# Patient Record
Sex: MALE | Race: WHITE | NOT HISPANIC OR LATINO
[De-identification: names, ages, dates, MRNs, and addresses within clinical notes are randomized per-mention and may not be internally consistent; named-entity substitution may affect disease eponyms.]

---

## 2017-04-06 ENCOUNTER — APPOINTMENT (OUTPATIENT)
Dept: HEART AND VASCULAR | Facility: CLINIC | Age: 82
End: 2017-04-06

## 2017-04-06 VITALS
RESPIRATION RATE: 20 BRPM | DIASTOLIC BLOOD PRESSURE: 47 MMHG | OXYGEN SATURATION: 99 % | WEIGHT: 125.66 LBS | HEART RATE: 64 BPM | HEIGHT: 69 IN | SYSTOLIC BLOOD PRESSURE: 109 MMHG | TEMPERATURE: 99 F

## 2017-04-06 VITALS
HEIGHT: 69 IN | HEART RATE: 49 BPM | OXYGEN SATURATION: 98 % | WEIGHT: 125 LBS | DIASTOLIC BLOOD PRESSURE: 44 MMHG | SYSTOLIC BLOOD PRESSURE: 103 MMHG | BODY MASS INDEX: 18.51 KG/M2

## 2017-04-06 RX ORDER — CHLORHEXIDINE GLUCONATE 213 G/1000ML
1 SOLUTION TOPICAL ONCE
Qty: 0 | Refills: 0 | Status: DISCONTINUED | OUTPATIENT
Start: 2017-04-10 | End: 2017-04-17

## 2017-04-06 NOTE — H&P ADULT - NSHPSOCIALHISTORY_GEN_ALL_CORE
former smoker quit 1980s, denies ETOH use former smoker quit 1980s (previously .[id number] years), denies ETOH use

## 2017-04-06 NOTE — H&P ADULT - PROBLEM SELECTOR PLAN 3
- H/H 7.8/25.9 today, as per verbal report from Dr. Montalvo's office Hgb 12.3 in 07/2016  - Guaic +, Denies gross blood in stool  - GI consult with Dr. Bolton  - NPO in the setting of possible colonscopy  - Discontinue ASA 81mg PO and Plavix 75mg PO in the setting of anemia and duration since last placed stent (4 years)  - Type and Screen ordered- follow up results  - Pt consented for 2L blood transfusion - H/H 7.8/25.9 today, as per verbal report from Dr. Montalvo's office Hgb 12.3 in 07/2016  - Endorsing weight loss and   - Guaic +, Denies gross blood in stool  - GI consult with Dr. Bolton  - NPO in the setting of possible colonscopy  - As per Dr. Mena, discontinue ASA 81mg PO and Plavix 75mg PO in the setting of anemia and duration since last placed stent (4 years)  - Type and Screen ordered- follow up results  - Pt consented for 2L blood transfusion - H/H 7.8/25.9 today, as per verbal report from Dr. Montalvo's office Hgb 12.3 in 07/2016  - Endorsing weight loss and   - Guaic +, Endorses 5lb weight loss, loss of appetite and diarrhea. Denies gross blood in stool  - GI consult with Dr. Bolton  - NPO in the setting of possible colonscopy  - As per Dr. Mena, discontinue ASA 81mg PO and Plavix 75mg PO in the setting of anemia and duration since last placed stent (4 years)  - Type and Screen ordered- follow up results  - Pt consented for blood transfusion  - Pending Echo results, will order 2U PRBCs - H/H 7.8/25.9 today, as per verbal report from Dr. Montalvo's office Hgb 12.3 in 07/2016  - Endorsing weight loss and   - Guaic +, Endorses 5lb weight loss, loss of appetite and diarrhea. Denies gross blood in stool  - GI consult with Dr. Bolton  - NPO in the setting of possible colonscopy  - As per Dr. Mena, discontinue ASA 81mg PO and Plavix 75mg PO in the setting of anemia and duration since last placed stent (4 years)  - Last dose ASA 81mg PO was yesterday, last dose Plavix 75mg PO today per pt  - Type and Screen ordered- follow up results  - Pt consented for blood transfusion  - Pending Echo results, will order 2U PRBCs - H/H 7.8/25.9 today, as per outpt labs Hgb 12.3 in 07/2016  - Guaic +, Endorses 5lb weight loss, loss of appetite and diarrhea. Denies gross blood in stool  - GI consult with Dr. Bolton  - NPO in the setting of possible colonscopy  - As per Dr. Mena, discontinue ASA 81mg PO and Plavix 75mg PO in the setting of anemia and duration since last placed stent (4 years)  - Last dose ASA 81mg PO was yesterday, last dose Plavix 75mg PO today per pt  - Type and Screen ordered- follow up results  - Pt consented and agreeable to blood transfusion  - Pending Type and Screen Results-  will order 2U PRBCs (EF 55-60% by Echo 04/10/17) - H/H 7.8/25.9 today, as per outpt labs Hgb 12.3 in 07/2016  - Guaic +, Endorses 5lb weight loss, loss of appetite and diarrhea. Denies gross blood in stool  - GI consult with Dr. Bolton  - NPO in the setting of possible colonscopy, follow up GI recs  - As per Dr. Mena, discontinue ASA 81mg PO and Plavix 75mg PO in the setting of anemia and duration since last placed stent (4 years)  - Last dose ASA 81mg PO was yesterday, last dose Plavix 75mg PO today per pt  - Type and Screen ordered- follow up results  - Pt consented and agreeable to blood transfusion  - Pending Type and Screen Results-  will order 2U PRBCs (EF 55-60% by Echo 04/10/17) - H/H 7.8/25.9 today, as per outpt labs Hgb 12.3 in 07/2016  - Guaic +, Endorses 5lb weight loss, loss of appetite and diarrhea. Denies gross blood in stool  - GI consult with Dr. Bolton, follow up recs  - As per Dr. Mena, discontinue ASA 81mg PO and Plavix 75mg PO in the setting of anemia and duration since last placed stent (4 years)  - Last dose ASA 81mg PO was yesterday, last dose Plavix 75mg PO today per pt  - Type and Screen ordered- follow up results  - Pt consented and agreeable to blood transfusion  - Pending Type and Screen Results-  will order 2U PRBCs (EF 55-60% by Echo 04/10/17)

## 2017-04-06 NOTE — H&P ADULT - PROBLEM SELECTOR PLAN 1
- Admit to [facility] for telemetry monitoring  - Currently O2 Sat 99% on RA, not in any respiratory distress  - Does not appear to be overloaded by PE  - Echo ordered, follow up results - Admit to [facility] for telemetry monitoring  - Currently O2 Sat 99% on RA, accessory muslce use but not in any respiratory distress  - Does not appear to be overloaded by PE, lungs CTAB, no JVD, O2Sat stable when supine  - Echo ordered, follow up results - Admit to [facility] for telemetry monitoring  - Currently O2 Sat 99% on RA, accessory muslce use but not in any respiratory distress  - WBC 11.4 with left shift. Denies fevers, chills, cough, recent illness. Continue to monitor.   - Does not appear to be overloaded by PE, lungs CTAB, no JVD, O2Sat stable when supine  - Echo ordered, follow up results - Admit to [facility] for telemetry monitoring  - Currently O2 Sat 99% on RA, accessory muslce use but not in any respiratory distress  - WBC 11.4 with left shift. Denies fevers, chills, cough, recent illness. Continue to monitor.   - Does not appear to be overloaded by PE, lungs CTAB, no JVD, O2Sat stable when supine  - Echo 04/10/17 revealed LVEF 55%-60% - Admit to [facility] for telemetry monitoring  - Currently O2 Sat 99% on RA, accessory muslce use but not in any respiratory distress  - WBC 11.4 with left shift. Denies fevers, chills, cough, recent illness. Continue to monitor.   - Does not appear to be overloaded by PE, lungs CTAB, no JVD, O2Sat stable when supine  - Echo 04/10/17 revealed LVEF 55%-60%  - CXR ordered- follow up results - Admit to [facility] for telemetry monitoring  - Currently O2 Sat 99% on RA, accessory muscle use but not in any respiratory distress  - WBC 11.4 with left shift. Denies fevers, chills, cough, recent illness. Continue to monitor.   - Does not appear to be overloaded by PE, lungs CTAB, no JVD, O2Sat stable when supine  - Echo 04/10/17 revealed LVEF 55%-60%  - CXR/ UA ordered- follow up results

## 2017-04-06 NOTE — H&P ADULT - NSHPPHYSICALEXAM_GEN_ALL_CORE
Appearance: Cachectic  HEENT:   Normal oral mucosa, PERRL, EOMI	  Neck: Supple, No Carotid Bruit and 2+ pulses B/L  Cardiovascular: Normal S1 S2, No JVD, No murmurs  Respiratory: + intercostal muscle accessory muscle use, Lungs clear to auscultation, No Rales, Rhonchi, Wheezing	  Gastrointestinal:  + Guaic, Soft, Non-tender, + BS	  Skin: No rashes, No ecchymoses, No cyanosis  Extremities: Normal range of motion, No clubbing, cyanosis or edema  Vascular: Femoral pulses 2+ b/l without bruit, DP 2+ b/l, PT 2+ b/l  Neurologic: Non-focal  Psychiatry: A & O x 3, Mood & affect appropriate Appearance: Cachectic and pale  HEENT:   Normal oral mucosa, PERRL, EOMI	  Neck: Supple, No Carotid Bruit and 2+ pulses B/L  Cardiovascular: Normal S1 S2, No JVD, No murmurs  Respiratory: + intercostal muscle accessory muscle use, Lungs clear to auscultation, No Rales, Rhonchi, Wheezing	  Gastrointestinal:  + Guaic, Soft, Non-tender, + BS	  Skin: No rashes, No ecchymoses, No cyanosis  Extremities: Normal range of motion, No clubbing, cyanosis or edema  Vascular: Femoral pulses 2+ b/l without bruit, DP 2+ b/l, PT 2+ b/l  Neurologic: Non-focal  Psychiatry: A & O x 3, Mood & affect appropriate

## 2017-04-06 NOTE — H&P ADULT - HISTORY OF PRESENT ILLNESS
To bring in medications    Pt is an 87 yo M former smoker, PMHx CAD s/p PCI x3 LCx (2013 @[facility]), HLD, BPH, presented to cardiologist c/o worsening shortness of breath which has progressed over the past year. Patient states symptoms are similar to when he required his previous stents, and he can only walk about a block or less before having to stop secondary to shortness of breath. He states that sometimes getting dressed and walking around the house can also cause SOB. He denies any symptoms such as chest pain, LOC, dizziness, syncope, PND, orthopnea, LE edema, palpitations, asthma/COPD, recent viral illness or travel. On EKG in office patient noticed to be sinus yasmine with IVCD. Pt recommended for cardiac catheterization given history of multiple PCI and EKG abnormalities as well as possible echo/EP evaluation. To bring in medications    * Pt sounded mildly short of breath on phone 4/6/17, able to form full sentences and denied any distress, chest pain, lightheadedness, instructed to please go immediately to emergency room/call EMS if shortness of breath worsened or any symptoms including not limited to chest pain, lightheadedness/dizziness, pt agreed and understood      Pt is an 85 yo M former smoker, PMHx CAD s/p PCI x3 LCx (2013 @[facility]), HLD, BPH, presented to cardiologist c/o worsening shortness of breath which has progressed over the past year. Patient states symptoms are similar to when he required his previous stents, and he can only walk about a block or less before having to stop secondary to shortness of breath. He states that sometimes getting dressed and walking around the house can also cause SOB. He denies any symptoms such as chest pain, LOC, dizziness, syncope, PND, orthopnea, LE edema, palpitations, asthma/COPD, recent viral illness or travel. On EKG in office patient noticed to be sinus yasmine with IVCD. Pt recommended for cardiac catheterization given history of multiple PCI and EKG abnormalities as well as possible echo/EP evaluation. To bring in medications    * Pt sounded mildly short of breath on phone 4/6/17, able to form full sentences and denied any distress, chest pain, lightheadedness, instructed to please go immediately to emergency room/call EMS if shortness of breath worsened or any symptoms including not limited to chest pain, lightheadedness/dizziness, pt agreed and understood      Pt is an 85 yo M former smoker, PMHx CAD s/p PCI x3 LCx (2013 @[facility]), HLD, BPH, presented to cardiologist c/o worsening shortness of breath which has progressed over the past year. Patient states symptoms are similar to when he required his previous stents, and he can only walk about a block or less before having to stop secondary to shortness of breath. He states that sometimes getting dressed and walking around the house can also cause SOB. He denies any symptoms such as chest pain, LOC, dizziness, syncope, PND, orthopnea, LE edema, palpitations, asthma/COPD, recent viral illness or travel. On EKG in office patient noticed to be sinus yasmine with IVCD. Pt recommended for Right and Left cardiac catheterization given history of multiple PCI and EKG abnormalities as well as possible echo/EP evaluation. Pt is an 87 yo M former smoker, PMHx CAD s/p PCI x3 LCx (2013 @[facility]), HLD, BPH, presented to cardiologist c/o worsening shortness of breath which has progressed over the past year. Patient states symptoms are similar to when he required his previous stents, and he can only walk about a block or less before having to stop secondary to shortness of breath. He states that sometimes getting dressed and walking around the house can also cause SOB. Of note, he also endorses a recent loss of appetite over the past month and non-bloody diarrhea x 1 week with associated 5lb weight loss. He denies any symptoms such as chest pain, LOC, dizziness, syncope, PND, orthopnea, LE edema, palpitations, asthma/COPD, recent viral illness or travel, abdominal pain, nausea/vomiting. On EKG in office patient noticed to be sinus yasmine with IVCD. Pt recommended for Right and Left cardiac catheterization given history of multiple PCI and EKG abnormalities as well as possible echo/EP evaluation. Pt is an 85 yo M former smoker, PMHx CAD s/p PCI x3 LCx (2013 @[facility]), HLD, BPH, presented to cardiologist c/o worsening shortness of breath which has progressed over the past year. Patient states symptoms are similar to when he required his previous stents, and he can only walk about a block or less before having to stop secondary to shortness of breath. He states that sometimes getting dressed and walking around the house can also cause SOB. Of note, he also endorses a recent loss of appetite over the past month and non-bloody diarrhea x 1 week with associated 5lb weight loss. He denies any symptoms such as chest pain, LOC, dizziness, syncope, PND, orthopnea, LE edema, palpitations, asthma/COPD, recent viral illness or travel, abdominal pain, nausea/vomiting, hx of blood tranfusion/anemia. On EKG in office patient noticed to be sinus yasmine with IVCD. Pt recommended for Right and Left cardiac catheterization given history of multiple PCI and EKG abnormalities as well as possible echo/EP evaluation. Pt is an 85 yo M former smoker, PMHx CAD s/p PCI x3 LCx (2013 @[facility]), HLD, BPH, depression, presented to cardiologist c/o worsening shortness of breath which has progressed over the past year. Patient states symptoms are similar to when he required his previous stents, and he can only walk about a block or less before having to stop secondary to shortness of breath. He states that sometimes getting dressed and walking around the house can also cause SOB. Of note, he also endorses a recent loss of appetite over the past month and non-bloody diarrhea x 1 week with associated 5lb weight loss. He denies any symptoms such as chest pain, LOC, dizziness, syncope, PND, orthopnea, LE edema, palpitations, asthma/COPD, recent viral illness or travel, abdominal pain, nausea/vomiting, hx of blood tranfusion/anemia. On EKG in office patient noticed to be sinus yasmine with IVCD. Pt recommended for Right and Left cardiac catheterization given history of multiple PCI and EKG abnormalities as well as possible echo/EP evaluation.

## 2017-04-06 NOTE — H&P ADULT - PROBLEM SELECTOR PLAN 2
- s/p DAYANA x3 to LCx 2013 with Dr. Varela  - NIURKA and L heart cardiac cath scheduled 04/10/17 with Dr. Varela cancelled in the setting of anemia - s/p DAYANA x3 to LCx 2013 with Dr. Avery BAH and L heart cardiac cath scheduled 04/10/17 with Dr. Varela cancelled in the setting of symptomatic anemia - s/p DAYANA x3 to LCx 2013 with Dr. Varela  - R and L heart cardiac cath scheduled 04/10/17 with Dr. Varela cancelled in the setting of symptomatic anemia  - As per Dr. Varela re-consider cardiac cath when he is stable

## 2017-04-06 NOTE — H&P ADULT - NSHPLABSRESULTS_GEN_ALL_CORE
EKG Sinus Bradycardia @ 54 BPM with   7.8    11.4  )-----------( 316      ( 10 Apr 2017 11:17 )             25.9       04-10    142  |  111<H>  |  29<H>  ----------------------------<  92  4.3   |  21<L>  |  1.74<H>    Ca    8.4<L>      10 Apr 2017 11:17    TPro  6.6  /  Alb  3.1<L>  /  TBili  0.2  /  DBili  0.07  /  AST  <3<L>  /  ALT  11<L>  /  AlkPhos  58  04-10      PT/INR - ( 10 Apr 2017 11:17 )   PT: 10.6 sec;   INR: 0.96          PTT - ( 10 Apr 2017 11:17 )  PTT:29.7 sec    CARDIAC MARKERS ( 10 Apr 2017 11:17 )  x     / x     / 80 U/L / x     / 4.5 ng/mL EKG Sinus Bradycardia @ 54 BPM with IVCD     7.8    11.4  )-----------( 316      ( 10 Apr 2017 11:17 )             25.9       04-10    142  |  111<H>  |  29<H>  ----------------------------<  92  4.3   |  21<L>  |  1.74<H>    Ca    8.4<L>      10 Apr 2017 11:17    TPro  6.6  /  Alb  3.1<L>  /  TBili  0.2  /  DBili  0.07  /  AST  <3<L>  /  ALT  11<L>  /  AlkPhos  58  04-10      PT/INR - ( 10 Apr 2017 11:17 )   PT: 10.6 sec;   INR: 0.96          PTT - ( 10 Apr 2017 11:17 )  PTT:29.7 sec    CARDIAC MARKERS ( 10 Apr 2017 11:17 )  x     / x     / 80 U/L / x     / 4.5 ng/mL

## 2017-04-06 NOTE — H&P ADULT - ASSESSMENT
Pt is an 87 yo M former smoker, PMHx CAD s/p PCI x3 LCx (2013 @[facility]), HLD, BPH, presented to cardiologist c/o worsening shortness of breath which has progressed over the past year who now presents for cardiac catheterization given history of multiple PCI and EKG abnormalities as well as possible echo/EP evaluation. Pt is an 85 yo M former smoker, PMHx CAD s/p PCI x3 LCx (2013 @[facility]), HLD, BPH, presented to cardiologist c/o worsening shortness of breath which has progressed over the past year who now presented for cardiac catheterization given history of multiple PCI and EKG abnormalities as well as possible echo/EP evaluation, however was found to be Anemic H/H 7.8/25.9 prior to cath and was Guaic + so decision was made to cancel cardiac cath and admit to [facility] for telemetry monitoring.

## 2017-04-06 NOTE — H&P ADULT - PROBLEM SELECTOR PLAN 4
- Continue Simvastatin 20mg PO daily - HR 54 BPM by EKG with IVCD  - As per office note, HR in the 40's at Dr. Mena's office, not on any AVN agents  - As per Dr. Mena, EP consult not necessary at this time  - Monitor telemetry overnight and reconsider in the AM

## 2017-04-09 ENCOUNTER — FORM ENCOUNTER (OUTPATIENT)
Age: 82
End: 2017-04-09

## 2017-04-10 ENCOUNTER — INPATIENT (INPATIENT)
Facility: HOSPITAL | Age: 82
LOS: 13 days | Discharge: NURSING, REHAB OR LONG-TERM CARE | DRG: 329 | End: 2017-04-24
Attending: SURGERY | Admitting: SURGERY
Payer: MEDICARE

## 2017-04-10 DIAGNOSIS — R06.02 SHORTNESS OF BREATH: ICD-10-CM

## 2017-04-10 DIAGNOSIS — D64.9 ANEMIA, UNSPECIFIED: ICD-10-CM

## 2017-04-10 DIAGNOSIS — N18.3 CHRONIC KIDNEY DISEASE, STAGE 3 (MODERATE): ICD-10-CM

## 2017-04-10 DIAGNOSIS — R00.1 BRADYCARDIA, UNSPECIFIED: ICD-10-CM

## 2017-04-10 DIAGNOSIS — E78.5 HYPERLIPIDEMIA, UNSPECIFIED: ICD-10-CM

## 2017-04-10 DIAGNOSIS — I25.10 ATHEROSCLEROTIC HEART DISEASE OF NATIVE CORONARY ARTERY WITHOUT ANGINA PECTORIS: ICD-10-CM

## 2017-04-10 DIAGNOSIS — K92.1 MELENA: ICD-10-CM

## 2017-04-10 DIAGNOSIS — Z98.49 CATARACT EXTRACTION STATUS, UNSPECIFIED EYE: Chronic | ICD-10-CM

## 2017-04-10 LAB
ALBUMIN SERPL ELPH-MCNC: 3.1 G/DL — LOW (ref 3.4–5)
ALP SERPL-CCNC: 58 U/L — SIGNIFICANT CHANGE UP (ref 40–120)
ALT FLD-CCNC: 11 U/L — LOW (ref 12–42)
ANION GAP SERPL CALC-SCNC: 10 MMOL/L — SIGNIFICANT CHANGE UP (ref 9–16)
APTT BLD: 29.7 SEC — SIGNIFICANT CHANGE UP (ref 27.5–37.4)
AST SERPL-CCNC: <3 U/L — LOW (ref 15–37)
BASOPHILS NFR BLD AUTO: 0.2 % — SIGNIFICANT CHANGE UP (ref 0–2)
BILIRUB DIRECT SERPL-MCNC: 0.07 MG/DL — SIGNIFICANT CHANGE UP
BILIRUB INDIRECT FLD-MCNC: 0.1 MG/DL — LOW (ref 0.2–1)
BILIRUB SERPL-MCNC: 0.2 MG/DL — SIGNIFICANT CHANGE UP (ref 0.2–1.2)
BUN SERPL-MCNC: 29 MG/DL — HIGH (ref 7–23)
CALCIUM SERPL-MCNC: 8.4 MG/DL — LOW (ref 8.5–10.5)
CHLORIDE SERPL-SCNC: 111 MMOL/L — HIGH (ref 96–108)
CHOLEST SERPL-MCNC: 138 MG/DL — SIGNIFICANT CHANGE UP
CK MB CFR SERPL CALC: 4.5 NG/ML — HIGH (ref 0.5–3.6)
CK SERPL-CCNC: 80 U/L — SIGNIFICANT CHANGE UP (ref 39–308)
CO2 SERPL-SCNC: 21 MMOL/L — LOW (ref 22–31)
CREAT SERPL-MCNC: 1.74 MG/DL — HIGH (ref 0.5–1.3)
EOSINOPHIL NFR BLD AUTO: 1 % — SIGNIFICANT CHANGE UP (ref 0–6)
FERRITIN SERPL-MCNC: 9.2 NG/ML — LOW (ref 26–388)
GLUCOSE SERPL-MCNC: 92 MG/DL — SIGNIFICANT CHANGE UP (ref 70–99)
HBA1C BLD-MCNC: 4.8 % — SIGNIFICANT CHANGE UP (ref 4.8–5.6)
HCT VFR BLD CALC: 25.9 % — LOW (ref 39–50)
HCT VFR BLD CALC: 29.8 % — LOW (ref 39–50)
HCT VFR BLD CALC: 29.9 % — LOW (ref 39–50)
HDLC SERPL-MCNC: 56 MG/DL — SIGNIFICANT CHANGE UP
HGB BLD-MCNC: 7.8 G/DL — LOW (ref 13–17)
HGB BLD-MCNC: 9.4 G/DL — LOW (ref 13–17)
HGB BLD-MCNC: 9.5 G/DL — LOW (ref 13–17)
INR BLD: 0.96 — SIGNIFICANT CHANGE UP (ref 0.88–1.16)
IRON SATN MFR SERPL: 14 UG/DL — LOW (ref 65–175)
IRON SATN MFR SERPL: 5 % — LOW (ref 26–39)
LIPID PNL WITH DIRECT LDL SERPL: 50 MG/DL — SIGNIFICANT CHANGE UP
LYMPHOCYTES # BLD AUTO: 5.4 % — LOW (ref 13–44)
MCHC RBC-ENTMCNC: 24.8 PG — LOW (ref 27–34)
MCHC RBC-ENTMCNC: 26 PG — LOW (ref 27–34)
MCHC RBC-ENTMCNC: 26 PG — LOW (ref 27–34)
MCHC RBC-ENTMCNC: 30.1 G/DL — LOW (ref 32–36)
MCHC RBC-ENTMCNC: 31.5 G/DL — LOW (ref 32–36)
MCHC RBC-ENTMCNC: 31.8 G/DL — LOW (ref 32–36)
MCV RBC AUTO: 81.7 FL — SIGNIFICANT CHANGE UP (ref 80–100)
MCV RBC AUTO: 82.2 FL — SIGNIFICANT CHANGE UP (ref 80–100)
MCV RBC AUTO: 82.5 FL — SIGNIFICANT CHANGE UP (ref 80–100)
MONOCYTES NFR BLD AUTO: 8.2 % — SIGNIFICANT CHANGE UP (ref 2–14)
NEUTROPHILS NFR BLD AUTO: 85.2 % — HIGH (ref 43–77)
PLATELET # BLD AUTO: 316 K/UL — SIGNIFICANT CHANGE UP (ref 150–400)
PLATELET # BLD AUTO: 325 K/UL — SIGNIFICANT CHANGE UP (ref 150–400)
PLATELET # BLD AUTO: 346 K/UL — SIGNIFICANT CHANGE UP (ref 150–400)
POTASSIUM SERPL-MCNC: 4.3 MMOL/L — SIGNIFICANT CHANGE UP (ref 3.5–5.3)
POTASSIUM SERPL-SCNC: 4.3 MMOL/L — SIGNIFICANT CHANGE UP (ref 3.5–5.3)
PROT SERPL-MCNC: 6.6 G/DL — SIGNIFICANT CHANGE UP (ref 6.4–8.2)
PROTHROM AB SERPL-ACNC: 10.6 SEC — SIGNIFICANT CHANGE UP (ref 9.8–12.7)
RBC # BLD: 3.15 M/UL — LOW (ref 4.2–5.8)
RBC # BLD: 3.61 M/UL — LOW (ref 4.2–5.8)
RBC # BLD: 3.66 M/UL — LOW (ref 4.2–5.8)
RBC # FLD: 15.3 % — SIGNIFICANT CHANGE UP (ref 10.3–16.9)
RBC # FLD: 15.5 % — SIGNIFICANT CHANGE UP (ref 10.3–16.9)
RBC # FLD: 16.5 % — SIGNIFICANT CHANGE UP (ref 10.3–16.9)
SODIUM SERPL-SCNC: 142 MMOL/L — SIGNIFICANT CHANGE UP (ref 135–145)
TIBC SERPL-MCNC: 311 UG/DL — SIGNIFICANT CHANGE UP (ref 250–450)
TOTAL CHOLESTEROL/HDL RATIO MEASUREMENT: 2.5 RATIO — SIGNIFICANT CHANGE UP
TRIGL SERPL-MCNC: 158 MG/DL — HIGH
WBC # BLD: 10.4 K/UL — SIGNIFICANT CHANGE UP (ref 3.8–10.5)
WBC # BLD: 11.4 K/UL — HIGH (ref 3.8–10.5)
WBC # BLD: 11.4 K/UL — HIGH (ref 3.8–10.5)
WBC # FLD AUTO: 10.4 K/UL — SIGNIFICANT CHANGE UP (ref 3.8–10.5)
WBC # FLD AUTO: 11.4 K/UL — HIGH (ref 3.8–10.5)
WBC # FLD AUTO: 11.4 K/UL — HIGH (ref 3.8–10.5)

## 2017-04-10 PROCEDURE — 93010 ELECTROCARDIOGRAM REPORT: CPT

## 2017-04-10 PROCEDURE — 71020: CPT | Mod: 26

## 2017-04-10 PROCEDURE — 99223 1ST HOSP IP/OBS HIGH 75: CPT

## 2017-04-10 RX ORDER — ASPIRIN/CALCIUM CARB/MAGNESIUM 324 MG
325 TABLET ORAL ONCE
Qty: 0 | Refills: 0 | Status: DISCONTINUED | OUTPATIENT
Start: 2017-04-10 | End: 2017-04-10

## 2017-04-10 RX ORDER — GABAPENTIN 400 MG/1
100 CAPSULE ORAL DAILY
Qty: 0 | Refills: 0 | Status: DISCONTINUED | OUTPATIENT
Start: 2017-04-10 | End: 2017-04-17

## 2017-04-10 RX ORDER — LISINOPRIL 2.5 MG/1
10 TABLET ORAL DAILY
Qty: 0 | Refills: 0 | Status: DISCONTINUED | OUTPATIENT
Start: 2017-04-10 | End: 2017-04-17

## 2017-04-10 RX ORDER — SODIUM FERRIC GLUCONAT/SUCROSE 62.5MG/5ML
100 AMPUL (ML) INTRAVENOUS DAILY
Qty: 0 | Refills: 0 | Status: DISCONTINUED | OUTPATIENT
Start: 2017-04-10 | End: 2017-04-11

## 2017-04-10 RX ORDER — MIRTAZAPINE 45 MG/1
15 TABLET, ORALLY DISINTEGRATING ORAL AT BEDTIME
Qty: 0 | Refills: 0 | Status: DISCONTINUED | OUTPATIENT
Start: 2017-04-10 | End: 2017-04-17

## 2017-04-10 RX ORDER — SIMVASTATIN 20 MG/1
20 TABLET, FILM COATED ORAL AT BEDTIME
Qty: 0 | Refills: 0 | Status: DISCONTINUED | OUTPATIENT
Start: 2017-04-10 | End: 2017-04-17

## 2017-04-10 RX ORDER — PANTOPRAZOLE SODIUM 20 MG/1
40 TABLET, DELAYED RELEASE ORAL
Qty: 0 | Refills: 0 | Status: DISCONTINUED | OUTPATIENT
Start: 2017-04-10 | End: 2017-04-17

## 2017-04-10 RX ORDER — SODIUM FERRIC GLUCONAT/SUCROSE 62.5MG/5ML
25 AMPUL (ML) INTRAVENOUS ONCE
Qty: 0 | Refills: 0 | Status: COMPLETED | OUTPATIENT
Start: 2017-04-10 | End: 2017-04-10

## 2017-04-10 RX ORDER — AMLODIPINE BESYLATE 2.5 MG/1
2.5 TABLET ORAL DAILY
Qty: 0 | Refills: 0 | Status: DISCONTINUED | OUTPATIENT
Start: 2017-04-10 | End: 2017-04-17

## 2017-04-10 RX ORDER — BUDESONIDE AND FORMOTEROL FUMARATE DIHYDRATE 160; 4.5 UG/1; UG/1
2 AEROSOL RESPIRATORY (INHALATION)
Qty: 0 | Refills: 0 | Status: DISCONTINUED | OUTPATIENT
Start: 2017-04-10 | End: 2017-04-24

## 2017-04-10 RX ADMIN — SIMVASTATIN 20 MILLIGRAM(S): 20 TABLET, FILM COATED ORAL at 23:36

## 2017-04-10 RX ADMIN — MIRTAZAPINE 15 MILLIGRAM(S): 45 TABLET, ORALLY DISINTEGRATING ORAL at 23:36

## 2017-04-10 RX ADMIN — Medication 208 MILLIGRAM(S): at 23:36

## 2017-04-10 NOTE — PROVIDER CONTACT NOTE (OTHER) - SITUATION
Pt blood transfusion was completed @ 1937 as per DARRELL Posada. a CBC was taken post transfusion and results came back @ 1951. Hgb was 9.4 , Hct 29.8 RBC 3.61

## 2017-04-10 NOTE — PROGRESS NOTE ADULT - SUBJECTIVE AND OBJECTIVE BOX
Pt is an 87 yo M former smoker, PMHx CAD s/p PCI x3 LCx (2013 @[facility]), HLD, BPH, depression, presented to cardiologist c/o worsening shortness of breath which has progressed over the past year. Symptoms are similar to when he required his previous stents, and he can only walk about a block or less before having to stop secondary to shortness of breath. He states that sometimes getting dressed and walking around the house can also cause SOB. Of note, he also endorses a recent loss of appetite over the past month and non-bloody diarrhea x 1 week with associated 5lb weight loss. He denies any symptoms such as chest pain, LOC, dizziness, syncope, PND, orthopnea, LE edema, palpitations, asthma/COPD, recent viral illness or travel, abdominal pain, nausea/vomiting, hx of blood tranfusion/anemia. On EKG in office patient noticed to be sinus yasmine with IVCD.Recommended for Right and Left cardiac catheterization given history of multiple PCI and EKG abnormalities as well as possible echo/EP evaluation noted to have anemia and heme positive stool.  Last colonoscopy normal 5 years ago,  Never had egd and did not notice any melena.      REVIEW OF SYSTEMS:  Constitutional: No fever, weight loss or fatigue  ENMT:  No difficulty hearing, tinnitus, vertigo; No sinus or throat pain  Respiratory: No cough, wheezing, chills or hemoptysis  Cardiovascular: No chest pain, palpitations, dizziness or leg swelling  Gastrointestinal: No abdominal or epigastric pain. No nausea, vomiting or hematemesis; No diarrhea or constipation. No melena or hematochezia.  Skin: No itching, burning, rashes or lesions   Musculoskeletal: No joint pain or swelling; No muscle, back or extremity pain    PAST MEDICAL & SURGICAL HISTORY:  CAD (coronary artery disease)  HLD (hyperlipidemia)  S/P cataract extraction      FAMILY HISTORY:  No pertinent family history in first degree relatives      SOCIAL HISTORY:  Smoking Status: [ ] Current, [ ] Former, [ ] Never  Pack Years:    MEDICATIONS:  MEDICATIONS  (STANDING):  chlorhexidine 4% Liquid 1Application(s) Topical once  lisinopril 10milliGRAM(s) Oral daily  gabapentin 100milliGRAM(s) Oral daily  mirtazapine 15milliGRAM(s) Oral at bedtime  PARoxetine 20milliGRAM(s) Oral daily  simvastatin 20milliGRAM(s) Oral at bedtime  amLODIPine   Tablet 2.5milliGRAM(s) Oral daily  pantoprazole    Tablet 40milliGRAM(s) Oral before breakfast  buDESOnide 160 MICROgram(s)/formoterol 4.5 MICROgram(s) Inhaler 2Puff(s) Inhalation two times a day  sodium ferric gluconate complex IVPB 25milliGRAM(s) IV Intermittent once  sodium ferric gluconate complex IVPB 100milliGRAM(s) IV Intermittent daily    MEDICATIONS  (PRN):      Allergies    No Known Allergies    Intolerances        Vital Signs Last 24 Hrs  T(C): --  T(F): --  HR: 59 (59 - 59)  BP: 154/65 (154/65 - 154/65)  BP(mean): --  RR: 16 (16 - 16)  SpO2: 95% (95% - 95%)        PHYSICAL EXAM:    General: Well developed; well nourished; in no acute distress  HEENT: MMM, conjunctiva and sclera clear  Gastrointestinal: Soft, non-tender non-distended; Normal bowel sounds; No rebound or guarding  Extremities: Normal range of motion, No clubbing, cyanosis or edema  Neurological: Alert and oriented x3  Skin: Warm and dry. No obvious rash      LABS:                        7.8    11.4  )-----------( 316      ( 10 Apr 2017 11:17 )             25.9     04-10    142  |  111<H>  |  29<H>  ----------------------------<  92  4.3   |  21<L>  |  1.74<H>    Ca    8.4<L>      10 Apr 2017 11:17    TPro  6.6  /  Alb  3.1<L>  /  TBili  0.2  /  DBili  0.07  /  AST  <3<L>  /  ALT  11<L>  /  AlkPhos  58  04-10          RADIOLOGY & ADDITIONAL STUDIES:

## 2017-04-11 ENCOUNTER — RESULT REVIEW (OUTPATIENT)
Age: 82
End: 2017-04-11

## 2017-04-11 LAB
ANION GAP SERPL CALC-SCNC: 8 MMOL/L — LOW (ref 9–16)
APPEARANCE UR: CLEAR — SIGNIFICANT CHANGE UP
BACTERIA # UR AUTO: PRESENT /HPF
BASOPHILS NFR BLD AUTO: 0.2 % — SIGNIFICANT CHANGE UP (ref 0–2)
BILIRUB UR-MCNC: NEGATIVE — SIGNIFICANT CHANGE UP
BUN SERPL-MCNC: 25 MG/DL — HIGH (ref 7–23)
CALCIUM SERPL-MCNC: 8.1 MG/DL — LOW (ref 8.5–10.5)
CHLORIDE SERPL-SCNC: 113 MMOL/L — HIGH (ref 96–108)
CO2 SERPL-SCNC: 22 MMOL/L — SIGNIFICANT CHANGE UP (ref 22–31)
COLOR SPEC: YELLOW — SIGNIFICANT CHANGE UP
COMMENT - URINE: SIGNIFICANT CHANGE UP
CREAT SERPL-MCNC: 1.43 MG/DL — HIGH (ref 0.5–1.3)
DIFF PNL FLD: (no result)
EOSINOPHIL NFR BLD AUTO: 3.6 % — SIGNIFICANT CHANGE UP (ref 0–6)
EPI CELLS # UR: SIGNIFICANT CHANGE UP /HPF
FERRITIN SERPL-MCNC: 37.9 NG/ML — SIGNIFICANT CHANGE UP (ref 26–388)
FOLATE SERPL-MCNC: 17.6 NG/ML — SIGNIFICANT CHANGE UP (ref 4.8–24.2)
GLUCOSE SERPL-MCNC: 90 MG/DL — SIGNIFICANT CHANGE UP (ref 70–99)
GLUCOSE UR QL: NEGATIVE — SIGNIFICANT CHANGE UP
HCT VFR BLD CALC: 28.2 % — LOW (ref 39–50)
HGB BLD-MCNC: 8.9 G/DL — LOW (ref 13–17)
KETONES UR-MCNC: (no result) MG/DL
LEUKOCYTE ESTERASE UR-ACNC: NEGATIVE — SIGNIFICANT CHANGE UP
LYMPHOCYTES # BLD AUTO: 11.7 % — LOW (ref 13–44)
MAGNESIUM SERPL-MCNC: 2 MG/DL — SIGNIFICANT CHANGE UP (ref 1.6–2.4)
MCHC RBC-ENTMCNC: 25.8 PG — LOW (ref 27–34)
MCHC RBC-ENTMCNC: 31.6 G/DL — LOW (ref 32–36)
MCV RBC AUTO: 81.7 FL — SIGNIFICANT CHANGE UP (ref 80–100)
MONOCYTES NFR BLD AUTO: 12.1 % — SIGNIFICANT CHANGE UP (ref 2–14)
NEUTROPHILS NFR BLD AUTO: 72.4 % — SIGNIFICANT CHANGE UP (ref 43–77)
NITRITE UR-MCNC: NEGATIVE — SIGNIFICANT CHANGE UP
PH UR: 5.5 — SIGNIFICANT CHANGE UP (ref 4–8)
PLATELET # BLD AUTO: 300 K/UL — SIGNIFICANT CHANGE UP (ref 150–400)
POTASSIUM SERPL-MCNC: 4.2 MMOL/L — SIGNIFICANT CHANGE UP (ref 3.5–5.3)
POTASSIUM SERPL-SCNC: 4.2 MMOL/L — SIGNIFICANT CHANGE UP (ref 3.5–5.3)
PROT UR-MCNC: NEGATIVE MG/DL — SIGNIFICANT CHANGE UP
RBC # BLD: 3.45 M/UL — LOW (ref 4.2–5.8)
RBC # FLD: 15.4 % — SIGNIFICANT CHANGE UP (ref 10.3–16.9)
RBC CASTS # UR COMP ASSIST: < 5 /HPF — SIGNIFICANT CHANGE UP
SODIUM SERPL-SCNC: 143 MMOL/L — SIGNIFICANT CHANGE UP (ref 135–145)
SP GR SPEC: 1.02 — SIGNIFICANT CHANGE UP (ref 1–1.03)
TSH SERPL-MCNC: 1.04 UIU/ML — SIGNIFICANT CHANGE UP (ref 0.35–4.94)
UROBILINOGEN FLD QL: 0.2 E.U./DL — SIGNIFICANT CHANGE UP
VIT B12 SERPL-MCNC: 611 PG/ML — SIGNIFICANT CHANGE UP (ref 243–894)
WBC # BLD: 8.5 K/UL — SIGNIFICANT CHANGE UP (ref 3.8–10.5)
WBC # FLD AUTO: 8.5 K/UL — SIGNIFICANT CHANGE UP (ref 3.8–10.5)
WBC UR QL: < 5 /HPF — SIGNIFICANT CHANGE UP

## 2017-04-11 PROCEDURE — 99233 SBSQ HOSP IP/OBS HIGH 50: CPT

## 2017-04-11 RX ORDER — SODIUM FERRIC GLUCONAT/SUCROSE 62.5MG/5ML
100 AMPUL (ML) INTRAVENOUS ONCE
Qty: 0 | Refills: 0 | Status: COMPLETED | OUTPATIENT
Start: 2017-04-11 | End: 2017-04-11

## 2017-04-11 RX ORDER — ALBUTEROL 90 UG/1
1 AEROSOL, METERED ORAL EVERY 4 HOURS
Qty: 0 | Refills: 0 | Status: DISCONTINUED | OUTPATIENT
Start: 2017-04-11 | End: 2017-04-24

## 2017-04-11 RX ORDER — SODIUM FERRIC GLUCONAT/SUCROSE 62.5MG/5ML
125 AMPUL (ML) INTRAVENOUS DAILY
Qty: 0 | Refills: 0 | Status: DISCONTINUED | OUTPATIENT
Start: 2017-04-11 | End: 2017-04-12

## 2017-04-11 RX ORDER — TIOTROPIUM BROMIDE 18 UG/1
1 CAPSULE ORAL; RESPIRATORY (INHALATION) DAILY
Qty: 0 | Refills: 0 | Status: DISCONTINUED | OUTPATIENT
Start: 2017-04-11 | End: 2017-04-24

## 2017-04-11 RX ORDER — SOD SULF/SODIUM/NAHCO3/KCL/PEG
4000 SOLUTION, RECONSTITUTED, ORAL ORAL ONCE
Qty: 0 | Refills: 0 | Status: COMPLETED | OUTPATIENT
Start: 2017-04-11 | End: 2017-04-11

## 2017-04-11 RX ORDER — IPRATROPIUM/ALBUTEROL SULFATE 18-103MCG
3 AEROSOL WITH ADAPTER (GRAM) INHALATION EVERY 6 HOURS
Qty: 0 | Refills: 0 | Status: DISCONTINUED | OUTPATIENT
Start: 2017-04-11 | End: 2017-04-24

## 2017-04-11 RX ADMIN — Medication 3 MILLILITER(S): at 10:33

## 2017-04-11 RX ADMIN — Medication 55 MILLIGRAM(S): at 12:08

## 2017-04-11 RX ADMIN — Medication 3 MILLILITER(S): at 21:51

## 2017-04-11 RX ADMIN — SIMVASTATIN 20 MILLIGRAM(S): 20 TABLET, FILM COATED ORAL at 21:51

## 2017-04-11 RX ADMIN — BUDESONIDE AND FORMOTEROL FUMARATE DIHYDRATE 2 PUFF(S): 160; 4.5 AEROSOL RESPIRATORY (INHALATION) at 17:53

## 2017-04-11 RX ADMIN — GABAPENTIN 100 MILLIGRAM(S): 400 CAPSULE ORAL at 11:34

## 2017-04-11 RX ADMIN — BUDESONIDE AND FORMOTEROL FUMARATE DIHYDRATE 2 PUFF(S): 160; 4.5 AEROSOL RESPIRATORY (INHALATION) at 01:15

## 2017-04-11 RX ADMIN — Medication 20 MILLIGRAM(S): at 11:34

## 2017-04-11 RX ADMIN — MIRTAZAPINE 15 MILLIGRAM(S): 45 TABLET, ORALLY DISINTEGRATING ORAL at 21:51

## 2017-04-11 RX ADMIN — LISINOPRIL 10 MILLIGRAM(S): 2.5 TABLET ORAL at 06:24

## 2017-04-11 RX ADMIN — AMLODIPINE BESYLATE 2.5 MILLIGRAM(S): 2.5 TABLET ORAL at 06:24

## 2017-04-11 RX ADMIN — PANTOPRAZOLE SODIUM 40 MILLIGRAM(S): 20 TABLET, DELAYED RELEASE ORAL at 06:24

## 2017-04-11 RX ADMIN — BUDESONIDE AND FORMOTEROL FUMARATE DIHYDRATE 2 PUFF(S): 160; 4.5 AEROSOL RESPIRATORY (INHALATION) at 06:25

## 2017-04-11 RX ADMIN — Medication 4000 MILLILITER(S): at 23:03

## 2017-04-11 RX ADMIN — Medication 54 MILLIGRAM(S): at 01:15

## 2017-04-11 NOTE — PROGRESS NOTE ADULT - ASSESSMENT
Pt is an 87 yo M former smoker, PMHx CAD s/p PCI x3 LCx (2013 @[facility]), HLD, BPH, presented to cardiologist c/o worsening shortness of breath which has progressed over the past year who now presented for cardiac catheterization given history of multiple PCI and EKG abnormalities as well as possible echo/EP evaluation, however was found to be Anemic H/H 7.8/25.9 prior to cath and was Guaic + so decision was made to cancel cardiac cath and admit to [facility] for telemetry monitoring.

## 2017-04-11 NOTE — PROGRESS NOTE ADULT - PROBLEM SELECTOR PLAN 1
-  O2 Sat 99% on RA, tachypneic, not in any respiratory distress  - WBC 11.4 with left shift. Denies fevers, chills, cough, recent illness. Continue to monitor.   - Does not appear to be overloaded by PE, lungs CTAB, no JVD, O2Sat stable when supine  - Echo 04/10/17 revealed LVEF 55%-60%  - CXR/ UA ordered- follow up results -  O2 Sat 99% on RA, tachypneic, not in any respiratory distress  - acute anemia hgb 7.8 s/p 1 unit prbcs improved to 8.9 this AM  - WBC 11.4 down to 8.5. Denies fevers, chills, cough, recent illness. Continue to monitor.   - Does not appear to be overloaded by PE, lungs CTAB, no JVD, O2Sat stable when supine  - Echo 04/10/17 revealed LVEF 55%-60%, Normal left ventricular size and wall thickness. The left ventricular wall motion is normal, no HD valvular dz, no effusion  - CXR reveals hyperinflation c/w emphysema, no congestion or infiltrate, f/u official read

## 2017-04-11 NOTE — PROGRESS NOTE ADULT - SUBJECTIVE AND OBJECTIVE BOX
Pt seen and examined [employer]fuse.  No complaints except for respiratory issue earlier    REVIEW OF SYSTEMS:  Constitutional: No fever, weight loss or fatigue  Cardiovascular: No chest pain, palpitations, dizziness or leg swelling  Gastrointestinal: No abdominal or epigastric pain. No nausea, vomiting or hematemesis; No diarrhea or constipation. No melena or hematochezia.  Skin: No itching, burning, rashes or lesions       MEDICATIONS:  MEDICATIONS  (STANDING):  chlorhexidine 4% Liquid 1Application(s) Topical once  lisinopril 10milliGRAM(s) Oral daily  gabapentin 100milliGRAM(s) Oral daily  mirtazapine 15milliGRAM(s) Oral at bedtime  PARoxetine 20milliGRAM(s) Oral daily  simvastatin 20milliGRAM(s) Oral at bedtime  amLODIPine   Tablet 2.5milliGRAM(s) Oral daily  pantoprazole    Tablet 40milliGRAM(s) Oral before breakfast  buDESOnide 160 MICROgram(s)/formoterol 4.5 MICROgram(s) Inhaler 2Puff(s) Inhalation two times a day  sodium ferric gluconate complex IVPB 100milliGRAM(s) IV Intermittent daily  sodium ferric gluconate complex IVPB 125milliGRAM(s) IV Intermittent daily    MEDICATIONS  (PRN):      Allergies    No Known Allergies    Intolerances        Vital Signs Last 24 Hrs  T(C): 36.4, Max: 37.2 (04-10 @ 21:00)  T(F): 97.6, Max: 98.9 (04-10 @ 21:00)  HR: 52 (52 - 60)  BP: 149/67 (118/72 - 159/61)  BP(mean): --  RR: 16 (15 - 16)  SpO2: 98% (95% - 98%)    I & Os for current day (as of 04-11 @ 10:19)  =============================================  IN: 360 ml / OUT: 600 ml / NET: -240 ml      PHYSICAL EXAM:    General: in no acute distress  HEENT: MMM, conjunctiva and sclera clear  Gastrointestinal: Soft non-tender non-distended; Normal bowel sounds; No hepatosplenomegaly  Skin: Warm and dry. No obvious rash    LABS:      CBC Full  -  ( 11 Apr 2017 06:24 )  WBC Count : 8.5 K/uL  Hemoglobin : 8.9 g/dL  Hematocrit : 28.2 %  Platelet Count - Automated : 300 K/uL  Mean Cell Volume : 81.7 fL  Mean Cell Hemoglobin : 25.8 pg  Mean Cell Hemoglobin Concentration : 31.6 g/dL  Auto Neutrophil # : x  Auto Lymphocyte # : x  Auto Monocyte # : x  Auto Eosinophil # : x  Auto Basophil # : x  Auto Neutrophil % : 72.4 %  Auto Lymphocyte % : 11.7 %  Auto Monocyte % : 12.1 %  Auto Eosinophil % : 3.6 %  Auto Basophil % : 0.2 %    04-11    143  |  113<H>  |  25<H>  ----------------------------<  90  4.2   |  22  |  1.43<H>    Ca    8.1<L>      11 Apr 2017 06:26  Mg     2.0     04-11    TPro  6.6  /  Alb  3.1<L>  /  TBili  0.2  /  DBili  0.07  /  AST  <3<L>  /  ALT  11<L>  /  AlkPhos  58  04-10    PT/INR - ( 10 Apr 2017 11:17 )   PT: 10.6 sec;   INR: 0.96          PTT - ( 10 Apr 2017 11:17 )  PTT:29.7 sec                  RADIOLOGY & ADDITIONAL STUDIES (The following images were personally reviewed):

## 2017-04-11 NOTE — PROGRESS NOTE ADULT - PROBLEM SELECTOR PLAN 2
egd no bleed, cears today, golytely this pm, colonoscopy tomorrow egd no bleed, clears today, golytely this pm, colonoscopy tomorrow

## 2017-04-11 NOTE — PROGRESS NOTE ADULT - PROBLEM SELECTOR PLAN 6
- Continue Simvastatin 20mg PO daily - Continue Simvastatin 20mg PO daily    DVT PPX: holding AC in setting of acute anemia, colonoscopy in AM  GI PPx: protonix 40 mg PO QD

## 2017-04-11 NOTE — PROGRESS NOTE ADULT - SUBJECTIVE AND OBJECTIVE BOX
HPI:  Pt is an 87 yo M former smoker, PMHx CAD s/p PCI x3 LCx (2013 @[facility]), HLD, BPH, depression, presented to cardiologist c/o worsening shortness of breath which has progressed over the past year. Patient states symptoms are similar to when he required his previous stents, and he can only walk about a block or less before having to stop secondary to shortness of breath. He states that sometimes getting dressed and walking around the house can also cause SOB. Of note, he also endorses a recent loss of appetite over the past month and non-bloody diarrhea x 1 week with associated 5lb weight loss. He denies any symptoms such as chest pain, LOC, dizziness, syncope, PND, orthopnea, LE edema, palpitations, asthma/COPD, recent viral illness or travel, abdominal pain, nausea/vomiting, hx of blood tranfusion/anemia. On EKG in office patient noticed to be sinus yasmine with IVCD. Pt recommended for Right and Left cardiac catheterization given history of multiple PCI and EKG abnormalities as well as possible echo/EP evaluation. (06 Apr 2017 16:15)    FAMILY HISTORY:  No pertinent family history in first degree relatives    MEDICATIONS  (STANDING):  chlorhexidine 4% Liquid 1Application(s) Topical once  lisinopril 10milliGRAM(s) Oral daily  gabapentin 100milliGRAM(s) Oral daily  mirtazapine 15milliGRAM(s) Oral at bedtime  PARoxetine 20milliGRAM(s) Oral daily  simvastatin 20milliGRAM(s) Oral at bedtime  amLODIPine   Tablet 2.5milliGRAM(s) Oral daily  pantoprazole    Tablet 40milliGRAM(s) Oral before breakfast  buDESOnide 160 MICROgram(s)/formoterol 4.5 MICROgram(s) Inhaler 2Puff(s) Inhalation two times a day  sodium ferric gluconate complex IVPB 100milliGRAM(s) IV Intermittent daily  sodium ferric gluconate complex IVPB 125milliGRAM(s) IV Intermittent daily    MEDICATIONS  (PRN):    Vital Signs Last 24 Hrs  T(C): 36.4, Max: 37.2 (04-10 @ 21:00)  T(F): 97.6, Max: 98.9 (04-10 @ 21:00)  HR: 52 (52 - 60)  BP: 149/67 (118/72 - 159/61)  BP(mean): --  RR: 16 (15 - 16)  SpO2: 98% (95% - 98%)    Physical exam:    Overall impression  Lymphadenopathy  Liver  spleen    Labs:  CBC Full  -  ( 11 Apr 2017 06:24 )  WBC Count : 8.5 K/uL  Hemoglobin : 8.9 g/dL  Hematocrit : 28.2 %  Platelet Count - Automated : 300 K/uL  Mean Cell Volume : 81.7 fL  Mean Cell Hemoglobin : 25.8 pg  Mean Cell Hemoglobin Concentration : 31.6 g/dL  Auto Neutrophil # : x  Auto Lymphocyte # : x  Auto Monocyte # : x  Auto Eosinophil # : x  Auto Basophil # : x  Auto Neutrophil % : 72.4 %  Auto Lymphocyte % : 11.7 %  Auto Monocyte % : 12.1 %  Auto Eosinophil % : 3.6 %  Auto Basophil % : 0.2 %    04-11    143  |  113<H>  |  25<H>  ----------------------------<  90  4.2   |  22  |  1.43<H>    Ca    8.1<L>      11 Apr 2017 06:26  Mg     2.0     04-11    TPro  6.6  /  Alb  3.1<L>  /  TBili  0.2  /  DBili  0.07  /  AST  <3<L>  /  ALT  11<L>  /  AlkPhos  58  04-10      Radiology:  HEALTH ISSUES - R/O PROBLEM Dx:      Assessmant / Problems  1) iron deficiency anemia on iv iron    Plan:    1) iv iron  2) colonoscopy /EGD  3)CEA  4) monitor Hg/Hcrt  Thank you  Darlin Ta MD

## 2017-04-11 NOTE — PROGRESS NOTE ADULT - PROBLEM SELECTOR PLAN 3
- H/H 7.8/25.9 today, as per outpt labs Hgb 12.3 in 07/2016  - [place] +, Endorses 5lb weight loss, loss of appetite and diarrhea. Denies gross blood in stool  - GI consult with Dr. Bolton, follow up recs  - As per Dr. Mena, discontinue ASA 81mg PO and Plavix 75mg PO in the setting of anemia and duration since last placed stent (4 years)  - Last dose ASA 81mg PO was yesterday, last dose Plavix 75mg PO today per pt  - Type and Screen ordered- follow up results  - Pt consented and agreeable to blood transfusion  - Pending Type and Screen Results-  will order 2U PRBCs (EF 55-60% by Echo 04/10/17) Heme/onc (Zaharia) / GI (Harman)  - H/H 7.8/25.9 today, as per outpt labs Hgb 12.3 in 07/2016  - [place] +, Endorses 5lb weight loss, loss of appetite and diarrhea. Denies gross blood in stool  - As per Dr. Mena, discontinue ASA 81mg PO and Plavix 75mg PO in the setting of anemia and duration since last placed stent (4 years)  - Last dose ASA 81mg PO was yesterday, last dose Plavix 75mg PO today per pt  - s/p 1 unit PRBCs with improvement to hgb of 8.9/28.2  - follow up folate, B12, CEA, IV iron ordered  - Pt s/p EGD today neg for acute bleed, NPO after MN and clears for colonoscopy in AM concerned for malignancy

## 2017-04-11 NOTE — PROGRESS NOTE ADULT - SUBJECTIVE AND OBJECTIVE BOX
Interventional Cardiology PA Adult Progress Note    Subjective Assessment: cachectic appearing, short of breath. Denies any chest pain, palpitations, n/v/d, fever, chills, hematemesis, hematochezia, melena  	  MEDICATIONS:  lisinopril 10milliGRAM(s) Oral daily  amLODIPine   Tablet 2.5milliGRAM(s) Oral daily  buDESOnide 160 MICROgram(s)/formoterol 4.5 MICROgram(s) Inhaler 2Puff(s) Inhalation two times a day  ALBUTerol/ipratropium for Nebulization 3milliLiter(s) Nebulizer every 6 hours  ALBUTerol    90 MICROgram(s) HFA Inhaler 1Puff(s) Inhalation every 4 hours  tiotropium 18 MICROgram(s) Capsule 1Capsule(s) Inhalation daily  gabapentin 100milliGRAM(s) Oral daily  mirtazapine 15milliGRAM(s) Oral at bedtime  PARoxetine 20milliGRAM(s) Oral daily  pantoprazole    Tablet 40milliGRAM(s) Oral before breakfast  polyethylene glycol/electrolyte Solution 4000milliLiter(s) Oral once  simvastatin 20milliGRAM(s) Oral at bedtime  chlorhexidine 4% Liquid 1Application(s) Topical once  sodium ferric gluconate complex IVPB 125milliGRAM(s) IV Intermittent daily	    [PHYSICAL EXAM:  TELEMETRY:  T(C): 36.2, Max: 37.2 (04-10 @ 21:00)  HR: 55 (52 - 60)  BP: 147/70 (118/72 - 159/61)  RR: 16 (15 - 16)  SpO2: 97% (96% - 98%)  Wt(kg): --  I&O's Summary  I & Os for 24h ending 11 Apr 2017 07:00  =============================================  IN: 360 ml / OUT: 600 ml / NET: -240 ml    I & Os for current day (as of 11 Apr 2017 16:46)  =============================================  IN: 120 ml / OUT: 250 ml / NET: -130 ml                                         Appearance: cachectic	  Neck: Supple,  - JVD; Carotid Bruit   Cardiovascular: Normal S1 S2, No JVD, No murmurs,   Respiratory: Lungs clear to auscultation//No Rales, Rhonchi, Wheezing	  Gastrointestinal:  Soft, Non-tender, + BS	  Skin: No rashes, No ecchymoses, No cyanosis  Extremities: Normal range of motion, No clubbing, cyanosis or edema  Vascular: Peripheral pulses palpable 2+ bilaterally  Neurologic: Non-focal  Psychiatry: A & O x 3, Mood & affect appropriate    	    ECG:  	  RADIOLOGY:   DIAGNOSTIC TESTING:  [ ] Echocardiogram:  [ ]  Catheterization:  [ ] Stress Test:    [ ] WINSTON  OTHER: 	    LABS:	 	  CARDIAC MARKERS:                       8.9    8.5   )-----------( 300      ( 11 Apr 2017 06:24 )             28.2     04-11    143  |  113<H>  |  25<H>  ----------------------------<  90  4.2   |  22  |  1.43<H>    Ca    8.1<L>      11 Apr 2017 06:26  Mg     2.0     04-11    TPro  6.6  /  Alb  3.1<L>  /  TBili  0.2  /  DBili  0.07  /  AST  <3<L>  /  ALT  11<L>  /  AlkPhos  58  04-10    proBNP:   Lipid Profile:   HgA1c:   TSH: Thyroid Stimulating Hormone, Serum: 1.044 uIU/mL (04-11 @ 06:26)    PT/INR - ( 10 Apr 2017 11:17 )   PT: 10.6 sec;   INR: 0.96     PTT - ( 10 Apr 2017 11:17 )  PTT:29.7 sec

## 2017-04-11 NOTE — PROGRESS NOTE ADULT - PROBLEM SELECTOR PLAN 5
- CKD Stage 3 by GFR, BUN/Cr 29/1.74 and GFR 35 today  - GFR WNL from outpt labs 07/2016 - CKD Stage 3 by GFR, BUN/Cr 25/1.43 today  - GFR WNL from outpt labs 07/2016

## 2017-04-11 NOTE — PROGRESS NOTE ADULT - PROBLEM SELECTOR PLAN 2
- s/p DAYANA x3 to LCx 2013 with Dr. Varela  - R and L heart cardiac cath scheduled 04/10/17 with Dr. Varela cancelled in the setting of symptomatic anemia  - As per Dr. Varela re-consider cardiac cath when he is stable - s/p DAYANA x3 to LCx 2013 with Dr. Varela  - R and L heart cardiac cath scheduled 04/10/17 with Dr. Varela cancelled in the setting of symptomatic anemia  - As per Dr. Varela re-consider cardiac cath when he is stable from heme/onc, GI standpoint.

## 2017-04-12 LAB
ANION GAP SERPL CALC-SCNC: 7 MMOL/L — LOW (ref 9–16)
BUN SERPL-MCNC: 19 MG/DL — SIGNIFICANT CHANGE UP (ref 7–23)
CALCIUM SERPL-MCNC: 8.2 MG/DL — LOW (ref 8.5–10.5)
CEA SERPL-MCNC: 1.7 NG/ML — SIGNIFICANT CHANGE UP (ref 0–3.8)
CHLORIDE SERPL-SCNC: 109 MMOL/L — HIGH (ref 96–108)
CO2 SERPL-SCNC: 23 MMOL/L — SIGNIFICANT CHANGE UP (ref 22–31)
CREAT SERPL-MCNC: 1.15 MG/DL — SIGNIFICANT CHANGE UP (ref 0.5–1.3)
FERRITIN SERPL-MCNC: 164.9 NG/ML — SIGNIFICANT CHANGE UP (ref 26–388)
FOLATE SERPL-MCNC: 17.2 NG/ML — SIGNIFICANT CHANGE UP (ref 4.8–24.2)
GLUCOSE SERPL-MCNC: 98 MG/DL — SIGNIFICANT CHANGE UP (ref 70–99)
HCT VFR BLD CALC: 26.9 % — LOW (ref 39–50)
HGB BLD-MCNC: 8.3 G/DL — LOW (ref 13–17)
IRON SATN MFR SERPL: 247 UG/DL — HIGH (ref 65–175)
IRON SATN MFR SERPL: 93 % — HIGH (ref 26–39)
MAGNESIUM SERPL-MCNC: 1.7 MG/DL — SIGNIFICANT CHANGE UP (ref 1.6–2.4)
MCHC RBC-ENTMCNC: 25 PG — LOW (ref 27–34)
MCHC RBC-ENTMCNC: 30.9 G/DL — LOW (ref 32–36)
MCV RBC AUTO: 81 FL — SIGNIFICANT CHANGE UP (ref 80–100)
PLATELET # BLD AUTO: 300 K/UL — SIGNIFICANT CHANGE UP (ref 150–400)
POTASSIUM SERPL-MCNC: 4 MMOL/L — SIGNIFICANT CHANGE UP (ref 3.5–5.3)
POTASSIUM SERPL-SCNC: 4 MMOL/L — SIGNIFICANT CHANGE UP (ref 3.5–5.3)
RBC # BLD: 3.32 M/UL — LOW (ref 4.2–5.8)
RBC # FLD: 15.8 % — SIGNIFICANT CHANGE UP (ref 10.3–16.9)
SODIUM SERPL-SCNC: 139 MMOL/L — SIGNIFICANT CHANGE UP (ref 135–145)
TIBC SERPL-MCNC: 266 UG/DL — SIGNIFICANT CHANGE UP (ref 250–450)
VIT B12 SERPL-MCNC: 578 PG/ML — SIGNIFICANT CHANGE UP (ref 243–894)
WBC # BLD: 12.6 K/UL — HIGH (ref 3.8–10.5)
WBC # FLD AUTO: 12.6 K/UL — HIGH (ref 3.8–10.5)

## 2017-04-12 PROCEDURE — 99233 SBSQ HOSP IP/OBS HIGH 50: CPT

## 2017-04-12 PROCEDURE — 74177 CT ABD & PELVIS W/CONTRAST: CPT | Mod: 26

## 2017-04-12 RX ORDER — DIATRIZOATE MEGLUMINE 180 MG/ML
30 INJECTION, SOLUTION INTRAVESICAL ONCE
Qty: 0 | Refills: 0 | Status: COMPLETED | OUTPATIENT
Start: 2017-04-12 | End: 2017-04-12

## 2017-04-12 RX ORDER — MAGNESIUM OXIDE 400 MG ORAL TABLET 241.3 MG
400 TABLET ORAL ONCE
Qty: 0 | Refills: 0 | Status: COMPLETED | OUTPATIENT
Start: 2017-04-12 | End: 2017-04-12

## 2017-04-12 RX ADMIN — Medication 3 MILLILITER(S): at 22:37

## 2017-04-12 RX ADMIN — Medication 20 MILLIGRAM(S): at 13:03

## 2017-04-12 RX ADMIN — MIRTAZAPINE 15 MILLIGRAM(S): 45 TABLET, ORALLY DISINTEGRATING ORAL at 22:38

## 2017-04-12 RX ADMIN — BUDESONIDE AND FORMOTEROL FUMARATE DIHYDRATE 2 PUFF(S): 160; 4.5 AEROSOL RESPIRATORY (INHALATION) at 07:48

## 2017-04-12 RX ADMIN — MAGNESIUM OXIDE 400 MG ORAL TABLET 400 MILLIGRAM(S): 241.3 TABLET ORAL at 07:50

## 2017-04-12 RX ADMIN — DIATRIZOATE MEGLUMINE 30 MILLILITER(S): 180 INJECTION, SOLUTION INTRAVESICAL at 14:50

## 2017-04-12 RX ADMIN — Medication 3 MILLILITER(S): at 07:47

## 2017-04-12 RX ADMIN — Medication 3 MILLILITER(S): at 13:03

## 2017-04-12 RX ADMIN — SIMVASTATIN 20 MILLIGRAM(S): 20 TABLET, FILM COATED ORAL at 22:38

## 2017-04-12 RX ADMIN — GABAPENTIN 100 MILLIGRAM(S): 400 CAPSULE ORAL at 13:03

## 2017-04-12 RX ADMIN — LISINOPRIL 10 MILLIGRAM(S): 2.5 TABLET ORAL at 07:50

## 2017-04-12 RX ADMIN — PANTOPRAZOLE SODIUM 40 MILLIGRAM(S): 20 TABLET, DELAYED RELEASE ORAL at 07:47

## 2017-04-12 RX ADMIN — DIATRIZOATE MEGLUMINE 30 MILLILITER(S): 180 INJECTION, SOLUTION INTRAVESICAL at 19:44

## 2017-04-12 RX ADMIN — AMLODIPINE BESYLATE 2.5 MILLIGRAM(S): 2.5 TABLET ORAL at 07:47

## 2017-04-12 NOTE — PROGRESS NOTE ADULT - SUBJECTIVE AND OBJECTIVE BOX
CC/ HPI 85 yo male former smoker, with CAD s/p PCI x3 LCx (2013 @[facility]), c/o worsening shortness of breath which has progressed over the past year found to be anemic, seen resting comfortably in the endoscopy suite without acute respiratory complaint          PAST MEDICAL & SURGICAL HISTORY:  CAD (coronary artery disease)  HLD (hyperlipidemia)  S/P cataract extraction    SOCHX:  + tobacco,  -  alcohol    FMHX: FA/MO  - contributory     ROS reviewed below with positive findings marked (+) :  GEN:  fever, chills ENT: tracheostomy,   epistaxis,  sinusitis COR: +CAD, CHF,  HTN, dysrhythmia PUL: COPD, ILD, asthma, pneumonia GI: PEG, dysphagia, hemorrhage, other ELIZABETH: kidney disease, electrolyte disorder HEM:  anemia, thrombus, coagulopathy, cancer ENDO:  thyroid disease, diabetes mellitus CNS:  dementia, stroke, seizure, PSY:  depression, anxiety, other      MEDICATIONS  (STANDING):  chlorhexidine 4% Liquid 1Application(s) Topical once  lisinopril 10milliGRAM(s) Oral daily  gabapentin 100milliGRAM(s) Oral daily  mirtazapine 15milliGRAM(s) Oral at bedtime  PARoxetine 20milliGRAM(s) Oral daily  simvastatin 20milliGRAM(s) Oral at bedtime  amLODIPine   Tablet 2.5milliGRAM(s) Oral daily  pantoprazole    Tablet 40milliGRAM(s) Oral before breakfast  buDESOnide 160 MICROgram(s)/formoterol 4.5 MICROgram(s) Inhaler 2Puff(s) Inhalation two times a day  ALBUTerol/ipratropium for Nebulization 3milliLiter(s) Nebulizer every 6 hours  ALBUTerol    90 MICROgram(s) HFA Inhaler 1Puff(s) Inhalation every 4 hours  tiotropium 18 MICROgram(s) Capsule 1Capsule(s) Inhalation daily    MEDICATIONS  (PRN):      Vital Signs Last 24 Hrs  T(C): 36.3, Max: 36.6 (04-11 @ 23:53)  T(F): 97.4, Max: 97.8 (04-11 @ 23:53)  HR: 56 (55 - 58)  BP: 156/69 (112/56 - 156/69)  BP(mean): --  RR: 17 (16 - 17)  SpO2: 98% (97% - 98%)    GENERAL:         comfortable,  - distress.  HEENT:            - trauma,  - icterus,  - injection,  - nasal discharge.  NECK:              - jugular venous distention, - thyromegaly.  LYMPH:           - lymphadenopathy, - masses.  RESP:              + clear,   - rales,   - rhonchi,   - wheezes.   COR:                S1S2   - gallops,  - rubs.  ABD:                bowel sounds,   soft, - tender, - distended, - organomegaly.  EXT/MSC:         - cyanosis,  + clubbing,  + edema.    NEURO:             alert,   responds to stimuli.                        8.3    12.6  )-----------( 300      ( 12 Apr 2017 06:31 )             26.9       CXR (4/10)  No acute disease      ASSESSMENT/PLAN    1)  Anemia  2)  Heart disease  3)  Rule out chronic obstructive pulmonary disease  4)  Rule out gastrointestinal lesion      Bedside spirometry  Bronchodilators:  Atrovent/ albuterol q 4 – 6 hours as needed  Corticosteroids: Budesonide  Cardiac/HTN:  amlodipine, lisinopril  GI: Rx/ c PPI/H2B  Heme: Rx/VT prophylaxis c SQH/SCD  Discuss with medical team

## 2017-04-12 NOTE — PROGRESS NOTE ADULT - SUBJECTIVE AND OBJECTIVE BOX
Patient is a 86y old  Male who presents with a chief complaint of     HPI:  Pt is an 87 yo M former smoker, PMHx CAD s/p PCI x3 LCx (2013 @[facility]), HLD, BPH, depression, presented to cardiologist c/o worsening shortness of breath which has progressed over the past year. Patient states symptoms are similar to when he required his previous stents, and he can only walk about a block or less before having to stop secondary to shortness of breath. He states that sometimes getting dressed and walking around the house can also cause SOB. Of note, he also endorses a recent loss of appetite over the past month and non-bloody diarrhea x 1 week with associated 5lb weight loss. He denies any symptoms such as chest pain, LOC, dizziness, syncope, PND, orthopnea, LE edema, palpitations, asthma/COPD, recent viral illness or travel, abdominal pain, nausea/vomiting, hx of blood tranfusion/anemia. On EKG in office patient noticed to be sinus yasmine with IVCD. Pt recommended for Right and Left cardiac catheterization given history of multiple PCI and EKG abnormalities as well as possible echo/EP evaluation. (2017 16:15)    h/o anemia, g positive, cath cancelled    INTERVAL HPI/OVERNIGHT EVENTS:::feels better-- for endoscopy    HEALTH ISSUES - PROBLEM Dx:  Blood in stool: Blood in stool  Bradycardia: Bradycardia  CKD (chronic kidney disease) stage 3, GFR 30-59 ml/min: CKD (chronic kidney disease) stage 3, GFR 30-59 ml/min  Hyperlipidemia: Hyperlipidemia  Anemia: Anemia  CAD (coronary artery disease): CAD (coronary artery disease)  SOB (shortness of breath): SOB (shortness of breath)          PAST MEDICAL & SURGICAL HISTORY:  CAD (coronary artery disease)  HLD (hyperlipidemia)  S/P cataract extraction          Consultant NOTE  REVIEWED  (  +++ )    REVIEW OF SYSTEMS:  [x] As per HPI  CONSTITUTIONAL: No fever, weight loss, or fatigue  RESPIRATORY: No cough, wheezing, chills or hemoptysis; No Shortness of Breath  CARDIOVASCULAR: No chest pain, palpitations, dizziness, or leg swelling  GASTROINTESTINAL: No abdominal or epigastric pain. No nausea, vomiting, or hematemesis; No diarrhea or constipation. No melena or hematochezia.  MUSCULOSKELETAL: No joint pain or swelling; No muscle, back, or extremity pain weakness  PSYCH    awake, alert       [x] All others negative	  [ ] Unable to obtain          Vital Signs Last 24 Hrs  T(C): 36.3, Max: 36.6 ( @ 23:53)  T(F): 97.4, Max: 97.8 ( @ 23:53)  HR: 58 (55 - 58)  BP: 146/78 (146/78 - 147/70)  BP(mean): --  RR: 16 (16 - 16)  SpO2: 98% (97% - 98%)        I & Os for current day (as of  @ 08:55)  =============================================  IN: 3820 ml / OUT: 570 ml / NET: 3250 ml    PHYSICAL EXAMINATION:                                    (    )  NO CHANGE  Appearance: Normal	  HEENT:   Normal oral mucosa, PERRL, EOMI	  Neck: Supple, + JVD/ - JVD; Carotid Bruit   Cardiovascular: Normal S1 S2, No JVD, mur  Respiratory: Lungs clear to auscultation/Decreased Breath Sounds/No Rales, Rhonchi, Wheezing	  Gastrointestinal:  Soft, Non-tender, + BS	  Skin: No rashes, No ecchymoses, No cyanosis  Extremities: Normal range of motion, No clubbing, cyanosis or edema  Vascular: Peripheral pulses palpable 2+ bilaterally  Neurologic: Non-focal  Psychiatry: A & O x 3, Mood & affect appropriate    chlorhexidine 4% Liquid 1Application(s) Topical once  lisinopril 10milliGRAM(s) Oral daily  gabapentin 100milliGRAM(s) Oral daily  mirtazapine 15milliGRAM(s) Oral at bedtime  PARoxetine 20milliGRAM(s) Oral daily  simvastatin 20milliGRAM(s) Oral at bedtime  amLODIPine   Tablet 2.5milliGRAM(s) Oral daily  pantoprazole    Tablet 40milliGRAM(s) Oral before breakfast  buDESOnide 160 MICROgram(s)/formoterol 4.5 MICROgram(s) Inhaler 2Puff(s) Inhalation two times a day  ALBUTerol/ipratropium for Nebulization 3milliLiter(s) Nebulizer every 6 hours  ALBUTerol    90 MICROgram(s) HFA Inhaler 1Puff(s) Inhalation every 4 hours  tiotropium 18 MICROgram(s) Capsule 1Capsule(s) Inhalation daily        PT/INR - ( 10 Apr 2017 11:17 )   PT: 10.6 sec;   INR: 0.96          PTT - ( 10 Apr 2017 11:17 )  PTT:29.7 sec    CARDIAC MARKERS ( 10 Apr 2017 11:17 )  x     / x     / 80 U/L / x     / 4.5 ng/mL                            8.3    12.6  )-----------( 300      ( 2017 06:31 )             26.9     04-12    139  |  109<H>  |  19  ----------------------------<  98  4.0   |  23  |  1.15    Ca    8.2<L>      2017 06:09  Mg     1.7     04-12    TPro  6.6  /  Alb  3.1<L>  /  TBili  0.2  /  DBili  0.07  /  AST  <3<L>  /  ALT  11<L>  /  AlkPhos  58  04-10      CAPILLARY BLOOD GLUCOSE    Urinalysis Basic - ( 2017 11:17 )    Color: Yellow / Appearance: Clear / S.020 / pH: x  Gluc: x / Ketone: Trace mg/dL  / Bili: NEGATIVE / Urobili: 0.2 E.U./dL   Blood: x / Protein: NEGATIVE mg/dL / Nitrite: NEGATIVE   Leuk Esterase: NEGATIVE / RBC: < 5 /HPF / WBC < 5 /HPF   Sq Epi: x / Non Sq Epi: Rare /HPF / Bacteria: Present /HPF

## 2017-04-12 NOTE — PROGRESS NOTE ADULT - ASSESSMENT
colonoscopy = diverticulosis of sigmoid, external hemorrhoids. large villous  ascending colon tumor  Recommend: CT scan, await biopsy results

## 2017-04-12 NOTE — PROGRESS NOTE ADULT - SUBJECTIVE AND OBJECTIVE BOX
Pt seen and examined colonoscopy today    REVIEW OF SYSTEMS:  Constitutional: No fever, weight loss or fatigue  Cardiovascular: No chest pain, palpitations, dizziness or leg swelling  Gastrointestinal: No abdominal or epigastric pain. No nausea, vomiting or hematemesis; No diarrhea or constipation. No melena or hematochezia.  Skin: No itching, burning, rashes or lesions       MEDICATIONS:  MEDICATIONS  (STANDING):  chlorhexidine 4% Liquid 1Application(s) Topical once  lisinopril 10milliGRAM(s) Oral daily  gabapentin 100milliGRAM(s) Oral daily  mirtazapine 15milliGRAM(s) Oral at bedtime  PARoxetine 20milliGRAM(s) Oral daily  simvastatin 20milliGRAM(s) Oral at bedtime  amLODIPine   Tablet 2.5milliGRAM(s) Oral daily  pantoprazole    Tablet 40milliGRAM(s) Oral before breakfast  buDESOnide 160 MICROgram(s)/formoterol 4.5 MICROgram(s) Inhaler 2Puff(s) Inhalation two times a day  ALBUTerol/ipratropium for Nebulization 3milliLiter(s) Nebulizer every 6 hours  ALBUTerol    90 MICROgram(s) HFA Inhaler 1Puff(s) Inhalation every 4 hours  tiotropium 18 MICROgram(s) Capsule 1Capsule(s) Inhalation daily    MEDICATIONS  (PRN):      Allergies    No Known Allergies    Intolerances        Vital Signs Last 24 Hrs  T(C): 36.3, Max: 36.6 ( @ 23:53)  T(F): 97.4, Max: 97.8 ( @ 23:53)  HR: 56 (55 - 58)  BP: 156/69 (112/56 - 156/69)  BP(mean): --  RR: 17 (16 - 17)  SpO2: 98% (97% - 98%)    I & Os for current day (as of  @ 10:04)  =============================================  IN: 3820 ml / OUT: 570 ml / NET: 3250 ml      PHYSICAL EXAM:    General: thin, in no acute distress  HEENT: MMM, conjunctiva and sclera clear  Gastrointestinal: Soft non-tender non-distended; Normal bowel sounds; No hepatosplenomegaly  Skin: Warm and dry. No obvious rash    LABS:      CBC Full  -  ( 2017 06:31 )  WBC Count : 12.6 K/uL  Hemoglobin : 8.3 g/dL  Hematocrit : 26.9 %  Platelet Count - Automated : 300 K/uL  Mean Cell Volume : 81.0 fL  Mean Cell Hemoglobin : 25.0 pg  Mean Cell Hemoglobin Concentration : 30.9 g/dL  Auto Neutrophil # : x  Auto Lymphocyte # : x  Auto Monocyte # : x  Auto Eosinophil # : x  Auto Basophil # : x  Auto Neutrophil % : x  Auto Lymphocyte % : x  Auto Monocyte % : x  Auto Eosinophil % : x  Auto Basophil % : x    04-12    139  |  109<H>  |  19  ----------------------------<  98  4.0   |  23  |  1.15    Ca    8.2<L>      2017 06:09  Mg     1.7     04-12    TPro  6.6  /  Alb  3.1<L>  /  TBili  0.2  /  DBili  0.07  /  AST  <3<L>  /  ALT  11<L>  /  AlkPhos  58  04-10    PT/INR - ( 10 Apr 2017 11:17 )   PT: 10.6 sec;   INR: 0.96          PTT - ( 10 Apr 2017 11:17 )  PTT:29.7 sec      Urinalysis Basic - ( 2017 11:17 )    Color: Yellow / Appearance: Clear / S.020 / pH: x  Gluc: x / Ketone: Trace mg/dL  / Bili: NEGATIVE / Urobili: 0.2 E.U./dL   Blood: x / Protein: NEGATIVE mg/dL / Nitrite: NEGATIVE   Leuk Esterase: NEGATIVE / RBC: < 5 /HPF / WBC < 5 /HPF   Sq Epi: x / Non Sq Epi: Rare /HPF / Bacteria: Present /HPF                RADIOLOGY & ADDITIONAL STUDIES (The following images were personally reviewed):

## 2017-04-12 NOTE — PROGRESS NOTE ADULT - PROBLEM SELECTOR PLAN 1
CV (Rajesh)/ Pulm (Marla)/ IM (Klein)  -  O2 Sat 99% on RA, tachypneic, not in any respiratory distress  - acute anemia hgb 7.8 s/p 1 unit prbcs 4/10 improved to 8.5 this AM  - WBC increased to 12.6 Denies fevers, chills, cough, recent illness. Continue to monitor.   - Does not appear to be overloaded by PE, lungs CTAB, no JVD, O2Sat stable when supine  - Echo 04/10/17 revealed LVEF 55%-60%, Normal left ventricular size and wall thickness. The left ventricular wall motion is normal, no HD valvular dz, no effusion  - CXR reveals hyperinflation c/w emphysema, no congestion or infiltrate, f/u official read  - continue nebs/inhalers as ordered by pulm PRN

## 2017-04-12 NOTE — PROGRESS NOTE ADULT - PROBLEM SELECTOR PLAN 2
- s/p DAYANA x3 to LCx 2013 with Dr. Varela  - R and L heart cardiac cath scheduled 04/10/17 with Dr. Varela cancelled in the setting of symptomatic anemia  - As per Dr. Varela re-consider cardiac cath when he is stable from heme/onc, GI standpoint.

## 2017-04-12 NOTE — PROGRESS NOTE ADULT - SUBJECTIVE AND OBJECTIVE BOX
HPI:  Pt is an 85 yo M former smoker, PMHx CAD s/p PCI x3 LCx (2013 @[facility]), HLD, BPH, depression, presented to cardiologist c/o worsening shortness of breath which has progressed over the past year. Patient states symptoms are similar to when he required his previous stents, and he can only walk about a block or less before having to stop secondary to shortness of breath. He states that sometimes getting dressed and walking around the house can also cause SOB. Of note, he also endorses a recent loss of appetite over the past month and non-bloody diarrhea x 1 week with associated 5lb weight loss. He denies any symptoms such as chest pain, LOC, dizziness, syncope, PND, orthopnea, LE edema, palpitations, asthma/COPD, recent viral illness or travel, abdominal pain, nausea/vomiting, hx of blood tranfusion/anemia. On EKG in office patient noticed to be sinus yasmine with IVCD. Pt recommended for Right and Left cardiac catheterization given history of multiple PCI and EKG abnormalities as well as possible echo/EP evaluation. (06 Apr 2017 16:15)    FAMILY HISTORY:  No pertinent family history in first degree relatives    MEDICATIONS  (STANDING):  chlorhexidine 4% Liquid 1Application(s) Topical once  lisinopril 10milliGRAM(s) Oral daily  gabapentin 100milliGRAM(s) Oral daily  mirtazapine 15milliGRAM(s) Oral at bedtime  PARoxetine 20milliGRAM(s) Oral daily  simvastatin 20milliGRAM(s) Oral at bedtime  amLODIPine   Tablet 2.5milliGRAM(s) Oral daily  pantoprazole    Tablet 40milliGRAM(s) Oral before breakfast  buDESOnide 160 MICROgram(s)/formoterol 4.5 MICROgram(s) Inhaler 2Puff(s) Inhalation two times a day  ALBUTerol/ipratropium for Nebulization 3milliLiter(s) Nebulizer every 6 hours  ALBUTerol    90 MICROgram(s) HFA Inhaler 1Puff(s) Inhalation every 4 hours  tiotropium 18 MICROgram(s) Capsule 1Capsule(s) Inhalation daily    MEDICATIONS  (PRN):    Vital Signs Last 24 Hrs  T(C): 37, Max: 37 (04-12 @ 17:10)  T(F): 98.6, Max: 98.6 (04-12 @ 17:10)  HR: 68 (52 - 68)  BP: 102/55 (102/55 - 156/69)  BP(mean): --  RR: 18 (17 - 18)  SpO2: 98% (98% - 99%)    Physical exam:    Overall impression  Lymphadenopathy  Liver  spleen    Labs:  CBC Full  -  ( 12 Apr 2017 06:31 )  WBC Count : 12.6 K/uL  Hemoglobin : 8.3 g/dL  Hematocrit : 26.9 %  Platelet Count - Automated : 300 K/uL  Mean Cell Volume : 81.0 fL  Mean Cell Hemoglobin : 25.0 pg  Mean Cell Hemoglobin Concentration : 30.9 g/dL  Auto Neutrophil # : x  Auto Lymphocyte # : x  Auto Monocyte # : x  Auto Eosinophil # : x  Auto Basophil # : x  Auto Neutrophil % : x  Auto Lymphocyte % : x  Auto Monocyte % : x  Auto Eosinophil % : x  Auto Basophil % : x    04-12    139  |  109<H>  |  19  ----------------------------<  98  4.0   |  23  |  1.15    Ca    8.2<L>      12 Apr 2017 06:09  Mg     1.7     04-12        Radiology:  HEALTH ISSUES - R/O PROBLEM Dx:      Assessmant / Problems  1) Anemia of iron deficiency and chronic disease  2) Colonic mass await pathology, highly likely malignant    Plan:  1) Transfuse 1 u PRBC  2) colonic bx pathology  3) PET CT     Thank you  Darlin Ta MD

## 2017-04-12 NOTE — PROGRESS NOTE ADULT - ASSESSMENT
Pt is an 87 yo M former smoker, PMHx CAD s/p PCI x3 LCx (2013 @[facility]), HLD, BPH, presented to cardiologist c/o worsening shortness of breath which has progressed over the past year who now presented for cardiac catheterization given history of multiple PCI and EKG abnormalities as well as possible echo/EP evaluation, however was found to be Anemic H/H 7.8/25.9 prior to cath and was Guaic + so decision was made to cancel cardiac cath and admit to [facility] for telemetry monitoring.

## 2017-04-12 NOTE — PROGRESS NOTE ADULT - PROBLEM SELECTOR PLAN 3
Heme/onc (Cely) / GI (Harman) / Onc (Jessica)/ IM (Klein)  - H/H 8.3/26.9 trending down. as per outpt labs Hgb 12.3 in 07/2016  - Guaic +, Endorses 5lb weight loss, loss of appetite and diarrhea. Denies gross blood in stool  - As per Dr. Mena, discontinue ASA 81mg PO and Plavix 75mg PO in the setting of anemia and duration since last placed stent (4 years)  - s/p 1 unit PRBCs 4/10 with improvement to hgb of 8.9/28.2  - follow up folate, B12, CEA, IV iron ordered daily  - Pt s/p EGD neg for acute bleed  - s/p colonoscopy today revealing diverticulosis of sigmoid, external hemorrhoids. 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 Lopez consulted from oncology for evaluation, discussed with Dr. Hammer and all AC will continue to be held 2/2 bleeding risk.   - CT scan abd/pelvis ordered for further evaluation of colonic mass, f/u results  - Dr. Hammer to discuss with Dr. Montalvo (internal med) recommendation for surgical consult Heme/onc (Cely) / GI (Harman) / Onc (Jessica)/ IM (Klein)  - H/H 8.3/26.9 trending down. as per outpt labs Hgb 12.3 in 07/2016, 1 unit today   - Guaic +, Endorses 5lb weight loss, loss of appetite and diarrhea. Denies gross blood in stool  - As per Dr. Mena, discontinue ASA 81mg PO and Plavix 75mg PO in the setting of anemia and duration since last placed stent (4 years)  - s/p 1 unit PRBCs 4/10 with improvement to hgb of 8.9/28.2  - follow up folate, B12, CEA, IV iron ordered daily  - Pt s/p EGD neg for acute bleed  - s/p colonoscopy today revealing diverticulosis of sigmoid, external hemorrhoids. 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 Lopez consulted from oncology for evaluation, discussed with Dr. Hammer and all AC will continue to be held 2/2 bleeding risk.   - CT scan abd/pelvis ordered for further evaluation of colonic mass, f/u results  - Dr. Hammer to discuss with Dr. Montalvo (internal med) recommendation for surgical consult

## 2017-04-12 NOTE — PROGRESS NOTE ADULT - SUBJECTIVE AND OBJECTIVE BOX
Interventional Cardiology PA Adult Progress Note    Subjective Assessment: Pt seen prior to colonoscopy, states that shortness of breath is improving, denies chest pain, palpitations, n/v/d, constipation, hematochezia, hematemesis, le edema, melena.  	  MEDICATIONS:  lisinopril 10milliGRAM(s) Oral daily  amLODIPine   Tablet 2.5milliGRAM(s) Oral daily  buDESOnide 160 MICROgram(s)/formoterol 4.5 MICROgram(s) Inhaler 2Puff(s) Inhalation two times a day  ALBUTerol/ipratropium for Nebulization 3milliLiter(s) Nebulizer every 6 hours  ALBUTerol    90 MICROgram(s) HFA Inhaler 1Puff(s) Inhalation every 4 hours  tiotropium 18 MICROgram(s) Capsule 1Capsule(s) Inhalation daily  gabapentin 100milliGRAM(s) Oral daily  mirtazapine 15milliGRAM(s) Oral at bedtime  PARoxetine 20milliGRAM(s) Oral daily  pantoprazole    Tablet 40milliGRAM(s) Oral before breakfast  simvastatin 20milliGRAM(s) Oral at bedtime  chlorhexidine 4% Liquid 1Application(s) Topical once  	    [PHYSICAL EXAM:  TELEMETRY:  T(C): 36.3, Max: 36.6 (04-11 @ 23:53)  HR: 52 (52 - 58)  BP: 156/69 (112/56 - 156/69)  RR: 18 (16 - 18)  SpO2: 99% (97% - 99%)  Wt(kg): --  I&O's Summary    I & Os for current day (as of 12 Apr 2017 12:22)  =============================================  IN: 3820 ml / OUT: 570 ml / NET: 3250 ml                                    Appearance: cachectic 		  Neck: Supple, - JVD; Carotid Bruit   Cardiovascular: Normal S1 S2, No JVD, No murmurs,   Respiratory: Lungs clear to auscultation/No Rales, Rhonchi, Wheezing	  Gastrointestinal:  Soft, Non-tender, + BS	  Skin: No rashes, No ecchymoses, No cyanosis  Extremities: Normal range of motion, No clubbing, cyanosis or edema  Vascular: Peripheral pulses palpable 2+ bilaterally  Neurologic: Non-focal  Psychiatry: A & O x 3, Mood & affect appropriate      	    ECG:  	  RADIOLOGY:   DIAGNOSTIC TESTING:  [ ] Echocardiogram:  [ ]  Catheterization:  [ ] Stress Test:    [ ] WINSTON  OTHER: 	    LABS:	 	  CARDIAC MARKERS                          8.3    12.6  )-----------( 300      ( 12 Apr 2017 06:31 )             26.9     04-12    139  |  109<H>  |  19  ----------------------------<  98  4.0   |  23  |  1.15    Ca    8.2<L>      12 Apr 2017 06:09  Mg     1.7     04-12      proBNP:   Lipid Profile:   HgA1c:   TSH:       ASSESSMENT/PLAN: 	        DVT ppx:  Dispo:

## 2017-04-12 NOTE — PROGRESS NOTE ADULT - PROBLEM SELECTOR PLAN 6
- Continue Simvastatin 20mg PO daily      DVT PPX: holding any ASA/ AC in setting of acute anemia and new colon mass  Dispo: likely home with follow up, f/u CT scan, Dr. Lopez recommendations, Dr. Hammer recommendations, and Dr. Montalvo rec. for a surgery consult/follow up.  discussed with all consulting services and attending

## 2017-04-13 DIAGNOSIS — C18.9 MALIGNANT NEOPLASM OF COLON, UNSPECIFIED: ICD-10-CM

## 2017-04-13 LAB
ANION GAP SERPL CALC-SCNC: 8 MMOL/L — LOW (ref 9–16)
BUN SERPL-MCNC: 14 MG/DL — SIGNIFICANT CHANGE UP (ref 7–23)
CALCIUM SERPL-MCNC: 8.7 MG/DL — SIGNIFICANT CHANGE UP (ref 8.5–10.5)
CHLORIDE SERPL-SCNC: 107 MMOL/L — SIGNIFICANT CHANGE UP (ref 96–108)
CO2 SERPL-SCNC: 25 MMOL/L — SIGNIFICANT CHANGE UP (ref 22–31)
CREAT SERPL-MCNC: 1.15 MG/DL — SIGNIFICANT CHANGE UP (ref 0.5–1.3)
GLUCOSE SERPL-MCNC: 74 MG/DL — SIGNIFICANT CHANGE UP (ref 70–99)
HCT VFR BLD CALC: 31.6 % — LOW (ref 39–50)
HGB BLD-MCNC: 10 G/DL — LOW (ref 13–17)
MAGNESIUM SERPL-MCNC: 1.7 MG/DL — SIGNIFICANT CHANGE UP (ref 1.6–2.4)
MCHC RBC-ENTMCNC: 25.6 PG — LOW (ref 27–34)
MCHC RBC-ENTMCNC: 31.6 G/DL — LOW (ref 32–36)
MCV RBC AUTO: 80.8 FL — SIGNIFICANT CHANGE UP (ref 80–100)
PLATELET # BLD AUTO: 277 K/UL — SIGNIFICANT CHANGE UP (ref 150–400)
POTASSIUM SERPL-MCNC: 4 MMOL/L — SIGNIFICANT CHANGE UP (ref 3.5–5.3)
POTASSIUM SERPL-SCNC: 4 MMOL/L — SIGNIFICANT CHANGE UP (ref 3.5–5.3)
RBC # BLD: 3.91 M/UL — LOW (ref 4.2–5.8)
RBC # FLD: 15.5 % — SIGNIFICANT CHANGE UP (ref 10.3–16.9)
SODIUM SERPL-SCNC: 140 MMOL/L — SIGNIFICANT CHANGE UP (ref 135–145)
SURGICAL PATHOLOGY STUDY: SIGNIFICANT CHANGE UP
WBC # BLD: 9.9 K/UL — SIGNIFICANT CHANGE UP (ref 3.8–10.5)
WBC # FLD AUTO: 9.9 K/UL — SIGNIFICANT CHANGE UP (ref 3.8–10.5)

## 2017-04-13 PROCEDURE — 99233 SBSQ HOSP IP/OBS HIGH 50: CPT

## 2017-04-13 PROCEDURE — 71260 CT THORAX DX C+: CPT | Mod: 26

## 2017-04-13 RX ORDER — MAGNESIUM OXIDE 400 MG ORAL TABLET 241.3 MG
800 TABLET ORAL ONCE
Qty: 0 | Refills: 0 | Status: COMPLETED | OUTPATIENT
Start: 2017-04-13 | End: 2017-04-13

## 2017-04-13 RX ADMIN — MAGNESIUM OXIDE 400 MG ORAL TABLET 800 MILLIGRAM(S): 241.3 TABLET ORAL at 12:43

## 2017-04-13 RX ADMIN — Medication 3 MILLILITER(S): at 17:26

## 2017-04-13 RX ADMIN — GABAPENTIN 100 MILLIGRAM(S): 400 CAPSULE ORAL at 12:43

## 2017-04-13 RX ADMIN — PANTOPRAZOLE SODIUM 40 MILLIGRAM(S): 20 TABLET, DELAYED RELEASE ORAL at 06:50

## 2017-04-13 RX ADMIN — LISINOPRIL 10 MILLIGRAM(S): 2.5 TABLET ORAL at 05:20

## 2017-04-13 RX ADMIN — Medication 20 MILLIGRAM(S): at 12:43

## 2017-04-13 RX ADMIN — AMLODIPINE BESYLATE 2.5 MILLIGRAM(S): 2.5 TABLET ORAL at 05:20

## 2017-04-13 RX ADMIN — SIMVASTATIN 20 MILLIGRAM(S): 20 TABLET, FILM COATED ORAL at 23:04

## 2017-04-13 RX ADMIN — Medication 3 MILLILITER(S): at 23:04

## 2017-04-13 RX ADMIN — BUDESONIDE AND FORMOTEROL FUMARATE DIHYDRATE 2 PUFF(S): 160; 4.5 AEROSOL RESPIRATORY (INHALATION) at 17:26

## 2017-04-13 RX ADMIN — MIRTAZAPINE 15 MILLIGRAM(S): 45 TABLET, ORALLY DISINTEGRATING ORAL at 23:04

## 2017-04-13 RX ADMIN — Medication 3 MILLILITER(S): at 12:44

## 2017-04-13 NOTE — PROGRESS NOTE ADULT - SUBJECTIVE AND OBJECTIVE BOX
Patient is a 86y old  Male who presents with a chief complaint of     HPI:  Pt is an 85 yo M former smoker, PMHx CAD s/p PCI x3 LCx (2013 @[facility]), HLD, BPH, depression, presented to cardiologist c/o worsening shortness of breath which has progressed over the past year. Patient states symptoms are similar to when he required his previous stents, and he can only walk about a block or less before having to stop secondary to shortness of breath. He states that sometimes getting dressed and walking around the house can also cause SOB. Of note, he also endorses a recent loss of appetite over the past month and non-bloody diarrhea x 1 week with associated 5lb weight loss. He denies any symptoms such as chest pain, LOC, dizziness, syncope, PND, orthopnea, LE edema, palpitations, asthma/COPD, recent viral illness or travel, abdominal pain, nausea/vomiting, hx of blood tranfusion/anemia. On EKG in office patient noticed to be sinus yasmine with IVCD. Pt recommended for Right and Left cardiac catheterization given history of multiple PCI and EKG abnormalities as well as possible echo/EP evaluation. (06 Apr 2017 16:15)    INTERVAL HPI/OVERNIGHT EVENTS:::colon lesion for surgery, pt aware    HEALTH ISSUES - PROBLEM Dx:  Blood in stool: Blood in stool  Bradycardia: Bradycardia  CKD (chronic kidney disease) stage 3, GFR 30-59 ml/min: CKD (chronic kidney disease) stage 3, GFR 30-59 ml/min  Hyperlipidemia: Hyperlipidemia  Anemia: Anemia  CAD (coronary artery disease): CAD (coronary artery disease)  SOB (shortness of breath): SOB (shortness of breath)          PAST MEDICAL & SURGICAL HISTORY:  CAD (coronary artery disease)  HLD (hyperlipidemia)  S/P cataract extraction          Consultant NOTE  REVIEWED  (   )    REVIEW OF SYSTEMS:  [x] As per HPI  CONSTITUTIONAL: No fever, weight loss, or fatigue  RESPIRATORY: SOB  CARDIOVASCULAR: No chest pain, palpitations, dizziness, or leg swelling  GASTROINTESTINAL: No abdominal or epigastric pain. No nausea, vomiting, or hematemesis; No diarrhea or constipation. No melena or hematochezia.  MUSCULOSKELETAL: No joint pain or swelling; No muscle, back, or extremity pain  PSYCH    awake, alert       [x] All others negative	  [ ] Unable to obtain          Vital Signs Last 24 Hrs  T(C): 36.4, Max: 37 (04-12 @ 17:10)  T(F): 97.6, Max: 98.6 (04-12 @ 17:10)  HR: 57 (57 - 68)  BP: 108/55 (101/58 - 174/75)  BP(mean): --  RR: 18 (16 - 18)  SpO2: 95% (95% - 100%)      I & Os for 24h ending 04-13 @ 07:00  =============================================  IN: 350 ml / OUT: 920 ml / NET: -570 ml    I & Os for current day (as of 04-13 @ 15:53)  =============================================  IN: 120 ml / OUT: 0 ml / NET: 120 ml    PHYSICAL EXAMINATION:                                    (    )  NO CHANGE  Appearance: Normal	  HEENT:   Normal oral mucosa, PERRL, EOMI	  Neck: Supple, + JVD/ - JVD; Carotid Bruit   Cardiovascular: Normal S1 S2, No JVD, No murmurs,   Respiratory: Lungs clear to auscultation/Decreased Breath Sounds/No Rales, Rhonchi, Wheezing	  Gastrointestinal:  Soft, Non-tender, + BS	  Skin: No rashes, No ecchymoses, No cyanosis  Extremities: Normal range of motion, No clubbing, cyanosis or edema  Vascular: Peripheral pulses palpable   Neurologic: Non-focal  Psychiatry: A & O x 3, Mood & affect appropriate    chlorhexidine 4% Liquid 1Application(s) Topical once  lisinopril 10milliGRAM(s) Oral daily  gabapentin 100milliGRAM(s) Oral daily  mirtazapine 15milliGRAM(s) Oral at bedtime  PARoxetine 20milliGRAM(s) Oral daily  simvastatin 20milliGRAM(s) Oral at bedtime  amLODIPine   Tablet 2.5milliGRAM(s) Oral daily  pantoprazole    Tablet 40milliGRAM(s) Oral before breakfast  buDESOnide 160 MICROgram(s)/formoterol 4.5 MICROgram(s) Inhaler 2Puff(s) Inhalation two times a day  ALBUTerol/ipratropium for Nebulization 3milliLiter(s) Nebulizer every 6 hours  ALBUTerol    90 MICROgram(s) HFA Inhaler 1Puff(s) Inhalation every 4 hours  tiotropium 18 MICROgram(s) Capsule 1Capsule(s) Inhalation daily                                      10.0   9.9   )-----------( 277      ( 13 Apr 2017 07:01 )             31.6     04-13    140  |  107  |  14  ----------------------------<  74  4.0   |  25  |  1.15    Ca    8.7      13 Apr 2017 07:01  Mg     1.7     04-13    poidal mass in the proximal ascending colon with slight   infiltration of the fat posterior to the colon, possibly representing   direct invasion.  2.  No pathologically enlarged nodes seen.  3.  No liver metastases seen.  4.  Two small lung nodules which have NOT increased in size since   1/14/2014 and are therefore of doubtful significance.  5.  Severe coronary artery calcifications.  6.  Severe atherosclerotic calcifications. Multiple abdominal aortic   aneurysms.  7.  Enlarged prostate.  8.  Large direct left inguinal hernia containing urinary bladder.  9.  Small right femoral hernia containing nonobstructed bowel.

## 2017-04-13 NOTE — PROGRESS NOTE ADULT - PROBLEM SELECTOR PLAN 7
- Continue Simvastatin 20mg PO daily      DVT PPX: holding any ASA/ AC in setting of acute anemia and new colon mass  Dispo: likely home with follow up, f/u CT scan, Dr. Lopez recommendations, Dr. Hammer recommendations, and Dr. Montalvo rec. for a surgery consult/follow up.  discussed with all consulting services and attending - Continue Simvastatin 20mg PO daily

## 2017-04-13 NOTE — PROGRESS NOTE ADULT - SUBJECTIVE AND OBJECTIVE BOX
Pt seen and examined no complaints  eating lunch    REVIEW OF SYSTEMS:  Constitutional: No fever, weight loss or fatigue  Cardiovascular: No chest pain, palpitations, dizziness or leg swelling  Gastrointestinal: No abdominal or epigastric pain. No nausea, vomiting or hematemesis; No diarrhea or constipation. No melena or hematochezia.  Skin: No itching, burning, rashes or lesions       MEDICATIONS:  MEDICATIONS  (STANDING):  chlorhexidine 4% Liquid 1Application(s) Topical once  lisinopril 10milliGRAM(s) Oral daily  gabapentin 100milliGRAM(s) Oral daily  mirtazapine 15milliGRAM(s) Oral at bedtime  PARoxetine 20milliGRAM(s) Oral daily  simvastatin 20milliGRAM(s) Oral at bedtime  amLODIPine   Tablet 2.5milliGRAM(s) Oral daily  pantoprazole    Tablet 40milliGRAM(s) Oral before breakfast  buDESOnide 160 MICROgram(s)/formoterol 4.5 MICROgram(s) Inhaler 2Puff(s) Inhalation two times a day  ALBUTerol/ipratropium for Nebulization 3milliLiter(s) Nebulizer every 6 hours  ALBUTerol    90 MICROgram(s) HFA Inhaler 1Puff(s) Inhalation every 4 hours  tiotropium 18 MICROgram(s) Capsule 1Capsule(s) Inhalation daily    MEDICATIONS  (PRN):      Allergies    No Known Allergies    Intolerances        Vital Signs Last 24 Hrs  T(C): 36.4, Max: 37 (04-12 @ 17:10)  T(F): 97.6, Max: 98.6 (04-12 @ 17:10)  HR: 61 (58 - 68)  BP: 101/58 (101/58 - 174/75)  BP(mean): --  RR: 16 (16 - 17)  SpO2: 95% (95% - 100%)  I & Os for 24h ending 04-13 @ 07:00  =============================================  IN: 350 ml / OUT: 920 ml / NET: -570 ml    I & Os for current day (as of 04-13 @ 13:07)  =============================================  IN: 120 ml / OUT: 0 ml / NET: 120 ml      PHYSICAL EXAM:    General: thin; in no acute distress  HEENT: MMM, conjunctiva and sclera clear  Gastrointestinal: Soft non-tender non-distended; Normal bowel sounds; No hepatosplenomegaly  Skin: Warm and dry. No obvious rash    LABS:      CBC Full  -  ( 13 Apr 2017 07:01 )  WBC Count : 9.9 K/uL  Hemoglobin : 10.0 g/dL  Hematocrit : 31.6 %  Platelet Count - Automated : 277 K/uL  Mean Cell Volume : 80.8 fL  Mean Cell Hemoglobin : 25.6 pg  Mean Cell Hemoglobin Concentration : 31.6 g/dL  Auto Neutrophil # : x  Auto Lymphocyte # : x  Auto Monocyte # : x  Auto Eosinophil # : x  Auto Basophil # : x  Auto Neutrophil % : x  Auto Lymphocyte % : x  Auto Monocyte % : x  Auto Eosinophil % : x  Auto Basophil % : x    04-13    140  |  107  |  14  ----------------------------<  74  4.0   |  25  |  1.15    Ca    8.7      13 Apr 2017 07:01  Mg     1.7     04-13                        RADIOLOGY & ADDITIONAL STUDIES (The following images were personally reviewed): CT ABDOMEN AND PELVIS OC IC1.  Large polypoidal mass in the proximal ascending colon with slight   infiltration of the fat posterior to the colon, possibly representing   direct invasion.  2.  No pathologically enlarged nodes seen.  3.  No liver metastases seen.  4.  Two small lung nodules which have NOT increased in size since   1/14/2014 and are therefore of doubtful significance.  5.  Severe coronary artery calcifications.  6.  Severe atherosclerotic calcifications. Multiple abdominal aortic   aneurysms.  7.  Enlarged prostate.  8.  Large direct left inguinal hernia containing urinary bladder.  9.  Small right femoral hernia containing nonobstructed bowel.1.  Large polypoidal mass in the proximal ascending colon with slight   infiltration of the fat posterior to the colon, possibly representing   direct invasion.  2.  No pathologically enlarged nodes seen.  3.  No liver metastases seen.  4.  Two small lung nodules which have NOT increased in size since   1/14/2014 and are therefore of doubtful significance.  5.  Severe coronary artery calcifications.  6.  Severe atherosclerotic calcifications. Multiple abdominal aortic   aneurysms.  7.  Enlarged prostate.  8.  Large direct left inguinal hernia containing urinary bladder.  9.  Small right femoral hernia containing nonobstructed bowel.

## 2017-04-13 NOTE — PROGRESS NOTE ADULT - ASSESSMENT
Pt is an 85yo M, former smoker, with PMHx CAD s/p PCI x3 LCx (2013 @[facility]), HLD, BPH who presented to cardiologist c/o worsening shortness of breath which has progressed over the past year who now presented for cardiac catheterization given history of multiple PCI and EKG abnormalities as well as possible echo/EP evaluation. On arrival to the cath lab patient was found to be Anemic H/H 7.8/25.9 prior to cath and was Guaic + so decision was made to cancel cardiac cath and admit to [facility] for telemetry monitoring.

## 2017-04-13 NOTE — PROGRESS NOTE ADULT - PROBLEM SELECTOR PLAN 3
Heme/onc (Cely) / GI (Harman) / Onc (Jessica)/ IM (Katia)  - H/H 10/31.6 s/p 1 unit PRBCs 4/10 and 1 unit overnight last night 4/12, will continue to trend  - Guaic +, Endorses 5lb weight loss, loss of appetite and diarrhea. Denies gross blood in stool  - As per Dr. Mena, discontinue ASA 81mg PO and Plavix 75mg PO in the setting of anemia and duration since last placed stent (4 years)  - follow up folate, B12, CEA, IV iron ordered daily

## 2017-04-13 NOTE — PROGRESS NOTE ADULT - PROBLEM SELECTOR PLAN 2
- s/p DAYANA x3 to LCx 2013 with Dr. Avery BAH and L heart cardiac cath scheduled 04/10/17 with Dr. Varela cancelled in the setting of symptomatic anemia

## 2017-04-13 NOTE — PROGRESS NOTE ADULT - SUBJECTIVE AND OBJECTIVE BOX
CC/ HPI 85 yo male former smoker, with CAD s/p PCI x3 LCx (2013 @[facility]), c/o worsening shortness of breath which has progressed over the past year found to be anemic, today without acute respiratory complaint.      PAST MEDICAL & SURGICAL HISTORY:  CAD (coronary artery disease)  HLD (hyperlipidemia)  S/P cataract extraction    SOCHX:  + tobacco,  -  alcohol    FMHX: FA/MO  - contributory     ROS reviewed below with positive findings marked (+) :  GEN:  fever, chills ENT: tracheostomy,   epistaxis,  sinusitis COR: +CAD, CHF,  HTN, dysrhythmia PUL: COPD, ILD, asthma, pneumonia GI: PEG, dysphagia, hemorrhage, other ELIZABETH: kidney disease, electrolyte disorder HEM:  anemia, thrombus, coagulopathy, cancer ENDO:  thyroid disease, diabetes mellitus CNS:  dementia, stroke, seizure, PSY:  depression, anxiety, other         MEDICATIONS  (STANDING):  chlorhexidine 4% Liquid 1Application(s) Topical once  lisinopril 10milliGRAM(s) Oral daily  gabapentin 100milliGRAM(s) Oral daily  mirtazapine 15milliGRAM(s) Oral at bedtime  PARoxetine 20milliGRAM(s) Oral daily  simvastatin 20milliGRAM(s) Oral at bedtime  amLODIPine   Tablet 2.5milliGRAM(s) Oral daily  pantoprazole    Tablet 40milliGRAM(s) Oral before breakfast  buDESOnide 160 MICROgram(s)/formoterol 4.5 MICROgram(s) Inhaler 2Puff(s) Inhalation two times a day  ALBUTerol/ipratropium for Nebulization 3milliLiter(s) Nebulizer every 6 hours  ALBUTerol    90 MICROgram(s) HFA Inhaler 1Puff(s) Inhalation every 4 hours  tiotropium 18 MICROgram(s) Capsule 1Capsule(s) Inhalation daily  magnesium oxide 800milliGRAM(s) Oral once      Vital Signs Last 24 Hrs  T(C): 36.3, Max: 37 (04-12 @ 17:10)  T(F): 97.4, Max: 98.6 (04-12 @ 17:10)  HR: 66 (58 - 68)  BP: 174/75 (102/55 - 174/75)  BP(mean): --  RR: 16 (16 - 18)  SpO2: 100% (98% - 100%)    GENERAL:         comfortable,  - distress.  HEENT:            - trauma,  - icterus,  - injection,  - nasal discharge.  NECK:              - jugular venous distention, - thyromegaly.  LYMPH:           - lymphadenopathy, - masses.  RESP:              + crackles,   - rhonchi,   - wheezes.   COR:                S1S2  - gallops,  - rubs.  ABD:                bowel sounds,   soft, - tender, - distended, - organomegaly.  EXT/MSC:         - cyanosis,   - edema.    NEURO:             alert,   responds to stimuli.                          10.0   9.9   )-----------( 277      ( 13 Apr 2017 07:01 )             31.6     CXR (4/10)  No acute disease      ASSESSMENT/PLAN    1)  Anemia  2)  Heart disease  3)  Rule out chronic obstructive pulmonary disease  4)  Colon mass      Bedside spirometry  Bronchodilators:  Atrovent/ albuterol q 4 – 6 hours as needed  Corticosteroids: Budesonide  Cardiac/HTN:  amlodipine, lisinopril  GI: Rx/ c PPI/H2B  Heme: Rx/VT prophylaxis c SQH/SCD  Discuss with medical team

## 2017-04-13 NOTE — PROGRESS NOTE ADULT - PROBLEM SELECTOR PLAN 1
CV (Rajesh)/ Pulm (Marla)/ IM (Katia)  -  O2 Sat 96% on RA, tachypneic, not in any respiratory distress  - WBC decreased from 12 to 9.9 Denies fevers, chills, cough, recent illness. Continue to monitor.   - Does not appear to be overloaded by PE, lungs CTAB, no JVD, O2Sat stable when supine  - Echo 04/10/17 revealed LVEF 55%-60%, Normal left ventricular size and wall thickness. The left ventricular wall motion is normal, no HD valvular dz, no effusion  - CXR reveals hyperinflation c/w emphysema, no congestion or infiltrate, f/u official read  - continue nebs/inhalers as ordered by pulm PRN

## 2017-04-13 NOTE — PROGRESS NOTE ADULT - SUBJECTIVE AND OBJECTIVE BOX
HPI:  Pt is an 87 yo M former smoker, PMHx CAD s/p PCI x3 LCx (2013 @[facility]), HLD, BPH, depression, presented to cardiologist c/o worsening shortness of breath which has progressed over the past year. Patient states symptoms are similar to when he required his previous stents, and he can only walk about a block or less before having to stop secondary to shortness of breath. He states that sometimes getting dressed and walking around the house can also cause SOB. Of note, he also endorses a recent loss of appetite over the past month and non-bloody diarrhea x 1 week with associated 5lb weight loss. He denies any symptoms such as chest pain, LOC, dizziness, syncope, PND, orthopnea, LE edema, palpitations, asthma/COPD, recent viral illness or travel, abdominal pain, nausea/vomiting, hx of blood tranfusion/anemia. On EKG in office patient noticed to be sinus yasmine with IVCD. Pt recommended for Right and Left cardiac catheterization given history of multiple PCI and EKG abnormalities as well as possible echo/EP evaluation. (06 Apr 2017 16:15)    FAMILY HISTORY:  No pertinent family history in first degree relatives    MEDICATIONS  (STANDING):  chlorhexidine 4% Liquid 1Application(s) Topical once  lisinopril 10milliGRAM(s) Oral daily  gabapentin 100milliGRAM(s) Oral daily  mirtazapine 15milliGRAM(s) Oral at bedtime  PARoxetine 20milliGRAM(s) Oral daily  simvastatin 20milliGRAM(s) Oral at bedtime  amLODIPine   Tablet 2.5milliGRAM(s) Oral daily  pantoprazole    Tablet 40milliGRAM(s) Oral before breakfast  buDESOnide 160 MICROgram(s)/formoterol 4.5 MICROgram(s) Inhaler 2Puff(s) Inhalation two times a day  ALBUTerol/ipratropium for Nebulization 3milliLiter(s) Nebulizer every 6 hours  ALBUTerol    90 MICROgram(s) HFA Inhaler 1Puff(s) Inhalation every 4 hours  tiotropium 18 MICROgram(s) Capsule 1Capsule(s) Inhalation daily  magnesium oxide 800milliGRAM(s) Oral once    MEDICATIONS  (PRN):    Vital Signs Last 24 Hrs  T(C): 36.4, Max: 37 (04-12 @ 17:10)  T(F): 97.6, Max: 98.6 (04-12 @ 17:10)  HR: 66 (58 - 68)  BP: 174/75 (102/55 - 174/75)  BP(mean): --  RR: 16 (16 - 18)  SpO2: 100% (98% - 100%)    Physical exam:    Overall impression  Lymphadenopathy  Liver  spleen    Labs:  CBC Full  -  ( 13 Apr 2017 07:01 )  WBC Count : 9.9 K/uL  Hemoglobin : 10.0 g/dL  Hematocrit : 31.6 %  Platelet Count - Automated : 277 K/uL  Mean Cell Volume : 80.8 fL  Mean Cell Hemoglobin : 25.6 pg  Mean Cell Hemoglobin Concentration : 31.6 g/dL  Auto Neutrophil # : x  Auto Lymphocyte # : x  Auto Monocyte # : x  Auto Eosinophil # : x  Auto Basophil # : x  Auto Neutrophil % : x  Auto Lymphocyte % : x  Auto Monocyte % : x  Auto Eosinophil % : x  Auto Basophil % : x    04-13    140  |  107  |  14  ----------------------------<  74  4.0   |  25  |  1.15    Ca    8.7      13 Apr 2017 07:01  Mg     1.7     04-13        Radiology:  HEALTH ISSUES - R/O PROBLEM Dx:      Assessmant / Problems  1) Anemia of chronic disease with intermitent gi blood loss, posttransfusion Hg 10  2)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

## 2017-04-13 NOTE — PROGRESS NOTE ADULT - SUBJECTIVE AND OBJECTIVE BOX
Interventional Cardiology PA Adult Progress Note    Subjective Assessment: Patient was seen and examined this AM and was asymptomatic without complaints. Denies CP or SOB.  	  MEDICATIONS:  lisinopril 10milliGRAM(s) Oral daily  amLODIPine   Tablet 2.5milliGRAM(s) Oral daily  buDESOnide 160 MICROgram(s)/formoterol 4.5 MICROgram(s) Inhaler 2Puff(s) Inhalation two times a day  ALBUTerol/ipratropium for Nebulization 3milliLiter(s) Nebulizer every 6 hours  ALBUTerol    90 MICROgram(s) HFA Inhaler 1Puff(s) Inhalation every 4 hours  tiotropium 18 MICROgram(s) Capsule 1Capsule(s) Inhalation daily  gabapentin 100milliGRAM(s) Oral daily  mirtazapine 15milliGRAM(s) Oral at bedtime  PARoxetine 20milliGRAM(s) Oral daily  pantoprazole    Tablet 40milliGRAM(s) Oral before breakfast  simvastatin 20milliGRAM(s) Oral at bedtime    [PHYSICAL EXAM:  TELEMETRY:  T(C): 36.4, Max: 37 (04-12 @ 17:10)  HR: 57 (57 - 68)  BP: 108/55 (101/58 - 174/75)  RR: 18 (16 - 18)  SpO2: 95% (95% - 100%)  Wt(kg): --  I&O's Summary  I & Os for 24h ending 13 Apr 2017 07:00  =============================================  IN: 350 ml / OUT: 920 ml / NET: -570 ml    I & Os for current day (as of 13 Apr 2017 16:12)  =============================================  IN: 120 ml / OUT: 0 ml / NET: 120 ml                                 Appearance: Normal	  HEENT:   Normal oral mucosa, PERRL, EOMI	  Neck: Supple, - JVD; No Carotid Bruit   Cardiovascular: Normal S1 S2, No JVD, No murmurs  Respiratory: Lungs clear to auscultation, No Rales, Rhonchi, Wheezing	  Gastrointestinal:  Soft, Non-tender, + BS	  Skin: No rashes, No ecchymoses, No cyanosis  Extremities: Normal range of motion, No clubbing, cyanosis or edema  Vascular: Peripheral pulses palpable 2+ bilaterally  Neurologic: Non-focal  Psychiatry: A & O x 3, Mood & affect appropriate	    LABS:	 	  CARDIAC MARKERS:                        10.0   9.9   )-----------( 277      ( 13 Apr 2017 07:01 )             31.6     04-13    140  |  107  |  14  ----------------------------<  74  4.0   |  25  |  1.15    Ca    8.7      13 Apr 2017 07:01  Mg     1.7     04-13

## 2017-04-14 LAB
ANION GAP SERPL CALC-SCNC: 5 MMOL/L — LOW (ref 9–16)
APTT BLD: 33.2 SEC — SIGNIFICANT CHANGE UP (ref 27.5–37.4)
BUN SERPL-MCNC: 21 MG/DL — SIGNIFICANT CHANGE UP (ref 7–23)
CALCIUM SERPL-MCNC: 8.2 MG/DL — LOW (ref 8.5–10.5)
CEA SERPL-MCNC: 1.3 NG/ML — SIGNIFICANT CHANGE UP (ref 0–3.8)
CHLORIDE SERPL-SCNC: 109 MMOL/L — HIGH (ref 96–108)
CO2 SERPL-SCNC: 27 MMOL/L — SIGNIFICANT CHANGE UP (ref 22–31)
CREAT SERPL-MCNC: 1.39 MG/DL — HIGH (ref 0.5–1.3)
GLUCOSE SERPL-MCNC: 78 MG/DL — SIGNIFICANT CHANGE UP (ref 70–99)
HCT VFR BLD CALC: 28.5 % — LOW (ref 39–50)
HGB BLD-MCNC: 8.9 G/DL — LOW (ref 13–17)
INR BLD: 1.03 — SIGNIFICANT CHANGE UP (ref 0.88–1.16)
MAGNESIUM SERPL-MCNC: 1.9 MG/DL — SIGNIFICANT CHANGE UP (ref 1.6–2.4)
MCHC RBC-ENTMCNC: 25.6 PG — LOW (ref 27–34)
MCHC RBC-ENTMCNC: 31.2 G/DL — LOW (ref 32–36)
MCV RBC AUTO: 82.1 FL — SIGNIFICANT CHANGE UP (ref 80–100)
PLATELET # BLD AUTO: 242 K/UL — SIGNIFICANT CHANGE UP (ref 150–400)
POTASSIUM SERPL-MCNC: 4.1 MMOL/L — SIGNIFICANT CHANGE UP (ref 3.5–5.3)
POTASSIUM SERPL-SCNC: 4.1 MMOL/L — SIGNIFICANT CHANGE UP (ref 3.5–5.3)
PROTHROM AB SERPL-ACNC: 11.4 SEC — SIGNIFICANT CHANGE UP (ref 9.8–12.7)
RBC # BLD: 3.47 M/UL — LOW (ref 4.2–5.8)
RBC # FLD: 15.8 % — SIGNIFICANT CHANGE UP (ref 10.3–16.9)
SODIUM SERPL-SCNC: 141 MMOL/L — SIGNIFICANT CHANGE UP (ref 135–145)
WBC # BLD: 7.7 K/UL — SIGNIFICANT CHANGE UP (ref 3.8–10.5)
WBC # FLD AUTO: 7.7 K/UL — SIGNIFICANT CHANGE UP (ref 3.8–10.5)

## 2017-04-14 PROCEDURE — 94010 BREATHING CAPACITY TEST: CPT | Mod: 26

## 2017-04-14 PROCEDURE — 93970 EXTREMITY STUDY: CPT | Mod: 26

## 2017-04-14 PROCEDURE — 99232 SBSQ HOSP IP/OBS MODERATE 35: CPT

## 2017-04-14 RX ORDER — SODIUM CHLORIDE 9 MG/ML
500 INJECTION, SOLUTION INTRAVENOUS ONCE
Qty: 0 | Refills: 0 | Status: COMPLETED | OUTPATIENT
Start: 2017-04-14 | End: 2017-04-14

## 2017-04-14 RX ORDER — ACETAMINOPHEN 500 MG
650 TABLET ORAL ONCE
Qty: 0 | Refills: 0 | Status: COMPLETED | OUTPATIENT
Start: 2017-04-14 | End: 2017-04-14

## 2017-04-14 RX ORDER — SODIUM CHLORIDE 9 MG/ML
1000 INJECTION, SOLUTION INTRAVENOUS
Qty: 0 | Refills: 0 | Status: DISCONTINUED | OUTPATIENT
Start: 2017-04-14 | End: 2017-04-15

## 2017-04-14 RX ADMIN — Medication 650 MILLIGRAM(S): at 10:50

## 2017-04-14 RX ADMIN — LISINOPRIL 10 MILLIGRAM(S): 2.5 TABLET ORAL at 06:58

## 2017-04-14 RX ADMIN — Medication 3 MILLILITER(S): at 06:58

## 2017-04-14 RX ADMIN — PANTOPRAZOLE SODIUM 40 MILLIGRAM(S): 20 TABLET, DELAYED RELEASE ORAL at 06:58

## 2017-04-14 RX ADMIN — GABAPENTIN 100 MILLIGRAM(S): 400 CAPSULE ORAL at 10:11

## 2017-04-14 RX ADMIN — Medication 3 MILLILITER(S): at 12:43

## 2017-04-14 RX ADMIN — Medication 20 MILLIGRAM(S): at 10:11

## 2017-04-14 RX ADMIN — AMLODIPINE BESYLATE 2.5 MILLIGRAM(S): 2.5 TABLET ORAL at 06:58

## 2017-04-14 RX ADMIN — Medication 3 MILLILITER(S): at 18:26

## 2017-04-14 RX ADMIN — MIRTAZAPINE 15 MILLIGRAM(S): 45 TABLET, ORALLY DISINTEGRATING ORAL at 21:43

## 2017-04-14 RX ADMIN — Medication 650 MILLIGRAM(S): at 10:11

## 2017-04-14 RX ADMIN — BUDESONIDE AND FORMOTEROL FUMARATE DIHYDRATE 2 PUFF(S): 160; 4.5 AEROSOL RESPIRATORY (INHALATION) at 11:43

## 2017-04-14 RX ADMIN — SODIUM CHLORIDE 100 MILLILITER(S): 9 INJECTION, SOLUTION INTRAVENOUS at 10:11

## 2017-04-14 RX ADMIN — SIMVASTATIN 20 MILLIGRAM(S): 20 TABLET, FILM COATED ORAL at 21:43

## 2017-04-14 RX ADMIN — BUDESONIDE AND FORMOTEROL FUMARATE DIHYDRATE 2 PUFF(S): 160; 4.5 AEROSOL RESPIRATORY (INHALATION) at 21:43

## 2017-04-14 RX ADMIN — SODIUM CHLORIDE 1000 MILLILITER(S): 9 INJECTION, SOLUTION INTRAVENOUS at 05:56

## 2017-04-14 NOTE — PROGRESS NOTE ADULT - SUBJECTIVE AND OBJECTIVE BOX
Patient is a 86y old  Male who presents with a chief complaint of     HPI:  Pt is an 87 yo M former smoker, PMHx CAD s/p PCI x3 LCx (2013 @[facility]), HLD, BPH, depression, presented to cardiologist c/o worsening shortness of breath which has progressed over the past year. Patient states symptoms are similar to when he required his previous stents, and he can only walk about a block or less before having to stop secondary to shortness of breath. He states that sometimes getting dressed and walking around the house can also cause SOB. Of note, he also endorses a recent loss of appetite over the past month and non-bloody diarrhea x 1 week with associated 5lb weight loss. He denies any symptoms such as chest pain, LOC, dizziness, syncope, PND, orthopnea, LE edema, palpitations, asthma/COPD, recent viral illness or travel, abdominal pain, nausea/vomiting, hx of blood tranfusion/anemia. On EKG in office patient noticed to be sinus yasmine with IVCD. Pt recommended for Right and Left cardiac catheterization given history of multiple PCI and EKG abnormalities as well as possible echo/EP evaluation. (06 Apr 2017 16:15)    INTERVAL HPI/OVERNIGHT EVENTS:::    HEALTH ISSUES - PROBLEM Dx:  Colon cancer: Colon cancer  Blood in stool: Blood in stool  Bradycardia: Bradycardia  CKD (chronic kidney disease) stage 3, GFR 30-59 ml/min: CKD (chronic kidney disease) stage 3, GFR 30-59 ml/min  Hyperlipidemia: Hyperlipidemia  Anemia: Anemia  CAD (coronary artery disease): CAD (coronary artery disease)  SOB (shortness of breath): SOB (shortness of breath)          PAST MEDICAL & SURGICAL HISTORY:  CAD (coronary artery disease)  HLD (hyperlipidemia)  S/P cataract extraction          Consultant NOTE  REVIEWED  (   )    REVIEW OF SYSTEMS:  [x] As per HPI  CONSTITUTIONAL: No fever, weight loss, or fatigue  RESPIRATORY: No cough, wheezing, chills or hemoptysis; No Shortness of Breath  CARDIOVASCULAR: No chest pain, palpitations, dizziness, or leg swelling  GASTROINTESTINAL: No abdominal or epigastric pain. No nausea, vomiting, or hematemesis; No diarrhea or constipation. No melena or hematochezia.  MUSCULOSKELETAL: No joint pain or swelling; No muscle, back, or extremity pain  PSYCH    awake, alert       [x] All others negative	  [ ] Unable to obtain          Vital Signs Last 24 Hrs  T(C): 36.7, Max: 36.8 (04-13 @ 16:51)  T(F): 98, Max: 98.2 (04-13 @ 16:51)  HR: 48 (48 - 63)  BP: 100/49 (100/49 - 155/69)  BP(mean): 70 (70 - 91)  RR: 14 (14 - 18)  SpO2: 99% (94% - 99%)        I & Os for current day (as of 04-14 @ 07:59)  =============================================  IN: 540 ml / OUT: 150 ml / NET: 390 ml    PHYSICAL EXAMINATION:                                    (    )  NO CHANGE  Appearance: Normal	  HEENT:   Normal oral mucosa, PERRL, EOMI	  Neck: Supple, + JVD/ - JVD; Carotid Bruit   Cardiovascular: Normal S1 S2, No JVD, No murmurs,   Respiratory: Lungs clear to auscultation/Decreased Breath Sounds/No Rales, Rhonchi, Wheezing	  Gastrointestinal:  Soft, Non-tender, + BS	  Skin: No rashes, No ecchymoses, No cyanosis  Extremities: Normal range of motion, No clubbing, cyanosis or edema  Vascular: Peripheral pulses palpable 2+ bilaterally  Neurologic: Non-focal  Psychiatry: A & O x 3, Mood & affect appropriate    chlorhexidine 4% Liquid 1Application(s) Topical once  lisinopril 10milliGRAM(s) Oral daily  gabapentin 100milliGRAM(s) Oral daily  mirtazapine 15milliGRAM(s) Oral at bedtime  PARoxetine 20milliGRAM(s) Oral daily  simvastatin 20milliGRAM(s) Oral at bedtime  amLODIPine   Tablet 2.5milliGRAM(s) Oral daily  pantoprazole    Tablet 40milliGRAM(s) Oral before breakfast  buDESOnide 160 MICROgram(s)/formoterol 4.5 MICROgram(s) Inhaler 2Puff(s) Inhalation two times a day  ALBUTerol/ipratropium for Nebulization 3milliLiter(s) Nebulizer every 6 hours  ALBUTerol    90 MICROgram(s) HFA Inhaler 1Puff(s) Inhalation every 4 hours  tiotropium 18 MICROgram(s) Capsule 1Capsule(s) Inhalation daily        PT/INR - ( 14 Apr 2017 05:58 )   PT: 11.4 sec;   INR: 1.03          PTT - ( 14 Apr 2017 05:58 )  PTT:33.2 sec                              8.9    7.7   )-----------( 242      ( 14 Apr 2017 05:58 )             28.5     04-14    141  |  109<H>  |  21  ----------------------------<  78  4.1   |  27  |  1.39<H>    Ca    8.2<L>      14 Apr 2017 05:58  Mg     1.9     04-14        CAPILLARY BLOOD GLUCOSE Patient is a 86y old  Male who presents with a chief complaint of     HPI:  Pt is an 85 yo M former smoker, PMHx CAD s/p PCI x3 LCx (2013 @[facility]), HLD, BPH, depression, presented to cardiologist c/o worsening shortness of breath which has progressed over the past year. Patient states symptoms are similar to when he required his previous stents, and he can only walk about a block or less before having to stop secondary to shortness of breath. He states that sometimes getting dressed and walking around the house can also cause SOB. Of note, he also endorses a recent loss of appetite over the past month and non-bloody diarrhea x 1 week with associated 5lb weight loss. He denies any symptoms such as chest pain, LOC, dizziness, syncope, PND, orthopnea, LE edema, palpitations, asthma/COPD, recent viral illness or travel, abdominal pain, nausea/vomiting, hx of blood tranfusion/anemia. On EKG in office patient noticed to be sinus yasmine with IVCD. Pt recommended for Right and Left cardiac catheterization given history of multiple PCI and EKG abnormalities as well as possible echo/EP evaluation. (06 Apr 2017 16:15)    INTERVAL HPI/OVERNIGHT EVENTS:::transfer to surgery    PATH TV adenoma    HEALTH ISSUES - PROBLEM Dx:  Colon cancer: Colon cancer  Blood in stool: Blood in stool  Bradycardia: Bradycardia  CKD (chronic kidney disease) stage 3, GFR 30-59 ml/min: CKD (chronic kidney disease) stage 3, GFR 30-59 ml/min  Hyperlipidemia: Hyperlipidemia  Anemia: Anemia  CAD (coronary artery disease): CAD (coronary artery disease)  SOB (shortness of breath): SOB (shortness of breath)          PAST MEDICAL & SURGICAL HISTORY:  CAD (coronary artery disease)  HLD (hyperlipidemia)  S/P cataract extraction          Consultant NOTE  REVIEWED  (+++   )    REVIEW OF SYSTEMS:  [x] As per HPI  CONSTITUTIONAL: comfortable  RESPIRATORY: No cough, wheezing, chills or hemoptysis; No Shortness of Breath  CARDIOVASCULAR: No chest pain, palpitations, dizziness, or leg swelling  GASTROINTESTINAL: No abdominal or epigastric pain. No nausea, vomiting, or hematemesis; No diarrhea or constipation. No melena or hematochezia.  MUSCULOSKELETAL: No joint pain or swelling; No muscle, back, or extremity pain  PSYCH    awake, alert       [x] All others negative	  [ ] Unable to obtain          Vital Signs Last 24 Hrs  T(C): 36.7, Max: 36.8 (04-13 @ 16:51)  T(F): 98, Max: 98.2 (04-13 @ 16:51)  HR: 48 (48 - 63)  BP: 100/49 (100/49 - 155/69)  BP(mean): 70 (70 - 91)  RR: 14 (14 - 18)  SpO2: 99% (94% - 99%)        I & Os for current day (as of 04-14 @ 07:59)  =============================================  IN: 540 ml / OUT: 150 ml / NET: 390 ml    PHYSICAL EXAMINATION:                                    (    )  NO CHANGE  Appearance: Normal	  HEENT:   Normal oral mucosa, PERRL, EOMI	  Neck: Supple, + JVD/ - JVD; Carotid Bruit   Cardiovascular: Normal S1 S2, No JVD, ,   Respiratory: Lungs clear to auscultation/Decreased Breath Sounds/No Rales, Rhonchi, Wheezing	  Gastrointestinal:  Soft, Non-tender, + BS	  Skin: No rashes, No ecchymoses, No cyanosis  Extremities: Normal range of motion, No clubbing, cyanosis or edema  Vascular: Peripheral pulses palpable 2+ bilaterally  Neurologic: Non-focal  Psychiatry: A & O x 3, Mood & affect appropriate    chlorhexidine 4% Liquid 1Application(s) Topical once  lisinopril 10milliGRAM(s) Oral daily  gabapentin 100milliGRAM(s) Oral daily  mirtazapine 15milliGRAM(s) Oral at bedtime  PARoxetine 20milliGRAM(s) Oral daily  simvastatin 20milliGRAM(s) Oral at bedtime  amLODIPine   Tablet 2.5milliGRAM(s) Oral daily  pantoprazole    Tablet 40milliGRAM(s) Oral before breakfast  buDESOnide 160 MICROgram(s)/formoterol 4.5 MICROgram(s) Inhaler 2Puff(s) Inhalation two times a day  ALBUTerol/ipratropium for Nebulization 3milliLiter(s) Nebulizer every 6 hours  ALBUTerol    90 MICROgram(s) HFA Inhaler 1Puff(s) Inhalation every 4 hours  tiotropium 18 MICROgram(s) Capsule 1Capsule(s) Inhalation daily        PT/INR - ( 14 Apr 2017 05:58 )   PT: 11.4 sec;   INR: 1.03          PTT - ( 14 Apr 2017 05:58 )  PTT:33.2 sec                              8.9    7.7   )-----------( 242      ( 14 Apr 2017 05:58 )             28.5     04-14    141  |  109<H>  |  21  ----------------------------<  78  4.1   |  27  |  1.39<H>    Ca    8.2<L>      14 Apr 2017 05:58  Mg     1.9     04-14        CAPILLARY BLOOD GLUCOSE    Surgical Pathology Report (04.12.17 @ 09:50)    Surgical Pathology Report:   ACCESSION No:  [id number]    TIARA GOMEZ                  1        Surgical Final Report          Final Diagnosis    1. Ascending colon mass, biopsy:  - Tubulovillous adenoma.    Conor Rodriguez MD  (Electronic Signature)  Reported on: 04/13/17    Clinical History  Patient is a 86-year-old male with history of anemia    Specimen(s) Submitted  1. Ascending colon mass    Gross Description  The specimen is received in formalin, labeled with the patient's  identification and "ascending colon mass." It consists of  multiple irregular fragments of tan-pink, soft tissue ranging  from 0.1-0.3 cm in greatest dimension. The specimen is entirely  submitted as, 1A.  EF 04/12/17 12:34

## 2017-04-14 NOTE — PROGRESS NOTE ADULT - ASSESSMENT
85 yo male with CAD (stent x2 last 3 years ago), BPH, htn, hld, admitted originally for elective cardiac cath, found to be anemic, further work up reveild ascending colonic mass    Dash diet   Home meds  IS  OOB  PT  F/U Chest CT

## 2017-04-14 NOTE — PROGRESS NOTE ADULT - SUBJECTIVE AND OBJECTIVE BOX
Pt seen and examined   no complaints    REVIEW OF SYSTEMS:  Constitutional: No fever,  Cardiovascular: No chest pain, palpitations, dizziness or leg swelling  Gastrointestinal: No abdominal or epigastric pain. No nausea, vomiting or hematemesis; No diarrhea or constipation. No melena or hematochezia.  Skin: No itching, burning, rashes or lesions       MEDICATIONS:  MEDICATIONS  (STANDING):  chlorhexidine 4% Liquid 1Application(s) Topical once  lisinopril 10milliGRAM(s) Oral daily  gabapentin 100milliGRAM(s) Oral daily  mirtazapine 15milliGRAM(s) Oral at bedtime  PARoxetine 20milliGRAM(s) Oral daily  simvastatin 20milliGRAM(s) Oral at bedtime  amLODIPine   Tablet 2.5milliGRAM(s) Oral daily  pantoprazole    Tablet 40milliGRAM(s) Oral before breakfast  buDESOnide 160 MICROgram(s)/formoterol 4.5 MICROgram(s) Inhaler 2Puff(s) Inhalation two times a day  ALBUTerol/ipratropium for Nebulization 3milliLiter(s) Nebulizer every 6 hours  ALBUTerol    90 MICROgram(s) HFA Inhaler 1Puff(s) Inhalation every 4 hours  tiotropium 18 MICROgram(s) Capsule 1Capsule(s) Inhalation daily  dextrose 5% + sodium chloride 0.45% 1000milliLiter(s) IV Continuous <Continuous>    MEDICATIONS  (PRN):      Allergies    No Known Allergies    Intolerances        Vital Signs Last 24 Hrs  T(C): 36.7, Max: 36.8 (04-13 @ 16:51)  T(F): 98, Max: 98.2 (04-13 @ 16:51)  HR: 78 (48 - 78)  BP: 130/56 (100/49 - 155/69)  BP(mean): 81 (70 - 91)  RR: 18 (14 - 18)  SpO2: 94% (94% - 99%)    I & Os for current day (as of 04-14 @ 09:24)  =============================================  IN: 540 ml / OUT: 150 ml / NET: 390 ml      PHYSICAL EXAM:    General: in no acute distress  HEENT: MMM, conjunctiva and sclera clear  Gastrointestinal: Soft non-tender non-distended; Normal bowel sounds; No hepatosplenomegaly  Skin: Warm and dry. No obvious rash    LABS:      CBC Full  -  ( 14 Apr 2017 05:58 )  WBC Count : 7.7 K/uL  Hemoglobin : 8.9 g/dL  Hematocrit : 28.5 %  Platelet Count - Automated : 242 K/uL  Mean Cell Volume : 82.1 fL  Mean Cell Hemoglobin : 25.6 pg  Mean Cell Hemoglobin Concentration : 31.2 g/dL  Auto Neutrophil # : x  Auto Lymphocyte # : x  Auto Monocyte # : x  Auto Eosinophil # : x  Auto Basophil # : x  Auto Neutrophil % : x  Auto Lymphocyte % : x  Auto Monocyte % : x  Auto Eosinophil % : x  Auto Basophil % : x    04-14    141  |  109<H>  |  21  ----------------------------<  78  4.1   |  27  |  1.39<H>    Ca    8.2<L>      14 Apr 2017 05:58  Mg     1.9     04-14      PT/INR - ( 14 Apr 2017 05:58 )   PT: 11.4 sec;   INR: 1.03          PTT - ( 14 Apr 2017 05:58 )  PTT:33.2 sec                  RADIOLOGY & ADDITIONAL STUDIES (The following images were personally reviewed):Surgical Pathology Report (04.12.17 @ 09:50)    Surgical Pathology Report:   ACCESSION No:  [id number]    TIARA GOMEZ                  1        Surgical Final Report          Final Diagnosis    1. Ascending colon mass, biopsy:  - Tubulovillous adenoma.    Conor Rodriguez MD  (Electronic Signature)  Reported on: 04/13/17    Clinical History  Patient is a 86-year-old male with history of anemia    Specimen(s) Submitted  1. Ascending colon mass    Gross Description  The specimen is received in formalin, labeled with the patient's  identification and "ascending colon mass." It consists of  multiple irregular fragments of tan-pink, soft tissue ranging  from 0.1-0.3 cm in greatest dimension. The specimen is entirely  submitted as, 1A.  EF 04/12/17 12:34

## 2017-04-14 NOTE — PROGRESS NOTE ADULT - SUBJECTIVE AND OBJECTIVE BOX
Chief Complaint/Reason for Consult: CAD  INTERVAL HPI: no sob palp cp  	  MEDICATIONS:  lisinopril 10milliGRAM(s) Oral daily  amLODIPine   Tablet 2.5milliGRAM(s) Oral daily      buDESOnide 160 MICROgram(s)/formoterol 4.5 MICROgram(s) Inhaler 2Puff(s) Inhalation two times a day  ALBUTerol/ipratropium for Nebulization 3milliLiter(s) Nebulizer every 6 hours  ALBUTerol    90 MICROgram(s) HFA Inhaler 1Puff(s) Inhalation every 4 hours  tiotropium 18 MICROgram(s) Capsule 1Capsule(s) Inhalation daily    gabapentin 100milliGRAM(s) Oral daily  mirtazapine 15milliGRAM(s) Oral at bedtime  PARoxetine 20milliGRAM(s) Oral daily    pantoprazole    Tablet 40milliGRAM(s) Oral before breakfast    simvastatin 20milliGRAM(s) Oral at bedtime    chlorhexidine 4% Liquid 1Application(s) Topical once  dextrose 5% + sodium chloride 0.45% 1000milliLiter(s) IV Continuous <Continuous>      REVIEW OF SYSTEMS:  [x] As per HPI  CONSTITUTIONAL: No fever, weight loss, or fatigue  RESPIRATORY: No cough, wheezing, chills or hemoptysis; No Shortness of Breath  CARDIOVASCULAR: No chest pain, palpitations, dizziness, or leg swelling  GASTROINTESTINAL: No abdominal or epigastric pain. No nausea, vomiting, or hematemesis; No diarrhea or constipation. No melena or hematochezia.  MUSCULOSKELETAL: No joint pain or swelling; No muscle, back, or extremity pain  [x] All others negative	  [ ] Unable to obtain    PHYSICAL EXAM:  T(C): 36.7, Max: 36.8 (04-13 @ 16:51)  HR: 56 (48 - 78)  BP: 109/53 (100/49 - 155/69)  RR: 16 (14 - 18)  SpO2: 94% (94% - 99%)  Wt(kg): --  I&O's Summary  I & Os for 24h ending 14 Apr 2017 07:00  =============================================  IN: 540 ml / OUT: 150 ml / NET: 390 ml    I & Os for current day (as of 14 Apr 2017 15:21)  =============================================  IN: 440 ml / OUT: 150 ml / NET: 290 ml        Appearance: Normal	  HEENT:   Normal oral mucosa  Cardiovascular: Normal S1 S2, No JVD, No murmurs, No edema  Respiratory: Lungs clear to auscultation	  Gastrointestinal:  Soft, Non-tender, + BS	  Extremities: Normal range of motion, No clubbing, cyanosis or edema  Vascular: Peripheral pulses palpable 2+ bilaterally    TELEMETRY: 	  no events  ECG:  Diagnosis Line Sinus bradycardia  Left axis deviation  Inferior infarct , age undetermined  	  RADIOLOGY:   CXR:  CT:No suspicious pulmonary finding.  US:    CARDIAC TESTING:  Echocardiogram:  Catheterization:  Stress Test:      LABS:	 	    CARDIAC MARKERS:                                  8.9    7.7   )-----------( 242      ( 14 Apr 2017 05:58 )             28.5     04-14    141  |  109<H>  |  21  ----------------------------<  78  4.1   |  27  |  1.39<H>    Ca    8.2<L>      14 Apr 2017 05:58  Mg     1.9     04-14      proBNP:   Lipid Profile:   HgA1c:   TSH:     ASSESSMENT/PLAN: 	  Problem: SOB (shortness of breath).   - Does not appear to be overloaded by PE, lungs CTAB, no JVD, O2Sat stable when supine  - Echo 04/10/17 revealed LVEF 55%-60%, Normal left ventricular size and wall thickness. The left ventricular wall motion is normal, no HD valvular dz, no effusion  - CXR reveals hyperinflation c/w emphysema, no congestion or infiltrate, f/u official read  - continue nebs/inhalers as ordered by pulm PRN.       Problem: CAD (coronary artery disease).  Plan: - s/p DAYANA x3 to LCx 2013   EF normal wall motion, no chest pain, EKG unchanged.         Problem: Bradycardia.  Plan: - HR in 40s-60s, no BB.       Problem: Hyperlipidemia.  Plan: - Continue Simvastatin 20mg PO daily.     Cardiac Optimized for Surgery.

## 2017-04-14 NOTE — PROGRESS NOTE ADULT - SUBJECTIVE AND OBJECTIVE BOX
INTERVAL HPI/OVERNIGHT EVENTS:    SURGERY ATTENDING        Pt is an 87 yo M former smoker, PMHx CAD s/p PCI x3 LCx (2013 @[facility]), HLD, BPH, depression, presented to cardiologist c/o worsening shortness of breath which has progressed over the past year. Patient states symptoms are similar to when he required his previous stents, and he can only walk about a block or less before having to stop secondary to shortness of breath. He states that sometimes getting dressed and walking around the house can also cause SOB. Of note, he also endorses a recent loss of appetite over the past month and non-bloody diarrhea x 1 week with associated 5lb weight loss. He denies any symptoms such as chest pain, LOC, dizziness, syncope, PND, orthopnea, LE edema, palpitations, asthma/COPD, recent viral illness or travel, abdominal pain, nausea/vomiting, hx of blood tranfusion/anemia. On EKG in office patient noticed to be sinus yasmine with IVCD. Pt recommended for Right and Left cardiac catheterization given history of multiple PCI and EKG abnormalities as well as possible echo/EP evaluation. (06 Apr 2017 16:15)  Patient seen and examined at bedside. Patient states he was being worked up for SOB on exertion which was believed to be cardiac in nature because of previous PCI with DESx2 last 3 years ago. He was admitted for elective cath, however found to be anemic. The patient does not report blood in stool, personal or family hx of colon CA, but was found to have GI bleed and anemia on admission. Cath postponed. Anemia tx with 2uPRBC with improvement in SOB. Colonoscopy showed large ulcerative colonic mass suspicious for CA in ascending colon. Path tubovillous adenoma, however invading pericolonic fat on CT. CT abd shows no nodes or intraabdominal mets. Pending CEA and CT chest. Patient otherwise denies fevers, chills, abdominal pain, nausea or vomiting.  Large B/L inguinal hernias , left containing urinary bladder, left small bowel, non obstructing.        SUBJECTIVE:  Flatus: [X ] YES [ ] NO             Bowel Movement: [X ] YES [ ] NO  Pain (0-10):     0       Pain Control Adequate: [ ] YES [ ] NO  Nausea: [ ] YES [X ] NO            Vomiting: [ ] YES [X ] NO  Diarrhea: [ ] YES [X ] NO         Constipation: [ ] YES [X ] NO     Chest Pain: [ ] YES [X ] NO    SOB:  [ ] YES [X ] NO    MEDICATIONS  (STANDING):  chlorhexidine 4% Liquid 1Application(s) Topical once  lisinopril 10milliGRAM(s) Oral daily  gabapentin 100milliGRAM(s) Oral daily  mirtazapine 15milliGRAM(s) Oral at bedtime  PARoxetine 20milliGRAM(s) Oral daily  simvastatin 20milliGRAM(s) Oral at bedtime  amLODIPine   Tablet 2.5milliGRAM(s) Oral daily  pantoprazole    Tablet 40milliGRAM(s) Oral before breakfast  buDESOnide 160 MICROgram(s)/formoterol 4.5 MICROgram(s) Inhaler 2Puff(s) Inhalation two times a day  ALBUTerol/ipratropium for Nebulization 3milliLiter(s) Nebulizer every 6 hours  ALBUTerol    90 MICROgram(s) HFA Inhaler 1Puff(s) Inhalation every 4 hours  tiotropium 18 MICROgram(s) Capsule 1Capsule(s) Inhalation daily  dextrose 5% + sodium chloride 0.45% 1000milliLiter(s) IV Continuous <Continuous>    MEDICATIONS  (PRN):      Vital Signs Last 24 Hrs  T(C): 36.7, Max: 36.8 (04-13 @ 16:51)  T(F): 98, Max: 98.2 (04-13 @ 16:51)  HR: 56 (48 - 78)  BP: 109/53 (100/49 - 155/69)  BP(mean): 77 (70 - 91)  RR: 16 (14 - 18)  SpO2: 94% (94% - 99%)            I&O's Detail  I & Os for 24h ending 14 Apr 2017 07:00  =============================================  IN:    Oral Fluid: 540 ml    Total IN: 540 ml  ---------------------------------------------  OUT:    Voided: 150 ml    Total OUT: 150 ml  ---------------------------------------------  Total NET: 390 ml    I & Os for current day (as of 14 Apr 2017 15:18)  =============================================  IN:    Oral Fluid: 240 ml    dextrose 5% + sodium chloride 0.45%: 200 ml    Total IN: 440 ml  ---------------------------------------------  OUT:    Voided: 150 ml    Total OUT: 150 ml  ---------------------------------------------  Total NET: 290 ml      LABS:                        8.9    7.7   )-----------( 242      ( 14 Apr 2017 05:58 )             28.5     04-14    141  |  109<H>  |  21  ----------------------------<  78  4.1   |  27  |  1.39<H>    Ca    8.2<L>      14 Apr 2017 05:58  Mg     1.9     04-14      PT/INR - ( 14 Apr 2017 05:58 )   PT: 11.4 sec;   INR: 1.03          PTT - ( 14 Apr 2017 05:58 )  PTT:33.2 sec      RADIOLOGY & ADDITIONAL STUDIES:

## 2017-04-14 NOTE — DIETITIAN INITIAL EVALUATION ADULT. - OTHER INFO
85y/o M admitted for elective cardiac cath, found to be anemic and further w/u showed ascending colonic mass. Pt seen resting in bed. He reports no appetite and very poor intake PTA. However, since admission pt reports appetite is greatly improved and is consuming % of meals provided. Denies N/V/D/C, pain, and mechanical issues with po intake. Last BM yesterday and normal. Pt endorses wt loss 2/2 poor intake PTA, but UBW reported to be 125# which is stable to current wt. Pt with noticeable loss of LBM with bitemporal, clavicular, and tricep wasting. 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 Discussed adequate intake and supplementation. Pt reports that ensure and boost upset his stomach, but he is willing to take ensure clear. Will follow and monitor meal intake and tolerance.

## 2017-04-14 NOTE — PROGRESS NOTE ADULT - ASSESSMENT
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 lesion is large and ulcerated and surgical Rx erecommended

## 2017-04-14 NOTE — PROGRESS NOTE ADULT - SUBJECTIVE AND OBJECTIVE BOX
HPI:  Pt is an 85 yo M former smoker, PMHx CAD s/p PCI x3 LCx (2013 @[facility]), HLD, BPH, depression, presented to cardiologist c/o worsening shortness of breath which has progressed over the past year. Patient states symptoms are similar to when he required his previous stents, and he can only walk about a block or less before having to stop secondary to shortness of breath. He states that sometimes getting dressed and walking around the house can also cause SOB. Of note, he also endorses a recent loss of appetite over the past month and non-bloody diarrhea x 1 week with associated 5lb weight loss. He denies any symptoms such as chest pain, LOC, dizziness, syncope, PND, orthopnea, LE edema, palpitations, asthma/COPD, recent viral illness or travel, abdominal pain, nausea/vomiting, hx of blood tranfusion/anemia. On EKG in office patient noticed to be sinus yasmine with IVCD. Pt recommended for Right and Left cardiac catheterization given history of multiple PCI and EKG abnormalities as well as possible echo/EP evaluation. (06 Apr 2017 16:15)    FAMILY HISTORY:  No pertinent family history in first degree relatives    MEDICATIONS  (STANDING):  chlorhexidine 4% Liquid 1Application(s) Topical once  lisinopril 10milliGRAM(s) Oral daily  gabapentin 100milliGRAM(s) Oral daily  mirtazapine 15milliGRAM(s) Oral at bedtime  PARoxetine 20milliGRAM(s) Oral daily  simvastatin 20milliGRAM(s) Oral at bedtime  amLODIPine   Tablet 2.5milliGRAM(s) Oral daily  pantoprazole    Tablet 40milliGRAM(s) Oral before breakfast  buDESOnide 160 MICROgram(s)/formoterol 4.5 MICROgram(s) Inhaler 2Puff(s) Inhalation two times a day  ALBUTerol/ipratropium for Nebulization 3milliLiter(s) Nebulizer every 6 hours  ALBUTerol    90 MICROgram(s) HFA Inhaler 1Puff(s) Inhalation every 4 hours  tiotropium 18 MICROgram(s) Capsule 1Capsule(s) Inhalation daily  dextrose 5% + sodium chloride 0.45% 1000milliLiter(s) IV Continuous <Continuous>    MEDICATIONS  (PRN):    Vital Signs Last 24 Hrs  T(C): 36.7, Max: 36.8 (04-13 @ 16:51)  T(F): 98, Max: 98.2 (04-13 @ 16:51)  HR: 56 (48 - 78)  BP: 109/53 (100/49 - 155/69)  BP(mean): 77 (70 - 91)  RR: 16 (14 - 18)  SpO2: 94% (94% - 99%)    Physical exam:    Overall impression  Lymphadenopathy  Liver  spleen    Labs:  CBC Full  -  ( 14 Apr 2017 05:58 )  WBC Count : 7.7 K/uL  Hemoglobin : 8.9 g/dL  Hematocrit : 28.5 %  Platelet Count - Automated : 242 K/uL  Mean Cell Volume : 82.1 fL  Mean Cell Hemoglobin : 25.6 pg  Mean Cell Hemoglobin Concentration : 31.2 g/dL  Auto Neutrophil # : x  Auto Lymphocyte # : x  Auto Monocyte # : x  Auto Eosinophil # : x  Auto Basophil # : x  Auto Neutrophil % : x  Auto Lymphocyte % : x  Auto Monocyte % : x  Auto Eosinophil % : x  Auto Basophil % : x    04-14    141  |  109<H>  |  21  ----------------------------<  78  4.1   |  27  |  1.39<H>    Ca    8.2<L>      14 Apr 2017 05:58  Mg     1.9     04-14        Radiology:  HEALTH ISSUES - R/O PROBLEM Dx:      Assessmant / Problems  Ascending colon mass ( large 5 cm. bleeding, on Ct abdomen invading retrocolonic fat)  Bx: tubulovilous adenoma  This is just a sampling issue, the mass behaves as a malignant tumor  Plan:  Recomend right hemicolectomy    Thank you  Darlin Ta MD HPI:  Pt is an 87 yo M former smoker, PMHx CAD s/p PCI x3 LCx (2013 @[facility]), HLD, BPH, depression, presented to cardiologist c/o worsening shortness of breath which has progressed over the past year. Patient states symptoms are similar to when he required his previous stents, and he can only walk about a block or less before having to stop secondary to shortness of breath. He states that sometimes getting dressed and walking around the house can also cause SOB. Of note, he also endorses a recent loss of appetite over the past month and non-bloody diarrhea x 1 week with associated 5lb weight loss. He denies any symptoms such as chest pain, LOC, dizziness, syncope, PND, orthopnea, LE edema, palpitations, asthma/COPD, recent viral illness or travel, abdominal pain, nausea/vomiting, hx of blood tranfusion/anemia. On EKG in office patient noticed to be sinus yasmine with IVCD. Pt recommended for Right and Left cardiac catheterization given history of multiple PCI and EKG abnormalities as well as possible echo/EP evaluation. (06 Apr 2017 16:15)    FAMILY HISTORY:  No pertinent family history in first degree relatives    MEDICATIONS  (STANDING):  chlorhexidine 4% Liquid 1Application(s) Topical once  lisinopril 10milliGRAM(s) Oral daily  gabapentin 100milliGRAM(s) Oral daily  mirtazapine 15milliGRAM(s) Oral at bedtime  PARoxetine 20milliGRAM(s) Oral daily  simvastatin 20milliGRAM(s) Oral at bedtime  amLODIPine   Tablet 2.5milliGRAM(s) Oral daily  pantoprazole    Tablet 40milliGRAM(s) Oral before breakfast  buDESOnide 160 MICROgram(s)/formoterol 4.5 MICROgram(s) Inhaler 2Puff(s) Inhalation two times a day  ALBUTerol/ipratropium for Nebulization 3milliLiter(s) Nebulizer every 6 hours  ALBUTerol    90 MICROgram(s) HFA Inhaler 1Puff(s) Inhalation every 4 hours  tiotropium 18 MICROgram(s) Capsule 1Capsule(s) Inhalation daily  dextrose 5% + sodium chloride 0.45% 1000milliLiter(s) IV Continuous <Continuous>    MEDICATIONS  (PRN):    Vital Signs Last 24 Hrs  T(C): 36.7, Max: 36.8 (04-13 @ 16:51)  T(F): 98, Max: 98.2 (04-13 @ 16:51)  HR: 56 (48 - 78)  BP: 109/53 (100/49 - 155/69)  BP(mean): 77 (70 - 91)  RR: 16 (14 - 18)  SpO2: 94% (94% - 99%)    Physical exam:    Overall impression  Lymphadenopathy  Liver  spleen    Labs:  CBC Full  -  ( 14 Apr 2017 05:58 )  WBC Count : 7.7 K/uL  Hemoglobin : 8.9 g/dL  Hematocrit : 28.5 %  Platelet Count - Automated : 242 K/uL  Mean Cell Volume : 82.1 fL  Mean Cell Hemoglobin : 25.6 pg  Mean Cell Hemoglobin Concentration : 31.2 g/dL  Auto Neutrophil # : x  Auto Lymphocyte # : x  Auto Monocyte # : x  Auto Eosinophil # : x  Auto Basophil # : x  Auto Neutrophil % : x  Auto Lymphocyte % : x  Auto Monocyte % : x  Auto Eosinophil % : x  Auto Basophil % : x    04-14    141  |  109<H>  |  21  ----------------------------<  78  4.1   |  27  |  1.39<H>    Ca    8.2<L>      14 Apr 2017 05:58  Mg     1.9     04-14        Radiology:  HEALTH ISSUES - R/O PROBLEM Dx:      Assessmant / Problems  1)Ascending colon mass ( large 5 cm. bleeding, on Ct abdomen invading retrocolonic fat)  Bx: tubulovilous adenoma  This is just a sampling issue, the mass behaves as a malignant tumor  2)the 2 pulmonary nodules appear unchanged since 2014, unlikely associated with colonic mass  Plan:    1)Recomend right hemicolectomy  2) pet ct whole body  Thank you  Darlin Ta MD

## 2017-04-14 NOTE — PROGRESS NOTE ADULT - ASSESSMENT
TO THE OR ON MONDAY, FOR LAPAROSCOPIC RIGHT RADICAL HEMICOLECTOMY , PREOP, CARDIOLOGY CLEARANCE, BOWEL PREP ON SUNDAY, TRANSFUSE TO H/H 10/30 PREOP.

## 2017-04-14 NOTE — DIETITIAN INITIAL EVALUATION ADULT. - ENERGY NEEDS
IBW 72.7Kg  %IBW 78%  BMI 18.6    Utilized IBW to calculate needs, <80% of IBW. Needs increased for suspected PCM and catabolic illness.

## 2017-04-14 NOTE — PHYSICAL THERAPY INITIAL EVALUATION ADULT - PERTINENT HX OF CURRENT PROBLEM, REHAB EVAL
Pt is an 87 yo M former smoker, PMHx CAD s/p PCI x3 LCx (2013 @[facility]), HLD, BPH, depression, presented to cardiologist c/o worsening shortness of breath which has progressed over the past year.

## 2017-04-14 NOTE — PROGRESS NOTE ADULT - SUBJECTIVE AND OBJECTIVE BOX
O/N: transferred from cardiology, VSS. Chest CT done, pending read. O/N: transferred from cardiology, S. Chest CT done, pending read.       SUBJECTIVE: Pt seen and examined at bedside. No overnight events.  Pain controlled. No f/c/n/v.   Vital Signs Last 24 Hrs  T(C): 36.7, Max: 36.8 (04-13 @ 16:51)  T(F): 98, Max: 98.2 (04-13 @ 16:51)  HR: 48 (48 - 63)  BP: 100/49 (100/49 - 155/69)  BP(mean): 70 (70 - 91)  RR: 14 (14 - 18)  SpO2: 99% (94% - 99%)    PHYSICAL EXAM    Gen: NAD  CV: RRR  Pulm: no resp distress  Abd: Soft, NT/ND  Ext: WWP    I&O's Detail    I & Os for current day (as of 14 Apr 2017 07:37)  =============================================  IN:    Oral Fluid: 540 ml    Total IN: 540 ml  ---------------------------------------------  OUT:    Voided: 150 ml    Total OUT: 150 ml  ---------------------------------------------  Total NET: 390 ml      LABS:                        8.9    7.7   )-----------( 242      ( 14 Apr 2017 05:58 )             28.5     04-14    141  |  109<H>  |  21  ----------------------------<  78  4.1   |  27  |  1.39<H>    Ca    8.2<L>      14 Apr 2017 05:58  Mg     1.9     04-14      PT/INR - ( 14 Apr 2017 05:58 )   PT: 11.4 sec;   INR: 1.03          PTT - ( 14 Apr 2017 05:58 )  PTT:33.2 sec      MEDICATIONS  (STANDING):  chlorhexidine 4% Liquid 1Application(s) Topical once  lisinopril 10milliGRAM(s) Oral daily  gabapentin 100milliGRAM(s) Oral daily  mirtazapine 15milliGRAM(s) Oral at bedtime  PARoxetine 20milliGRAM(s) Oral daily  simvastatin 20milliGRAM(s) Oral at bedtime  amLODIPine   Tablet 2.5milliGRAM(s) Oral daily  pantoprazole    Tablet 40milliGRAM(s) Oral before breakfast  buDESOnide 160 MICROgram(s)/formoterol 4.5 MICROgram(s) Inhaler 2Puff(s) Inhalation two times a day  ALBUTerol/ipratropium for Nebulization 3milliLiter(s) Nebulizer every 6 hours  ALBUTerol    90 MICROgram(s) HFA Inhaler 1Puff(s) Inhalation every 4 hours  tiotropium 18 MICROgram(s) Capsule 1Capsule(s) Inhalation daily    MEDICATIONS  (PRN):      RADIOLOGY & ADDITIONAL STUDIES:    ASSESSMENT AND PLAN

## 2017-04-14 NOTE — PROGRESS NOTE ADULT - SUBJECTIVE AND OBJECTIVE BOX
CC/ HPI 85 yo male former smoker, with CAD s/p PCI x3 LCx (2013 @[facility]), admitted with anemia and found to have colon mass, today without acute respiratory complaint.      PAST MEDICAL & SURGICAL HISTORY:  CAD (coronary artery disease)  HLD (hyperlipidemia)  S/P cataract extraction    SOCHX:  + tobacco,  -  alcohol    FMHX: FA/MO  - contributory     ROS reviewed below with positive findings marked (+) :  GEN:  fever, chills ENT: tracheostomy,   epistaxis,  sinusitis COR: +CAD, CHF,  HTN, dysrhythmia PUL: COPD, ILD, asthma, pneumonia GI: PEG, dysphagia, hemorrhage, other ELIZABETH: kidney disease, electrolyte disorder HEM:  anemia, thrombus, coagulopathy, cancer ENDO:  thyroid disease, diabetes mellitus CNS:  dementia, stroke, seizure, PSY:  depression, anxiety, other      MEDICATIONS  (STANDING):  chlorhexidine 4% Liquid 1Application(s) Topical once  lisinopril 10milliGRAM(s) Oral daily  gabapentin 100milliGRAM(s) Oral daily  mirtazapine 15milliGRAM(s) Oral at bedtime  PARoxetine 20milliGRAM(s) Oral daily  simvastatin 20milliGRAM(s) Oral at bedtime  amLODIPine   Tablet 2.5milliGRAM(s) Oral daily  pantoprazole    Tablet 40milliGRAM(s) Oral before breakfast  buDESOnide 160 MICROgram(s)/formoterol 4.5 MICROgram(s) Inhaler 2Puff(s) Inhalation two times a day  ALBUTerol/ipratropium for Nebulization 3milliLiter(s) Nebulizer every 6 hours  ALBUTerol    90 MICROgram(s) HFA Inhaler 1Puff(s) Inhalation every 4 hours  tiotropium 18 MICROgram(s) Capsule 1Capsule(s) Inhalation daily  dextrose 5% + sodium chloride 0.45% 1000milliLiter(s) IV Continuous <Continuous>    MEDICATIONS  (PRN):      Vital Signs Last 24 Hrs  T(C): 36.4, Max: 36.8 (04-13 @ 16:51)  T(F): 97.6, Max: 98.2 (04-13 @ 16:51)  HR: 78 (48 - 78)  BP: 130/56 (100/49 - 155/69)  BP(mean): 81 (70 - 91)  RR: 18 (14 - 18)  SpO2: 94% (94% - 99%)    GENERAL:         comfortable,  - distress.  HEENT:            - trauma,  - icterus,  - injection,  - nasal discharge.  NECK:              - jugular venous distention, - thyromegaly.  LYMPH:           - lymphadenopathy, - masses.  RESP:              + clear,   - rales,   - rhonchi,   - wheezes.   COR:                S1S2 RRR  - murmurs,  - gallops,  - rubs.  ABD:                bowel sounds,   soft, - tender, - distended, - organomegaly.  EXT/MSC:         - cyanosis,  - clubbing,  - edema.    NEURO:             alert,   responds to stimuli.                          8.9    7.7   )-----------( 242      ( 14 Apr 2017 05:58 )             28.5       CT chest (4/13) Negative for thoracic inlet or axillary adenopathy. There is a mildly enlarged prevascular lymph node measuring 1.6 cm, stable since 2013. Negative for hilar adenopathy. Evaluation of the lung parenchyma demonstrates biapical pleural-parenchymal scarring. There is mild centrilobular emphysematous change, localized the bilateral lung apices. There is a 3 mm subpleural nodule within the right lower lobe image 234, stable since 3/2013, and a 2 mm subpleural nodule within the left lower lobe (image 242), stable since 1/2014 for which follow-up is not required. There is a small diffuse small multifocal region of clustered branching tree-in-bud micronodular the within the subpleural right lower lobe, intervally improved since 1/2014, consistent with minimal bronchiolitis. There is a fibrotic/atelectatic opacity within the lingula. No endobronchial lesion. No pulmonary consolidation or mass.            ASSESSMENT/PLAN    1) Colon mass  2) Chronic obstructive pulmonary disease  3) Pulmonary nodules  4) Coronary artery disease      Bedside spirometry  Bronchodilators:  Spiriva daily, Atrovent/ albuterol q 4 – 6 hours as needed  Corticosteroids: Budesonide  Cardiac/HTN:  amlodipine, lisinopril  GI: Rx/ c PPI/H2B  Heme: Rx/VT prophylaxis  Discuss with medical team

## 2017-04-14 NOTE — CHART NOTE - NSCHARTNOTEFT_GEN_A_CORE
Upon Nutritional Assessment by the Registered Dietitian your patient was determined to meet criteria / has evidence of the following diagnosis/diagnoses:          [ ]  Mild Protein Calorie Malnutrition        [ ]  Moderate Protein Calorie Malnutrition        [ X] Severe Protein Calorie Malnutrition        [ ] Unspecified Protein Calorie Malnutrition        [ X] Underweight / BMI <19        [ ] Morbid Obesity / BMI > 40      Findings as based on:  •  Comprehensive nutrition assessment and consultation  •  Calorie counts (nutrient intake analysis)  •  Food acceptance and intake status from observations by staff  •  Follow up  •  Patient education  •  Intervention secondary to interdisciplinary rounds  •   concerns      Treatment:    The following diet has been recommended:      PROVIDER Section:     By signing this assessment you are acknowledging and agree with the diagnosis/diagnoses assigned by the Registered Dietitian    Comments:

## 2017-04-15 LAB
ANION GAP SERPL CALC-SCNC: 7 MMOL/L — LOW (ref 9–16)
BUN SERPL-MCNC: 21 MG/DL — SIGNIFICANT CHANGE UP (ref 7–23)
CALCIUM SERPL-MCNC: 8.3 MG/DL — LOW (ref 8.5–10.5)
CHLORIDE SERPL-SCNC: 110 MMOL/L — HIGH (ref 96–108)
CO2 SERPL-SCNC: 25 MMOL/L — SIGNIFICANT CHANGE UP (ref 22–31)
CREAT SERPL-MCNC: 1.46 MG/DL — HIGH (ref 0.5–1.3)
GLUCOSE SERPL-MCNC: 112 MG/DL — HIGH (ref 70–99)
MAGNESIUM SERPL-MCNC: 1.7 MG/DL — SIGNIFICANT CHANGE UP (ref 1.6–2.4)
PHOSPHATE SERPL-MCNC: 2.5 MG/DL — SIGNIFICANT CHANGE UP (ref 2.5–4.5)
POTASSIUM SERPL-MCNC: 4.7 MMOL/L — SIGNIFICANT CHANGE UP (ref 3.5–5.3)
POTASSIUM SERPL-SCNC: 4.7 MMOL/L — SIGNIFICANT CHANGE UP (ref 3.5–5.3)
SODIUM SERPL-SCNC: 142 MMOL/L — SIGNIFICANT CHANGE UP (ref 135–145)

## 2017-04-15 PROCEDURE — 99232 SBSQ HOSP IP/OBS MODERATE 35: CPT

## 2017-04-15 RX ORDER — SODIUM FERRIC GLUCONAT/SUCROSE 62.5MG/5ML
125 AMPUL (ML) INTRAVENOUS DAILY
Qty: 0 | Refills: 0 | Status: DISCONTINUED | OUTPATIENT
Start: 2017-04-15 | End: 2017-04-17

## 2017-04-15 RX ORDER — SODIUM FERRIC GLUCONAT/SUCROSE 62.5MG/5ML
125 AMPUL (ML) INTRAVENOUS DAILY
Qty: 0 | Refills: 0 | Status: DISCONTINUED | OUTPATIENT
Start: 2017-04-15 | End: 2017-04-15

## 2017-04-15 RX ORDER — MAGNESIUM SULFATE 500 MG/ML
1 VIAL (ML) INJECTION ONCE
Qty: 0 | Refills: 0 | Status: COMPLETED | OUTPATIENT
Start: 2017-04-15 | End: 2017-04-15

## 2017-04-15 RX ORDER — DEXTROSE MONOHYDRATE, SODIUM CHLORIDE, AND POTASSIUM CHLORIDE 50; .745; 4.5 G/1000ML; G/1000ML; G/1000ML
1000 INJECTION, SOLUTION INTRAVENOUS
Qty: 0 | Refills: 0 | Status: DISCONTINUED | OUTPATIENT
Start: 2017-04-15 | End: 2017-04-15

## 2017-04-15 RX ORDER — POTASSIUM PHOSPHATE, MONOBASIC POTASSIUM PHOSPHATE, DIBASIC 236; 224 MG/ML; MG/ML
15 INJECTION, SOLUTION INTRAVENOUS ONCE
Qty: 0 | Refills: 0 | Status: COMPLETED | OUTPATIENT
Start: 2017-04-15 | End: 2017-04-15

## 2017-04-15 RX ORDER — SODIUM CHLORIDE 9 MG/ML
1000 INJECTION INTRAMUSCULAR; INTRAVENOUS; SUBCUTANEOUS
Qty: 0 | Refills: 0 | Status: DISCONTINUED | OUTPATIENT
Start: 2017-04-15 | End: 2017-04-17

## 2017-04-15 RX ADMIN — Medication 3 MILLILITER(S): at 17:50

## 2017-04-15 RX ADMIN — PANTOPRAZOLE SODIUM 40 MILLIGRAM(S): 20 TABLET, DELAYED RELEASE ORAL at 06:08

## 2017-04-15 RX ADMIN — POTASSIUM PHOSPHATE, MONOBASIC POTASSIUM PHOSPHATE, DIBASIC 62.5 MILLIMOLE(S): 236; 224 INJECTION, SOLUTION INTRAVENOUS at 10:47

## 2017-04-15 RX ADMIN — Medication 110 MILLIGRAM(S): at 22:35

## 2017-04-15 RX ADMIN — Medication 3 MILLILITER(S): at 06:08

## 2017-04-15 RX ADMIN — Medication 3 MILLILITER(S): at 00:37

## 2017-04-15 RX ADMIN — GABAPENTIN 100 MILLIGRAM(S): 400 CAPSULE ORAL at 12:37

## 2017-04-15 RX ADMIN — Medication 100 GRAM(S): at 08:23

## 2017-04-15 RX ADMIN — Medication 20 MILLIGRAM(S): at 12:37

## 2017-04-15 RX ADMIN — BUDESONIDE AND FORMOTEROL FUMARATE DIHYDRATE 2 PUFF(S): 160; 4.5 AEROSOL RESPIRATORY (INHALATION) at 10:48

## 2017-04-15 RX ADMIN — LISINOPRIL 10 MILLIGRAM(S): 2.5 TABLET ORAL at 06:08

## 2017-04-15 RX ADMIN — Medication 3 MILLILITER(S): at 12:37

## 2017-04-15 RX ADMIN — SIMVASTATIN 20 MILLIGRAM(S): 20 TABLET, FILM COATED ORAL at 21:53

## 2017-04-15 RX ADMIN — MIRTAZAPINE 15 MILLIGRAM(S): 45 TABLET, ORALLY DISINTEGRATING ORAL at 21:53

## 2017-04-15 RX ADMIN — AMLODIPINE BESYLATE 2.5 MILLIGRAM(S): 2.5 TABLET ORAL at 06:08

## 2017-04-15 RX ADMIN — BUDESONIDE AND FORMOTEROL FUMARATE DIHYDRATE 2 PUFF(S): 160; 4.5 AEROSOL RESPIRATORY (INHALATION) at 21:53

## 2017-04-15 RX ADMIN — SODIUM CHLORIDE 100 MILLILITER(S): 9 INJECTION INTRAMUSCULAR; INTRAVENOUS; SUBCUTANEOUS at 10:47

## 2017-04-15 NOTE — PROGRESS NOTE ADULT - SUBJECTIVE AND OBJECTIVE BOX
O/N: Afebrile, VSS. RONN  4/14: CT chest with no mets, CEA normal, b/l LE duplex ordered O/N: Afebrile, VSS. RONN  4/14: CT chest with no mets, CEA normal, b/l LE duplex ordered     SUBJECTIVE: Patient w/o complaints. Patient seen and examined bedside by chief resident.    lisinopril 10milliGRAM(s) Oral daily  amLODIPine   Tablet 2.5milliGRAM(s) Oral daily      Vital Signs Last 24 Hrs  T(C): 36.7, Max: 36.9 (04-14 @ 21:12)  T(F): 98.1, Max: 98.4 (04-14 @ 21:12)  HR: 50 (50 - 78)  BP: 129/61 (104/52 - 141/64)  BP(mean): 88 (75 - 92)  RR: 16 (16 - 18)  SpO2: 98% (77% - 99%)  I&O's Detail    I & Os for current day (as of 15 Apr 2017 07:28)  =============================================  IN:    dextrose 5% + sodium chloride 0.45%: 1000 ml    Oral Fluid: 840 ml    Total IN: 1840 ml  ---------------------------------------------  OUT:    Voided: 650 ml    Total OUT: 650 ml  ---------------------------------------------  Total NET: 1190 ml      General: NAD, sitting up in chair  C/V: NSR  Pulm: Nonlabored breathing, no respiratory distress  Abd: soft, NT/ND.  Extrem: WWP, no edema      LABS:                        8.9    7.7   )-----------( 242      ( 14 Apr 2017 05:58 )             28.5     04-14    141  |  109<H>  |  21  ----------------------------<  78  4.1   |  27  |  1.39<H>    Ca    8.2<L>      14 Apr 2017 05:58  Mg     1.9     04-14      PT/INR - ( 14 Apr 2017 05:58 )   PT: 11.4 sec;   INR: 1.03          PTT - ( 14 Apr 2017 05:58 )  PTT:33.2 sec      RADIOLOGY & ADDITIONAL STUDIES:

## 2017-04-15 NOTE — PROGRESS NOTE ADULT - SUBJECTIVE AND OBJECTIVE BOX
Chief Complaint/Reason for Consult: CAD  INTERVAL HPI: no sob palp cp  	  MEDICATIONS:  lisinopril 10milliGRAM(s) Oral daily  amLODIPine   Tablet 2.5milliGRAM(s) Oral daily      buDESOnide 160 MICROgram(s)/formoterol 4.5 MICROgram(s) Inhaler 2Puff(s) Inhalation two times a day  ALBUTerol/ipratropium for Nebulization 3milliLiter(s) Nebulizer every 6 hours  ALBUTerol    90 MICROgram(s) HFA Inhaler 1Puff(s) Inhalation every 4 hours  tiotropium 18 MICROgram(s) Capsule 1Capsule(s) Inhalation daily    gabapentin 100milliGRAM(s) Oral daily  mirtazapine 15milliGRAM(s) Oral at bedtime  PARoxetine 20milliGRAM(s) Oral daily    pantoprazole    Tablet 40milliGRAM(s) Oral before breakfast    simvastatin 20milliGRAM(s) Oral at bedtime    chlorhexidine 4% Liquid 1Application(s) Topical once  sodium chloride 0.9%. 1000milliLiter(s) IV Continuous <Continuous>      REVIEW OF SYSTEMS:  [x] As per HPI  CONSTITUTIONAL: No fever, weight loss, or fatigue  RESPIRATORY: No cough, wheezing, chills or hemoptysis; No Shortness of Breath  CARDIOVASCULAR: No chest pain, palpitations, dizziness, or leg swelling  GASTROINTESTINAL: No abdominal or epigastric pain. No nausea, vomiting, or hematemesis; No diarrhea or constipation. No melena or hematochezia.  MUSCULOSKELETAL: No joint pain or swelling; No muscle, back, or extremity pain  [x] All others negative	  [ ] Unable to obtain    PHYSICAL EXAM:  T(C): 36.6, Max: 36.9 (04-14 @ 21:12)  HR: 64 (50 - 68)  BP: 137/63 (104/52 - 141/64)  RR: 14 (14 - 18)  SpO2: 97% (77% - 99%)  Wt(kg): --  I&O's Summary  I & Os for 24h ending 15 Apr 2017 07:00  =============================================  IN: 1840 ml / OUT: 650 ml / NET: 1190 ml    I & Os for current day (as of 15 Apr 2017 13:06)  =============================================  IN: 440 ml / OUT: 450 ml / NET: -10 ml        Appearance: Normal	  HEENT:   Normal oral mucosa  Cardiovascular: Normal S1 S2, No JVD, No murmurs, No edema  Respiratory: Lungs clear to auscultation	  Gastrointestinal:  Soft, Non-tender, + BS	  Extremities: Normal range of motion, No clubbing, cyanosis or edema  Vascular: Peripheral pulses palpable 2+ bilaterally    TELEMETRY: 	  sinus yasmine no pauses  ECG:    	  RADIOLOGY:   CXR:  CT:  US:    CARDIAC TESTING:  Echocardiogram:  Catheterization:  Stress Test:      LABS:	 	    CARDIAC MARKERS:                                  8.9    7.7   )-----------( 242      ( 14 Apr 2017 05:58 )             28.5     04-15    142  |  110<H>  |  21  ----------------------------<  112<H>  4.7   |  25  |  1.46<H>    Ca    8.3<L>      15 Apr 2017 07:09  Phos  2.5     04-15  Mg     1.7     04-15      proBNP:   Lipid Profile:   HgA1c:   TSH:     ASSESSMENT/PLAN: 	  Problem: SOB (shortness of breath).   - Does not appear to be overloaded by PE, lungs CTAB, no JVD, O2Sat stable when supine  - Echo 04/10/17 revealed LVEF 55%-60%, Normal left ventricular size and wall thickness. The left ventricular wall motion is normal, no HD valvular dz, no effusion  - CXR reveals hyperinflation c/w emphysema, no congestion or infiltrate, f/u official read  - continue nebs/inhalers as ordered by pulm PRN.       Problem: CAD (coronary artery disease).  Plan: - s/p DAYANA x3 to LCx 2013   EF normal wall motion, no chest pain, EKG unchanged.         Problem: Bradycardia.  Plan: - HR in 40s-60s, no BB.       Problem: Hyperlipidemia.  Plan: - Continue Simvastatin 20mg PO daily.     Cardiac Optimized for Surgery.

## 2017-04-15 NOTE — PROGRESS NOTE ADULT - ASSESSMENT
87 yo male with CAD (stent x2 last 3 years ago), BPH, htn, hld, admitted originally for elective cardiac cath, found to be anemic, further work up reveild ascending colonic mass    Dash diet   Home meds  IS  OOB  PT  F/U doppler 85 yo male with CAD (stent x2 last 3 years ago), BPH, htn, hld, admitted originally for elective cardiac cath, found to be anemic, further work up reveild ascending colonic mass. No DVT seen on doppler.    Dash diet   Home meds  IS  OOB  PT  Bowel prep tomorrow  OR monday

## 2017-04-15 NOTE — PROGRESS NOTE ADULT - SUBJECTIVE AND OBJECTIVE BOX
HPI:  Pt is an 87 yo M former smoker, PMHx CAD s/p PCI x3 LCx (2013 @[facility]), HLD, BPH, depression, presented to cardiologist c/o worsening shortness of breath which has progressed over the past year. Patient states symptoms are similar to when he required his previous stents, and he can only walk about a block or less before having to stop secondary to shortness of breath. He states that sometimes getting dressed and walking around the house can also cause SOB. Of note, he also endorses a recent loss of appetite over the past month and non-bloody diarrhea x 1 week with associated 5lb weight loss. He denies any symptoms such as chest pain, LOC, dizziness, syncope, PND, orthopnea, LE edema, palpitations, asthma/COPD, recent viral illness or travel, abdominal pain, nausea/vomiting, hx of blood tranfusion/anemia. On EKG in office patient noticed to be sinus yasmine with IVCD. Pt recommended for Right and Left cardiac catheterization given history of multiple PCI and EKG abnormalities as well as possible echo/EP evaluation. (06 Apr 2017 16:15)    FAMILY HISTORY:  No pertinent family history in first degree relatives    MEDICATIONS  (STANDING):  chlorhexidine 4% Liquid 1Application(s) Topical once  lisinopril 10milliGRAM(s) Oral daily  gabapentin 100milliGRAM(s) Oral daily  mirtazapine 15milliGRAM(s) Oral at bedtime  PARoxetine 20milliGRAM(s) Oral daily  simvastatin 20milliGRAM(s) Oral at bedtime  amLODIPine   Tablet 2.5milliGRAM(s) Oral daily  pantoprazole    Tablet 40milliGRAM(s) Oral before breakfast  buDESOnide 160 MICROgram(s)/formoterol 4.5 MICROgram(s) Inhaler 2Puff(s) Inhalation two times a day  ALBUTerol/ipratropium for Nebulization 3milliLiter(s) Nebulizer every 6 hours  ALBUTerol    90 MICROgram(s) HFA Inhaler 1Puff(s) Inhalation every 4 hours  tiotropium 18 MICROgram(s) Capsule 1Capsule(s) Inhalation daily  sodium chloride 0.9%. 1000milliLiter(s) IV Continuous <Continuous>    MEDICATIONS  (PRN):    Vital Signs Last 24 Hrs  T(C): 37.3, Max: 37.3 (04-15 @ 18:12)  T(F): 99.2, Max: 99.2 (04-15 @ 18:12)  HR: 66 (50 - 66)  BP: 142/65 (112/56 - 142/65)  BP(mean): 90 (79 - 90)  RR: 16 (14 - 16)  SpO2: 96% (96% - 99%)    Physical exam:    Overall impression  Lymphadenopathy  Liver  spleen    Labs:  CBC Full  -  ( 14 Apr 2017 05:58 )  WBC Count : 7.7 K/uL  Hemoglobin : 8.9 g/dL  Hematocrit : 28.5 %  Platelet Count - Automated : 242 K/uL  Mean Cell Volume : 82.1 fL  Mean Cell Hemoglobin : 25.6 pg  Mean Cell Hemoglobin Concentration : 31.2 g/dL  Auto Neutrophil # : x  Auto Lymphocyte # : x  Auto Monocyte # : x  Auto Eosinophil # : x  Auto Basophil # : x  Auto Neutrophil % : x  Auto Lymphocyte % : x  Auto Monocyte % : x  Auto Eosinophil % : x  Auto Basophil % : x    04-15    142  |  110<H>  |  21  ----------------------------<  112<H>  4.7   |  25  |  1.46<H>    Ca    8.3<L>      15 Apr 2017 07:09  Phos  2.5     04-15  Mg     1.7     04-15        Radiology:  HEALTH ISSUES - R/O PROBLEM Dx:      Assessmant / Problems  Ascending colon mass , 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 Patient states symptoms are similar to when he required his previous stents, and he can only walk about a block or less before having to stop secondary to shortness of breath. He states that sometimes getting dressed and walking around the house can also cause SOB. Of note, he also endorses a recent loss of appetite over the past month and non-bloody diarrhea x 1 week with associated 5lb weight loss. He denies any symptoms such as chest pain, LOC, dizziness, syncope, PND, orthopnea, LE edema, palpitations, asthma/COPD, recent viral illness or travel, abdominal pain, nausea/vomiting, hx of blood tranfusion/anemia. On EKG in office patient noticed to be sinus yasmine with IVCD. Pt recommended for Right and Left cardiac catheterization given history of multiple PCI and EKG abnormalities as well as possible echo/EP evaluation. (06 Apr 2017 16:15)    FAMILY HISTORY:  No pertinent family history in first degree relatives    MEDICATIONS  (STANDING):  chlorhexidine 4% Liquid 1Application(s) Topical once  lisinopril 10milliGRAM(s) Oral daily  gabapentin 100milliGRAM(s) Oral daily  mirtazapine 15milliGRAM(s) Oral at bedtime  PARoxetine 20milliGRAM(s) Oral daily  simvastatin 20milliGRAM(s) Oral at bedtime  amLODIPine   Tablet 2.5milliGRAM(s) Oral daily  pantoprazole    Tablet 40milliGRAM(s) Oral before breakfast  buDESOnide 160 MICROgram(s)/formoterol 4.5 MICROgram(s) Inhaler 2Puff(s) Inhalation two times a day  ALBUTerol/ipratropium for Nebulization 3milliLiter(s) Nebulizer every 6 hours  ALBUTerol    90 MICROgram(s) HFA Inhaler 1Puff(s) Inhalation every 4 hours  tiotropium 18 MICROgram(s) Capsule 1Capsule(s) Inhalation daily  sodium chloride 0.9%. 1000milliLiter(s) IV Continuous <Continuous>    MEDICATIONS  (PRN):    Vital Signs Last 24 Hrs  T(C): 37.3, Max: 37.3 (04-15 @ 18:12)  T(F): 99.2, Max: 99.2 (04-15 @ 18:12)  HR: 66 (50 - 66)  BP: 142/65 (112/56 - 142/65)  BP(mean): 90 (79 - 90)  RR: 16 (14 - 16)  SpO2: 96% (96% - 99%)    Physical exam:    Overall impression  Lymphadenopathy  Liver  spleen    Labs:  CBC Full  -  ( 14 Apr 2017 05:58 )  WBC Count : 7.7 K/uL  Hemoglobin : 8.9 g/dL  Hematocrit : 28.5 %  Platelet Count - Automated : 242 K/uL  Mean Cell Volume : 82.1 fL  Mean Cell Hemoglobin : 25.6 pg  Mean Cell Hemoglobin Concentration : 31.2 g/dL  Auto Neutrophil # : x  Auto Lymphocyte # : x  Auto Monocyte # : x  Auto Eosinophil # : x  Auto Basophil # : x  Auto Neutrophil % : x  Auto Lymphocyte % : x  Auto Monocyte % : x  Auto Eosinophil % : x  Auto Basophil % : x    04-15    142  |  110<H>  |  21  ----------------------------<  112<H>  4.7   |  25  |  1.46<H>    Ca    8.3<L>      15 Apr 2017 07:09  Phos  2.5     04-15  Mg     1.7     04-15        Radiology:  HEALTH ISSUES - R/O PROBLEM Dx:      Assessmant / Problems  1)Ascending colon mass , 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

## 2017-04-15 NOTE — PROGRESS NOTE ADULT - SUBJECTIVE AND OBJECTIVE BOX
PUD CCM ATTENDING FOR DR JULIAN  \  - CP - SOB (in bed, usually able to walk 3 blocks before he stops due to SOB) - cough  \  CC/ HPI 87 yo male former smoker, with CAD s/p PCI x3 LCx (2013 @[facility]), admitted with anemia and found to have colon mass, today without acute respiratory complaint.    PAST MEDICAL & SURGICAL HISTORY:  CAD (coronary artery disease)  HLD (hyperlipidemia)  S/P cataract extraction    SOCHX:  + tobacco QUIT 1980,  -  alcohol    FMHX: FA/MO  - contributory     ROS reviewed below with positive findings marked (+) :  GEN:  fever, chills ENT: tracheostomy,   epistaxis,  sinusitis COR: +CAD, CHF,  HTN, dysrhythmia PUL: COPD, ILD, asthma, pneumonia GI: PEG, dysphagia, hemorrhage, other ELIZABETH: kidney disease, electrolyte disorder HEM:  anemia, thrombus, coagulopathy, cancer ENDO:  thyroid disease, diabetes mellitus CNS:  dementia, stroke, seizure, PSY:  depression, anxiety, other      MEDICATIONS  (STANDING):  chlorhexidine 4% Liquid 1Application(s) Topical once  lisinopril 10milliGRAM(s) Oral daily  gabapentin 100milliGRAM(s) Oral daily  mirtazapine 15milliGRAM(s) Oral at bedtime  PARoxetine 20milliGRAM(s) Oral daily  simvastatin 20milliGRAM(s) Oral at bedtime  amLODIPine   Tablet 2.5milliGRAM(s) Oral daily  pantoprazole    Tablet 40milliGRAM(s) Oral before breakfast  buDESOnide 160 MICROgram(s)/formoterol 4.5 MICROgram(s) Inhaler 2Puff(s) Inhalation two times a day  ALBUTerol/ipratropium for Nebulization 3milliLiter(s) Nebulizer every 6 hours  ALBUTerol    90 MICROgram(s) HFA Inhaler 1Puff(s) Inhalation every 4 hours  tiotropium 18 MICROgram(s) Capsule 1Capsule(s) Inhalation daily  dextrose 5% + sodium chloride 0.45% 1000milliLiter(s) IV Continuous <Continuous>    MEDICATIONS  (PRN):    Vital Signs Last 24 Hrs  T(C): 36.4, Max: 36.8 (04-13 @ 16:51)  T(F): 97.6, Max: 98.2 (04-13 @ 16:51)  HR: 78 (48 - 78)  BP: 130/56 (100/49 - 155/69)  BP(mean): 81 (70 - 91)  RR: 18 (14 - 18)  SpO2: 94% (94% - 99%) ON RA, AT REST    GENERAL:         comfortable,  - distress.  HEENT:            - trauma,  - icterus,  - injection,  - nasal discharge.  NECK:              - jugular venous distention, - thyromegaly.  LYMPH:           - lymphadenopathy, - masses.  RESP:              BS mildly decreased,   - rales,   - rhonchi,   - wheezes.   COR:                S1S2  sinus rhythm RRR  - murmurs,  - gallops,  - rubs.  ABD:                bowel sounds,   soft, - tender, - distended, - organomegaly.  EXT/MSC:         - cyanosis,  - clubbing,  - edema.    NEURO:             alert,   responds to stimuli, intermittent agitation                          8.9    7.7   )-----------( 242      ( 14 Apr 2017 05:58 )             28.5       CT chest (4/13) Negative for thoracic inlet or axillary adenopathy. There is a mildly enlarged prevascular lymph node measuring 1.6 cm, stable since 2013. Negative for hilar adenopathy. Evaluation of the lung parenchyma demonstrates biapical pleural-parenchymal scarring. There is mild centrilobular emphysematous change, localized the bilateral lung apices. There is a 3 mm subpleural nodule within the right lower lobe image 234, stable since 3/2013, and a 2 mm subpleural nodule within the left lower lobe (image 242), stable since 1/2014 for which follow-up is not required. There is a small diffuse small multifocal region of clustered branching tree-in-bud micronodular the within the subpleural right lower lobe, intervally improved since 1/2014, consistent with minimal bronchiolitis. There is a fibrotic/atelectatic opacity within the lingula. No endobronchial lesion. No pulmonary consolidation or mass.    ASSESSMENT/PLAN    1) Colon mass  2) Chronic obstructive pulmonary disease  3) Pulmonary nodules  4) Coronary artery disease    Bedside spirometry  Bronchodilators:  Spiriva daily, Atrovent/ albuterol q 4 – 6 hours as needed  Corticosteroids: Budesonide  Cardiac/HTN:  amlodipine, lisinopril  GI: Rx/ c PPI/H2B  Heme: Rx/VT prophylaxis  On monitor  PT, oob

## 2017-04-15 NOTE — PROGRESS NOTE ADULT - SUBJECTIVE AND OBJECTIVE BOX
Pt seen and examined no complaints    REVIEW OF SYSTEMS:  Constitutional: No fever,  Cardiovascular: No chest pain, palpitations, dizziness or leg swelling  Gastrointestinal: No abdominal or epigastric pain. No nausea, vomiting or hematemesis; No diarrhea or constipation. No melena or hematochezia.  Skin: No itching, burning, rashes or lesions       MEDICATIONS:  MEDICATIONS  (STANDING):  chlorhexidine 4% Liquid 1Application(s) Topical once  lisinopril 10milliGRAM(s) Oral daily  gabapentin 100milliGRAM(s) Oral daily  mirtazapine 15milliGRAM(s) Oral at bedtime  PARoxetine 20milliGRAM(s) Oral daily  simvastatin 20milliGRAM(s) Oral at bedtime  amLODIPine   Tablet 2.5milliGRAM(s) Oral daily  pantoprazole    Tablet 40milliGRAM(s) Oral before breakfast  buDESOnide 160 MICROgram(s)/formoterol 4.5 MICROgram(s) Inhaler 2Puff(s) Inhalation two times a day  ALBUTerol/ipratropium for Nebulization 3milliLiter(s) Nebulizer every 6 hours  ALBUTerol    90 MICROgram(s) HFA Inhaler 1Puff(s) Inhalation every 4 hours  tiotropium 18 MICROgram(s) Capsule 1Capsule(s) Inhalation daily  sodium chloride 0.9%. 1000milliLiter(s) IV Continuous <Continuous>    MEDICATIONS  (PRN):      Allergies    No Known Allergies    Intolerances        Vital Signs Last 24 Hrs  T(C): 36.6, Max: 36.9 (04-14 @ 21:12)  T(F): 97.8, Max: 98.4 (04-14 @ 21:12)  HR: 60 (50 - 68)  BP: 112/56 (104/52 - 141/64)  BP(mean): 80 (75 - 92)  RR: 14 (14 - 18)  SpO2: 98% (77% - 99%)  I & Os for 24h ending 04-15 @ 07:00  =============================================  IN: 1840 ml / OUT: 650 ml / NET: 1190 ml    I & Os for current day (as of 04-15 @ 11:11)  =============================================  IN: 440 ml / OUT: 450 ml / NET: -10 ml      PHYSICAL EXAM:    General: thin; in no acute distress  HEENT: MMM, conjunctiva and sclera clear  Gastrointestinal: Soft non-tender non-distended; Normal bowel sounds; No hepatosplenomegaly  Skin: Warm and dry. No obvious rash    LABS:      CBC Full  -  ( 14 Apr 2017 05:58 )  WBC Count : 7.7 K/uL  Hemoglobin : 8.9 g/dL  Hematocrit : 28.5 %  Platelet Count - Automated : 242 K/uL  Mean Cell Volume : 82.1 fL  Mean Cell Hemoglobin : 25.6 pg  Mean Cell Hemoglobin Concentration : 31.2 g/dL  Auto Neutrophil # : x  Auto Lymphocyte # : x  Auto Monocyte # : x  Auto Eosinophil # : x  Auto Basophil # : x  Auto Neutrophil % : x  Auto Lymphocyte % : x  Auto Monocyte % : x  Auto Eosinophil % : x  Auto Basophil % : x    04-15    142  |  110<H>  |  21  ----------------------------<  112<H>  4.7   |  25  |  1.46<H>    Ca    8.3<L>      15 Apr 2017 07:09  Phos  2.5     04-15  Mg     1.7     04-15      PT/INR - ( 14 Apr 2017 05:58 )   PT: 11.4 sec;   INR: 1.03          PTT - ( 14 Apr 2017 05:58 )  PTT:33.2 sec                  RADIOLOGY & ADDITIONAL STUDIES (The following images were personally reviewed):

## 2017-04-15 NOTE — PROGRESS NOTE ADULT - SUBJECTIVE AND OBJECTIVE BOX
Patient is a 86y old  Male who presents with a chief complaint of     HPI:  Pt is an 85 yo M former smoker, PMHx CAD s/p PCI x3 LCx (2013 @[facility]), HLD, BPH, depression, presented to cardiologist c/o worsening shortness of breath which has progressed over the past year. Patient states symptoms are similar to when he required his previous stents, and he can only walk about a block or less before having to stop secondary to shortness of breath. He states that sometimes getting dressed and walking around the house can also cause SOB. Of note, he also endorses a recent loss of appetite over the past month and non-bloody diarrhea x 1 week with associated 5lb weight loss. He denies any symptoms such as chest pain, LOC, dizziness, syncope, PND, orthopnea, LE edema, palpitations, asthma/COPD, recent viral illness or travel, abdominal pain, nausea/vomiting, hx of blood tranfusion/anemia. On EKG in office patient noticed to be sinus yasmine with IVCD. Pt recommended for Right and Left cardiac catheterization given history of multiple PCI and EKG abnormalities as well as possible echo/EP evaluation. (06 Apr 2017 16:15)    INTERVAL HPI/OVERNIGHT EVENTS:::OR on Monday. comfortable.     HEALTH ISSUES - PROBLEM Dx:  Colon cancer: Colon cancer  Blood in stool: Blood in stool  Bradycardia: Bradycardia  CKD (chronic kidney disease) stage 3, GFR 30-59 ml/min: CKD (chronic kidney disease) stage 3, GFR 30-59 ml/min  Hyperlipidemia: Hyperlipidemia  Anemia: Anemia  CAD (coronary artery disease): CAD (coronary artery disease)  SOB (shortness of breath): SOB (shortness of breath)          PAST MEDICAL & SURGICAL HISTORY:  CAD (coronary artery disease)  HLD (hyperlipidemia)  S/P cataract extraction          Consultant NOTE  REVIEWED  (   )    REVIEW OF SYSTEMS:  [x] As per HPI  CONSTITUTIONAL: thin, weak  RESPIRATORY: No cough, wheezing, chills or hemoptysis; No Shortness of Breath  CARDIOVASCULAR: No chest pain, palpitations, dizziness, or leg swelling  GASTROINTESTINAL: pre op  MUSCULOSKELETAL: No joint pain or swelling; No muscle, back, or extremity pain  PSYCH    awake, alert       [x] All others negative	  [ ] Unable to obtain          Vital Signs Last 24 Hrs  T(C): 36.6, Max: 36.9 (04-14 @ 21:12)  T(F): 97.8, Max: 98.4 (04-14 @ 21:12)  HR: 60 (50 - 68)  BP: 112/56 (104/52 - 141/64)  BP(mean): 80 (75 - 92)  RR: 14 (14 - 18)  SpO2: 98% (77% - 99%)      I & Os for 24h ending 04-15 @ 07:00  =============================================  IN: 1840 ml / OUT: 650 ml / NET: 1190 ml    I & Os for current day (as of 04-15 @ 11:50)  =============================================  IN: 440 ml / OUT: 450 ml / NET: -10 ml    PHYSICAL EXAMINATION:                                    (    )  NO CHANGE  Appearance: Normal	  HEENT:   Normal oral mucosa, PERRL, EOMI	  Neck: Supple, + JVD/ - JVD; Carotid Bruit   Cardiovascular: Normal S1 S2, No JVD, No murmurs,   Respiratory: Lungs clear to auscultation/Decreased Breath Sounds/No Rales, Rhonchi, Wheezing	  Gastrointestinal:  Soft, Non-tender, + BS	  Skin: No rashes, No ecchymoses, No cyanosis  Extremities: Normal range of motion, No clubbing, cyanosis or edema  Vascular: Peripheral pulses palpable 2+ bilaterally  Neurologic: Non-focal  Psychiatry: A & O x 3, Mood & affect appropriate    chlorhexidine 4% Liquid 1Application(s) Topical once  lisinopril 10milliGRAM(s) Oral daily  gabapentin 100milliGRAM(s) Oral daily  mirtazapine 15milliGRAM(s) Oral at bedtime  PARoxetine 20milliGRAM(s) Oral daily  simvastatin 20milliGRAM(s) Oral at bedtime  amLODIPine   Tablet 2.5milliGRAM(s) Oral daily  pantoprazole    Tablet 40milliGRAM(s) Oral before breakfast  buDESOnide 160 MICROgram(s)/formoterol 4.5 MICROgram(s) Inhaler 2Puff(s) Inhalation two times a day  ALBUTerol/ipratropium for Nebulization 3milliLiter(s) Nebulizer every 6 hours  ALBUTerol    90 MICROgram(s) HFA Inhaler 1Puff(s) Inhalation every 4 hours  tiotropium 18 MICROgram(s) Capsule 1Capsule(s) Inhalation daily  sodium chloride 0.9%. 1000milliLiter(s) IV Continuous <Continuous>        PT/INR - ( 14 Apr 2017 05:58 )   PT: 11.4 sec;   INR: 1.03          PTT - ( 14 Apr 2017 05:58 )  PTT:33.2 sec                              8.9    7.7   )-----------( 242      ( 14 Apr 2017 05:58 )             28.5     04-15    142  |  110<H>  |  21  ----------------------------<  112<H>  4.7   |  25  |  1.46<H>    Ca    8.3<L>      15 Apr 2017 07:09  Phos  2.5     04-15  Mg     1.7     04-15        CAPILLARY BLOOD GLUCOSE

## 2017-04-16 ENCOUNTER — RESULT REVIEW (OUTPATIENT)
Age: 82
End: 2017-04-16

## 2017-04-16 LAB
ANION GAP SERPL CALC-SCNC: 7 MMOL/L — LOW (ref 9–16)
BUN SERPL-MCNC: 16 MG/DL — SIGNIFICANT CHANGE UP (ref 7–23)
CALCIUM SERPL-MCNC: 8.4 MG/DL — LOW (ref 8.5–10.5)
CHLORIDE SERPL-SCNC: 110 MMOL/L — HIGH (ref 96–108)
CO2 SERPL-SCNC: 23 MMOL/L — SIGNIFICANT CHANGE UP (ref 22–31)
CREAT SERPL-MCNC: 1.32 MG/DL — HIGH (ref 0.5–1.3)
GLUCOSE SERPL-MCNC: 85 MG/DL — SIGNIFICANT CHANGE UP (ref 70–99)
HCT VFR BLD CALC: 31.9 % — LOW (ref 39–50)
HGB BLD-MCNC: 10.1 G/DL — LOW (ref 13–17)
MAGNESIUM SERPL-MCNC: 2 MG/DL — SIGNIFICANT CHANGE UP (ref 1.6–2.4)
MCHC RBC-ENTMCNC: 26.4 PG — LOW (ref 27–34)
MCHC RBC-ENTMCNC: 31.7 G/DL — LOW (ref 32–36)
MCV RBC AUTO: 83.5 FL — SIGNIFICANT CHANGE UP (ref 80–100)
PHOSPHATE SERPL-MCNC: 2.8 MG/DL — SIGNIFICANT CHANGE UP (ref 2.5–4.5)
PLATELET # BLD AUTO: 255 K/UL — SIGNIFICANT CHANGE UP (ref 150–400)
POTASSIUM SERPL-MCNC: 4.6 MMOL/L — SIGNIFICANT CHANGE UP (ref 3.5–5.3)
POTASSIUM SERPL-SCNC: 4.6 MMOL/L — SIGNIFICANT CHANGE UP (ref 3.5–5.3)
RBC # BLD: 3.82 M/UL — LOW (ref 4.2–5.8)
RBC # FLD: 16.2 % — SIGNIFICANT CHANGE UP (ref 10.3–16.9)
SODIUM SERPL-SCNC: 140 MMOL/L — SIGNIFICANT CHANGE UP (ref 135–145)
WBC # BLD: 10.3 K/UL — SIGNIFICANT CHANGE UP (ref 3.8–10.5)
WBC # FLD AUTO: 10.3 K/UL — SIGNIFICANT CHANGE UP (ref 3.8–10.5)

## 2017-04-16 PROCEDURE — 99232 SBSQ HOSP IP/OBS MODERATE 35: CPT

## 2017-04-16 RX ORDER — METRONIDAZOLE 500 MG
500 TABLET ORAL ONCE
Qty: 0 | Refills: 0 | Status: COMPLETED | OUTPATIENT
Start: 2017-04-16 | End: 2017-04-16

## 2017-04-16 RX ORDER — SOD SULF/SODIUM/NAHCO3/KCL/PEG
4000 SOLUTION, RECONSTITUTED, ORAL ORAL ONCE
Qty: 0 | Refills: 0 | Status: COMPLETED | OUTPATIENT
Start: 2017-04-16 | End: 2017-04-16

## 2017-04-16 RX ORDER — NEOMYCIN SULFATE 500 MG/1
1 TABLET ORAL ONCE
Qty: 0 | Refills: 0 | Status: COMPLETED | OUTPATIENT
Start: 2017-04-16 | End: 2017-04-16

## 2017-04-16 RX ADMIN — Medication 4000 MILLILITER(S): at 10:51

## 2017-04-16 RX ADMIN — BUDESONIDE AND FORMOTEROL FUMARATE DIHYDRATE 2 PUFF(S): 160; 4.5 AEROSOL RESPIRATORY (INHALATION) at 21:47

## 2017-04-16 RX ADMIN — SIMVASTATIN 20 MILLIGRAM(S): 20 TABLET, FILM COATED ORAL at 21:47

## 2017-04-16 RX ADMIN — NEOMYCIN SULFATE 1 GRAM(S): 500 TABLET ORAL at 21:47

## 2017-04-16 RX ADMIN — Medication 110 MILLIGRAM(S): at 21:56

## 2017-04-16 RX ADMIN — Medication 3 MILLILITER(S): at 01:06

## 2017-04-16 RX ADMIN — Medication 20 MILLIGRAM(S): at 12:34

## 2017-04-16 RX ADMIN — Medication 500 MILLIGRAM(S): at 21:47

## 2017-04-16 RX ADMIN — Medication 63.75 MILLIMOLE(S): at 10:51

## 2017-04-16 RX ADMIN — NEOMYCIN SULFATE 1 GRAM(S): 500 TABLET ORAL at 15:33

## 2017-04-16 RX ADMIN — PANTOPRAZOLE SODIUM 40 MILLIGRAM(S): 20 TABLET, DELAYED RELEASE ORAL at 07:11

## 2017-04-16 RX ADMIN — Medication 500 MILLIGRAM(S): at 15:33

## 2017-04-16 RX ADMIN — BUDESONIDE AND FORMOTEROL FUMARATE DIHYDRATE 2 PUFF(S): 160; 4.5 AEROSOL RESPIRATORY (INHALATION) at 11:00

## 2017-04-16 RX ADMIN — GABAPENTIN 100 MILLIGRAM(S): 400 CAPSULE ORAL at 12:34

## 2017-04-16 RX ADMIN — NEOMYCIN SULFATE 1 GRAM(S): 500 TABLET ORAL at 13:25

## 2017-04-16 RX ADMIN — MIRTAZAPINE 15 MILLIGRAM(S): 45 TABLET, ORALLY DISINTEGRATING ORAL at 21:47

## 2017-04-16 RX ADMIN — Medication 3 MILLILITER(S): at 12:35

## 2017-04-16 RX ADMIN — Medication 500 MILLIGRAM(S): at 13:25

## 2017-04-16 RX ADMIN — Medication 3 MILLILITER(S): at 18:37

## 2017-04-16 RX ADMIN — AMLODIPINE BESYLATE 2.5 MILLIGRAM(S): 2.5 TABLET ORAL at 06:03

## 2017-04-16 RX ADMIN — Medication 3 MILLILITER(S): at 06:03

## 2017-04-16 RX ADMIN — LISINOPRIL 10 MILLIGRAM(S): 2.5 TABLET ORAL at 06:03

## 2017-04-16 NOTE — PROGRESS NOTE ADULT - SUBJECTIVE AND OBJECTIVE BOX
Chief Complaint/Reason for Consult: CAD  INTERVAL HPI: no sob palp cp  	  MEDICATIONS:  lisinopril 10milliGRAM(s) Oral daily  amLODIPine   Tablet 2.5milliGRAM(s) Oral daily    neomycin 1Gram(s) Oral once  neomycin 1Gram(s) Oral once  neomycin 1Gram(s) Oral once  metroNIDAZOLE    Tablet 500milliGRAM(s) Oral once  metroNIDAZOLE    Tablet 500milliGRAM(s) Oral once  metroNIDAZOLE    Tablet 500milliGRAM(s) Oral once    buDESOnide 160 MICROgram(s)/formoterol 4.5 MICROgram(s) Inhaler 2Puff(s) Inhalation two times a day  ALBUTerol/ipratropium for Nebulization 3milliLiter(s) Nebulizer every 6 hours  ALBUTerol    90 MICROgram(s) HFA Inhaler 1Puff(s) Inhalation every 4 hours  tiotropium 18 MICROgram(s) Capsule 1Capsule(s) Inhalation daily    gabapentin 100milliGRAM(s) Oral daily  mirtazapine 15milliGRAM(s) Oral at bedtime  PARoxetine 20milliGRAM(s) Oral daily    pantoprazole    Tablet 40milliGRAM(s) Oral before breakfast    simvastatin 20milliGRAM(s) Oral at bedtime    chlorhexidine 4% Liquid 1Application(s) Topical once  sodium chloride 0.9%. 1000milliLiter(s) IV Continuous <Continuous>  sodium ferric gluconate complex IVPB 125milliGRAM(s) IV Intermittent daily      REVIEW OF SYSTEMS:  [x] As per HPI  CONSTITUTIONAL: No fever, weight loss, or fatigue  RESPIRATORY: No cough, wheezing, chills or hemoptysis; No Shortness of Breath  CARDIOVASCULAR: No chest pain, palpitations, dizziness, or leg swelling  GASTROINTESTINAL: No abdominal or epigastric pain. No nausea, vomiting, or hematemesis; No diarrhea or constipation. No melena or hematochezia.  MUSCULOSKELETAL: No joint pain or swelling; No muscle, back, or extremity pain  [x] All others negative	  [ ] Unable to obtain    PHYSICAL EXAM:  T(C): 36.6, Max: 37.3 (04-15 @ 18:12)  HR: 54 (54 - 68)  BP: 142/67 (122/58 - 148/67)  RR: 20 (16 - 20)  SpO2: 92% (92% - 98%)  Wt(kg): --  I&O's Summary  I & Os for 24h ending 16 Apr 2017 07:00  =============================================  IN: 2410 ml / OUT: 2500 ml / NET: -90 ml    I & Os for current day (as of 16 Apr 2017 12:50)  =============================================  IN: 1200 ml / OUT: 400 ml / NET: 800 ml        Appearance: Normal	  HEENT:   Normal oral mucosa  Cardiovascular: Normal S1 S2, No JVD, No murmurs, No edema  Respiratory: Lungs clear to auscultation	  Gastrointestinal:  Soft, Non-tender, + BS	  Extremities: Normal range of motion, No clubbing, cyanosis or edema  Vascular: Peripheral pulses palpable 2+ bilaterally    TELEMETRY: 	    ECG:    	  RADIOLOGY:   CXR:  CT:  US:    CARDIAC TESTING:  Echocardiogram:  Catheterization:  Stress Test:      LABS:	 	    CARDIAC MARKERS:                                  10.1   10.3  )-----------( 255      ( 16 Apr 2017 08:06 )             31.9     04-16    140  |  110<H>  |  16  ----------------------------<  85  4.6   |  23  |  1.32<H>    Ca    8.4<L>      16 Apr 2017 08:06  Phos  2.8     04-16  Mg     2.0     04-16      proBNP:   Lipid Profile:   HgA1c:   TSH:     ASSESSMENT/PLAN: 	  Problem: SOB (shortness of breath).   - Does not appear to be overloaded by PE, lungs CTAB, no JVD, O2Sat stable when supine  - Echo 04/10/17 revealed LVEF 55%-60%, Normal left ventricular size and wall thickness. The left ventricular wall motion is normal, no HD valvular dz, no effusion  - CXR reveals hyperinflation c/w emphysema, no congestion or infiltrate, f/u official read  - continue nebs/inhalers as ordered by pulm PRN.       Problem: CAD (coronary artery disease).  Plan: - s/p DAYANA x3 to LCx 2013   EF normal wall motion, no chest pain, EKG unchanged.         Problem: Bradycardia.  Plan: - HR in 40s-60s, no BB.       Problem: Hyperlipidemia.  Plan: - Continue Simvastatin 20mg PO daily.     Cardiac Optimized for Surgery

## 2017-04-16 NOTE — PROGRESS NOTE ADULT - SUBJECTIVE AND OBJECTIVE BOX
Pt seen and examined  no complaints    REVIEW OF SYSTEMS:  Constitutional: No fever  Cardiovascular: No chest pain, palpitations, dizziness or leg swelling  Gastrointestinal: No abdominal or epigastric pain. No nausea, vomiting or hematemesis; No diarrhea or constipation. No melena or hematochezia.  Skin: No itching, burning, rashes or lesions       MEDICATIONS:  MEDICATIONS  (STANDING):  chlorhexidine 4% Liquid 1Application(s) Topical once  lisinopril 10milliGRAM(s) Oral daily  gabapentin 100milliGRAM(s) Oral daily  mirtazapine 15milliGRAM(s) Oral at bedtime  PARoxetine 20milliGRAM(s) Oral daily  simvastatin 20milliGRAM(s) Oral at bedtime  amLODIPine   Tablet 2.5milliGRAM(s) Oral daily  pantoprazole    Tablet 40milliGRAM(s) Oral before breakfast  buDESOnide 160 MICROgram(s)/formoterol 4.5 MICROgram(s) Inhaler 2Puff(s) Inhalation two times a day  ALBUTerol/ipratropium for Nebulization 3milliLiter(s) Nebulizer every 6 hours  ALBUTerol    90 MICROgram(s) HFA Inhaler 1Puff(s) Inhalation every 4 hours  tiotropium 18 MICROgram(s) Capsule 1Capsule(s) Inhalation daily  sodium chloride 0.9%. 1000milliLiter(s) IV Continuous <Continuous>  sodium ferric gluconate complex IVPB 125milliGRAM(s) IV Intermittent daily  polyethylene glycol/electrolyte Solution. 4000milliLiter(s) Oral once  sodium phosphate IVPB 15milliMole(s) IV Intermittent once  neomycin 1Gram(s) Oral once  neomycin 1Gram(s) Oral once  neomycin 1Gram(s) Oral once  metroNIDAZOLE    Tablet 500milliGRAM(s) Oral once  metroNIDAZOLE    Tablet 500milliGRAM(s) Oral once  metroNIDAZOLE    Tablet 500milliGRAM(s) Oral once    MEDICATIONS  (PRN):      Allergies    No Known Allergies    Intolerances        Vital Signs Last 24 Hrs  T(C): 36.6, Max: 37.3 (04-15 @ 18:12)  T(F): 97.8, Max: 99.2 (04-15 @ 18:12)  HR: 54 (54 - 68)  BP: 142/67 (122/58 - 148/67)  BP(mean): 97 (84 - 97)  RR: 20 (14 - 20)  SpO2: 92% (92% - 98%)  I & Os for 24h ending 04-16 @ 07:00  =============================================  IN: 2410 ml / OUT: 2500 ml / NET: -90 ml    I & Os for current day (as of 04-16 @ 10:48)  =============================================  IN: 600 ml / OUT: 400 ml / NET: 200 ml      PHYSICAL EXAM:    General: thin; in no acute distress  HEENT: MMM, conjunctiva and sclera clear  Gastrointestinal: Soft non-tender non-distended; Normal bowel sounds; No hepatosplenomegaly  Skin: Warm and dry. No obvious rash    LABS:      CBC Full  -  ( 16 Apr 2017 08:06 )  WBC Count : 10.3 K/uL  Hemoglobin : 10.1 g/dL  Hematocrit : 31.9 %  Platelet Count - Automated : 255 K/uL  Mean Cell Volume : 83.5 fL  Mean Cell Hemoglobin : 26.4 pg  Mean Cell Hemoglobin Concentration : 31.7 g/dL  Auto Neutrophil # : x  Auto Lymphocyte # : x  Auto Monocyte # : x  Auto Eosinophil # : x  Auto Basophil # : x  Auto Neutrophil % : x  Auto Lymphocyte % : x  Auto Monocyte % : x  Auto Eosinophil % : x  Auto Basophil % : x    04-16    140  |  110<H>  |  16  ----------------------------<  85  4.6   |  23  |  1.32<H>    Ca    8.4<L>      16 Apr 2017 08:06  Phos  2.8     04-16  Mg     2.0     04-16                        RADIOLOGY & ADDITIONAL STUDIES (The following images were personally reviewed):

## 2017-04-16 NOTE — PROGRESS NOTE ADULT - SUBJECTIVE AND OBJECTIVE BOX
PUD CCM ATTENDING FOR DR JULIAN    In chair    - CP + SOB - cough    CC/ HPI 85 yo male former smoker, with CAD s/p PCI x3 LCx (2013 @[facility]), admitted with anemia and found to have colon mass, today without acute respiratory complaint.    PAST MEDICAL & SURGICAL HISTORY:  CAD (coronary artery disease)  HLD (hyperlipidemia)  S/P cataract extraction    SOCHX:  + tobacco QUIT 1980,  -  alcohol    FMHX: FA/MO  - contributory     ROS reviewed below with positive findings marked (+) :  GEN:  fever, chills ENT: tracheostomy,   epistaxis,  sinusitis COR: +CAD, CHF,  HTN, dysrhythmia PUL: COPD, ILD, asthma, pneumonia GI: PEG, dysphagia, hemorrhage, other ELIZABETH: kidney disease, electrolyte disorder HEM:  anemia, thrombus, coagulopathy, cancer ENDO:  thyroid disease, diabetes mellitus CNS:  dementia, stroke, seizure, PSY:  depression, anxiety, other      MEDICATIONS  (STANDING):  chlorhexidine 4% Liquid 1Application(s) Topical once  lisinopril 10milliGRAM(s) Oral daily  gabapentin 100milliGRAM(s) Oral daily  mirtazapine 15milliGRAM(s) Oral at bedtime  PARoxetine 20milliGRAM(s) Oral daily  simvastatin 20milliGRAM(s) Oral at bedtime  amLODIPine   Tablet 2.5milliGRAM(s) Oral daily  pantoprazole    Tablet 40milliGRAM(s) Oral before breakfast  buDESOnide 160 MICROgram(s)/formoterol 4.5 MICROgram(s) Inhaler 2Puff(s) Inhalation two times a day  ALBUTerol/ipratropium for Nebulization 3milliLiter(s) Nebulizer every 6 hours  ALBUTerol    90 MICROgram(s) HFA Inhaler 1Puff(s) Inhalation every 4 hours  tiotropium 18 MICROgram(s) Capsule 1Capsule(s) Inhalation daily  dextrose 5% + sodium chloride 0.45% 1000milliLiter(s) IV Continuous <Continuous>    MEDICATIONS  (PRN):    Vital Signs Last 24 Hrs  T(C): 36.4, Max: 36.8 (04-13 @ 16:51)  T(F): 97.6, Max: 98.2 (04-13 @ 16:51)  HR: 78 (48 - 78)  BP: 130/56 (100/49 - 155/69)  BP(mean): 81 (70 - 91)  RR: 18 (14 - 18)  SpO2: 94% (94% - 99%) ON RA, AT REST    no pain  GENERAL:         uncomfortable,  - acute distress.  HEENT:            - trauma,  - icterus,  - injection,  - nasal discharge.  NECK:              - jugular venous distention, - thyromegaly.  LYMPH:           - lymphadenopathy, - masses.  RESP:              BS mildly decreased,   - rales,   + rhonchi,   - wheezes.   COR:                S1S2  sinus rhythm RRR  - murmurs,  - gallops,  - rubs.  ABD:                bowel sounds,   soft, - tender, - distended, - organomegaly.  EXT/MSC:         - cyanosis,  + clubbing,  - edema.    NEURO:             alert,   responds to stimuli, intermittent agitation                          8.9    7.7   )-----------( 242      ( 14 Apr 2017 05:58 )             28.5       CT chest (4/13) Negative for thoracic inlet or axillary adenopathy. There is a mildly enlarged prevascular lymph node measuring 1.6 cm, stable since 2013. Negative for hilar adenopathy. Evaluation of the lung parenchyma demonstrates biapical pleural-parenchymal scarring. There is mild centrilobular emphysematous change, localized the bilateral lung apices. There is a 3 mm subpleural nodule within the right lower lobe image 234, stable since 3/2013, and a 2 mm subpleural nodule within the left lower lobe (image 242), stable since 1/2014 for which follow-up is not required. There is a small diffuse small multifocal region of clustered branching tree-in-bud micronodular the within the subpleural right lower lobe, intervally improved since 1/2014, consistent with minimal bronchiolitis. There is a fibrotic/atelectatic opacity within the lingula. No endobronchial lesion. No pulmonary consolidation or mass.    ASSESSMENT/PLAN    1) Colon mass  2) Chronic obstructive pulmonary disease  3) Pulmonary nodules, reviewed again today, similar to 2014  4) Coronary artery disease  5) Severe atherosclerosis of the abdominal aorta, with aneurysms    Bedside spirometry  Bronchodilators:  Spiriva daily, Atrovent/ albuterol q 4 – 6 hours as needed  Corticosteroids: Budesonide  Cardiac/HTN:  amlodipine, lisinopril, simvastatin  GI: Rx/ c PPI/H2B  Heme: Rx/VT prophylaxis  On monitor  PT, oob

## 2017-04-16 NOTE — PROGRESS NOTE ADULT - ASSESSMENT
85 yo male with CAD (stent x2 last 3 years ago), BPH, htn, hld, admitted originally for elective cardiac cath, found to be anemic, further work up reveild ascending colonic mass. No DVT seen on doppler.    DASH diet, CLD later and NPO after MN  transfuse 1u pRBC today  Home meds  IS  OOB  PT  Bowel prep today  OR monday 85 yo male with CAD (stent x2 last 3 years ago), BPH, htn, hld, admitted originally for elective cardiac cath, found to be anemic, further work up reveild ascending colonic mass. No DVT seen on doppler.    CLD/IVF  Home meds  IS  OOB  PT  Bowel prep today  OR monday

## 2017-04-16 NOTE — PROGRESS NOTE ADULT - SUBJECTIVE AND OBJECTIVE BOX
Patient is a 86y old  Male who presents with a chief complaint of     HPI:  Pt is an 87 yo M former smoker, PMHx CAD s/p PCI x3 LCx (2013 @[facility]), HLD, BPH, depression, presented to cardiologist c/o worsening shortness of breath which has progressed over the past year. Patient states symptoms are similar to when he required his previous stents, and he can only walk about a block or less before having to stop secondary to shortness of breath. He states that sometimes getting dressed and walking around the house can also cause SOB. Of note, he also endorses a recent loss of appetite over the past month and non-bloody diarrhea x 1 week with associated 5lb weight loss. He denies any symptoms such as chest pain, LOC, dizziness, syncope, PND, orthopnea, LE edema, palpitations, asthma/COPD, recent viral illness or travel, abdominal pain, nausea/vomiting, hx of blood tranfusion/anemia. On EKG in office patient noticed to be sinus yasmine with IVCD. Pt recommended for Right and Left cardiac catheterization given history of multiple PCI and EKG abnormalities as well as possible echo/EP evaluation. (06 Apr 2017 16:15)    INTERVAL HPI/OVERNIGHT EVENTS:::on bowel prep for tmw    HEALTH ISSUES - PROBLEM Dx:  Colon cancer: Colon cancer question of  Blood in stool: Blood in stool  Bradycardia: Bradycardia  CKD (chronic kidney disease) stage 3, GFR 30-59 ml/min: CKD (chronic kidney disease) stage 3, GFR 30-59 ml/min  Hyperlipidemia: Hyperlipidemia  Anemia: Anemia  CAD (coronary artery disease): CAD (coronary artery disease)  SOB (shortness of breath): SOB (shortness of breath)          PAST MEDICAL & SURGICAL HISTORY:  CAD (coronary artery disease)  HLD (hyperlipidemia)  S/P cataract extraction          Consultant NOTE  REVIEWED  (  +++ )    REVIEW OF SYSTEMS:  [x] As per HPI  CONSTITUTIONAL: No fever, weight loss, or fatigue  RESPIRATORY: No cough, wheezing, chills or hemoptysis; No Shortness of Breath  CARDIOVASCULAR: No chest pain, palpitations, dizziness, or leg swelling  GASTROINTESTINAL: No abdominal or epigastric pain. No nausea, vomiting, or hematemesis; No diarrhea or constipation. No melena or hematochezia.  MUSCULOSKELETAL: No joint pain or swelling; No muscle, back, or extremity pain  PSYCH    awake, alert       [x] All others negative	  [ ] Unable to obtain          Vital Signs Last 24 Hrs  T(C): 36.6, Max: 37.3 (04-15 @ 18:12)  T(F): 97.8, Max: 99.2 (04-15 @ 18:12)  HR: 54 (54 - 68)  BP: 142/67 (122/58 - 148/67)  BP(mean): 97 (84 - 97)  RR: 20 (14 - 20)  SpO2: 92% (92% - 98%)      I & Os for 24h ending 04-16 @ 07:00  =============================================  IN: 2410 ml / OUT: 2500 ml / NET: -90 ml    I & Os for current day (as of 04-16 @ 11:55)  =============================================  IN: 900 ml / OUT: 400 ml / NET: 500 ml    PHYSICAL EXAMINATION:                                    (    )  NO CHANGE  Appearance: thin  HEENT:   Normal oral mucosa, PERRL, EOMI	  Neck: Supple, + JVD/ - JVD; Carotid Bruit   Cardiovascular: Normal S1 S2, No JVD, No murmurs,   Respiratory: Lungs clear to auscultation/Decreased Breath Sounds/No Rales, Rhonchi, Wheezing	  Gastrointestinal:  Soft, Non-tender, + BS	  Skin: No rashes, No ecchymoses, No cyanosis  Extremities: Normal range of motion, No clubbing, cyanosis or edema  Vascular: Peripheral pulses palpable 2+ bilaterally  Neurologic: Non-focal  Psychiatry: A & O x 3, Mood & affect appropriate    chlorhexidine 4% Liquid 1Application(s) Topical once  lisinopril 10milliGRAM(s) Oral daily  gabapentin 100milliGRAM(s) Oral daily  mirtazapine 15milliGRAM(s) Oral at bedtime  PARoxetine 20milliGRAM(s) Oral daily  simvastatin 20milliGRAM(s) Oral at bedtime  amLODIPine   Tablet 2.5milliGRAM(s) Oral daily  pantoprazole    Tablet 40milliGRAM(s) Oral before breakfast  buDESOnide 160 MICROgram(s)/formoterol 4.5 MICROgram(s) Inhaler 2Puff(s) Inhalation two times a day  ALBUTerol/ipratropium for Nebulization 3milliLiter(s) Nebulizer every 6 hours  ALBUTerol    90 MICROgram(s) HFA Inhaler 1Puff(s) Inhalation every 4 hours  tiotropium 18 MICROgram(s) Capsule 1Capsule(s) Inhalation daily  sodium chloride 0.9%. 1000milliLiter(s) IV Continuous <Continuous>  sodium ferric gluconate complex IVPB 125milliGRAM(s) IV Intermittent daily  neomycin 1Gram(s) Oral once  neomycin 1Gram(s) Oral once  neomycin 1Gram(s) Oral once  metroNIDAZOLE    Tablet 500milliGRAM(s) Oral once  metroNIDAZOLE    Tablet 500milliGRAM(s) Oral once  metroNIDAZOLE    Tablet 500milliGRAM(s) Oral once                                      10.1   10.3  )-----------( 255      ( 16 Apr 2017 08:06 )             31.9     04-16    140  |  110<H>  |  16  ----------------------------<  85  4.6   |  23  |  1.32<H>    Ca    8.4<L>      16 Apr 2017 08:06  Phos  2.8     04-16  Mg     2.0     04-16        CAPILLARY BLOOD GLUCOSE

## 2017-04-17 DIAGNOSIS — C18.9 MALIGNANT NEOPLASM OF COLON, UNSPECIFIED: ICD-10-CM

## 2017-04-17 DIAGNOSIS — R06.3 PERIODIC BREATHING: ICD-10-CM

## 2017-04-17 DIAGNOSIS — D64.9 ANEMIA, UNSPECIFIED: ICD-10-CM

## 2017-04-17 DIAGNOSIS — R06.82 TACHYPNEA, NOT ELSEWHERE CLASSIFIED: ICD-10-CM

## 2017-04-17 DIAGNOSIS — I25.110 ATHEROSCLEROTIC HEART DISEASE OF NATIVE CORONARY ARTERY WITH UNSTABLE ANGINA PECTORIS: ICD-10-CM

## 2017-04-17 LAB
ANION GAP SERPL CALC-SCNC: 8 MMOL/L — LOW (ref 9–16)
ANION GAP SERPL CALC-SCNC: 9 MMOL/L — SIGNIFICANT CHANGE UP (ref 9–16)
APTT BLD: 38 SEC — HIGH (ref 27.5–37.4)
APTT BLD: 38.2 SEC — HIGH (ref 27.5–37.4)
APTT BLD: 39.2 SEC — HIGH (ref 27.5–37.4)
BASE EXCESS BLDA CALC-SCNC: -6.1 MMOL/L — LOW (ref -2–3)
BUN SERPL-MCNC: 10 MG/DL — SIGNIFICANT CHANGE UP (ref 7–23)
BUN SERPL-MCNC: 9 MG/DL — SIGNIFICANT CHANGE UP (ref 7–23)
CALCIUM SERPL-MCNC: 7.7 MG/DL — LOW (ref 8.5–10.5)
CALCIUM SERPL-MCNC: 8.3 MG/DL — LOW (ref 8.5–10.5)
CHLORIDE SERPL-SCNC: 112 MMOL/L — HIGH (ref 96–108)
CHLORIDE SERPL-SCNC: 114 MMOL/L — HIGH (ref 96–108)
CO2 SERPL-SCNC: 21 MMOL/L — LOW (ref 22–31)
CO2 SERPL-SCNC: 22 MMOL/L — SIGNIFICANT CHANGE UP (ref 22–31)
CREAT SERPL-MCNC: 1.02 MG/DL — SIGNIFICANT CHANGE UP (ref 0.5–1.3)
CREAT SERPL-MCNC: 1.13 MG/DL — SIGNIFICANT CHANGE UP (ref 0.5–1.3)
GAS PNL BLDA: SIGNIFICANT CHANGE UP
GLUCOSE SERPL-MCNC: 115 MG/DL — HIGH (ref 70–99)
GLUCOSE SERPL-MCNC: 81 MG/DL — SIGNIFICANT CHANGE UP (ref 70–99)
HCO3 BLDA-SCNC: 20 MMOL/L — LOW (ref 21–28)
HCT VFR BLD CALC: 28.4 % — LOW (ref 39–50)
HCT VFR BLD CALC: 35.7 % — LOW (ref 39–50)
HGB BLD-MCNC: 11.7 G/DL — LOW (ref 13–17)
HGB BLD-MCNC: 8.8 G/DL — LOW (ref 13–17)
INR BLD: 1.1 — SIGNIFICANT CHANGE UP (ref 0.88–1.16)
MAGNESIUM SERPL-MCNC: 1.4 MG/DL — LOW (ref 1.6–2.4)
MAGNESIUM SERPL-MCNC: 1.6 MG/DL — SIGNIFICANT CHANGE UP (ref 1.6–2.4)
MCHC RBC-ENTMCNC: 26.3 PG — LOW (ref 27–34)
MCHC RBC-ENTMCNC: 27 PG — SIGNIFICANT CHANGE UP (ref 27–34)
MCHC RBC-ENTMCNC: 31 G/DL — LOW (ref 32–36)
MCHC RBC-ENTMCNC: 32.8 G/DL — SIGNIFICANT CHANGE UP (ref 32–36)
MCV RBC AUTO: 82.4 FL — SIGNIFICANT CHANGE UP (ref 80–100)
MCV RBC AUTO: 84.8 FL — SIGNIFICANT CHANGE UP (ref 80–100)
PCO2 BLDA: 39 MMHG — SIGNIFICANT CHANGE UP (ref 35–48)
PH BLDA: 7.32 — LOW (ref 7.35–7.45)
PHOSPHATE SERPL-MCNC: 2.2 MG/DL — LOW (ref 2.5–4.5)
PHOSPHATE SERPL-MCNC: 2.7 MG/DL — SIGNIFICANT CHANGE UP (ref 2.5–4.5)
PLATELET # BLD AUTO: 238 K/UL — SIGNIFICANT CHANGE UP (ref 150–400)
PLATELET # BLD AUTO: 260 K/UL — SIGNIFICANT CHANGE UP (ref 150–400)
PO2 BLDA: 122 MMHG — HIGH (ref 83–108)
POTASSIUM SERPL-MCNC: 4.2 MMOL/L — SIGNIFICANT CHANGE UP (ref 3.5–5.3)
POTASSIUM SERPL-MCNC: 4.4 MMOL/L — SIGNIFICANT CHANGE UP (ref 3.5–5.3)
POTASSIUM SERPL-SCNC: 4.2 MMOL/L — SIGNIFICANT CHANGE UP (ref 3.5–5.3)
POTASSIUM SERPL-SCNC: 4.4 MMOL/L — SIGNIFICANT CHANGE UP (ref 3.5–5.3)
PROTHROM AB SERPL-ACNC: 12.2 SEC — SIGNIFICANT CHANGE UP (ref 9.8–12.7)
RBC # BLD: 3.35 M/UL — LOW (ref 4.2–5.8)
RBC # BLD: 4.33 M/UL — SIGNIFICANT CHANGE UP (ref 4.2–5.8)
RBC # FLD: 15.9 % — SIGNIFICANT CHANGE UP (ref 10.3–16.9)
RBC # FLD: 16 % — SIGNIFICANT CHANGE UP (ref 10.3–16.9)
SAO2 % BLDA: 98 % — SIGNIFICANT CHANGE UP (ref 95–100)
SODIUM SERPL-SCNC: 143 MMOL/L — SIGNIFICANT CHANGE UP (ref 135–145)
SODIUM SERPL-SCNC: 143 MMOL/L — SIGNIFICANT CHANGE UP (ref 135–145)
WBC # BLD: 15.4 K/UL — HIGH (ref 3.8–10.5)
WBC # BLD: 7.3 K/UL — SIGNIFICANT CHANGE UP (ref 3.8–10.5)
WBC # FLD AUTO: 15.4 K/UL — HIGH (ref 3.8–10.5)
WBC # FLD AUTO: 7.3 K/UL — SIGNIFICANT CHANGE UP (ref 3.8–10.5)

## 2017-04-17 PROCEDURE — 71010: CPT | Mod: 26

## 2017-04-17 PROCEDURE — 99232 SBSQ HOSP IP/OBS MODERATE 35: CPT

## 2017-04-17 RX ORDER — HEPARIN SODIUM 5000 [USP'U]/ML
5000 INJECTION INTRAVENOUS; SUBCUTANEOUS EVERY 8 HOURS
Qty: 0 | Refills: 0 | Status: DISCONTINUED | OUTPATIENT
Start: 2017-04-17 | End: 2017-04-18

## 2017-04-17 RX ORDER — GLUCAGON INJECTION, SOLUTION 0.5 MG/.1ML
1 INJECTION, SOLUTION SUBCUTANEOUS ONCE
Qty: 0 | Refills: 0 | Status: DISCONTINUED | OUTPATIENT
Start: 2017-04-17 | End: 2017-04-24

## 2017-04-17 RX ORDER — SODIUM CHLORIDE 9 MG/ML
1000 INJECTION, SOLUTION INTRAVENOUS
Qty: 0 | Refills: 0 | Status: DISCONTINUED | OUTPATIENT
Start: 2017-04-17 | End: 2017-04-24

## 2017-04-17 RX ORDER — POTASSIUM PHOSPHATE, MONOBASIC POTASSIUM PHOSPHATE, DIBASIC 236; 224 MG/ML; MG/ML
15 INJECTION, SOLUTION INTRAVENOUS ONCE
Qty: 0 | Refills: 0 | Status: DISCONTINUED | OUTPATIENT
Start: 2017-04-17 | End: 2017-04-17

## 2017-04-17 RX ORDER — HYDROMORPHONE HYDROCHLORIDE 2 MG/ML
0.5 INJECTION INTRAMUSCULAR; INTRAVENOUS; SUBCUTANEOUS EVERY 4 HOURS
Qty: 0 | Refills: 0 | Status: DISCONTINUED | OUTPATIENT
Start: 2017-04-17 | End: 2017-04-18

## 2017-04-17 RX ORDER — PANTOPRAZOLE SODIUM 20 MG/1
40 TABLET, DELAYED RELEASE ORAL DAILY
Qty: 0 | Refills: 0 | Status: DISCONTINUED | OUTPATIENT
Start: 2017-04-17 | End: 2017-04-17

## 2017-04-17 RX ORDER — MAGNESIUM SULFATE 500 MG/ML
2 VIAL (ML) INJECTION ONCE
Qty: 0 | Refills: 0 | Status: COMPLETED | OUTPATIENT
Start: 2017-04-17 | End: 2017-04-17

## 2017-04-17 RX ORDER — LABETALOL HCL 100 MG
10 TABLET ORAL
Qty: 0 | Refills: 0 | Status: DISCONTINUED | OUTPATIENT
Start: 2017-04-17 | End: 2017-04-24

## 2017-04-17 RX ORDER — MAGNESIUM SULFATE 500 MG/ML
4 VIAL (ML) INJECTION ONCE
Qty: 0 | Refills: 0 | Status: DISCONTINUED | OUTPATIENT
Start: 2017-04-17 | End: 2017-04-17

## 2017-04-17 RX ORDER — CEFOXITIN 1 G/1
2 INJECTION, POWDER, FOR SOLUTION INTRAVENOUS EVERY 8 HOURS
Qty: 0 | Refills: 0 | Status: DISCONTINUED | OUTPATIENT
Start: 2017-04-17 | End: 2017-04-17

## 2017-04-17 RX ORDER — INSULIN LISPRO 100/ML
VIAL (ML) SUBCUTANEOUS
Qty: 0 | Refills: 0 | Status: DISCONTINUED | OUTPATIENT
Start: 2017-04-17 | End: 2017-04-24

## 2017-04-17 RX ORDER — POTASSIUM PHOSPHATE, MONOBASIC POTASSIUM PHOSPHATE, DIBASIC 236; 224 MG/ML; MG/ML
30 INJECTION, SOLUTION INTRAVENOUS ONCE
Qty: 0 | Refills: 0 | Status: DISCONTINUED | OUTPATIENT
Start: 2017-04-17 | End: 2017-04-17

## 2017-04-17 RX ORDER — HYDRALAZINE HCL 50 MG
10 TABLET ORAL
Qty: 0 | Refills: 0 | Status: DISCONTINUED | OUTPATIENT
Start: 2017-04-17 | End: 2017-04-18

## 2017-04-17 RX ORDER — HYDROMORPHONE HYDROCHLORIDE 2 MG/ML
0.2 INJECTION INTRAMUSCULAR; INTRAVENOUS; SUBCUTANEOUS EVERY 4 HOURS
Qty: 0 | Refills: 0 | Status: DISCONTINUED | OUTPATIENT
Start: 2017-04-17 | End: 2017-04-18

## 2017-04-17 RX ORDER — CEFOXITIN 1 G/1
2 INJECTION, POWDER, FOR SOLUTION INTRAVENOUS EVERY 8 HOURS
Qty: 0 | Refills: 0 | Status: COMPLETED | OUTPATIENT
Start: 2017-04-17 | End: 2017-04-18

## 2017-04-17 RX ORDER — SODIUM CHLORIDE 9 MG/ML
1000 INJECTION, SOLUTION INTRAVENOUS
Qty: 0 | Refills: 0 | Status: DISCONTINUED | OUTPATIENT
Start: 2017-04-17 | End: 2017-04-18

## 2017-04-17 RX ORDER — DEXTROSE 50 % IN WATER 50 %
25 SYRINGE (ML) INTRAVENOUS ONCE
Qty: 0 | Refills: 0 | Status: DISCONTINUED | OUTPATIENT
Start: 2017-04-17 | End: 2017-04-24

## 2017-04-17 RX ADMIN — BUDESONIDE AND FORMOTEROL FUMARATE DIHYDRATE 2 PUFF(S): 160; 4.5 AEROSOL RESPIRATORY (INHALATION) at 23:50

## 2017-04-17 RX ADMIN — SODIUM CHLORIDE 100 MILLILITER(S): 9 INJECTION, SOLUTION INTRAVENOUS at 19:56

## 2017-04-17 RX ADMIN — Medication 3 MILLILITER(S): at 07:33

## 2017-04-17 RX ADMIN — Medication 50 GRAM(S): at 19:56

## 2017-04-17 RX ADMIN — Medication 50 GRAM(S): at 17:40

## 2017-04-17 RX ADMIN — LISINOPRIL 10 MILLIGRAM(S): 2.5 TABLET ORAL at 07:34

## 2017-04-17 RX ADMIN — HEPARIN SODIUM 5000 UNIT(S): 5000 INJECTION INTRAVENOUS; SUBCUTANEOUS at 22:13

## 2017-04-17 RX ADMIN — Medication 110 MILLIGRAM(S): at 12:38

## 2017-04-17 RX ADMIN — Medication 63.75 MILLIMOLE(S): at 22:13

## 2017-04-17 RX ADMIN — PANTOPRAZOLE SODIUM 40 MILLIGRAM(S): 20 TABLET, DELAYED RELEASE ORAL at 07:34

## 2017-04-17 RX ADMIN — Medication 3 MILLILITER(S): at 12:00

## 2017-04-17 RX ADMIN — Medication 3 MILLILITER(S): at 18:20

## 2017-04-17 RX ADMIN — AMLODIPINE BESYLATE 2.5 MILLIGRAM(S): 2.5 TABLET ORAL at 07:34

## 2017-04-17 RX ADMIN — CEFOXITIN 100 GRAM(S): 1 INJECTION, POWDER, FOR SOLUTION INTRAVENOUS at 23:45

## 2017-04-17 NOTE — PROGRESS NOTE ADULT - SUBJECTIVE AND OBJECTIVE BOX
O/N: Afebrile, VSS. completed bowel prep.   4/16: started on golytely, hgb 10.1 this AM no transfusion required O/N: Afebrile, VSS. completed bowel prep.   4/16: started on golytely, hgb 10.1 this AM no transfusion required       SUBJECTIVE: Patient w/o complaints. Patient seen and examined bedside by chief resident.    lisinopril 10milliGRAM(s) Oral daily  amLODIPine   Tablet 2.5milliGRAM(s) Oral daily      Vital Signs Last 24 Hrs  T(C): 37.2, Max: 37.2 (04-17 @ 06:12)  T(F): 98.9, Max: 98.9 (04-17 @ 06:12)  HR: 58 (54 - 72)  BP: 137/63 (137/63 - 165/67)  BP(mean): 91 (91 - 108)  RR: 18 (18 - 20)  SpO2: 17% (17% - 99%)  I&O's Detail    I & Os for current day (as of 17 Apr 2017 07:32)  =============================================  IN:    Oral Fluid: 1200 ml    sodium chloride 0.9%.: 300 ml    Total IN: 1500 ml  ---------------------------------------------  OUT:    Voided: 700 ml    Total OUT: 700 ml  ---------------------------------------------  Total NET: 800 ml      General: NAD, resting comfortably in bed  C/V: NSR  Pulm: Nonlabored breathing, no respiratory distress  Abd: soft, NT/ND.  Extrem: WWP, no edema        LABS:                        8.8    7.3   )-----------( 238      ( 17 Apr 2017 06:57 )             28.4     04-17    143  |  112<H>  |  10  ----------------------------<  81  4.2   |  22  |  1.13    Ca    8.3<L>      17 Apr 2017 06:56  Phos  2.2     04-17  Mg     1.6     04-17      PT/INR - ( 17 Apr 2017 06:57 )   PT: 12.2 sec;   INR: 1.10          PTT - ( 17 Apr 2017 06:57 )  PTT:38.2 sec

## 2017-04-17 NOTE — PROGRESS NOTE ADULT - SUBJECTIVE AND OBJECTIVE BOX
Patient is a 86y old  Male who presents with a chief complaint of     HPI:  Pt is an 87 yo M former smoker, PMHx CAD s/p PCI x3 LCx (2013 @[facility]), HLD, BPH, depression, presented to cardiologist c/o worsening shortness of breath which has progressed over the past year. Patient states symptoms are similar to when he required his previous stents, and he can only walk about a block or less before having to stop secondary to shortness of breath. He states that sometimes getting dressed and walking around the house can also cause SOB. Of note, he also endorses a recent loss of appetite over the past month and non-bloody diarrhea x 1 week with associated 5lb weight loss. He denies any symptoms such as chest pain, LOC, dizziness, syncope, PND, orthopnea, LE edema, palpitations, asthma/COPD, recent viral illness or travel, abdominal pain, nausea/vomiting, hx of blood tranfusion/anemia. On EKG in office patient noticed to be sinus yasmine with IVCD. Pt recommended for Right and Left cardiac catheterization given history of multiple PCI and EKG abnormalities as well as possible echo/EP evaluation. (06 Apr 2017 16:15)    INTERVAL HPI/OVERNIGHT EVENTS:::s/p expl lap-- removal of colon.  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  Tachypnea: Tachypnea  Colon cancer: Colon mass  Blood in stool: Blood in stool  Bradycardia: Bradycardia  CKD (chronic kidney disease) stage 3, GFR 30-59 ml/min: CKD (chronic kidney disease) stage 3, GFR 30-59 ml/min  Hyperlipidemia: Hyperlipidemia  Anemia: Anemia  CAD (coronary artery disease): CAD (coronary artery disease)  SOB (shortness of breath): SOB (shortness of breath)          PAST MEDICAL & SURGICAL HISTORY:  CAD (coronary artery disease)  HLD (hyperlipidemia)  S/P cataract extraction          Consultant NOTE  REVIEWED  (  ++ )    REVIEW OF SYSTEMS:  [x] As per HPI  POST OP  CONSTITUTIONAL: No fever, weight loss, or fatigue  RESPIRATORY: No cough, wheezing, chills or hemoptysis; No Shortness of Breath  CARDIOVASCULAR: No chest pain, palpitations, dizziness, or leg swelling  GASTROINTESTINAL  s/p expl lap  MUSCULOSKELETAL: No joint pain or swelling; No muscle, back, or extremity pain wekness  PSYCH    awake, alert       [x] All others negative	  [ ] Unable to obtain          Vital Signs Last 24 Hrs  T(C): 36.7, Max: 37.2 (04-16 @ 22:51)  T(F): 98.1, Max: 98.9 (04-16 @ 22:51)  HR: 58 (56 - 76)  BP: 169/61 (137/63 - 169/61)  BP(mean): 88 (80 - 113)  RR: 19 (14 - 41)  SpO2: 99% (17% - 100%)      I & Os for 24h ending 04-17 @ 07:00  =============================================  IN: 1500 ml / OUT: 700 ml / NET: 800 ml    I & Os for current day (as of 04-17 @ 21:13)  =============================================  IN: 100 ml / OUT: 490 ml / NET: -390 ml    PHYSICAL EXAMINATION:                                    (    )  NO CHANGE  Appearance: Normal	  HEENT:   Normal oral mucosa, PERRL, EOMI	  Neck: Supple, + JVD/ - JVD; Carotid Bruit   Cardiovascular: Normal S1 S2, No JVD, No murmurs,   Respiratory: Lungs clear to auscultation/Decreased Breath Sounds/No Rales, Rhonchi, Wheezing	  Gastrointestinal:  Soft, Non-tender, + BS	  Skin: No rashes, No ecchymoses, No cyanosis  Extremities: Normal range of motion, No clubbing, cyanosis or edema  Vascular: Peripheral pulses palpable 2+ bilaterally  Neurologic: Non-focal  Psychiatry: A & O x 3, Mood & affect appropriate    chlorhexidine 4% Liquid 1Application(s) Topical once  buDESOnide 160 MICROgram(s)/formoterol 4.5 MICROgram(s) Inhaler 2Puff(s) Inhalation two times a day  ALBUTerol/ipratropium for Nebulization 3milliLiter(s) Nebulizer every 6 hours  ALBUTerol    90 MICROgram(s) HFA Inhaler 1Puff(s) Inhalation every 4 hours  tiotropium 18 MICROgram(s) Capsule 1Capsule(s) Inhalation daily  sodium ferric gluconate complex IVPB 125milliGRAM(s) IV Intermittent daily  heparin  Injectable 5000Unit(s) SubCutaneous every 8 hours  HYDROmorphone  Injectable 0.5milliGRAM(s) IV Push every 4 hours PRN  HYDROmorphone  Injectable 0.2milliGRAM(s) IV Push every 4 hours PRN  lactated ringers. 1000milliLiter(s) IV Continuous <Continuous>  insulin lispro (HumaLOG) corrective regimen sliding scale  SubCutaneous Before meals and at bedtime  dextrose 5%. 1000milliLiter(s) IV Continuous <Continuous>  dextrose 50% Injectable 25Gram(s) IV Push once  glucagon  Injectable 1milliGRAM(s) IntraMuscular once PRN  labetalol Injectable 10milliGRAM(s) IV Push every 1 hour PRN  hydrALAZINE Injectable 10milliGRAM(s) IV Push every 1 hour PRN  cefOXitin  IVPB 2Gram(s) IV Intermittent every 8 hours  sodium phosphate IVPB 15milliMole(s) IV Intermittent once        PT/INR - ( 17 Apr 2017 06:57 )   PT: 12.2 sec;   INR: 1.10          PTT - ( 17 Apr 2017 17:08 )  PTT:39.2 sec                              11.7   15.4  )-----------( 260      ( 17 Apr 2017 17:08 )             35.7     04-17    143  |  114<H>  |  9   ----------------------------<  115<H>  4.4   |  21<L>  |  1.02    Ca    7.7<L>      17 Apr 2017 17:08  Phos  2.7     04-17  Mg     1.4     04-17        CAPILLARY BLOOD GLUCOSE  92 (17 Apr 2017 06:12)

## 2017-04-17 NOTE — PROGRESS NOTE ADULT - SUBJECTIVE AND OBJECTIVE BOX
Pt seen and examined   no complaints, transfusion in progress  REVIEW OF SYSTEMS:  Constitutional: No fever, weight loss or fatigue  Cardiovascular: No chest pain, palpitations, dizziness or leg swelling  Gastrointestinal: No abdominal or epigastric pain. No nausea, vomiting or hematemesis; No diarrhea or constipation. No melena or hematochezia.  Skin: No itching, burning, rashes or lesions       MEDICATIONS:  MEDICATIONS  (STANDING):  chlorhexidine 4% Liquid 1Application(s) Topical once  lisinopril 10milliGRAM(s) Oral daily  gabapentin 100milliGRAM(s) Oral daily  mirtazapine 15milliGRAM(s) Oral at bedtime  PARoxetine 20milliGRAM(s) Oral daily  simvastatin 20milliGRAM(s) Oral at bedtime  amLODIPine   Tablet 2.5milliGRAM(s) Oral daily  pantoprazole    Tablet 40milliGRAM(s) Oral before breakfast  buDESOnide 160 MICROgram(s)/formoterol 4.5 MICROgram(s) Inhaler 2Puff(s) Inhalation two times a day  ALBUTerol/ipratropium for Nebulization 3milliLiter(s) Nebulizer every 6 hours  ALBUTerol    90 MICROgram(s) HFA Inhaler 1Puff(s) Inhalation every 4 hours  tiotropium 18 MICROgram(s) Capsule 1Capsule(s) Inhalation daily  sodium chloride 0.9%. 1000milliLiter(s) IV Continuous <Continuous>  sodium ferric gluconate complex IVPB 125milliGRAM(s) IV Intermittent daily  potassium phosphate IVPB 15milliMole(s) IV Intermittent once  magnesium sulfate  IVPB 2Gram(s) IV Intermittent once    MEDICATIONS  (PRN):      Allergies    No Known Allergies    Intolerances        Vital Signs Last 24 Hrs  T(C): 37.2, Max: 37.2 (04-17 @ 06:12)  T(F): 98.9, Max: 98.9 (04-17 @ 06:12)  HR: 76 (58 - 76)  BP: 162/67 (137/63 - 165/67)  BP(mean): 97 (91 - 108)  RR: 18 (18 - 20)  SpO2: 98% (17% - 99%)  I & Os for 24h ending 04-17 @ 07:00  =============================================  IN: 1500 ml / OUT: 700 ml / NET: 800 ml    I & Os for current day (as of 04-17 @ 12:41)  =============================================  IN: 0 ml / OUT: 250 ml / NET: -250 ml      PHYSICAL EXAM:    General: Well developed; well nourished; in no acute distress  HEENT: MMM, conjunctiva and sclera clear  Gastrointestinal: Soft non-tender non-distended; Normal bowel sounds; No hepatosplenomegaly  Skin: Warm and dry. No obvious rash    LABS:      CBC Full  -  ( 17 Apr 2017 06:57 )  WBC Count : 7.3 K/uL  Hemoglobin : 8.8 g/dL  Hematocrit : 28.4 %  Platelet Count - Automated : 238 K/uL  Mean Cell Volume : 84.8 fL  Mean Cell Hemoglobin : 26.3 pg  Mean Cell Hemoglobin Concentration : 31.0 g/dL  Auto Neutrophil # : x  Auto Lymphocyte # : x  Auto Monocyte # : x  Auto Eosinophil # : x  Auto Basophil # : x  Auto Neutrophil % : x  Auto Lymphocyte % : x  Auto Monocyte % : x  Auto Eosinophil % : x  Auto Basophil % : x    04-17    143  |  112<H>  |  10  ----------------------------<  81  4.2   |  22  |  1.13    Ca    8.3<L>      17 Apr 2017 06:56  Phos  2.2     04-17  Mg     1.6     04-17      PT/INR - ( 17 Apr 2017 06:57 )   PT: 12.2 sec;   INR: 1.10          PTT - ( 17 Apr 2017 11:25 )  PTT:38.0 sec                  RADIOLOGY & ADDITIONAL STUDIES (The following images were personally reviewed):

## 2017-04-17 NOTE — PROGRESS NOTE ADULT - ASSESSMENT
87 yo male with CAD (stent x2 last 3 years ago), BPH, htn, hld, admitted originally for elective cardiac cath, found to be anemic, further work up reveled ascending colonic mass    NPO/IVF  Home meds  IS  OOBA  SCDs  No AC  OR today 87 yo male with CAD (stent x2 last 3 years ago), BPH, htn, hld, admitted originally for elective cardiac cath, found to be anemic, further work up reveled ascending colonic mass    NPO/IVF  Home meds  IS  OOBA  SCDs  No AC  1U PRBC  OR today

## 2017-04-17 NOTE — PROGRESS NOTE ADULT - PROBLEM SELECTOR PROBLEM 5
Bradycardia
CKD (chronic kidney disease) stage 3, GFR 30-59 ml/min
CKD (chronic kidney disease) stage 3, GFR 30-59 ml/min
Colon cancer
SOB (shortness of breath)

## 2017-04-17 NOTE — PROGRESS NOTE ADULT - SUBJECTIVE AND OBJECTIVE BOX
PUD ATTENDING    INTERVAL HPI/OVERNIGHT EVENTS:    In SICU, tachypnea up to 46/min    PAST MEDICAL & SURGICAL HISTORY:  CAD (coronary artery disease)  HLD (hyperlipidemia)  S/P cataract extraction    FAMILY HISTORY:  No pertinent family history in first degree relatives    SOCIAL HISTORY:    REVIEW OF SYSTEMS:  still sedated; nonverbal    Vital Signs Last 24 Hrs  T(C): 36.7, Max: 37.2 (04-16 @ 22:51)  T(F): 98.1, Max: 98.9 (04-16 @ 22:51)  HR: 58 (56 - 76)  BP: 153/62 (137/63 - 165/64)  BP(mean): 100 (80 - 113)  RR: 38 (14 - 41)  SpO2: 98% (17% - 99%)    ABG - ( 17 Apr 2017 17:06 )  pH: 7.32  /  pCO2: 39    /  pO2: 122   / HCO3: 20    / Base Excess: -6.1  /  SaO2: 98        I&O's Detail  I & Os for 24h ending 17 Apr 2017 07:00  =============================================  IN:    Oral Fluid: 1200 ml    sodium chloride 0.9%: 300 ml    Total IN: 1500 ml  ---------------------------------------------  OUT:    Voided: 700 ml    Total OUT: 700 ml  ---------------------------------------------  Total NET: 800 ml    I & Os for current day (as of 17 Apr 2017 18:13)  =============================================  IN:    IV PiggyBack: 50 ml    Total IN: 50 ml  ---------------------------------------------  OUT:    Voided: 250 ml    Indwelling Catheter - Urethral: 60 ml    Total OUT: 310 ml  ---------------------------------------------  Total NET: -260 ml    PHYSICAL EXAM:  EXTUBATED IN OR, NOW IN SICU  Malnourished, cachexia, mucle atrophy,  comfortable BUT RR 15-47,  +acute distress; vital signs are monitored continuously  Eyes: -conjunctivitis, -scleritis   Head: no focal deficit, normocephalic,  no trauma  ENMT: DOES NOT OPEN MOUTHT, -nasal discharge, -hoarseness, ABnormal hearing, -cough, -hemoptysis, trachea midline  Neck: supple, - lymphadenopathy,  -masses, -JVD  Respiratory: bilateral diminished breath sounds, -wheezing, -rhonchi, -rales, -crackles  Chest: +accessory muscle use, +paradoxical breathing  Cardiovascular: regular rate and sinus rhythm, S1 S2 normal, -S3, -S4, -murmur, -gallop, -rub  Gastrointestinal: soft, nontender, nondistended, normal bowel sounds, no hepatosplenomegaly  Genitourinary: -flank pain, -dysuria  Extremities: -clubbing, -cyanosis, -edema    Vascular: peripheral pulses palpable 2+ bilaterally  Neurological: STILL SEDATED, no focal deficit, -tremor   Skin: warm, dry, -erythema, iv sites intact  Lymph nodes; no cervical, supraclavicular or axillary adenopathy  Psychiatric: cooperative, appropriate mood    MEDICATIONS  (STANDING):  chlorhexidine 4% Liquid 1Application(s) Topical once  buDESOnide 160 MICROgram(s)/formoterol 4.5 MICROgram(s) Inhaler 2Puff(s) Inhalation two times a day  ALBUTerol/ipratropium for Nebulization 3milliLiter(s) Nebulizer every 6 hours  ALBUTerol    90 MICROgram(s) HFA Inhaler 1Puff(s) Inhalation every 4 hours  tiotropium 18 MICROgram(s) Capsule 1Capsule(s) Inhalation daily  sodium ferric gluconate complex IVPB 125milliGRAM(s) IV Intermittent daily  heparin  Injectable 5000Unit(s) SubCutaneous every 8 hours  lactated ringers. 1000milliLiter(s) IV Continuous <Continuous>  insulin lispro (HumaLOG) corrective regimen sliding scale  SubCutaneous Before meals and at bedtime  dextrose 5%. 1000milliLiter(s) IV Continuous <Continuous>  dextrose 50% Injectable 25Gram(s) IV Push once  magnesium sulfate  IVPB 4Gram(s) IV Intermittent once  potassium phosphate IVPB 30milliMole(s) IV Intermittent once  cefOXitin  IVPB 2Gram(s) IV Intermittent every 8 hours    MEDICATIONS  (PRN):  HYDROmorphone  Injectable 0.5milliGRAM(s) IV Push every 4 hours PRN Severe Pain (7 - 10)  HYDROmorphone  Injectable 0.2milliGRAM(s) IV Push every 4 hours PRN Moderate Pain (4 - 6)  glucagon  Injectable 1milliGRAM(s) IntraMuscular once PRN Glucose LESS THAN 70 milligrams/deciliter  labetalol Injectable 10milliGRAM(s) IV Push every 1 hour PRN For SBP>160, Hold for HR<65  hydrALAZINE Injectable 10milliGRAM(s) IV Push every 1 hour PRN For SBP>160, hold for HR>100    Allergies  No Known Allergies  Intolerances    LABS:                        11.7   15.4  )-----------( 260      ( 17 Apr 2017 17:08 )             35.7     04-17    143  |  114<H>  |  9   ----------------------------<  115<H>  4.4   |  21<L>  |  1.02    Ca    7.7<L>      17 Apr 2017 17:08  Phos  2.7     04-17  Mg     1.4     04-17  PT/INR - ( 17 Apr 2017 06:57 )   PT: 12.2 sec;   INR: 1.10     PTT - ( 17 Apr 2017 17:08 )  PTT:39.2 sec    +DVT prophylaxis 5 q12  +Sleep na  +Nutrition goals  NPO  -Pain NOT OBVIOUS  -Decubital ulcer  +GI prophylaxis (PPV, coagulopathy, Hx)  +Aspiration prophylaxis (45 degrees)  +Sedation/analgesia stopped once  +ID (phos, CH, mupi, SB)  -Delirium  +Cardiac  +Prevention  +Education  +Medication reviewed (drug-drug interactions, PDA)  Medical devices  Discussed with SICU, PGY, CCRN    RADIOLOGY & ADDITIONAL STUDIES:    EXAM:  XR CHEST 1 VIEW PORT IMMEDIATE                          PROCEDURE DATE:  04/17/2017                     INTERPRETATION:  XR CHEST 1 VIEW PORT IMMEDIATE dated 4/17/2017 6:00 PM    CLINICAL INFORMATION: Male, 86 years old.  Post-op.    PRIOR STUDIES: 4/10/2017.    FINDINGS: Heart size, mediastinal and hilar contours are unchanged. There   is persistent cardiomegaly despite the portable technique. There may be a   new left-sided pleural effusion as the diaphragm appears obscured. There   are linear atelectasis at the right base. No pneumothorax. Soft tissues   and osseous structures and straight age-appropriate degenerative changes.   Extensive calcified atheromatous plaque formation is noted to involve the   aortic arch and descending thoracic aorta as well as the abdominal aorta.    IMPRESSION:  Question newly developing left-sided pleural effusion.  Question new linear atelectasis at the right base.  Cardiomegaly.

## 2017-04-17 NOTE — PROGRESS NOTE ADULT - SUBJECTIVE AND OBJECTIVE BOX
Chief Complaint/Reason for Consult: CAD  INTERVAL HPI: no sob palp cp currently getting transfusion  	  MEDICATIONS:  lisinopril 10milliGRAM(s) Oral daily  amLODIPine   Tablet 2.5milliGRAM(s) Oral daily      buDESOnide 160 MICROgram(s)/formoterol 4.5 MICROgram(s) Inhaler 2Puff(s) Inhalation two times a day  ALBUTerol/ipratropium for Nebulization 3milliLiter(s) Nebulizer every 6 hours  ALBUTerol    90 MICROgram(s) HFA Inhaler 1Puff(s) Inhalation every 4 hours  tiotropium 18 MICROgram(s) Capsule 1Capsule(s) Inhalation daily    gabapentin 100milliGRAM(s) Oral daily  mirtazapine 15milliGRAM(s) Oral at bedtime  PARoxetine 20milliGRAM(s) Oral daily    pantoprazole    Tablet 40milliGRAM(s) Oral before breakfast    simvastatin 20milliGRAM(s) Oral at bedtime    chlorhexidine 4% Liquid 1Application(s) Topical once  sodium chloride 0.9%. 1000milliLiter(s) IV Continuous <Continuous>  sodium ferric gluconate complex IVPB 125milliGRAM(s) IV Intermittent daily  potassium phosphate IVPB 15milliMole(s) IV Intermittent once  magnesium sulfate  IVPB 2Gram(s) IV Intermittent once      REVIEW OF SYSTEMS:  [x] As per HPI  CONSTITUTIONAL: No fever, weight loss, or fatigue  RESPIRATORY: No cough, wheezing, chills or hemoptysis; No Shortness of Breath  CARDIOVASCULAR: No chest pain, palpitations, dizziness, or leg swelling  GASTROINTESTINAL: No abdominal or epigastric pain. No nausea, vomiting, or hematemesis; No diarrhea or constipation. No melena or hematochezia.  MUSCULOSKELETAL: No joint pain or swelling; No muscle, back, or extremity pain  [x] All others negative	  [ ] Unable to obtain    PHYSICAL EXAM:  T(C): 37.2, Max: 37.2 (04-16 @ 22:51)  HR: 56 (56 - 76)  BP: 147/89 (137/63 - 165/67)  RR: 16 (16 - 20)  SpO2: 98% (17% - 99%)  Wt(kg): --  I&O's Summary  I & Os for 24h ending 17 Apr 2017 07:00  =============================================  IN: 1500 ml / OUT: 700 ml / NET: 800 ml    I & Os for current day (as of 17 Apr 2017 13:56)  =============================================  IN: 0 ml / OUT: 250 ml / NET: -250 ml    Height (cm): 175.3 (04-16 @ 22:51)  Weight (kg): 57 (04-16 @ 22:51)  BMI (kg/m2): 18.5 (04-16 @ 22:51)  BSA (m2): 1.7 (04-16 @ 22:51)    Appearance: Normal	  HEENT:   Normal oral mucosa  Cardiovascular: Normal S1 S2, No JVD, No murmurs, No edema  Respiratory: Lungs clear to auscultation	  Gastrointestinal:  Soft, Non-tender, + BS	  Extremities: Normal range of motion, No clubbing, cyanosis or edema  Vascular: Peripheral pulses palpable 2+ bilaterally    TELEMETRY: 	    ECG:    	  RADIOLOGY:   CXR:  CT:  US:    CARDIAC TESTING:  Echocardiogram:  Catheterization:  Stress Test:      LABS:	 	    CARDIAC MARKERS:                                  8.8    7.3   )-----------( 238      ( 17 Apr 2017 06:57 )             28.4     04-17    143  |  112<H>  |  10  ----------------------------<  81  4.2   |  22  |  1.13    Ca    8.3<L>      17 Apr 2017 06:56  Phos  2.2     04-17  Mg     1.6     04-17      proBNP:   Lipid Profile:   HgA1c:   TSH:     ASSESSMENT/PLAN: 	  Problem: SOB (shortness of breath).   - Does not appear to be overloaded by PE, lungs CTAB, no JVD, O2Sat stable when supine  - Echo 04/10/17 revealed LVEF 55%-60%, Normal left ventricular size and wall thickness. The left ventricular wall motion is normal, no HD valvular dz, no effusion  - CXR reveals hyperinflation c/w emphysema, no congestion or infiltrate, f/u official read  - continue nebs/inhalers as ordered by pulm PRN.       Problem: CAD (coronary artery disease).  Plan: - s/p DAYANA x3 to LCx 2013   EF normal wall motion, no chest pain, EKG unchanged.         Problem: Bradycardia.  Plan: - HR in 40s-60s, no BB.       Problem: Hyperlipidemia.  Plan: - Continue Simvastatin 20mg PO daily.     Cardiac Optimized for Surgery

## 2017-04-17 NOTE — PROGRESS NOTE ADULT - PROBLEM SELECTOR PLAN 4
- HR 54 BPM by EKG with IVCD  - As per office note, HR in the 40's at Dr. Mena's office, not on any AVN agents  - As per Dr. Mena, EP consult not necessary at this time  - Monitor telemetry overnight and reconsider in the AM
- HR 54 BPM by EKG with IVCD, currently in 50s   - As per office note, HR in the 40's at Dr. Mena's office, not on any AVN agents  - As per Dr. Mena, EP consult not necessary at this time  - Monitor telemetry overnight and reconsider in the AM
Dr. Lopez (oncology) and Dr. Bower (surgery) following  - EGD negative but colonoscopy + for mass in ascending colon, biopsy revealed tubulovillous adenoma  - CT  abd/pelvis revealed large polypoidal mass in the proximal ascending colon with slight infiltration of the fat posterior to the colon, possibly representing direct invasion, no pathologically enlarged nodes seen, no liver metastases seen.  - NPO after MN for CT chest with IV contrast in AM per surgery team  - Surgery to discuss doing surgery for removal of mass on this admission, patient is cleared from a cardiac standpoint for surgery per Dr. Mena
had one PRBC
monitor h/h

## 2017-04-17 NOTE — BRIEF OPERATIVE NOTE - PROCEDURE
Right hemicolectomy with anastomosis of ileum to colon  04/17/2017  extended right hemicolectomy  Active  DAYO

## 2017-04-18 LAB
ANION GAP SERPL CALC-SCNC: 11 MMOL/L — SIGNIFICANT CHANGE UP (ref 9–16)
BUN SERPL-MCNC: 12 MG/DL — SIGNIFICANT CHANGE UP (ref 7–23)
CALCIUM SERPL-MCNC: 8 MG/DL — LOW (ref 8.5–10.5)
CHLORIDE SERPL-SCNC: 111 MMOL/L — HIGH (ref 96–108)
CO2 SERPL-SCNC: 21 MMOL/L — LOW (ref 22–31)
CREAT SERPL-MCNC: 1.14 MG/DL — SIGNIFICANT CHANGE UP (ref 0.5–1.3)
GLUCOSE SERPL-MCNC: 144 MG/DL — HIGH (ref 70–99)
HCT VFR BLD CALC: 34.5 % — LOW (ref 39–50)
HGB BLD-MCNC: 11.5 G/DL — LOW (ref 13–17)
INR BLD: 1.06 — SIGNIFICANT CHANGE UP (ref 0.88–1.16)
MAGNESIUM SERPL-MCNC: 2.7 MG/DL — HIGH (ref 1.6–2.4)
MCHC RBC-ENTMCNC: 27.2 PG — SIGNIFICANT CHANGE UP (ref 27–34)
MCHC RBC-ENTMCNC: 33.3 G/DL — SIGNIFICANT CHANGE UP (ref 32–36)
MCV RBC AUTO: 81.6 FL — SIGNIFICANT CHANGE UP (ref 80–100)
PHOSPHATE SERPL-MCNC: 4.1 MG/DL — SIGNIFICANT CHANGE UP (ref 2.5–4.5)
PLATELET # BLD AUTO: 244 K/UL — SIGNIFICANT CHANGE UP (ref 150–400)
POTASSIUM SERPL-MCNC: 4.1 MMOL/L — SIGNIFICANT CHANGE UP (ref 3.5–5.3)
POTASSIUM SERPL-SCNC: 4.1 MMOL/L — SIGNIFICANT CHANGE UP (ref 3.5–5.3)
PROTHROM AB SERPL-ACNC: 11.8 SEC — SIGNIFICANT CHANGE UP (ref 9.8–12.7)
RBC # BLD: 4.23 M/UL — SIGNIFICANT CHANGE UP (ref 4.2–5.8)
RBC # FLD: 16.3 % — SIGNIFICANT CHANGE UP (ref 10.3–16.9)
SODIUM SERPL-SCNC: 143 MMOL/L — SIGNIFICANT CHANGE UP (ref 135–145)
WBC # BLD: 21.4 K/UL — HIGH (ref 3.8–10.5)
WBC # FLD AUTO: 21.4 K/UL — HIGH (ref 3.8–10.5)

## 2017-04-18 PROCEDURE — 99232 SBSQ HOSP IP/OBS MODERATE 35: CPT

## 2017-04-18 RX ORDER — SODIUM CHLORIDE 9 MG/ML
500 INJECTION, SOLUTION INTRAVENOUS
Qty: 0 | Refills: 0 | Status: DISCONTINUED | OUTPATIENT
Start: 2017-04-18 | End: 2017-04-21

## 2017-04-18 RX ORDER — HEPARIN SODIUM 5000 [USP'U]/ML
5000 INJECTION INTRAVENOUS; SUBCUTANEOUS EVERY 12 HOURS
Qty: 0 | Refills: 0 | Status: DISCONTINUED | OUTPATIENT
Start: 2017-04-18 | End: 2017-04-19

## 2017-04-18 RX ORDER — HYDRALAZINE HCL 50 MG
10 TABLET ORAL ONCE
Qty: 0 | Refills: 0 | Status: COMPLETED | OUTPATIENT
Start: 2017-04-18 | End: 2017-04-18

## 2017-04-18 RX ORDER — CEFOTETAN DISODIUM 1 G
1 VIAL (EA) INJECTION EVERY 12 HOURS
Qty: 0 | Refills: 0 | Status: DISCONTINUED | OUTPATIENT
Start: 2017-04-19 | End: 2017-04-23

## 2017-04-18 RX ORDER — HYDROMORPHONE HYDROCHLORIDE 2 MG/ML
0.2 INJECTION INTRAMUSCULAR; INTRAVENOUS; SUBCUTANEOUS EVERY 4 HOURS
Qty: 0 | Refills: 0 | Status: DISCONTINUED | OUTPATIENT
Start: 2017-04-18 | End: 2017-04-21

## 2017-04-18 RX ORDER — ACETAMINOPHEN 500 MG
1000 TABLET ORAL ONCE
Qty: 0 | Refills: 0 | Status: COMPLETED | OUTPATIENT
Start: 2017-04-18 | End: 2017-04-18

## 2017-04-18 RX ORDER — ACETAMINOPHEN 500 MG
1000 TABLET ORAL ONCE
Qty: 0 | Refills: 0 | Status: COMPLETED | OUTPATIENT
Start: 2017-04-18 | End: 2017-04-19

## 2017-04-18 RX ORDER — CEFOTETAN DISODIUM 1 G
1 VIAL (EA) INJECTION ONCE
Qty: 0 | Refills: 0 | Status: COMPLETED | OUTPATIENT
Start: 2017-04-18 | End: 2017-04-18

## 2017-04-18 RX ORDER — DIPHENHYDRAMINE HCL 50 MG
25 CAPSULE ORAL ONCE
Qty: 0 | Refills: 0 | Status: COMPLETED | OUTPATIENT
Start: 2017-04-18 | End: 2017-04-18

## 2017-04-18 RX ORDER — CEFOTETAN DISODIUM 1 G
VIAL (EA) INJECTION
Qty: 0 | Refills: 0 | Status: DISCONTINUED | OUTPATIENT
Start: 2017-04-18 | End: 2017-04-23

## 2017-04-18 RX ADMIN — Medication 10 MILLIGRAM(S): at 00:55

## 2017-04-18 RX ADMIN — Medication 25 MILLIGRAM(S): at 22:44

## 2017-04-18 RX ADMIN — HEPARIN SODIUM 5000 UNIT(S): 5000 INJECTION INTRAVENOUS; SUBCUTANEOUS at 14:22

## 2017-04-18 RX ADMIN — HYDROMORPHONE HYDROCHLORIDE 0.5 MILLIGRAM(S): 2 INJECTION INTRAMUSCULAR; INTRAVENOUS; SUBCUTANEOUS at 11:12

## 2017-04-18 RX ADMIN — HYDROMORPHONE HYDROCHLORIDE 0.5 MILLIGRAM(S): 2 INJECTION INTRAMUSCULAR; INTRAVENOUS; SUBCUTANEOUS at 00:54

## 2017-04-18 RX ADMIN — HYDROMORPHONE HYDROCHLORIDE 0.2 MILLIGRAM(S): 2 INJECTION INTRAMUSCULAR; INTRAVENOUS; SUBCUTANEOUS at 15:35

## 2017-04-18 RX ADMIN — Medication 10 MILLIGRAM(S): at 22:07

## 2017-04-18 RX ADMIN — Medication 120 GRAM(S): at 19:16

## 2017-04-18 RX ADMIN — BUDESONIDE AND FORMOTEROL FUMARATE DIHYDRATE 2 PUFF(S): 160; 4.5 AEROSOL RESPIRATORY (INHALATION) at 21:49

## 2017-04-18 RX ADMIN — HEPARIN SODIUM 5000 UNIT(S): 5000 INJECTION INTRAVENOUS; SUBCUTANEOUS at 05:07

## 2017-04-18 RX ADMIN — CEFOXITIN 100 GRAM(S): 1 INJECTION, POWDER, FOR SOLUTION INTRAVENOUS at 05:07

## 2017-04-18 RX ADMIN — Medication 1000 MILLIGRAM(S): at 07:40

## 2017-04-18 RX ADMIN — Medication 3 MILLILITER(S): at 12:42

## 2017-04-18 RX ADMIN — HYDROMORPHONE HYDROCHLORIDE 0.2 MILLIGRAM(S): 2 INJECTION INTRAMUSCULAR; INTRAVENOUS; SUBCUTANEOUS at 15:20

## 2017-04-18 RX ADMIN — HYDROMORPHONE HYDROCHLORIDE 0.5 MILLIGRAM(S): 2 INJECTION INTRAMUSCULAR; INTRAVENOUS; SUBCUTANEOUS at 01:30

## 2017-04-18 RX ADMIN — Medication 3 MILLILITER(S): at 01:15

## 2017-04-18 RX ADMIN — Medication 3 MILLILITER(S): at 06:08

## 2017-04-18 RX ADMIN — Medication 400 MILLIGRAM(S): at 07:09

## 2017-04-18 RX ADMIN — Medication 3 MILLILITER(S): at 23:18

## 2017-04-18 NOTE — PROGRESS NOTE ADULT - ASSESSMENT
ABD SOFT, DISTENDED, BS-, FLATUS-, BM-, WOUND IS DRY, INTACT.  NPO, IVF, IV ABX, DVT PROFILAXIS, SPIROMETRY , OOB.

## 2017-04-18 NOTE — PROGRESS NOTE ADULT - SUBJECTIVE AND OBJECTIVE BOX
S: NAEO.  Patient reports    O: Vitals: Vital Signs Last 24 Hrs  T(C): 37.1, Max: 37.1 (04-18 @ 06:05)  T(F): 98.8, Max: 98.8 (04-18 @ 06:05)  HR: 58 (54 - 76)  BP: 147/71 (124/59 - 177/64)  BP(mean): 117 (77 - 117)  RR: 15 (12 - 41)  SpO2: 98% (95% - 100%)    CAPILLARY BLOOD GLUCOSE  110 (17 Apr 2017 22:00)      PHYSICAL EXAM:  Neuro: A&Ox3, NAD, grossly neuro intact  CV: RRR, no MRGs  Pulm: CTAB, no wheezes, rales ronchi  Abd: BS (+), Soft, NT, ND  Ext: No edema peripherally or centrally  Vasc: 2+ pulses - radial and PT      I & Os for 24h ending 04-17 @ 07:00  =============================================  IN: 1500 ml / OUT: 700 ml / NET: 800 ml    I & Os for current day (as of 04-18 @ 06:47)  =============================================  IN: 906 ml / OUT: 1370 ml / NET: -464 ml      Labs:   ABG - ( 17 Apr 2017 17:06 )  pH: 7.32  /  pCO2: 39    /  pO2: 122   / HCO3: 20    / Base Excess: -6.1  /  SaO2: 98                              11.5   21.4  )-----------( 244      ( 18 Apr 2017 04:51 )             34.5   04-18    143  |  111<H>  |  12  ----------------------------<  144<H>  4.1   |  21<L>  |  1.14    Ca    8.0<L>      18 Apr 2017 04:51  Phos  4.1     04-18  Mg     2.7     04-18    PT/INR - ( 18 Apr 2017 04:51 )   PT: 11.8 sec;   INR: 1.06          PTT - ( 17 Apr 2017 17:08 )  PTT:39.2 sec      RADIOLOGY & ADDITIONAL STUDIES: S: NAEO.  Patient reports moderate pain this am.   Patient occasionally hyperventilates 2/2 anxiety, VSS.   Denies n/v/f/c/cp/sob.  AFVSS o/n, UOP 120cc/hr over past day.      O: Vitals: Vital Signs Last 24 Hrs  T(C): 37.1, Max: 37.1 (04-18 @ 06:05)  T(F): 98.8, Max: 98.8 (04-18 @ 06:05)  HR: 58 (54 - 76)  BP: 147/71 (124/59 - 177/64)  BP(mean): 117 (77 - 117)  RR: 15 (12 - 41)  SpO2: 98% (95% - 100%)    CAPILLARY BLOOD GLUCOSE  110 (17 Apr 2017 22:00)    PHYSICAL EXAM:  Neuro: A&Ox3, NAD, grossly neuro intact  CV: RRR, no m/f/g  Pulm: CTAB, no wheezes/rales/rhonchi  Abd: BS (+), Soft, NT, ND, midline incision with island dressing- significant strikethrough but no saturation or oozing around dressing  Ext: wwp, no c/c/e  Vasc: 2+ pulses - dp/pt bilaterally     I & Os for 24h ending 04-17 @ 07:00  =============================================  IN: 1500 ml / OUT: 700 ml / NET: 800 ml    I & Os for current day (as of 04-18 @ 06:47)  =============================================  IN: 906 ml / OUT: 1370 ml / NET: -464 ml    Labs:   ABG - ( 17 Apr 2017 17:06 )  pH: 7.32  /  pCO2: 39    /  pO2: 122   / HCO3: 20    / Base Excess: -6.1  /  SaO2: 98                            11.5   21.4  )-----------( 244      ( 18 Apr 2017 04:51 )             34.5   04-18    143  |  111<H>  |  12  ----------------------------<  144<H>  4.1   |  21<L>  |  1.14    Ca    8.0<L>      18 Apr 2017 04:51  Phos  4.1     04-18  Mg     2.7     04-18    PT/INR - ( 18 Apr 2017 04:51 )   PT: 11.8 sec;   INR: 1.06       PTT - ( 17 Apr 2017 17:08 )  PTT:39.2 sec    RADIOLOGY & ADDITIONAL STUDIES:    EXAM:  XR CHEST 1 VIEW PORT IMMEDIATE                        PROCEDURE DATE:  04/17/2017    INTERPRETATION:  XR CHEST 1 VIEW PORT IMMEDIATE dated 4/17/2017 6:00 PM  CLINICAL INFORMATION: Male, 86 years old.  Post-op.  PRIOR STUDIES: 4/10/2017.    FINDINGS: Heart size, mediastinal and hilar contours are unchanged. There   is persistent cardiomegaly despite the portable technique. There may be a   new left-sided pleural effusion as the diaphragm appears obscured. There   are linear atelectasis at the right base. No pneumothorax. Soft tissues   and osseous structures and straight age-appropriate degenerative changes.   Extensive calcified atheromatous plaque formation is noted to involve the   aortic arch and descending thoracic aorta as well as the abdominal aorta.    IMPRESSION:  Question newly developing left-sided pleural effusion.  Question new linear atelectasis at the right base.  Cardiomegaly.

## 2017-04-18 NOTE — PROGRESS NOTE ADULT - ASSESSMENT
85 yo male with CAD (stent x2 last 3 years ago), BPH, htn, hld, admitted originally for elective cardiac cath, found to be anemic, further work up reveild ascending colonic mass s/p R hemicolectomy    Neuro: tylenol & dilaudid prn  CV: normotensive goals  Pulm: 2L NC, Spiriva, Symbicort, Duonebs  FENGI: NPO/IVF, protonix  : Espinoza  Endo: ISS  ID: Cefoxitin x3  Heme: SQH, hold ASA  Lines: Vidya (4/17--), PIV  Wounds: Midline incision, sebaceous cyst removal incision

## 2017-04-18 NOTE — PROGRESS NOTE ADULT - SUBJECTIVE AND OBJECTIVE BOX
Pt seen and examined post op  no complaints except post surgical pain    REVIEW OF SYSTEMS:  Constitutional: No fever,  Cardiovascular: No chest pain, palpitations, dizziness or leg swelling  Gastrointestinal:+pain. No nausea, vomiting or hematemesis; No diarrhea or constipation. No melena or hematochezia.  Skin: No itching, burning, rashes or lesions       MEDICATIONS:  MEDICATIONS  (STANDING):  buDESOnide 160 MICROgram(s)/formoterol 4.5 MICROgram(s) Inhaler 2Puff(s) Inhalation two times a day  ALBUTerol/ipratropium for Nebulization 3milliLiter(s) Nebulizer every 6 hours  ALBUTerol    90 MICROgram(s) HFA Inhaler 1Puff(s) Inhalation every 4 hours  tiotropium 18 MICROgram(s) Capsule 1Capsule(s) Inhalation daily  heparin  Injectable 5000Unit(s) SubCutaneous every 8 hours  insulin lispro (HumaLOG) corrective regimen sliding scale  SubCutaneous Before meals and at bedtime  dextrose 5%. 1000milliLiter(s) IV Continuous <Continuous>  dextrose 50% Injectable 25Gram(s) IV Push once  lactated ringers. 500milliLiter(s) IV Continuous <Continuous>    MEDICATIONS  (PRN):  HYDROmorphone  Injectable 0.5milliGRAM(s) IV Push every 4 hours PRN Severe Pain (7 - 10)  HYDROmorphone  Injectable 0.2milliGRAM(s) IV Push every 4 hours PRN Moderate Pain (4 - 6)  glucagon  Injectable 1milliGRAM(s) IntraMuscular once PRN Glucose LESS THAN 70 milligrams/deciliter  labetalol Injectable 10milliGRAM(s) IV Push every 1 hour PRN For SBP>160, Hold for HR<65  hydrALAZINE Injectable 10milliGRAM(s) IV Push every 1 hour PRN For SBP>160, hold for HR>100      Allergies    No Known Allergies    Intolerances        Vital Signs Last 24 Hrs  T(C): 37.1, Max: 37.1 (04-18 @ 06:05)  T(F): 98.8, Max: 98.8 (04-18 @ 06:05)  HR: 66 (54 - 72)  BP: 150/58 (124/59 - 177/64)  BP(mean): 92 (77 - 117)  RR: 54 (12 - 83)  SpO2: 96% (95% - 100%)  I & Os for 24h ending 04-18 @ 07:00  =============================================  IN: 1006 ml / OUT: 1410 ml / NET: -404 ml    I & Os for current day (as of 04-18 @ 10:30)  =============================================  IN: 300 ml / OUT: 100 ml / NET: 200 ml      PHYSICAL EXAM:    General:  in no acute distress  HEENT: MMM, conjunctiva and sclera clear  Gastrointestinal: flat, non-distended; dec.l bowel sounds; No hepatosplenomegaly wound with dressings.  midline  Skin: Warm and dry. No obvious rash    LABS:  ABG - ( 17 Apr 2017 17:06 )  pH: 7.32  /  pCO2: 39    /  pO2: 122   / HCO3: 20    / Base Excess: -6.1  /  SaO2: 98                  CBC Full  -  ( 18 Apr 2017 04:51 )  WBC Count : 21.4 K/uL  Hemoglobin : 11.5 g/dL  Hematocrit : 34.5 %  Platelet Count - Automated : 244 K/uL  Mean Cell Volume : 81.6 fL  Mean Cell Hemoglobin : 27.2 pg  Mean Cell Hemoglobin Concentration : 33.3 g/dL  Auto Neutrophil # : x  Auto Lymphocyte # : x  Auto Monocyte # : x  Auto Eosinophil # : x  Auto Basophil # : x  Auto Neutrophil % : x  Auto Lymphocyte % : x  Auto Monocyte % : x  Auto Eosinophil % : x  Auto Basophil % : x    04-18    143  |  111<H>  |  12  ----------------------------<  144<H>  4.1   |  21<L>  |  1.14    Ca    8.0<L>      18 Apr 2017 04:51  Phos  4.1     04-18  Mg     2.7     04-18      PT/INR - ( 18 Apr 2017 04:51 )   PT: 11.8 sec;   INR: 1.06          PTT - ( 17 Apr 2017 17:08 )  PTT:39.2 sec                  RADIOLOGY & ADDITIONAL STUDIES (The following images were personally reviewed):

## 2017-04-18 NOTE — PROGRESS NOTE ADULT - SUBJECTIVE AND OBJECTIVE BOX
HPI:  Pt is an 85 yo M former smoker, PMHx CAD s/p PCI x3 LCx (2013 @[facility]), HLD, BPH, depression, presented to cardiologist c/o worsening shortness of breath which has progressed over the past year. Patient states symptoms are similar to when he required his previous stents, and he can only walk about a block or less before having to stop secondary to shortness of breath. He states that sometimes getting dressed and walking around the house can also cause SOB. Of note, he also endorses a recent loss of appetite over the past month and non-bloody diarrhea x 1 week with associated 5lb weight loss. He denies any symptoms such as chest pain, LOC, dizziness, syncope, PND, orthopnea, LE edema, palpitations, asthma/COPD, recent viral illness or travel, abdominal pain, nausea/vomiting, hx of blood tranfusion/anemia. On EKG in office patient noticed to be sinus yasmine with IVCD. Pt recommended for Right and Left cardiac catheterization given history of multiple PCI and EKG abnormalities as well as possible echo/EP evaluation. (06 Apr 2017 16:15)    FAMILY HISTORY:  No pertinent family history in first degree relatives    MEDICATIONS  (STANDING):  buDESOnide 160 MICROgram(s)/formoterol 4.5 MICROgram(s) Inhaler 2Puff(s) Inhalation two times a day  ALBUTerol/ipratropium for Nebulization 3milliLiter(s) Nebulizer every 6 hours  ALBUTerol    90 MICROgram(s) HFA Inhaler 1Puff(s) Inhalation every 4 hours  tiotropium 18 MICROgram(s) Capsule 1Capsule(s) Inhalation daily  insulin lispro (HumaLOG) corrective regimen sliding scale  SubCutaneous Before meals and at bedtime  dextrose 5%. 1000milliLiter(s) IV Continuous <Continuous>  dextrose 50% Injectable 25Gram(s) IV Push once  lactated ringers. 500milliLiter(s) IV Continuous <Continuous>  heparin  Injectable 5000Unit(s) SubCutaneous every 12 hours  cefoTEtan  IVPB  IV Intermittent     MEDICATIONS  (PRN):  glucagon  Injectable 1milliGRAM(s) IntraMuscular once PRN Glucose LESS THAN 70 milligrams/deciliter  labetalol Injectable 10milliGRAM(s) IV Push every 1 hour PRN For SBP>160, Hold for HR<65  HYDROmorphone  Injectable 0.2milliGRAM(s) IV Push every 4 hours PRN Severe Pain (7 - 10)  acetaminophen  IVPB. 1000milliGRAM(s) IV Intermittent once PRN Moderate Pain (4 - 6)    Vital Signs Last 24 Hrs  T(C): 37.4, Max: 37.4 (04-18 @ 22:21)  T(F): 99.3, Max: 99.3 (04-18 @ 22:21)  HR: 72 (58 - 72)  BP: 162/93 (124/59 - 189/81)  BP(mean): 123 (77 - 123)  RR: 20 (12 - 83)  SpO2: 95% (89% - 99%)    Physical exam:    Overall impression  Lymphadenopathy  Liver  spleen    Labs:  CBC Full  -  ( 18 Apr 2017 04:51 )  WBC Count : 21.4 K/uL  Hemoglobin : 11.5 g/dL  Hematocrit : 34.5 %  Platelet Count - Automated : 244 K/uL  Mean Cell Volume : 81.6 fL  Mean Cell Hemoglobin : 27.2 pg  Mean Cell Hemoglobin Concentration : 33.3 g/dL  Auto Neutrophil # : x  Auto Lymphocyte # : x  Auto Monocyte # : x  Auto Eosinophil # : x  Auto Basophil # : x  Auto Neutrophil % : x  Auto Lymphocyte % : x  Auto Monocyte % : x  Auto Eosinophil % : x  Auto Basophil % : x    04-18    143  |  111<H>  |  12  ----------------------------<  144<H>  4.1   |  21<L>  |  1.14    Ca    8.0<L>      18 Apr 2017 04:51  Phos  4.1     04-18  Mg     2.7     04-18        Radiology:  HEALTH ISSUES - R/O PROBLEM Dx:      Assessmant / Problems  1)s/p right hemicolectomy   good clinically    Plan:  await surgical pathology    Thank you  Darlin Ta MD

## 2017-04-18 NOTE — PROGRESS NOTE ADULT - SUBJECTIVE AND OBJECTIVE BOX
INTERVAL HPI/OVERNIGHT EVENTS:    SURGERY ATTENDING    STATUS POST:  EXPLORATORY LAPAROTOMY , RIGHT RADICAL EXTENDED HEMICOLECTOMY FOR BLEEDING RIGHT COLON NEARLY OBSTRUCTING MASS WITH SIDE TO SIDE FUNCTIONAL END TO END ILEOCOLONIC ANASTOMOSIS.    POST OPERATIVE DAY #: 1    SUBJECTIVE:  Flatus: [ ] YES [X ] NO             Bowel Movement: [ ] YES [X ] NO  Pain (0-10):     3       Pain Control Adequate: [X ] YES [ ] NO  Nausea: [ ] YES [X ] NO            Vomiting: [ ] YES [X ] NO  Diarrhea: [ ] YES [X ] NO         Constipation: [ ] YES [X ] NO     Chest Pain: [ ] YES [X ] NO    SOB:  [ ] YES [X ] NO    MEDICATIONS  (STANDING):  buDESOnide 160 MICROgram(s)/formoterol 4.5 MICROgram(s) Inhaler 2Puff(s) Inhalation two times a day  ALBUTerol/ipratropium for Nebulization 3milliLiter(s) Nebulizer every 6 hours  ALBUTerol    90 MICROgram(s) HFA Inhaler 1Puff(s) Inhalation every 4 hours  tiotropium 18 MICROgram(s) Capsule 1Capsule(s) Inhalation daily  insulin lispro (HumaLOG) corrective regimen sliding scale  SubCutaneous Before meals and at bedtime  dextrose 5%. 1000milliLiter(s) IV Continuous <Continuous>  dextrose 50% Injectable 25Gram(s) IV Push once  lactated ringers. 500milliLiter(s) IV Continuous <Continuous>  heparin  Injectable 5000Unit(s) SubCutaneous every 12 hours  cefoTEtan  IVPB  IV Intermittent   cefoTEtan  IVPB 1Gram(s) IV Intermittent once    MEDICATIONS  (PRN):  glucagon  Injectable 1milliGRAM(s) IntraMuscular once PRN Glucose LESS THAN 70 milligrams/deciliter  labetalol Injectable 10milliGRAM(s) IV Push every 1 hour PRN For SBP>160, Hold for HR<65  hydrALAZINE Injectable 10milliGRAM(s) IV Push every 1 hour PRN For SBP>160, hold for HR>100  HYDROmorphone  Injectable 0.2milliGRAM(s) IV Push every 4 hours PRN Severe Pain (7 - 10)  acetaminophen  IVPB. 1000milliGRAM(s) IV Intermittent once PRN Moderate Pain (4 - 6)      Vital Signs Last 24 Hrs  T(C): 36.5, Max: 37.1 (04-18 @ 06:05)  T(F): 97.7, Max: 98.8 (04-18 @ 06:05)  HR: 66 (54 - 72)  BP: 160/81 (124/59 - 177/64)  BP(mean): 117 (77 - 117)  RR: 18 (12 - 83)  SpO2: 94% (89% - 100%)            I&O's Detail  I & Os for 24h ending 18 Apr 2017 07:00  =============================================  IN:    lactated ringers.: 600 ml    IV PiggyBack: 406 ml    Total IN: 1006 ml  ---------------------------------------------  OUT:    Indwelling Catheter - Urethral: 1160 ml    Voided: 250 ml    Total OUT: 1410 ml  ---------------------------------------------  Total NET: -404 ml    I & Os for current day (as of 18 Apr 2017 17:59)  =============================================  IN:    lactated ringers.: 240 ml    lactated ringers.: 200 ml    IV PiggyBack: 100 ml    Total IN: 540 ml  ---------------------------------------------  OUT:    Indwelling Catheter - Urethral: 440 ml    Total OUT: 440 ml  ---------------------------------------------  Total NET: 100 ml      LABS:                        11.5   21.4  )-----------( 244      ( 18 Apr 2017 04:51 )             34.5     04-18    143  |  111<H>  |  12  ----------------------------<  144<H>  4.1   |  21<L>  |  1.14    Ca    8.0<L>      18 Apr 2017 04:51  Phos  4.1     04-18  Mg     2.7     04-18      PT/INR - ( 18 Apr 2017 04:51 )   PT: 11.8 sec;   INR: 1.06          PTT - ( 17 Apr 2017 17:08 )  PTT:39.2 sec      RADIOLOGY & ADDITIONAL STUDIES:

## 2017-04-18 NOTE — PROGRESS NOTE ADULT - SUBJECTIVE AND OBJECTIVE BOX
Patient is a 86y old  Male who presents with a chief complaint of     HPI:  Pt is an 85 yo M former smoker, PMHx CAD s[facility] ([facility] HLD, BPH, depression, presented to cardiologist c/o worsening shortness of breath which has progressed over the past year. Patient states symptoms are similar to when he required his previous stents, and he can only walk about a block or less before having to stop secondary to shortness of breath. He states that sometimes getting dressed and walking around the house can also cause SOB. Of note, he also endorses a recent loss of appetite over the past month and non-bloody diarrhea x 1 week with associated 5lb weight loss. He denies any symptoms such as chest pain, LOC, dizziness, syncope, PND, orthopnea, LE edema, palpitations, asthma/COPD, recent viral illness or travel, abdominal pain, nausea/vomiting, hx of blood tranfusion/anemia. On EKG in office patient noticed to be sinus yasmine with IVCD. Pt recommended for Right and Left cardiac catheterization given history of multiple PCI and EKG abnormalities as well as possible echo/EP evaluation. (06 Apr 2017 16:15)    INTERVAL HPI/OVERNIGHT EVENTS:::CV stable, POD nu 1   doing well. to transfer to stepdown.  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  Tachypnea: Tachypnea  Colon cancer: Colon cancer  Blood in stool: Blood in stool  Bradycardia: Bradycardia  CKD (chronic kidney disease) stage 3, GFR 30-59 ml/min: CKD (chronic kidney disease) stage 3, GFR 30-59 ml/min  Hyperlipidemia: Hyperlipidemia  Anemia: Anemia  CAD (coronary artery disease): CAD (coronary artery disease)  SOB (shortness of breath): SOB (shortness of breath)          PAST MEDICAL & SURGICAL HISTORY:  CAD (coronary artery disease)  HLD (hyperlipidemia)  S/P cataract extraction          Consultant NOTE  REVIEWED  (   )    REVIEW OF SYSTEMS:  [x] As per HPI  CONSTITUTIONAL: No fever, weight loss, or fatigue  RESPIRATORY: No cough, wheezing, chills or hemoptysis; No Shortness of Breath  CARDIOVASCULAR: No chest pain, palpitations, dizziness, or leg swelling  GASTROINTESTINAL s/p surg  MUSCULOSKELETAL: No joint pain or swelling; No muscle, back, or extremity pain  PSYCH    awake, alert       [x] All others negative	  [ ] Unable to obtain          Vital Signs Last 24 Hrs  T(C): 37.1, Max: 37.1 (04-18 @ 06:05)  T(F): 98.8, Max: 98.8 (04-18 @ 06:05)  HR: 66 (54 - 72)  BP: 150/58 (124/59 - 177/64)  BP(mean): 92 (77 - 117)  RR: 54 (12 - 83)  SpO2: 96% (95% - 100%)      I & Os for 24h ending 04-18 @ 07:00  =============================================  IN: 1006 ml / OUT: 1410 ml / NET: -404 ml    I & Os for current day (as of 04-18 @ 09:13)  =============================================  IN: 300 ml / OUT: 100 ml / NET: 200 ml    PHYSICAL EXAMINATION:                                    (    )  NO CHANGE  Appearance: Normal	  HEENT:   Normal oral mucosa, PERRL, EOMI	  Neck: Supple, + JVD/ - JVD; Carotid Bruit   Cardiovascular: Normal S1 S2, No JVD, No murmurs,   Respiratory: Lungs clear to auscultation/Decreased Breath Sounds/No Rales, Rhonchi, Wheezing	  Gastrointestinal:  midline dressing  Skin: No rashes, No ecchymoses, No cyanosis  Extremities: Normal range of motion, No clubbing, cyanosis or edema  Vascular: Peripheral pulses palpable 2+ bilaterally  Neurologic: Non-focal  Psychiatry: A & O x 3, Mood & affect appropriate    buDESOnide 160 MICROgram(s)/formoterol 4.5 MICROgram(s) Inhaler 2Puff(s) Inhalation two times a day  ALBUTerol/ipratropium for Nebulization 3milliLiter(s) Nebulizer every 6 hours  ALBUTerol    90 MICROgram(s) HFA Inhaler 1Puff(s) Inhalation every 4 hours  tiotropium 18 MICROgram(s) Capsule 1Capsule(s) Inhalation daily  heparin  Injectable 5000Unit(s) SubCutaneous every 8 hours  HYDROmorphone  Injectable 0.5milliGRAM(s) IV Push every 4 hours PRN  HYDROmorphone  Injectable 0.2milliGRAM(s) IV Push every 4 hours PRN  lactated ringers. 1000milliLiter(s) IV Continuous <Continuous>  insulin lispro (HumaLOG) corrective regimen sliding scale  SubCutaneous Before meals and at bedtime  dextrose 5%. 1000milliLiter(s) IV Continuous <Continuous>  dextrose 50% Injectable 25Gram(s) IV Push once  glucagon  Injectable 1milliGRAM(s) IntraMuscular once PRN  labetalol Injectable 10milliGRAM(s) IV Push every 1 hour PRN  hydrALAZINE Injectable 10milliGRAM(s) IV Push every 1 hour PRN        PT/INR - ( 18 Apr 2017 04:51 )   PT: 11.8 sec;   INR: 1.06          PTT - ( 17 Apr 2017 17:08 )  PTT:39.2 sec                              11.5   21.4  )-----------( 244      ( 18 Apr 2017 04:51 )             34.5     04-18    143  |  111<H>  |  12  ----------------------------<  144<H>  4.1   |  21<L>  |  1.14    Ca    8.0<L>      18 Apr 2017 04:51  Phos  4.1     04-18  Mg     2.7     04-18        CAPILLARY BLOOD GLUCOSE  110 (17 Apr 2017 22:00)    Surgical Pathology Report (04.12.17 @ 09:50)    Surgical Pathology Report:   ACCESSION No:  [id number]    TIARA GOMEZ                  1        Surgical Final Report          Final Diagnosis    1. Ascending colon mass, biopsy:  - Tubulovillous adenoma.    Conor Rodriguez MD  (Electronic Signature)  Reported on: 04/13/17    Clinical History  Patient is a 86-year-old male with history of anemia    Specimen(s) Submitted  1. Ascending colon mass    Gross Description  The specimen is received in formalin, labeled with the patient's  identification and "ascending colon mass." It consists of  multiple irregular fragments of tan-pink, soft tissue ranging  from 0.1-0.3 cm in greatest dimension. The specimen is entirely  submitted as, 1A.  EF 04/12/17 12:34

## 2017-04-18 NOTE — PROGRESS NOTE ADULT - SUBJECTIVE AND OBJECTIVE BOX
Chief Complaint/Reason for Consult: CAD  INTERVAL HPI: no sob palp cp, +abd pain at surgical site  	  MEDICATIONS:  labetalol Injectable 10milliGRAM(s) IV Push every 1 hour PRN  hydrALAZINE Injectable 10milliGRAM(s) IV Push every 1 hour PRN      buDESOnide 160 MICROgram(s)/formoterol 4.5 MICROgram(s) Inhaler 2Puff(s) Inhalation two times a day  ALBUTerol/ipratropium for Nebulization 3milliLiter(s) Nebulizer every 6 hours  ALBUTerol    90 MICROgram(s) HFA Inhaler 1Puff(s) Inhalation every 4 hours  tiotropium 18 MICROgram(s) Capsule 1Capsule(s) Inhalation daily    HYDROmorphone  Injectable 0.5milliGRAM(s) IV Push every 4 hours PRN  HYDROmorphone  Injectable 0.2milliGRAM(s) IV Push every 4 hours PRN      insulin lispro (HumaLOG) corrective regimen sliding scale  SubCutaneous Before meals and at bedtime  dextrose 50% Injectable 25Gram(s) IV Push once  glucagon  Injectable 1milliGRAM(s) IntraMuscular once PRN    heparin  Injectable 5000Unit(s) SubCutaneous every 8 hours  dextrose 5%. 1000milliLiter(s) IV Continuous <Continuous>  lactated ringers. 500milliLiter(s) IV Continuous <Continuous>      REVIEW OF SYSTEMS:  [x] As per HPI  CONSTITUTIONAL: No fever, weight loss, or fatigue  RESPIRATORY: No cough, wheezing, chills or hemoptysis; No Shortness of Breath  CARDIOVASCULAR: No chest pain, palpitations, dizziness, or leg swelling  GASTROINTESTINAL: No abdominal or epigastric pain. No nausea, vomiting, or hematemesis; No diarrhea or constipation. No melena or hematochezia.  MUSCULOSKELETAL: No joint pain or swelling; No muscle, back, or extremity pain  [x] All others negative	  [ ] Unable to obtain    PHYSICAL EXAM:  T(C): 37.1, Max: 37.1 (04-18 @ 06:05)  HR: 66 (54 - 72)  BP: 150/58 (124/59 - 177/64)  RR: 54 (12 - 83)  SpO2: 96% (95% - 100%)  Wt(kg): --  I&O's Summary  I & Os for 24h ending 18 Apr 2017 07:00  =============================================  IN: 1006 ml / OUT: 1410 ml / NET: -404 ml    I & Os for current day (as of 18 Apr 2017 10:58)  =============================================  IN: 300 ml / OUT: 100 ml / NET: 200 ml        Appearance: Normal	  HEENT:   Normal oral mucosa  Cardiovascular: Normal S1 S2, No JVD, No murmurs, No edema  Respiratory: Lungs clear to auscultation	  Gastrointestinal:  Soft, Non-tender, + BS	  Extremities: Normal range of motion, No clubbing, cyanosis or edema  Vascular: Peripheral pulses palpable 2+ bilaterally    TELEMETRY: 	  NSR no bradycardia one PVC  ECG:    	  RADIOLOGY:   CXR:  CT:  US:    CARDIAC TESTING:  Echocardiogram:  Catheterization:  Stress Test:      LABS:	 	    CARDIAC MARKERS:                                  11.5   21.4  )-----------( 244      ( 18 Apr 2017 04:51 )             34.5     04-18    143  |  111<H>  |  12  ----------------------------<  144<H>  4.1   |  21<L>  |  1.14    Ca    8.0<L>      18 Apr 2017 04:51  Phos  4.1     04-18  Mg     2.7     04-18      proBNP:   Lipid Profile:   HgA1c:   TSH:     ASSESSMENT/PLAN: 	  Post op s complications  tele no acute findings  no chest pain no signs of cardiac decompensation  HTN/CAD - continue labateolol IVP for BP/HR mgmt, change backto home meds when tolerating PO

## 2017-04-18 NOTE — PROGRESS NOTE ADULT - SUBJECTIVE AND OBJECTIVE BOX
CC/ HPI 85 yo male former smoker, chronic obstructive pulmonary disease, CAD s/p PCI x3 LCx, 2013, found to have colon mass, status post right hemicolectomy, today without acute respiratory complaint.      PAST MEDICAL & SURGICAL HISTORY:  CAD (coronary artery disease)  HLD (hyperlipidemia)  S/P cataract extraction    SOCHX:  + tobacco,  -  alcohol    FMHX: FA/MO  - contributory     ROS reviewed below with positive findings marked (+) :  GEN:  fever, chills ENT: tracheostomy,   epistaxis,  sinusitis COR: +CAD, CHF,  HTN, dysrhythmia PUL: COPD, ILD, asthma, pneumonia GI: PEG, dysphagia, hemorrhage, other ELIZABETH: kidney disease, electrolyte disorder HEM:  anemia, thrombus, coagulopathy, cancer ENDO:  thyroid disease, diabetes mellitus CNS:  dementia, stroke, seizure, PSY:  depression, anxiety, other       MEDICATIONS  (STANDING):  buDESOnide 160 MICROgram(s)/formoterol 4.5 MICROgram(s) Inhaler 2Puff(s) Inhalation two times a day  ALBUTerol/ipratropium for Nebulization 3milliLiter(s) Nebulizer every 6 hours  ALBUTerol    90 MICROgram(s) HFA Inhaler 1Puff(s) Inhalation every 4 hours  tiotropium 18 MICROgram(s) Capsule 1Capsule(s) Inhalation daily  heparin  Injectable 5000Unit(s) SubCutaneous every 8 hours  lactated ringers. 1000milliLiter(s) IV Continuous <Continuous>  insulin lispro (HumaLOG) corrective regimen sliding scale  SubCutaneous Before meals and at bedtime  dextrose 5%. 1000milliLiter(s) IV Continuous <Continuous>  dextrose 50% Injectable 25Gram(s) IV Push once    MEDICATIONS  (PRN):  HYDROmorphone  Injectable 0.5milliGRAM(s) IV Push every 4 hours PRN Severe Pain (7 - 10)  HYDROmorphone  Injectable 0.2milliGRAM(s) IV Push every 4 hours PRN Moderate Pain (4 - 6)  glucagon  Injectable 1milliGRAM(s) IntraMuscular once PRN Glucose LESS THAN 70 milligrams/deciliter  labetalol Injectable 10milliGRAM(s) IV Push every 1 hour PRN For SBP>160, Hold for HR<65  hydrALAZINE Injectable 10milliGRAM(s) IV Push every 1 hour PRN For SBP>160, hold for HR>100      Vital Signs Last 24 Hrs  T(C): 37.1, Max: 37.1 (04-18 @ 06:05)  T(F): 98.8, Max: 98.8 (04-18 @ 06:05)  HR: 72 (54 - 76)  BP: 153/61 (124/59 - 177/64)  BP(mean): 92 (77 - 117)  RR: 83 (12 - 83)  SpO2: 96% (95% - 100%)    GENERAL:         comfortable,  - distress.  HEENT:            - trauma,  - icterus,  - injection,  - nasal discharge.  NECK:              - jugular venous distention, - thyromegaly.  LYMPH:           - lymphadenopathy, - masses.  RESP:              + crackles,   - rhonchi,   - wheezes.   COR:                S1S2 RRR  - murmurs,  - gallops,  - rubs.  ABD:                 soft, mild tender, dressing intact, - distended, - organomegaly.  EXT/MSC:         - cyanosis,  - clubbing,  - edema.    NEURO:             alert,   responds to stimuli.      ABG - ( 17 Apr 2017 17:06 )  pH: 7.32  /  pCO2: 39    /  pO2: 122   / HCO3: 20    / Base Excess: -6.1  /  SaO2: 98                              11.5   21.4  )-----------( 244      ( 18 Apr 2017 04:51 )             34.5     04-18    143  |  111<H>  |  12  ----------------------------<  144<H>  4.1   |  21<L>  |  1.14      Chest XR (4/17)  There may be a new left-sided pleural effusion as the diaphragm appears obscured. There are linear atelectasis at the right base.         CT chest (4/13) Negative for thoracic inlet or axillary adenopathy. There is a mildly enlarged prevascular lymph node measuring 1.6 cm, stable since 2013. Negative for hilar adenopathy. Evaluation of the lung parenchyma demonstrates biapical pleural-parenchymal scarring. There is mild centrilobular emphysematous change, localized the bilateral lung apices. There is a 3 mm subpleural nodule within the right lower lobe image 234, stable since 3/2013, and a 2 mm subpleural nodule within the left lower lobe (image 242), stable since 1/2014 for which follow-up is not required. There is a small diffuse small multifocal region of clustered branching tree-in-bud micronodular the within the subpleural right lower lobe, intervally improved since 1/2014, consistent with minimal bronchiolitis. There is a fibrotic/atelectatic opacity within the lingula. No endobronchial lesion. No pulmonary consolidation or mass.          ASSESSMENT/PLAN    1) Status post right hemicolectomy  2) Chronic obstructive pulmonary disease  3) Pulmonary nodules  4) Atelectasis/pleural effusion        Incentive spirometer encouraged  Pain management  Bronchodilators:  Spiriva daily, Atrovent/ albuterol q 4 – 6 hours as needed  Corticosteroids: Budesonide  Cardiac/HTN:  BP control  GI: Rx/ c PPI/H2B  Heme: Rx/VT prophylaxis  Discuss with medical team

## 2017-04-19 DIAGNOSIS — I25.10 ATHEROSCLEROTIC HEART DISEASE OF NATIVE CORONARY ARTERY WITHOUT ANGINA PECTORIS: ICD-10-CM

## 2017-04-19 LAB
ALBUMIN SERPL ELPH-MCNC: 2.3 G/DL — LOW (ref 3.4–5)
ALP SERPL-CCNC: 51 U/L — SIGNIFICANT CHANGE UP (ref 40–120)
ALT FLD-CCNC: 19 U/L — SIGNIFICANT CHANGE UP (ref 12–42)
ANION GAP SERPL CALC-SCNC: 11 MMOL/L — SIGNIFICANT CHANGE UP (ref 9–16)
AST SERPL-CCNC: 17 U/L — SIGNIFICANT CHANGE UP (ref 15–37)
BILIRUB SERPL-MCNC: 0.6 MG/DL — SIGNIFICANT CHANGE UP (ref 0.2–1.2)
BUN SERPL-MCNC: 15 MG/DL — SIGNIFICANT CHANGE UP (ref 7–23)
CALCIUM SERPL-MCNC: 8.2 MG/DL — LOW (ref 8.5–10.5)
CHLORIDE SERPL-SCNC: 109 MMOL/L — HIGH (ref 96–108)
CO2 SERPL-SCNC: 21 MMOL/L — LOW (ref 22–31)
CREAT SERPL-MCNC: 1.12 MG/DL — SIGNIFICANT CHANGE UP (ref 0.5–1.3)
GLUCOSE SERPL-MCNC: 89 MG/DL — SIGNIFICANT CHANGE UP (ref 70–99)
HCT VFR BLD CALC: 30.1 % — LOW (ref 39–50)
HCT VFR BLD CALC: 31.6 % — LOW (ref 39–50)
HGB BLD-MCNC: 9.5 G/DL — LOW (ref 13–17)
HGB BLD-MCNC: 9.9 G/DL — LOW (ref 13–17)
MAGNESIUM SERPL-MCNC: 2.1 MG/DL — SIGNIFICANT CHANGE UP (ref 1.6–2.4)
MCHC RBC-ENTMCNC: 26 PG — LOW (ref 27–34)
MCHC RBC-ENTMCNC: 26.2 PG — LOW (ref 27–34)
MCHC RBC-ENTMCNC: 31.3 G/DL — LOW (ref 32–36)
MCHC RBC-ENTMCNC: 31.6 G/DL — LOW (ref 32–36)
MCV RBC AUTO: 82.9 FL — SIGNIFICANT CHANGE UP (ref 80–100)
MCV RBC AUTO: 83.1 FL — SIGNIFICANT CHANGE UP (ref 80–100)
PHOSPHATE SERPL-MCNC: 1.9 MG/DL — LOW (ref 2.5–4.5)
PLATELET # BLD AUTO: 244 K/UL — SIGNIFICANT CHANGE UP (ref 150–400)
PLATELET # BLD AUTO: 276 K/UL — SIGNIFICANT CHANGE UP (ref 150–400)
POTASSIUM SERPL-MCNC: 4 MMOL/L — SIGNIFICANT CHANGE UP (ref 3.5–5.3)
POTASSIUM SERPL-SCNC: 4 MMOL/L — SIGNIFICANT CHANGE UP (ref 3.5–5.3)
PREALB SERPL-MCNC: 12 MG/DL — LOW (ref 20–40)
PROT SERPL-MCNC: 5.2 G/DL — LOW (ref 6.4–8.2)
RBC # BLD: 3.62 M/UL — LOW (ref 4.2–5.8)
RBC # BLD: 3.81 M/UL — LOW (ref 4.2–5.8)
RBC # FLD: 16.6 % — SIGNIFICANT CHANGE UP (ref 10.3–16.9)
RBC # FLD: 16.6 % — SIGNIFICANT CHANGE UP (ref 10.3–16.9)
SODIUM SERPL-SCNC: 141 MMOL/L — SIGNIFICANT CHANGE UP (ref 135–145)
WBC # BLD: 12.7 K/UL — HIGH (ref 3.8–10.5)
WBC # BLD: 12.9 K/UL — HIGH (ref 3.8–10.5)
WBC # FLD AUTO: 12.7 K/UL — HIGH (ref 3.8–10.5)
WBC # FLD AUTO: 12.9 K/UL — HIGH (ref 3.8–10.5)

## 2017-04-19 PROCEDURE — 99232 SBSQ HOSP IP/OBS MODERATE 35: CPT

## 2017-04-19 RX ORDER — POTASSIUM PHOSPHATE, MONOBASIC POTASSIUM PHOSPHATE, DIBASIC 236; 224 MG/ML; MG/ML
21 INJECTION, SOLUTION INTRAVENOUS ONCE
Qty: 0 | Refills: 0 | Status: COMPLETED | OUTPATIENT
Start: 2017-04-19 | End: 2017-04-19

## 2017-04-19 RX ORDER — PHYTONADIONE (VIT K1) 5 MG
5 TABLET ORAL ONCE
Qty: 0 | Refills: 0 | Status: COMPLETED | OUTPATIENT
Start: 2017-04-19 | End: 2017-04-19

## 2017-04-19 RX ORDER — DIPHENHYDRAMINE HCL 50 MG
25 CAPSULE ORAL AT BEDTIME
Qty: 0 | Refills: 0 | Status: DISCONTINUED | OUTPATIENT
Start: 2017-04-19 | End: 2017-04-23

## 2017-04-19 RX ORDER — HYDROMORPHONE HYDROCHLORIDE 2 MG/ML
0.5 INJECTION INTRAMUSCULAR; INTRAVENOUS; SUBCUTANEOUS AT BEDTIME
Qty: 0 | Refills: 0 | Status: DISCONTINUED | OUTPATIENT
Start: 2017-04-19 | End: 2017-04-21

## 2017-04-19 RX ADMIN — Medication 3 MILLILITER(S): at 06:21

## 2017-04-19 RX ADMIN — POTASSIUM PHOSPHATE, MONOBASIC POTASSIUM PHOSPHATE, DIBASIC 64.25 MILLIMOLE(S): 236; 224 INJECTION, SOLUTION INTRAVENOUS at 11:33

## 2017-04-19 RX ADMIN — HEPARIN SODIUM 5000 UNIT(S): 5000 INJECTION INTRAVENOUS; SUBCUTANEOUS at 06:21

## 2017-04-19 RX ADMIN — BUDESONIDE AND FORMOTEROL FUMARATE DIHYDRATE 2 PUFF(S): 160; 4.5 AEROSOL RESPIRATORY (INHALATION) at 10:43

## 2017-04-19 RX ADMIN — HYDROMORPHONE HYDROCHLORIDE 0.5 MILLIGRAM(S): 2 INJECTION INTRAMUSCULAR; INTRAVENOUS; SUBCUTANEOUS at 22:20

## 2017-04-19 RX ADMIN — Medication 3 MILLILITER(S): at 11:30

## 2017-04-19 RX ADMIN — Medication 25 MILLIGRAM(S): at 22:03

## 2017-04-19 RX ADMIN — SODIUM CHLORIDE 80 MILLILITER(S): 9 INJECTION, SOLUTION INTRAVENOUS at 06:10

## 2017-04-19 RX ADMIN — Medication 120 GRAM(S): at 10:37

## 2017-04-19 RX ADMIN — BUDESONIDE AND FORMOTEROL FUMARATE DIHYDRATE 2 PUFF(S): 160; 4.5 AEROSOL RESPIRATORY (INHALATION) at 22:04

## 2017-04-19 RX ADMIN — Medication 1000 MILLIGRAM(S): at 01:44

## 2017-04-19 RX ADMIN — HYDROMORPHONE HYDROCHLORIDE 0.5 MILLIGRAM(S): 2 INJECTION INTRAMUSCULAR; INTRAVENOUS; SUBCUTANEOUS at 22:03

## 2017-04-19 RX ADMIN — Medication 400 MILLIGRAM(S): at 01:16

## 2017-04-19 RX ADMIN — Medication 120 GRAM(S): at 22:03

## 2017-04-19 RX ADMIN — Medication 3 MILLILITER(S): at 16:42

## 2017-04-19 RX ADMIN — Medication 101 MILLIGRAM(S): at 17:33

## 2017-04-19 NOTE — PROGRESS NOTE ADULT - SUBJECTIVE AND OBJECTIVE BOX
O/N: Given benadryl per Dr. Headley , SBP'S in th 180's given hydralazine , dressing saturated chanegd   4/18: SDU, wound slightly oozy- reinforced O/N: Given benadryl per Dr. Headley , SBP'S in th 180's given hydralazine , dressing saturated chanegd   4/18: SDU, wound slightly oozy- reinforced    STATUS POST:  R hemicolectomy w/ ileocolonic anastomosis    POD#2     SUBJECTIVE: Patient w/o complaints. Patient seen and examined bedside by chief resident.    labetalol Injectable 10milliGRAM(s) IV Push every 1 hour PRN  heparin  Injectable 5000Unit(s) SubCutaneous every 12 hours  cefoTEtan  IVPB  IV Intermittent   cefoTEtan  IVPB 1Gram(s) IV Intermittent every 12 hours      Vital Signs Last 24 Hrs  T(C): 37.2, Max: 37.4 (04-18 @ 22:21)  T(F): 99, Max: 99.3 (04-18 @ 22:21)  HR: 68 (60 - 82)  BP: 154/67 (150/58 - 189/81)  BP(mean): 96 (96 - 123)  RR: 18 (15 - 72)  SpO2: 95% (89% - 99%)  I&O's Detail    I & Os for current day (as of 19 Apr 2017 07:07)  =============================================  IN:    lactated ringers.: 960 ml    lactated ringers.: 200 ml    IV PiggyBack: 150 ml    Total IN: 1310 ml  ---------------------------------------------  OUT:    Indwelling Catheter - Urethral: 1140 ml    Total OUT: 1140 ml  ---------------------------------------------  Total NET: 170 ml      General: NAD, resting comfortably in bed  C/V: NSR  Pulm: Slightly tachypni, nonlabored breathing, no resp distress  Abd: soft, nondistended, ATTP, incision with some oozing at the top, dressing changed at bedside  Extrem: WWP, no edema        LABS:                        11.5   21.4  )-----------( 244      ( 18 Apr 2017 04:51 )             34.5     04-18    143  |  111<H>  |  12  ----------------------------<  144<H>  4.1   |  21<L>  |  1.14    Ca    8.0<L>      18 Apr 2017 04:51  Phos  4.1     04-18  Mg     2.7     04-18      PT/INR - ( 18 Apr 2017 04:51 )   PT: 11.8 sec;   INR: 1.06          PTT - ( 17 Apr 2017 17:08 )  PTT:39.2 sec

## 2017-04-19 NOTE — PROGRESS NOTE ADULT - SUBJECTIVE AND OBJECTIVE BOX
Patient is a 86y old  Male who presents with a chief complaint of     HPI:  Pt is an 85 yo M former smoker, PMHx CAD s/p PCI x3 LCx (2013 @[facility]), HLD, BPH, depression, presented to cardiologist c/o worsening shortness of breath which has progressed over the past year. Patient states symptoms are similar to when he required his previous stents, and he can only walk about a block or less before having to stop secondary to shortness of breath. He states that sometimes getting dressed and walking around the house can also cause SOB. Of note, he also endorses a recent loss of appetite over the past month and non-bloody diarrhea x 1 week with associated 5lb weight loss. He denies any symptoms such as chest pain, LOC, dizziness, syncope, PND, orthopnea, LE edema, palpitations, asthma/COPD, recent viral illness or travel, abdominal pain, nausea/vomiting, hx of blood tranfusion/anemia. On EKG in office patient noticed to be sinus yasmine with IVCD. Pt recommended for Right and Left cardiac catheterization given history of multiple PCI and EKG abnormalities as well as possible echo/EP evaluation. (06 Apr 2017 16:15)    INTERVAL HPI/OVERNIGHT EVENTS:::doing well. 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  Tachypnea: Tachypnea  Colon cancer: Colon cancer  Blood in stool: Blood in stool  Bradycardia: Bradycardia  CKD (chronic kidney disease) stage 3, GFR 30-59 ml/min: CKD (chronic kidney disease) stage 3, GFR 30-59 ml/min  Hyperlipidemia: Hyperlipidemia  Anemia: Anemia  CAD (coronary artery disease): CAD (coronary artery disease)  SOB (shortness of breath): SOB (shortness of breath)          PAST MEDICAL & SURGICAL HISTORY:  CAD (coronary artery disease)  HLD (hyperlipidemia)  S/P cataract extraction        REVIEW OF SYSTEMS:  [x] As per HPI  CONSTITUTIONAL: No fever, weight loss, or fatigue  RESPIRATORY: No cough, wheezing, chills or hemoptysis; No Shortness of Breath  CARDIOVASCULAR: No chest pain, palpitations, dizziness, or leg swelling  GASTROINTESTINAL: No abdominal or epigastric pain. No nausea, vomiting, or hematemesis; No diarrhea or constipation. No melena or hematochezia.s/p surg  MUSCULOSKELETAL: No joint pain or swelling; No muscle, back, or extremity pain  PSYCH    awake, alert       [x] All others negative	  [ ] Unable to obtain          Vital Signs Last 24 Hrs  T(C): 37.4, Max: 37.4 (04-18 @ 22:21)  T(F): 99.4, Max: 99.4 (04-19 @ 09:50)  HR: 74 (66 - 82)  BP: 162/73 (152/69 - 189/81)  BP(mean): 105 (96 - 123)  RR: 18 (16 - 20)  SpO2: 93% (93% - 96%)      I & Os for 24h ending 04-19 @ 07:00  =============================================  IN: 1310 ml / OUT: 1140 ml / NET: 170 ml    I & Os for current day (as of 04-19 @ 14:46)  =============================================  IN: 0 ml / OUT: 500 ml / NET: -500 ml    PHYSICAL EXAMINATION:                                    ( ++   )  NO CHANGE  Appearance: Normal	  HEENT:   Normal oral mucosa, PERRL, EOMI	  Neck: Supple, + JVD/ - JVD; Carotid Bruit   Cardiovascular: Normal S1 S2, No JVD,    Respiratory: Lungs clear to auscultation/Decreased Breath Sounds/No Rales, Rhonchi, Wheezing	  Gastrointestinal:  Soft, Non-tender, + BS	scar  Skin: No rashes, No ecchymoses, No cyanosis  Extremities: Normal range of motion, No clubbing, cyanosis or edema  Vascular: Peripheral pulses palpable 2+ bilaterally  Neurologic: Non-focal  Psychiatry: A & O x 3, Mood & affect appropriate    buDESOnide 160 MICROgram(s)/formoterol 4.5 MICROgram(s) Inhaler 2Puff(s) Inhalation two times a day  ALBUTerol/ipratropium for Nebulization 3milliLiter(s) Nebulizer every 6 hours  ALBUTerol    90 MICROgram(s) HFA Inhaler 1Puff(s) Inhalation every 4 hours  tiotropium 18 MICROgram(s) Capsule 1Capsule(s) Inhalation daily  insulin lispro (HumaLOG) corrective regimen sliding scale  SubCutaneous Before meals and at bedtime  dextrose 5%. 1000milliLiter(s) IV Continuous <Continuous>  dextrose 50% Injectable 25Gram(s) IV Push once  glucagon  Injectable 1milliGRAM(s) IntraMuscular once PRN  labetalol Injectable 10milliGRAM(s) IV Push every 1 hour PRN  lactated ringers. 500milliLiter(s) IV Continuous <Continuous>  HYDROmorphone  Injectable 0.2milliGRAM(s) IV Push every 4 hours PRN  cefoTEtan  IVPB  IV Intermittent   cefoTEtan  IVPB 1Gram(s) IV Intermittent every 12 hours        PT/INR - ( 18 Apr 2017 04:51 )   PT: 11.8 sec;   INR: 1.06          PTT - ( 17 Apr 2017 17:08 )  PTT:39.2 sec                              9.5    12.9  )-----------( 276      ( 19 Apr 2017 13:28 )             30.1     04-19    141  |  109<H>  |  15  ----------------------------<  89  4.0   |  21<L>  |  1.12    Ca    8.2<L>      19 Apr 2017 07:29  Phos  1.9     04-19  Mg     2.1     04-19    TPro  5.2<L>  /  Alb  2.3<L>  /  TBili  0.6  /  DBili  x   /  AST  17  /  ALT  19  /  AlkPhos  51  04-19      CAPILLARY BLOOD GLUCOSE  97 (19 Apr 2017 11:21)  102 (19 Apr 2017 05:18)  114 (18 Apr 2017 20:16)  132 (18 Apr 2017 16:15)    Surgical Pathology Report (04.12.17 @ 09:50)    Surgical Pathology Report:   ACCESSION No:  [id number]    TIARA GOMEZ                  1        Surgical Final Report          Final Diagnosis    1. Ascending colon mass, biopsy:  - Tubulovillous adenoma.    Conor Rodriguez MD  (Electronic Signature)  Reported on: 04/13/17    Clinical History  Patient is a 86-year-old male with history of anemia    Specimen(s) Submitted  1. Ascending colon mass    Gross Description  The specimen is received in formalin, labeled with the patient's  identification and "ascending colon mass." It consists of  multiple irregular fragments of tan-pink, soft tissue ranging  from 0.1-0.3 cm in greatest dimension. The specimen is entirely  submitted as, 1A.  EF 04/12/17 12:34

## 2017-04-19 NOTE — PROGRESS NOTE ADULT - SUBJECTIVE AND OBJECTIVE BOX
CC/ HPI 85 yo male former smoker, chronic obstructive pulmonary disease, CAD s/p PCI x3 LCx, 2013, found to have colon mass, status post right hemicolectomy, today without acute respiratory complaint.      PAST MEDICAL & SURGICAL HISTORY:  CAD (coronary artery disease)  HLD (hyperlipidemia)  S/P cataract extraction    SOCHX:  + tobacco,  -  alcohol    FMHX: FA/MO  - contributory     ROS reviewed below with positive findings marked (+) :  GEN:  fever, chills ENT: tracheostomy,   epistaxis,  sinusitis COR: +CAD, CHF,  HTN, dysrhythmia PUL: COPD, ILD, asthma, pneumonia GI: PEG, dysphagia, hemorrhage, other ELIZABETH: kidney disease, electrolyte disorder HEM:  anemia, thrombus, coagulopathy, cancer ENDO:  thyroid disease, diabetes mellitus CNS:  dementia, stroke, seizure, PSY:  depression, anxiety, other      MEDICATIONS  (STANDING):  buDESOnide 160 MICROgram(s)/formoterol 4.5 MICROgram(s) Inhaler 2Puff(s) Inhalation two times a day  ALBUTerol/ipratropium for Nebulization 3milliLiter(s) Nebulizer every 6 hours  ALBUTerol    90 MICROgram(s) HFA Inhaler 1Puff(s) Inhalation every 4 hours  tiotropium 18 MICROgram(s) Capsule 1Capsule(s) Inhalation daily  insulin lispro (HumaLOG) corrective regimen sliding scale  SubCutaneous Before meals and at bedtime  dextrose 5%. 1000milliLiter(s) IV Continuous <Continuous>  dextrose 50% Injectable 25Gram(s) IV Push once  lactated ringers. 500milliLiter(s) IV Continuous <Continuous>  cefoTEtan  IVPB  IV Intermittent   cefoTEtan  IVPB 1Gram(s) IV Intermittent every 12 hours    MEDICATIONS  (PRN):  glucagon  Injectable 1milliGRAM(s) IntraMuscular once PRN Glucose LESS THAN 70 milligrams/deciliter  labetalol Injectable 10milliGRAM(s) IV Push every 1 hour PRN For SBP>160, Hold for HR<65  HYDROmorphone  Injectable 0.2milliGRAM(s) IV Push every 4 hours PRN Severe Pain (7 - 10)      Vital Signs Last 24 Hrs  T(C): 37.4, Max: 37.4 (04-18 @ 22:21)  T(F): 99.4, Max: 99.4 (04-19 @ 09:50)  HR: 72 (62 - 82)  BP: 169/76 (152/69 - 189/81)  BP(mean): 116 (96 - 123)  RR: 18 (16 - 20)  SpO2: 93% (92% - 99%)    GENERAL:         comfortable,  - distress.  HEENT:            - trauma,  - icterus,  - injection,  - nasal discharge.  NECK:              - jugular venous distention, - thyromegaly.  LYMPH:           - lymphadenopathy, - masses.  RESP:              + crackles,   - rhonchi,   - wheezes.   COR:                S1S2  - gallops,  - rubs.  ABD:                abdominal binder, bowel sounds,  soft, - tender, - distended.  EXT/MSC:         - cyanosis,  - edema.    NEURO:             alert,   responds to stimuli.                           9.9    12.7  )-----------( 244      ( 19 Apr 2017 07:29 )             31.6     Chest XR (4/17)  There may be a new left-sided pleural effusion as the diaphragm appears obscured. There are linear atelectasis at the right base.         CT chest (4/13) Negative for thoracic inlet or axillary adenopathy. There is a mildly enlarged prevascular lymph node measuring 1.6 cm, stable since 2013. Negative for hilar adenopathy. Evaluation of the lung parenchyma demonstrates biapical pleural-parenchymal scarring. There is mild centrilobular emphysematous change, localized the bilateral lung apices. There is a 3 mm subpleural nodule within the right lower lobe image 234, stable since 3/2013, and a 2 mm subpleural nodule within the left lower lobe (image 242), stable since 1/2014 for which follow-up is not required. There is a small diffuse small multifocal region of clustered branching tree-in-bud micronodular the within the subpleural right lower lobe, intervally improved since 1/2014, consistent with minimal bronchiolitis. There is a fibrotic/atelectatic opacity within the lingula. No endobronchial lesion. No pulmonary consolidation or mass.          ASSESSMENT/PLAN    1) Status post right hemicolectomy  2) Chronic obstructive pulmonary disease  3) Pulmonary nodules  4) Atelectasis/pleural effusion      Incentive spirometer encouraged  Pain management  Bronchodilators:  Spiriva daily, Atrovent/ albuterol q 4 – 6 hours as needed  Corticosteroids: Budesonide  Cardiac/HTN:  BP control  GI: Rx/ c PPI/H2B  Heme: Rx/VT prophylaxis  Discuss with medical team

## 2017-04-19 NOTE — PROGRESS NOTE ADULT - PROBLEM SELECTOR PROBLEM 4
Bradycardia
Bradycardia
Colon cancer
CAD (coronary artery disease)
Anemia
Anemia, unspecified type
Coronary artery disease, angina presence unspecified, unspecified vessel or lesion type, unspecified whether native or transplanted heart

## 2017-04-19 NOTE — PROGRESS NOTE ADULT - ASSESSMENT
87 yo male with CAD (stent x2 last 3 years ago), BPH, htn, hld, admitted originally for elective cardiac cath, found to be anemic, further work up reveild ascending colonic mass s/p R hemicolectomy 87 yo male with CAD (stent x2 last 3 years ago), BPH, htn, hld, admitted originally for elective cardiac cath, found to be anemic, further work up reveild ascending colonic mass s/p R hemicolectomy    NPO/IVF  IV abx  Espinoza  Pain/nausea control  DVT ppx  OOB/IS  f/u AM labs

## 2017-04-19 NOTE — PROGRESS NOTE ADULT - PROBLEM SELECTOR PROBLEM 1
SOB (shortness of breath)
Anemia
Anemia
Cheyne-Meeks breathing disorder
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
Tachypnea

## 2017-04-19 NOTE — PROGRESS NOTE ADULT - SUBJECTIVE AND OBJECTIVE BOX
INTERVAL HPI/OVERNIGHT EVENTS:    SURGERY ATTENDING    STATUS POST:   EXPLORATORY LAPAROTOMY , RIGHT RADICAL EXTENDED HEMICOLECTOMY FOR BLEEDING RIGHT COLON NEARLY OBSTRUCTING MASS WITH SIDE TO SIDE FUNCTIONAL END TO END ILEOCOLONIC ANASTOMOSIS.      POST OPERATIVE DAY #: 2    SUBJECTIVE:  Flatus: [ ] YES [X ] NO             Bowel Movement: [ ] YES [X ] NO  Pain (0-10):      3      Pain Control Adequate: [X ] YES [ ] NO  Nausea: [ ] YES [X ] NO            Vomiting: [ ] YES [X ] NO  Diarrhea: [ ] YES [X ] NO         Constipation: [ ] YES [X ] NO     Chest Pain: [ ] YES [X ] NO    SOB:  [ ] YES [X ] NO    MEDICATIONS  (STANDING):  buDESOnide 160 MICROgram(s)/formoterol 4.5 MICROgram(s) Inhaler 2Puff(s) Inhalation two times a day  ALBUTerol/ipratropium for Nebulization 3milliLiter(s) Nebulizer every 6 hours  ALBUTerol    90 MICROgram(s) HFA Inhaler 1Puff(s) Inhalation every 4 hours  tiotropium 18 MICROgram(s) Capsule 1Capsule(s) Inhalation daily  insulin lispro (HumaLOG) corrective regimen sliding scale  SubCutaneous Before meals and at bedtime  dextrose 5%. 1000milliLiter(s) IV Continuous <Continuous>  dextrose 50% Injectable 25Gram(s) IV Push once  lactated ringers. 500milliLiter(s) IV Continuous <Continuous>  cefoTEtan  IVPB  IV Intermittent   cefoTEtan  IVPB 1Gram(s) IV Intermittent every 12 hours    MEDICATIONS  (PRN):  glucagon  Injectable 1milliGRAM(s) IntraMuscular once PRN Glucose LESS THAN 70 milligrams/deciliter  labetalol Injectable 10milliGRAM(s) IV Push every 1 hour PRN For SBP>160, Hold for HR<65  HYDROmorphone  Injectable 0.2milliGRAM(s) IV Push every 4 hours PRN Severe Pain (7 - 10)  LORazepam   Injectable 1milliGRAM(s) IntraMuscular every 6 hours PRN Anxiety      Vital Signs Last 24 Hrs  T(C): 37.6, Max: 37.6 (04-19 @ 14:46)  T(F): 99.6, Max: 99.6 (04-19 @ 14:46)  HR: 74 (66 - 82)  BP: 162/73 (152/69 - 189/81)  BP(mean): 105 (96 - 123)  RR: 18 (16 - 20)  SpO2: 93% (93% - 96%)            I&O's Detail  I & Os for 24h ending 19 Apr 2017 07:00  =============================================  IN:    lactated ringers.: 960 ml    lactated ringers.: 200 ml    IV PiggyBack: 150 ml    Total IN: 1310 ml  ---------------------------------------------  OUT:    Indwelling Catheter - Urethral: 1140 ml    Total OUT: 1140 ml  ---------------------------------------------  Total NET: 170 ml    I & Os for current day (as of 19 Apr 2017 16:35)  =============================================  IN:    Total IN: 0 ml  ---------------------------------------------  OUT:    Indwelling Catheter - Urethral: 500 ml    Total OUT: 500 ml  ---------------------------------------------  Total NET: -500 ml      LABS:                        9.5    12.9  )-----------( 276      ( 19 Apr 2017 13:28 )             30.1     04-19    141  |  109<H>  |  15  ----------------------------<  89  4.0   |  21<L>  |  1.12    Ca    8.2<L>      19 Apr 2017 07:29  Phos  1.9     04-19  Mg     2.1     04-19    TPro  5.2<L>  /  Alb  2.3<L>  /  TBili  0.6  /  DBili  x   /  AST  17  /  ALT  19  /  AlkPhos  51  04-19    PT/INR - ( 18 Apr 2017 04:51 )   PT: 11.8 sec;   INR: 1.06          PTT - ( 17 Apr 2017 17:08 )  PTT:39.2 sec      RADIOLOGY & ADDITIONAL STUDIES:

## 2017-04-19 NOTE — PROGRESS NOTE ADULT - SUBJECTIVE AND OBJECTIVE BOX
Chief Complaint/Reason for Consult: CAD  INTERVAL HPI: eventsn oted for pain at surgical site adn HTN s/p IV hydralazine  	  MEDICATIONS:  labetalol Injectable 10milliGRAM(s) IV Push every 1 hour PRN    cefoTEtan  IVPB  IV Intermittent   cefoTEtan  IVPB 1Gram(s) IV Intermittent every 12 hours    buDESOnide 160 MICROgram(s)/formoterol 4.5 MICROgram(s) Inhaler 2Puff(s) Inhalation two times a day  ALBUTerol/ipratropium for Nebulization 3milliLiter(s) Nebulizer every 6 hours  ALBUTerol    90 MICROgram(s) HFA Inhaler 1Puff(s) Inhalation every 4 hours  tiotropium 18 MICROgram(s) Capsule 1Capsule(s) Inhalation daily    HYDROmorphone  Injectable 0.2milliGRAM(s) IV Push every 4 hours PRN      insulin lispro (HumaLOG) corrective regimen sliding scale  SubCutaneous Before meals and at bedtime  dextrose 50% Injectable 25Gram(s) IV Push once  glucagon  Injectable 1milliGRAM(s) IntraMuscular once PRN    dextrose 5%. 1000milliLiter(s) IV Continuous <Continuous>  lactated ringers. 500milliLiter(s) IV Continuous <Continuous>      REVIEW OF SYSTEMS:  [x] As per HPI  CONSTITUTIONAL: No fever, weight loss, or fatigue  RESPIRATORY: No cough, wheezing, chills or hemoptysis; No Shortness of Breath  CARDIOVASCULAR: No chest pain, palpitations, dizziness, or leg swelling  GASTROINTESTINAL: No abdominal or epigastric pain. No nausea, vomiting, or hematemesis; No diarrhea or constipation. No melena or hematochezia.  MUSCULOSKELETAL: No joint pain or swelling; No muscle, back, or extremity pain  [x] All others negative	  [ ] Unable to obtain    PHYSICAL EXAM:  T(C): 37.6, Max: 37.6 (04-19 @ 14:46)  HR: 74 (66 - 82)  BP: 162/73 (152/69 - 189/81)  RR: 18 (16 - 20)  SpO2: 93% (93% - 96%)  Wt(kg): --  I&O's Summary  I & Os for 24h ending 19 Apr 2017 07:00  =============================================  IN: 1310 ml / OUT: 1140 ml / NET: 170 ml    I & Os for current day (as of 19 Apr 2017 16:20)  =============================================  IN: 0 ml / OUT: 500 ml / NET: -500 ml        Appearance: Normal	  HEENT:   Normal oral mucosa  Cardiovascular: Normal S1 S2, No JVD, No murmurs, No edema  Respiratory: Lungs clear to auscultation	  Gastrointestinal:  Soft, Non-tender, + BS	  Extremities: Normal range of motion, No clubbing, cyanosis or edema  Vascular: Peripheral pulses palpable 2+ bilaterally    TELEMETRY: 	    ECG:    	  RADIOLOGY:   CXR:  CT:  US:    CARDIAC TESTING:  Echocardiogram:  Catheterization:  Stress Test:      LABS:	 	    CARDIAC MARKERS:                                  9.5    12.9  )-----------( 276      ( 19 Apr 2017 13:28 )             30.1     04-19    141  |  109<H>  |  15  ----------------------------<  89  4.0   |  21<L>  |  1.12    Ca    8.2<L>      19 Apr 2017 07:29  Phos  1.9     04-19  Mg     2.1     04-19    TPro  5.2<L>  /  Alb  2.3<L>  /  TBili  0.6  /  DBili  x   /  AST  17  /  ALT  19  /  AlkPhos  51  04-19    proBNP:   Lipid Profile:   HgA1c:   TSH:     ASSESSMENT/PLAN: 	  HTN - SBP cuirrently 160s, would encourage better pain control, prn labaelolol and hyrdalazine only if SBP>200.

## 2017-04-19 NOTE — PROGRESS NOTE ADULT - PROBLEM SELECTOR PROBLEM 2
CAD (coronary artery disease)
Blood in stool
Blood in stool
Cheyne-Meeks breathing disorder
Malignant neoplasm of colon, unspecified part of colon
Malignant neoplasm of colon, unspecified part of colon

## 2017-04-19 NOTE — PROGRESS NOTE ADULT - SUBJECTIVE AND OBJECTIVE BOX
HPI:  Pt is an 87 yo M former smoker, PMHx CAD s/p PCI x3 LCx (2013 @[facility]), HLD, BPH, depression, presented to cardiologist c/o worsening shortness of breath which has progressed over the past year. Patient states symptoms are similar to when he required his previous stents, and he can only walk about a block or less before having to stop secondary to shortness of breath. He states that sometimes getting dressed and walking around the house can also cause SOB. Of note, he also endorses a recent loss of appetite over the past month and non-bloody diarrhea x 1 week with associated 5lb weight loss. He denies any symptoms such as chest pain, LOC, dizziness, syncope, PND, orthopnea, LE edema, palpitations, asthma/COPD, recent viral illness or travel, abdominal pain, nausea/vomiting, hx of blood tranfusion/anemia. On EKG in office patient noticed to be sinus yasmine with IVCD. Pt recommended for Right and Left cardiac catheterization given history of multiple PCI and EKG abnormalities as well as possible echo/EP evaluation. (06 Apr 2017 16:15)    FAMILY HISTORY:  No pertinent family history in first degree relatives    MEDICATIONS  (STANDING):  buDESOnide 160 MICROgram(s)/formoterol 4.5 MICROgram(s) Inhaler 2Puff(s) Inhalation two times a day  ALBUTerol/ipratropium for Nebulization 3milliLiter(s) Nebulizer every 6 hours  ALBUTerol    90 MICROgram(s) HFA Inhaler 1Puff(s) Inhalation every 4 hours  tiotropium 18 MICROgram(s) Capsule 1Capsule(s) Inhalation daily  insulin lispro (HumaLOG) corrective regimen sliding scale  SubCutaneous Before meals and at bedtime  dextrose 5%. 1000milliLiter(s) IV Continuous <Continuous>  dextrose 50% Injectable 25Gram(s) IV Push once  lactated ringers. 500milliLiter(s) IV Continuous <Continuous>  cefoTEtan  IVPB  IV Intermittent   cefoTEtan  IVPB 1Gram(s) IV Intermittent every 12 hours    MEDICATIONS  (PRN):  glucagon  Injectable 1milliGRAM(s) IntraMuscular once PRN Glucose LESS THAN 70 milligrams/deciliter  labetalol Injectable 10milliGRAM(s) IV Push every 1 hour PRN For SBP>160, Hold for HR<65  HYDROmorphone  Injectable 0.2milliGRAM(s) IV Push every 4 hours PRN Severe Pain (7 - 10)    Vital Signs Last 24 Hrs  T(C): 37.6, Max: 37.6 (04-19 @ 14:46)  T(F): 99.6, Max: 99.6 (04-19 @ 14:46)  HR: 74 (66 - 82)  BP: 162/73 (152/69 - 189/81)  BP(mean): 105 (96 - 123)  RR: 18 (16 - 20)  SpO2: 93% (93% - 96%)    Physical exam:    Overall impression  Lymphadenopathy  Liver  spleen    Labs:  CBC Full  -  ( 19 Apr 2017 13:28 )  WBC Count : 12.9 K/uL  Hemoglobin : 9.5 g/dL  Hematocrit : 30.1 %  Platelet Count - Automated : 276 K/uL  Mean Cell Volume : 83.1 fL  Mean Cell Hemoglobin : 26.2 pg  Mean Cell Hemoglobin Concentration : 31.6 g/dL  Auto Neutrophil # : x  Auto Lymphocyte # : x  Auto Monocyte # : x  Auto Eosinophil # : x  Auto Basophil # : x  Auto Neutrophil % : x  Auto Lymphocyte % : x  Auto Monocyte % : x  Auto Eosinophil % : x  Auto Basophil % : x    04-19    141  |  109<H>  |  15  ----------------------------<  89  4.0   |  21<L>  |  1.12    Ca    8.2<L>      19 Apr 2017 07:29  Phos  1.9     04-19  Mg     2.1     04-19    TPro  5.2<L>  /  Alb  2.3<L>  /  TBili  0.6  /  DBili  x   /  AST  17  /  ALT  19  /  AlkPhos  51  04-19      Radiology:  HEALTH ISSUES - R/O PROBLEM Dx:      Assessmant / Problems  1 s/p right colectomy for ascending colon mass  2 anemia 2/2 chronic disease and gi bleeding stable now    Plan:  1 surgical pathology result  2 monitor CBC    Thank you  Darlin Ta MD

## 2017-04-19 NOTE — PROGRESS NOTE ADULT - SUBJECTIVE AND OBJECTIVE BOX
Pt seen and examined no bm no flatus    REVIEW OF SYSTEMS:  Constitutional: No fever,   Cardiovascular: No chest pain, palpitations, dizziness or leg swelling  Gastrointestinal: No abdominal or epigastric pain. No nausea, vomiting or hematemesis; No BM. No melena or hematochezia.  Skin: No itching, burning, rashes or lesions       MEDICATIONS:  MEDICATIONS  (STANDING):  buDESOnide 160 MICROgram(s)/formoterol 4.5 MICROgram(s) Inhaler 2Puff(s) Inhalation two times a day  ALBUTerol/ipratropium for Nebulization 3milliLiter(s) Nebulizer every 6 hours  ALBUTerol    90 MICROgram(s) HFA Inhaler 1Puff(s) Inhalation every 4 hours  tiotropium 18 MICROgram(s) Capsule 1Capsule(s) Inhalation daily  insulin lispro (HumaLOG) corrective regimen sliding scale  SubCutaneous Before meals and at bedtime  dextrose 5%. 1000milliLiter(s) IV Continuous <Continuous>  dextrose 50% Injectable 25Gram(s) IV Push once  lactated ringers. 500milliLiter(s) IV Continuous <Continuous>  cefoTEtan  IVPB  IV Intermittent   cefoTEtan  IVPB 1Gram(s) IV Intermittent every 12 hours  potassium phosphate IVPB 21milliMole(s) IV Intermittent once  phytonadione  IVPB 5milliGRAM(s) IV Intermittent once    MEDICATIONS  (PRN):  glucagon  Injectable 1milliGRAM(s) IntraMuscular once PRN Glucose LESS THAN 70 milligrams/deciliter  labetalol Injectable 10milliGRAM(s) IV Push every 1 hour PRN For SBP>160, Hold for HR<65  HYDROmorphone  Injectable 0.2milliGRAM(s) IV Push every 4 hours PRN Severe Pain (7 - 10)      Allergies    No Known Allergies    Intolerances        Vital Signs Last 24 Hrs  T(C): 37.4, Max: 37.4 (04-18 @ 22:21)  T(F): 99.4, Max: 99.4 (04-19 @ 09:50)  HR: 76 (62 - 82)  BP: 165/83 (152/69 - 189/81)  BP(mean): 116 (96 - 123)  RR: 18 (16 - 34)  SpO2: 94% (89% - 99%)  I & Os for 24h ending 04-19 @ 07:00  =============================================  IN: 1310 ml / OUT: 1140 ml / NET: 170 ml    I & Os for current day (as of 04-19 @ 10:35)  =============================================  IN: 0 ml / OUT: 250 ml / NET: -250 ml      PHYSICAL EXAM:    General: ; in no acute distress  HEENT: MMM, conjunctiva and sclera clear  Gastrointestinal: Soft flat, binder on, decreased BS  Skin: Warm and dry. No obvious rash    LABS:  ABG - ( 17 Apr 2017 17:06 )  pH: 7.32  /  pCO2: 39    /  pO2: 122   / HCO3: 20    / Base Excess: -6.1  /  SaO2: 98                  CBC Full  -  ( 19 Apr 2017 07:29 )  WBC Count : 12.7 K/uL  Hemoglobin : 9.9 g/dL  Hematocrit : 31.6 %  Platelet Count - Automated : 244 K/uL  Mean Cell Volume : 82.9 fL  Mean Cell Hemoglobin : 26.0 pg  Mean Cell Hemoglobin Concentration : 31.3 g/dL  Auto Neutrophil # : x  Auto Lymphocyte # : x  Auto Monocyte # : x  Auto Eosinophil # : x  Auto Basophil # : x  Auto Neutrophil % : x  Auto Lymphocyte % : x  Auto Monocyte % : x  Auto Eosinophil % : x  Auto Basophil % : x    04-19    141  |  109<H>  |  15  ----------------------------<  89  4.0   |  21<L>  |  1.12    Ca    8.2<L>      19 Apr 2017 07:29  Phos  1.9     04-19  Mg     2.1     04-19    TPro  5.2<L>  /  Alb  2.3<L>  /  TBili  0.6  /  DBili  x   /  AST  17  /  ALT  19  /  AlkPhos  51  04-19    PT/INR - ( 18 Apr 2017 04:51 )   PT: 11.8 sec;   INR: 1.06          PTT - ( 17 Apr 2017 17:08 )  PTT:39.2 sec                  RADIOLOGY & ADDITIONAL STUDIES (The following images were personally reviewed):

## 2017-04-19 NOTE — PROGRESS NOTE ADULT - PROBLEM SELECTOR PROBLEM 3
Anemia
CKD (chronic kidney disease) stage 3, GFR 30-59 ml/min
CKD (chronic kidney disease) stage 3, GFR 30-59 ml/min
Anemia, unspecified type
CAD (coronary artery disease)
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

## 2017-04-20 LAB
ANION GAP SERPL CALC-SCNC: 12 MMOL/L — SIGNIFICANT CHANGE UP (ref 9–16)
BUN SERPL-MCNC: 15 MG/DL — SIGNIFICANT CHANGE UP (ref 7–23)
CALCIUM SERPL-MCNC: 8.4 MG/DL — LOW (ref 8.5–10.5)
CHLORIDE SERPL-SCNC: 107 MMOL/L — SIGNIFICANT CHANGE UP (ref 96–108)
CO2 SERPL-SCNC: 21 MMOL/L — LOW (ref 22–31)
CREAT SERPL-MCNC: 1 MG/DL — SIGNIFICANT CHANGE UP (ref 0.5–1.3)
GLUCOSE SERPL-MCNC: 78 MG/DL — SIGNIFICANT CHANGE UP (ref 70–99)
HCT VFR BLD CALC: 30.8 % — LOW (ref 39–50)
HGB BLD-MCNC: 9.8 G/DL — LOW (ref 13–17)
MAGNESIUM SERPL-MCNC: 2 MG/DL — SIGNIFICANT CHANGE UP (ref 1.6–2.4)
MCHC RBC-ENTMCNC: 26.8 PG — LOW (ref 27–34)
MCHC RBC-ENTMCNC: 31.8 G/DL — LOW (ref 32–36)
MCV RBC AUTO: 84.4 FL — SIGNIFICANT CHANGE UP (ref 80–100)
PHOSPHATE SERPL-MCNC: 2.5 MG/DL — SIGNIFICANT CHANGE UP (ref 2.5–4.5)
PLATELET # BLD AUTO: 255 K/UL — SIGNIFICANT CHANGE UP (ref 150–400)
POTASSIUM SERPL-MCNC: 4.2 MMOL/L — SIGNIFICANT CHANGE UP (ref 3.5–5.3)
POTASSIUM SERPL-SCNC: 4.2 MMOL/L — SIGNIFICANT CHANGE UP (ref 3.5–5.3)
RBC # BLD: 3.65 M/UL — LOW (ref 4.2–5.8)
RBC # FLD: 16.6 % — SIGNIFICANT CHANGE UP (ref 10.3–16.9)
SODIUM SERPL-SCNC: 140 MMOL/L — SIGNIFICANT CHANGE UP (ref 135–145)
SURGICAL PATHOLOGY STUDY: SIGNIFICANT CHANGE UP
SURGICAL PATHOLOGY STUDY: SIGNIFICANT CHANGE UP
WBC # BLD: 11.6 K/UL — HIGH (ref 3.8–10.5)
WBC # FLD AUTO: 11.6 K/UL — HIGH (ref 3.8–10.5)

## 2017-04-20 PROCEDURE — 99232 SBSQ HOSP IP/OBS MODERATE 35: CPT

## 2017-04-20 RX ORDER — SODIUM FERRIC GLUCONAT/SUCROSE 62.5MG/5ML
100 AMPUL (ML) INTRAVENOUS ONCE
Qty: 0 | Refills: 0 | Status: COMPLETED | OUTPATIENT
Start: 2017-04-20 | End: 2017-04-20

## 2017-04-20 RX ORDER — SODIUM FERRIC GLUCONAT/SUCROSE 62.5MG/5ML
125 AMPUL (ML) INTRAVENOUS DAILY
Qty: 0 | Refills: 0 | Status: DISCONTINUED | OUTPATIENT
Start: 2017-04-20 | End: 2017-04-20

## 2017-04-20 RX ORDER — SODIUM FERRIC GLUCONAT/SUCROSE 62.5MG/5ML
25 AMPUL (ML) INTRAVENOUS ONCE
Qty: 0 | Refills: 0 | Status: COMPLETED | OUTPATIENT
Start: 2017-04-20 | End: 2017-04-20

## 2017-04-20 RX ADMIN — Medication 104 MILLIGRAM(S): at 21:28

## 2017-04-20 RX ADMIN — Medication 3 MILLILITER(S): at 18:19

## 2017-04-20 RX ADMIN — Medication 3 MILLILITER(S): at 12:06

## 2017-04-20 RX ADMIN — Medication 3 MILLILITER(S): at 00:23

## 2017-04-20 RX ADMIN — HYDROMORPHONE HYDROCHLORIDE 0.5 MILLIGRAM(S): 2 INJECTION INTRAMUSCULAR; INTRAVENOUS; SUBCUTANEOUS at 21:28

## 2017-04-20 RX ADMIN — BUDESONIDE AND FORMOTEROL FUMARATE DIHYDRATE 2 PUFF(S): 160; 4.5 AEROSOL RESPIRATORY (INHALATION) at 21:28

## 2017-04-20 RX ADMIN — Medication 120 GRAM(S): at 18:19

## 2017-04-20 RX ADMIN — Medication 3 MILLILITER(S): at 06:49

## 2017-04-20 RX ADMIN — Medication 25 MILLIGRAM(S): at 21:28

## 2017-04-20 RX ADMIN — BUDESONIDE AND FORMOTEROL FUMARATE DIHYDRATE 2 PUFF(S): 160; 4.5 AEROSOL RESPIRATORY (INHALATION) at 12:06

## 2017-04-20 RX ADMIN — Medication 108 MILLIGRAM(S): at 23:27

## 2017-04-20 RX ADMIN — HYDROMORPHONE HYDROCHLORIDE 0.5 MILLIGRAM(S): 2 INJECTION INTRAMUSCULAR; INTRAVENOUS; SUBCUTANEOUS at 22:28

## 2017-04-20 RX ADMIN — Medication 120 GRAM(S): at 06:49

## 2017-04-20 NOTE — PROGRESS NOTE ADULT - SUBJECTIVE AND OBJECTIVE BOX
O/N: VSS , RONN , Afebrile , -flatus/-bm  4/19: wound oozy, dressing changed x2. 1U platelets given, ABF    STATUS POST:  R hemicolectomy with ileocolonic anastomosis      POST OPERATIVE DAY #:3 O/N: VSS , RONN , Afebrile , -flatus/-bm  4/19: wound oozy, dressing changed x2. 1U platelets given, ABF    STATUS POST:  R hemicolectomy with ileocolonic anastomosis      POST OPERATIVE DAY #:3  SUBJECTIVE: Pt seen and examined at bedside. No overnight events.  Pain controlled. No f/c/n/v. -BM/-Flatus    Vital Signs Last 24 Hrs  T(C): 37.1, Max: 37.6 (04-19 @ 14:46)  T(F): 98.7, Max: 99.6 (04-19 @ 14:46)  HR: 64 (64 - 80)  BP: 166/70 (144/67 - 169/76)  BP(mean): 101 (96 - 116)  RR: 18 (16 - 18)  SpO2: 94% (92% - 94%)    PHYSICAL EXAM    Gen: NAD  CV: RRR  Pulm: CTABL  Abd: Soft, nondistended, midline incision clean, dry, staples in place, abdominal binder  Ext: WWP    I&O's Detail  I & Os for 24h ending 19 Apr 2017 07:00  =============================================  IN:    lactated ringers.: 960 ml    lactated ringers.: 200 ml    IV PiggyBack: 150 ml    Total IN: 1310 ml  ---------------------------------------------  OUT:    Indwelling Catheter - Urethral: 1140 ml    Total OUT: 1140 ml  ---------------------------------------------  Total NET: 170 ml    I & Os for current day (as of 20 Apr 2017 06:56)  =============================================  IN:    Total IN: 0 ml  ---------------------------------------------  OUT:    Indwelling Catheter - Urethral: 1350 ml    Total OUT: 1350 ml  ---------------------------------------------  Total NET: -1350 ml      LABS:                        9.5    12.9  )-----------( 276      ( 19 Apr 2017 13:28 )             30.1     04-19    141  |  109<H>  |  15  ----------------------------<  89  4.0   |  21<L>  |  1.12    Ca    8.2<L>      19 Apr 2017 07:29  Phos  1.9     04-19  Mg     2.1     04-19    TPro  5.2<L>  /  Alb  2.3<L>  /  TBili  0.6  /  DBili  x   /  AST  17  /  ALT  19  /  AlkPhos  51  04-19          MEDICATIONS  (STANDING):  buDESOnide 160 MICROgram(s)/formoterol 4.5 MICROgram(s) Inhaler 2Puff(s) Inhalation two times a day  ALBUTerol/ipratropium for Nebulization 3milliLiter(s) Nebulizer every 6 hours  ALBUTerol    90 MICROgram(s) HFA Inhaler 1Puff(s) Inhalation every 4 hours  tiotropium 18 MICROgram(s) Capsule 1Capsule(s) Inhalation daily  insulin lispro (HumaLOG) corrective regimen sliding scale  SubCutaneous Before meals and at bedtime  dextrose 5%. 1000milliLiter(s) IV Continuous <Continuous>  dextrose 50% Injectable 25Gram(s) IV Push once  lactated ringers. 500milliLiter(s) IV Continuous <Continuous>  cefoTEtan  IVPB  IV Intermittent   cefoTEtan  IVPB 1Gram(s) IV Intermittent every 12 hours  diphenhydrAMINE   Injectable 25milliGRAM(s) IV Push at bedtime  HYDROmorphone  Injectable 0.5milliGRAM(s) IV Push at bedtime    MEDICATIONS  (PRN):  glucagon  Injectable 1milliGRAM(s) IntraMuscular once PRN Glucose LESS THAN 70 milligrams/deciliter  labetalol Injectable 10milliGRAM(s) IV Push every 1 hour PRN For SBP>160, Hold for HR<65  HYDROmorphone  Injectable 0.2milliGRAM(s) IV Push every 4 hours PRN Severe Pain (7 - 10)  LORazepam   Injectable 1milliGRAM(s) IntraMuscular every 6 hours PRN Anxiety      RADIOLOGY & ADDITIONAL STUDIES:    ASSESSMENT AND PLAN

## 2017-04-20 NOTE — PROGRESS NOTE ADULT - SUBJECTIVE AND OBJECTIVE BOX
Patient is a 86y old  Male who presents with a chief complaint of     HPI:  Pt is an 85 yo M former smoker, PMHx CAD s/p PCI x3 LCx (2013 @[facility]), HLD, BPH, depression, presented to cardiologist c/o worsening shortness of breath which has progressed over the past year. Patient states symptoms are similar to when he required his previous stents, and he can only walk about a block or less before having to stop secondary to shortness of breath. He states that sometimes getting dressed and walking around the house can also cause SOB. Of note, he also endorses a recent loss of appetite over the past month and non-bloody diarrhea x 1 week with associated 5lb weight loss. He denies any symptoms such as chest pain, LOC, dizziness, syncope, PND, orthopnea, LE edema, palpitations, asthma/COPD, recent viral illness or travel, abdominal pain, nausea/vomiting, hx of blood tranfusion/anemia. On EKG in office patient noticed to be sinus yasmine with IVCD. Pt recommended for Right and Left cardiac catheterization given history of multiple PCI and EKG abnormalities as well as possible echo/EP evaluation. (06 Apr 2017 16:15)    INTERVAL HPI/OVERNIGHT EVENTS:::    HEALTH ISSUES - PROBLEM Dx:  Coronary artery disease, angina presence unspecified, unspecified vessel or lesion type, unspecified whether native or transplanted heart: Coronary artery disease, angina presence unspecified, unspecified vessel or lesion type, unspecified whether native or transplanted heart  Anemia, unspecified type: Anemia, unspecified type  Coronary artery disease with unstable angina pectoris, unspecified vessel or lesion type, unspecified whether native or transplanted heart: Coronary artery disease with unstable angina pectoris, unspecified vessel or lesion type, unspecified whether native or transplanted heart  Malignant neoplasm of colon, unspecified part of colon: Malignant neoplasm of colon, unspecified part of colon  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  Tachypnea: Tachypnea  Colon cancer: Colon cancer  Blood in stool: Blood in stool  Bradycardia: Bradycardia  CKD (chronic kidney disease) stage 3, GFR 30-59 ml/min: CKD (chronic kidney disease) stage 3, GFR 30-59 ml/min  Hyperlipidemia: Hyperlipidemia  Anemia: Anemia  CAD (coronary artery disease): CAD (coronary artery disease)  SOB (shortness of breath): SOB (shortness of breath)          PAST MEDICAL & SURGICAL HISTORY:  CAD (coronary artery disease)  HLD (hyperlipidemia)  S/P cataract extraction          Consultant NOTE  REVIEWED  (   )    REVIEW OF SYSTEMS:  [x] As per HPI  CONSTITUTIONAL: No fever, weight loss, or fatigue  RESPIRATORY: No cough, wheezing, chills or hemoptysis; No Shortness of Breath  CARDIOVASCULAR: No chest pain, palpitations, dizziness, or leg swelling  GASTROINTESTINAL: No abdominal or epigastric pain. No nausea, vomiting, or hematemesis; No diarrhea or constipation. No melena or hematochezia.  MUSCULOSKELETAL: No joint pain or swelling; No muscle, back, or extremity pain  PSYCH    awake, alert       [x] All others negative	  [ ] Unable to obtain          Vital Signs Last 24 Hrs  T(C): 36.9, Max: 37.6 (04-19 @ 14:46)  T(F): 98.4, Max: 99.6 (04-19 @ 14:46)  HR: 64 (64 - 80)  BP: 166/70 (144/67 - 169/76)  BP(mean): 101 (96 - 109)  RR: 18 (18 - 18)  SpO2: 94% (92% - 94%)        I & Os for current day (as of 04-20 @ 11:24)  =============================================  IN: 0 ml / OUT: 1350 ml / NET: -1350 ml    PHYSICAL EXAMINATION:                                    (    )  NO CHANGE  Appearance: Normal	  HEENT:   Normal oral mucosa, PERRL, EOMI	  Neck: Supple, + JVD/ - JVD; Carotid Bruit   Cardiovascular: Normal S1 S2, No JVD, No murmurs,   Respiratory: Lungs clear to auscultation/Decreased Breath Sounds/No Rales, Rhonchi, Wheezing	  Gastrointestinal:  Soft, Non-tender, + BS	  Skin: No rashes, No ecchymoses, No cyanosis  Extremities: Normal range of motion, No clubbing, cyanosis or edema  Vascular: Peripheral pulses palpable 2+ bilaterally  Neurologic: Non-focal  Psychiatry: A & O x 3, Mood & affect appropriate    buDESOnide 160 MICROgram(s)/formoterol 4.5 MICROgram(s) Inhaler 2Puff(s) Inhalation two times a day  ALBUTerol/ipratropium for Nebulization 3milliLiter(s) Nebulizer every 6 hours  ALBUTerol    90 MICROgram(s) HFA Inhaler 1Puff(s) Inhalation every 4 hours  tiotropium 18 MICROgram(s) Capsule 1Capsule(s) Inhalation daily  insulin lispro (HumaLOG) corrective regimen sliding scale  SubCutaneous Before meals and at bedtime  dextrose 5%. 1000milliLiter(s) IV Continuous <Continuous>  dextrose 50% Injectable 25Gram(s) IV Push once  glucagon  Injectable 1milliGRAM(s) IntraMuscular once PRN  labetalol Injectable 10milliGRAM(s) IV Push every 1 hour PRN  lactated ringers. 500milliLiter(s) IV Continuous <Continuous>  HYDROmorphone  Injectable 0.2milliGRAM(s) IV Push every 4 hours PRN  cefoTEtan  IVPB  IV Intermittent   cefoTEtan  IVPB 1Gram(s) IV Intermittent every 12 hours  LORazepam   Injectable 1milliGRAM(s) IntraMuscular every 6 hours PRN  diphenhydrAMINE   Injectable 25milliGRAM(s) IV Push at bedtime  HYDROmorphone  Injectable 0.5milliGRAM(s) IV Push at bedtime  sodium ferric gluconate complex Injectable 125milliGRAM(s) IV Push daily                                      9.8    11.6  )-----------( 255      ( 20 Apr 2017 07:35 )             30.8     04-20    140  |  107  |  15  ----------------------------<  78  4.2   |  21<L>  |  1.00    Ca    8.4<L>      20 Apr 2017 07:35  Phos  2.5     04-20  Mg     2.0     04-20    TPro  5.2<L>  /  Alb  2.3<L>  /  TBili  0.6  /  DBili  x   /  AST  17  /  ALT  19  /  AlkPhos  51  04-19      CAPILLARY BLOOD GLUCOSE  90 (20 Apr 2017 05:36)  96 (19 Apr 2017 20:20)  90 (19 Apr 2017 16:54) Patient is a 86y old  Male who presents with a chief complaint of     HPI:  Pt is an 87 yo M former smoker, PMHx CAD s/p PCI x3 LCx (2013 @[facility]), HLD, BPH, depression, presented to cardiologist c/o worsening shortness of breath which has progressed over the past year. Patient states symptoms are similar to when he required his previous stents, and he can only walk about a block or less before having to stop secondary to shortness of breath. He states that sometimes getting dressed and walking around the house can also cause SOB. Of note, he also endorses a recent loss of appetite over the past month and non-bloody diarrhea x 1 week with associated 5lb weight loss. He denies any symptoms such as chest pain, LOC, dizziness, syncope, PND, orthopnea, LE edema, palpitations, asthma/COPD, recent viral illness or travel, abdominal pain, nausea/vomiting, hx of blood tranfusion/anemia. On EKG in office patient noticed to be sinus yasmine with IVCD. Pt recommended for Right and Left cardiac catheterization given history of multiple PCI and EKG abnormalities as well as possible echo/EP evaluation. (06 Apr 2017 16:15)    INTERVAL HPI/OVERNIGHT EVENTS:::post op--doing well.    HEALTH ISSUES - PROBLEM Dx:  Coronary artery disease, angina presence unspecified, unspecified vessel or lesion type, unspecified whether native or transplanted heart: Coronary artery disease, angina presence unspecified, unspecified vessel or lesion type, unspecified whether native or transplanted heart  Anemia, unspecified type: Anemia, unspecified type  Coronary artery disease with unstable angina pectoris, unspecified vessel or lesion type, unspecified whether native or transplanted heart: Coronary artery disease with unstable angina pectoris, unspecified vessel or lesion type, unspecified whether native or transplanted heart  Malignant neoplasm of colon, unspecified part of colon: Malignant neoplasm of colon, unspecified part of colon  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  Tachypnea: Tachypnea  Colon cancer: Colon cancer  Blood in stool: Blood in stool  Bradycardia: Bradycardia  CKD (chronic kidney disease) stage 3, GFR 30-59 ml/min: CKD (chronic kidney disease) stage 3, GFR 30-59 ml/min  Hyperlipidemia: Hyperlipidemia  Anemia: Anemia  CAD (coronary artery disease): CAD (coronary artery disease)  SOB (shortness of breath): SOB (shortness of breath)          PAST MEDICAL & SURGICAL HISTORY:  CAD (coronary artery disease)  HLD (hyperlipidemia)  S/P cataract extraction          Consultant NOTE  REVIEWED  (  +++ )    REVIEW OF SYSTEMS:  [x] As per HPI  CONSTITUTIONAL: No fever, weight loss, or fatigue  RESPIRATORY: No cough, wheezing, chills or hemoptysis; No Shortness of Breath  CARDIOVASCULAR: No chest pain, palpitations, dizziness, or leg swelling  GASTROINTESTIL no flatus.  MUSCULOSKELETAL: No joint pain or swelling; No muscle, back, or extremity pain  weakness  PSYCH    awake, alert       [x] All others negative	  [ ] Unable to obtain          Vital Signs Last 24 Hrs  T(C): 36.9, Max: 37.6 (04-19 @ 14:46)  T(F): 98.4, Max: 99.6 (04-19 @ 14:46)  HR: 64 (64 - 80)  BP: 166/70 (144/67 - 169/76)  BP(mean): 101 (96 - 109)  RR: 18 (18 - 18)  SpO2: 94% (92% - 94%)        I & Os for current day (as of 04-20 @ 11:24)  =============================================  IN: 0 ml / OUT: 1350 ml / NET: -1350 ml    PHYSICAL EXAMINATION:                                    (  +++  )  NO CHANGE  Appearance: Normal	  HEENT:   Normal oral mucosa, PERRL, EOMI	  Neck: Supple, + JVD/ - JVD; Carotid Bruit   Cardiovascular: Normal S1 S2, No JVD, No murmurs,   Respiratory: Lungs clear to auscultation/Decreased Breath Sounds/No Rales, Rhonchi, Wheezing	  Gastrointestinal:  bs dimished  Skin: No rashes, No ecchymoses, No cyanosis  Extremities: Normal range of motion, No clubbing, cyanosis or edema  Vascular: Peripheral pulses palpable 2+ bilaterally  Neurologic: Non-focal  Psychiatry: A & O x 3, Mood & affect appropriate    buDESOnide 160 MICROgram(s)/formoterol 4.5 MICROgram(s) Inhaler 2Puff(s) Inhalation two times a day  ALBUTerol/ipratropium for Nebulization 3milliLiter(s) Nebulizer every 6 hours  ALBUTerol    90 MICROgram(s) HFA Inhaler 1Puff(s) Inhalation every 4 hours  tiotropium 18 MICROgram(s) Capsule 1Capsule(s) Inhalation daily  insulin lispro (HumaLOG) corrective regimen sliding scale  SubCutaneous Before meals and at bedtime  dextrose 5%. 1000milliLiter(s) IV Continuous <Continuous>  dextrose 50% Injectable 25Gram(s) IV Push once  glucagon  Injectable 1milliGRAM(s) IntraMuscular once PRN  labetalol Injectable 10milliGRAM(s) IV Push every 1 hour PRN  lactated ringers. 500milliLiter(s) IV Continuous <Continuous>  HYDROmorphone  Injectable 0.2milliGRAM(s) IV Push every 4 hours PRN  cefoTEtan  IVPB  IV Intermittent   cefoTEtan  IVPB 1Gram(s) IV Intermittent every 12 hours  LORazepam   Injectable 1milliGRAM(s) IntraMuscular every 6 hours PRN  diphenhydrAMINE   Injectable 25milliGRAM(s) IV Push at bedtime  HYDROmorphone  Injectable 0.5milliGRAM(s) IV Push at bedtime  sodium ferric gluconate complex Injectable 125milliGRAM(s) IV Push daily                                      9.8    11.6  )-----------( 255      ( 20 Apr 2017 07:35 )             30.8     04-20    140  |  107  |  15  ----------------------------<  78  4.2   |  21<L>  |  1.00    Ca    8.4<L>      20 Apr 2017 07:35  Phos  2.5     04-20  Mg     2.0     04-20    TPro  5.2<L>  /  Alb  2.3<L>  /  TBili  0.6  /  DBili  x   /  AST  17  /  ALT  19  /  AlkPhos  51  04-19      CAPILLARY BLOOD GLUCOSE  90 (20 Apr 2017 05:36)  96 (19 Apr 2017 20:20)  90 (19 Apr 2017 16:54)  Surgical Pathology Report (04.17.17 @ 08:35)    Surgical Pathology Report:   ACCESSION No:  [id number]    TIARA GOMEZ                  2        Surgical Final Report          Final Diagnosis    1. 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  - The appendix is partially obliterated.  - The attached ileum and remaining colon are without  significant pathology.  - Forty five unremarkable regional lymph nodes are  identified (0/45)    2. Left lower quadrant mass:  - Skin with an epidermal inclusion cyst.    Ana Curtis MD  (Electronic Signature)  Reported on: 04/20/17    Clinical History  Right colon mass    Specimen(s) Submitted  1. Right colon with right colon mass  2. Left lower quadrant mass    Gross Description  This case is received in two parts:  1. The specimen is received fresh labeled with the patient's  identification and "right colon with right colon mass"  It  consists of a right colon specimen with the terminal ileum  measuring 17 cm in length and 2.5 and 3.5 cm in circumference and  the colon measuring 24 cm in length and 5.5-9 cm in  circumference.  The appendix measures 4.2 cm in length and 0.5 cm  in diameter. There is an unremarkable separate staple line which  measures 4 cm in diameter and 0.7 cm in length.  Also received is  a loose fragment of diffusely hemorrhagic and indurated  mesentery.  The lumen of the colon is remarkable 6.4 x 5.5 x 1.7 cm mass  which is is situated 3.5 cm and ileocecal valve, 9 cm from the  mesenteric(inked Orange), 10.5 cm from the distal 13 cm from the  vascular and 20 cm from the proximal margins.  The mass is  purple-brown, exophytic, diffusely papillary and rubbery to  friable.  The serosa associated with the mass is inked blue.  The  mass fragments upon sectioning and appears confined to the  mucosa, 0.6 cm from the serosa.  The overall serosa is pink-purple, smooth and glistening.  The  colonic mucosa is pink brown with prominent              TIARA GOMEZ T                  2        Surgical Final Report          transverse folds with a 0.6 cm wall thickness.  The mucosal  associated with the ileum is tan-pink with prominent transverse  folds with a 0.4 cm wall thickness.  45 lymph nodes are  identified ranging from 0.2 up to 1.9 cm in greatest dimension.  The appendix displays a pinpoint lumen and a 0.3 cm average wall  thickness.  Representative sections are submitted in 24  cassettes:1A-separate staple line; 1B-loose hemorrhagic  mesentery; 1C-proximal margin; 1D-distal margin; 1E-mesenteric  margin; 1F-vascular margin;1G-ileocecal valve; 1H-bisected  distal appendix, appendiceal orifice, cross sections; 1I-1N-  mass; 1O-six lymph nodes; 1P-five lymph nodes; 1Q-five lymph  nodes; 1R-five lymph nodes; 1S-six lymph nodes; 1T-six lymph  nodes; 1U-four lymph nodes; 1V-three lymph nodes; 1W-four lymph  nodes; 1X-one lymph node.  2. The specimen is received in formalin, labeled with the  patient's identification and "left lower quadrant mass." It  consists of an ovoid subcutaneous unilocular cyst measuring 2.6 x  1.5x 1.3 cm with an overlying ellipse of tan hairbearing skin  measuring 2.5 x 1.6 cm.  The ellipse is remarkable for a central  Surgical Pathology Report:   ACCESSION No:  [id number]    TIARA GOMEZ                  2        Surgical Final Report          Final Diagnosis    1. 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  - The appendix is partially obliterated.  - The attached ileum and remaining colon are without  significant pathology.  - Forty five unremarkable regional lymph nodes are  identified (0/45)    2. Left lower quadrant mass:  - Skin with an epidermal inclusion cyst.    Ana Curtis MD  (Electronic Signature)  Reported on: 04/20/17    Clinical History  Right colon mass    Specimen(s) Submitted  1. Right colon with right colon mass  2. Left lower quadrant mass    Gross Description  This case is received in two parts:  1. The specimen is received fresh labeled with the patient's  identification and "right colon with right colon mass"  It  consists of a right colon specimen with the terminal ileum  measuring 17 cm in length and 2.5 and 3.5 cm in circumference and  the colon measuring 24 cm in length and 5.5-9 cm in  circumference.  The appendix measures 4.2 cm in length and 0.5 cm  in diameter. There is an unremarkable separate staple line which  measures 4 cm in diameter and 0.7 cm in length.  Also received is  a loose fragment of diffusely hemorrhagic and indurated  mesentery.  The lumen of the colon is remarkable 6.4 x 5.5 x 1.7 cm mass  which is is situated 3.5 cm and ileocecal valve, 9 cm from the  mesenteric(inked Orange), 10.5 cm from the distal 13 cm from the  vascular and 20 cm from the proximal margins.  The mass is  purple-brown, exophytic, diffusely papillary and rubbery to  friable.  The serosa associated with the mass is inked blue.  The  mass fragments upon sectioning and appears confined to the  mucosa, 0.6 cm from the serosa.  The overall serosa is pink-purple, smooth and glistening.  The  colonic mucosa is pink brown with prominent              GOMEZTIARA T                  2        Surgical Final Report          transverse folds with a 0.6 cm wall thickness.  The mucosal  associated with the ileum is tan-pink with prominent transverse  folds with a 0.4 cm wall thickness.  45 lymph nodes are  identified ranging from 0.2 up to 1.9 cm in greatest dimension.  The appendix displays a pinpoint lumen and a 0.3 cm average wall  thickness.  Representative sections are submitted in 24  cassettes:1A-separate staple line; 1B-loose hemorrhagic  mesentery; 1C-proximal margin; 1D-distal margin; 1E-mesenteric  margin; 1F-vascular margin;1G-ileocecal valve; 1H-bisected  distal appendix, appendiceal orifice, cross sections; 1I-1N-  mass; 1O-six lymph nodes; 1P-five lymph nodes; 1Q-five lymph  nodes; 1R-five lymph nodes; 1S-six lymph nodes; 1T-six lymph  nodes; 1U-four lymph nodes; 1V-three lymph nodes; 1W-four lymph  nodes; 1X-one lymph node.  2. The specimen is received in formalin, labeled with the  patient's identification and "left lower quadrant mass." It  consists of an ovoid subcutaneous unilocular cyst measuring 2.6 x  1.5x 1.3 cm with an overlying ellipse of tan hairbearing skin  measuring 2.5 x 1.6 cm.  The ellipse is remarkable for a central  0.4 x 0.3 x 0.2 cm ovoid cavity.  The margins are inked blue.  The cyst contains indurated brown material and minimal peripheral  white, soft and friable material.  The cyst abuts the margins.  The specimen is entirely separately submitted in five cassettes  2A-2E, with the opposing skin tips in 2A and 2E.  EF 04/19/17 11:53 (04.17.17 @ 08:35)    0.4 x 0.3 x 0.2 cm ovoid cavity.  The margins are inked blue.  The cyst contains indurated brown material and minimal peripheral  white, soft and friable material.  The cyst abuts the margins.  The specimen is entirely separately submitted in five cassettes  2A-2E, with the opposing skin tips in 2A and 2E.  EF 04/19/17 11:53

## 2017-04-20 NOTE — PROGRESS NOTE ADULT - SUBJECTIVE AND OBJECTIVE BOX
Chief Complaint/Reason for Consult:  INTERVAL HPI:  	  MEDICATIONS:  labetalol Injectable 10milliGRAM(s) IV Push every 1 hour PRN    cefoTEtan  IVPB  IV Intermittent   cefoTEtan  IVPB 1Gram(s) IV Intermittent every 12 hours    buDESOnide 160 MICROgram(s)/formoterol 4.5 MICROgram(s) Inhaler 2Puff(s) Inhalation two times a day  ALBUTerol/ipratropium for Nebulization 3milliLiter(s) Nebulizer every 6 hours  ALBUTerol    90 MICROgram(s) HFA Inhaler 1Puff(s) Inhalation every 4 hours  tiotropium 18 MICROgram(s) Capsule 1Capsule(s) Inhalation daily  diphenhydrAMINE   Injectable 25milliGRAM(s) IV Push at bedtime    HYDROmorphone  Injectable 0.2milliGRAM(s) IV Push every 4 hours PRN  LORazepam   Injectable 1milliGRAM(s) IntraMuscular every 6 hours PRN  HYDROmorphone  Injectable 0.5milliGRAM(s) IV Push at bedtime      insulin lispro (HumaLOG) corrective regimen sliding scale  SubCutaneous Before meals and at bedtime  dextrose 50% Injectable 25Gram(s) IV Push once  glucagon  Injectable 1milliGRAM(s) IntraMuscular once PRN    dextrose 5%. 1000milliLiter(s) IV Continuous <Continuous>  lactated ringers. 500milliLiter(s) IV Continuous <Continuous>  sodium ferric gluconate complex Injectable 125milliGRAM(s) IV Push daily      REVIEW OF SYSTEMS:  [x] As per HPI  CONSTITUTIONAL: No fever, weight loss, or fatigue  RESPIRATORY: No cough, wheezing, chills or hemoptysis; No Shortness of Breath  CARDIOVASCULAR: No chest pain, palpitations, dizziness, or leg swelling  GASTROINTESTINAL: No abdominal or epigastric pain. No nausea, vomiting, or hematemesis; No diarrhea or constipation. No melena or hematochezia.  MUSCULOSKELETAL: No joint pain or swelling; No muscle, back, or extremity pain  [x] All others negative	  [ ] Unable to obtain    PHYSICAL EXAM:  T(C): 37.1, Max: 37.6 (04-19 @ 14:46)  HR: 64 (64 - 80)  BP: 166/70 (144/67 - 169/76)  RR: 18 (18 - 18)  SpO2: 94% (92% - 94%)  Wt(kg): --  I&O's Summary    I & Os for current day (as of 20 Apr 2017 08:34)  =============================================  IN: 0 ml / OUT: 1350 ml / NET: -1350 ml        Appearance: Normal	  HEENT:   Normal oral mucosa  Cardiovascular: Normal S1 S2, No JVD, No murmurs, No edema  Respiratory: Lungs clear to auscultation	  Gastrointestinal:  Soft, Non-tender, + BS	  Extremities: Normal range of motion, No clubbing, cyanosis or edema  Vascular: Peripheral pulses palpable 2+ bilaterally    TELEMETRY: 	    ECG:    	  RADIOLOGY:   CXR:  CT:  US:    CARDIAC TESTING:  Echocardiogram:  Catheterization:  Stress Test:      LABS:	 	    CARDIAC MARKERS:                                  9.8    11.6  )-----------( 255      ( 20 Apr 2017 07:35 )             30.8     04-20    140  |  107  |  15  ----------------------------<  78  4.2   |  21<L>  |  1.00    Ca    8.4<L>      20 Apr 2017 07:35  Phos  2.5     04-20  Mg     2.0     04-20    TPro  5.2<L>  /  Alb  2.3<L>  /  TBili  0.6  /  DBili  x   /  AST  17  /  ALT  19  /  AlkPhos  51  04-19    proBNP:   Lipid Profile:   HgA1c:   TSH:     ASSESSMENT/PLAN: 	  HTN - SBP currently 160s, would encourage better pain control, prn labaelolol and hyrdalazine only if SBP>200. Chief Complaint/Reason for Consult: CAD  INTERVAL HPI: post op abd pain no cp sob palp no events overnight  	  MEDICATIONS:  labetalol Injectable 10milliGRAM(s) IV Push every 1 hour PRN    cefoTEtan  IVPB  IV Intermittent   cefoTEtan  IVPB 1Gram(s) IV Intermittent every 12 hours    buDESOnide 160 MICROgram(s)/formoterol 4.5 MICROgram(s) Inhaler 2Puff(s) Inhalation two times a day  ALBUTerol/ipratropium for Nebulization 3milliLiter(s) Nebulizer every 6 hours  ALBUTerol    90 MICROgram(s) HFA Inhaler 1Puff(s) Inhalation every 4 hours  tiotropium 18 MICROgram(s) Capsule 1Capsule(s) Inhalation daily  diphenhydrAMINE   Injectable 25milliGRAM(s) IV Push at bedtime    HYDROmorphone  Injectable 0.2milliGRAM(s) IV Push every 4 hours PRN  LORazepam   Injectable 1milliGRAM(s) IntraMuscular every 6 hours PRN  HYDROmorphone  Injectable 0.5milliGRAM(s) IV Push at bedtime      insulin lispro (HumaLOG) corrective regimen sliding scale  SubCutaneous Before meals and at bedtime  dextrose 50% Injectable 25Gram(s) IV Push once  glucagon  Injectable 1milliGRAM(s) IntraMuscular once PRN    dextrose 5%. 1000milliLiter(s) IV Continuous <Continuous>  lactated ringers. 500milliLiter(s) IV Continuous <Continuous>  sodium ferric gluconate complex Injectable 125milliGRAM(s) IV Push daily      REVIEW OF SYSTEMS:  [x] As per HPI  CONSTITUTIONAL: No fever, weight loss, or fatigue  RESPIRATORY: No cough, wheezing, chills or hemoptysis; No Shortness of Breath  CARDIOVASCULAR: No chest pain, palpitations, dizziness, or leg swelling  GASTROINTESTINAL: No abdominal or epigastric pain. No nausea, vomiting, or hematemesis; No diarrhea or constipation. No melena or hematochezia.  MUSCULOSKELETAL: No joint pain or swelling; No muscle, back, or extremity pain  [x] All others negative	  [ ] Unable to obtain    PHYSICAL EXAM:  T(C): 37.1, Max: 37.6 (04-19 @ 14:46)  HR: 64 (64 - 80)  BP: 166/70 (144/67 - 169/76)  RR: 18 (18 - 18)  SpO2: 94% (92% - 94%)  Wt(kg): --  I&O's Summary    I & Os for current day (as of 20 Apr 2017 08:34)  =============================================  IN: 0 ml / OUT: 1350 ml / NET: -1350 ml        Appearance: Normal	  HEENT:   Normal oral mucosa  Cardiovascular: Normal S1 S2, No JVD, No murmurs, No edema  Respiratory: Lungs clear to auscultation	  Gastrointestinal:  Soft, Non-tender, + BS	  Extremities: Normal range of motion, No clubbing, cyanosis or edema  Vascular: Peripheral pulses palpable 2+ bilaterally    TELEMETRY: 	    ECG:    	  RADIOLOGY:   CXR:  CT:  US:    CARDIAC TESTING:  Echocardiogram:  Catheterization:  Stress Test:      LABS:	 	    CARDIAC MARKERS:                                  9.8    11.6  )-----------( 255      ( 20 Apr 2017 07:35 )             30.8     04-20    140  |  107  |  15  ----------------------------<  78  4.2   |  21<L>  |  1.00    Ca    8.4<L>      20 Apr 2017 07:35  Phos  2.5     04-20  Mg     2.0     04-20    TPro  5.2<L>  /  Alb  2.3<L>  /  TBili  0.6  /  DBili  x   /  AST  17  /  ALT  19  /  AlkPhos  51  04-19    proBNP:   Lipid Profile:   HgA1c:   TSH:     ASSESSMENT/PLAN: 	  HTN - SBP currently 160s, would encourage better pain control, prn labaelolol and hyrdalazine only if SBP>200.

## 2017-04-20 NOTE — PROGRESS NOTE ADULT - ASSESSMENT
87 yo male with CAD (stent x2 last 3 years ago), BPH, htn, hld, admitted originally for elective cardiac cath, found to be anemic, further work up reveild ascending colonic mass s/p R hemicolectomy    NPO/IVF  IV abx  Espinoza  Pain/nausea control  DVT ppx  OOB/IS  f/u AM labs

## 2017-04-20 NOTE — PROGRESS NOTE ADULT - SUBJECTIVE AND OBJECTIVE BOX
HPI:  Pt is an 87 yo M former smoker, PMHx CAD s/p PCI x3 LCx (2013 @[facility]), HLD, BPH, depression, presented to cardiologist c/o worsening shortness of breath which has progressed over the past year. Patient states symptoms are similar to when he required his previous stents, and he can only walk about a block or less before having to stop secondary to shortness of breath. He states that sometimes getting dressed and walking around the house can also cause SOB. Of note, he also endorses a recent loss of appetite over the past month and non-bloody diarrhea x 1 week with associated 5lb weight loss. He denies any symptoms such as chest pain, LOC, dizziness, syncope, PND, orthopnea, LE edema, palpitations, asthma/COPD, recent viral illness or travel, abdominal pain, nausea/vomiting, hx of blood tranfusion/anemia. On EKG in office patient noticed to be sinus yasmine with IVCD. Pt recommended for Right and Left cardiac catheterization given history of multiple PCI and EKG abnormalities as well as possible echo/EP evaluation. (06 Apr 2017 16:15)    FAMILY HISTORY:  No pertinent family history in first degree relatives    MEDICATIONS  (STANDING):  buDESOnide 160 MICROgram(s)/formoterol 4.5 MICROgram(s) Inhaler 2Puff(s) Inhalation two times a day  ALBUTerol/ipratropium for Nebulization 3milliLiter(s) Nebulizer every 6 hours  ALBUTerol    90 MICROgram(s) HFA Inhaler 1Puff(s) Inhalation every 4 hours  tiotropium 18 MICROgram(s) Capsule 1Capsule(s) Inhalation daily  insulin lispro (HumaLOG) corrective regimen sliding scale  SubCutaneous Before meals and at bedtime  dextrose 5%. 1000milliLiter(s) IV Continuous <Continuous>  dextrose 50% Injectable 25Gram(s) IV Push once  lactated ringers. 500milliLiter(s) IV Continuous <Continuous>  cefoTEtan  IVPB  IV Intermittent   cefoTEtan  IVPB 1Gram(s) IV Intermittent every 12 hours  diphenhydrAMINE   Injectable 25milliGRAM(s) IV Push at bedtime  HYDROmorphone  Injectable 0.5milliGRAM(s) IV Push at bedtime    MEDICATIONS  (PRN):  glucagon  Injectable 1milliGRAM(s) IntraMuscular once PRN Glucose LESS THAN 70 milligrams/deciliter  labetalol Injectable 10milliGRAM(s) IV Push every 1 hour PRN For SBP>160, Hold for HR<65  HYDROmorphone  Injectable 0.2milliGRAM(s) IV Push every 4 hours PRN Severe Pain (7 - 10)  LORazepam   Injectable 1milliGRAM(s) IntraMuscular every 6 hours PRN Anxiety    Vital Signs Last 24 Hrs  T(C): 37.1, Max: 37.6 (04-19 @ 14:46)  T(F): 98.7, Max: 99.6 (04-19 @ 14:46)  HR: 64 (64 - 80)  BP: 166/70 (144/67 - 169/76)  BP(mean): 101 (96 - 116)  RR: 18 (16 - 18)  SpO2: 94% (92% - 94%)    Physical exam:    Overall impression  Lymphadenopathy  Liver  spleen    Labs:  CBC Full  -  ( 19 Apr 2017 13:28 )  WBC Count : 12.9 K/uL  Hemoglobin : 9.5 g/dL  Hematocrit : 30.1 %  Platelet Count - Automated : 276 K/uL  Mean Cell Volume : 83.1 fL  Mean Cell Hemoglobin : 26.2 pg  Mean Cell Hemoglobin Concentration : 31.6 g/dL  Auto Neutrophil # : x  Auto Lymphocyte # : x  Auto Monocyte # : x  Auto Eosinophil # : x  Auto Basophil # : x  Auto Neutrophil % : x  Auto Lymphocyte % : x  Auto Monocyte % : x  Auto Eosinophil % : x  Auto Basophil % : x    04-19    141  |  109<H>  |  15  ----------------------------<  89  4.0   |  21<L>  |  1.12    Ca    8.2<L>      19 Apr 2017 07:29  Phos  1.9     04-19  Mg     2.1     04-19    TPro  5.2<L>  /  Alb  2.3<L>  /  TBili  0.6  /  DBili  x   /  AST  17  /  ALT  19  /  AlkPhos  51  04-19      Radiology:  HEALTH ISSUES - R/O PROBLEM Dx:      Assessmant / Problems  s/p right hemicolectomy for ascending colon mass  good postop progress      Plan:    awaiting surgical pathology  PET CT scan ordered    Thank you  Darlin Ta MD HPI:  Pt is an 87 yo M former smoker, PMHx CAD s/p PCI x3 LCx (2013 @[facility]), HLD, BPH, depression, presented to cardiologist c/o worsening shortness of breath which has progressed over the past year. Patient states symptoms are similar to when he required his previous stents, and he can only walk about a block or less before having to stop secondary to shortness of breath. He states that sometimes getting dressed and walking around the house can also cause SOB. Of note, he also endorses a recent loss of appetite over the past month and non-bloody diarrhea x 1 week with associated 5lb weight loss. He denies any symptoms such as chest pain, LOC, dizziness, syncope, PND, orthopnea, LE edema, palpitations, asthma/COPD, recent viral illness or travel, abdominal pain, nausea/vomiting, hx of blood tranfusion/anemia. On EKG in office patient noticed to be sinus yasmine with IVCD. Pt recommended for Right and Left cardiac catheterization given history of multiple PCI and EKG abnormalities as well as possible echo/EP evaluation. (06 Apr 2017 16:15)    FAMILY HISTORY:  No pertinent family history in first degree relatives    MEDICATIONS  (STANDING):  buDESOnide 160 MICROgram(s)/formoterol 4.5 MICROgram(s) Inhaler 2Puff(s) Inhalation two times a day  ALBUTerol/ipratropium for Nebulization 3milliLiter(s) Nebulizer every 6 hours  ALBUTerol    90 MICROgram(s) HFA Inhaler 1Puff(s) Inhalation every 4 hours  tiotropium 18 MICROgram(s) Capsule 1Capsule(s) Inhalation daily  insulin lispro (HumaLOG) corrective regimen sliding scale  SubCutaneous Before meals and at bedtime  dextrose 5%. 1000milliLiter(s) IV Continuous <Continuous>  dextrose 50% Injectable 25Gram(s) IV Push once  lactated ringers. 500milliLiter(s) IV Continuous <Continuous>  cefoTEtan  IVPB  IV Intermittent   cefoTEtan  IVPB 1Gram(s) IV Intermittent every 12 hours  diphenhydrAMINE   Injectable 25milliGRAM(s) IV Push at bedtime  HYDROmorphone  Injectable 0.5milliGRAM(s) IV Push at bedtime    MEDICATIONS  (PRN):  glucagon  Injectable 1milliGRAM(s) IntraMuscular once PRN Glucose LESS THAN 70 milligrams/deciliter  labetalol Injectable 10milliGRAM(s) IV Push every 1 hour PRN For SBP>160, Hold for HR<65  HYDROmorphone  Injectable 0.2milliGRAM(s) IV Push every 4 hours PRN Severe Pain (7 - 10)  LORazepam   Injectable 1milliGRAM(s) IntraMuscular every 6 hours PRN Anxiety    Vital Signs Last 24 Hrs  T(C): 37.1, Max: 37.6 (04-19 @ 14:46)  T(F): 98.7, Max: 99.6 (04-19 @ 14:46)  HR: 64 (64 - 80)  BP: 166/70 (144/67 - 169/76)  BP(mean): 101 (96 - 116)  RR: 18 (16 - 18)  SpO2: 94% (92% - 94%)    Physical exam:    Overall impression  Lymphadenopathy  Liver  spleen    Labs:  CBC Full  -  ( 19 Apr 2017 13:28 )  WBC Count : 12.9 K/uL  Hemoglobin : 9.5 g/dL  Hematocrit : 30.1 %  Platelet Count - Automated : 276 K/uL  Mean Cell Volume : 83.1 fL  Mean Cell Hemoglobin : 26.2 pg  Mean Cell Hemoglobin Concentration : 31.6 g/dL  Auto Neutrophil # : x  Auto Lymphocyte # : x  Auto Monocyte # : x  Auto Eosinophil # : x  Auto Basophil # : x  Auto Neutrophil % : x  Auto Lymphocyte % : x  Auto Monocyte % : x  Auto Eosinophil % : x  Auto Basophil % : x    04-19    141  |  109<H>  |  15  ----------------------------<  89  4.0   |  21<L>  |  1.12    Ca    8.2<L>      19 Apr 2017 07:29  Phos  1.9     04-19  Mg     2.1     04-19    TPro  5.2<L>  /  Alb  2.3<L>  /  TBili  0.6  /  DBili  x   /  AST  17  /  ALT  19  /  AlkPhos  51  04-19      Radiology:  HEALTH ISSUES - R/O PROBLEM Dx:      Assessmant / Problems  1) s/p right hemicolectomy for ascending colon mass  good postop progress  2) Anemia of chronic disease and gi bleeding      Plan:    1) awaiting surgical pathology  2) PET CT scan ordered  3)iv iron    Thank you  Darlin Ta MD

## 2017-04-20 NOTE — PROGRESS NOTE ADULT - ASSESSMENT
ASSESSMENT/PLAN    1) Status post right hemicolectomy  2) Chronic obstructive pulmonary disease  3) Pulmonary nodules  4) Atelectasis/pleural effusion      Incentive spirometer encouraged  Pain management  Bronchodilators:  Spiriva daily, Atrovent/ albuterol q 4 – 6 hours as needed  Corticosteroids: Budesonide  Cardiac/HTN:  BP control  GI: Rx/ c PPI/H2B  Heme: Rx/VT prophylaxis  Mobilize, OOB, progress incentive spirometry, pt instructed  Discuss with medical team

## 2017-04-20 NOTE — PROGRESS NOTE ADULT - SUBJECTIVE AND OBJECTIVE BOX
Pt seen and examined no complaints    REVIEW OF SYSTEMS:  Constitutional: No fever, weight loss or fatigue  Cardiovascular: No chest pain, palpitations, dizziness or leg swelling  Gastrointestinal: + abdominal surgery incision  pain. No nausea, vomiting or hematemesis; No diarrhea or constipation. No melena or hematochezia.  Skin: No itching, burning, rashes or lesions       MEDICATIONS:  MEDICATIONS  (STANDING):  buDESOnide 160 MICROgram(s)/formoterol 4.5 MICROgram(s) Inhaler 2Puff(s) Inhalation two times a day  ALBUTerol/ipratropium for Nebulization 3milliLiter(s) Nebulizer every 6 hours  ALBUTerol    90 MICROgram(s) HFA Inhaler 1Puff(s) Inhalation every 4 hours  tiotropium 18 MICROgram(s) Capsule 1Capsule(s) Inhalation daily  insulin lispro (HumaLOG) corrective regimen sliding scale  SubCutaneous Before meals and at bedtime  dextrose 5%. 1000milliLiter(s) IV Continuous <Continuous>  dextrose 50% Injectable 25Gram(s) IV Push once  lactated ringers. 500milliLiter(s) IV Continuous <Continuous>  cefoTEtan  IVPB  IV Intermittent   cefoTEtan  IVPB 1Gram(s) IV Intermittent every 12 hours  diphenhydrAMINE   Injectable 25milliGRAM(s) IV Push at bedtime  HYDROmorphone  Injectable 0.5milliGRAM(s) IV Push at bedtime  sodium ferric gluconate complex Injectable 125milliGRAM(s) IV Push daily    MEDICATIONS  (PRN):  glucagon  Injectable 1milliGRAM(s) IntraMuscular once PRN Glucose LESS THAN 70 milligrams/deciliter  labetalol Injectable 10milliGRAM(s) IV Push every 1 hour PRN For SBP>160, Hold for HR<65  HYDROmorphone  Injectable 0.2milliGRAM(s) IV Push every 4 hours PRN Severe Pain (7 - 10)  LORazepam   Injectable 1milliGRAM(s) IntraMuscular every 6 hours PRN Anxiety      Allergies    No Known Allergies    Intolerances        Vital Signs Last 24 Hrs  T(C): 37.3, Max: 37.3 (04-20 @ 13:59)  T(F): 99.1, Max: 99.1 (04-20 @ 13:59)  HR: 68 (64 - 80)  BP: 148/64 (144/67 - 166/70)  BP(mean): 101 (96 - 109)  RR: 18 (18 - 18)  SpO2: 95% (92% - 95%)    I & Os for current day (as of 04-20 @ 15:05)  =============================================  IN: 0 ml / OUT: 1350 ml / NET: -1350 ml      PHYSICAL EXAM:    General: in no acute distress  HEENT: MMM, conjunctiva and sclera clear  Gastrointestinal: flat, wound clean, Normal bowel sounds; No hepatosplenomegaly  Skin: Warm and dry. No obvious rash    LABS:      CBC Full  -  ( 20 Apr 2017 07:35 )  WBC Count : 11.6 K/uL  Hemoglobin : 9.8 g/dL  Hematocrit : 30.8 %  Platelet Count - Automated : 255 K/uL  Mean Cell Volume : 84.4 fL  Mean Cell Hemoglobin : 26.8 pg  Mean Cell Hemoglobin Concentration : 31.8 g/dL  Auto Neutrophil # : x  Auto Lymphocyte # : x  Auto Monocyte # : x  Auto Eosinophil # : x  Auto Basophil # : x  Auto Neutrophil % : x  Auto Lymphocyte % : x  Auto Monocyte % : x  Auto Eosinophil % : x  Auto Basophil % : x    04-20    140  |  107  |  15  ----------------------------<  78  4.2   |  21<L>  |  1.00    Ca    8.4<L>      20 Apr 2017 07:35  Phos  2.5     04-20  Mg     2.0     04-20    TPro  5.2<L>  /  Alb  2.3<L>  /  TBili  0.6  /  DBili  x   /  AST  17  /  ALT  19  /  AlkPhos  51  04-19                      RADIOLOGY & ADDITIONAL STUDIES (The following images were personally reviewed):

## 2017-04-21 LAB
ANION GAP SERPL CALC-SCNC: 11 MMOL/L — SIGNIFICANT CHANGE UP (ref 9–16)
BUN SERPL-MCNC: 16 MG/DL — SIGNIFICANT CHANGE UP (ref 7–23)
CALCIUM SERPL-MCNC: 8.2 MG/DL — LOW (ref 8.5–10.5)
CHLORIDE SERPL-SCNC: 105 MMOL/L — SIGNIFICANT CHANGE UP (ref 96–108)
CO2 SERPL-SCNC: 22 MMOL/L — SIGNIFICANT CHANGE UP (ref 22–31)
CREAT SERPL-MCNC: 0.97 MG/DL — SIGNIFICANT CHANGE UP (ref 0.5–1.3)
GLUCOSE SERPL-MCNC: 85 MG/DL — SIGNIFICANT CHANGE UP (ref 70–99)
HCT VFR BLD CALC: 30.4 % — LOW (ref 39–50)
HGB BLD-MCNC: 9.5 G/DL — LOW (ref 13–17)
MAGNESIUM SERPL-MCNC: 1.9 MG/DL — SIGNIFICANT CHANGE UP (ref 1.6–2.4)
MCHC RBC-ENTMCNC: 25.7 PG — LOW (ref 27–34)
MCHC RBC-ENTMCNC: 31.3 G/DL — LOW (ref 32–36)
MCV RBC AUTO: 82.4 FL — SIGNIFICANT CHANGE UP (ref 80–100)
PHOSPHATE SERPL-MCNC: 2.3 MG/DL — LOW (ref 2.5–4.5)
PLATELET # BLD AUTO: 232 K/UL — SIGNIFICANT CHANGE UP (ref 150–400)
POTASSIUM SERPL-MCNC: 4.1 MMOL/L — SIGNIFICANT CHANGE UP (ref 3.5–5.3)
POTASSIUM SERPL-SCNC: 4.1 MMOL/L — SIGNIFICANT CHANGE UP (ref 3.5–5.3)
RBC # BLD: 3.69 M/UL — LOW (ref 4.2–5.8)
RBC # FLD: 16.4 % — SIGNIFICANT CHANGE UP (ref 10.3–16.9)
SODIUM SERPL-SCNC: 138 MMOL/L — SIGNIFICANT CHANGE UP (ref 135–145)
WBC # BLD: 10.1 K/UL — SIGNIFICANT CHANGE UP (ref 3.8–10.5)
WBC # FLD AUTO: 10.1 K/UL — SIGNIFICANT CHANGE UP (ref 3.8–10.5)

## 2017-04-21 PROCEDURE — 99232 SBSQ HOSP IP/OBS MODERATE 35: CPT

## 2017-04-21 RX ORDER — TAMSULOSIN HYDROCHLORIDE 0.4 MG/1
0.4 CAPSULE ORAL AT BEDTIME
Qty: 0 | Refills: 0 | Status: DISCONTINUED | OUTPATIENT
Start: 2017-04-21 | End: 2017-04-24

## 2017-04-21 RX ADMIN — Medication 3 MILLILITER(S): at 00:55

## 2017-04-21 RX ADMIN — TAMSULOSIN HYDROCHLORIDE 0.4 MILLIGRAM(S): 0.4 CAPSULE ORAL at 22:21

## 2017-04-21 RX ADMIN — Medication 120 GRAM(S): at 05:47

## 2017-04-21 RX ADMIN — Medication 120 GRAM(S): at 18:01

## 2017-04-21 RX ADMIN — BUDESONIDE AND FORMOTEROL FUMARATE DIHYDRATE 2 PUFF(S): 160; 4.5 AEROSOL RESPIRATORY (INHALATION) at 22:30

## 2017-04-21 RX ADMIN — BUDESONIDE AND FORMOTEROL FUMARATE DIHYDRATE 2 PUFF(S): 160; 4.5 AEROSOL RESPIRATORY (INHALATION) at 11:38

## 2017-04-21 RX ADMIN — Medication 3 MILLILITER(S): at 18:02

## 2017-04-21 RX ADMIN — Medication 2: at 12:16

## 2017-04-21 RX ADMIN — Medication 10 MILLIGRAM(S): at 10:17

## 2017-04-21 RX ADMIN — Medication 3 MILLILITER(S): at 05:47

## 2017-04-21 RX ADMIN — Medication 25 MILLIGRAM(S): at 22:21

## 2017-04-21 RX ADMIN — Medication 3 MILLILITER(S): at 11:38

## 2017-04-21 NOTE — PROGRESS NOTE ADULT - ASSESSMENT
87 yo male with CAD (stent x2 last 3 years ago), BPH, htn, hld, admitted originally for elective cardiac cath, found to be anemic, further work up reveild ascending colonic mass s/p R hemicolectomy    NPO with sips and ice chips   IV abx  Espinoza  Pain/nausea control  DVT ppx  OOB/IS  f/u AM labs

## 2017-04-21 NOTE — PROGRESS NOTE ADULT - SUBJECTIVE AND OBJECTIVE BOX
Chief Complaint/Reason for Consult: CAD  INTERVAL HPI: post op abd pain no cp sob palp no events overnight  	  MEDICATIONS:  labetalol Injectable 10milliGRAM(s) IV Push every 1 hour PRN  tamsulosin 0.4milliGRAM(s) Oral at bedtime    cefoTEtan  IVPB  IV Intermittent   cefoTEtan  IVPB 1Gram(s) IV Intermittent every 12 hours    buDESOnide 160 MICROgram(s)/formoterol 4.5 MICROgram(s) Inhaler 2Puff(s) Inhalation two times a day  ALBUTerol/ipratropium for Nebulization 3milliLiter(s) Nebulizer every 6 hours  ALBUTerol    90 MICROgram(s) HFA Inhaler 1Puff(s) Inhalation every 4 hours  tiotropium 18 MICROgram(s) Capsule 1Capsule(s) Inhalation daily  diphenhydrAMINE   Injectable 25milliGRAM(s) IV Push at bedtime    HYDROmorphone  Injectable 0.2milliGRAM(s) IV Push every 4 hours PRN  LORazepam   Injectable 1milliGRAM(s) IntraMuscular every 6 hours PRN  HYDROmorphone  Injectable 0.5milliGRAM(s) IV Push at bedtime      insulin lispro (HumaLOG) corrective regimen sliding scale  SubCutaneous Before meals and at bedtime  dextrose 50% Injectable 25Gram(s) IV Push once  glucagon  Injectable 1milliGRAM(s) IntraMuscular once PRN    dextrose 5%. 1000milliLiter(s) IV Continuous <Continuous>  lactated ringers. 500milliLiter(s) IV Continuous <Continuous>      REVIEW OF SYSTEMS:  [x] As per HPI  CONSTITUTIONAL: No fever, weight loss, or fatigue  RESPIRATORY: No cough, wheezing, chills or hemoptysis; No Shortness of Breath  CARDIOVASCULAR: No chest pain, palpitations, dizziness, or leg swelling  GASTROINTESTINAL: No abdominal or epigastric pain. No nausea, vomiting, or hematemesis; No diarrhea or constipation. No melena or hematochezia.  MUSCULOSKELETAL: No joint pain or swelling; No muscle, back, or extremity pain  [x] All others negative	  [ ] Unable to obtain    PHYSICAL EXAM:  T(C): 35.7, Max: 37 (04-20 @ 21:19)  HR: 70 (68 - 84)  BP: 151/68 (142/63 - 174/72)  RR: 14 (14 - 18)  SpO2: 94% (93% - 100%)  Wt(kg): --  I&O's Summary    I & Os for current day (as of 21 Apr 2017 14:26)  =============================================  IN: 1880 ml / OUT: 1750 ml / NET: 130 ml        Appearance: Normal	  HEENT:   Normal oral mucosa  Cardiovascular: Normal S1 S2, No JVD, No murmurs, No edema  Respiratory: Lungs clear to auscultation	  Gastrointestinal:  Soft, Non-tender, + BS	  Extremities: Normal range of motion, No clubbing, cyanosis or edema  Vascular: Peripheral pulses palpable 2+ bilaterally    TELEMETRY: 	    ECG:    	  RADIOLOGY:   CXR:  CT:  US:    CARDIAC TESTING:  Echocardiogram:  Catheterization:  Stress Test:      LABS:	 	    CARDIAC MARKERS:                                  9.5    10.1  )-----------( 232      ( 21 Apr 2017 07:03 )             30.4     04-21    138  |  105  |  16  ----------------------------<  85  4.1   |  22  |  0.97    Ca    8.2<L>      21 Apr 2017 07:03  Phos  2.3     04-21  Mg     1.9     04-21      proBNP:   Lipid Profile:   HgA1c:   TSH:     ASSESSMENT/PLAN: 	  HTN - SBP currently 160s, would encourage better pain control, prn labaelolol and hyrdalazine only if SBP>200.  Hold ASA, plavix - will resume when okay with surgery as outpt, no active signs of CHF decompensation or ACS

## 2017-04-21 NOTE — PROGRESS NOTE ADULT - ASSESSMENT
ABD SOFT, DISTENDED, BS+, FLATUS+, BM+, WOUND IS DRY, INTACT.  TOLERATING CLEARS, DVT PROFILAXIS, SPIROMETRY , OOB, ADVANCE TO FULL LIQUID DIET.

## 2017-04-21 NOTE — PROGRESS NOTE ADULT - ASSESSMENT
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  no malignancy.  Surgery necessary because tumor though benign causing anemia as well the size makes it difficult to remove endoscopically.  Surgery treatment of choice

## 2017-04-21 NOTE — PROGRESS NOTE ADULT - SUBJECTIVE AND OBJECTIVE BOX
HPI:  Pt is an 85 yo M former smoker, PMHx CAD s/p PCI x3 LCx (2013 @[facility]), HLD, BPH, depression, presented to cardiologist c/o worsening shortness of breath which has progressed over the past year. Patient states symptoms are similar to when he required his previous stents, and he can only walk about a block or less before having to stop secondary to shortness of breath. He states that sometimes getting dressed and walking around the house can also cause SOB. Of note, he also endorses a recent loss of appetite over the past month and non-bloody diarrhea x 1 week with associated 5lb weight loss. He denies any symptoms such as chest pain, LOC, dizziness, syncope, PND, orthopnea, LE edema, palpitations, asthma/COPD, recent viral illness or travel, abdominal pain, nausea/vomiting, hx of blood tranfusion/anemia. On EKG in office patient noticed to be sinus yasmine with IVCD. Pt recommended for Right and Left cardiac catheterization given history of multiple PCI and EKG abnormalities as well as possible echo/EP evaluation. (06 Apr 2017 16:15)    FAMILY HISTORY:  No pertinent family history in first degree relatives    MEDICATIONS  (STANDING):  buDESOnide 160 MICROgram(s)/formoterol 4.5 MICROgram(s) Inhaler 2Puff(s) Inhalation two times a day  ALBUTerol/ipratropium for Nebulization 3milliLiter(s) Nebulizer every 6 hours  ALBUTerol    90 MICROgram(s) HFA Inhaler 1Puff(s) Inhalation every 4 hours  tiotropium 18 MICROgram(s) Capsule 1Capsule(s) Inhalation daily  insulin lispro (HumaLOG) corrective regimen sliding scale  SubCutaneous Before meals and at bedtime  dextrose 5%. 1000milliLiter(s) IV Continuous <Continuous>  dextrose 50% Injectable 25Gram(s) IV Push once  lactated ringers. 500milliLiter(s) IV Continuous <Continuous>  cefoTEtan  IVPB  IV Intermittent   cefoTEtan  IVPB 1Gram(s) IV Intermittent every 12 hours  diphenhydrAMINE   Injectable 25milliGRAM(s) IV Push at bedtime  HYDROmorphone  Injectable 0.5milliGRAM(s) IV Push at bedtime  tamsulosin 0.4milliGRAM(s) Oral at bedtime    MEDICATIONS  (PRN):  glucagon  Injectable 1milliGRAM(s) IntraMuscular once PRN Glucose LESS THAN 70 milligrams/deciliter  labetalol Injectable 10milliGRAM(s) IV Push every 1 hour PRN For SBP>160, Hold for HR<65  HYDROmorphone  Injectable 0.2milliGRAM(s) IV Push every 4 hours PRN Severe Pain (7 - 10)  LORazepam   Injectable 1milliGRAM(s) IntraMuscular every 6 hours PRN Anxiety    Vital Signs Last 24 Hrs  T(C): 36.7, Max: 37.3 (04-20 @ 13:59)  T(F): 98, Max: 99.1 (04-20 @ 13:59)  HR: 80 (68 - 84)  BP: 161/70 (148/64 - 166/72)  BP(mean): 100 (98 - 119)  RR: 16 (16 - 18)  SpO2: 93% (93% - 100%)    Physical exam:    Overall impression  Lymphadenopathy  Liver  spleen    Labs:  CBC Full  -  ( 21 Apr 2017 07:03 )  WBC Count : 10.1 K/uL  Hemoglobin : 9.5 g/dL  Hematocrit : 30.4 %  Platelet Count - Automated : 232 K/uL  Mean Cell Volume : 82.4 fL  Mean Cell Hemoglobin : 25.7 pg  Mean Cell Hemoglobin Concentration : 31.3 g/dL  Auto Neutrophil # : x  Auto Lymphocyte # : x  Auto Monocyte # : x  Auto Eosinophil # : x  Auto Basophil # : x  Auto Neutrophil % : x  Auto Lymphocyte % : x  Auto Monocyte % : x  Auto Eosinophil % : x  Auto Basophil % : x    04-21    138  |  105  |  16  ----------------------------<  85  4.1   |  22  |  0.97    Ca    8.2<L>      21 Apr 2017 07:03  Phos  2.3     04-21  Mg     1.9     04-21        Radiology:  HEALTH ISSUES - R/O PROBLEM Dx:      Assessmant / Problems  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 Surgery saved him of potentialy lifethreatening complications    Plan:  continue iv iron    Thank you  Darlin Ta MD

## 2017-04-21 NOTE — PROGRESS NOTE ADULT - SUBJECTIVE AND OBJECTIVE BOX
Interventional, Pulmonary, Critical, Chest Special Procedures.    Pt was seen and fully examined by myself.     Time spent with patient in minutes:37    The patient seen ambulating c assistance today, breathing is better today. The re is no significan pain today  HPI:  Pt is an 87 yo M former smoker, PMHx CAD s/p PCI x3 LCx (2013 @[facility]), HLD, BPH, depression, presented to cardiologist c/o worsening shortness of breath which has progressed over the past year. Patient states symptoms are similar to when he required his previous stents, and he can only walk about a block or less before having to stop secondary to shortness of breath. He states that sometimes getting dressed and walking around the house can also cause SOB. Of note, he also endorses a recent loss of appetite over the past month and non-bloody diarrhea x 1 week with associated 5lb weight loss. He denies any symptoms such as chest pain, LOC, dizziness, syncope, PND, orthopnea, LE edema, palpitations, asthma/COPD, recent viral illness or travel, abdominal pain, nausea/vomiting, hx of blood tranfusion/anemia. On EKG in office patient noticed to be sinus yasmine with IVCD. Pt recommended for Right and Left cardiac catheterization given history of multiple PCI and EKG abnormalities as well as possible echo/EP evaluation. (06 Apr 2017 16:15)    REVIEW OF SYSTEMS:  ROS reviewed below with positive findings marked (+) :  GEN:  fever, chills ENT: tracheostomy,   epistaxis,  sinusitis COR: +CAD, CHF,  HTN, dysrhythmia PUL: COPD, ILD, asthma, pneumonia GI: PEG, dysphagia, hemorrhage, other ELIZABETH: kidney disease, electrolyte disorder HEM:  anemia, thrombus, coagulopathy, cancer ENDO:  thyroid disease, diabetes mellitus CNS:  dementia, stroke, seizure, PSY:  depression, anxiety, other    PAST MEDICAL & SURGICAL HISTORY:  CAD (coronary artery disease)  HLD (hyperlipidemia)  S/P cataract extraction    FAMILY HISTORY:  No pertinent family history in first degree relatives    SOCIAL HISTORY:      - Tobacco     - ETOH    Allergies    No Known Allergies    Intolerances      Vital Signs Last 24 Hrs  T(C): 36.9, Max: 37 (04-20 @ 21:19)  T(F): 98.5, Max: 98.6 (04-20 @ 21:19)  HR: 70 (70 - 84)  BP: 151/68 (142/63 - 174/72)  BP(mean): 104 (98 - 119)  RR: 14 (14 - 18)  SpO2: 94% (93% - 98%)    I & Os for current day (as of 04-21 @ 15:57)  =============================================  IN: 1880 ml / OUT: 1750 ml / NET: 130 ml      PHYSICAL EXAM:  GENERAL:         comfortable,  - distress.  HEENT:            - trauma,  - icterus,  - injection,  - nasal discharge.  NECK:              - jugular venous distention, - thyromegaly.  LYMPH:           - lymphadenopathy, - masses.  RESP:              + crackles,   - rhonchi,   - wheezes.   COR:                S1S2  - gallops,  - rubs.  ABD:                abdominal binder, bowel sounds,  soft, - tender, - distended.  EXT/MSC:         - cyanosis,  - edema.    NEURO:             alert,   responds to stimuli.    DEVICES:  - DENTURES   +IV R / L     - ETUBE   -TRACH   -CTUBE  R / L      LABS:                          9.5    10.1  )-----------( 232      ( 21 Apr 2017 07:03 )             30.4     04-21    138  |  105  |  16  ----------------------------<  85  4.1   |  22  |  0.97    Ca    8.2<L>      21 Apr 2017 07:03  Phos  2.3     04-21  Mg     1.9     04-21        RADIOLOGY & ADDITIONAL STUDIES (The following images were personally reviewed):Interventional, Pulmonary, Critical, Chest Special Procedures.    Pt was seen and fully examined by myself.     Time spent with patient in minutes:    Patient is a 86y old  Male who presents with a chief complaint of   HPI:  Pt is an 87 yo M former smoker, PMHx CAD s/p PCI x3 LCx (2013 @[facility]), HLD, BPH, depression, presented to cardiologist c/o worsening shortness of breath which has progressed over the past year. Patient states symptoms are similar to when he required his previous stents, and he can only walk about a block or less before having to stop secondary to shortness of breath. He states that sometimes getting dressed and walking around the house can also cause SOB. Of note, he also endorses a recent loss of appetite over the past month and non-bloody diarrhea x 1 week with associated 5lb weight loss. He denies any symptoms such as chest pain, LOC, dizziness, syncope, PND, orthopnea, LE edema, palpitations, asthma/COPD, recent viral illness or travel, abdominal pain, nausea/vomiting, hx of blood tranfusion/anemia. On EKG in office patient noticed to be sinus yasmine with IVCD. Pt recommended for Right and Left cardiac catheterization given history of multiple PCI and EKG abnormalities as well as possible echo/EP evaluation. (06 Apr 2017 16:15)    REVIEW OF SYSTEMS:  Constitutional: No fever, weight loss, chills or fatigue  Eyes: No eye pain, visual disturbances, or discharge  ENMT:  No difficulty hearing, tinnitus, vertigo; No sinus or throat pain. No epistaxis, dysphagia, dysphonia, hoarseness or odynophagia  Neck: No pain, stiffness or neck swelling.  No masses or deformities  Respiratory: No cough, wheezing, chills or hemoptysis  - COPD  - ILD   - PE   - ASTHMA     - PNEUMONIA  Cardiovascular: No chest pain, dysrhythmia, palpitations, dizziness or edema   - COPD     - CAD   - CHF   - HTN  Gastrointestinal: No abdominal or epigastric pain. No nausea, vomiting or hematemesis; No diarrhea or constipation. No melena or hematochezia. No dysphagia or Icterus.          Genitourinary: No dysuria, frequency, hematuria or incontinence   - CKD/DEBBIE      - ESRD  Neurological: No headaches, memory loss, loss of strength, numbness or tremors      -DEMENTIA     - STROKE    - SEIZURE  Skin: No itching, burning, rashes or lesions   Lymph Nodes: No enlarged glands  Endocrine: No heat or cold intolerance; No hair loss       - DM     - THYROID DISORDER  Musculoskeletal: No joint pain or swelling; No muscle, back or extremity pain  Psychiatric: No depression, anxiety, mood swings or difficulty sleeping  Heme/Lymph: No easy bruising or bleeding gums         - ANEMIA      - CANCER   -COAGULOPATHY  Allergy and Immunologic: No hives or eczema    PAST MEDICAL & SURGICAL HISTORY:  CAD (coronary artery disease)  HLD (hyperlipidemia)  S/P cataract extraction    FAMILY HISTORY:  No pertinent family history in first degree relatives    SOCIAL HISTORY:      - Tobacco     - ETOH    Allergies    No Known Allergies    Intolerances      Vital Signs Last 24 Hrs  T(C): 36.9, Max: 37 (04-20 @ 21:19)  T(F): 98.5, Max: 98.6 (04-20 @ 21:19)  HR: 70 (70 - 84)  BP: 151/68 (142/63 - 174/72)  BP(mean): 104 (98 - 119)  RR: 14 (14 - 18)  SpO2: 94% (93% - 98%)    I & Os for current day (as of 04-21 @ 15:57)  =============================================  IN: 1880 ml / OUT: 1750 ml / NET: 130 ml      PHYSICAL EXAM:  Comfortable, no distress  Eyes: PERRL, EOM intact; conjunctiva and sclera clear  Head: Normocephalic;  No Trauma  ENMT: No nasal discharge, hoarseness, cough or hemoptysis.  Airway clear  Neck: Supple; non tender; no masses or deformities.    No JVD  Respiratory: CTA,  - WHEEZING   - RHONCHI  - RALES  - CRACKLES.  Diminished breath sounds  BILATERAL  RIGHT  LEFT  Cardiovascular: Regular rate and rhythm. S1 and S2 Normal; No murmurs, gallops or rubs     - PPM/AICD  Gastrointestinal: Soft non-tender, non-distended; Normal bowel sounds; No hepatosplenomegaly.     -PEG    -  GT   - RUBIO  Genitourinary: No costovertebral angle tenderness. No dysuria  Extremities: AROM, No clubbing, cyanosis or edema    Vascular: Peripheral pulses palpable 2+ bilaterally  Neurological: Alert and responisve to stimuli   Skin: Warm and dry. No obvious rash  Lymph Nodes: No acute cervical or supraclavicular adenopathy  Psychiatric: Cooperative and appropriate mood    DEVICES:  - DENTURES   +IV R / L     - ETUBE   -TRACH   -CTUBE  R / L      LABS:                          9.5    10.1  )-----------( 232      ( 21 Apr 2017 07:03 )             30.4     04-21    138  |  105  |  16  ----------------------------<  85  4.1   |  22  |  0.97    Ca    8.2<L>      21 Apr 2017 07:03  Phos  2.3     04-21  Mg     1.9     04-21        RADIOLOGY & ADDITIONAL STUDIES (The following images were personally reviewed):

## 2017-04-21 NOTE — PROVIDER CONTACT NOTE (OTHER) - SITUATION
Pt reported he wanted to move his bowels and upon completion, small amount of bloody liquid seen. Pt reported he wanted to move his bowels for the first time and upon completion, small amount of bloody liquid seen.

## 2017-04-21 NOTE — PROGRESS NOTE ADULT - SUBJECTIVE AND OBJECTIVE BOX
Patient is a 86y old  Male who presents with a chief complaint of     HPI:  Pt is an 85 yo M former smoker, PMHx CAD s/p PCI x3 LCx (2013 @[facility]), HLD, BPH, depression, presented to cardiologist c/o worsening shortness of breath which has progressed over the past year. Patient states symptoms are similar to when he required his previous stents, and he can only walk about a block or less before having to stop secondary to shortness of breath. He states that sometimes getting dressed and walking around the house can also cause SOB. Of note, he also endorses a recent loss of appetite over the past month and non-bloody diarrhea x 1 week with associated 5lb weight loss. He denies any symptoms such as chest pain, LOC, dizziness, syncope, PND, orthopnea, LE edema, palpitations, asthma/COPD, recent viral illness or travel, abdominal pain, nausea/vomiting, hx of blood tranfusion/anemia. On EKG in office patient noticed to be sinus yasmine with IVCD. Pt recommended for Right and Left cardiac catheterization given history of multiple PCI and EKG abnormalities as well as possible echo/EP evaluation. (06 Apr 2017 16:15)    INTERVAL HPI/OVERNIGHT EVENTS:::    HEALTH ISSUES - PROBLEM Dx:  Coronary artery disease, angina presence unspecified, unspecified vessel or lesion type, unspecified whether native or transplanted heart: Coronary artery disease, angina presence unspecified, unspecified vessel or lesion type, unspecified whether native or transplanted heart  Anemia, unspecified type: Anemia, unspecified type  Coronary artery disease with unstable angina pectoris, unspecified vessel or lesion type, unspecified whether native or transplanted heart: Coronary artery disease with unstable angina pectoris, unspecified vessel or lesion type, unspecified whether native or transplanted heart  Malignant neoplasm of colon, unspecified part of colon: Malignant neoplasm of colon, unspecified part of colon  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  Tachypnea: Tachypnea  Colon cancer: Colon cancer  Blood in stool: Blood in stool  Bradycardia: Bradycardia  CKD (chronic kidney disease) stage 3, GFR 30-59 ml/min: CKD (chronic kidney disease) stage 3, GFR 30-59 ml/min  Hyperlipidemia: Hyperlipidemia  Anemia: Anemia  CAD (coronary artery disease): CAD (coronary artery disease)  SOB (shortness of breath): SOB (shortness of breath)          PAST MEDICAL & SURGICAL HISTORY:  CAD (coronary artery disease)  HLD (hyperlipidemia)  S/P cataract extraction          Consultant NOTE  REVIEWED  (   )    REVIEW OF SYSTEMS:  [x] As per HPI  CONSTITUTIONAL: No fever, weight loss, or fatigue  RESPIRATORY: No cough, wheezing, chills or hemoptysis; No Shortness of Breath  CARDIOVASCULAR: No chest pain, palpitations, dizziness, or leg swelling  GASTROINTESTINAL: No abdominal or epigastric pain. No nausea, vomiting, or hematemesis; No diarrhea or constipation. No melena or hematochezia.  MUSCULOSKELETAL: No joint pain or swelling; No muscle, back, or extremity pain  PSYCH    awake, alert       [x] All others negative	  [ ] Unable to obtain          Vital Signs Last 24 Hrs  T(C): 36.7, Max: 37.3 (04-20 @ 13:59)  T(F): 98, Max: 99.1 (04-20 @ 13:59)  HR: 80 (68 - 84)  BP: 161/70 (148/64 - 166/72)  BP(mean): 100 (98 - 119)  RR: 16 (16 - 18)  SpO2: 93% (93% - 100%)        I & Os for current day (as of 04-21 @ 08:25)  =============================================  IN: 1880 ml / OUT: 1750 ml / NET: 130 ml    PHYSICAL EXAMINATION:                                    (    )  NO CHANGE  Appearance: Normal	  HEENT:   Normal oral mucosa, PERRL, EOMI	  Neck: Supple, + JVD/ - JVD; Carotid Bruit   Cardiovascular: Normal S1 S2, No JVD, No murmurs,   Respiratory: Lungs clear to auscultation/Decreased Breath Sounds/No Rales, Rhonchi, Wheezing	  Gastrointestinal:  Soft, Non-tender, + BS	  Skin: No rashes, No ecchymoses, No cyanosis  Extremities: Normal range of motion, No clubbing, cyanosis or edema  Vascular: Peripheral pulses palpable 2+ bilaterally  Neurologic: Non-focal  Psychiatry: A & O x 3, Mood & affect appropriate    buDESOnide 160 MICROgram(s)/formoterol 4.5 MICROgram(s) Inhaler 2Puff(s) Inhalation two times a day  ALBUTerol/ipratropium for Nebulization 3milliLiter(s) Nebulizer every 6 hours  ALBUTerol    90 MICROgram(s) HFA Inhaler 1Puff(s) Inhalation every 4 hours  tiotropium 18 MICROgram(s) Capsule 1Capsule(s) Inhalation daily  insulin lispro (HumaLOG) corrective regimen sliding scale  SubCutaneous Before meals and at bedtime  dextrose 5%. 1000milliLiter(s) IV Continuous <Continuous>  dextrose 50% Injectable 25Gram(s) IV Push once  glucagon  Injectable 1milliGRAM(s) IntraMuscular once PRN  labetalol Injectable 10milliGRAM(s) IV Push every 1 hour PRN  lactated ringers. 500milliLiter(s) IV Continuous <Continuous>  HYDROmorphone  Injectable 0.2milliGRAM(s) IV Push every 4 hours PRN  cefoTEtan  IVPB  IV Intermittent   cefoTEtan  IVPB 1Gram(s) IV Intermittent every 12 hours  LORazepam   Injectable 1milliGRAM(s) IntraMuscular every 6 hours PRN  diphenhydrAMINE   Injectable 25milliGRAM(s) IV Push at bedtime  HYDROmorphone  Injectable 0.5milliGRAM(s) IV Push at bedtime  tamsulosin 0.4milliGRAM(s) Oral at bedtime                                      9.5    10.1  )-----------( 232      ( 21 Apr 2017 07:03 )             30.4     04-21    138  |  105  |  16  ----------------------------<  85  4.1   |  22  |  0.97    Ca    8.2<L>      21 Apr 2017 07:03  Phos  2.3     04-21  Mg     1.9     04-21        CAPILLARY BLOOD GLUCOSE  70 (21 Apr 2017 06:00)  84 (20 Apr 2017 20:46)  82 (20 Apr 2017 16:46)  84 (20 Apr 2017 11:25) Patient is a 86y old  Male who presents with a chief complaint of     HPI:  Pt is an 85 yo M former smoker, PMHx CAD s/p PCI x3 LCx (2013 @[facility]), HLD, BPH, depression, presented to cardiologist c/o worsening shortness of breath which has progressed over the past year. Patient states symptoms are similar to when he required his previous stents, and he can only walk about a block or less before having to stop secondary to shortness of breath. He states that sometimes getting dressed and walking around the house can also cause SOB. Of note, he also endorses a recent loss of appetite over the past month and non-bloody diarrhea x 1 week with associated 5lb weight loss. He denies any symptoms such as chest pain, LOC, dizziness, syncope, PND, orthopnea, LE edema, palpitations, asthma/COPD, recent viral illness or travel, abdominal pain, nausea/vomiting, hx of blood tranfusion/anemia. On EKG in office patient noticed to be sinus yasmine with IVCD. Pt recommended for Right and Left cardiac catheterization given history of multiple PCI and EKG abnormalities as well as possible echo/EP evaluation. (06 Apr 2017 16:15)    INTERVAL HPI/OVERNIGHT EVENTS:::doing well. BM times 2    HEALTH ISSUES - PROBLEM Dx:  Coronary artery disease, angina presence unspecified, unspecified vessel or lesion type, unspecified whether native or transplanted heart: Coronary artery disease, angina presence unspecified, unspecified vessel or lesion type, unspecified whether native or transplanted heart  Anemia, unspecified type: Anemia, unspecified type  Coronary artery disease with unstable angina pectoris, unspecified vessel or lesion type, unspecified whether native or transplanted heart: Coronary artery disease with unstable angina pectoris, unspecified vessel or lesion type, unspecified whether native or transplanted heart  Malignant neoplasm of colon, unspecified part of colon: Malignant neoplasm of colon, unspecified part of colon  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  Tachypnea: Tachypnea  Colon lesion  Blood in stool: Blood in stool  Bradycardia: Bradycardia  CKD (chronic kidney disease) stage 3, GFR 30-59 ml/min: CKD (chronic kidney disease) stage 3, GFR 30-59 ml/min  Hyperlipidemia: Hyperlipidemia  Anemia: Anemia  CAD (coronary artery disease): CAD (coronary artery disease)  SOB (shortness of breath): SOB (shortness of breath)          PAST MEDICAL & SURGICAL HISTORY:  CAD (coronary artery disease)  HLD (hyperlipidemia)  S/P cataract extraction          Consultant NOTE  REVIEWED  (   )    REVIEW OF SYSTEMS:  [x] As per HPI  CONSTITUTIONAL: No fever, weight loss, or fatigue  RESPIRATORY: No cough, wheezing, chills or hemoptysis; No Shortness of Breath  CARDIOVASCULAR: No chest pain, palpitations, dizziness, or leg swelling  GASTROINTESTINAL: No abdominal or epigastric pain. No nausea, vomiting, or hematemesis; No diarrhea or constipation. No melena or hematochezia.  MUSCULOSKELETAL: No joint pain or swelling; No muscle, back, or extremity pain  weakness  PSYCH    awake, alert       [x] All others negative	  [ ] Unable to obtain          Vital Signs Last 24 Hrs  T(C): 36.7, Max: 37.3 (04-20 @ 13:59)  T(F): 98, Max: 99.1 (04-20 @ 13:59)  HR: 80 (68 - 84)  BP: 161/70 (148/64 - 166/72)  BP(mean): 100 (98 - 119)  RR: 16 (16 - 18)  SpO2: 93% (93% - 100%)        I & Os for current day (as of 04-21 @ 08:25)  =============================================  IN: 1880 ml / OUT: 1750 ml / NET: 130 ml    PHYSICAL EXAMINATION:                                    (   +++ )  NO CHANGE  Appearance: Normal	  HEENT:   Normal oral mucosa, PERRL, EOMI	  Neck: Supple, + JVD/ - JVD; Carotid Bruit   Cardiovascular: Normal S1 S2, No JVD, No murmurs,   Respiratory: Lungs clear to auscultation/Decreased Breath Sounds/No Rales, Rhonchi, Wheezing	  Gastrointestinal:  Soft, Non-tender, + BS	scar  Skin: No rashes, No ecchymoses, No cyanosis  Extremities: Normal range of motion, No clubbing, cyanosis or edema  Vascular: Peripheral pulses palpable 2+ bilaterally  Neurologic: Non-focal  Psychiatry: A & O x 3, Mood & affect appropriate    buDESOnide 160 MICROgram(s)/formoterol 4.5 MICROgram(s) Inhaler 2Puff(s) Inhalation two times a day  ALBUTerol/ipratropium for Nebulization 3milliLiter(s) Nebulizer every 6 hours  ALBUTerol    90 MICROgram(s) HFA Inhaler 1Puff(s) Inhalation every 4 hours  tiotropium 18 MICROgram(s) Capsule 1Capsule(s) Inhalation daily  insulin lispro (HumaLOG) corrective regimen sliding scale  SubCutaneous Before meals and at bedtime  dextrose 5%. 1000milliLiter(s) IV Continuous <Continuous>  dextrose 50% Injectable 25Gram(s) IV Push once  glucagon  Injectable 1milliGRAM(s) IntraMuscular once PRN  labetalol Injectable 10milliGRAM(s) IV Push every 1 hour PRN  lactated ringers. 500milliLiter(s) IV Continuous <Continuous>  HYDROmorphone  Injectable 0.2milliGRAM(s) IV Push every 4 hours PRN  cefoTEtan  IVPB  IV Intermittent   cefoTEtan  IVPB 1Gram(s) IV Intermittent every 12 hours  LORazepam   Injectable 1milliGRAM(s) IntraMuscular every 6 hours PRN  diphenhydrAMINE   Injectable 25milliGRAM(s) IV Push at bedtime  HYDROmorphone  Injectable 0.5milliGRAM(s) IV Push at bedtime  tamsulosin 0.4milliGRAM(s) Oral at bedtime                                      9.5    10.1  )-----------( 232      ( 21 Apr 2017 07:03 )             30.4     04-21    138  |  105  |  16  ----------------------------<  85  4.1   |  22  |  0.97    Ca    8.2<L>      21 Apr 2017 07:03  Phos  2.3     04-21  Mg     1.9     04-21        CAPILLARY BLOOD GLUCOSE  70 (21 Apr 2017 06:00)  84 (20 Apr 2017 20:46)  82 (20 Apr 2017 16:46)  84 (20 Apr 2017 11:25)

## 2017-04-21 NOTE — PROGRESS NOTE ADULT - SUBJECTIVE AND OBJECTIVE BOX
INTERVAL HPI/OVERNIGHT EVENTS:     SURGERY ATTENDING    STATUS POST:  EXPLORATORY LAPAROTOMY , RIGHT RADICAL EXTENDED HEMICOLECTOMY FOR BLEEDING RIGHT COLON NEARLY OBSTRUCTING MASS WITH SIDE TO SIDE FUNCTIONAL END TO END ILEOCOLONIC ANASTOMOSIS.        POST OPERATIVE DAY #: 4    SUBJECTIVE:  Flatus: [X ] YES [ ] NO             Bowel Movement: [X ] YES [ ] NO  Pain (0-10):       2     Pain Control Adequate: [X ] YES [ ] NO  Nausea: [ ] YES [X ] NO            Vomiting: [ ] YES [X ] NO  Diarrhea: [ ] YES [X ] NO         Constipation: [ ] YES [X ] NO     Chest Pain: [ ] YES [X ] NO    SOB:  [ ] YES [X ] NO    MEDICATIONS  (STANDING):  buDESOnide 160 MICROgram(s)/formoterol 4.5 MICROgram(s) Inhaler 2Puff(s) Inhalation two times a day  ALBUTerol/ipratropium for Nebulization 3milliLiter(s) Nebulizer every 6 hours  ALBUTerol    90 MICROgram(s) HFA Inhaler 1Puff(s) Inhalation every 4 hours  tiotropium 18 MICROgram(s) Capsule 1Capsule(s) Inhalation daily  insulin lispro (HumaLOG) corrective regimen sliding scale  SubCutaneous Before meals and at bedtime  dextrose 5%. 1000milliLiter(s) IV Continuous <Continuous>  dextrose 50% Injectable 25Gram(s) IV Push once  cefoTEtan  IVPB  IV Intermittent   cefoTEtan  IVPB 1Gram(s) IV Intermittent every 12 hours  diphenhydrAMINE   Injectable 25milliGRAM(s) IV Push at bedtime  tamsulosin 0.4milliGRAM(s) Oral at bedtime    MEDICATIONS  (PRN):  glucagon  Injectable 1milliGRAM(s) IntraMuscular once PRN Glucose LESS THAN 70 milligrams/deciliter  labetalol Injectable 10milliGRAM(s) IV Push every 1 hour PRN For SBP>160, Hold for HR<65  LORazepam   Injectable 1milliGRAM(s) IntraMuscular every 6 hours PRN Anxiety  oxyCODONE  5 mG/acetaminophen 325 mG 1Tablet(s) Oral every 4 hours PRN Moderate Pain (4 - 6)      Vital Signs Last 24 Hrs  T(C): 36.9, Max: 37 (04-20 @ 21:19)  T(F): 98.5, Max: 98.6 (04-20 @ 21:19)  HR: 70 (70 - 84)  BP: 151/68 (142/63 - 174/72)  BP(mean): 104 (98 - 119)  RR: 14 (14 - 18)  SpO2: 94% (93% - 98%)            I&O's Detail    I & Os for current day (as of 21 Apr 2017 16:18)  =============================================  IN:    lactated ringers.: 1880 ml    Total IN: 1880 ml  ---------------------------------------------  OUT:    Indwelling Catheter - Urethral: 1750 ml    Total OUT: 1750 ml  ---------------------------------------------  Total NET: 130 ml      LABS:                        9.5    10.1  )-----------( 232      ( 21 Apr 2017 07:03 )             30.4     04-21    138  |  105  |  16  ----------------------------<  85  4.1   |  22  |  0.97    Ca    8.2<L>      21 Apr 2017 07:03  Phos  2.3     04-21  Mg     1.9     04-21            RADIOLOGY & ADDITIONAL STUDIES:

## 2017-04-21 NOTE — PROGRESS NOTE ADULT - SUBJECTIVE AND OBJECTIVE BOX
O/N: Afebrile , VSS, RONN , -f/-bm,NPO with chips per Dr. Headley   4/20: no oozing from incision, pain controlled, no BF    STATUS POST:  R hemicolectomy with ileocolonic anastomosis    POST OPERATIVE DAY #: 4 O/N: Afebrile , VSS, RONN , -f/-bm,NPO with chips per Dr. Headley   4/20: no oozing from incision, pain controlled, no BF    STATUS POST:  R hemicolectomy with ileocolonic anastomosis    POST OPERATIVE DAY #: 4    SUBJECTIVE: Pt seen and examined at bedside. No overnight events.  Pain controlled. No f/c/n/v. 1 episode clots per rectum    Vital Signs Last 24 Hrs  T(C): 36.7, Max: 37.3 (04-20 @ 13:59)  T(F): 98, Max: 99.1 (04-20 @ 13:59)  HR: 80 (68 - 84)  BP: 161/70 (148/64 - 166/72)  BP(mean): 100 (98 - 119)  RR: 16 (16 - 18)  SpO2: 93% (93% - 100%)    PHYSICAL EXAM    Gen: NAD  CV: RRR  Pulm: CTABL  Abd: Soft, NT/ND, midline incision clean, staples in place  Ext: WWP    I&O's Detail    I & Os for current day (as of 21 Apr 2017 07:19)  =============================================  IN:    lactated ringers.: 1880 ml    Total IN: 1880 ml  ---------------------------------------------  OUT:    Indwelling Catheter - Urethral: 1750 ml    Total OUT: 1750 ml  ---------------------------------------------  Total NET: 130 ml      LABS:                        9.5    10.1  )-----------( 232      ( 21 Apr 2017 07:03 )             30.4     04-20    140  |  107  |  15  ----------------------------<  78  4.2   |  21<L>  |  1.00    Ca    8.4<L>      20 Apr 2017 07:35  Phos  2.5     04-20  Mg     2.0     04-20    TPro  5.2<L>  /  Alb  2.3<L>  /  TBili  0.6  /  DBili  x   /  AST  17  /  ALT  19  /  AlkPhos  51  04-19          MEDICATIONS  (STANDING):  buDESOnide 160 MICROgram(s)/formoterol 4.5 MICROgram(s) Inhaler 2Puff(s) Inhalation two times a day  ALBUTerol/ipratropium for Nebulization 3milliLiter(s) Nebulizer every 6 hours  ALBUTerol    90 MICROgram(s) HFA Inhaler 1Puff(s) Inhalation every 4 hours  tiotropium 18 MICROgram(s) Capsule 1Capsule(s) Inhalation daily  insulin lispro (HumaLOG) corrective regimen sliding scale  SubCutaneous Before meals and at bedtime  dextrose 5%. 1000milliLiter(s) IV Continuous <Continuous>  dextrose 50% Injectable 25Gram(s) IV Push once  lactated ringers. 500milliLiter(s) IV Continuous <Continuous>  cefoTEtan  IVPB  IV Intermittent   cefoTEtan  IVPB 1Gram(s) IV Intermittent every 12 hours  diphenhydrAMINE   Injectable 25milliGRAM(s) IV Push at bedtime  HYDROmorphone  Injectable 0.5milliGRAM(s) IV Push at bedtime    MEDICATIONS  (PRN):  glucagon  Injectable 1milliGRAM(s) IntraMuscular once PRN Glucose LESS THAN 70 milligrams/deciliter  labetalol Injectable 10milliGRAM(s) IV Push every 1 hour PRN For SBP>160, Hold for HR<65  HYDROmorphone  Injectable 0.2milliGRAM(s) IV Push every 4 hours PRN Severe Pain (7 - 10)  LORazepam   Injectable 1milliGRAM(s) IntraMuscular every 6 hours PRN Anxiety      RADIOLOGY & ADDITIONAL STUDIES:    ASSESSMENT AND PLAN

## 2017-04-21 NOTE — PROGRESS NOTE ADULT - SUBJECTIVE AND OBJECTIVE BOX
Pt seen and examined  no complaints, had 2 BMs, tolerating clears    REVIEW OF SYSTEMS:  Constitutional: No fever, Cardiovascular: No chest pain, palpitations, dizziness or leg swelling  Gastrointestinal: No abdominal or epigastric pain. No nausea, vomiting or hematemesis; No diarrhea or constipation. No melena or hematochezia.  Skin: No itching, burning, rashes or lesions       MEDICATIONS:  MEDICATIONS  (STANDING):  buDESOnide 160 MICROgram(s)/formoterol 4.5 MICROgram(s) Inhaler 2Puff(s) Inhalation two times a day  ALBUTerol/ipratropium for Nebulization 3milliLiter(s) Nebulizer every 6 hours  ALBUTerol    90 MICROgram(s) HFA Inhaler 1Puff(s) Inhalation every 4 hours  tiotropium 18 MICROgram(s) Capsule 1Capsule(s) Inhalation daily  insulin lispro (HumaLOG) corrective regimen sliding scale  SubCutaneous Before meals and at bedtime  dextrose 5%. 1000milliLiter(s) IV Continuous <Continuous>  dextrose 50% Injectable 25Gram(s) IV Push once  lactated ringers. 500milliLiter(s) IV Continuous <Continuous>  cefoTEtan  IVPB  IV Intermittent   cefoTEtan  IVPB 1Gram(s) IV Intermittent every 12 hours  diphenhydrAMINE   Injectable 25milliGRAM(s) IV Push at bedtime  HYDROmorphone  Injectable 0.5milliGRAM(s) IV Push at bedtime  tamsulosin 0.4milliGRAM(s) Oral at bedtime    MEDICATIONS  (PRN):  glucagon  Injectable 1milliGRAM(s) IntraMuscular once PRN Glucose LESS THAN 70 milligrams/deciliter  labetalol Injectable 10milliGRAM(s) IV Push every 1 hour PRN For SBP>160, Hold for HR<65  HYDROmorphone  Injectable 0.2milliGRAM(s) IV Push every 4 hours PRN Severe Pain (7 - 10)  LORazepam   Injectable 1milliGRAM(s) IntraMuscular every 6 hours PRN Anxiety      Allergies    No Known Allergies    Intolerances        Vital Signs Last 24 Hrs  T(C): 36.7, Max: 37.3 (04-20 @ 13:59)  T(F): 98, Max: 99.1 (04-20 @ 13:59)  HR: 80 (68 - 84)  BP: 161/70 (148/64 - 166/72)  BP(mean): 100 (98 - 119)  RR: 16 (16 - 18)  SpO2: 93% (93% - 100%)    I & Os for current day (as of 04-21 @ 08:46)  =============================================  IN: 1880 ml / OUT: 1750 ml / NET: 130 ml      PHYSICAL EXAM:    General:thin  in no acute distress  HEENT: MMM, conjunctiva and sclera clear  Gastrointestinal: Soft non-tender non-distended; Normal bowel sounds; No hepatosplenomegaly  Skin: Warm and dry. No obvious rash    LABS:      CBC Full  -  ( 21 Apr 2017 07:03 )  WBC Count : 10.1 K/uL  Hemoglobin : 9.5 g/dL  Hematocrit : 30.4 %  Platelet Count - Automated : 232 K/uL  Mean Cell Volume : 82.4 fL  Mean Cell Hemoglobin : 25.7 pg  Mean Cell Hemoglobin Concentration : 31.3 g/dL  Auto Neutrophil # : x  Auto Lymphocyte # : x  Auto Monocyte # : x  Auto Eosinophil # : x  Auto Basophil # : x  Auto Neutrophil % : x  Auto Lymphocyte % : x  Auto Monocyte % : x  Auto Eosinophil % : x  Auto Basophil % : x    04-21    138  |  105  |  16  ----------------------------<  85  4.1   |  22  |  0.97    Ca    8.2<L>      21 Apr 2017 07:03  Phos  2.3     04-21  Mg     1.9     04-21                        RADIOLOGY & ADDITIONAL STUDIES (The following images were personally reviewed):Surgical Pathology Report (04.17.17 @ 08:35)    Surgical Pathology Report:   ACCESSION No:  [id number]    TIARA GOMEZ 2        Surgical Final Report          Final Diagnosis    1. 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  - The appendix is partially obliterated.  - The attached ileum and remaining colon are without  significant pathology.  - Forty five unremarkable regional lymph nodes are  identified (0/45)    2. Left lower quadrant mass:  - Skin with an epidermal inclusion cyst.    Ana Curtis MD  (Electronic Signature)  Reported on: 04/20/17    Clinical History  Right colon mass    Specimen(s) Submitted  1. Right colon with right colon mass  2. Left lower quadrant mass    Gross Description  This case is received in two parts:  1. The specimen is received fresh labeled with the patient's  identification and "right colon with right colon mass"  It  consists of a right colon specimen with the terminal ileum  measuring 17 cm in length and 2.5 and 3.5 cm in circumference and  the colon measuring 24 cm in length and 5.5-9 cm in  circumference.  The appendix measures 4.2 cm in length and 0.5 cm  in diameter. There is an unremarkable separate staple line which  measures 4 cm in diameter and 0.7 cm in length.  Also received is  a loose fragment of diffusely hemorrhagic and indurated  mesentery.  The lumen of the colon is remarkable 6.4 x 5.5 x 1.7 cm mass  which is is situated 3.5 cm and ileocecal valve, 9 cm from the  mesenteric(inked Orange), 10.5 cm from the distal 13 cm from the  vascular and 20 cm from the proximal margins.  The mass is  purple-brown, exophytic, diffusely papillary and rubbery to  friable.  The serosa associated with the mass is inked blue.  The  mass fragments upon sectioning and appears confined to the  mucosa, 0.6 cm from the serosa.  The overall serosa is pink-purple, smooth and glistening.  The  colonic mucosa is pink brown with prominent              JASON TIARA T                  2        Surgical Final Report          transverse folds with a 0.6 cm wall thickness.  The mucosal  associated with the ileum is tan-pink with prominent transverse  folds with a 0.4 cm wall thickness.  45 lymph nodes are  identified ranging from 0.2 up to 1.9 cm in greatest dimension.  The appendix displays a pinpoint lumen and a 0.3 cm average wall  thickness.  Representative sections are submitted in 24  cassettes:1A-separate staple line; 1B-loose hemorrhagic  mesentery; 1C-proximal margin; 1D-distal margin; 1E-mesenteric  margin; 1F-vascular margin;1G-ileocecal valve; 1H-bisected  distal appendix, appendiceal orifice, cross sections; 1I-1N-  mass; 1O-six lymph nodes; 1P-five lymph nodes; 1Q-five lymph  nodes; 1R-five lymph nodes; 1S-six lymph nodes; 1T-six lymph  nodes; 1U-four lymph nodes; 1V-three lymph nodes; 1W-four lymph  nodes; 1X-one lymph node.  2. The specimen is received in formalin, labeled with the  patient's identification and "left lower quadrant mass." It  consists of an ovoid subcutaneous unilocular cyst measuring 2.6 x  1.5x 1.3 cm with an overlying ellipse of tan hairbearing skin  measuring 2.5 x 1.6 cm.  The ellipse is remarkable for a central  0.4 x 0.3 x 0.2 cm ovoid cavity.  The margins are inked blue.  The cyst contains indurated brown material and minimal peripheral  white, soft and friable material.  The cyst abuts the margins.  The specimen is entirely separately submitted in five cassettes  2A-2E, with the opposing skin tips in 2A and 2E.  EF 04/19/17 11:53

## 2017-04-22 LAB
ANION GAP SERPL CALC-SCNC: 10 MMOL/L — SIGNIFICANT CHANGE UP (ref 9–16)
BUN SERPL-MCNC: 11 MG/DL — SIGNIFICANT CHANGE UP (ref 7–23)
CALCIUM SERPL-MCNC: 8.4 MG/DL — LOW (ref 8.5–10.5)
CHLORIDE SERPL-SCNC: 103 MMOL/L — SIGNIFICANT CHANGE UP (ref 96–108)
CO2 SERPL-SCNC: 25 MMOL/L — SIGNIFICANT CHANGE UP (ref 22–31)
CREAT SERPL-MCNC: 0.9 MG/DL — SIGNIFICANT CHANGE UP (ref 0.5–1.3)
GLUCOSE SERPL-MCNC: 110 MG/DL — HIGH (ref 70–99)
HCT VFR BLD CALC: 29.8 % — LOW (ref 39–50)
HGB BLD-MCNC: 9.5 G/DL — LOW (ref 13–17)
MAGNESIUM SERPL-MCNC: 1.7 MG/DL — SIGNIFICANT CHANGE UP (ref 1.6–2.4)
MCHC RBC-ENTMCNC: 26.4 PG — LOW (ref 27–34)
MCHC RBC-ENTMCNC: 31.9 G/DL — LOW (ref 32–36)
MCV RBC AUTO: 82.8 FL — SIGNIFICANT CHANGE UP (ref 80–100)
PHOSPHATE SERPL-MCNC: 1.9 MG/DL — LOW (ref 2.5–4.5)
PLATELET # BLD AUTO: 232 K/UL — SIGNIFICANT CHANGE UP (ref 150–400)
POTASSIUM SERPL-MCNC: 3.5 MMOL/L — SIGNIFICANT CHANGE UP (ref 3.5–5.3)
POTASSIUM SERPL-SCNC: 3.5 MMOL/L — SIGNIFICANT CHANGE UP (ref 3.5–5.3)
RBC # BLD: 3.6 M/UL — LOW (ref 4.2–5.8)
RBC # FLD: 16.1 % — SIGNIFICANT CHANGE UP (ref 10.3–16.9)
SODIUM SERPL-SCNC: 138 MMOL/L — SIGNIFICANT CHANGE UP (ref 135–145)
WBC # BLD: 9.6 K/UL — SIGNIFICANT CHANGE UP (ref 3.8–10.5)
WBC # FLD AUTO: 9.6 K/UL — SIGNIFICANT CHANGE UP (ref 3.8–10.5)

## 2017-04-22 RX ORDER — MAGNESIUM SULFATE 500 MG/ML
1 VIAL (ML) INJECTION ONCE
Qty: 0 | Refills: 0 | Status: COMPLETED | OUTPATIENT
Start: 2017-04-22 | End: 2017-04-22

## 2017-04-22 RX ORDER — POTASSIUM CHLORIDE 20 MEQ
40 PACKET (EA) ORAL ONCE
Qty: 0 | Refills: 0 | Status: COMPLETED | OUTPATIENT
Start: 2017-04-22 | End: 2017-04-22

## 2017-04-22 RX ADMIN — TAMSULOSIN HYDROCHLORIDE 0.4 MILLIGRAM(S): 0.4 CAPSULE ORAL at 21:48

## 2017-04-22 RX ADMIN — BUDESONIDE AND FORMOTEROL FUMARATE DIHYDRATE 2 PUFF(S): 160; 4.5 AEROSOL RESPIRATORY (INHALATION) at 22:00

## 2017-04-22 RX ADMIN — Medication 40 MILLIEQUIVALENT(S): at 11:30

## 2017-04-22 RX ADMIN — Medication 3 MILLILITER(S): at 17:31

## 2017-04-22 RX ADMIN — Medication 3 MILLILITER(S): at 06:27

## 2017-04-22 RX ADMIN — Medication 120 GRAM(S): at 06:27

## 2017-04-22 RX ADMIN — Medication 120 GRAM(S): at 17:30

## 2017-04-22 RX ADMIN — Medication 2: at 11:30

## 2017-04-22 RX ADMIN — Medication 25 MILLIGRAM(S): at 21:47

## 2017-04-22 RX ADMIN — Medication 3 MILLILITER(S): at 11:30

## 2017-04-22 RX ADMIN — Medication 100 GRAM(S): at 11:33

## 2017-04-22 RX ADMIN — Medication 2: at 17:29

## 2017-04-22 RX ADMIN — BUDESONIDE AND FORMOTEROL FUMARATE DIHYDRATE 2 PUFF(S): 160; 4.5 AEROSOL RESPIRATORY (INHALATION) at 11:41

## 2017-04-22 NOTE — PROGRESS NOTE ADULT - ASSESSMENT
ABD SOFT, DISTENDED, BS+, FLATUS+, BM+, WOUND IS DRY, INTACT.  TOLERATING CLEARS, DVT PROFILAXIS, SPIROMETRY , OOB, ADVANCE TO SOFT MECHANICAL DIET DIET.  REHAB PENDING

## 2017-04-22 NOTE — PROGRESS NOTE ADULT - SUBJECTIVE AND OBJECTIVE BOX
Interventional, Pulmonary, Critical, Chest Special Procedures.    Pt was seen and fully examined by myself.     Time spent with patient in minutes:37    Patient is a 86y old  Male. Today the patient reports better breathig, some cough, Rubio discomfort.   HPI:  Pt is an 85 yo M former smoker, PMHx CAD s/p PCI x3 LCx (2013 @[facility]), HLD, BPH, depression, presented to cardiologist c/o worsening shortness of breath which has progressed over the past year. Patient states symptoms are similar to when he required his previous stents, and he can only walk about a block or less before having to stop secondary to shortness of breath. He states that sometimes getting dressed and walking around the house can also cause SOB. Of note, he also endorses a recent loss of appetite over the past month and non-bloody diarrhea x 1 week with associated 5lb weight loss. He denies any symptoms such as chest pain, LOC, dizziness, syncope, PND, orthopnea, LE edema, palpitations, asthma/COPD, recent viral illness or travel, abdominal pain, nausea/vomiting, hx of blood tranfusion/anemia. On EKG in office patient noticed to be sinus yasmine with IVCD. Pt recommended for Right and Left cardiac catheterization given history of multiple PCI and EKG abnormalities as well as possible echo/EP evaluation. (06 Apr 2017 16:15)    REVIEW OF SYSTEMS:updated  Constitutional: No fever, weight loss, chills or fatigue  Eyes: No eye pain, visual disturbances, or discharge  ENMT:  No difficulty hearing, tinnitus, vertigo; No sinus or throat pain. No epistaxis, dysphagia, dysphonia, hoarseness or odynophagia  Neck: No pain, stiffness or neck swelling.  No masses or deformities  Respiratory: No cough, wheezing, chills or hemoptysis  - COPD  - ILD   - PE   - ASTHMA     - PNEUMONIA  Cardiovascular: No chest pain, dysrhythmia, palpitations, dizziness or edema   - COPD     - CAD   - CHF   - HTN  Gastrointestinal: No abdominal or epigastric pain. No nausea, vomiting or hematemesis; No diarrhea or constipation. No melena or hematochezia. No dysphagia or Icterus.          Genitourinary: No dysuria, frequency, hematuria or incontinence   - CKD/DEBBIE      - ESRD  Neurological: No headaches, memory loss, loss of strength, numbness or tremors      -DEMENTIA     - STROKE    - SEIZURE  Skin: No itching, burning, rashes or lesions   Lymph Nodes: No enlarged glands  Endocrine: No heat or cold intolerance; No hair loss       - DM     - THYROID DISORDER  Musculoskeletal: No joint pain or swelling; No muscle, back or extremity pain  Psychiatric: No depression, anxiety, mood swings or difficulty sleeping  Heme/Lymph: No easy bruising or bleeding gums         - ANEMIA      - CANCER   -COAGULOPATHY  Allergy and Immunologic: No hives or eczema    PAST MEDICAL & SURGICAL HISTORY:  CAD (coronary artery disease)  HLD (hyperlipidemia)  S/P cataract extraction    FAMILY HISTORY:  No pertinent family history in first degree relatives    SOCIAL HISTORY:      - Tobacco     - ETOH    Allergies    No Known Allergies    Intolerances      Vital Signs Last 24 Hrs  T(C): 37.1, Max: 37.4 (04-22 @ 05:25)  T(F): 98.8, Max: 99.3 (04-22 @ 05:25)  HR: 64 (58 - 70)  BP: 167/70 (146/69 - 176/75)  BP(mean): 108 (98 - 108)  RR: 18 (14 - 18)  SpO2: 95% (93% - 99%)    I & Os for current day (as of 04-22 @ 11:24)  =============================================  IN: 1220 ml / OUT: 700 ml / NET: 520 ml      PHYSICAL EXAM:  Comfortable, no distress  Eyes: PERRL, EOM intact; conjunctiva and sclera clear  Head: Normocephalic;  No Trauma  ENMT: No nasal discharge, +hoarseness, mild dry  cough   Neck: Supple; non tender; no masses or deformities.    No JVD  Respiratory:  - WHEEZING   - RHONCHI  - RALES  + CRACKLES.  Diminished breath sounds  BILATERAL  RIGHT  LEFT bases  Cardiovascular: Regular rate and rhythm. S1 and S2 Normal; No murmurs, gallops or rubs     - PPM/AICD  Gastrointestinal: Soft wound sites tender, non-distended; Normal bowel sounds; No hepatosplenomegaly.     -PEG    -  GT   + RUBIO  Genitourinary: No costovertebral angle tenderness. No dysuria  Extremities: AROM, No clubbing, cyanosis or edema    Vascular: Peripheral pulses palpable 2+ bilaterally  Neurological: Alert and responisve to stimuli   Skin: Warm and dry. No obvious rash  Lymph Nodes: No acute cervical or supraclavicular adenopathy  Psychiatric: Cooperative and appropriate mood    DEVICES:  - DENTURES   +IV R / L     - ETUBE   -TRACH   -CTUBE  R / L      LABS:                          9.5    9.6   )-----------( 232      ( 22 Apr 2017 07:44 )             29.8     04-22    138  |  103  |  11  ----------------------------<  110<H>  3.5   |  25  |  0.90    Ca    8.4<L>      22 Apr 2017 07:43  Phos  1.9     04-22  Mg     1.7     04-22        RADIOLOGY & ADDITIONAL STUDIES (The following images were personally reviewed):

## 2017-04-22 NOTE — PROGRESS NOTE ADULT - ASSESSMENT
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    FLD  Cefotetan  Espinoza  Pain/nausea control  DVT ppx  OOB/IS  f/u AM labs 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    FLD  Cefotetan  d/c Espinoza  Pain/nausea control  DVT ppx  OOB/IS  f/u AM labs

## 2017-04-22 NOTE — PROGRESS NOTE ADULT - ASSESSMENT
1) Status post right hemicolectomy  2) Chronic obstructive pulmonary disease  3) Pulmonary nodules  4) Atelectasis/pleural effusion  5) HypoP      Incentive spirometer encouraged  Pain management  Bronchodilators:  Spiriva daily, Atrovent/ albuterol q 4 – 6 hours as needed  Corticosteroids: Budesonide  Cardiac/HTN:  BP control  GI: Rx/ c PPI/H2B  Heme: Rx/VT prophylaxis pt on SCD, recommend adding SQ Heparin unless contraindicated  Mobilize, OOB, progress incentive spirometry, pt instructed  Replete P IV  Discuss with medical team

## 2017-04-22 NOTE — PROGRESS NOTE ADULT - SUBJECTIVE AND OBJECTIVE BOX
Patient is a 86y old  Male who presents with a chief complaint of     HPI:  Pt is an 87 yo M former smoker, PMHx CAD s/p PCI x3 LCx (2013 @[facility]), HLD, BPH, depression, presented to cardiologist c/o worsening shortness of breath which has progressed over the past year. Patient states symptoms are similar to when he required his previous stents, and he can only walk about a block or less before having to stop secondary to shortness of breath. He states that sometimes getting dressed and walking around the house can also cause SOB. Of note, he also endorses a recent loss of appetite over the past month and non-bloody diarrhea x 1 week with associated 5lb weight loss. He denies any symptoms such as chest pain, LOC, dizziness, syncope, PND, orthopnea, LE edema, palpitations, asthma/COPD, recent viral illness or travel, abdominal pain, nausea/vomiting, hx of blood tranfusion/anemia. On EKG in office patient noticed to be sinus yasmine with IVCD. Pt recommended for Right and Left cardiac catheterization given history of multiple PCI and EKG abnormalities as well as possible echo/EP evaluation. (06 Apr 2017 16:15)    INTERVAL HPI/OVERNIGHT EVENTS:::    HEALTH ISSUES - PROBLEM Dx:  Coronary artery disease, angina presence unspecified, unspecified vessel or lesion type, unspecified whether native or transplanted heart: Coronary artery disease, angina presence unspecified, unspecified vessel or lesion type, unspecified whether native or transplanted heart  Anemia, unspecified type: Anemia, unspecified type  Coronary artery disease with unstable angina pectoris, unspecified vessel or lesion type, unspecified whether native or transplanted heart: Coronary artery disease with unstable angina pectoris, unspecified vessel or lesion type, unspecified whether native or transplanted heart  Malignant neoplasm of colon, unspecified part of colon: Malignant neoplasm of colon, unspecified part of colon  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  Tachypnea: Tachypnea  Colon cancer: Colon cancer  Blood in stool: Blood in stool  Bradycardia: Bradycardia  CKD (chronic kidney disease) stage 3, GFR 30-59 ml/min: CKD (chronic kidney disease) stage 3, GFR 30-59 ml/min  Hyperlipidemia: Hyperlipidemia  Anemia: Anemia  CAD (coronary artery disease): CAD (coronary artery disease)  SOB (shortness of breath): SOB (shortness of breath)          PAST MEDICAL & SURGICAL HISTORY:  CAD (coronary artery disease)  HLD (hyperlipidemia)  S/P cataract extraction          Consultant NOTE  REVIEWED  (   )    REVIEW OF SYSTEMS:  [x] As per HPI  CONSTITUTIONAL: No fever, weight loss, or fatigue  RESPIRATORY: No cough, wheezing, chills or hemoptysis; No Shortness of Breath  CARDIOVASCULAR: No chest pain, palpitations, dizziness, or leg swelling  GASTROINTESTINAL: s/p surgery  MUSCULOSKELETAL: No joint pain or swelling; No muscle, back, or extremity pain  PSYCH    awake, alert       [x] All others negative	  [ ] Unable to obtain          Vital Signs Last 24 Hrs  T(C): 36.9, Max: 37.4 (04-22 @ 05:25)  T(F): 98.4, Max: 99.3 (04-22 @ 05:25)  HR: 76 (62 - 76)  BP: 155/70 (146/69 - 176/75)  BP(mean): 100 (99 - 108)  RR: 18 (16 - 18)  SpO2: 98% (95% - 99%)      I & Os for 24h ending 04-22 @ 07:00  =============================================  IN: 1220 ml / OUT: 700 ml / NET: 520 ml    I & Os for current day (as of 04-22 @ 22:21)  =============================================  IN: 0 ml / OUT: 500 ml / NET: -500 ml    PHYSICAL EXAMINATION:                                    (  +++  )  NO CHANGE  Appearance: Normal	  HEENT:   Normal oral mucosa, PERRL, EOMI	  Neck: Supple, + JVD/ - JVD; Carotid Bruit   Cardiovascular: Normal S1 S2, No JVD, No murmurs,   Respiratory: Lungs clear to auscultation/Decreased Breath Sounds/No Rales, Rhonchi, Wheezing	  Gastrointestinal:  Soft, Non-tender, + BS	s/p surg  Skin: No rashes, No ecchymoses, No cyanosis  Extremities: Normal range of motion, No clubbing, cyanosis or edema  Vascular: Peripheral pulses palpable  Neurologic: Non-focal  Psychiatry: A & O x 3, Mood & affect appropriate    buDESOnide 160 MICROgram(s)/formoterol 4.5 MICROgram(s) Inhaler 2Puff(s) Inhalation two times a day  ALBUTerol/ipratropium for Nebulization 3milliLiter(s) Nebulizer every 6 hours  ALBUTerol    90 MICROgram(s) HFA Inhaler 1Puff(s) Inhalation every 4 hours  tiotropium 18 MICROgram(s) Capsule 1Capsule(s) Inhalation daily  insulin lispro (HumaLOG) corrective regimen sliding scale  SubCutaneous Before meals and at bedtime  dextrose 5%. 1000milliLiter(s) IV Continuous <Continuous>  dextrose 50% Injectable 25Gram(s) IV Push once  glucagon  Injectable 1milliGRAM(s) IntraMuscular once PRN  labetalol Injectable 10milliGRAM(s) IV Push every 1 hour PRN  cefoTEtan  IVPB  IV Intermittent   cefoTEtan  IVPB 1Gram(s) IV Intermittent every 12 hours  LORazepam   Injectable 1milliGRAM(s) IntraMuscular every 6 hours PRN  diphenhydrAMINE   Injectable 25milliGRAM(s) IV Push at bedtime  tamsulosin 0.4milliGRAM(s) Oral at bedtime  oxyCODONE  5 mG/acetaminophen 325 mG 1Tablet(s) Oral every 4 hours PRN                                      9.5    9.6   )-----------( 232      ( 22 Apr 2017 07:44 )             29.8     04-22    138  |  103  |  11  ----------------------------<  110<H>  3.5   |  25  |  0.90    Ca    8.4<L>      22 Apr 2017 07:43  Phos  1.9     04-22  Mg     1.7     04-22        CAPILLARY BLOOD GLUCOSE  149 (22 Apr 2017 21:59)  175 (22 Apr 2017 16:37)  175 (22 Apr 2017 11:06)  113 (22 Apr 2017 05:25)

## 2017-04-22 NOTE — PROGRESS NOTE ADULT - SUBJECTIVE AND OBJECTIVE BOX
INTERVAL HPI/OVERNIGHT EVENTS:   SURGERY ATTENDING    STATUS POST:                            EXPLORATORY LAPAROTOMY , RIGHT RADICAL EXTENDED HEMICOLECTOMY FOR BLEEDING RIGHT COLON NEARLY OBSTRUCTING MASS WITH SIDE TO SIDE FUNCTIONAL END TO END ILEOCOLONIC ANASTOMOSIS.      POST OPERATIVE DAY #: 5    SUBJECTIVE:  Flatus: [X ] YES [ ] NO             Bowel Movement: [X ] YES [ ] NO  Pain (0-10):         2   Pain Control Adequate: [X ] YES [ ] NO  Nausea: [ ] YES [X ] NO            Vomiting: [ ] YES [X ] NO  Diarrhea: [ ] YES [X ] NO         Constipation: [ ] YES [X ] NO     Chest Pain: [ ] YES [X ] NO    SOB:  [ ] YES [X ] NO    MEDICATIONS  (STANDING):  buDESOnide 160 MICROgram(s)/formoterol 4.5 MICROgram(s) Inhaler 2Puff(s) Inhalation two times a day  ALBUTerol/ipratropium for Nebulization 3milliLiter(s) Nebulizer every 6 hours  ALBUTerol    90 MICROgram(s) HFA Inhaler 1Puff(s) Inhalation every 4 hours  tiotropium 18 MICROgram(s) Capsule 1Capsule(s) Inhalation daily  insulin lispro (HumaLOG) corrective regimen sliding scale  SubCutaneous Before meals and at bedtime  dextrose 5%. 1000milliLiter(s) IV Continuous <Continuous>  dextrose 50% Injectable 25Gram(s) IV Push once  cefoTEtan  IVPB  IV Intermittent   cefoTEtan  IVPB 1Gram(s) IV Intermittent every 12 hours  diphenhydrAMINE   Injectable 25milliGRAM(s) IV Push at bedtime  tamsulosin 0.4milliGRAM(s) Oral at bedtime    MEDICATIONS  (PRN):  glucagon  Injectable 1milliGRAM(s) IntraMuscular once PRN Glucose LESS THAN 70 milligrams/deciliter  labetalol Injectable 10milliGRAM(s) IV Push every 1 hour PRN For SBP>160, Hold for HR<65  LORazepam   Injectable 1milliGRAM(s) IntraMuscular every 6 hours PRN Anxiety  oxyCODONE  5 mG/acetaminophen 325 mG 1Tablet(s) Oral every 4 hours PRN Moderate Pain (4 - 6)      Vital Signs Last 24 Hrs  T(C): 37.1, Max: 37.4 (04-22 @ 05:25)  T(F): 98.8, Max: 99.3 (04-22 @ 05:25)  HR: 64 (58 - 66)  BP: 167/70 (146/69 - 176/75)  BP(mean): 108 (98 - 108)  RR: 18 (15 - 18)  SpO2: 95% (93% - 99%)          I&O's Detail    I & Os for current day (as of 22 Apr 2017 12:53)  =============================================  IN:    Oral Fluid: 660 ml    lactated ringers.: 560 ml    Total IN: 1220 ml  ---------------------------------------------  OUT:    Indwelling Catheter - Urethral: 700 ml    Total OUT: 700 ml  ---------------------------------------------  Total NET: 520 ml      LABS:                        9.5    9.6   )-----------( 232      ( 22 Apr 2017 07:44 )             29.8     04-22    138  |  103  |  11  ----------------------------<  110<H>  3.5   |  25  |  0.90    Ca    8.4<L>      22 Apr 2017 07:43  Phos  1.9     04-22  Mg     1.7     04-22            RADIOLOGY & ADDITIONAL STUDIES:

## 2017-04-22 NOTE — PROGRESS NOTE ADULT - SUBJECTIVE AND OBJECTIVE BOX
O/N: Tolerating FLD. VSS. +BF.  4/21: tolerating CLD, advanced to FLD, started on flomax      STATUS POST: R hemicolectomy with ileocolonic anastomosis      POST OP DAY #: 5 O/N: Tolerating FLD. VSS. +BF.  4/21: tolerating CLD, advanced to FLD, started on flomax      STATUS POST: R hemicolectomy with ileocolonic anastomosis      POST OP DAY #: 5    SUBJECTIVE: Pt seen and examined at bedside. No overnight events.  Pain controlled. No f/c/n/v. +BM/+Flatus    Vital Signs Last 24 Hrs  T(C): 37.4, Max: 37.4 (04-22 @ 05:25)  T(F): 99.3, Max: 99.3 (04-22 @ 05:25)  HR: 62 (58 - 74)  BP: 176/75 (142/63 - 176/75)  BP(mean): 108 (98 - 108)  RR: 16 (14 - 18)  SpO2: 96% (93% - 99%)    PHYSICAL EXAM    Gen: NAD  CV: RRR  Pulm: CTABL  Abd: Soft, NT/ND, midline incision clean, dry, staples in place    I&O's Detail    I & Os for current day (as of 22 Apr 2017 07:51)  =============================================  IN:    Oral Fluid: 660 ml    lactated ringers.: 560 ml    Total IN: 1220 ml  ---------------------------------------------  OUT:    Indwelling Catheter - Urethral: 700 ml    Total OUT: 700 ml  ---------------------------------------------  Total NET: 520 ml      LABS:                        9.5    10.1  )-----------( 232      ( 21 Apr 2017 07:03 )             30.4     04-21    138  |  105  |  16  ----------------------------<  85  4.1   |  22  |  0.97    Ca    8.2<L>      21 Apr 2017 07:03  Phos  2.3     04-21  Mg     1.9     04-21            MEDICATIONS  (STANDING):  buDESOnide 160 MICROgram(s)/formoterol 4.5 MICROgram(s) Inhaler 2Puff(s) Inhalation two times a day  ALBUTerol/ipratropium for Nebulization 3milliLiter(s) Nebulizer every 6 hours  ALBUTerol    90 MICROgram(s) HFA Inhaler 1Puff(s) Inhalation every 4 hours  tiotropium 18 MICROgram(s) Capsule 1Capsule(s) Inhalation daily  insulin lispro (HumaLOG) corrective regimen sliding scale  SubCutaneous Before meals and at bedtime  dextrose 5%. 1000milliLiter(s) IV Continuous <Continuous>  dextrose 50% Injectable 25Gram(s) IV Push once  cefoTEtan  IVPB  IV Intermittent   cefoTEtan  IVPB 1Gram(s) IV Intermittent every 12 hours  diphenhydrAMINE   Injectable 25milliGRAM(s) IV Push at bedtime  tamsulosin 0.4milliGRAM(s) Oral at bedtime    MEDICATIONS  (PRN):  glucagon  Injectable 1milliGRAM(s) IntraMuscular once PRN Glucose LESS THAN 70 milligrams/deciliter  labetalol Injectable 10milliGRAM(s) IV Push every 1 hour PRN For SBP>160, Hold for HR<65  LORazepam   Injectable 1milliGRAM(s) IntraMuscular every 6 hours PRN Anxiety  oxyCODONE  5 mG/acetaminophen 325 mG 1Tablet(s) Oral every 4 hours PRN Moderate Pain (4 - 6)      RADIOLOGY & ADDITIONAL STUDIES:    ASSESSMENT AND PLAN

## 2017-04-23 LAB
ANION GAP SERPL CALC-SCNC: 8 MMOL/L — LOW (ref 9–16)
ANION GAP SERPL CALC-SCNC: 8 MMOL/L — LOW (ref 9–16)
ANION GAP SERPL CALC-SCNC: 9 MMOL/L — SIGNIFICANT CHANGE UP (ref 9–16)
APTT BLD: >200 SEC — CRITICAL HIGH (ref 27.5–37.4)
BASE EXCESS BLDA CALC-SCNC: 0.9 MMOL/L — SIGNIFICANT CHANGE UP (ref -2–3)
BUN SERPL-MCNC: 11 MG/DL — SIGNIFICANT CHANGE UP (ref 7–23)
BUN SERPL-MCNC: 13 MG/DL — SIGNIFICANT CHANGE UP (ref 7–23)
BUN SERPL-MCNC: 15 MG/DL — SIGNIFICANT CHANGE UP (ref 7–23)
CALCIUM SERPL-MCNC: 7.9 MG/DL — LOW (ref 8.5–10.5)
CALCIUM SERPL-MCNC: 8.2 MG/DL — LOW (ref 8.5–10.5)
CALCIUM SERPL-MCNC: 8.3 MG/DL — LOW (ref 8.5–10.5)
CHLORIDE SERPL-SCNC: 103 MMOL/L — SIGNIFICANT CHANGE UP (ref 96–108)
CHLORIDE SERPL-SCNC: 106 MMOL/L — SIGNIFICANT CHANGE UP (ref 96–108)
CHLORIDE SERPL-SCNC: 108 MMOL/L — SIGNIFICANT CHANGE UP (ref 96–108)
CK MB CFR SERPL CALC: 1.6 NG/ML — SIGNIFICANT CHANGE UP (ref 0.5–3.6)
CK SERPL-CCNC: 63 U/L — SIGNIFICANT CHANGE UP (ref 39–308)
CO2 SERPL-SCNC: 25 MMOL/L — SIGNIFICANT CHANGE UP (ref 22–31)
CO2 SERPL-SCNC: 26 MMOL/L — SIGNIFICANT CHANGE UP (ref 22–31)
CO2 SERPL-SCNC: 26 MMOL/L — SIGNIFICANT CHANGE UP (ref 22–31)
CREAT SERPL-MCNC: 0.98 MG/DL — SIGNIFICANT CHANGE UP (ref 0.5–1.3)
CREAT SERPL-MCNC: 1 MG/DL — SIGNIFICANT CHANGE UP (ref 0.5–1.3)
CREAT SERPL-MCNC: 1.1 MG/DL — SIGNIFICANT CHANGE UP (ref 0.5–1.3)
GAS PNL BLDA: SIGNIFICANT CHANGE UP
GLUCOSE SERPL-MCNC: 112 MG/DL — HIGH (ref 70–99)
GLUCOSE SERPL-MCNC: 113 MG/DL — HIGH (ref 70–99)
GLUCOSE SERPL-MCNC: 122 MG/DL — HIGH (ref 70–99)
HCO3 BLDA-SCNC: 23 MMOL/L — SIGNIFICANT CHANGE UP (ref 21–28)
HCT VFR BLD CALC: 29.9 % — LOW (ref 39–50)
HCT VFR BLD CALC: 32.3 % — LOW (ref 39–50)
HCT VFR BLD CALC: 32.5 % — LOW (ref 39–50)
HGB BLD-MCNC: 10.6 G/DL — LOW (ref 13–17)
HGB BLD-MCNC: 10.6 G/DL — LOW (ref 13–17)
HGB BLD-MCNC: 9.7 G/DL — LOW (ref 13–17)
INR BLD: 1.13 — SIGNIFICANT CHANGE UP (ref 0.88–1.16)
MAGNESIUM SERPL-MCNC: 1.8 MG/DL — SIGNIFICANT CHANGE UP (ref 1.6–2.4)
MAGNESIUM SERPL-MCNC: 2 MG/DL — SIGNIFICANT CHANGE UP (ref 1.6–2.4)
MCHC RBC-ENTMCNC: 26.7 PG — LOW (ref 27–34)
MCHC RBC-ENTMCNC: 27 PG — SIGNIFICANT CHANGE UP (ref 27–34)
MCHC RBC-ENTMCNC: 27.2 PG — SIGNIFICANT CHANGE UP (ref 27–34)
MCHC RBC-ENTMCNC: 32.4 G/DL — SIGNIFICANT CHANGE UP (ref 32–36)
MCHC RBC-ENTMCNC: 32.6 G/DL — SIGNIFICANT CHANGE UP (ref 32–36)
MCHC RBC-ENTMCNC: 32.8 G/DL — SIGNIFICANT CHANGE UP (ref 32–36)
MCV RBC AUTO: 82.4 FL — SIGNIFICANT CHANGE UP (ref 80–100)
MCV RBC AUTO: 82.9 FL — SIGNIFICANT CHANGE UP (ref 80–100)
MCV RBC AUTO: 83 FL — SIGNIFICANT CHANGE UP (ref 80–100)
PCO2 BLDA: 28 MMHG — LOW (ref 35–48)
PH BLDA: 7.53 — HIGH (ref 7.35–7.45)
PHOSPHATE SERPL-MCNC: 2 MG/DL — LOW (ref 2.5–4.5)
PHOSPHATE SERPL-MCNC: 2 MG/DL — LOW (ref 2.5–4.5)
PLATELET # BLD AUTO: 240 K/UL — SIGNIFICANT CHANGE UP (ref 150–400)
PLATELET # BLD AUTO: 299 K/UL — SIGNIFICANT CHANGE UP (ref 150–400)
PLATELET # BLD AUTO: 308 K/UL — SIGNIFICANT CHANGE UP (ref 150–400)
PO2 BLDA: 90 MMHG — SIGNIFICANT CHANGE UP (ref 83–108)
POTASSIUM SERPL-MCNC: 3.9 MMOL/L — SIGNIFICANT CHANGE UP (ref 3.5–5.3)
POTASSIUM SERPL-MCNC: 4.1 MMOL/L — SIGNIFICANT CHANGE UP (ref 3.5–5.3)
POTASSIUM SERPL-MCNC: 4.6 MMOL/L — SIGNIFICANT CHANGE UP (ref 3.5–5.3)
POTASSIUM SERPL-SCNC: 3.9 MMOL/L — SIGNIFICANT CHANGE UP (ref 3.5–5.3)
POTASSIUM SERPL-SCNC: 4.1 MMOL/L — SIGNIFICANT CHANGE UP (ref 3.5–5.3)
POTASSIUM SERPL-SCNC: 4.6 MMOL/L — SIGNIFICANT CHANGE UP (ref 3.5–5.3)
PROTHROM AB SERPL-ACNC: 12.6 SEC — SIGNIFICANT CHANGE UP (ref 9.8–12.7)
RBC # BLD: 3.63 M/UL — LOW (ref 4.2–5.8)
RBC # BLD: 3.89 M/UL — LOW (ref 4.2–5.8)
RBC # BLD: 3.92 M/UL — LOW (ref 4.2–5.8)
RBC # FLD: 16.2 % — SIGNIFICANT CHANGE UP (ref 10.3–16.9)
SAO2 % BLDA: 98 % — SIGNIFICANT CHANGE UP (ref 95–100)
SODIUM SERPL-SCNC: 137 MMOL/L — SIGNIFICANT CHANGE UP (ref 135–145)
SODIUM SERPL-SCNC: 140 MMOL/L — SIGNIFICANT CHANGE UP (ref 135–145)
SODIUM SERPL-SCNC: 142 MMOL/L — SIGNIFICANT CHANGE UP (ref 135–145)
TROPONIN I SERPL-MCNC: 0.03 NG/ML — SIGNIFICANT CHANGE UP (ref 0.01–0.04)
WBC # BLD: 10.8 K/UL — HIGH (ref 3.8–10.5)
WBC # BLD: 11.6 K/UL — HIGH (ref 3.8–10.5)
WBC # BLD: 14.4 K/UL — HIGH (ref 3.8–10.5)
WBC # FLD AUTO: 10.8 K/UL — HIGH (ref 3.8–10.5)
WBC # FLD AUTO: 11.6 K/UL — HIGH (ref 3.8–10.5)
WBC # FLD AUTO: 14.4 K/UL — HIGH (ref 3.8–10.5)

## 2017-04-23 PROCEDURE — 71275 CT ANGIOGRAPHY CHEST: CPT | Mod: 26

## 2017-04-23 PROCEDURE — 71010: CPT | Mod: 26

## 2017-04-23 RX ORDER — HEPARIN SODIUM 5000 [USP'U]/ML
10000 INJECTION INTRAVENOUS; SUBCUTANEOUS ONCE
Qty: 0 | Refills: 0 | Status: DISCONTINUED | OUTPATIENT
Start: 2017-04-23 | End: 2017-04-23

## 2017-04-23 RX ORDER — HALOPERIDOL DECANOATE 100 MG/ML
1 INJECTION INTRAMUSCULAR ONCE
Qty: 0 | Refills: 0 | Status: COMPLETED | OUTPATIENT
Start: 2017-04-23 | End: 2017-04-23

## 2017-04-23 RX ORDER — POTASSIUM PHOSPHATE, MONOBASIC POTASSIUM PHOSPHATE, DIBASIC 236; 224 MG/ML; MG/ML
21 INJECTION, SOLUTION INTRAVENOUS ONCE
Qty: 0 | Refills: 0 | Status: COMPLETED | OUTPATIENT
Start: 2017-04-23 | End: 2017-04-23

## 2017-04-23 RX ORDER — PIPERACILLIN AND TAZOBACTAM 4; .5 G/20ML; G/20ML
INJECTION, POWDER, LYOPHILIZED, FOR SOLUTION INTRAVENOUS
Qty: 0 | Refills: 0 | Status: DISCONTINUED | OUTPATIENT
Start: 2017-04-23 | End: 2017-04-23

## 2017-04-23 RX ORDER — PIPERACILLIN AND TAZOBACTAM 4; .5 G/20ML; G/20ML
4.5 INJECTION, POWDER, LYOPHILIZED, FOR SOLUTION INTRAVENOUS ONCE
Qty: 0 | Refills: 0 | Status: COMPLETED | OUTPATIENT
Start: 2017-04-23 | End: 2017-04-23

## 2017-04-23 RX ORDER — PIPERACILLIN AND TAZOBACTAM 4; .5 G/20ML; G/20ML
4.5 INJECTION, POWDER, LYOPHILIZED, FOR SOLUTION INTRAVENOUS EVERY 8 HOURS
Qty: 0 | Refills: 0 | Status: DISCONTINUED | OUTPATIENT
Start: 2017-04-24 | End: 2017-04-24

## 2017-04-23 RX ORDER — HYDRALAZINE HCL 50 MG
10 TABLET ORAL ONCE
Qty: 0 | Refills: 0 | Status: DISCONTINUED | OUTPATIENT
Start: 2017-04-23 | End: 2017-04-24

## 2017-04-23 RX ORDER — HYDRALAZINE HCL 50 MG
10 TABLET ORAL ONCE
Qty: 0 | Refills: 0 | Status: DISCONTINUED | OUTPATIENT
Start: 2017-04-23 | End: 2017-04-23

## 2017-04-23 RX ORDER — CLOPIDOGREL BISULFATE 75 MG/1
75 TABLET, FILM COATED ORAL DAILY
Qty: 0 | Refills: 0 | Status: DISCONTINUED | OUTPATIENT
Start: 2017-04-23 | End: 2017-04-24

## 2017-04-23 RX ORDER — HEPARIN SODIUM 5000 [USP'U]/ML
10000 INJECTION INTRAVENOUS; SUBCUTANEOUS ONCE
Qty: 0 | Refills: 0 | Status: COMPLETED | OUTPATIENT
Start: 2017-04-23 | End: 2017-04-23

## 2017-04-23 RX ORDER — HEPARIN SODIUM 5000 [USP'U]/ML
5000 INJECTION INTRAVENOUS; SUBCUTANEOUS EVERY 8 HOURS
Qty: 0 | Refills: 0 | Status: DISCONTINUED | OUTPATIENT
Start: 2017-04-23 | End: 2017-04-24

## 2017-04-23 RX ORDER — PIPERACILLIN AND TAZOBACTAM 4; .5 G/20ML; G/20ML
INJECTION, POWDER, LYOPHILIZED, FOR SOLUTION INTRAVENOUS
Qty: 0 | Refills: 0 | Status: DISCONTINUED | OUTPATIENT
Start: 2017-04-23 | End: 2017-04-24

## 2017-04-23 RX ADMIN — Medication 1 MILLIGRAM(S): at 17:43

## 2017-04-23 RX ADMIN — BUDESONIDE AND FORMOTEROL FUMARATE DIHYDRATE 2 PUFF(S): 160; 4.5 AEROSOL RESPIRATORY (INHALATION) at 23:30

## 2017-04-23 RX ADMIN — Medication 3 MILLILITER(S): at 12:37

## 2017-04-23 RX ADMIN — Medication 120 GRAM(S): at 19:58

## 2017-04-23 RX ADMIN — POTASSIUM PHOSPHATE, MONOBASIC POTASSIUM PHOSPHATE, DIBASIC 64.25 MILLIMOLE(S): 236; 224 INJECTION, SOLUTION INTRAVENOUS at 10:15

## 2017-04-23 RX ADMIN — Medication 120 GRAM(S): at 05:04

## 2017-04-23 RX ADMIN — CLOPIDOGREL BISULFATE 75 MILLIGRAM(S): 75 TABLET, FILM COATED ORAL at 12:37

## 2017-04-23 RX ADMIN — Medication 3 MILLILITER(S): at 04:57

## 2017-04-23 RX ADMIN — PIPERACILLIN AND TAZOBACTAM 200 GRAM(S): 4; .5 INJECTION, POWDER, LYOPHILIZED, FOR SOLUTION INTRAVENOUS at 23:09

## 2017-04-23 RX ADMIN — Medication 1 MILLIGRAM(S): at 17:14

## 2017-04-23 RX ADMIN — HEPARIN SODIUM 8 UNIT(S): 5000 INJECTION INTRAVENOUS; SUBCUTANEOUS at 21:30

## 2017-04-23 RX ADMIN — HALOPERIDOL DECANOATE 1 MILLIGRAM(S): 100 INJECTION INTRAMUSCULAR at 21:09

## 2017-04-23 RX ADMIN — Medication 3 MILLILITER(S): at 19:58

## 2017-04-23 NOTE — PROGRESS NOTE ADULT - SUBJECTIVE AND OBJECTIVE BOX
INTERVAL HPI/OVERNIGHT EVENTS:  SURGERY ATTENDING    STATUS POST:       EXPLORATORY LAPAROTOMY , RIGHT RADICAL EXTENDED HEMICOLECTOMY FOR BLEEDING RIGHT COLON NEARLY OBSTRUCTING MASS WITH SIDE TO SIDE FUNCTIONAL END TO END ILEOCOLONIC ANASTOMOSIS.      POST OPERATIVE DAY #: 6    SUBJECTIVE:  Flatus: [X ] YES [ ] NO             Bowel Movement: [X ] YES [ ] NO  Pain (0-10):      2      Pain Control Adequate: [X ] YES [ ] NO  Nausea: [ ] YES [X ] NO            Vomiting: [ ] YES [X ] NO  Diarrhea: [ ] YES [X ] NO         Constipation: [ ] YES [X ] NO     Chest Pain: [ ] YES [X ] NO    SOB:  [X ] YES [ ] NO    MEDICATIONS  (STANDING):  buDESOnide 160 MICROgram(s)/formoterol 4.5 MICROgram(s) Inhaler 2Puff(s) Inhalation two times a day  ALBUTerol/ipratropium for Nebulization 3milliLiter(s) Nebulizer every 6 hours  ALBUTerol    90 MICROgram(s) HFA Inhaler 1Puff(s) Inhalation every 4 hours  tiotropium 18 MICROgram(s) Capsule 1Capsule(s) Inhalation daily  insulin lispro (HumaLOG) corrective regimen sliding scale  SubCutaneous Before meals and at bedtime  dextrose 5%. 1000milliLiter(s) IV Continuous <Continuous>  dextrose 50% Injectable 25Gram(s) IV Push once  cefoTEtan  IVPB  IV Intermittent   cefoTEtan  IVPB 1Gram(s) IV Intermittent every 12 hours  diphenhydrAMINE   Injectable 25milliGRAM(s) IV Push at bedtime  tamsulosin 0.4milliGRAM(s) Oral at bedtime  clopidogrel Tablet 75milliGRAM(s) Oral daily  hydrALAZINE Injectable 10milliGRAM(s) IV Push once  heparin -  Bolus [id number](s) IV Bolus once    MEDICATIONS  (PRN):  glucagon  Injectable 1milliGRAM(s) IntraMuscular once PRN Glucose LESS THAN 70 milligrams/deciliter  labetalol Injectable 10milliGRAM(s) IV Push every 1 hour PRN For SBP>160, Hold for HR<65  LORazepam   Injectable 1milliGRAM(s) IntraMuscular every 6 hours PRN Anxiety  oxyCODONE  5 mG/acetaminophen 325 mG 1Tablet(s) Oral every 4 hours PRN Moderate Pain (4 - 6)      Vital Signs Last 24 Hrs  T(C): 36.4, Max: 37.3 (04-23 @ 05:29)  T(F): 97.5, Max: 99.1 (04-23 @ 05:29)  HR: 74 (52 - 76)  BP: 155/68 (134/62 - 189/74)  BP(mean): 98 (89 - 107)  RR: 21 (20 - 38)  SpO2: 100% (92% - 100%)            I&O's Detail  I & Os for 24h ending 23 Apr 2017 07:00  =============================================  IN:    Total IN: 0 ml  ---------------------------------------------  OUT:    Indwelling Catheter - Urethral: 675 ml    Voided: 230 ml    Total OUT: 905 ml  ---------------------------------------------  Total NET: -905 ml    I & Os for current day (as of 23 Apr 2017 22:22)  =============================================  IN:    Oral Fluid: 550 ml    Solution: 50 ml    Total IN: 600 ml  ---------------------------------------------  OUT:    Voided: 350 ml    Total OUT: 350 ml  ---------------------------------------------  Total NET: 250 ml      LABS:                        10.6   11.6  )-----------( 299      ( 23 Apr 2017 17:38 )             32.5     04-23    140  |  106  |  15  ----------------------------<  122<H>  4.6   |  26  |  1.10    Ca    8.3<L>      23 Apr 2017 17:38  Phos  2.0     04-23  Mg     2.0     04-23            RADIOLOGY & ADDITIONAL STUDIES:

## 2017-04-23 NOTE — PROGRESS NOTE ADULT - SUBJECTIVE AND OBJECTIVE BOX
Pt seen and examined   no complaints, + numerous flatus    REVIEW OF SYSTEMS:  Constitutional: No fever, weight loss or fatigue  Cardiovascular: No chest pain, palpitations, dizziness or leg swelling  Gastrointestinal: No abdominal or epigastric pain. No nausea, vomiting or hematemesis; No diarrhea or constipation. No melena or hematochezia.  Skin: No itching, burning, rashes or lesions       MEDICATIONS:  MEDICATIONS  (STANDING):  buDESOnide 160 MICROgram(s)/formoterol 4.5 MICROgram(s) Inhaler 2Puff(s) Inhalation two times a day  ALBUTerol/ipratropium for Nebulization 3milliLiter(s) Nebulizer every 6 hours  ALBUTerol    90 MICROgram(s) HFA Inhaler 1Puff(s) Inhalation every 4 hours  tiotropium 18 MICROgram(s) Capsule 1Capsule(s) Inhalation daily  insulin lispro (HumaLOG) corrective regimen sliding scale  SubCutaneous Before meals and at bedtime  dextrose 5%. 1000milliLiter(s) IV Continuous <Continuous>  dextrose 50% Injectable 25Gram(s) IV Push once  cefoTEtan  IVPB  IV Intermittent   cefoTEtan  IVPB 1Gram(s) IV Intermittent every 12 hours  diphenhydrAMINE   Injectable 25milliGRAM(s) IV Push at bedtime  tamsulosin 0.4milliGRAM(s) Oral at bedtime  clopidogrel Tablet 75milliGRAM(s) Oral daily    MEDICATIONS  (PRN):  glucagon  Injectable 1milliGRAM(s) IntraMuscular once PRN Glucose LESS THAN 70 milligrams/deciliter  labetalol Injectable 10milliGRAM(s) IV Push every 1 hour PRN For SBP>160, Hold for HR<65  LORazepam   Injectable 1milliGRAM(s) IntraMuscular every 6 hours PRN Anxiety  oxyCODONE  5 mG/acetaminophen 325 mG 1Tablet(s) Oral every 4 hours PRN Moderate Pain (4 - 6)      Allergies    No Known Allergies    Intolerances        Vital Signs Last 24 Hrs  T(C): 36.5, Max: 37.3 (04-23 @ 05:29)  T(F): 97.7, Max: 99.1 (04-23 @ 05:29)  HR: 76 (52 - 76)  BP: 189/74 (134/62 - 189/74)  BP(mean): 107 (89 - 107)  RR: 35 (18 - 38)  SpO2: 100% (92% - 100%)  I & Os for 24h ending 04-23 @ 07:00  =============================================  IN: 0 ml / OUT: 905 ml / NET: -905 ml    I & Os for current day (as of 04-23 @ 16:50)  =============================================  IN: 550 ml / OUT: 250 ml / NET: 300 ml      PHYSICAL EXAM:    General: in no acute distress  HEENT: MMM, conjunctiva and sclera clear  Gastrointestinal: Soft non-tender non-distended; Normal bowel sounds; No hepatosplenomegaly wound dry  Skin: Warm and dry. No obvious rash    LABS:      CBC Full  -  ( 23 Apr 2017 06:10 )  WBC Count : 10.8 K/uL  Hemoglobin : 9.7 g/dL  Hematocrit : 29.9 %  Platelet Count - Automated : 240 K/uL  Mean Cell Volume : 82.4 fL  Mean Cell Hemoglobin : 26.7 pg  Mean Cell Hemoglobin Concentration : 32.4 g/dL  Auto Neutrophil # : x  Auto Lymphocyte # : x  Auto Monocyte # : x  Auto Eosinophil # : x  Auto Basophil # : x  Auto Neutrophil % : x  Auto Lymphocyte % : x  Auto Monocyte % : x  Auto Eosinophil % : x  Auto Basophil % : x    04-23    142  |  108  |  11  ----------------------------<  112<H>  3.9   |  26  |  1.00    Ca    7.9<L>      23 Apr 2017 06:10  Phos  2.0     04-23  Mg     2.0     04-23                        RADIOLOGY & ADDITIONAL STUDIES (The following images were personally reviewed):

## 2017-04-23 NOTE — PROGRESS NOTE ADULT - SUBJECTIVE AND OBJECTIVE BOX
Patient is a 86y old  Male who presents with a chief complaint of     HPI:  Pt is an 85 yo M former smoker, PMHx CAD s/p PCI x3 LCx (2013 @[facility]), HLD, BPH, depression, presented to cardiologist c/o worsening shortness of breath which has progressed over the past year. Patient states symptoms are similar to when he required his previous stents, and he can only walk about a block or less before having to stop secondary to shortness of breath. He states that sometimes getting dressed and walking around the house can also cause SOB. Of note, he also endorses a recent loss of appetite over the past month and non-bloody diarrhea x 1 week with associated 5lb weight loss. He denies any symptoms such as chest pain, LOC, dizziness, syncope, PND, orthopnea, LE edema, palpitations, asthma/COPD, recent viral illness or travel, abdominal pain, nausea/vomiting, hx of blood tranfusion/anemia. On EKG in office patient noticed to be sinus yasmine with IVCD. Pt recommended for Right and Left cardiac catheterization given history of multiple PCI and EKG abnormalities as well as possible echo/EP evaluation. (06 Apr 2017 16:15)    INTERVAL HPI/OVERNIGHT EVENTS:::    HEALTH ISSUES - PROBLEM Dx:  Coronary artery disease, angina presence unspecified, unspecified vessel or lesion type, unspecified whether native or transplanted heart: Coronary artery disease, angina presence unspecified, unspecified vessel or lesion type, unspecified whether native or transplanted heart  Anemia, unspecified type: Anemia, unspecified type  Coronary artery disease with unstable angina pectoris, unspecified vessel or lesion type, unspecified whether native or transplanted heart: Coronary artery disease with unstable angina pectoris, unspecified vessel or lesion type, unspecified whether native or transplanted heart  Malignant neoplasm of colon, unspecified part of colon: Malignant neoplasm of colon, unspecified part of colon  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  Tachypnea: Tachypnea  Colon cancer: Colon cancer  Blood in stool: Blood in stool  Bradycardia: Bradycardia  CKD (chronic kidney disease) stage 3, GFR 30-59 ml/min: CKD (chronic kidney disease) stage 3, GFR 30-59 ml/min  Hyperlipidemia: Hyperlipidemia  Anemia: Anemia  CAD (coronary artery disease): CAD (coronary artery disease)  SOB (shortness of breath): SOB (shortness of breath)          PAST MEDICAL & SURGICAL HISTORY:  CAD (coronary artery disease)  HLD (hyperlipidemia)  S/P cataract extraction          Consultant NOTE  REVIEWED  (   )    REVIEW OF SYSTEMS:  [x] As per HPI  CONSTITUTIONAL: No fever, weight loss, or fatigue  RESPIRATORY: No cough, wheezing, chills or hemoptysis; No Shortness of Breath  CARDIOVASCULAR: No chest pain, palpitations, dizziness, or leg swelling  GASTROINTESTINAL: No abdominal or epigastric pain. No nausea, vomiting, or hematemesis; No diarrhea or constipation. No melena or hematochezia.  MUSCULOSKELETAL: No joint pain or swelling; No muscle, back, or extremity pain  PSYCH    awake, alert       [x] All others negative	  [ ] Unable to obtain          Vital Signs Last 24 Hrs  T(C): 36.5, Max: 37.3 (04-23 @ 05:29)  T(F): 97.7, Max: 99.1 (04-23 @ 05:29)  HR: 76 (52 - 76)  BP: 189/74 (134/62 - 189/74)  BP(mean): 107 (89 - 107)  RR: 35 (18 - 38)  SpO2: 100% (92% - 100%)      I & Os for 24h ending 04-23 @ 07:00  =============================================  IN: 0 ml / OUT: 905 ml / NET: -905 ml    I & Os for current day (as of 04-23 @ 17:06)  =============================================  IN: 550 ml / OUT: 250 ml / NET: 300 ml    PHYSICAL EXAMINATION:                                    (    )  NO CHANGE  Appearance: Normal	  HEENT:   Normal oral mucosa, PERRL, EOMI	  Neck: Supple, + JVD/ - JVD; Carotid Bruit   Cardiovascular: Normal S1 S2, No JVD, No murmurs,   Respiratory: Lungs clear to auscultation/Decreased Breath Sounds/No Rales, Rhonchi, Wheezing	  Gastrointestinal:  Soft, Non-tender, + BS	  Skin: No rashes, No ecchymoses, No cyanosis  Extremities: Normal range of motion, No clubbing, cyanosis or edema  Vascular: Peripheral pulses palpable 2+ bilaterally  Neurologic: Non-focal  Psychiatry: A & O x 3, Mood & affect appropriate    buDESOnide 160 MICROgram(s)/formoterol 4.5 MICROgram(s) Inhaler 2Puff(s) Inhalation two times a day  ALBUTerol/ipratropium for Nebulization 3milliLiter(s) Nebulizer every 6 hours  ALBUTerol    90 MICROgram(s) HFA Inhaler 1Puff(s) Inhalation every 4 hours  tiotropium 18 MICROgram(s) Capsule 1Capsule(s) Inhalation daily  insulin lispro (HumaLOG) corrective regimen sliding scale  SubCutaneous Before meals and at bedtime  dextrose 5%. 1000milliLiter(s) IV Continuous <Continuous>  dextrose 50% Injectable 25Gram(s) IV Push once  glucagon  Injectable 1milliGRAM(s) IntraMuscular once PRN  labetalol Injectable 10milliGRAM(s) IV Push every 1 hour PRN  cefoTEtan  IVPB  IV Intermittent   cefoTEtan  IVPB 1Gram(s) IV Intermittent every 12 hours  LORazepam   Injectable 1milliGRAM(s) IntraMuscular every 6 hours PRN  diphenhydrAMINE   Injectable 25milliGRAM(s) IV Push at bedtime  tamsulosin 0.4milliGRAM(s) Oral at bedtime  oxyCODONE  5 mG/acetaminophen 325 mG 1Tablet(s) Oral every 4 hours PRN  clopidogrel Tablet 75milliGRAM(s) Oral daily  hydrALAZINE Injectable 10milliGRAM(s) IV Push once                                      9.7    10.8  )-----------( 240      ( 23 Apr 2017 06:10 )             29.9     04-23    142  |  108  |  11  ----------------------------<  112<H>  3.9   |  26  |  1.00    Ca    7.9<L>      23 Apr 2017 06:10  Phos  2.0     04-23  Mg     2.0     04-23        CAPILLARY BLOOD GLUCOSE  129 (23 Apr 2017 16:54)  122 (23 Apr 2017 11:40)  113 (23 Apr 2017 05:29)  149 (22 Apr 2017 21:59) Patient is a 86y old  Male who presents with a chief complaint of     HPI:  Pt is an 87 yo M former smoker, PMHx CAD s/p PCI x3 LCx (2013 @[facility]), HLD, BPH, depression, presented to cardiologist c/o worsening shortness of breath which has progressed over the past year. Patient states symptoms are similar to when he required his previous stents, and he can only walk about a block or less before having to stop secondary to shortness of breath. He states that sometimes getting dressed and walking around the house can also cause SOB. Of note, he also endorses a recent loss of appetite over the past month and non-bloody diarrhea x 1 week with associated 5lb weight loss. He denies any symptoms such as chest pain, LOC, dizziness, syncope, PND, orthopnea, LE edema, palpitations, asthma/COPD, recent viral illness or travel, abdominal pain, nausea/vomiting, hx of blood tranfusion/anemia.     INTERVAL HPI/OVERNIGHT EVENTS:::doing well, d/c P    HEALTH ISSUES - PROBLEM Dx:  Coronary artery disease, angina presence unspecified, unspecified vessel or lesion type, unspecified whether native or transplanted heart: Coronary artery disease, angina presence unspecified, unspecified vessel or lesion type, unspecified whether native or transplanted heart  Anemia, unspecified type: Anemia, unspecified type  Coronary artery disease with unstable angina pectoris, unspecified vessel or lesion type, unspecified whether native or transplanted heart: Coronary artery disease with unstable angina pectoris, unspecified vessel or lesion type, unspecified whether native or transplanted heart  Malignant neoplasm of colon, unspecified part of colon: Malignant neoplasm of colon, unspecified part of colon  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  Tachypnea: Tachypnea  Colon cancer: Colon cancer  Blood in stool: Blood in stool  Bradycardia: Bradycardia  CKD (chronic kidney disease) stage 3, GFR 30-59 ml/min: CKD (chronic kidney disease) stage 3, GFR 30-59 ml/min  Hyperlipidemia: Hyperlipidemia  Anemia: Anemia  CAD (coronary artery disease): CAD (coronary artery disease)  SOB (shortness of breath): SOB (shortness of breath)          PAST MEDICAL & SURGICAL HISTORY:  CAD (coronary artery disease)  HLD (hyperlipidemia)  S/P cataract extraction          Consultant NOTE  REVIEWED  (   )    REVIEW OF SYSTEMS:  [x] As per HPI  CONSTITUTIONAL: No fever, weight loss, or fatigue  RESPIRATORY: No cough, wheezing, chills or hemoptysis; No Shortness of Breath  CARDIOVASCULAR: No chest pain, palpitations, dizziness, or leg swelling CAD  GASTROINTESTINAL: No abdominal or epigastric pain. No nausea, vomiting, or hematemesis; No diarrhea or constipation. No melena or hematochezia.s/p laparotomy  MUSCULOSKELETAL: No joint pain or swelling; No muscle, back, or extremity pain  PSYCH    awake, alert       [x] All others negative	  [ ] Unable to obtain          Vital Signs Last 24 Hrs  T(C): 36.5, Max: 37.3 (04-23 @ 05:29)  T(F): 97.7, Max: 99.1 (04-23 @ 05:29)  HR: 76 (52 - 76)  BP: 189/74 (134/62 - 189/74)  BP(mean): 107 (89 - 107)  RR: 35 (18 - 38)  SpO2: 100% (92% - 100%)      I & Os for 24h ending 04-23 @ 07:00  =============================================  IN: 0 ml / OUT: 905 ml / NET: -905 ml    I & Os for current day (as of 04-23 @ 17:06)  =============================================  IN: 550 ml / OUT: 250 ml / NET: 300 ml    PHYSICAL EXAMINATION:                                    (    )  NO CHANGE  Appearance: Normal	  HEENT:   Normal oral mucosa, PERRL, EOMI	  Neck: Supple, + JVD/ - JVD; Carotid Bruit   Cardiovascular: Normal S1 S2, No JVD, No murmurs,   Respiratory: Lungs clear to auscultation/Decreased Breath Sounds/No Rales, Rhonchi, Wheezing	  Gastrointestinal:  Soft, Non-tender, + BS	SCAR  Skin: No rashes, No ecchymoses, No cyanosis  Extremities: Normal range of motion, No clubbing, cyanosis or edema  Vascular: Peripheral pulses palpable 2+ bilaterally  Neurologic: Non-focal  Psychiatry: A & O x 3, Mood & affect appropriate    buDESOnide 160 MICROgram(s)/formoterol 4.5 MICROgram(s) Inhaler 2Puff(s) Inhalation two times a day  ALBUTerol/ipratropium for Nebulization 3milliLiter(s) Nebulizer every 6 hours  ALBUTerol    90 MICROgram(s) HFA Inhaler 1Puff(s) Inhalation every 4 hours  tiotropium 18 MICROgram(s) Capsule 1Capsule(s) Inhalation daily  insulin lispro (HumaLOG) corrective regimen sliding scale  SubCutaneous Before meals and at bedtime  dextrose 5%. 1000milliLiter(s) IV Continuous <Continuous>  dextrose 50% Injectable 25Gram(s) IV Push once  glucagon  Injectable 1milliGRAM(s) IntraMuscular once PRN  labetalol Injectable 10milliGRAM(s) IV Push every 1 hour PRN  cefoTEtan  IVPB  IV Intermittent   cefoTEtan  IVPB 1Gram(s) IV Intermittent every 12 hours  LORazepam   Injectable 1milliGRAM(s) IntraMuscular every 6 hours PRN  diphenhydrAMINE   Injectable 25milliGRAM(s) IV Push at bedtime  tamsulosin 0.4milliGRAM(s) Oral at bedtime  oxyCODONE  5 mG/acetaminophen 325 mG 1Tablet(s) Oral every 4 hours PRN  clopidogrel Tablet 75milliGRAM(s) Oral daily  hydrALAZINE Injectable 10milliGRAM(s) IV Push once                                      9.7    10.8  )-----------( 240      ( 23 Apr 2017 06:10 )             29.9     04-23    142  |  108  |  11  ----------------------------<  112<H>  3.9   |  26  |  1.00    Ca    7.9<L>      23 Apr 2017 06:10  Phos  2.0     04-23  Mg     2.0     04-23        CAPILLARY BLOOD GLUCOSE  129 (23 Apr 2017 16:54)  122 (23 Apr 2017 11:40)  113 (23 Apr 2017 05:29)  149 (22 Apr 2017 21:59)

## 2017-04-23 NOTE — PROVIDER CONTACT NOTE (OTHER) - RECOMMENDATIONS
haldol and restrains and Enhanced supervision.CT angio
NIRU Alfred will administer dose IV Hydralazine.
Inquiring on whether to hold the next PRBC infusion
Monitor since patient is clinically stable.

## 2017-04-23 NOTE — PROVIDER CONTACT NOTE (OTHER) - ACTION/TREATMENT ORDERED:
haldol 1mg given, restrained, CT angio
Continue to monitor.
Pt repositioned with HOB elevated 90 degrees.  EKG ordered and PRN Ativan to be given as per order.  Will continue to monitor.
As per ALLIE Alfred, pt is to receive the 2nd PRBC infusion.
As per ALLIE Brown, no intervention needed, expected with recent surgery.

## 2017-04-23 NOTE — PROVIDER CONTACT NOTE (OTHER) - SITUATION
pt lying in bed (without exertion) reports feeling SOB and inability to "catch" his breath.  RR of 35, O2 sat 100% on 2L NC. BP elevated (189/74) HR 76 NSR.

## 2017-04-23 NOTE — PROGRESS NOTE ADULT - ASSESSMENT
ABD SOFT, DISTENDED, BS+, FLATUS+, BM+, WOUND IS DRY, INTACT.  TOLERATING CLEARS, WAS ADVANCE TO SOFT MECHANICAL DIET, TOLERATED, DVT PROFILAXIS, SPIROMETRY , OOB,    AROUND 17:00 DEVELOPED SOB, AGITATION, WORK UP WAS INITIATED FOR R/O MI, PE, PATIENT TRANSFER TO [facility].

## 2017-04-23 NOTE — PROVIDER CONTACT NOTE (OTHER) - REASON
Hgb 9.4 after 1st PRBC transfusion
Small amount of bloody liquid passed per rectum.
elevated BP
pt tachypneic & reports SOB

## 2017-04-23 NOTE — PROGRESS NOTE ADULT - ASSESSMENT
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    Soft mechanical diet  Cefotetan  TOV  Pain/nausea control  DVT ppx  OOB/IS  f/u AM labs  Home plavix 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    Soft mechanical diet  Cefotetan  Pain/nausea control  DVT ppx  OOB/IS  f/u AM labs  Home plavix 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    Soft mechanical diet  Cefotetan  Pain/nausea control  DVT ppx  OOB/IS  f/u AM labs  Home plavix  Replete hypophosphatemia

## 2017-04-23 NOTE — PROGRESS NOTE ADULT - ASSESSMENT
ASSESSMENT/PLAN    1) Status post right hemicolectomy  2) Chronic obstructive pulmonary disease  3) Pulmonary nodules  4) Atelectasis/pleural effusion    Observe on RA  Incentive spirometer encouraged  Pain management  Bronchodilators:  Spiriva daily, Atrovent/ albuterol q 4 – 6 hours as needed  Corticosteroids: Budesonide  Cardiac/HTN:  BP control  GI: Rx/ c PPI/H2B  Heme: Rx/VT prophylaxis pt on Plavix and SCD, add Heparin SQ unless contraindicated  Mobilize, OOB, progress incentive spirometry, pt instructed  Discuss with medical team

## 2017-04-23 NOTE — PROGRESS NOTE ADULT - SUBJECTIVE AND OBJECTIVE BOX
Patient is a 86y old  Male who presents with a chief complaint of     HPI:  Pt is an 87 yo M former smoker, PMHx CAD s/p PCI x3 LCx (2013 @[facility]), HLD, BPH, depression, presented to cardiologist c/o worsening shortness of breath which has progressed over the past year. Patient states symptoms are similar to when he required his previous stents, and he can only walk about a block or less before having to stop secondary to shortness of breath. He states that sometimes getting dressed and walking around the house can also cause SOB. Of note, he also endorses a recent loss of appetite over the past month and non-bloody diarrhea x 1 week with associated 5lb weight loss. He denies any symptoms such as chest pain, LOC, dizziness, syncope, PND, orthopnea, LE edema, palpitations, asthma/COPD, recent viral illness or travel, abdominal pain, nausea/vomiting, hx of blood tranfusion/anemia. On EKG in office patient noticed to be sinus yasmine with IVCD. Pt recommended for Right and Left cardiac catheterization given history of multiple PCI and EKG abnormalities as well as possible echo/EP evaluation. (06 Apr 2017 16:15)    INTERVAL HPI/OVERNIGHT EVENTS:::    HEALTH ISSUES - PROBLEM Dx:  Coronary artery disease, angina presence unspecified, unspecified vessel or lesion type, unspecified whether native or transplanted heart: Coronary artery disease, angina presence unspecified, unspecified vessel or lesion type, unspecified whether native or transplanted heart  Anemia, unspecified type: Anemia, unspecified type  Coronary artery disease with unstable angina pectoris, unspecified vessel or lesion type, unspecified whether native or transplanted heart: Coronary artery disease with unstable angina pectoris, unspecified vessel or lesion type, unspecified whether native or transplanted heart  Malignant neoplasm of colon, unspecified part of colon: Malignant neoplasm of colon, unspecified part of colon  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  Tachypnea: Tachypnea  Colon cancer: Colon cancer  Blood in stool: Blood in stool  Bradycardia: Bradycardia  CKD (chronic kidney disease) stage 3, GFR 30-59 ml/min: CKD (chronic kidney disease) stage 3, GFR 30-59 ml/min  Hyperlipidemia: Hyperlipidemia  Anemia: Anemia  CAD (coronary artery disease): CAD (coronary artery disease)  SOB (shortness of breath): SOB (shortness of breath)          PAST MEDICAL & SURGICAL HISTORY:  CAD (coronary artery disease)  HLD (hyperlipidemia)  S/P cataract extraction          Consultant NOTE  REVIEWED  (   )    REVIEW OF SYSTEMS:  [x] As per HPI  CONSTITUTIONAL: No fever, weight loss, or fatigue  RESPIRATORY: No cough, wheezing, chills or hemoptysis; No Shortness of Breath  CARDIOVASCULAR: No chest pain, palpitations, dizziness, or leg swelling  GASTROINTESTINAL: No abdominal or epigastric pain. No nausea, vomiting, or hematemesis; No diarrhea or constipation. No melena or hematochezia.  MUSCULOSKELETAL: No joint pain or swelling; No muscle, back, or extremity pain  PSYCH    awake, alert       [x] All others negative	  [ ] Unable to obtain          Vital Signs Last 24 Hrs  T(C): 36.5, Max: 37.3 (04-23 @ 05:29)  T(F): 97.7, Max: 99.1 (04-23 @ 05:29)  HR: 76 (52 - 76)  BP: 189/74 (134/62 - 189/74)  BP(mean): 107 (89 - 107)  RR: 35 (18 - 38)  SpO2: 100% (92% - 100%)      I & Os for 24h ending 04-23 @ 07:00  =============================================  IN: 0 ml / OUT: 905 ml / NET: -905 ml    I & Os for current day (as of 04-23 @ 17:08)  =============================================  IN: 550 ml / OUT: 250 ml / NET: 300 ml    PHYSICAL EXAMINATION:                                    (    )  NO CHANGE  Appearance: Normal	  HEENT:   Normal oral mucosa, PERRL, EOMI	  Neck: Supple, + JVD/ - JVD; Carotid Bruit   Cardiovascular: Normal S1 S2, No JVD, No murmurs,   Respiratory: Lungs clear to auscultation/Decreased Breath Sounds/No Rales, Rhonchi, Wheezing	  Gastrointestinal:  Soft, Non-tender, + BS	  Skin: No rashes, No ecchymoses, No cyanosis  Extremities: Normal range of motion, No clubbing, cyanosis or edema  Vascular: Peripheral pulses palpable 2+ bilaterally  Neurologic: Non-focal  Psychiatry: A & O x 3, Mood & affect appropriate    buDESOnide 160 MICROgram(s)/formoterol 4.5 MICROgram(s) Inhaler 2Puff(s) Inhalation two times a day  ALBUTerol/ipratropium for Nebulization 3milliLiter(s) Nebulizer every 6 hours  ALBUTerol    90 MICROgram(s) HFA Inhaler 1Puff(s) Inhalation every 4 hours  tiotropium 18 MICROgram(s) Capsule 1Capsule(s) Inhalation daily  insulin lispro (HumaLOG) corrective regimen sliding scale  SubCutaneous Before meals and at bedtime  dextrose 5%. 1000milliLiter(s) IV Continuous <Continuous>  dextrose 50% Injectable 25Gram(s) IV Push once  glucagon  Injectable 1milliGRAM(s) IntraMuscular once PRN  labetalol Injectable 10milliGRAM(s) IV Push every 1 hour PRN  cefoTEtan  IVPB  IV Intermittent   cefoTEtan  IVPB 1Gram(s) IV Intermittent every 12 hours  LORazepam   Injectable 1milliGRAM(s) IntraMuscular every 6 hours PRN  diphenhydrAMINE   Injectable 25milliGRAM(s) IV Push at bedtime  tamsulosin 0.4milliGRAM(s) Oral at bedtime  oxyCODONE  5 mG/acetaminophen 325 mG 1Tablet(s) Oral every 4 hours PRN  clopidogrel Tablet 75milliGRAM(s) Oral daily  hydrALAZINE Injectable 10milliGRAM(s) IV Push once                                      9.7    10.8  )-----------( 240      ( 23 Apr 2017 06:10 )             29.9     04-23    142  |  108  |  11  ----------------------------<  112<H>  3.9   |  26  |  1.00    Ca    7.9<L>      23 Apr 2017 06:10  Phos  2.0     04-23  Mg     2.0     04-23        CAPILLARY BLOOD GLUCOSE  129 (23 Apr 2017 16:54)  122 (23 Apr 2017 11:40)  113 (23 Apr 2017 05:29)  149 (22 Apr 2017 21:59)

## 2017-04-23 NOTE — PROGRESS NOTE ADULT - SUBJECTIVE AND OBJECTIVE BOX
O/N: [facility]'ed at [facility], tolerating diet, no nausea, +F/BM. Restarted Plavix for 4/23 as per Dr. Headley.  4/22: advanced to soft diet, tolerating    STATUS POST: R hemicolectomy with ileocolonic anastomosis    POST OP DAY #: 6 O/N: [facility]ed [facility], tolerating diet, no nausea, +F/BM. Restarted Plavix for 4/23 as per Dr. Headley.  4/22: advanced to soft diet, tolerating    STATUS POST: R hemicolectomy with ileocolonic anastomosis    POST OP DAY #: 6    SUBJECTIVE: Pt has no complaints. Denies nausea, vomiting, sob, chest pain. +F      MEDICATIONS  (STANDING):  buDESOnide 160 MICROgram(s)/formoterol 4.5 MICROgram(s) Inhaler 2Puff(s) Inhalation two times a day  ALBUTerol/ipratropium for Nebulization 3milliLiter(s) Nebulizer every 6 hours  ALBUTerol    90 MICROgram(s) HFA Inhaler 1Puff(s) Inhalation every 4 hours  tiotropium 18 MICROgram(s) Capsule 1Capsule(s) Inhalation daily  insulin lispro (HumaLOG) corrective regimen sliding scale  SubCutaneous Before meals and at bedtime  dextrose 5%. 1000milliLiter(s) IV Continuous <Continuous>  dextrose 50% Injectable 25Gram(s) IV Push once  cefoTEtan  IVPB  IV Intermittent   cefoTEtan  IVPB 1Gram(s) IV Intermittent every 12 hours  diphenhydrAMINE   Injectable 25milliGRAM(s) IV Push at bedtime  tamsulosin 0.4milliGRAM(s) Oral at bedtime  clopidogrel Tablet 75milliGRAM(s) Oral daily  potassium phosphate IVPB 21milliMole(s) IV Intermittent once    MEDICATIONS  (PRN):  glucagon  Injectable 1milliGRAM(s) IntraMuscular once PRN Glucose LESS THAN 70 milligrams/deciliter  labetalol Injectable 10milliGRAM(s) IV Push every 1 hour PRN For SBP>160, Hold for HR<65  LORazepam   Injectable 1milliGRAM(s) IntraMuscular every 6 hours PRN Anxiety  oxyCODONE  5 mG/acetaminophen 325 mG 1Tablet(s) Oral every 4 hours PRN Moderate Pain (4 - 6)      Vital Signs Last 24 Hrs  T(C): 36.1, Max: 37.3 (04-23 @ 05:29)  T(F): 96.9, Max: 99.1 (04-23 @ 05:29)  HR: 66 (52 - 76)  BP: 134/62 (134/62 - 160/74)  BP(mean): 89 (89 - 107)  RR: 24 (18 - 38)  SpO2: 99% (92% - 99%)    PHYSICAL EXAM:      Constitutional: A&Ox3, NAD, sitting in chair    Respiratory: non labored breathing, no respiratory distress    Cardiovascular: NSR, RRR    Gastrointestinal: abdominal binder in place. Midline staple line  CDI with minor superficial bruising noted. NT/ND         Genitourinary: voids    Extremities: (-) edema                  I&O's Detail  I & Os for 24h ending 23 Apr 2017 07:00  =============================================  IN:    Total IN: 0 ml  ---------------------------------------------  OUT:    Indwelling Catheter - Urethral: 675 ml    Voided: 230 ml    Total OUT: 905 ml  ---------------------------------------------  Total NET: -905 ml    I & Os for current day (as of 23 Apr 2017 09:43)  =============================================  IN:    Oral Fluid: 550 ml    Total IN: 550 ml  ---------------------------------------------  OUT:    Total OUT: 0 ml  ---------------------------------------------  Total NET: 550 ml      LABS:                        9.7    10.8  )-----------( 240      ( 23 Apr 2017 06:10 )             29.9     04-23    142  |  108  |  11  ----------------------------<  112<H>  3.9   |  26  |  1.00    Ca    7.9<L>      23 Apr 2017 06:10  Phos  2.0     04-23  Mg     2.0     04-23            RADIOLOGY & ADDITIONAL STUDIES: O/N: [facility], tolerating diet, no nausea, +F/BM. Restarted Plavix for 4/23 as per Dr. Headley.  4/22: advanced to soft diet, tolerating    STATUS POST: R hemicolectomy with ileocolonic anastomosis    POST OP DAY #: 6    SUBJECTIVE: Pt has no complaints. Denies nausea, vomiting, sob, chest pain. +F. Passed TOV.      MEDICATIONS  (STANDING):  buDESOnide 160 MICROgram(s)/formoterol 4.5 MICROgram(s) Inhaler 2Puff(s) Inhalation two times a day  ALBUTerol/ipratropium for Nebulization 3milliLiter(s) Nebulizer every 6 hours  ALBUTerol    90 MICROgram(s) HFA Inhaler 1Puff(s) Inhalation every 4 hours  tiotropium 18 MICROgram(s) Capsule 1Capsule(s) Inhalation daily  insulin lispro (HumaLOG) corrective regimen sliding scale  SubCutaneous Before meals and at bedtime  dextrose 5%. 1000milliLiter(s) IV Continuous <Continuous>  dextrose 50% Injectable 25Gram(s) IV Push once  cefoTEtan  IVPB  IV Intermittent   cefoTEtan  IVPB 1Gram(s) IV Intermittent every 12 hours  diphenhydrAMINE   Injectable 25milliGRAM(s) IV Push at bedtime  tamsulosin 0.4milliGRAM(s) Oral at bedtime  clopidogrel Tablet 75milliGRAM(s) Oral daily  potassium phosphate IVPB 21milliMole(s) IV Intermittent once    MEDICATIONS  (PRN):  glucagon  Injectable 1milliGRAM(s) IntraMuscular once PRN Glucose LESS THAN 70 milligrams/deciliter  labetalol Injectable 10milliGRAM(s) IV Push every 1 hour PRN For SBP>160, Hold for HR<65  LORazepam   Injectable 1milliGRAM(s) IntraMuscular every 6 hours PRN Anxiety  oxyCODONE  5 mG/acetaminophen 325 mG 1Tablet(s) Oral every 4 hours PRN Moderate Pain (4 - 6)      Vital Signs Last 24 Hrs  T(C): 36.1, Max: 37.3 (04-23 @ 05:29)  T(F): 96.9, Max: 99.1 (04-23 @ 05:29)  HR: 66 (52 - 76)  BP: 134/62 (134/62 - 160/74)  BP(mean): 89 (89 - 107)  RR: 24 (18 - 38)  SpO2: 99% (92% - 99%)    PHYSICAL EXAM:      Constitutional: A&Ox3, NAD, sitting in chair    Respiratory: non labored breathing, no respiratory distress    Cardiovascular: NSR, RRR    Gastrointestinal: abdominal binder in place. Midline staple line  CDI with minor superficial bruising noted. NT/ND         Genitourinary: voids    Extremities: (-) edema                  I&O's Detail  I & Os for 24h ending 23 Apr 2017 07:00  =============================================  IN:    Total IN: 0 ml  ---------------------------------------------  OUT:    Indwelling Catheter - Urethral: 675 ml    Voided: 230 ml    Total OUT: 905 ml  ---------------------------------------------  Total NET: -905 ml    I & Os for current day (as of 23 Apr 2017 09:43)  =============================================  IN:    Oral Fluid: 550 ml    Total IN: 550 ml  ---------------------------------------------  OUT:    Total OUT: 0 ml  ---------------------------------------------  Total NET: 550 ml      LABS:                        9.7    10.8  )-----------( 240      ( 23 Apr 2017 06:10 )             29.9     04-23    142  |  108  |  11  ----------------------------<  112<H>  3.9   |  26  |  1.00    Ca    7.9<L>      23 Apr 2017 06:10  Phos  2.0     04-23  Mg     2.0     04-23            RADIOLOGY & ADDITIONAL STUDIES:

## 2017-04-23 NOTE — PROGRESS NOTE ADULT - SUBJECTIVE AND OBJECTIVE BOX
Interventional, Pulmonary, Critical, Chest Special Procedures.    Pt was seen and fully examined by myself.     Time spent with patient in minutes:37    The patient appears comfortable and eupneic on RA when seen. No new complaints  HPI:  Pt is an 87 yo M former smoker, PMHx CAD s/p PCI x3 LCx (2013 @[facility]), HLD, BPH, depression, presented to cardiologist c/o worsening shortness of breath which has progressed over the past year. Patient states symptoms are similar to when he required his previous stents, and he can only walk about a block or less before having to stop secondary to shortness of breath. He states that sometimes getting dressed and walking around the house can also cause SOB. Of note, he also endorses a recent loss of appetite over the past month and non-bloody diarrhea x 1 week with associated 5lb weight loss. He denies any symptoms such as chest pain, LOC, dizziness, syncope, PND, orthopnea, LE edema, palpitations, asthma/COPD, recent viral illness or travel, abdominal pain, nausea/vomiting, hx of blood tranfusion/anemia. On EKG in office patient noticed to be sinus yasmine with IVCD. Pt recommended for Right and Left cardiac catheterization given history of multiple PCI and EKG abnormalities as well as possible echo/EP evaluation. (06 Apr 2017 16:15)    REVIEW OF SYSTEMS:  ROS reviewed below with positive findings marked (+) :  GEN:  fever, chills ENT: tracheostomy,   epistaxis,  sinusitis COR: +CAD, CHF,  HTN, dysrhythmia PUL: COPD, ILD, asthma, pneumonia GI: PEG, dysphagia, hemorrhage, other ELIZABETH: kidney disease, electrolyte disorder HEM:  anemia, thrombus, coagulopathy, cancer ENDO:  thyroid disease, diabetes mellitus CNS:  dementia, stroke, seizure, PSY:  depression, anxiety, other    PAST MEDICAL & SURGICAL HISTORY:  CAD (coronary artery disease)  HLD (hyperlipidemia)  S/P cataract extraction    FAMILY HISTORY:  No pertinent family history in first degree relatives    SOCIAL HISTORY:      - Tobacco     - ETOH    Allergies    No Known Allergies    Intolerances      Vital Signs Last 24 Hrs  T(C): 36.1, Max: 37.3 (04-23 @ 05:29)  T(F): 96.9, Max: 99.1 (04-23 @ 05:29)  HR: 66 (52 - 76)  BP: 134/62 (134/62 - 160/74)  BP(mean): 89 (89 - 107)  RR: 24 (18 - 38)  SpO2: 99% (92% - 99%)  I & Os for 24h ending 04-23 @ 07:00  =============================================  IN: 0 ml / OUT: 905 ml / NET: -905 ml    I & Os for current day (as of 04-23 @ 12:23)  =============================================  IN: 550 ml / OUT: 0 ml / NET: 550 ml      PHYSICAL EXAM:  GENERAL:         comfortable,  - distress.  HEENT:            - trauma,  - icterus,  - injection,  - nasal discharge.  NECK:              - jugular venous distention, - thyromegaly.  LYMPH:           - lymphadenopathy, - masses.  RESP:              + crackles,   - rhonchi,   - wheezes.   COR:                S1S2  - gallops,  - rubs.  ABD:                abdominal binder, bowel sounds,  soft, - tender, - distended.  EXT/MSC:         - cyanosis,  - edema.    NEURO:             alert,   responds to stimuli.  DEVICES:  - DENTURES   +IV R / L     - ETUBE   -TRACH   -CTUBE  R / L      LABS:                          9.7    10.8  )-----------( 240      ( 23 Apr 2017 06:10 )             29.9     04-23    142  |  108  |  11  ----------------------------<  112<H>  3.9   |  26  |  1.00    Ca    7.9<L>      23 Apr 2017 06:10  Phos  2.0     04-23  Mg     2.0     04-23        RADIOLOGY & ADDITIONAL STUDIES (The following images were personally reviewed):

## 2017-04-24 ENCOUNTER — TRANSCRIPTION ENCOUNTER (OUTPATIENT)
Age: 82
End: 2017-04-24

## 2017-04-24 VITALS
OXYGEN SATURATION: 97 % | DIASTOLIC BLOOD PRESSURE: 61 MMHG | HEART RATE: 66 BPM | TEMPERATURE: 99 F | SYSTOLIC BLOOD PRESSURE: 113 MMHG | RESPIRATION RATE: 16 BRPM

## 2017-04-24 LAB
ANION GAP SERPL CALC-SCNC: 10 MMOL/L — SIGNIFICANT CHANGE UP (ref 9–16)
APTT BLD: 28.7 SEC — SIGNIFICANT CHANGE UP (ref 27.5–37.4)
BUN SERPL-MCNC: 11 MG/DL — SIGNIFICANT CHANGE UP (ref 7–23)
CALCIUM SERPL-MCNC: 8.2 MG/DL — LOW (ref 8.5–10.5)
CHLORIDE SERPL-SCNC: 103 MMOL/L — SIGNIFICANT CHANGE UP (ref 96–108)
CO2 SERPL-SCNC: 25 MMOL/L — SIGNIFICANT CHANGE UP (ref 22–31)
CREAT SERPL-MCNC: 1.03 MG/DL — SIGNIFICANT CHANGE UP (ref 0.5–1.3)
GLUCOSE SERPL-MCNC: 98 MG/DL — SIGNIFICANT CHANGE UP (ref 70–99)
HCT VFR BLD CALC: 29.3 % — LOW (ref 39–50)
HGB BLD-MCNC: 9.5 G/DL — LOW (ref 13–17)
INR BLD: 1.05 — SIGNIFICANT CHANGE UP (ref 0.88–1.16)
MAGNESIUM SERPL-MCNC: 1.7 MG/DL — SIGNIFICANT CHANGE UP (ref 1.6–2.4)
MCHC RBC-ENTMCNC: 26.7 PG — LOW (ref 27–34)
MCHC RBC-ENTMCNC: 32.4 G/DL — SIGNIFICANT CHANGE UP (ref 32–36)
MCV RBC AUTO: 82.3 FL — SIGNIFICANT CHANGE UP (ref 80–100)
NT-PROBNP SERPL-SCNC: 2657 PG/ML — HIGH
PHOSPHATE SERPL-MCNC: 4.1 MG/DL — SIGNIFICANT CHANGE UP (ref 2.5–4.5)
PLATELET # BLD AUTO: 288 K/UL — SIGNIFICANT CHANGE UP (ref 150–400)
POTASSIUM SERPL-MCNC: 3.6 MMOL/L — SIGNIFICANT CHANGE UP (ref 3.5–5.3)
POTASSIUM SERPL-SCNC: 3.6 MMOL/L — SIGNIFICANT CHANGE UP (ref 3.5–5.3)
PROTHROM AB SERPL-ACNC: 11.7 SEC — SIGNIFICANT CHANGE UP (ref 9.8–12.7)
RBC # BLD: 3.56 M/UL — LOW (ref 4.2–5.8)
RBC # FLD: 16.6 % — SIGNIFICANT CHANGE UP (ref 10.3–16.9)
SODIUM SERPL-SCNC: 138 MMOL/L — SIGNIFICANT CHANGE UP (ref 135–145)
WBC # BLD: 12.8 K/UL — HIGH (ref 3.8–10.5)
WBC # FLD AUTO: 12.8 K/UL — HIGH (ref 3.8–10.5)

## 2017-04-24 PROCEDURE — 80076 HEPATIC FUNCTION PANEL: CPT

## 2017-04-24 PROCEDURE — 82607 VITAMIN B-12: CPT

## 2017-04-24 PROCEDURE — 71260 CT THORAX DX C+: CPT

## 2017-04-24 PROCEDURE — 82550 ASSAY OF CK (CPK): CPT

## 2017-04-24 PROCEDURE — 93306 TTE W/DOPPLER COMPLETE: CPT

## 2017-04-24 PROCEDURE — 83036 HEMOGLOBIN GLYCOSYLATED A1C: CPT

## 2017-04-24 PROCEDURE — 93306 TTE W/DOPPLER COMPLETE: CPT | Mod: 26

## 2017-04-24 PROCEDURE — 36430 TRANSFUSION BLD/BLD COMPNT: CPT

## 2017-04-24 PROCEDURE — 97164 PT RE-EVAL EST PLAN CARE: CPT

## 2017-04-24 PROCEDURE — 85027 COMPLETE CBC AUTOMATED: CPT

## 2017-04-24 PROCEDURE — 80061 LIPID PANEL: CPT

## 2017-04-24 PROCEDURE — 97161 PT EVAL LOW COMPLEX 20 MIN: CPT

## 2017-04-24 PROCEDURE — 88305 TISSUE EXAM BY PATHOLOGIST: CPT

## 2017-04-24 PROCEDURE — 88307 TISSUE EXAM BY PATHOLOGIST: CPT

## 2017-04-24 PROCEDURE — 71046 X-RAY EXAM CHEST 2 VIEWS: CPT

## 2017-04-24 PROCEDURE — 82553 CREATINE MB FRACTION: CPT

## 2017-04-24 PROCEDURE — 71275 CT ANGIOGRAPHY CHEST: CPT

## 2017-04-24 PROCEDURE — 84443 ASSAY THYROID STIM HORMONE: CPT

## 2017-04-24 PROCEDURE — 84100 ASSAY OF PHOSPHORUS: CPT

## 2017-04-24 PROCEDURE — 99232 SBSQ HOSP IP/OBS MODERATE 35: CPT

## 2017-04-24 PROCEDURE — 83880 ASSAY OF NATRIURETIC PEPTIDE: CPT

## 2017-04-24 PROCEDURE — 81003 URINALYSIS AUTO W/O SCOPE: CPT

## 2017-04-24 PROCEDURE — 85730 THROMBOPLASTIN TIME PARTIAL: CPT

## 2017-04-24 PROCEDURE — 84484 ASSAY OF TROPONIN QUANT: CPT

## 2017-04-24 PROCEDURE — 81001 URINALYSIS AUTO W/SCOPE: CPT

## 2017-04-24 PROCEDURE — 93005 ELECTROCARDIOGRAM TRACING: CPT

## 2017-04-24 PROCEDURE — 85025 COMPLETE CBC W/AUTO DIFF WBC: CPT

## 2017-04-24 PROCEDURE — 97116 GAIT TRAINING THERAPY: CPT

## 2017-04-24 PROCEDURE — 83550 IRON BINDING TEST: CPT

## 2017-04-24 PROCEDURE — 82378 CARCINOEMBRYONIC ANTIGEN: CPT

## 2017-04-24 PROCEDURE — 86923 COMPATIBILITY TEST ELECTRIC: CPT

## 2017-04-24 PROCEDURE — 93970 EXTREMITY STUDY: CPT

## 2017-04-24 PROCEDURE — 80053 COMPREHEN METABOLIC PANEL: CPT

## 2017-04-24 PROCEDURE — 83735 ASSAY OF MAGNESIUM: CPT

## 2017-04-24 PROCEDURE — 71045 X-RAY EXAM CHEST 1 VIEW: CPT

## 2017-04-24 PROCEDURE — 85610 PROTHROMBIN TIME: CPT

## 2017-04-24 PROCEDURE — 94640 AIRWAY INHALATION TREATMENT: CPT

## 2017-04-24 PROCEDURE — 86850 RBC ANTIBODY SCREEN: CPT

## 2017-04-24 PROCEDURE — 86901 BLOOD TYPING SEROLOGIC RH(D): CPT

## 2017-04-24 PROCEDURE — 84134 ASSAY OF PREALBUMIN: CPT

## 2017-04-24 PROCEDURE — 74177 CT ABD & PELVIS W/CONTRAST: CPT

## 2017-04-24 PROCEDURE — P9016: CPT

## 2017-04-24 PROCEDURE — 36415 COLL VENOUS BLD VENIPUNCTURE: CPT

## 2017-04-24 PROCEDURE — 82746 ASSAY OF FOLIC ACID SERUM: CPT

## 2017-04-24 PROCEDURE — 82803 BLOOD GASES ANY COMBINATION: CPT

## 2017-04-24 PROCEDURE — 86900 BLOOD TYPING SEROLOGIC ABO: CPT

## 2017-04-24 PROCEDURE — 80048 BASIC METABOLIC PNL TOTAL CA: CPT

## 2017-04-24 PROCEDURE — 99232 SBSQ HOSP IP/OBS MODERATE 35: CPT | Mod: GC

## 2017-04-24 PROCEDURE — 82728 ASSAY OF FERRITIN: CPT

## 2017-04-24 PROCEDURE — P9035: CPT

## 2017-04-24 PROCEDURE — 94150 VITAL CAPACITY TEST: CPT

## 2017-04-24 RX ORDER — ACETAMINOPHEN 500 MG
650 TABLET ORAL ONCE
Qty: 0 | Refills: 0 | Status: COMPLETED | OUTPATIENT
Start: 2017-04-24 | End: 2017-04-24

## 2017-04-24 RX ORDER — MIRTAZAPINE 45 MG/1
15 TABLET, ORALLY DISINTEGRATING ORAL AT BEDTIME
Qty: 0 | Refills: 0 | Status: DISCONTINUED | OUTPATIENT
Start: 2017-04-24 | End: 2017-04-24

## 2017-04-24 RX ORDER — MAGNESIUM SULFATE 500 MG/ML
2 VIAL (ML) INJECTION ONCE
Qty: 0 | Refills: 0 | Status: COMPLETED | OUTPATIENT
Start: 2017-04-24 | End: 2017-04-24

## 2017-04-24 RX ORDER — TAMSULOSIN HYDROCHLORIDE 0.4 MG/1
1 CAPSULE ORAL
Qty: 3 | Refills: 0
Start: 2017-04-24 | End: 2017-04-27

## 2017-04-24 RX ORDER — AMLODIPINE BESYLATE 2.5 MG/1
2.5 TABLET ORAL DAILY
Qty: 0 | Refills: 0 | Status: DISCONTINUED | OUTPATIENT
Start: 2017-04-24 | End: 2017-04-24

## 2017-04-24 RX ORDER — FUROSEMIDE 40 MG
1 TABLET ORAL
Qty: 10 | Refills: 0 | OUTPATIENT
Start: 2017-04-24 | End: 2017-05-04

## 2017-04-24 RX ADMIN — Medication 20 MILLIGRAM(S): at 12:49

## 2017-04-24 RX ADMIN — Medication 650 MILLIGRAM(S): at 15:00

## 2017-04-24 RX ADMIN — HYDROMORPHONE HYDROCHLORIDE 0.5 MILLIGRAM(S): 2 INJECTION INTRAMUSCULAR; INTRAVENOUS; SUBCUTANEOUS at 08:07

## 2017-04-24 RX ADMIN — Medication 650 MILLIGRAM(S): at 14:51

## 2017-04-24 RX ADMIN — HEPARIN SODIUM 5000 UNIT(S): 5000 INJECTION INTRAVENOUS; SUBCUTANEOUS at 14:52

## 2017-04-24 RX ADMIN — Medication 2: at 10:43

## 2017-04-24 RX ADMIN — Medication 3 MILLILITER(S): at 06:50

## 2017-04-24 RX ADMIN — PIPERACILLIN AND TAZOBACTAM 200 GRAM(S): 4; .5 INJECTION, POWDER, LYOPHILIZED, FOR SOLUTION INTRAVENOUS at 14:51

## 2017-04-24 RX ADMIN — BUDESONIDE AND FORMOTEROL FUMARATE DIHYDRATE 2 PUFF(S): 160; 4.5 AEROSOL RESPIRATORY (INHALATION) at 12:49

## 2017-04-24 RX ADMIN — AMLODIPINE BESYLATE 2.5 MILLIGRAM(S): 2.5 TABLET ORAL at 10:43

## 2017-04-24 RX ADMIN — Medication 50 GRAM(S): at 06:51

## 2017-04-24 RX ADMIN — CLOPIDOGREL BISULFATE 75 MILLIGRAM(S): 75 TABLET, FILM COATED ORAL at 12:48

## 2017-04-24 RX ADMIN — Medication 65 MILLIMOLE(S): at 00:00

## 2017-04-24 RX ADMIN — Medication 3 MILLILITER(S): at 12:48

## 2017-04-24 RX ADMIN — PIPERACILLIN AND TAZOBACTAM 200 GRAM(S): 4; .5 INJECTION, POWDER, LYOPHILIZED, FOR SOLUTION INTRAVENOUS at 06:48

## 2017-04-24 NOTE — CONSULT NOTE ADULT - RS GEN PE MLT RESP DETAILS PC
diminished breath sounds, R/diminished breath sounds, L
rales/diminished breath sounds, R/diminished breath sounds, L

## 2017-04-24 NOTE — PROGRESS NOTE ADULT - SUBJECTIVE AND OBJECTIVE BOX
CC/ HPI 85 yo male former smoker, chronic obstructive pulmonary disease, CAD s/p PCI x3 LCx, 2013, found to have colon mass, status post right hemicolectomy, suspected pneumonia, improved dyspnea      PAST MEDICAL & SURGICAL HISTORY:  CAD (coronary artery disease)  HLD (hyperlipidemia)  S/P cataract extraction    SOCHX:  + tobacco,  -  alcohol    FMHX: FA/MO  - contributory     ROS reviewed below with positive findings marked (+) :  GEN:  fever, chills ENT: tracheostomy,   epistaxis,  sinusitis COR: +CAD, CHF,  HTN, dysrhythmia PUL: COPD, ILD, asthma, pneumonia GI: PEG, dysphagia, hemorrhage, other ELIZABETH: kidney disease, electrolyte disorder HEM:  anemia, thrombus, coagulopathy, cancer ENDO:  thyroid disease, diabetes mellitus CNS:  dementia, stroke, seizure, PSY:  depression, anxiety, other       MEDICATIONS  (STANDING):  buDESOnide 160 MICROgram(s)/formoterol 4.5 MICROgram(s) Inhaler 2Puff(s) Inhalation two times a day  ALBUTerol/ipratropium for Nebulization 3milliLiter(s) Nebulizer every 6 hours  ALBUTerol    90 MICROgram(s) HFA Inhaler 1Puff(s) Inhalation every 4 hours  tiotropium 18 MICROgram(s) Capsule 1Capsule(s) Inhalation daily  insulin lispro (HumaLOG) corrective regimen sliding scale  SubCutaneous Before meals and at bedtime  dextrose 5%. 1000milliLiter(s) IV Continuous <Continuous>  dextrose 50% Injectable 25Gram(s) IV Push once  tamsulosin 0.4milliGRAM(s) Oral at bedtime  clopidogrel Tablet 75milliGRAM(s) Oral daily  hydrALAZINE Injectable 10milliGRAM(s) IV Push once  heparin  Injectable 5000Unit(s) SubCutaneous every 8 hours  piperacillin/tazobactam IVPB.  IV Intermittent   piperacillin/tazobactam IVPB. 4.5Gram(s) IV Intermittent every 8 hours  PARoxetine 20milliGRAM(s) Oral daily  mirtazapine 15milliGRAM(s) Oral at bedtime  amLODIPine   Tablet 2.5milliGRAM(s) Oral daily    MEDICATIONS  (PRN):  glucagon  Injectable 1milliGRAM(s) IntraMuscular once PRN Glucose LESS THAN 70 milligrams/deciliter  labetalol Injectable 10milliGRAM(s) IV Push every 1 hour PRN For SBP>160, Hold for HR<65      Vital Signs Last 24 Hrs  T(C): 36.6, Max: 37.5 (04-23 @ 22:00)  T(F): 97.9, Max: 99.5 (04-23 @ 22:00)  HR: 64 (64 - 136)  BP: 155/67 (123/64 - 189/74)  BP(mean): 93 (87 - 116)  RR: 24 (13 - 35)  SpO2: 96% (90% - 100%)    GENERAL:         comfortable,  - distress.  HEENT:            - trauma,  - icterus,  - injection,  - nasal discharge.  NECK:              - jugular venous distention, - thyromegaly.  LYMPH:           - lymphadenopathy, - masses.  RESP:               - clear,   - rales,   - rhonchi,   - wheezes.   COR:                S1S2 RRR  - murmurs,  - gallops,  - rubs.  ABD:                bowel sounds,   soft, - tender, - distended, - organomegaly.  EXT/MSC:         - cyanosis,  - clubbing,  - edema.    NEURO:             alert,   responds to stimuli.    ABG - ( 23 Apr 2017 17:33 )  pH: 7.53  /  pCO2: 28    /  pO2: 90    / HCO3: 23    / Base Excess: 0.9   /  SaO2: 98                              9.5    12.8  )-----------( 288      ( 24 Apr 2017 06:01 )             29.3     CT Chest (4/23)  1.  No pulmonary embolism. 2.  New small to moderate right and small left pleural effusions. 3.  New patchy nodular opacities in the left lower lobe suspicious for infection or inflammation.  4.  Mildly increased mediastinal/hilar lymphadenopathy    138  |  103  |  11  ----------------------------<  98  3.6   |  25  |  1.03    Ca    8.2<L>      24 Apr 2017 06:00  Phos  4.1     04-24  Mg     1.7     04-24      ASSESSMENT/PLAN    1) Status post right hemicolectomy  2) Chronic obstructive pulmonary disease  3) Pneumonia  4) Atelectasis/pleural effusion      Incentive spirometer encouraged  Follow up chest radiograph  Oxygen as needed  Bronchodilators:  Spiriva daily, Atrovent/ albuterol q 4 – 6 hours as needed  ID/Abx:  Zosyn  Corticosteroids: Budesonide  Cardiac/HTN:  BP control  GI: Rx/ c PPI/H2B  Heme: Rx/VT prophylaxis  Discuss with medical team

## 2017-04-24 NOTE — DISCHARGE NOTE ADULT - HOSPITAL COURSE
Patient was admitted for elective cardiac cath. Patient was found to be anemic and further workup revealed a colonic mass. CT chest found no mets. Patient went to the OR for a R hemicolectomy. Patient was transfused prior to and during surgery. Patient also received platelets following procedure. Diet was and will continue to be advanced as tolerated and pain was well controlled on medication. On day of discharge, pt deemed stable and ready to go to rehab with plan to follow up as an outpatient. Patient was admitted for elective cardiac catheterization. Patient was found to be anemic and further workup revealed a colonic mass. CT chest found no mets. Patient went to the OR for a right hemicolectomy. Patient was transfused prior to and during surgery. Patient also received platelets following procedure. Diet was and will continue to be advanced as tolerated and pain has been well controlled. On day of discharge, pt deemed stable and ready to go to rehab with plan to follow up as an outpatient.

## 2017-04-24 NOTE — DISCHARGE NOTE ADULT - MEDICATION SUMMARY - MEDICATIONS TO TAKE
I will START or STAY ON the medications listed below when I get home from the hospital:    Aspirin Enteric Coated 81 mg oral delayed release tablet  -- 1 tab(s) by mouth once a day  -- Indication: For Home meds    lisinopril 10 mg oral tablet  -- 1 tab(s) by mouth once a day  -- Indication: For Home meds    gabapentin 100 mg oral capsule  --  by mouth once a day  -- Indication: For Home meds    mirtazapine 15 mg oral tablet  -- 1 tab(s) by mouth once a day (at bedtime)  -- Indication: For Home meds    PARoxetine 20 mg oral tablet  -- 1 tab(s) by mouth once a day  -- Indication: For Home meds    simvastatin 20 mg oral tablet  -- 1 tab(s) by mouth once a day (at bedtime)  -- Indication: For Home meds    clopidogrel 75 mg oral tablet  -- 1 tab(s) by mouth once a day  -- Indication: For Home meds    Advair Diskus 250 mcg-50 mcg inhalation powder  -- 1 puff(s) inhaled once a day  -- Indication: For Home meds    amLODIPine 2.5 mg oral tablet  -- 1 tab(s) by mouth once a day  -- Indication: For Home meds    Augmentin 875 mg-125 mg oral tablet  -- 1 tab(s) by mouth every 12 hours  -- Finish all this medication unless otherwise directed by prescriber.  Take with food or milk.    -- Indication: For Antibiotics    pantoprazole 40 mg oral delayed release tablet  -- 1 tab(s) by mouth once a day  -- Indication: For Home meds I will START or STAY ON the medications listed below when I get home from the hospital:    Aspirin Enteric Coated 81 mg oral delayed release tablet  -- 1 tab(s) by mouth once a day  -- Indication: For Home meds    lisinopril 10 mg oral tablet  -- 1 tab(s) by mouth once a day  -- Indication: For Home meds    Flomax 0.4 mg oral capsule  -- 1 cap(s) by mouth once a day  -- It is very important that you take or use this exactly as directed.  Do not skip doses or discontinue unless directed by your doctor.  May cause drowsiness.  Alcohol may intensify this effect.  Use care when operating dangerous machinery.  Some non-prescription drugs may aggravate your condition.  Read all labels carefully.  If a warning appears, check with your doctor before taking.  Swallow whole.  Do not crush.  Take with food or milk.    -- Indication: For Retention    gabapentin 100 mg oral capsule  --  by mouth once a day  -- Indication: For Home meds    mirtazapine 15 mg oral tablet  -- 1 tab(s) by mouth once a day (at bedtime)  -- Indication: For Home meds    PARoxetine 20 mg oral tablet  -- 1 tab(s) by mouth once a day  -- Indication: For Home meds    simvastatin 20 mg oral tablet  -- 1 tab(s) by mouth once a day (at bedtime)  -- Indication: For Home meds    clopidogrel 75 mg oral tablet  -- 1 tab(s) by mouth once a day  -- Indication: For Home meds    Advair Diskus 250 mcg-50 mcg inhalation powder  -- 1 puff(s) inhaled once a day  -- Indication: For Home meds    amLODIPine 2.5 mg oral tablet  -- 1 tab(s) by mouth once a day  -- Indication: For Home meds    Lasix 40 mg oral tablet  -- 1 tab(s) by mouth once a day  -- Avoid prolonged or excessive exposure to direct and/or artificial sunlight while taking this medication.  It is very important that you take or use this exactly as directed.  Do not skip doses or discontinue unless directed by your doctor.  It may be advisable to drink a full glass orange juice or eat a banana daily while taking this medication.    -- Indication: For Diuretic    Augmentin 875 mg-125 mg oral tablet  -- 1 tab(s) by mouth every 12 hours  -- Finish all this medication unless otherwise directed by prescriber.  Take with food or milk.    -- Indication: For Antibiotics    pantoprazole 40 mg oral delayed release tablet  -- 1 tab(s) by mouth once a day  -- Indication: For Home meds

## 2017-04-24 NOTE — PROGRESS NOTE ADULT - SUBJECTIVE AND OBJECTIVE BOX
INTERVAL HPI/OVERNIGHT EVENTS:   SURGERY ATTENDING    STATUS POST:   EXPLORATORY LAPAROTOMY , RIGHT RADICAL EXTENDED HEMICOLECTOMY FOR BLEEDING RIGHT COLON NEARLY OBSTRUCTING MASS WITH SIDE TO SIDE FUNCTIONAL END TO END ILEOCOLONIC ANASTOMOSIS.      POST OPERATIVE DAY #: 7    SUBJECTIVE:  Flatus: [X ] YES [ ] NO             Bowel Movement: [X ] YES [ ] NO  Pain (0-10):        1    Pain Control Adequate: [X ] YES [ ] NO  Nausea: [ ] YES [X ] NO            Vomiting: [ ] YES [X ] NO  Diarrhea: [ ] YES [X ] NO         Constipation: [ ] YES [X ] NO     Chest Pain: [ ] YES [X ] NO    SOB:  [ ] YES [X ] NO    MEDICATIONS  (STANDING):  buDESOnide 160 MICROgram(s)/formoterol 4.5 MICROgram(s) Inhaler 2Puff(s) Inhalation two times a day  ALBUTerol/ipratropium for Nebulization 3milliLiter(s) Nebulizer every 6 hours  ALBUTerol    90 MICROgram(s) HFA Inhaler 1Puff(s) Inhalation every 4 hours  tiotropium 18 MICROgram(s) Capsule 1Capsule(s) Inhalation daily  insulin lispro (HumaLOG) corrective regimen sliding scale  SubCutaneous Before meals and at bedtime  dextrose 5%. 1000milliLiter(s) IV Continuous <Continuous>  dextrose 50% Injectable 25Gram(s) IV Push once  tamsulosin 0.4milliGRAM(s) Oral at bedtime  clopidogrel Tablet 75milliGRAM(s) Oral daily  hydrALAZINE Injectable 10milliGRAM(s) IV Push once  heparin  Injectable 5000Unit(s) SubCutaneous every 8 hours  piperacillin/tazobactam IVPB.  IV Intermittent   piperacillin/tazobactam IVPB. 4.5Gram(s) IV Intermittent every 8 hours  PARoxetine 20milliGRAM(s) Oral daily  mirtazapine 15milliGRAM(s) Oral at bedtime  amLODIPine   Tablet 2.5milliGRAM(s) Oral daily    MEDICATIONS  (PRN):  glucagon  Injectable 1milliGRAM(s) IntraMuscular once PRN Glucose LESS THAN 70 milligrams/deciliter  labetalol Injectable 10milliGRAM(s) IV Push every 1 hour PRN For SBP>160, Hold for HR<65      Vital Signs Last 24 Hrs  T(C): 37.1, Max: 37.5 (04-23 @ 22:00)  T(F): 98.8, Max: 99.5 (04-23 @ 22:00)  HR: 66 (64 - 110)  BP: 113/61 (113/61 - 167/60)  BP(mean): 93 (87 - 116)  RR: 16 (13 - 32)  SpO2: 97% (94% - 98%)            I&O's Detail  I & Os for 24h ending 24 Apr 2017 07:00  =============================================  IN:    Oral Fluid: 550 ml    Solution: 300 ml    IV PiggyBack: 250.5 ml    Total IN: 1100.5 ml  ---------------------------------------------  OUT:    Indwelling Catheter - Urethral: 665 ml    Voided: 350 ml    Total OUT: 1015 ml  ---------------------------------------------  Total NET: 85.5 ml    I & Os for current day (as of 24 Apr 2017 21:18)  =============================================  IN:    Oral Fluid: 250 ml    Total IN: 250 ml  ---------------------------------------------  OUT:    Indwelling Catheter - Urethral: 940 ml    Total OUT: 940 ml  ---------------------------------------------  Total NET: -690 ml      LABS:                        9.5    12.8  )-----------( 288      ( 24 Apr 2017 06:01 )             29.3     04-24    138  |  103  |  11  ----------------------------<  98  3.6   |  25  |  1.03    Ca    8.2<L>      24 Apr 2017 06:00  Phos  4.1     04-24  Mg     1.7     04-24      PT/INR - ( 24 Apr 2017 07:05 )   PT: 11.7 sec;   INR: 1.05          PTT - ( 24 Apr 2017 07:05 )  PTT:28.7 sec      RADIOLOGY & ADDITIONAL STUDIES:

## 2017-04-24 NOTE — PROGRESS NOTE ADULT - ATTENDING COMMENTS
Anemia -- s/p PC hgb 8.3  for endoscopy  CV better after PC    cath on hold at this time  Diet  OOB
COLON Ca  CAD  ASHD  COPD  anemia  pre op
POD nu 1  s/p r colon   doing well  CV stable  COPD-- stable    OOB/PT    pain control-- denies at this time
colon lesion--OR Monday  Anemia--PC times one pre op  CV seen by cardiology--EF is NL  Pulmonary Stable.
labd hgb 10  for OR tomorrow  Path -- will determine further rx  pt aware and agrees.  Post op- may need ELISHA
s/p colon resection  afua po  CV/ stable  medication reviewed
CV stable  OOB  PT  d/c in AM to ELISHA  s/p colon resection-path negative
anemia--resolved  Path-- tubo vill adenoma    OR MONDAY  ASA CLASS  II--III  cardiac evaluation p  No medical contraindication to procedure.
colon resection--- benign pt aware  TO ELISHA
s/p colon
s/p colon resection  PATH benign    d/c today
Agree with Progress Note, Subjective and Objective, Assessment and Plan
Agree with Progress Note, Subjective and Objective, Assessment and Plan
CCM > 35
POD nu 2  s/p colon resection  CV stable  Pulmonary-- doing well
Agree with Progress Note, Subjective and Objective, Assessment and Plan

## 2017-04-24 NOTE — CONSULT NOTE ADULT - SUBJECTIVE AND OBJECTIVE BOX
HPI:  Pt is an 87 yo M former smoker, PMHx CAD s/p PCI x3 LCx (2013 @[facility]), HLD, BPH, depression, presented to cardiologist c/o worsening shortness of breath which has progressed over the past year. Patient states symptoms are similar to when he required his previous stents, and he can only walk about a block or less before having to stop secondary to shortness of breath. He states that sometimes getting dressed and walking around the house can also cause SOB. Of note, he also endorses a recent loss of appetite over the past month and non-bloody diarrhea x 1 week with associated 5lb weight loss. He denies any symptoms such as chest pain, LOC, dizziness, syncope, PND, orthopnea, LE edema, palpitations, asthma/COPD, recent viral illness or travel, abdominal pain, nausea/vomiting, hx of blood tranfusion/anemia. On EKG in office patient noticed to be sinus yasmine with IVCD. Pt recommended for Right and Left cardiac catheterization given history of multiple PCI and EKG abnormalities as well as possible echo/EP evaluation. (06 Apr 2017 16:15)    FAMILY HISTORY:  No pertinent family history in first degree relatives    MEDICATIONS  (STANDING):  chlorhexidine 4% Liquid 1Application(s) Topical once  lisinopril 10milliGRAM(s) Oral daily  gabapentin 100milliGRAM(s) Oral daily  mirtazapine 15milliGRAM(s) Oral at bedtime  PARoxetine 20milliGRAM(s) Oral daily  simvastatin 20milliGRAM(s) Oral at bedtime  amLODIPine   Tablet 2.5milliGRAM(s) Oral daily  pantoprazole    Tablet 40milliGRAM(s) Oral before breakfast  buDESOnide 160 MICROgram(s)/formoterol 4.5 MICROgram(s) Inhaler 2Puff(s) Inhalation two times a day  sodium ferric gluconate complex IVPB 25milliGRAM(s) IV Intermittent once  sodium ferric gluconate complex IVPB 100milliGRAM(s) IV Intermittent daily    MEDICATIONS  (PRN):    Vital Signs Last 24 Hrs  T(C): --  T(F): --  HR: 59 (59 - 59)  BP: 154/65 (154/65 - 154/65)  BP(mean): --  RR: 16 (16 - 16)  SpO2: 95% (95% - 95%)PHYSICAL EXAM:    Constitutional:    Neck:    Breasts:    Respiratory:    Cardiovascular:    Gastrointestinal:    Extremities:    Neurological:    Skin:    Lymph Nodes:      Labs:  CBC Full  -  ( 10 Apr 2017 11:17 )  WBC Count : 11.4 K/uL  Hemoglobin : 7.8 g/dL  Hematocrit : 25.9 %  Platelet Count - Automated : 316 K/uL  Mean Cell Volume : 82.2 fL  Mean Cell Hemoglobin : 24.8 pg  Mean Cell Hemoglobin Concentration : 30.1 g/dL  Auto Neutrophil # : x  Auto Lymphocyte # : x  Auto Monocyte # : x  Auto Eosinophil # : x  Auto Basophil # : x  Auto Neutrophil % : 85.2 %  Auto Lymphocyte % : 5.4 %  Auto Monocyte % : 8.2 %  Auto Eosinophil % : 1.0 %  Auto Basophil % : 0.2 %    04-10    142  |  111<H>  |  29<H>  ----------------------------<  92  4.3   |  21<L>  |  1.74<H>    Ca    8.4<L>      10 Apr 2017 11:17    TPro  6.6  /  Alb  3.1<L>  /  TBili  0.2  /  DBili  0.07  /  AST  <3<L>  /  ALT  11<L>  /  AlkPhos  58  04-10      Radiology:  HEALTH ISSUES - R/O PROBLEM Dx:    Assessmant:  1) iron deficiency anemia symptomatic  2) diarrhea , anorexia x 3 daysw  Plan:  1) anemia w/u  2) transfuse 2 U PRBC  3) iv iron     Thank you  Darlin Ta MD
JASON TIARA      Patient is a 86y old  Male who presents with a chief complaint of     HPI:  Pt is an 85 yo M former smoker, PMHx CAD s/p PCI x3 LCx (2013 @[facility]), HLD, BPH, depression, presented to cardiologist c/o worsening shortness of breath which has progressed over the past year. Patient states symptoms are similar to when he required his previous stents, and he can only walk about a block or less before having to stop secondary to shortness of breath. He states that sometimes getting dressed and walking around the house can also cause SOB. Of note, he also endorses a recent loss of appetite over the past month and non-bloody diarrhea x 1 week with associated 5lb weight loss. He denies any symptoms such as chest pain, LOC, dizziness, syncope, PND, orthopnea, LE edema, palpitations, asthma/COPD, recent viral illness or travel, abdominal pain, nausea/vomiting, hx of blood tranfusion/anemia. On EKG in office patient noticed to be sinus yasmine with IVCD. Pt recommended for Right and Left cardiac catheterization given history of multiple PCI and EKG abnormalities as well as possible echo/EP evaluation. (2017 16:15)      Addl  Medical issues:       HEALTH ISSUES - PROBLEM Dx:  Blood in stool: Blood in stool  Bradycardia: Bradycardia  CKD (chronic kidney disease) stage 3, GFR 30-59 ml/min: CKD (chronic kidney disease) stage 3, GFR 30-59 ml/min  Hyperlipidemia: Hyperlipidemia  Anemia: Anemia  CAD (coronary artery disease): CAD (coronary artery disease)  SOB (shortness of breath): SOB (shortness of breath)            MEDICATIONS  (STANDING):  chlorhexidine 4% Liquid 1Application(s) Topical once  lisinopril 10milliGRAM(s) Oral daily  gabapentin 100milliGRAM(s) Oral daily  mirtazapine 15milliGRAM(s) Oral at bedtime  PARoxetine 20milliGRAM(s) Oral daily  simvastatin 20milliGRAM(s) Oral at bedtime  amLODIPine   Tablet 2.5milliGRAM(s) Oral daily  pantoprazole    Tablet 40milliGRAM(s) Oral before breakfast  buDESOnide 160 MICROgram(s)/formoterol 4.5 MICROgram(s) Inhaler 2Puff(s) Inhalation two times a day  sodium ferric gluconate complex IVPB 125milliGRAM(s) IV Intermittent daily  polyethylene glycol/electrolyte Solution 4000milliLiter(s) Oral once  ALBUTerol/ipratropium for Nebulization 3milliLiter(s) Nebulizer every 6 hours  ALBUTerol    90 MICROgram(s) HFA Inhaler 1Puff(s) Inhalation every 4 hours  tiotropium 18 MICROgram(s) Capsule 1Capsule(s) Inhalation daily    MEDICATIONS  (PRN):          PAST MEDICAL & SURGICAL HISTORY:  CAD (coronary artery disease)  HLD (hyperlipidemia)  S/P cataract extraction      REVIEW OF SYSTEMS:  [x] As per HPI          Reviewed   no change                            Changes noted  CONSTITUTIONAL: No fever, weight loss, or fatigue  weakness  RESPIRATORY: SOB  CARDIOVASCULAR: No chest pain, palpitations, dizziness, or leg swelling  GASTROINTESTINAL: diarrhea  MUSCULOSKELETAL: No joint pain or swelling; No muscle, back, or extremity pain  Neuro:   Grossly  Negative  Psych        Awake  alert  [x] All others negative	  [ ] Unable to obtain      Vital Signs Last 24 Hrs  T(C): 36.4, Max: 36.5 ( @ 05:28)  T(F): 97.6, Max: 97.7 ( @ 05:28)  HR: 58 (52 - 58)  BP: 146/78 (118/72 - 149/67)  BP(mean): --  RR: 16 (15 - 16)  SpO2: 98% (97% - 98%)    PHYSICAL EXAM:      Constitutional:weak, frail    Eyes:    ENMT:neg    Neck:neg    Breasts:d    Back:neg    Respiratory:cc rhonchi    Cardiovascular:s1s2 2/6 mur    Gastrointestinal:soft BS present    Genitourinary:    Rectal:as per GI    Extremities:FROM    Vascular:    Neurological:negative    Skin:    Lymph Nodes:    Musculoskeletal:LE weakness    Psychiatric:                              8.9    8.5   )-----------( 300      ( 2017 06:24 )             28.2     04-    143  |  113<H>  |  25<H>  ----------------------------<  90  4.2   |  22  |  1.43<H>    Ca    8.1<L>      2017 06:26  Mg     2.0         TPro  6.6  /  Alb  3.1<L>  /  TBili  0.2  /  DBili  0.07  /  AST  <3<L>  /  ALT  11<L>  /  AlkPhos  58  04-10    CARDIAC MARKERS ( 10 Apr 2017 11:17 )  x     / x     / 80 U/L / x     / 4.5 ng/mL      PT/INR - ( 10 Apr 2017 11:17 )   PT: 10.6 sec;   INR: 0.96          PTT - ( 10 Apr 2017 11:17 )  PTT:29.7 sec  CAPILLARY BLOOD GLUCOSE    Urinalysis Basic - ( 2017 11:17 )    Color: Yellow / Appearance: Clear / S.020 / pH: x  Gluc: x / Ketone: Trace mg/dL  / Bili: NEGATIVE / Urobili: 0.2 E.U./dL   Blood: x / Protein: NEGATIVE mg/dL / Nitrite: NEGATIVE   Leuk Esterase: NEGATIVE / RBC: < 5 /HPF / WBC < 5 /HPF   Sq Epi: x / Non Sq Epi: Rare /HPF / Bacteria: Present /HPF      I&O's Summary  I & Os for 24h ending 2017 07:00  =============================================  IN: 360 ml / OUT: 600 ml / NET: -240 ml    I & Os for current day (as of 2017 21:09)  =============================================  IN: 720 ml / OUT: 570 ml / NET: 150 ml        agnosis Line Sinus bradycardia      INTERPRETATION:  CLINICAL INFORMATION:  dyspnea.    TECHNIQUE: PA and Lateral views of the chest dated 4/10/2017 11:05 PM.    COMPARISON:  2014    FINDINGS: There is no focal infiltrate. There is no pleural effusion.   There is no pneumothorax. The cardiomediastinal silhouette silhouette is   unchanged.     IMPRESSION: No focal infiltrates.
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 Colonoscopy demonstrate a colonic carcinoma.    The patient's past  medical history is significant for cardiac stents in the past but relatively good health. He had 2 inguinal hernias repaired without complication. He has been intermittenly hypertensive.    On physical exam he is a pale chronically ill appearing gentleman in no acute distress at rest with speech difficult from poorly fitting dentures.  Vital signs are within normal limits.  The patient has poor dental health. There is no palpable adenopathy in the neck. There is no jugular venous distention.  The lungs are clear to percussion and auscultation.   The heart sounds are With no obvious aortic or mitral murmur or ectopy.  The patient has no truncal obesity and no organomegaly.  The inguinal repair on both sides is quite indurated.  He has a large comedone in the left lower quadrant inguinal area.  Neurologically he is intact although his gait is unstable without a cane.
HPI (As per H&P):  Pt is an 87 yo M former smoker, PMHx CAD s/p PCI x3 LCx (2013 @[facility]), HLD, BPH, depression, presented to cardiologist c/o worsening shortness of breath which has progressed over the past year. Patient states symptoms are similar to when he required his previous stents, and he can only walk about a block or less before having to stop secondary to shortness of breath. He states that sometimes getting dressed and walking around the house can also cause SOB. Of note, he also endorses a recent loss of appetite over the past month and non-bloody diarrhea x 1 week with associated 5lb weight loss. He denies any symptoms such as chest pain, LOC, dizziness, syncope, PND, orthopnea, LE edema, palpitations, asthma/COPD, recent viral illness or travel, abdominal pain, nausea/vomiting, hx of blood tranfusion/anemia. On EKG in office patient noticed to be sinus yasmine with IVCD. Pt recommended for Right and Left cardiac catheterization given history of multiple PCI and EKG abnormalities as well as possible echo/EP evaluation. (06 Apr 2017 16:15)    General Surgery Subjective  Patient seen and examined at bedside. Patient states he was being worked up for SOB on exertion which was believed to be cardiac in nature because of previous PCI with DESx2 last 3 years ago. He was admitted for elective cath, however found to be anemic. The patient does not report blood in stool, personal or family hx of colon CA, but was found to have GI bleed and anemia on admission. Cath postponed. Anemia tx with 2uPRBC with improvement in SOB. Colonoscopy showed large ulcerative colonic mass suspicious for CA in ascending colon. Path tubovillous adenoma, however invading pericolonic fat on CT. CT abd shows no nodes or intraabdominal mets. Pending CEA and CT chest. Patient otherwise denies fevers, chills, abdominal pain, nausea or vomiting.       PAST MEDICAL & SURGICAL HISTORY:  CAD (coronary artery disease)  HLD (hyperlipidemia)  S/P cataract extraction          MEDICATIONS  (STANDING):  chlorhexidine 4% Liquid 1Application(s) Topical once  lisinopril 10milliGRAM(s) Oral daily  gabapentin 100milliGRAM(s) Oral daily  mirtazapine 15milliGRAM(s) Oral at bedtime  PARoxetine 20milliGRAM(s) Oral daily  simvastatin 20milliGRAM(s) Oral at bedtime  amLODIPine   Tablet 2.5milliGRAM(s) Oral daily  pantoprazole    Tablet 40milliGRAM(s) Oral before breakfast  buDESOnide 160 MICROgram(s)/formoterol 4.5 MICROgram(s) Inhaler 2Puff(s) Inhalation two times a day  ALBUTerol/ipratropium for Nebulization 3milliLiter(s) Nebulizer every 6 hours  ALBUTerol    90 MICROgram(s) HFA Inhaler 1Puff(s) Inhalation every 4 hours  tiotropium 18 MICROgram(s) Capsule 1Capsule(s) Inhalation daily    MEDICATIONS  (PRN):      Allergies    No Known Allergies    Intolerances        SOCIAL HISTORY:        Vital Signs Last 24 Hrs  T(C): 36.8, Max: 37 (04-12 @ 22:28)  T(F): 98.2, Max: 98.6 (04-12 @ 22:28)  HR: 63 (57 - 68)  BP: 155/69 (101/58 - 174/75)  BP(mean): --  RR: 18 (16 - 18)  SpO2: 97% (95% - 100%)    Physical Exam:  Gen: In bed, A/Ox3, NAD  Card: RRR, No m/r/g  Lungs: CTAB/L, no wheezes or crackles  Abdomen: Soft, nontender, nondistended  Extremities: No edema    LABS:                        10.0   9.9   )-----------( 277      ( 13 Apr 2017 07:01 )             31.6     04-13    140  |  107  |  14  ----------------------------<  74  4.0   |  25  |  1.15    Ca    8.7      13 Apr 2017 07:01  Mg     1.7     04-13            RADIOLOGY & ADDITIONAL STUDIES:  CT Abd/pelv  IMPRESSION:  1.  Large polypoidal mass in the proximal ascending colon with slight   infiltration of the fat posterior to the colon, possibly representing   direct invasion.  2.  No pathologically enlarged nodes seen.  3.  No liver metastases seen.  4.  Two small lung nodules which have NOT increased in size since   1/14/2014 and are therefore of doubtful significance.  5.  Severe coronary artery calcifications.  6.  Severe atherosclerotic calcifications. Multiple abdominal aortic   aneurysms.  7.  Enlarged prostate.  8.  Large direct left inguinal hernia containing urinary bladder.  9.  Small right femoral hernia containing nonobstructed bowel.
Patient is a 86y old  Male who presents with a chief complaint of     HPI:  Pt is an 87 yo M former smoker, PMHx CAD s/p PCI x3 LCx (2013 @[facility]), HLD, BPH, depression, presented to cardiologist c/o worsening shortness of breath which has progressed over the past year. Patient states symptoms are similar to when he required his previous stents, and he can only walk about a block or less before having to stop secondary to shortness of breath. He states that sometimes getting dressed and walking around the house can also cause SOB. Of note, he also endorses a recent loss of appetite over the past month and non-bloody diarrhea x 1 week with associated 5lb weight loss. He denies any symptoms such as chest pain, LOC, dizziness, syncope, PND, orthopnea, LE edema, palpitations, asthma/COPD, recent viral illness or travel, abdominal pain, nausea/vomiting, hx of blood tranfusion/anemia. On EKG in office patient noticed to be sinus yasmine with IVCD. Pt recommended for Right and Left cardiac catheterization given history of multiple PCI and EKG abnormalities as well as possible echo/EP evaluation. (06 Apr 2017 16:15)    SICU Addendum  86M pt with multiple significant medical comorbidities s/p Right hemicolectomy with primary ileocolic anastomosis, transfused 1U intraop, EBL 50cc, received 1800cc crystalloid, , OR time 3 hours. Patient in NAD, pain well controlled, HDS.     PAST MEDICAL & SURGICAL HISTORY:  CAD (coronary artery disease)  HLD (hyperlipidemia)  S/P cataract extraction      FAMILY HISTORY:  No pertinent family history in first degree relatives      SOCIAL HISTORY:  Smoking Status: [ ] Current, [ ] Former, [ ] Never  Pack Years:    MEDICATIONS:  Pulmonary:  buDESOnide 160 MICROgram(s)/formoterol 4.5 MICROgram(s) Inhaler 2Puff(s) Inhalation two times a day  ALBUTerol/ipratropium for Nebulization 3milliLiter(s) Nebulizer every 6 hours  ALBUTerol    90 MICROgram(s) HFA Inhaler 1Puff(s) Inhalation every 4 hours  tiotropium 18 MICROgram(s) Capsule 1Capsule(s) Inhalation daily    Antimicrobials:  cefOXitin  IVPB 2Gram(s) IV Intermittent every 8 hours    Anticoagulants:  heparin  Injectable 5000Unit(s) SubCutaneous every 8 hours    Onc:    GI/:    Endocrine:  insulin lispro (HumaLOG) corrective regimen sliding scale  SubCutaneous Before meals and at bedtime  dextrose 50% Injectable 25Gram(s) IV Push once  glucagon  Injectable 1milliGRAM(s) IntraMuscular once PRN    Cardiac:  labetalol Injectable 10milliGRAM(s) IV Push every 1 hour PRN  hydrALAZINE Injectable 10milliGRAM(s) IV Push every 1 hour PRN    Other Medications:  chlorhexidine 4% Liquid 1Application(s) Topical once  sodium ferric gluconate complex IVPB 125milliGRAM(s) IV Intermittent daily  HYDROmorphone  Injectable 0.5milliGRAM(s) IV Push every 4 hours PRN  HYDROmorphone  Injectable 0.2milliGRAM(s) IV Push every 4 hours PRN  lactated ringers. 1000milliLiter(s) IV Continuous <Continuous>  dextrose 5%. 1000milliLiter(s) IV Continuous <Continuous>      Allergies    No Known Allergies    Intolerances        Vital Signs Last 24 Hrs  T(C): 36.1, Max: 37.2 (04-16 @ 22:51)  T(F): 97, Max: 98.9 (04-16 @ 22:51)  HR: 54 (54 - 76)  BP: 170/63 (137/63 - 170/63)  BP(mean): 91 (80 - 113)  RR: 13 (13 - 41)  SpO2: 98% (17% - 100%)  I & Os for 24h ending 04-17 @ 07:00  =============================================  IN: 1500 ml / OUT: 700 ml / NET: 800 ml    I & Os for current day (as of 04-17 @ 22:35)  =============================================  IN: 364 ml / OUT: 755 ml / NET: -391 ml        LABS:  ABG - ( 17 Apr 2017 17:06 )  pH: 7.32  /  pCO2: 39    /  pO2: 122   / HCO3: 20    / Base Excess: -6.1  /  SaO2: 98                  CBC Full  -  ( 17 Apr 2017 17:08 )  WBC Count : 15.4 K/uL  Hemoglobin : 11.7 g/dL  Hematocrit : 35.7 %  Platelet Count - Automated : 260 K/uL  Mean Cell Volume : 82.4 fL  Mean Cell Hemoglobin : 27.0 pg  Mean Cell Hemoglobin Concentration : 32.8 g/dL  Auto Neutrophil # : x  Auto Lymphocyte # : x  Auto Monocyte # : x  Auto Eosinophil # : x  Auto Basophil # : x  Auto Neutrophil % : x  Auto Lymphocyte % : x  Auto Monocyte % : x  Auto Eosinophil % : x  Auto Basophil % : x    04-17    143  |  114<H>  |  9   ----------------------------<  115<H>  4.4   |  21<L>  |  1.02    Ca    7.7<L>      17 Apr 2017 17:08  Phos  2.7     04-17  Mg     1.4     04-17      PT/INR - ( 17 Apr 2017 06:57 )   PT: 12.2 sec;   INR: 1.10          PTT - ( 17 Apr 2017 17:08 )  PTT:39.2 sec    RADIOLOGY & ADDITIONAL STUDIES (The following images were personally reviewed):  CXR reviewed, noticeable Left base pleural effusion.
Pt is an 85 yo M former smoker, PMHx CAD s/p PCI x3 LCx (2013 @[facility]), HLD, BPH, depression, presented to cardiologist c/o worsening shortness of breath which has progressed over the past year. Patient states symptoms are similar to when he required his previous stents, and he can only walk about a block or less before having to stop secondary to shortness of breath. He states that sometimes getting dressed and walking around the house can also cause SOB. Of note, he also endorses a recent loss of appetite over the past month and non-bloody diarrhea x 1 week with associated 5lb weight loss. He denies any symptoms such as chest pain, LOC, dizziness, syncope, PND, orthopnea, LE edema, palpitations, asthma/COPD, recent viral illness or travel, abdominal pain, nausea/vomiting, hx of blood tranfusion/anemia. On EKG in office patient noticed to be sinus yasmine with IVCD. Pt recommended for Right and Left cardiac catheterization given history of multiple PCI and EKG abnormalities as well as possible echo/EP evaluation. (06 Apr 2017 16:15)    SICU ADDENDUM  86M pt with above medical history POD 6 right hemicolectomy, planned to be discharge to rehab within the next few days, with tachypnea and tachycardia on the floor Chest PE protocol obtained, no pe detected, transferred to SICU for further care    PAST MEDICAL & SURGICAL HISTORY:  CAD (coronary artery disease)  HLD (hyperlipidemia)  S/P cataract extraction    FAMILY HISTORY:  No pertinent family history in first degree relatives    SOCIAL HISTORY:  Smoking Status: [ ] Current, [ ] Former, [ ] Never  Pack Years:    MEDICATIONS:  Pulmonary:  buDESOnide 160 MICROgram(s)/formoterol 4.5 MICROgram(s) Inhaler 2Puff(s) Inhalation two times a day  ALBUTerol/ipratropium for Nebulization 3milliLiter(s) Nebulizer every 6 hours  ALBUTerol    90 MICROgram(s) HFA Inhaler 1Puff(s) Inhalation every 4 hours  tiotropium 18 MICROgram(s) Capsule 1Capsule(s) Inhalation daily    Antimicrobials:  piperacillin/tazobactam IVPB.  IV Intermittent   piperacillin/tazobactam IVPB. 4.5Gram(s) IV Intermittent every 8 hours    Anticoagulants:  clopidogrel Tablet 75milliGRAM(s) Oral daily  heparin  Injectable 5000Unit(s) SubCutaneous every 8 hours    Onc:    GI/:    Endocrine:  insulin lispro (HumaLOG) corrective regimen sliding scale  SubCutaneous Before meals and at bedtime  dextrose 50% Injectable 25Gram(s) IV Push once  glucagon  Injectable 1milliGRAM(s) IntraMuscular once PRN    Cardiac:  labetalol Injectable 10milliGRAM(s) IV Push every 1 hour PRN  tamsulosin 0.4milliGRAM(s) Oral at bedtime  hydrALAZINE Injectable 10milliGRAM(s) IV Push once    Other Medications:  dextrose 5%. 1000milliLiter(s) IV Continuous <Continuous>    Allergies    No Known Allergies    Intolerances    Vital Signs Last 24 Hrs  T(C): 37.5, Max: 37.5 (04-23 @ 22:00)  T(F): 99.5, Max: 99.5 (04-23 @ 22:00)  HR: 72 (52 - 136)  BP: 147/67 (128/55 - 189/74)  BP(mean): 108 (87 - 116)  RR: 30 (18 - 38)  SpO2: 94% (90% - 100%)  I & Os for 24h ending 04-23 @ 07:00  =============================================  IN: 0 ml / OUT: 905 ml / NET: -905 ml    I & Os for current day (as of 04-24 @ 01:42)  =============================================  IN: 825 ml / OUT: 660 ml / NET: 165 ml    LABS:  ABG - ( 23 Apr 2017 17:33 )  pH: 7.53  /  pCO2: 28    /  pO2: 90    / HCO3: 23    / Base Excess: 0.9   /  SaO2: 98        CBC Full  -  ( 23 Apr 2017 22:41 )  WBC Count : 14.4 K/uL  Hemoglobin : 10.6 g/dL  Hematocrit : 32.3 %  Platelet Count - Automated : 308 K/uL  Mean Cell Volume : 83.0 fL  Mean Cell Hemoglobin : 27.2 pg  Mean Cell Hemoglobin Concentration : 32.8 g/dL  Auto Neutrophil # : x  Auto Lymphocyte # : x  Auto Monocyte # : x  Auto Eosinophil # : x  Auto Basophil # : x  Auto Neutrophil % : x  Auto Lymphocyte % : x  Auto Monocyte % : x  Auto Eosinophil % : x  Auto Basophil % : x    04-23    137  |  103  |  13  ----------------------------<  113<H>  4.1   |  25  |  0.98    Ca    8.2<L>      23 Apr 2017 22:41  Phos  2.0     04-23  Mg     1.8     04-23    PT/INR - ( 23 Apr 2017 22:41 )   PT: 12.6 sec;   INR: 1.13       PTT - ( 23 Apr 2017 22:41 )  PTT:>200.0 sec    RADIOLOGY & ADDITIONAL STUDIES (The following images were personally reviewed):  INTERPRETATION:    CTA (CT angiography) of the CHEST dated 4/23/2017 10:24 PM    INDICATION: Status post right hemicolectomy with ileocolonic anastomosis.   History of atrial fibrillation. Assess for pulmonary embolism.    TECHNIQUE: CT angiography of the chest was performed during bolus   injection of intravenous contrast.  Post-processing including the   production of axial, coronal and sagittal multiplanar reformatted images   and axial and coronal maximum intensity projections (MIPs) was performed.    PRIOR STUDY: CT chest 4/13/2017.    FINDINGS: No pulmonary embolism is seen. The thoracic aorta is normal in   appearance. The heart is normal in size. No pericardial effusion is seen.   Increased mediastinal and hilar lymphadenopathy is demonstrated. Coronary   artery calcifications are again noted.    Interval development of small to moderate right and small left pleural   effusions with compressive atelectasis of the posterior basal segments of   the bilateral lower lobes. There are patchy nodular opacities in the   posterior left lower lobe which enhance less than the atelectatic lung.    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    Evaluation of the osseous structures demonstrates no acute abnormality.    IMPRESSION:   Since 4/13/2017,  1.  No pulmonary embolism.   2.  New small to moderate right and small left pleural effusions.  3.  New patchy nodular opacities in the left lower lobe suspicious for   infection or inflammation.  4.  Mildly increased mediastinal/hilar lymphadenopathy.

## 2017-04-24 NOTE — CONSULT NOTE ADULT - ATTENDING COMMENTS
Knowing that there is any colonic primary malignancy it would be important to determine the extent of disease.  It is possible that a palliative resection could be performed minimally invasively.  Review of CT scan of the chest abdomen and pelvis is paramount.  Prealbumin and other risk factors for surgical intervention  should be evaluated.  Parenteral resuscitation of iron will be important for wound healing.
CBC-- stable    GI evaluation  GI eval  OOB  PT/OT
86M exsmoker ho PCI, HLD BPH sp R hemicolectomy for colon Ca. admitted to [facility] for episode of tachycardia and anxiety/agitation associated with ativan and haldol. no evidence PE on ct angio but he has developed bilateral pleural effusions since last CT on 4-13. he is oriented and neurologicall intact, no evidence tachycardia or resp distress. he does have cheyne stoke respiration. plan for TTE to eval for new onset CHF. will continue zosyn until cultures are resulted. if needed for anxiety, will use seroquel low dose

## 2017-04-24 NOTE — PROGRESS NOTE ADULT - SUBJECTIVE AND OBJECTIVE BOX
S: NAEO.  Patient reports    O: Vitals: Vital Signs Last 24 Hrs  T(C): 37.3, Max: 37.5 (04-23 @ 22:00)  T(F): 99.2, Max: 99.5 (04-23 @ 22:00)  HR: 70 (66 - 136)  BP: 167/60 (128/55 - 189/74)  BP(mean): 94 (87 - 116)  RR: 17 (13 - 35)  SpO2: 97% (90% - 100%)    CAPILLARY BLOOD GLUCOSE  106 (24 Apr 2017 06:00)  115 (23 Apr 2017 23:05)  115 (23 Apr 2017 22:00)  129 (23 Apr 2017 16:54)  122 (23 Apr 2017 11:40)      PHYSICAL EXAM:      I & Os for current day (as of 04-24 @ 07:14)  =============================================  IN: 1012.5 ml / OUT: 935 ml / NET: 77.5 ml      Labs:   ABG - ( 23 Apr 2017 17:33 )  pH: 7.53  /  pCO2: 28    /  pO2: 90    / HCO3: 23    / Base Excess: 0.9   /  SaO2: 98                              9.5    12.8  )-----------( 288      ( 24 Apr 2017 06:01 )             29.3   04-24    138  |  103  |  11  ----------------------------<  98  3.6   |  25  |  1.03    Ca    8.2<L>      24 Apr 2017 06:00  Phos  4.1     04-24  Mg     1.7     04-24    PT/INR - ( 23 Apr 2017 22:41 )   PT: 12.6 sec;   INR: 1.13       PTT - ( 23 Apr 2017 22:41 )  PTT:>200.0 sec    RADIOLOGY & ADDITIONAL STUDIES:  EXAM:  CT ANGIO CHEST PE PROTOCOL IC                        PROCEDURE DATE:  04/23/2017    INTERPRETATION:  CTA (CT angiography) of the CHEST dated 4/23/2017 10:24 PM    INDICATION: Status post right hemicolectomy with ileocolonic anastomosis.   History of atrial fibrillation. Assess for pulmonary embolism.    TECHNIQUE: CT angiography of the chest was performed during bolus   injection of intravenous contrast.  Post-processing including the   production of axial, coronal and sagittal multiplanar reformatted images   and axial and coronal maximum intensity projections (MIPs) was performed.    PRIOR STUDY: CT chest 4/13/2017.    FINDINGS: No pulmonary embolism is seen. The thoracic aorta is normal in   appearance. The heart is normal in size. No pericardial effusion is seen.   Increased mediastinal and hilar lymphadenopathy is demonstrated. Coronary   artery calcifications are again noted.    Interval development of small to moderate right and small left pleural   effusions with compressive atelectasis of the posterior basal segments of   the bilateral lower lobes. There are patchy nodular opacities in the   posterior left lower lobe which enhance less than the atelectatic lung.    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    Evaluation of the osseous structures demonstrates no acute abnormality.    IMPRESSION:   Since 4/13/2017,  1.  No pulmonary embolism.   2.  New small to moderate right and small left pleural effusions.  3.  New patchy nodular opacities in the left lower lobe suspicious for   infection or inflammation.  4.  Mildly increased mediastinal/hilar lymphadenopathy.

## 2017-04-24 NOTE — DISCHARGE NOTE ADULT - CARE PLAN
Principal Discharge DX:	Colon cancer  Goal:	Recovery  Instructions for follow-up, activity and diet:	You may continue to advance to a regular diet as tolerated. You may shower, but do not bathe or submerge in water for at least two weeks. Leave steri-strips in place if present; they will fall off on their own. Please be sure to keep the wound clean and dry, changing any dressings daily (except steri-strips), unless instructed otherwise. No heavy lifting or strenuous exercise until cleared by your surgeon. Contact your doctor or go to the ER for fever > 101.5, chills, nausea, vomiting, chest pain, shortness of breath, pain not controlled by medication or excessive bleeding. Please follow up with Dr. Headley in one week; you may call the office to make an appointment at your earliest convenience. Patient should leave noel in place until follow up w/ Dr. Headley. Principal Discharge DX:	Colon cancer  Goal:	Recovery  Instructions for follow-up, activity and diet:	You may continue to advance to a regular diet as tolerated. You may shower, but do not bathe or submerge in water for at least two weeks. Leave steri-strips in place if present; they will fall off on their own. Please be sure to keep the wound clean and dry, changing any dressings daily (except steri-strips), unless instructed otherwise. No heavy lifting or strenuous exercise until cleared by your surgeon. Contact your doctor or go to the ER for fever > 101.5, chills, nausea, vomiting, chest pain, shortness of breath, pain not controlled by medication or excessive bleeding. Please follow up with Dr. Headley in one week; you may call the office to make an appointment at your earliest convenience. Patient will take flomax and leave noel in place for 3 days at which point noel may be removed.

## 2017-04-24 NOTE — DISCHARGE NOTE ADULT - PATIENT PORTAL LINK FT
“You can access the [facility] Patient Portal, offered by [facility], by registering with the following website: [email or web address]”

## 2017-04-24 NOTE — PROGRESS NOTE ADULT - PROVIDER SPECIALTY LIST ADULT
Cardiology
Gastroenterology
Internal Medicine
Intervent Cardiology
Pulmonology
SICU
Surgery
Heme/Onc
Internal Medicine
Pulmonology

## 2017-04-24 NOTE — PROGRESS NOTE ADULT - SUBJECTIVE AND OBJECTIVE BOX
HPI:  Pt is an 87 yo M former smoker, PMHx CAD s/p PCI x3 LCx (2013 [facility]), HLD, BPH, depression, presented to cardiologist c/o worsening shortness of breath which has progressed over the past year. Patient states symptoms are similar to when he required his previous stents, and he can only walk about a block or less before having to stop secondary to shortness of breath. He states that sometimes getting dressed and walking around the house can also cause SOB. Of note, he also endorses a recent loss of appetite over the past month and non-bloody diarrhea x 1 week with associated 5lb weight loss. He denies any symptoms such as chest pain, LOC, dizziness, syncope, PND, orthopnea, LE edema, palpitations, asthma/COPD, recent viral illness or travel, abdominal pain, nausea/vomiting, hx of blood tranfusion/anemia. On EKG in office patient noticed to be sinus yasmine with IVCD. Pt recommended for Right and Left cardiac catheterization given history of multiple PCI and EKG abnormalities as well as possible echo/EP evaluation. (06 Apr 2017 16:15)    FAMILY HISTORY:  No pertinent family history in first degree relatives    MEDICATIONS  (STANDING):  buDESOnide 160 MICROgram(s)/formoterol 4.5 MICROgram(s) Inhaler 2Puff(s) Inhalation two times a day  ALBUTerol/ipratropium for Nebulization 3milliLiter(s) Nebulizer every 6 hours  ALBUTerol    90 MICROgram(s) HFA Inhaler 1Puff(s) Inhalation every 4 hours  tiotropium 18 MICROgram(s) Capsule 1Capsule(s) Inhalation daily  insulin lispro (HumaLOG) corrective regimen sliding scale  SubCutaneous Before meals and at bedtime  dextrose 5%. 1000milliLiter(s) IV Continuous <Continuous>  dextrose 50% Injectable 25Gram(s) IV Push once  tamsulosin 0.4milliGRAM(s) Oral at bedtime  clopidogrel Tablet 75milliGRAM(s) Oral daily  hydrALAZINE Injectable 10milliGRAM(s) IV Push once  heparin  Injectable 5000Unit(s) SubCutaneous every 8 hours  piperacillin/tazobactam IVPB.  IV Intermittent   piperacillin/tazobactam IVPB. 4.5Gram(s) IV Intermittent every 8 hours  PARoxetine 20milliGRAM(s) Oral daily  mirtazapine 15milliGRAM(s) Oral at bedtime  amLODIPine   Tablet 2.5milliGRAM(s) Oral daily    MEDICATIONS  (PRN):  glucagon  Injectable 1milliGRAM(s) IntraMuscular once PRN Glucose LESS THAN 70 milligrams/deciliter  labetalol Injectable 10milliGRAM(s) IV Push every 1 hour PRN For SBP>160, Hold for HR<65    Vital Signs Last 24 Hrs  T(C): 36.6, Max: 37.5 (04-23 @ 22:00)  T(F): 97.9, Max: 99.5 (04-23 @ 22:00)  HR: 64 (64 - 136)  BP: 155/67 (123/64 - 189/74)  BP(mean): 93 (87 - 116)  RR: 24 (13 - 35)  SpO2: 96% (90% - 100%)    Physical exam:    Overall impression  Lymphadenopathy  Liver  spleen    Labs:  CBC Full  -  ( 24 Apr 2017 06:01 )  WBC Count : 12.8 K/uL  Hemoglobin : 9.5 g/dL  Hematocrit : 29.3 %  Platelet Count - Automated : 288 K/uL  Mean Cell Volume : 82.3 fL  Mean Cell Hemoglobin : 26.7 pg  Mean Cell Hemoglobin Concentration : 32.4 g/dL  Auto Neutrophil # : x  Auto Lymphocyte # : x  Auto Monocyte # : x  Auto Eosinophil # : x  Auto Basophil # : x  Auto Neutrophil % : x  Auto Lymphocyte % : x  Auto Monocyte % : x  Auto Eosinophil % : x  Auto Basophil % : x    04-24    138  |  103  |  11  ----------------------------<  98  3.6   |  25  |  1.03    Ca    8.2<L>      24 Apr 2017 06:00  Phos  4.1     04-24  Mg     1.7     04-24        Radiology:  HEALTH ISSUES - R/O PROBLEM Dx:      Assessmant / Problems  1) s/p right hemicholectomy   2) Diarhea , low grade fever r/o C dif cholitis  3) Anemia of chronic disease and gi bleeding improving on iv iron    Plan:  iv iron    Thank you  Darlin Ta MD

## 2017-04-24 NOTE — DISCHARGE NOTE ADULT - PLAN OF CARE
Recovery You may continue to advance to a regular diet as tolerated. You may shower, but do not bathe or submerge in water for at least two weeks. Leave steri-strips in place if present; they will fall off on their own. Please be sure to keep the wound clean and dry, changing any dressings daily (except steri-strips), unless instructed otherwise. No heavy lifting or strenuous exercise until cleared by your surgeon. Contact your doctor or go to the ER for fever > 101.5, chills, nausea, vomiting, chest pain, shortness of breath, pain not controlled by medication or excessive bleeding. Please follow up with Dr. Headley in one week; you may call the office to make an appointment at your earliest convenience. Patient should leave noel in place until follow up w/ Dr. Headley. You may continue to advance to a regular diet as tolerated. You may shower, but do not bathe or submerge in water for at least two weeks. Leave steri-strips in place if present; they will fall off on their own. Please be sure to keep the wound clean and dry, changing any dressings daily (except steri-strips), unless instructed otherwise. No heavy lifting or strenuous exercise until cleared by your surgeon. Contact your doctor or go to the ER for fever > 101.5, chills, nausea, vomiting, chest pain, shortness of breath, pain not controlled by medication or excessive bleeding. Please follow up with Dr. Headley in one week; you may call the office to make an appointment at your earliest convenience. Patient will take flomax and leave onel in place for 3 days at which point noel may be removed.

## 2017-04-24 NOTE — PROGRESS NOTE ADULT - SUBJECTIVE AND OBJECTIVE BOX
Pt seen and examined   diarrhea since last pm.  taking clears only,. no abdominal pain    REVIEW OF SYSTEMS:  Constitutional: No fever  Cardiovascular: No chest pain, palpitations, dizziness or leg swelling  Gastrointestinal: No abdominal or epigastric pain. No nausea, vomiting or hematemesis; No diarrhea or constipation. No melena or hematochezia.  Skin: No itching, burning, rashes or lesions       MEDICATIONS:  MEDICATIONS  (STANDING):  buDESOnide 160 MICROgram(s)/formoterol 4.5 MICROgram(s) Inhaler 2Puff(s) Inhalation two times a day  ALBUTerol/ipratropium for Nebulization 3milliLiter(s) Nebulizer every 6 hours  ALBUTerol    90 MICROgram(s) HFA Inhaler 1Puff(s) Inhalation every 4 hours  tiotropium 18 MICROgram(s) Capsule 1Capsule(s) Inhalation daily  insulin lispro (HumaLOG) corrective regimen sliding scale  SubCutaneous Before meals and at bedtime  dextrose 5%. 1000milliLiter(s) IV Continuous <Continuous>  dextrose 50% Injectable 25Gram(s) IV Push once  tamsulosin 0.4milliGRAM(s) Oral at bedtime  clopidogrel Tablet 75milliGRAM(s) Oral daily  hydrALAZINE Injectable 10milliGRAM(s) IV Push once  heparin  Injectable 5000Unit(s) SubCutaneous every 8 hours  piperacillin/tazobactam IVPB.  IV Intermittent   piperacillin/tazobactam IVPB. 4.5Gram(s) IV Intermittent every 8 hours  PARoxetine 20milliGRAM(s) Oral daily  mirtazapine 15milliGRAM(s) Oral at bedtime  amLODIPine   Tablet 2.5milliGRAM(s) Oral daily    MEDICATIONS  (PRN):  glucagon  Injectable 1milliGRAM(s) IntraMuscular once PRN Glucose LESS THAN 70 milligrams/deciliter  labetalol Injectable 10milliGRAM(s) IV Push every 1 hour PRN For SBP>160, Hold for HR<65      Allergies    No Known Allergies    Intolerances        Vital Signs Last 24 Hrs  T(C): 35.7, Max: 37.5 (04-23 @ 22:00)  T(F): 96.2, Max: 99.5 (04-23 @ 22:00)  HR: 77 (64 - 136)  BP: 138/62 (123/64 - 189/74)  BP(mean): 93 (87 - 116)  RR: 18 (13 - 35)  SpO2: 97% (90% - 100%)  I & Os for 24h ending 04-24 @ 07:00  =============================================  IN: 1100.5 ml / OUT: 1015 ml / NET: 85.5 ml    I & Os for current day (as of 04-24 @ 13:18)  =============================================  IN: 250 ml / OUT: 240 ml / NET: 10 ml      PHYSICAL EXAM:    General: thin in no acute distress lying in bed  HEENT: MMM, conjunctiva and sclera clear  Gastrointestinal: Soft non-tender non-distended; Normal bowel sounds; No hepatosplenomegaly  Skin: Warm and dry. No obvious rash    LABS:  ABG - ( 23 Apr 2017 17:33 )  pH: 7.53  /  pCO2: 28    /  pO2: 90    / HCO3: 23    / Base Excess: 0.9   /  SaO2: 98                  CBC Full  -  ( 24 Apr 2017 06:01 )  WBC Count : 12.8 K/uL  Hemoglobin : 9.5 g/dL  Hematocrit : 29.3 %  Platelet Count - Automated : 288 K/uL  Mean Cell Volume : 82.3 fL  Mean Cell Hemoglobin : 26.7 pg  Mean Cell Hemoglobin Concentration : 32.4 g/dL  Auto Neutrophil # : x  Auto Lymphocyte # : x  Auto Monocyte # : x  Auto Eosinophil # : x  Auto Basophil # : x  Auto Neutrophil % : x  Auto Lymphocyte % : x  Auto Monocyte % : x  Auto Eosinophil % : x  Auto Basophil % : x    04-24    138  |  103  |  11  ----------------------------<  98  3.6   |  25  |  1.03    Ca    8.2<L>      24 Apr 2017 06:00  Phos  4.1     04-24  Mg     1.7     04-24      PT/INR - ( 24 Apr 2017 07:05 )   PT: 11.7 sec;   INR: 1.05          PTT - ( 24 Apr 2017 07:05 )  PTT:28.7 sec                  RADIOLOGY & ADDITIONAL STUDIES (The following images were personally reviewed):

## 2017-04-24 NOTE — PROVIDER CONTACT NOTE (CHANGE IN STATUS NOTIFICATION) - ASSESSMENT
Called security regarding pts dentures missing, pt stated he left on tray on sunday for food, dietrary dept was made aware and they checked all trays, no upper dentures found. security called on [place] to file report but they didn't yet. called security again today and spoke to Josi supervisor that stated they would come to file a report of missing dentures, but no one has come yet. Shereen and Bessie baer managers on unit are aware of pts missing dentures.
pt was asst oob to chair and ambulated with pt with 1-2 person asst with pts own cane.
Tachypneic RR> 30s, HR > 130s,Agitated and confused.

## 2017-04-24 NOTE — PROGRESS NOTE ADULT - ASSESSMENT
ABD SOFT, DISTENDED, BS+, FLATUS+, BM+, WOUND IS DRY, INTACT.  TOLERATING CLEARS, WAS ADVANCE TO SOFT MECHANICAL DIET, TOLERATED, DVT PROFILAXIS, SPIROMETRY , OOB,    OUT OF SICU, DOING WELL, CT CHEST NEGATIVE FOR PE, SMALL PLURAL EFFUSIONS, NO PNEUMONIA, NO SOB.  TRANSFER TO REHAB WITH F/U IN THE OFFICE

## 2017-04-24 NOTE — PROGRESS NOTE ADULT - SUBJECTIVE AND OBJECTIVE BOX
Chief Complaint/Reason for Consult: CAD  INTERVAL HPI: post op abd pain no cp sob palp no events over  weekend stepped down to regional  	  MEDICATIONS:  labetalol Injectable 10milliGRAM(s) IV Push every 1 hour PRN  tamsulosin 0.4milliGRAM(s) Oral at bedtime  hydrALAZINE Injectable 10milliGRAM(s) IV Push once  amLODIPine   Tablet 2.5milliGRAM(s) Oral daily    piperacillin/tazobactam IVPB.  IV Intermittent   piperacillin/tazobactam IVPB. 4.5Gram(s) IV Intermittent every 8 hours    buDESOnide 160 MICROgram(s)/formoterol 4.5 MICROgram(s) Inhaler 2Puff(s) Inhalation two times a day  ALBUTerol/ipratropium for Nebulization 3milliLiter(s) Nebulizer every 6 hours  ALBUTerol    90 MICROgram(s) HFA Inhaler 1Puff(s) Inhalation every 4 hours  tiotropium 18 MICROgram(s) Capsule 1Capsule(s) Inhalation daily    PARoxetine 20milliGRAM(s) Oral daily  mirtazapine 15milliGRAM(s) Oral at bedtime      insulin lispro (HumaLOG) corrective regimen sliding scale  SubCutaneous Before meals and at bedtime  dextrose 50% Injectable 25Gram(s) IV Push once  glucagon  Injectable 1milliGRAM(s) IntraMuscular once PRN    dextrose 5%. 1000milliLiter(s) IV Continuous <Continuous>  clopidogrel Tablet 75milliGRAM(s) Oral daily  heparin  Injectable 5000Unit(s) SubCutaneous every 8 hours      REVIEW OF SYSTEMS:  [x] As per HPI  CONSTITUTIONAL: No fever, weight loss, or fatigue  RESPIRATORY: No cough, wheezing, chills or hemoptysis; No Shortness of Breath  CARDIOVASCULAR: No chest pain, palpitations, dizziness, or leg swelling  GASTROINTESTINAL: No abdominal or epigastric pain. No nausea, vomiting, or hematemesis; No diarrhea or constipation. No melena or hematochezia.  MUSCULOSKELETAL: No joint pain or swelling; No muscle, back, or extremity pain  [x] All others negative	  [ ] Unable to obtain    PHYSICAL EXAM:  T(C): 35.7, Max: 37.5 (04-23 @ 22:00)  HR: 77 (64 - 136)  BP: 138/62 (123/64 - 189/74)  RR: 18 (13 - 35)  SpO2: 97% (90% - 100%)  Wt(kg): --  I&O's Summary  I & Os for 24h ending 24 Apr 2017 07:00  =============================================  IN: 1100.5 ml / OUT: 1015 ml / NET: 85.5 ml    I & Os for current day (as of 24 Apr 2017 13:02)  =============================================  IN: 250 ml / OUT: 240 ml / NET: 10 ml        Appearance: Normal	  HEENT:   Normal oral mucosa  Cardiovascular: Normal S1 S2, No JVD, No murmurs, No edema  Respiratory: Lungs clear to auscultation	  Gastrointestinal:  Soft, Non-tender, + BS	  Extremities: Normal range of motion, No clubbing, cyanosis or edema  Vascular: Peripheral pulses palpable 2+ bilaterally    TELEMETRY: 	    ECG:    	  RADIOLOGY:   CXR:  CT:  US:    CARDIAC TESTING:  Echocardiogram:  Catheterization:  Stress Test:      LABS:	 	    CARDIAC MARKERS:  Troponin I, Serum: 0.031 ng/mL (04-23 @ 17:38)                                  9.5    12.8  )-----------( 288      ( 24 Apr 2017 06:01 )             29.3     04-24    138  |  103  |  11  ----------------------------<  98  3.6   |  25  |  1.03    Ca    8.2<L>      24 Apr 2017 06:00  Phos  4.1     04-24  Mg     1.7     04-24      proBNP: Serum Pro-Brain Natriuretic Peptide: 2657 pg/mL (04-23 @ 22:41)    Lipid Profile:   HgA1c:   TSH:     ASSESSMENT/PLAN: 	  HTN - SBP currently 160s, INCREASE Norvasc to 5mg, prn labaelolol and hyrdalazine only if SBP>200.  Hold ASA, plavix - will resume when okay with surgery as outpt, no active signs of CHF decompensation or ACS Chief Complaint/Reason for Consult: CAD  INTERVAL HPI: post op abd pain no cp sob palp no events over  weekend stepped down to regional  	  MEDICATIONS:  labetalol Injectable 10milliGRAM(s) IV Push every 1 hour PRN  tamsulosin 0.4milliGRAM(s) Oral at bedtime  hydrALAZINE Injectable 10milliGRAM(s) IV Push once  amLODIPine   Tablet 2.5milliGRAM(s) Oral daily    piperacillin/tazobactam IVPB.  IV Intermittent   piperacillin/tazobactam IVPB. 4.5Gram(s) IV Intermittent every 8 hours    buDESOnide 160 MICROgram(s)/formoterol 4.5 MICROgram(s) Inhaler 2Puff(s) Inhalation two times a day  ALBUTerol/ipratropium for Nebulization 3milliLiter(s) Nebulizer every 6 hours  ALBUTerol    90 MICROgram(s) HFA Inhaler 1Puff(s) Inhalation every 4 hours  tiotropium 18 MICROgram(s) Capsule 1Capsule(s) Inhalation daily    PARoxetine 20milliGRAM(s) Oral daily  mirtazapine 15milliGRAM(s) Oral at bedtime      insulin lispro (HumaLOG) corrective regimen sliding scale  SubCutaneous Before meals and at bedtime  dextrose 50% Injectable 25Gram(s) IV Push once  glucagon  Injectable 1milliGRAM(s) IntraMuscular once PRN    dextrose 5%. 1000milliLiter(s) IV Continuous <Continuous>  clopidogrel Tablet 75milliGRAM(s) Oral daily  heparin  Injectable 5000Unit(s) SubCutaneous every 8 hours      REVIEW OF SYSTEMS:  [x] As per HPI  CONSTITUTIONAL: No fever, weight loss, or fatigue  RESPIRATORY: No cough, wheezing, chills or hemoptysis; No Shortness of Breath  CARDIOVASCULAR: No chest pain, palpitations, dizziness, or leg swelling  GASTROINTESTINAL: No abdominal or epigastric pain. No nausea, vomiting, or hematemesis; No diarrhea or constipation. No melena or hematochezia.  MUSCULOSKELETAL: No joint pain or swelling; No muscle, back, or extremity pain  [x] All others negative	  [ ] Unable to obtain    PHYSICAL EXAM:  T(C): 35.7, Max: 37.5 (04-23 @ 22:00)  HR: 77 (64 - 136)  BP: 138/62 (123/64 - 189/74)  RR: 18 (13 - 35)  SpO2: 97% (90% - 100%)  Wt(kg): --  I&O's Summary  I & Os for 24h ending 24 Apr 2017 07:00  =============================================  IN: 1100.5 ml / OUT: 1015 ml / NET: 85.5 ml    I & Os for current day (as of 24 Apr 2017 13:02)  =============================================  IN: 250 ml / OUT: 240 ml / NET: 10 ml        Appearance: Normal	  HEENT:   Normal oral mucosa  Cardiovascular: Normal S1 S2, No JVD, No murmurs, No edema  Respiratory: Lungs clear to auscultation	  Gastrointestinal:  Soft, Non-tender, + BS	  Extremities: Normal range of motion, No clubbing, cyanosis or edema  Vascular: Peripheral pulses palpable 2+ bilaterally    TELEMETRY: 	    ECG:    	  RADIOLOGY:   CXR:  CT:  US:    CARDIAC TESTING:  Echocardiogram:Left ventricular hypertrophy presentThe left ventricular ejection   fraction is   estimated to be 50-55%Probably normal right ventricular size and   function.The   left atrial size is normal.Calcified aortic valve.There is mildaortic   regurgitation.No hemodynamically significant valvular aortic   stenosis.There is   trace to mild mitral regurgitation.There is trace to mild tricuspid   regurgitation.There was insufficient TR detected from which to calculate   pulmonary artery systolic pressure.  There is mild pulmonic   regurgitation.The   inferior vena cava is normal in size (<2.1 cm) with normal inspiratory   collapse (>50%) consistent with normal right atrial pressure.  Catheterization:  Stress Test:      LABS:	 	    CARDIAC MARKERS:  Troponin I, Serum: 0.031 ng/mL (04-23 @ 17:38)                                  9.5    12.8  )-----------( 288      ( 24 Apr 2017 06:01 )             29.3     04-24    138  |  103  |  11  ----------------------------<  98  3.6   |  25  |  1.03    Ca    8.2<L>      24 Apr 2017 06:00  Phos  4.1     04-24  Mg     1.7     04-24      proBNP: Serum Pro-Brain Natriuretic Peptide: 2657 pg/mL (04-23 @ 22:41)    Lipid Profile:   HgA1c:   TSH:     ASSESSMENT/PLAN: 	  Echo findings unchanged from prior echo, I reviewed echo my self and do nto see a change in EF from prior echo.  HTN - SBP currently 160s, INCREASE Norvasc to 5mg, prn labaelolol and hyrdalazine only if SBP>200.  Hold ASA, plavix - will resume when okay with surgery as outpt, no active signs of CHF decompensation or ACS

## 2017-04-24 NOTE — CONSULT NOTE ADULT - ASSESSMENT
Pt is an 87 yo M former smoker, PMHx CAD s/p PCI x3 LCx (2013 @[facility]), HLD, BPH, presented to cardiologist c/o worsening shortness of breath which has progressed over the past year who now presented for cardiac catheterization given history of multiple PCI and EKG abnormalities as well as possible echo/EP evaluation, however was found to be Anemic H/H 7.8/25.9 prior to cath and was [place] + so decision was made to cancel cardiac cath and admit to [facility] for telemetry monitoring.
87 yo male with ascending colonic mass    Plan:  CT chest to complete staging, so far no intrabdominal mets  CEA  Cardiac and medicine clearance for Right colectomy  Pre-op  transfer to Dr. Headley service  Will bowel prep before surgery
Colonic carcinoma of uncertain stage.  Profound iron deficiency anemia with iron saturations of less than 5%.  Poor dental health  Unstable gait without a cane.
87 yo male with CAD (stent x2 last 3 years ago), BPH, htn, hld, admitted originally for elective cardiac cath, found to be anemic, further work up reveild ascending colonic mass s/p R hemicolectomy    Neuro: tylenol & dilaudid prn  CV: normotensive goals, labetalol prn, hydralazine prn  Pulm: 2L NC, Symbicort, Duonebs  FENGI: NPO  : Espinoza, flomax  Endo: ISS  ID: PNA ppx Zosyn (4/23-)  Heme: SQH, Plavix  Lines: PIV  Wounds: Midline, sebaceous cyst removal
87 yo male with CAD (stent x2 last 3 years ago), BPH, htn, hld, admitted originally for elective cardiac cath, found to be anemic, further work up reveild ascending colonic mass s/p R hemicolectomy    Neuro: dilaudid prn  CV: normotensive goals  Pulm: 2L NC  FENGI: NPO/IVF, protonix  : Espinoza  Endo: ISS  ID: Periop abx  Heme: SQH, hold ASA  Lines: Vidya (4/17--), PIV  Wounds: Midline, sebaceous cyst removal

## 2017-04-24 NOTE — PROGRESS NOTE ADULT - SUBJECTIVE AND OBJECTIVE BOX
Patient is a 86y old  Male who presents with a chief complaint of     HPI:  Pt is an 85 yo M former smoker, PMHx CAD s/p PCI x3 LCx (2013 @[facility]), HLD, BPH, depression, presented to cardiologist c/o worsening shortness of breath which has progressed over the past year. Patient states symptoms are similar to when he required his previous stents, and he can only walk about a block or less before having to stop secondary to shortness of breath. He states that sometimes getting dressed and walking around the house can also cause SOB. Of note, he also endorses a recent loss of appetite over the past month and non-bloody diarrhea x 1 week with associated 5lb weight loss. He denies any symptoms such as chest pain, LOC, dizziness, syncope, PND, orthopnea, LE edema, palpitations, asthma/COPD, recent viral illness or travel, abdominal pain, nausea/vomiting, hx of blood tranfusion/anemia. On EKG in office patient noticed to be sinus yasmine with IVCD. Pt recommended for Right and Left cardiac catheterization given history of multiple PCI and EKG abnormalities as well as possible echo/EP evaluation. (06 Apr 2017 16:15)    INTERVAL HPI/OVERNIGHT EVENTS:::    HEALTH ISSUES - PROBLEM Dx:  Coronary artery disease, angina presence unspecified, unspecified vessel or lesion type, unspecified whether native or transplanted heart: Coronary artery disease, angina presence unspecified, unspecified vessel or lesion type, unspecified whether native or transplanted heart  Anemia, unspecified type: Anemia, unspecified type  Coronary artery disease with unstable angina pectoris, unspecified vessel or lesion type, unspecified whether native or transplanted heart: Coronary artery disease with unstable angina pectoris, unspecified vessel or lesion type, unspecified whether native or transplanted heart  Malignant neoplasm of colon, unspecified part of colon: Malignant neoplasm of colon, unspecified part of colon  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  Tachypnea: Tachypnea  Colon cancer: Colon cancer  Blood in stool: Blood in stool  Bradycardia: Bradycardia  CKD (chronic kidney disease) stage 3, GFR 30-59 ml/min: CKD (chronic kidney disease) stage 3, GFR 30-59 ml/min  Hyperlipidemia: Hyperlipidemia  Anemia: Anemia  CAD (coronary artery disease): CAD (coronary artery disease)  SOB (shortness of breath): SOB (shortness of breath)          PAST MEDICAL & SURGICAL HISTORY:  CAD (coronary artery disease)  HLD (hyperlipidemia)  S/P cataract extraction          Consultant NOTE  REVIEWED  (   )    REVIEW OF SYSTEMS:  [x] As per HPI  CONSTITUTIONAL: No fever, weight loss, or fatigue  RESPIRATORY: No cough, wheezing, chills or hemoptysis; No Shortness of Breath  CARDIOVASCULAR: No chest pain, palpitations, dizziness, or leg swelling  GASTROINTESTINAL: No abdominal or epigastric pain. No nausea, vomiting, or hematemesis; No diarrhea or constipation. No melena or hematochezia.  MUSCULOSKELETAL: No joint pain or swelling; No muscle, back, or extremity pain  PSYCH    awake, alert       [x] All others negative	  [ ] Unable to obtain          Vital Signs Last 24 Hrs  T(C): 37.3, Max: 37.5 (04-23 @ 22:00)  T(F): 99.2, Max: 99.5 (04-23 @ 22:00)  HR: 70 (66 - 136)  BP: 167/60 (128/55 - 189/74)  BP(mean): 94 (87 - 116)  RR: 17 (13 - 35)  SpO2: 97% (90% - 100%)        I & Os for current day (as of 04-24 @ 07:03)  =============================================  IN: 1012.5 ml / OUT: 935 ml / NET: 77.5 ml    PHYSICAL EXAMINATION:                                    (    )  NO CHANGE  Appearance: Normal	  HEENT:   Normal oral mucosa, PERRL, EOMI	  Neck: Supple, + JVD/ - JVD; Carotid Bruit   Cardiovascular: Normal S1 S2, No JVD, No murmurs,   Respiratory: Lungs clear to auscultation/Decreased Breath Sounds/No Rales, Rhonchi, Wheezing	  Gastrointestinal:  Soft, Non-tender, + BS	  Skin: No rashes, No ecchymoses, No cyanosis  Extremities: Normal range of motion, No clubbing, cyanosis or edema  Vascular: Peripheral pulses palpable 2+ bilaterally  Neurologic: Non-focal  Psychiatry: A & O x 3, Mood & affect appropriate    buDESOnide 160 MICROgram(s)/formoterol 4.5 MICROgram(s) Inhaler 2Puff(s) Inhalation two times a day  ALBUTerol/ipratropium for Nebulization 3milliLiter(s) Nebulizer every 6 hours  ALBUTerol    90 MICROgram(s) HFA Inhaler 1Puff(s) Inhalation every 4 hours  tiotropium 18 MICROgram(s) Capsule 1Capsule(s) Inhalation daily  insulin lispro (HumaLOG) corrective regimen sliding scale  SubCutaneous Before meals and at bedtime  dextrose 5%. 1000milliLiter(s) IV Continuous <Continuous>  dextrose 50% Injectable 25Gram(s) IV Push once  glucagon  Injectable 1milliGRAM(s) IntraMuscular once PRN  labetalol Injectable 10milliGRAM(s) IV Push every 1 hour PRN  tamsulosin 0.4milliGRAM(s) Oral at bedtime  clopidogrel Tablet 75milliGRAM(s) Oral daily  hydrALAZINE Injectable 10milliGRAM(s) IV Push once  heparin  Injectable 5000Unit(s) SubCutaneous every 8 hours  piperacillin/tazobactam IVPB.  IV Intermittent   piperacillin/tazobactam IVPB. 4.5Gram(s) IV Intermittent every 8 hours        PT/INR - ( 23 Apr 2017 22:41 )   PT: 12.6 sec;   INR: 1.13          PTT - ( 23 Apr 2017 22:41 )  PTT:>200.0 sec    CARDIAC MARKERS ( 23 Apr 2017 17:38 )  0.031 ng/mL / x     / 63 U/L / x     / 1.6 ng/mL                            9.5    12.8  )-----------( 288      ( 24 Apr 2017 06:01 )             29.3     04-24    138  |  103  |  11  ----------------------------<  98  3.6   |  25  |  1.03    Ca    8.2<L>      24 Apr 2017 06:00  Phos  4.1     04-24  Mg     1.7     04-24        CAPILLARY BLOOD GLUCOSE  106 (24 Apr 2017 06:00)  115 (23 Apr 2017 23:05)  115 (23 Apr 2017 22:00)  129 (23 Apr 2017 16:54)  122 (23 Apr 2017 11:40) Patient is a 86y old  Male who presents with a chief complaint of     HPI:  Pt is an 85 yo M former smoker, PMHx CAD s/p PCI x3 LCx (2013 @[facility]), HLD, BPH, depression, presented to cardiologist c/o worsening shortness of breath which has progressed over the past year. Patient states symptoms are similar to when he required his previous stents, and he can only walk about a block or less before having to stop secondary to shortness of breath. He states that sometimes getting dressed and walking around the house can also cause SOB. Of note, he also endorses a recent loss of appetite over the past month and non-bloody diarrhea x 1 week with associated 5lb weight loss. He denies any symptoms such as chest pain, LOC, dizziness, syncope, PND, orthopnea, LE edema, palpitations, asthma/COPD, recent viral illness or travel, abdominal pain, nausea/vomiting, hx of blood tranfusion/anemia. On EKG in office patient noticed to be sinus yasmine with IVCD. Pt recommended for Right and Left cardiac catheterization given history of multiple PCI and EKG abnormalities as well as possible echo/EP evaluation. (06 Apr 2017 16:15)    INTERVAL HPI/OVERNIGHT EVENTS:::doing wee. afua po    HEALTH ISSUES - PROBLEM Dx:  Coronary artery disease, angina presence unspecified, unspecified vessel or lesion type, unspecified whether native or transplanted heart: Coronary artery disease, angina presence unspecified, unspecified vessel or lesion type, unspecified whether native or transplanted heart  Anemia, unspecified type: Anemia, unspecified type  Coronary artery disease with unstable angina pectoris, unspecified vessel or lesion type, unspecified whether native or transplanted heart: Coronary artery disease with unstable angina pectoris, unspecified vessel or lesion type, unspecified whether native or transplanted heart  Malignant neoplasm of colon, unspecified part of colon: Malignant neoplasm of colon, unspecified part of colon  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  Tachypnea: Tachypnea  Colon cancer: Colon cancer  Blood in stool: Blood in stool  Bradycardia: Bradycardia  CKD (chronic kidney disease) stage 3, GFR 30-59 ml/min: CKD (chronic kidney disease) stage 3, GFR 30-59 ml/min  Hyperlipidemia: Hyperlipidemia  Anemia: Anemia  CAD (coronary artery disease): CAD (coronary artery disease)  SOB (shortness of breath): SOB (shortness of breath)          PAST MEDICAL & SURGICAL HISTORY:  CAD (coronary artery disease)  HLD (hyperlipidemia)  S/P cataract extraction          Consultant NOTE  REVIEWED  (  +++ )    REVIEW OF SYSTEMS:unchanged  [x] As per HPI  CONSTITUTIONAL: No fever, weight loss, or fatigue  RESPIRATORY: No cough, wheezing, chills or hemoptysis; No Shortness of Breath  CARDIOVASCULAR: No chest pain, palpitations, dizziness, or leg swelling  GASTROINTESTINAL: No abdominal or epigastric pain. No nausea, vomiting, or hematemesis; No diarrhea or constipation. No melena or hematochezia.  MUSCULOSKELETAL: No joint pain or swelling; No muscle, back, or extremity pain  PSYCH    awake, alert       [x] All others negative	  [ ] Unable to obtain          Vital Signs Last 24 Hrs  T(C): 37.3, Max: 37.5 (04-23 @ 22:00)  T(F): 99.2, Max: 99.5 (04-23 @ 22:00)  HR: 70 (66 - 136)  BP: 167/60 (128/55 - 189/74)  BP(mean): 94 (87 - 116)  RR: 17 (13 - 35)  SpO2: 97% (90% - 100%)        I & Os for current day (as of 04-24 @ 07:03)  =============================================  IN: 1012.5 ml / OUT: 935 ml / NET: 77.5 ml    PHYSICAL EXAMINATION:                                    (  +++  )  NO CHANGE  Appearance: Normal	  HEENT:   Normal oral mucosa, PERRL, EOMI	  Neck: Supple, + JVD/ - JVD; Carotid Bruit   Cardiovascular: Normal S1 S2, No JVD, No murmurs,   Respiratory: Lungs clear to auscultation/Decreased Breath Sounds/No Rales, Rhonchi, Wheezing	  Gastrointestinal:  Soft, Non-tender, + BS	s/p laparotomy  Skin: No rashes, No ecchymoses, No cyanosis  Extremities: Normal range of motion, No clubbing, cyanosis or edema  Vascular: Peripheral pulses palpable 2+ bilaterally  Neurologic: Non-focal  Psychiatry: A & O x 3, Mood & affect appropriate    buDESOnide 160 MICROgram(s)/formoterol 4.5 MICROgram(s) Inhaler 2Puff(s) Inhalation two times a day  ALBUTerol/ipratropium for Nebulization 3milliLiter(s) Nebulizer every 6 hours  ALBUTerol    90 MICROgram(s) HFA Inhaler 1Puff(s) Inhalation every 4 hours  tiotropium 18 MICROgram(s) Capsule 1Capsule(s) Inhalation daily  insulin lispro (HumaLOG) corrective regimen sliding scale  SubCutaneous Before meals and at bedtime  dextrose 5%. 1000milliLiter(s) IV Continuous <Continuous>  dextrose 50% Injectable 25Gram(s) IV Push once  glucagon  Injectable 1milliGRAM(s) IntraMuscular once PRN  labetalol Injectable 10milliGRAM(s) IV Push every 1 hour PRN  tamsulosin 0.4milliGRAM(s) Oral at bedtime  clopidogrel Tablet 75milliGRAM(s) Oral daily  hydrALAZINE Injectable 10milliGRAM(s) IV Push once  heparin  Injectable 5000Unit(s) SubCutaneous every 8 hours  piperacillin/tazobactam IVPB.  IV Intermittent   piperacillin/tazobactam IVPB. 4.5Gram(s) IV Intermittent every 8 hours        PT/INR - ( 23 Apr 2017 22:41 )   PT: 12.6 sec;   INR: 1.13          PTT - ( 23 Apr 2017 22:41 )  PTT:>200.0 sec    CARDIAC MARKERS ( 23 Apr 2017 17:38 )  0.031 ng/mL / x     / 63 U/L / x     / 1.6 ng/mL                            9.5    12.8  )-----------( 288      ( 24 Apr 2017 06:01 )             29.3     04-24    138  |  103  |  11  ----------------------------<  98  3.6   |  25  |  1.03    Ca    8.2<L>      24 Apr 2017 06:00  Phos  4.1     04-24  Mg     1.7     04-24        CAPILLARY BLOOD GLUCOSE  106 (24 Apr 2017 06:00)  115 (23 Apr 2017 23:05)  115 (23 Apr 2017 22:00)  129 (23 Apr 2017 16:54)  122 (23 Apr 2017 11:40)

## 2017-04-24 NOTE — PROGRESS NOTE ADULT - SUBJECTIVE AND OBJECTIVE BOX
Interval Events:  ct PE negative, Satting well overnight, agitation and anxiety relieved with haldol.   Patient seen and examined at bedside.      Allergies    No Known Allergies    Intolerances        Vital Signs Last 24 Hrs  T(C): 35.7, Max: 37.5 (04-23 @ 22:00)  T(F): 96.2, Max: 99.5 (04-23 @ 22:00)  HR: 77 (64 - 136)  BP: 138/62 (123/64 - 189/74)  BP(mean): 93 (87 - 116)  RR: 18 (13 - 35)  SpO2: 97% (90% - 100%)  I & Os for 24h ending 04-24 @ 07:00  =============================================  IN: 1100.5 ml / OUT: 1015 ml / NET: 85.5 ml    I & Os for current day (as of 04-24 @ 12:59)  =============================================  IN: 250 ml / OUT: 240 ml / NET: 10 ml  I & Os for 24h ending 04-24 @ 07:00  =============================================  IN: 1100.5 ml / OUT: 1015 ml / NET: 85.5 ml    I & Os for current day (as of 04-24 @ 12:59)  =============================================  IN: 250 ml / OUT: 240 ml / NET: 10 ml        LABS:  ABG - ( 23 Apr 2017 17:33 )  pH: 7.53  /  pCO2: 28    /  pO2: 90    / HCO3: 23    / Base Excess: 0.9   /  SaO2: 98                  CBC Full  -  ( 24 Apr 2017 06:01 )  WBC Count : 12.8 K/uL  Hemoglobin : 9.5 g/dL  Hematocrit : 29.3 %  Platelet Count - Automated : 288 K/uL  Mean Cell Volume : 82.3 fL  Mean Cell Hemoglobin : 26.7 pg  Mean Cell Hemoglobin Concentration : 32.4 g/dL  Auto Neutrophil # : x  Auto Lymphocyte # : x  Auto Monocyte # : x  Auto Eosinophil # : x  Auto Basophil # : x  Auto Neutrophil % : x  Auto Lymphocyte % : x  Auto Monocyte % : x  Auto Eosinophil % : x  Auto Basophil % : x    04-24    138  |  103  |  11  ----------------------------<  98  3.6   |  25  |  1.03    Ca    8.2<L>      24 Apr 2017 06:00  Phos  4.1     04-24  Mg     1.7     04-24      PT/INR - ( 24 Apr 2017 07:05 )   PT: 11.7 sec;   INR: 1.05          PTT - ( 24 Apr 2017 07:05 )  PTT:28.7 sec                RADIOLOGY & ADDITIONAL STUDIES (The following images were personally reviewed):      Physical Exam:     Neuro: A&OX3 No deficits  CV:  RRR reg s1 s2 decreased BS on rrl   Pulm: CTA B/L   Abd: Soft NT nd midline incision with staples cd/i abd binder in place   Ext: No C/C/E Ext wwp + dp     87 yo male with CAD (stent x2 last 3 years ago), BPH, htn, hld, admitted originally for elective cardiac cath, found to be anemic, further work up reveild ascending colonic mass s/p R hemicolectomy    Neuro: tylenol & dilaudid prn, restarted remeron and paxil, may need psych consult for continued anxiety  CV: normotensive goals, labetalol prn, hydralazine prn restarted home norvasc 2.5   Pulm: 2L NC, Symbicort, Duonebs  FENGI: start cld this am   : Alexis, flomax  Endo: ISS  ID: PNA ppx Zosyn (4/23-)  Heme: SQH, Plavix  Lines: PIV  Wounds: Midline, sebaceous cyst removal

## 2017-04-24 NOTE — CONSULT NOTE ADULT - CONSULT REASON
Medicine
Ascending colon mass
Elderly man for cardiac procedure with profound iron deficiency anemia and a colonic primary
SOB
hemodynamic monitoring, respiratory lability

## 2017-04-27 DIAGNOSIS — K44.9 DIAPHRAGMATIC HERNIA WITHOUT OBSTRUCTION OR GANGRENE: ICD-10-CM

## 2017-04-27 DIAGNOSIS — D64.9 ANEMIA, UNSPECIFIED: ICD-10-CM

## 2017-04-27 DIAGNOSIS — K57.30 DIVERTICULOSIS OF LARGE INTESTINE WITHOUT PERFORATION OR ABSCESS WITHOUT BLEEDING: ICD-10-CM

## 2017-04-27 DIAGNOSIS — R06.3 PERIODIC BREATHING: ICD-10-CM

## 2017-04-27 DIAGNOSIS — K92.1 MELENA: ICD-10-CM

## 2017-04-27 DIAGNOSIS — Z79.51 LONG TERM (CURRENT) USE OF INHALED STEROIDS: ICD-10-CM

## 2017-04-27 DIAGNOSIS — E78.5 HYPERLIPIDEMIA, UNSPECIFIED: ICD-10-CM

## 2017-04-27 DIAGNOSIS — I12.9 HYPERTENSIVE CHRONIC KIDNEY DISEASE WITH STAGE 1 THROUGH STAGE 4 CHRONIC KIDNEY DISEASE, OR UNSPECIFIED CHRONIC KIDNEY DISEASE: ICD-10-CM

## 2017-04-27 DIAGNOSIS — Z78.1 PHYSICAL RESTRAINT STATUS: ICD-10-CM

## 2017-04-27 DIAGNOSIS — K40.90 UNILATERAL INGUINAL HERNIA, WITHOUT OBSTRUCTION OR GANGRENE, NOT SPECIFIED AS RECURRENT: ICD-10-CM

## 2017-04-27 DIAGNOSIS — J90 PLEURAL EFFUSION, NOT ELSEWHERE CLASSIFIED: ICD-10-CM

## 2017-04-27 DIAGNOSIS — E43 UNSPECIFIED SEVERE PROTEIN-CALORIE MALNUTRITION: ICD-10-CM

## 2017-04-27 DIAGNOSIS — R00.1 BRADYCARDIA, UNSPECIFIED: ICD-10-CM

## 2017-04-27 DIAGNOSIS — D50.0 IRON DEFICIENCY ANEMIA SECONDARY TO BLOOD LOSS (CHRONIC): ICD-10-CM

## 2017-04-27 DIAGNOSIS — N18.3 CHRONIC KIDNEY DISEASE, STAGE 3 (MODERATE): ICD-10-CM

## 2017-04-27 DIAGNOSIS — Z95.5 PRESENCE OF CORONARY ANGIOPLASTY IMPLANT AND GRAFT: ICD-10-CM

## 2017-04-27 DIAGNOSIS — Z87.891 PERSONAL HISTORY OF NICOTINE DEPENDENCE: ICD-10-CM

## 2017-04-27 DIAGNOSIS — L72.0 EPIDERMAL CYST: ICD-10-CM

## 2017-04-27 DIAGNOSIS — R06.02 SHORTNESS OF BREATH: ICD-10-CM

## 2017-04-27 DIAGNOSIS — R06.82 TACHYPNEA, NOT ELSEWHERE CLASSIFIED: ICD-10-CM

## 2017-04-27 DIAGNOSIS — F32.9 MAJOR DEPRESSIVE DISORDER, SINGLE EPISODE, UNSPECIFIED: ICD-10-CM

## 2017-04-27 DIAGNOSIS — Z79.02 LONG TERM (CURRENT) USE OF ANTITHROMBOTICS/ANTIPLATELETS: ICD-10-CM

## 2017-04-27 DIAGNOSIS — R91.8 OTHER NONSPECIFIC ABNORMAL FINDING OF LUNG FIELD: ICD-10-CM

## 2017-04-27 DIAGNOSIS — Z79.82 LONG TERM (CURRENT) USE OF ASPIRIN: ICD-10-CM

## 2017-04-27 DIAGNOSIS — J98.11 ATELECTASIS: ICD-10-CM

## 2017-04-27 DIAGNOSIS — D12.6 BENIGN NEOPLASM OF COLON, UNSPECIFIED: ICD-10-CM

## 2017-04-27 DIAGNOSIS — I70.0 ATHEROSCLEROSIS OF AORTA: ICD-10-CM

## 2017-04-27 DIAGNOSIS — E83.39 OTHER DISORDERS OF PHOSPHORUS METABOLISM: ICD-10-CM

## 2017-04-27 DIAGNOSIS — J18.9 PNEUMONIA, UNSPECIFIED ORGANISM: ICD-10-CM

## 2017-04-27 DIAGNOSIS — I25.110 ATHEROSCLEROTIC HEART DISEASE OF NATIVE CORONARY ARTERY WITH UNSTABLE ANGINA PECTORIS: ICD-10-CM

## 2017-04-27 DIAGNOSIS — F41.9 ANXIETY DISORDER, UNSPECIFIED: ICD-10-CM

## 2017-04-27 DIAGNOSIS — N40.0 BENIGN PROSTATIC HYPERPLASIA WITHOUT LOWER URINARY TRACT SYMPTOMS: ICD-10-CM

## 2017-04-27 DIAGNOSIS — D63.8 ANEMIA IN OTHER CHRONIC DISEASES CLASSIFIED ELSEWHERE: ICD-10-CM

## 2017-04-27 DIAGNOSIS — J44.9 CHRONIC OBSTRUCTIVE PULMONARY DISEASE, UNSPECIFIED: ICD-10-CM

## 2017-04-27 DIAGNOSIS — K64.4 RESIDUAL HEMORRHOIDAL SKIN TAGS: ICD-10-CM

## 2017-04-27 DIAGNOSIS — K29.70 GASTRITIS, UNSPECIFIED, WITHOUT BLEEDING: ICD-10-CM

## 2017-06-26 NOTE — PROGRESS NOTE ADULT - PROBLEM/PLAN-2
DISPLAY PLAN FREE TEXT
no

## 2017-10-17 NOTE — PROGRESS NOTE ADULT - SUBJECTIVE AND OBJECTIVE BOX
10/17/2017       RE: Radha Uribe  [address]  [facility] [id number]     Dear Colleague,    Thank you for referring your patient, Radha Uribe, to the [facility]. Please see a copy of my visit note below.    HISTORY OF PRESENT ILLNESS: Radha Uribe is a 66-year-old woman who was referred to our Oncology Clinic by Dr. Crespo for evaluation of a newly diagnosed breast cancer. Radha was in her usual health until last month, when she was asked to do a mammogram by her primary care doctor, Dr. Preston. She has not been doing mammogram for the last 20 years, and most of the time she does self breast examinations. She noticed a mass, which was not painful, in her left breast, and immediately went for a mammogram. The mammogram was done on 09/13/2013, which revealed a 2.5 x 2 x 0.8 cm mass, so she had an ultrasound-guided core needle biopsy on 09/17, which revealed infiltrating ductal carcinoma, Superior grade 3 (score 3 for tubule formation, 3 for nuclear atypia, 3 for mitosis). No angiolymphatic invasion was identified. There was a focal necrosis of invasive carcinoma identified, and no definite carcinoma in situ was identified. The greatest length of the needle biopsy at the left breast in the 2 o'clock position showed benign breast tissue with fibrocystic changes. There is a focal intraductal microcalcification identified. The tumor was 15-20% weakly to moderately positive for estrogen receptors and 5% positive for progesterone receptors. HER-2 was amplified. The ratio of HER-2/CEP17 signals were more than 5.9 by FISH analysis. The signal/nucleus was more than 19.4. She was seen by Dr. Crespo at the Breast Clinic on 09/24, who recommended to do an MRI of the breast due to density of the breast tissues, as well as referred her to the Breast Oncology Clinic for further staging workup and management of her breast cancer. MRI of the breast showed BI-RADS 6 and mass in the left breast at the  "11 o'clock position that measures about 1.6 x 1.6 x 1.6 cm. The longest diameter in sagittal reconstruction was 1.9 cm. There is no evidence of axillary lymph node involvement on the MRI.   She had neoadjuvant chemotherapy , mastectomy and SNL sampling which showed pathologic CR and no disease in the LN. This was followed by radiation therapy. She received 5000 cGy in 25 fractions, completed on 5/23/2014. She continued on treatment with herceptin for 13 cycles. This was completed on 12/9/2014.     FOLLOWUP NOTE: Oct 17, 2017        INTERVAL HISTORY:  Radha returns to the clinic.  She  reports feeling well. She has had some URI symptoms, but these are improving. She continues to smoke and has no interest in quitting. Her energy level is good and she has no complaints of pain, anxiety, or depression.       REVIEW OF SYSTEMS:  She has some wheezing.  The remainder of a 10-point review of systems is negative.       PHYSICAL EXAMINATION:   VITAL SIGNS: /72  Pulse 79  Temp 98.2  F (36.8  C)  Resp 16  Ht 1.659 m (5' 5.32\")  Wt 53.1 kg (117 lb 1.6 oz)  LMP  (LMP Unknown)  SpO2 95%  BMI 19.3 kg/m2    GENERAL:  Radha appeared generally well.  HEENT:  Oropharynx is without lesions.   LYMPH:  There is no palpable cervical, supraclavicular, subclavicular or axillary lymphadenopathy.   BREASTS:  Examination of the right breast is without masses.  Examination of the left breast reveals a well-healed incision at the 11 o'clock position, 2 fingerbreadths from the nipple-areolar complex, without erythema or masses.  No masses in the left breast.  The left axillary incision is well-healed without erythema or masses.   LUNGS:  Clear to percussion and auscultation, except for wheezing in the right upper lobe.   HEART:  There is a regular rate and rhythm, and a 2/6 systolic ejection murmur heard best at left sternal border.   ABDOMEN:  Soft and nontender, without hepatosplenomegaly.   EXTREMITIES:  Without edema. " Interventional, Pulmonary, Critical, Chest Special Procedures.    Pt was seen and fully examined by myself.     Time spent with patient in minutes:37  CC/ HPI 85 yo male former smoker, chronic obstructive pulmonary disease, CAD s/p PCI x3 LCx, 2013, found to have colon mass, status post right hemicolectomy, today without acute respiratory complaint.    HPI:  Pt is an 85 yo M former smoker, PMHx CAD s/p PCI x3 LCx (2013 @[facility]), HLD, BPH, depression, presented to cardiologist c/o worsening shortness of breath which has progressed over the past year. Patient states symptoms are similar to when he required his previous stents, and he can only walk about a block or less before having to stop secondary to shortness of breath. He states that sometimes getting dressed and walking around the house can also cause SOB. Of note, he also endorses a recent loss of appetite over the past month and non-bloody diarrhea x 1 week with associated 5lb weight loss. He denies any symptoms such as chest pain, LOC, dizziness, syncope, PND, orthopnea, LE edema, palpitations, asthma/COPD, recent viral illness or travel, abdominal pain, nausea/vomiting, hx of blood tranfusion/anemia. On EKG in office patient noticed to be sinus yasmine with IVCD. Pt recommended for Right and Left cardiac catheterization given history of multiple PCI and EKG abnormalities as well as possible echo/EP evaluation. (06 Apr 2017 16:15)    REVIEW OF SYSTEMS:  ROS reviewed below with positive findings marked (+) :  GEN:  fever, chills ENT: tracheostomy,   epistaxis,  sinusitis COR: +CAD, CHF,  HTN, dysrhythmia PUL: COPD, ILD, asthma, pneumonia GI: PEG, dysphagia, hemorrhage, other ELIZABETH: kidney disease, electrolyte disorder HEM:  anemia, thrombus, coagulopathy, cancer ENDO:  thyroid disease, diabetes mellitus CNS:  dementia, stroke, seizure, PSY:  depression, anxiety, other    PAST MEDICAL & SURGICAL HISTORY:  CAD (coronary artery disease)  HLD (hyperlipidemia)  S/P cataract extraction    FAMILY HISTORY:  No pertinent family history in first degree relatives    SOCIAL HISTORY:      - Tobacco     - ETOH    Allergies    No Known Allergies    Intolerances      Vital Signs Last 24 Hrs  T(C): 36.8, Max: 37.3 (04-20 @ 13:59)  T(F): 98.3, Max: 99.1 (04-20 @ 13:59)  HR: 68 (64 - 80)  BP: 166/72 (144/67 - 166/72)  BP(mean): 101 (96 - 101)  RR: 18 (18 - 18)  SpO2: 100% (92% - 100%)  I & Os for 24h ending 04-20 @ 07:00  =============================================  IN: 0 ml / OUT: 1350 ml / NET: -1350 ml    I & Os for current day (as of 04-20 @ 17:29)  =============================================  IN: 0 ml / OUT: 700 ml / NET: -700 ml      PHYSICAL EXAM:  GENERAL:         comfortable,  - distress.  HEENT:            - trauma,  - icterus,  - injection,  - nasal discharge.  NECK:              - jugular venous distention, - thyromegaly.  LYMPH:           - lymphadenopathy, - masses.  RESP:              + crackles,   - rhonchi,   - wheezes.   COR:                S1S2  - gallops,  - rubs.  ABD:                abdominal binder, bowel sounds,  soft, - tender, - distended.  EXT/MSC:         - cyanosis,  - edema.    NEURO:             alert,   responds to stimuli.    DEVICES:  - DENTURES   +IV R / L     - ETUBE   -TRACH   -CTUBE  R / L      LABS:                          9.8    11.6  )-----------( 255      ( 20 Apr 2017 07:35 )             30.8     04-20    140  |  107  |  15  ----------------------------<  78  4.2   |  21<L>  |  1.00    Ca    8.4<L>      20 Apr 2017 07:35  Phos  2.5     04-20  Mg     2.0     04-20    TPro  5.2<L>  /  Alb  2.3<L>  /  TBili  0.6  /  DBili  x   /  AST  17  /  ALT  19  /  AlkPhos  51  04-19        EXAM:  CT CHEST IC                          PROCEDURE DATE:  04/13/2017    Quantity of Contrast in Vial in ml: 100 Contrast Used: Optiray 350  Quantity of Contrast Wasted in ml: 10           INTERPRETATION:  CLINICAL INDICATION: 86-year-old with colon cancer for   evaluation of metastases.        FINDINGS: CT of the chest was performed with the administration of   intravenous contrast. Reconstructions were performed in the sagittal and   coronal. Comparison is made to prior studies dated 4/12/2017 and   1/14/2014.    Evaluation of the chest demonstrates the visualized thyroid gland is   normal in appearance. Heart is normal in size. Severe coronary arterial   calcification. No pleural or pericardial effusion. Negative for thoracic   inlet or axillary adenopathy. There is a mildly enlarged prevascular   lymph node measuring 1.6 cm, stable since 2013. Negative for hilar   adenopathy. Evaluation of the lung parenchyma demonstrates biapical   pleural-parenchymal scarring. There is mild centrilobular emphysematous   change, localized the bilateral lung apices. There is a 3 mm subpleural   nodule within the right lower lobe image 234, stable since 3/2013, and a   2 mm subpleural nodule within the left lower lobe (image 242), stable   since 1/2014 for which follow-up is not required. There is a small   diffuse small multifocal region of clustered branching tree-in-bud   micronodular the within the subpleural right lower lobe, intervally   improved since 1/2014, consistent with minimal bronchiolitis. There is a   fibrotic/atelectatic opacity within the lingula. No endobronchial lesion.   No pulmonary consolidation or mass. Evaluation of the upper abdomen   demonstrates low density thickening of the bilateral adrenal glands,   consistent with adenomatous hyperplasia. 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 Evaluation the osseous structures   demonstrates no destructive lesion.      IMPRESSION:    No suspicious pulmonary finding.RADIOLOGY & ADDITIONAL STUDIES (The following images were personally reviewed):   PSYCHIATRIC:  Mood and affect were normal.       LABORATORY DATA:  The CMP is within normal limits.  CBC  showed hgb of 10.8 otherwise within normal limits.  Glucose was 106.       ASSESSMENT AND PLAN:     1.  Radha Uribe is a 66-year-old woman who was diagnosed in 2013 with a stage IIA, T2 N2 MX, invasive ductal carcinoma of the left breast.  The tumor was positive for estrogen receptor in 15-20% of the cells, IL-positive in 5% of the cells, and HER2 was amplified.  The left breast mass measured 2.5 x 2 x 0.8 cm in size at the 11 o'clock position of the left breast on the mammogram.  She was treated with neoadjuvant Taxol and Herceptin for 12 weeks followed by dose-dense AC for 4 cycles.  She had a complete pathologic response in the lumpectomy specimen.  She completed radiation in 04/2014.  She tolerated the treatment quite well.  She began letrozole, and has tolerated it very well with no hot flashes, myalgias or arthralgias.  She had a mammogram on 10/13/2016 which showed benign results.   2.  Letrozole therapy is recommended for 10 years given the outcome of the MA.17R trial.  We could consider the breast cancer index to reduce the length of time and treatment with letrozole from 10 years to 5 years, but on the other hand this has not been applied for HER2-positive breast cancers, and she is at somewhat increased risk of distant recurrence.   3.  Continue with mammography performed yearly.   4.  Thyroid mass.  She did have a thyroid biopsy, which showed benign results, and she will follow up with Dr. Hernandez. .   5.  DEXA scan showed osteopenia with a most valid negative T score of -2.4 on 04/30/2014.  Radha does not want to pursue this finding.  She does not want treatment with Reclast.   6. Smoking cessation broached.  She affirms refusal to discuss.   7.  Followup.  Radha will see Michelle Coker in followup in 6 months with SAHARA, and then in 12 months with me with a mammogram.  CBC, CMP with follow  up visits. Follow up with SAHARA 4-17-18 with CBC, CMP.          Thank you for allowing us to continue to participate in Radha Uribe's care.  The patient was seen and evaluated by me.  I discussed the patient with the resident and agree with the findings and plan in the note, which was edited by me.    Sincerely,     South Esparza MD    [facility]  [phone].      Alexis Ventura MD  Resident, Radiation Oncology       Again, thank you for allowing me to participate in the care of your patient.      Sincerely,    South Esparza MD

## 2018-11-27 ENCOUNTER — INPATIENT (INPATIENT)
Facility: HOSPITAL | Age: 83
LOS: 3 days | Discharge: HOME | DRG: 682 | End: 2018-12-01
Attending: INTERNAL MEDICINE | Admitting: INTERNAL MEDICINE
Payer: MEDICARE

## 2018-11-27 VITALS
TEMPERATURE: 98 F | SYSTOLIC BLOOD PRESSURE: 121 MMHG | HEART RATE: 61 BPM | DIASTOLIC BLOOD PRESSURE: 65 MMHG | OXYGEN SATURATION: 96 % | RESPIRATION RATE: 20 BRPM

## 2018-11-27 DIAGNOSIS — R19.7 DIARRHEA, UNSPECIFIED: ICD-10-CM

## 2018-11-27 DIAGNOSIS — Z98.49 CATARACT EXTRACTION STATUS, UNSPECIFIED EYE: Chronic | ICD-10-CM

## 2018-11-27 DIAGNOSIS — R06.02 SHORTNESS OF BREATH: ICD-10-CM

## 2018-11-27 DIAGNOSIS — Z91.89 OTHER SPECIFIED PERSONAL RISK FACTORS, NOT ELSEWHERE CLASSIFIED: ICD-10-CM

## 2018-11-27 DIAGNOSIS — E87.2 ACIDOSIS: ICD-10-CM

## 2018-11-27 DIAGNOSIS — N40.0 BENIGN PROSTATIC HYPERPLASIA WITHOUT LOWER URINARY TRACT SYMPTOMS: ICD-10-CM

## 2018-11-27 DIAGNOSIS — F03.90 UNSPECIFIED DEMENTIA WITHOUT BEHAVIORAL DISTURBANCE: ICD-10-CM

## 2018-11-27 DIAGNOSIS — E78.5 HYPERLIPIDEMIA, UNSPECIFIED: ICD-10-CM

## 2018-11-27 DIAGNOSIS — Z90.49 ACQUIRED ABSENCE OF OTHER SPECIFIED PARTS OF DIGESTIVE TRACT: Chronic | ICD-10-CM

## 2018-11-27 DIAGNOSIS — R63.8 OTHER SYMPTOMS AND SIGNS CONCERNING FOOD AND FLUID INTAKE: ICD-10-CM

## 2018-11-27 DIAGNOSIS — N17.9 ACUTE KIDNEY FAILURE, UNSPECIFIED: ICD-10-CM

## 2018-11-27 DIAGNOSIS — I25.10 ATHEROSCLEROTIC HEART DISEASE OF NATIVE CORONARY ARTERY WITHOUT ANGINA PECTORIS: ICD-10-CM

## 2018-11-27 LAB
ALBUMIN SERPL ELPH-MCNC: 4 G/DL — SIGNIFICANT CHANGE UP (ref 3.3–5)
ALP SERPL-CCNC: 56 U/L — SIGNIFICANT CHANGE UP (ref 40–120)
ALT FLD-CCNC: 8 U/L — LOW (ref 10–45)
ANION GAP SERPL CALC-SCNC: 18 MMOL/L — HIGH (ref 5–17)
APPEARANCE UR: CLEAR — SIGNIFICANT CHANGE UP
AST SERPL-CCNC: 13 U/L — SIGNIFICANT CHANGE UP (ref 10–40)
BASOPHILS NFR BLD AUTO: 0.3 % — SIGNIFICANT CHANGE UP (ref 0–2)
BILIRUB SERPL-MCNC: 0.3 MG/DL — SIGNIFICANT CHANGE UP (ref 0.2–1.2)
BILIRUB UR-MCNC: ABNORMAL
BUN SERPL-MCNC: 57 MG/DL — HIGH (ref 7–23)
CALCIUM SERPL-MCNC: 8.9 MG/DL — SIGNIFICANT CHANGE UP (ref 8.4–10.5)
CHLORIDE SERPL-SCNC: 101 MMOL/L — SIGNIFICANT CHANGE UP (ref 96–108)
CK MB CFR SERPL CALC: 4.4 NG/ML — SIGNIFICANT CHANGE UP (ref 0–6.7)
CK SERPL-CCNC: 87 U/L — SIGNIFICANT CHANGE UP (ref 30–200)
CO2 SERPL-SCNC: 20 MMOL/L — LOW (ref 22–31)
COLOR SPEC: YELLOW — SIGNIFICANT CHANGE UP
CREAT SERPL-MCNC: 2.41 MG/DL — HIGH (ref 0.5–1.3)
DIFF PNL FLD: ABNORMAL
EOSINOPHIL NFR BLD AUTO: 2 % — SIGNIFICANT CHANGE UP (ref 0–6)
GLUCOSE SERPL-MCNC: 93 MG/DL — SIGNIFICANT CHANGE UP (ref 70–99)
GLUCOSE UR QL: NEGATIVE — SIGNIFICANT CHANGE UP
HCT VFR BLD CALC: 36.3 % — LOW (ref 39–50)
HGB BLD-MCNC: 11.9 G/DL — LOW (ref 13–17)
KETONES UR-MCNC: 15 MG/DL
LEUKOCYTE ESTERASE UR-ACNC: NEGATIVE — SIGNIFICANT CHANGE UP
LYMPHOCYTES # BLD AUTO: 10.6 % — LOW (ref 13–44)
MCHC RBC-ENTMCNC: 31.8 PG — SIGNIFICANT CHANGE UP (ref 27–34)
MCHC RBC-ENTMCNC: 32.8 G/DL — SIGNIFICANT CHANGE UP (ref 32–36)
MCV RBC AUTO: 97.1 FL — SIGNIFICANT CHANGE UP (ref 80–100)
MONOCYTES NFR BLD AUTO: 10.1 % — SIGNIFICANT CHANGE UP (ref 2–14)
NEUTROPHILS NFR BLD AUTO: 77 % — SIGNIFICANT CHANGE UP (ref 43–77)
NITRITE UR-MCNC: NEGATIVE — SIGNIFICANT CHANGE UP
NT-PROBNP SERPL-SCNC: 296 PG/ML — SIGNIFICANT CHANGE UP (ref 0–300)
PH UR: 5.5 — SIGNIFICANT CHANGE UP (ref 5–8)
PLATELET # BLD AUTO: 188 K/UL — SIGNIFICANT CHANGE UP (ref 150–400)
POTASSIUM SERPL-MCNC: 5.6 MMOL/L — HIGH (ref 3.5–5.3)
POTASSIUM SERPL-SCNC: 5.6 MMOL/L — HIGH (ref 3.5–5.3)
PROT SERPL-MCNC: 6.4 G/DL — SIGNIFICANT CHANGE UP (ref 6–8.3)
PROT UR-MCNC: NEGATIVE MG/DL — SIGNIFICANT CHANGE UP
RBC # BLD: 3.74 M/UL — LOW (ref 4.2–5.8)
RBC # FLD: 13 % — SIGNIFICANT CHANGE UP (ref 10.3–16.9)
SODIUM SERPL-SCNC: 139 MMOL/L — SIGNIFICANT CHANGE UP (ref 135–145)
SP GR SPEC: 1.02 — SIGNIFICANT CHANGE UP (ref 1–1.03)
TROPONIN T SERPL-MCNC: 0.04 NG/ML — CRITICAL HIGH (ref 0–0.01)
UROBILINOGEN FLD QL: 0.2 E.U./DL — SIGNIFICANT CHANGE UP
UUN UR-MCNC: 667 MG/DL — SIGNIFICANT CHANGE UP
WBC # BLD: 7.3 K/UL — SIGNIFICANT CHANGE UP (ref 3.8–10.5)
WBC # FLD AUTO: 7.3 K/UL — SIGNIFICANT CHANGE UP (ref 3.8–10.5)

## 2018-11-27 PROCEDURE — 71045 X-RAY EXAM CHEST 1 VIEW: CPT | Mod: 26

## 2018-11-27 PROCEDURE — 99285 EMERGENCY DEPT VISIT HI MDM: CPT | Mod: GC,25

## 2018-11-27 PROCEDURE — 93010 ELECTROCARDIOGRAM REPORT: CPT | Mod: GC

## 2018-11-27 PROCEDURE — 76775 US EXAM ABDO BACK WALL LIM: CPT | Mod: 26

## 2018-11-27 RX ORDER — SIMVASTATIN 20 MG/1
20 TABLET, FILM COATED ORAL AT BEDTIME
Qty: 0 | Refills: 0 | Status: DISCONTINUED | OUTPATIENT
Start: 2018-11-27 | End: 2018-12-01

## 2018-11-27 RX ORDER — MIRTAZAPINE 45 MG/1
30 TABLET, ORALLY DISINTEGRATING ORAL AT BEDTIME
Qty: 0 | Refills: 0 | Status: DISCONTINUED | OUTPATIENT
Start: 2018-11-27 | End: 2018-12-01

## 2018-11-27 RX ORDER — ASPIRIN/CALCIUM CARB/MAGNESIUM 324 MG
81 TABLET ORAL DAILY
Qty: 0 | Refills: 0 | Status: DISCONTINUED | OUTPATIENT
Start: 2018-11-27 | End: 2018-12-01

## 2018-11-27 RX ORDER — TAMSULOSIN HYDROCHLORIDE 0.4 MG/1
0.4 CAPSULE ORAL AT BEDTIME
Qty: 0 | Refills: 0 | Status: DISCONTINUED | OUTPATIENT
Start: 2018-11-27 | End: 2018-12-01

## 2018-11-27 RX ORDER — DULOXETINE HYDROCHLORIDE 30 MG/1
20 CAPSULE, DELAYED RELEASE ORAL
Qty: 0 | Refills: 0 | Status: DISCONTINUED | OUTPATIENT
Start: 2018-11-27 | End: 2018-12-01

## 2018-11-27 RX ORDER — SODIUM CHLORIDE 9 MG/ML
1000 INJECTION, SOLUTION INTRAVENOUS
Qty: 0 | Refills: 0 | Status: DISCONTINUED | OUTPATIENT
Start: 2018-11-27 | End: 2018-11-28

## 2018-11-27 RX ORDER — SODIUM CHLORIDE 9 MG/ML
1000 INJECTION INTRAMUSCULAR; INTRAVENOUS; SUBCUTANEOUS ONCE
Qty: 0 | Refills: 0 | Status: COMPLETED | OUTPATIENT
Start: 2018-11-27 | End: 2018-11-27

## 2018-11-27 RX ORDER — GABAPENTIN 400 MG/1
100 CAPSULE ORAL DAILY
Qty: 0 | Refills: 0 | Status: DISCONTINUED | OUTPATIENT
Start: 2018-11-27 | End: 2018-12-01

## 2018-11-27 RX ORDER — BUDESONIDE AND FORMOTEROL FUMARATE DIHYDRATE 160; 4.5 UG/1; UG/1
2 AEROSOL RESPIRATORY (INHALATION)
Qty: 0 | Refills: 0 | Status: DISCONTINUED | OUTPATIENT
Start: 2018-11-27 | End: 2018-12-01

## 2018-11-27 RX ORDER — FUROSEMIDE 40 MG
40 TABLET ORAL DAILY
Qty: 0 | Refills: 0 | Status: DISCONTINUED | OUTPATIENT
Start: 2018-11-27 | End: 2018-12-01

## 2018-11-27 RX ORDER — HEPARIN SODIUM 5000 [USP'U]/ML
5000 INJECTION INTRAVENOUS; SUBCUTANEOUS EVERY 8 HOURS
Qty: 0 | Refills: 0 | Status: DISCONTINUED | OUTPATIENT
Start: 2018-11-27 | End: 2018-12-01

## 2018-11-27 RX ORDER — MIRTAZAPINE 45 MG/1
1 TABLET, ORALLY DISINTEGRATING ORAL
Qty: 0 | Refills: 0 | COMMUNITY

## 2018-11-27 RX ORDER — CLOPIDOGREL BISULFATE 75 MG/1
75 TABLET, FILM COATED ORAL DAILY
Qty: 0 | Refills: 0 | Status: DISCONTINUED | OUTPATIENT
Start: 2018-11-27 | End: 2018-12-01

## 2018-11-27 RX ORDER — LISINOPRIL 2.5 MG/1
10 TABLET ORAL DAILY
Qty: 0 | Refills: 0 | Status: DISCONTINUED | OUTPATIENT
Start: 2018-11-27 | End: 2018-11-30

## 2018-11-27 RX ADMIN — SODIUM CHLORIDE 500 MILLILITER(S): 9 INJECTION INTRAMUSCULAR; INTRAVENOUS; SUBCUTANEOUS at 22:00

## 2018-11-27 RX ADMIN — SODIUM CHLORIDE 1000 MILLILITER(S): 9 INJECTION INTRAMUSCULAR; INTRAVENOUS; SUBCUTANEOUS at 21:45

## 2018-11-27 RX ADMIN — SODIUM CHLORIDE 500 MILLILITER(S): 9 INJECTION INTRAMUSCULAR; INTRAVENOUS; SUBCUTANEOUS at 19:13

## 2018-11-27 NOTE — H&P ADULT - NSHPLABSRESULTS_GEN_ALL_CORE
.  LABS:                         11.9   7.3   )-----------( 188      ( 2018 19:26 )             36.3         139  |  101  |  57<H>  ----------------------------<  93  5.6<H>   |  20<L>  |  2.41<H>    Ca    8.9      2018 19:26  Phos  4.0         TPro  6.4  /  Alb  4.0  /  TBili  0.3  /  DBili  x   /  AST  13  /  ALT  8<L>  /  AlkPhos  56        Urinalysis Basic - ( 2018 22:21 )  Color: Yellow / Appearance: Clear / S.020 / pH: x  Gluc: x / Ketone: 15 mg/dL  / Bili: Small / Urobili: 0.2 E.U./dL   Blood: x / Protein: NEGATIVE mg/dL / Nitrite: NEGATIVE   Leuk Esterase: NEGATIVE / RBC: < 5 /HPF / WBC < 5 /HPF   Sq Epi: x / Non Sq Epi: 0-5 /HPF / Bacteria: Present /HPF      CARDIAC MARKERS ( 2018 19:26 )  x     / 0.04 ng/mL / 87 U/L / x     / 4.4 ng/mL      Serum Pro-Brain Natriuretic Peptide: 296 pg/mL ( @ 19:26)        RADIOLOGY, EKG & ADDITIONAL TESTS: Reviewed.

## 2018-11-27 NOTE — H&P ADULT - PROBLEM SELECTOR PLAN 7
Chronic dyspnea which has been worked-up extensively at [facility] and outpt by Dr. Gutiérrez (pulm). Was seen by Dr. Baptiste in ED (covering for Dr. Gutiérrez) who reviewed outpatient PFTs which were at pts baseline. Pt denies cough, wheezing, sputum production. Sat well on room air.  - c/w Symbicort 160/4.5 2 puffs daily (therapeutic interchange for Advair)  - f/u pulm recs

## 2018-11-27 NOTE — ED PROVIDER NOTE - PHYSICAL EXAMINATION
PHYSICAL EXAM:  Constitutional: WDWN, NAD  Eyes: PERRL, EOMI, clear conjunctiva  ENT: no oropharyngeal erythema or exudates; MMM  Neck: supple; no JVD or thyromegaly  Respiratory: CTA B/L  Cardiac: +S1/S2; RRR; no M/R/G  Gastrointestinal: soft, NT/ND; no rebound or guarding; +BS  Back: no CVAT B/L  Extremities: WWP, no peripheral edema, intact dp and radial pulses BL  Neurologic: AAOx3; CNII-XII grossly intact; no focal deficits, SILT   Psychiatric: Mood and affect appropriate, pleasant and cooperative

## 2018-11-27 NOTE — ED PROVIDER NOTE - ATTENDING CONTRIBUTION TO CARE
89 yo M presenting to ED from Dr. Montalvo's office for 2 week Hx of diarrhea.   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 Headley, pt sent to ED from Dr. Montalvo's office when he went for his routine follow up, also reporting diarrhea for the past 2 weeks, pt unable to urinate at the office sent to ED with impression of dehydration.  reports diarrhea started about 2 weeks ago associated with mild cramping which improves after BM, reports up to 2-3 episodes of large watery BM, non bloody, has not noticed mucous or fatty stool.  denies F/C/ recent travel or sick contact, has SOB he endorses at baseline, reports Poor PO intake fro soledad past few days specially today, jhonatan took his am pills an nothing else, last urination was 2 pm, small amount of urine, last bm in am.  seen and examined in ED, accompanied by nephew, lives alone and independently, received meals on wheels, remote smoker, quit ~ 30 years ago, no alcohol or recreational drug use.  HDS, VS WNL. bedside bladder scan shows minimal urine ~ 30cc.  pt receiving IVF, pending labs and imaging  HDS    PE no focal abd tenderness  weak appearing  dry appearing    Labs show DEBBIE most likely secondary to dehydration  Pt with no urinary retention on us but still unable to urinate so will place noel to monitor Is&Os - case discussed with Katia who agrees on admission for hydration and electrolyte monitoring.

## 2018-11-27 NOTE — H&P ADULT - HISTORY OF PRESENT ILLNESS
88M with PMH of HTN, CAD s/p PCI (DAYANA to mRCA, pRCA, mCx 2013 with Dr. 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 Headley with pathology showing Tubulovillous adenoma who is now presenting from Dr. Montalvo's office for urinary retention in setting of 3 weeks of diarrhea. Patient went to Dr. Montalvo's office for routine visit, when he mentioned 3 weeks of watery, yellowish-colored, NB diarrhea associated with mild cramping, approximately 2-3 episodes per day. Patient also endorsed inability to urinate since 2pm (presumed 2/2 dehydration from diarrhea) which prompted Dr. Montalvo to send patient to ED. Patient denies sick contacts, recent travel, abnormal food intake from baseline. No recent hospitalizations or antibiotic use. Patient has BPH but never had urinary retention to this extent in the past. He was urinating normal up to today; endorsing poor PO intake for the last few weeks since the diarrhea started. Drinks approx one small water bottle per day and 2 glasses red wine. Of note, patient has chronic SOB which has been worked-up extensively at [facility] and outpt by Dr. Gutiérrez (pulm). His SOB is at baseline, denies new cough, chest pain, palpitations, orthopnea, PND, edema or decreased exercise tolerance. ROS negative for fevers, chills, abd pain, nausea, vomiting.     In the ED, vitals were T 97.9F, HR 61, /65, RR 20, SpO2 95% room air. Labs notable for Hgb 11.9 (baseline prior 9-10), K 5.6, CO2 20, AG 18, BUN/Cr 57/2.41 (baseline 1 as of 04/2017) , Trop 0.04, . UA negative for LE, nitrite but positive for ketones. EKG with NSR w PAC and PVC, LAD, no ST-T wave abnormalities. CXR unremarkable. Bladder scan performed showing 30cc urine in bladder. Patient given 2L NS.

## 2018-11-27 NOTE — ED ADULT NURSE REASSESSMENT NOTE - NS ED NURSE REASSESS COMMENT FT1
attempted to call U/S x 3 to procure u/s prior to going to floor, unable to reach, entered in tracking
called floor to give report, staff states will call back for report
still awaiting 7 W to take report, pt. aware
initial admit orders were received, awaiting sign out/bed assignment, pt. utd on poc, with understanding verbalized, assessment on-going

## 2018-11-27 NOTE — H&P ADULT - ASSESSMENT
88M with PMH of HTN, CAD s/p PCI (DAYANA to mRCA, pRCA, mCx 2013 with Dr. Varela), BPH and colonic mass s/p right hemicolectomy (April 2017 with Dr. Headley) with pathology showing Tubulovillous adenoma who is now presenting from Dr. Montalvo's office for urinary retention, admitted for pre-renal DEBBIE 2/2 dehydration from diarrhea.

## 2018-11-27 NOTE — ED ADULT NURSE NOTE - OBJECTIVE STATEMENT
The pt is a 89 y/o male who came in c/o shortness of breath, diarrhea for one week. Pt is aox4, skin is very dry.

## 2018-11-27 NOTE — H&P ADULT - NSHPSOCIALHISTORY_GEN_ALL_CORE
Tobacco use: former smoker, quit 30 years ago  ETOH use: drinks 2 glasses red wine per day  No recreational drug use.    Social: lives alone and independently, ambulated with walker or cane, received meals on wheels

## 2018-11-27 NOTE — PROGRESS NOTE ADULT - SUBJECTIVE AND OBJECTIVE BOX
Interventional, Pulmonary, Critical, Chest Special Procedures.    Pt was seen and fully examined by myself.     Time spent with patient in minutes:    Patient is a 88y old  Male who presents with a chief complaint of   HPI:    REVIEW OF SYSTEMS:  Constitutional: No fever, weight loss, chills or fatigue  Eyes: No eye pain, visual disturbances, or discharge  ENMT:  No difficulty hearing, tinnitus, vertigo; No sinus or throat pain. No epistaxis, dysphagia, dysphonia, hoarseness or odynophagia  Neck: No pain, stiffness or neck swelling.  No masses or deformities  Respiratory: No cough, wheezing, chills or hemoptysis  - COPD  - ILD   - PE   - ASTHMA     - PNEUMONIA  Cardiovascular: No chest pain, dysrhythmia, palpitations, dizziness or edema   - COPD     - CAD   - CHF   - HTN  Gastrointestinal: No abdominal or epigastric pain. No nausea, vomiting or hematemesis; No diarrhea or constipation. No melena or hematochezia. No dysphagia or Icterus.          Genitourinary: No dysuria, frequency, hematuria or incontinence   - CKD/DEBBIE      - ESRD  Neurological: No headaches, memory loss, loss of strength, numbness or tremors      -DEMENTIA     - STROKE    - SEIZURE  Skin: No itching, burning, rashes or lesions   Lymph Nodes: No enlarged glands  Endocrine: No heat or cold intolerance; No hair loss       - DM     - THYROID DISORDER  Musculoskeletal: No joint pain or swelling; No muscle, back or extremity pain  Psychiatric: No depression, anxiety, mood swings or difficulty sleeping  Heme/Lymph: No easy bruising or bleeding gums         - ANEMIA      - CANCER   -COAGULOPATHY  Allergy and Immunologic: No hives or eczema    PAST MEDICAL & SURGICAL HISTORY:  CAD (coronary artery disease)  HLD (hyperlipidemia)  S/P cataract extraction    FAMILY HISTORY:  No pertinent family history in first degree relatives    SOCIAL HISTORY:      - Tobacco     - ETOH    Allergies    No Known Allergies    Intolerances      Vital Signs Last 24 Hrs  T(C): 36.6 (27 Nov 2018 16:34), Max: 36.6 (27 Nov 2018 16:34)  T(F): 97.9 (27 Nov 2018 16:34), Max: 97.9 (27 Nov 2018 16:34)  HR: 61 (27 Nov 2018 16:34) (61 - 61)  BP: 121/65 (27 Nov 2018 16:34) (121/65 - 121/65)  BP(mean): --  RR: 20 (27 Nov 2018 16:34) (20 - 20)  SpO2: 96% (27 Nov 2018 16:34) (96% - 96%)      PHYSICAL EXAM:  Comfortable, no distress  Eyes: PERRL, EOM intact; conjunctiva and sclera clear  Head: Normocephalic;  No Trauma  ENMT: No nasal discharge, hoarseness, cough or hemoptysis.  Airway clear  Neck: Supple; non tender; no masses or deformities.    No JVD  Respiratory: CTA,  - WHEEZING   - RHONCHI  - RALES  - CRACKLES.  Diminished breath sounds  BILATERAL  RIGHT  LEFT  Cardiovascular: Regular rate and rhythm. S1 and S2 Normal; No murmurs, gallops or rubs     - PPM/AICD  Gastrointestinal: Soft non-tender, non-distended; Normal bowel sounds; No hepatosplenomegaly.     -PEG    -  GT   - RUBIO  Genitourinary: No costovertebral angle tenderness. No dysuria  Extremities: AROM, No clubbing, cyanosis or edema    Vascular: Peripheral pulses palpable 2+ bilaterally  Neurological: Alert and responisve to stimuli   Skin: Warm and dry. No obvious rash  Lymph Nodes: No acute cervical or supraclavicular adenopathy  Psychiatric: Cooperative and appropriate mood    DEVICES:  - DENTURES   +IV R / L     - ETUBE   -TRACH   -CTUBE  R / L      LABS:              RADIOLOGY & ADDITIONAL STUDIES (The following images were personally reviewed): Interventional, Pulmonary, Critical, Chest Special Procedures initial ER    Pt was seen and fully examined by myself.     Time spent with patient in minutes 77    Patient is a 88y old  Male who presents with a chief complaint of progressively worsening dyspnea, diarrhea and malaise. The patient emaciated, ill appearing, eupneic at rest, sob when talking, pain free when seen. The patient reported that diarrhea stopped two days ago and now he cannot have BM. The patient denies any recent URI, denies sick contacts, denies aspiration. The patient takes Stiolto/Advair at home, but uncertain why.   HTN, CAD s/p PCI (DAYANA to mRCA, pRCA, mCx 2013 with Dr. 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 Headley with pathology showing Tubulovillous adenoma who is now presenting from Dr. Montalvo's office for urinary retention in setting of 3 weeks of diarrhea. Patient went to Dr. Montalvo's office for routine visit, when he mentioned 3 weeks of watery, yellowish-colored, NB diarrhea associated with mild cramping, approximately 2-3 episodes per day. Patient also endorsed inability to urinate since 2pm (presumed 2/2 dehydration from diarrhea) which prompted Dr. Montalvo to send patient to ED. Patient denies sick contacts, recent travel, abnormal food intake from baseline. No recent hospitalizations or antibiotic use. Patient has BPH but never had urinary retention to this extent in the past. He was urinating normal up to today; endorsing poor PO intake for the last few weeks since the diarrhea started. Drinks approx one small water bottle per day and 2 glasses red wine. Of note, patient has chronic SOB which has been worked-up extensively at [facility] and outpt by Dr. Gutiérrez (pulm). His SOB is at baseline, denies new cough, chest pain, palpitations, orthopnea, PND, edema or decreased exercise tolerance. ROS negative for fevers, chills, abd pain, nausea, vomiting.    REVIEW OF SYSTEMS:  ROS reviewed below with positive findings marked (+) :  GEN:  fever, chills ENT: tracheostomy,   epistaxis,  sinusitis COR:+ CAD, CHF,  HTN, dysrhythmia PUL: +COPD, ILD, asthma, pneumonia GI: PEG, dysphagia, hemorrhage, other ELIZABETH: kidney disease, electrolyte disorder HEM:  anemia, thrombus, coagulopathy, cancer ENDO:  thyroid disease, diabetes mellitus CNS:  dementia, stroke, seizure, PSY:  depression, anxiety, other  MEDICATIONS  (STANDING):  aspirin enteric coated 81 milliGRAM(s) Oral daily  buDESOnide 160 MICROgram(s)/formoterol 4.5 MICROgram(s) Inhaler 2 Puff(s) Inhalation two times a day  clopidogrel Tablet 75 milliGRAM(s) Oral daily    PAST MEDICAL & SURGICAL HISTORY:  CAD (coronary artery disease)  HLD (hyperlipidemia)  S/P cataract extraction    FAMILY HISTORY:  No pertinent family history in first degree relatives    SOCIAL HISTORY:      - Tobacco     - ETOH    Allergies    No Known Allergies    Intolerances      Vital Signs Last 24 Hrs  T(C): 36.6 (27 Nov 2018 16:34), Max: 36.6 (27 Nov 2018 16:34)  T(F): 97.9 (27 Nov 2018 16:34), Max: 97.9 (27 Nov 2018 16:34)  HR: 61 (27 Nov 2018 16:34) (61 - 61)  BP: 121/65 (27 Nov 2018 16:34) (121/65 - 121/65)  BP(mean): --  RR: 20 (27 Nov 2018 16:34) (20 - 20)  SpO2: 96% (27 Nov 2018 16:34) (96% - 96%)      PHYSICAL EXAM:  Un Comfortable, no immediate distress, emaciated  Eyes: PERRL, EOM intact; conjunctiva and sclera clear  Head: Normocephalic;  No Trauma  ENMT: No nasal discharge, +hoarseness, cough   Neck: Supple; non tender; no masses or deformities.    No JVD  Respiratory:   - WHEEZING   - RHONCHI  - RALES  + CRACKLES.  Diminished breath sounds  BILATERAL  RIGHT  LEFT bases  Cardiovascular: Regular rate and rhythm. S1 and S2 Normal; No murmurs, gallops or rubs     - PPM/AICD  Gastrointestinal: Soft non-tender, non-distended; Normal bowel sounds; No hepatosplenomegaly.     -PEG    -  GT   - RUBIO  Genitourinary: No costovertebral angle tenderness. No dysuria  Extremities: AROM, No clubbing, cyanosis or edema    Vascular: Peripheral pulses palpable 2+ bilaterally  Neurological: Alert and responisve to stimuli   Skin: Warm and dry. No obvious rash  Lymph Nodes: No acute cervical or supraclavicular adenopathy  Psychiatric: Cooperative and appropriate mood    DEVICES:  - DENTURES   +IV R / L     - ETUBE   -TRACH   -CTUBE  R / L      LABS:              RADIOLOGY & ADDITIONAL STUDIES (The following images were personally reviewed):

## 2018-11-27 NOTE — H&P ADULT - PROBLEM SELECTOR PLAN 10
1) PCP Contacted on Admission: (Y/N) --> Name & Phone #: Dr. Montalvo  2) Date of Contact with PCP: 11/27/18  3) PCP Contacted at Discharge: (Y/N)  4) Summary of Handoff Given to PCP:   5) Post-Discharge Appointment Date and Location:

## 2018-11-27 NOTE — H&P ADULT - PROBLEM SELECTOR PLAN 8
-Continue with home Paroxetine 20  -Continue with home Mirtazapine 30mg qhs (for appetite stimulation)  -Continue with home Duloxetine 20mg BID

## 2018-11-27 NOTE — ED PROVIDER NOTE - OBJECTIVE STATEMENT
89 yo M presenting to ED from Dr. Montalvo's office for 2 week Hx of diarrhea.   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 Headley, pt sent to ED from Dr. Montalvo's office when he went for his routine follow up, also reporting diarrhea for the past 2 weeks, pt unable to urinate at the office sent to ED with impression of dehydration.  reports diarrhea started about 2 weeks ago associated with mild cramping which improves after BM, reports up to 2-3 episodes of large watery BM, non bloody, has not noticed mucous or fatty stool.  denies F/C/ recent travel or sick contact, has SOB he endorses at baseline, reports Poor PO intake fro soledad past few days specially today, jhonatan took his am pills an nothing else, last urination was 2 pm, small amount of urine, last bm in am.  seen and examined in ED, accompanied by nephew, lives alone and independently, received meals on wheels, remote smoker, quit ~ 30 years ago, no alcohol or recreational drug use.  HDS, VS WNL. bedside bladder scan shows minimal urine ~ 30cc.  pt receiving IVF, pending labs and imaging  HDS

## 2018-11-27 NOTE — ED ADULT NURSE NOTE - NSIMPLEMENTINTERV_GEN_ALL_ED
Implemented All Universal Safety Interventions:  [place] to call system. Call bell, personal items and telephone within reach. Instruct patient to call for assistance. Room bathroom lighting operational. Non-slip footwear when patient is off stretcher. Physically safe environment: no spills, clutter or unnecessary equipment. Stretcher in lowest position, wheels locked, appropriate side rails in place.

## 2018-11-27 NOTE — H&P ADULT - PROBLEM SELECTOR PLAN 4
Hx of CAD s/p PCI with DAYANA to mRCA, pRCA, mCx 2013 with Dr. Varela. Was planned for dx cath early 2017 for evaluation of dyspnea, however was deferred d/t diagnosed colon mass requiring surgery. Has no outpatient cardiology f/u.  - c/w home ASA 81mg daily, Plavix 75mg daily, Lisinopril 10mg daily, simvastatin 20qhs  #Elevated troponin- trop 0.04 on admission. No chest pain, palpitations, at baseline SOB. Denies decreased exercise tolerance, PND, orthopnea, edema. EKG unchanged from previous, no ischemic changes. Likely type II MI in setting of dehydration.   - trend trop to peak

## 2018-11-27 NOTE — H&P ADULT - PROBLEM SELECTOR PLAN 1
Presenting with inability to urinate and found to have Cr 2.41 (baseline 1 as of 04/2017). FeNa c/w pre-renal etiology. Bladder scan in ED showing only 30cc urine in bladder. DDx most likely pre-renal DEBBIE d/t dehydration from prolonged diarrhea and decreased PO intake. Unlikely obstructive; pt has BPH but only 30cc seen in bladder and urine lytes more c/w pre-renal.  - patient able to void in ED after 1L NS bolus  - ordered for additional 1L NS bolus (EF 55%) followed by maintenance IVF set to stop after 12hrs  - repeat BMP after 2nd bolus for improvement, trend Cr  - f/u retroperitoneal US   - avoid nephrotoxic drugs, renally dose meds

## 2018-11-27 NOTE — H&P ADULT - PROBLEM SELECTOR PLAN 9
F: D5 1/2 NS at 100cc/hr x 12hrs  E: replete K<4, Mg<2  N: Dash/TLC    VTE Prophylaxis: HSQ 5000 q8.  C: Full Code  D: JUAN

## 2018-11-27 NOTE — H&P ADULT - ATTENDING COMMENTS
Chart reviewed pt seen in office 11/27.  Admitted for vol depletion.  CCV no chnge  GI--diarrhea times 5 days--will monitr.  OOB  PT

## 2018-11-27 NOTE — H&P ADULT - PROBLEM SELECTOR PLAN 3
AGMA likely 2/2 starvation ketosis from decreased PO intake for 3 weeks. UA positive for ketones. Perfusing well, mentating well- no indication for lactate.  - s/p 1L NS in ED, ordered for additional 1L NS  - continue with D5 1/2NS after 2nd bolus for 12hrs, reassess tomorrow  - repeat BMP after 2nd bolus for reassessment

## 2018-11-27 NOTE — H&P ADULT - NSHPPHYSICALEXAM_GEN_ALL_CORE
.  VITAL SIGNS:  T(F): 98 (11-27-18 @ 21:40), Max: 98 (11-27-18 @ 21:40)  HR: 67 (11-27-18 @ 21:40) (61 - 67)  BP: 128/70 (11-27-18 @ 21:40) (121/65 - 128/70)  RR: 18 (11-27-18 @ 21:40) (18 - 20)  SpO2: 97% (11-27-18 @ 21:40) (96% - 97%)    PHYSICAL EXAM:  Constitutional: WDWN, resting comfortably in bed; NAD  HEENT: NC/AT, PERRL, EOMI, anicteric sclera, no nasal discharge; uvula midline, no oropharyngeal erythema or exudates; MMM  Neck: supple; no JVD or thyromegaly  Respiratory: tachypneic, shallow breaths, CTA B/L, no W/R/R, no retractions, able to speak in complete sentences  Cardiac: +S1/S2, RRR, 1/6 systolic murmur at apex  Gastrointestinal: soft, NT/ND; no rebound or guarding; +BSx4  Extremities: WWP, no clubbing or cyanosis; no peripheral edema  Vascular: 2+ radial,  DP/PT pulses B/L  Dermatologic: skin warm, dry and intact; no rashes, wounds, or scars  Neurologic: AAOx3; CNII-XII grossly intact; no focal deficits  Psychiatric: affect and characteristics of appearance, verbalizations, behaviors are appropriate

## 2018-11-27 NOTE — ED PROVIDER NOTE - MEDICAL DECISION MAKING DETAILS
pt found to have DEBBIE, with Cr bump to 2.4 from baseline of ~ 1, also elevated K, AGMA and low UOP, no ecg changes, discussed with Dr. Montalvo, pt is getting admitted in Medicine under Dr. Montalvo

## 2018-11-27 NOTE — H&P ADULT - PROBLEM SELECTOR PLAN 2
Endorsing 3 weeks of watery, non-bloody, yellowish diarrhea. Dehydrated on admission likely 2/2 volume loss with diarrhea and decreased PO intake. No sick contacts, recent travel, f/c, recent antibiotic use or hospitalizations. DDx including malabsorptive diarrhea given hemicolectomy vs infectious, although less likely given no leukocytosis or systemic signs of infection.   - f/u stool osmolar gap (stool Na, K, osmolality), stool cxs, GI PCR  - continue with IVF rehydration and encourage PO intake  - consider GI consult in AM

## 2018-11-27 NOTE — ED PROVIDER NOTE - NS ED ROS FT
CONSTITUTIONAL: feels weak , No fevers or chills  EYES/ENT: No visual changes;  No vertigo or throat pain   NECK: No pain or stiffness  RESPIRATORY: No cough, wheezing, hemoptysis; shortness of breath present at baseline   CARDIOVASCULAR: No chest pain or palpitations  GASTROINTESTINAL: No abdominal or epigastric pain. No nausea, vomiting, or hematemesis; Non bloody diarrhea present , No melena or hematochezia.  GENITOURINARY: No dysuria, hematuria, low Urine output +  NEUROLOGICAL: No numbness or weakness  SKIN: No itching, burning, rashes, or lesions   All other review of systems is negative unless indicated above.

## 2018-11-27 NOTE — H&P ADULT - PROBLEM SELECTOR PLAN 5
Continue home Flomax 0.4mg qhs.    #Hyperkalemia- K of 5.6 without EKG changes. S/p 2L NS in ED and now on maintenance IVF.  - repeat BMP tonight for resolution

## 2018-11-28 LAB
ANION GAP SERPL CALC-SCNC: 13 MMOL/L — SIGNIFICANT CHANGE UP (ref 5–17)
BUN SERPL-MCNC: 53 MG/DL — HIGH (ref 7–23)
CALCIUM SERPL-MCNC: 8.3 MG/DL — LOW (ref 8.4–10.5)
CHLORIDE SERPL-SCNC: 107 MMOL/L — SIGNIFICANT CHANGE UP (ref 96–108)
CO2 SERPL-SCNC: 18 MMOL/L — LOW (ref 22–31)
CREAT SERPL-MCNC: 2.15 MG/DL — HIGH (ref 0.5–1.3)
GLUCOSE SERPL-MCNC: 78 MG/DL — SIGNIFICANT CHANGE UP (ref 70–99)
HCT VFR BLD CALC: 33.7 % — LOW (ref 39–50)
HGB BLD-MCNC: 11 G/DL — LOW (ref 13–17)
MCHC RBC-ENTMCNC: 31.9 PG — SIGNIFICANT CHANGE UP (ref 27–34)
MCHC RBC-ENTMCNC: 32.6 G/DL — SIGNIFICANT CHANGE UP (ref 32–36)
MCV RBC AUTO: 97.7 FL — SIGNIFICANT CHANGE UP (ref 80–100)
PLATELET # BLD AUTO: 181 K/UL — SIGNIFICANT CHANGE UP (ref 150–400)
POTASSIUM SERPL-MCNC: 4.9 MMOL/L — SIGNIFICANT CHANGE UP (ref 3.5–5.3)
POTASSIUM SERPL-SCNC: 4.9 MMOL/L — SIGNIFICANT CHANGE UP (ref 3.5–5.3)
RBC # BLD: 3.45 M/UL — LOW (ref 4.2–5.8)
RBC # FLD: 13.1 % — SIGNIFICANT CHANGE UP (ref 10.3–16.9)
SODIUM SERPL-SCNC: 138 MMOL/L — SIGNIFICANT CHANGE UP (ref 135–145)
TROPONIN T SERPL-MCNC: 0.04 NG/ML — CRITICAL HIGH (ref 0–0.01)
TROPONIN T SERPL-MCNC: 0.05 NG/ML — CRITICAL HIGH (ref 0–0.01)
WBC # BLD: 6.8 K/UL — SIGNIFICANT CHANGE UP (ref 3.8–10.5)
WBC # FLD AUTO: 6.8 K/UL — SIGNIFICANT CHANGE UP (ref 3.8–10.5)

## 2018-11-28 PROCEDURE — 93010 ELECTROCARDIOGRAM REPORT: CPT

## 2018-11-28 RX ORDER — SOD,AMMONIUM,POTASSIUM LACTATE
1 CREAM (GRAM) TOPICAL
Qty: 0 | Refills: 0 | Status: DISCONTINUED | OUTPATIENT
Start: 2018-11-28 | End: 2018-12-01

## 2018-11-28 RX ORDER — IPRATROPIUM/ALBUTEROL SULFATE 18-103MCG
3 AEROSOL WITH ADAPTER (GRAM) INHALATION EVERY 6 HOURS
Qty: 0 | Refills: 0 | Status: DISCONTINUED | OUTPATIENT
Start: 2018-11-28 | End: 2018-12-01

## 2018-11-28 RX ADMIN — HEPARIN SODIUM 5000 UNIT(S): 5000 INJECTION INTRAVENOUS; SUBCUTANEOUS at 07:39

## 2018-11-28 RX ADMIN — LISINOPRIL 10 MILLIGRAM(S): 2.5 TABLET ORAL at 07:39

## 2018-11-28 RX ADMIN — Medication 20 MILLIGRAM(S): at 12:47

## 2018-11-28 RX ADMIN — BUDESONIDE AND FORMOTEROL FUMARATE DIHYDRATE 2 PUFF(S): 160; 4.5 AEROSOL RESPIRATORY (INHALATION) at 21:19

## 2018-11-28 RX ADMIN — GABAPENTIN 100 MILLIGRAM(S): 400 CAPSULE ORAL at 12:47

## 2018-11-28 RX ADMIN — BUDESONIDE AND FORMOTEROL FUMARATE DIHYDRATE 2 PUFF(S): 160; 4.5 AEROSOL RESPIRATORY (INHALATION) at 09:21

## 2018-11-28 RX ADMIN — HEPARIN SODIUM 5000 UNIT(S): 5000 INJECTION INTRAVENOUS; SUBCUTANEOUS at 21:19

## 2018-11-28 RX ADMIN — DULOXETINE HYDROCHLORIDE 20 MILLIGRAM(S): 30 CAPSULE, DELAYED RELEASE ORAL at 07:39

## 2018-11-28 RX ADMIN — CLOPIDOGREL BISULFATE 75 MILLIGRAM(S): 75 TABLET, FILM COATED ORAL at 12:47

## 2018-11-28 RX ADMIN — SIMVASTATIN 20 MILLIGRAM(S): 20 TABLET, FILM COATED ORAL at 21:19

## 2018-11-28 RX ADMIN — DULOXETINE HYDROCHLORIDE 20 MILLIGRAM(S): 30 CAPSULE, DELAYED RELEASE ORAL at 18:16

## 2018-11-28 RX ADMIN — SODIUM CHLORIDE 75 MILLILITER(S): 9 INJECTION, SOLUTION INTRAVENOUS at 04:23

## 2018-11-28 RX ADMIN — Medication 81 MILLIGRAM(S): at 12:47

## 2018-11-28 RX ADMIN — HEPARIN SODIUM 5000 UNIT(S): 5000 INJECTION INTRAVENOUS; SUBCUTANEOUS at 14:04

## 2018-11-28 RX ADMIN — TAMSULOSIN HYDROCHLORIDE 0.4 MILLIGRAM(S): 0.4 CAPSULE ORAL at 21:19

## 2018-11-28 RX ADMIN — MIRTAZAPINE 30 MILLIGRAM(S): 45 TABLET, ORALLY DISINTEGRATING ORAL at 21:19

## 2018-11-28 RX ADMIN — Medication 1 APPLICATION(S): at 18:16

## 2018-11-28 RX ADMIN — Medication 40 MILLIGRAM(S): at 07:40

## 2018-11-28 NOTE — PROGRESS NOTE ADULT - PROBLEM SELECTOR PLAN 3
AGMA likely 2/2 starvation ketosis from decreased PO intake for 3 weeks. UA positive for ketones. Perfusing well, mentating well- no indication for lactate.  - s/p 1L NS in ED, ordered for additional 1L NS  - continue with D5 1/2NS after 2nd bolus for 12hrs, reassess tomorrow  - repeat BMP after 2nd bolus for reassessment RESOLVED. AGMA likely 2/2 starvation ketosis from decreased PO intake for 3 weeks. UA positive for ketones. Perfusing well, mentating well- no indication for lactate.  - s/p 2L NS +D5 1/2NS for 12hrs

## 2018-11-28 NOTE — PROGRESS NOTE ADULT - PROBLEM SELECTOR PLAN 7
Chronic dyspnea which has been worked-up extensively at [facility] and outpt by Dr. Gutiérrez (pulm). Was seen by Dr. Baptiste in ED (covering for Dr. Gutiérrez) who reviewed outpatient PFTs which were at pts baseline. Pt denies cough, wheezing, sputum production. Sat well on room air.  - c/w Symbicort 160/4.5 2 puffs daily (therapeutic interchange for Advair)  - f/u pulm recs

## 2018-11-28 NOTE — PROGRESS NOTE ADULT - SUBJECTIVE AND OBJECTIVE BOX
OVERNIGHT EVENTS:    SUBJECTIVE / INTERVAL HPI: Patient seen and examined at bedside.     VITAL SIGNS:  Vital Signs Last 24 Hrs  T(C): 36.7 (2018 05:37), Max: 36.7 (2018 21:40)  T(F): 98.1 (2018 05:37), Max: 98.1 (2018 05:37)  HR: 64 (2018 07:38) (50 - 74)  BP: 126/60 (2018 05:37) (121/65 - 155/78)  BP(mean): --  RR: 18 (2018 05:37) (18 - 20)  SpO2: 94% (2018 05:37) (94% - 97%)      18 @ 07:01  -  18 @ 07:00  --------------------------------------------------------  IN: 2000 mL / OUT: 200 mL / NET: 1800 mL        PHYSICAL EXAM:  General: NAD  HEENT: NC/AT, anicteric sclera; MMM  Neck: supple  Cardiovascular: +S1/S2, RRR  Respiratory: CTA B/L, no W/R/R  Gastrointestinal: soft, NT/ND; +BSx4  Extremities: WWP; no edema, clubbing or cyanosis  Vascular: 2+ radial, DP/PT pulses B/L  Neurological: AAOx3, no focal deficits    MEDICATIONS  (STANDING):  aspirin enteric coated 81 milliGRAM(s) Oral daily  buDESOnide 160 MICROgram(s)/formoterol 4.5 MICROgram(s) Inhaler 2 Puff(s) Inhalation two times a day  clopidogrel Tablet 75 milliGRAM(s) Oral daily  dextrose 5% + sodium chloride 0.45%. 1000 milliLiter(s) (75 mL/Hr) IV Continuous <Continuous>  DULoxetine 20 milliGRAM(s) Oral two times a day  furosemide    Tablet 40 milliGRAM(s) Oral daily  gabapentin 100 milliGRAM(s) Oral daily  heparin  Injectable 5000 Unit(s) SubCutaneous every 8 hours  lisinopril 10 milliGRAM(s) Oral daily  mirtazapine 30 milliGRAM(s) Oral at bedtime  PARoxetine 20 milliGRAM(s) Oral daily  simvastatin 20 milliGRAM(s) Oral at bedtime  tamsulosin 0.4 milliGRAM(s) Oral at bedtime    MEDICATIONS  (PRN):      Allergies    No Known Allergies    Intolerances        LABS:                        11.0   6.8   )-----------( 181      ( 2018 03:49 )             33.7         138  |  107  |  53<H>  ----------------------------<  78  4.9   |  18<L>  |  2.15<H>    Ca    8.3<L>      2018 03:49  Phos  4.0         TPro  6.4  /  Alb  4.0  /  TBili  0.3  /  DBili  x   /  AST  13  /  ALT  8<L>  /  AlkPhos  56        Urinalysis Basic - ( 2018 22:21 )    Color: Yellow / Appearance: Clear / S.020 / pH: x  Gluc: x / Ketone: 15 mg/dL  / Bili: Small / Urobili: 0.2 E.U./dL   Blood: x / Protein: NEGATIVE mg/dL / Nitrite: NEGATIVE   Leuk Esterase: NEGATIVE / RBC: < 5 /HPF / WBC < 5 /HPF   Sq Epi: x / Non Sq Epi: 0-5 /HPF / Bacteria: Present /HPF      CAPILLARY BLOOD GLUCOSE              RADIOLOGY & ADDITIONAL TESTS: Reviewed. OVERNIGHT EVENTS: Patient reports 4 episodes of diarrhea overnight.    SUBJECTIVE / INTERVAL HPI: Patient seen and examined at bedside this AM. Patient is concerned about his diarrhea. He reports that it is yellow in color and non-bloody. He also reports nausea and abdominal discomfort and cramping. Pt admits to decreased PO intake, decreased appetite. He states that he has been making lots of urine. He denies vomiting, dysuria, hematuria, melena and hematochezia.    VITAL SIGNS:  Vital Signs Last 24 Hrs  T(C): 36.7 (2018 05:37), Max: 36.7 (2018 21:40)  T(F): 98.1 (2018 05:37), Max: 98.1 (2018 05:37)  HR: 64 (2018 07:38) (50 - 74)  BP: 126/60 (2018 05:37) (121/65 - 155/78)  BP(mean): --  RR: 18 (2018 05:37) (18 - 20)  SpO2: 94% (2018 05:37) (94% - 97%)    18 @ 07:01  -  18 @ 07:00  --------------------------------------------------------  IN: 2000 mL / OUT: 200 mL / NET: 1800 mL    PHYSICAL EXAM:  General: NAD, WDWN  HEENT: NC/AT, anicteric sclera; MMM  Cardiovascular: +S1/S2, RRR, systolic murmur noted at apex  Respiratory: CTA B/L, no W/R/R, tachypneic, no retractions, able to speak in full sentences  Gastrointestinal: soft, NT/ND; +BSx4  Extremities: WWP; no edema, clubbing or cyanosis  Derm: dry cracking skin on palms b/l, dry skin on shins b/l  Vascular: 2+ radial, DP/PT pulses B/L  Neurological: AAOx3, no focal deficits    MEDICATIONS  (STANDING):  aspirin enteric coated 81 milliGRAM(s) Oral daily  buDESOnide 160 MICROgram(s)/formoterol 4.5 MICROgram(s) Inhaler 2 Puff(s) Inhalation two times a day  clopidogrel Tablet 75 milliGRAM(s) Oral daily  dextrose 5% + sodium chloride 0.45%. 1000 milliLiter(s) (75 mL/Hr) IV Continuous <Continuous>  DULoxetine 20 milliGRAM(s) Oral two times a day  furosemide    Tablet 40 milliGRAM(s) Oral daily  gabapentin 100 milliGRAM(s) Oral daily  heparin  Injectable 5000 Unit(s) SubCutaneous every 8 hours  lisinopril 10 milliGRAM(s) Oral daily  mirtazapine 30 milliGRAM(s) Oral at bedtime  PARoxetine 20 milliGRAM(s) Oral daily  simvastatin 20 milliGRAM(s) Oral at bedtime  tamsulosin 0.4 milliGRAM(s) Oral at bedtime    Allergies  No Known Allergies      LABS:                 11.0   6.8   )-----------( 181      ( 2018 03:49 )             33.7     138  |  107  |  53<H>  ----------------------------<  78  4.9   |  18<L>  |  2.15<H>  Ca    8.3<L>      2018 03:49  Phos  4.0       TPro  6.4  /  Alb  4.0  /  TBili  0.3  /  DBili  x   /  AST  13  /  ALT  8<L>  /  AlkPhos  56        Urinalysis Basic - ( 2018 22:21 )  Color: Yellow / Appearance: Clear / S.020 / pH: x  Gluc: x / Ketone: 15 mg/dL  / Bili: Small / Urobili: 0.2 E.U./dL   Blood: x / Protein: NEGATIVE mg/dL / Nitrite: NEGATIVE   Leuk Esterase: NEGATIVE / RBC: < 5 /HPF / WBC < 5 /HPF   Sq Epi: x / Non Sq Epi: 0-5 /HPF / Bacteria: Present /HPF    RADIOLOGY & ADDITIONAL TESTS: Reviewed.

## 2018-11-28 NOTE — PROGRESS NOTE ADULT - PROBLEM SELECTOR PLAN 4
Hx of CAD s/p PCI with DAYANA to mRCA, pRCA, mCx 2013 with Dr. Varela. Was planned for dx cath early 2017 for evaluation of dyspnea, however was deferred d/t diagnosed colon mass requiring surgery. Has no outpatient cardiology f/u.  - c/w home ASA 81mg daily, Plavix 75mg daily, Lisinopril 10mg daily, simvastatin 20qhs  #Elevated troponin- trop 0.04 on admission. No chest pain, palpitations, at baseline SOB. Denies decreased exercise tolerance, PND, orthopnea, edema. EKG unchanged from previous, no ischemic changes. Likely type II MI in setting of dehydration.   - trend trop to peak Hx of CAD s/p PCI with DAYANA to mRCA, pRCA, mCx 2013 with Dr. Varela. Was planned for dx cath early 2017 for evaluation of dyspnea, however was deferred d/t diagnosed colon mass requiring surgery. Has no outpatient cardiology f/u.  - c/w home ASA 81mg daily, Plavix 75mg daily, Lisinopril 10mg daily, simvastatin 20qhs    #Elevated troponin- trop 0.04 on admission. No chest pain, palpitations, at baseline SOB. Denies decreased exercise tolerance, PND, orthopnea, edema. EKG unchanged from previous, no ischemic changes. Likely type II MI in setting of dehydration.   - trop trended to peak

## 2018-11-28 NOTE — PROGRESS NOTE ADULT - PROBLEM SELECTOR PLAN 5
Continue home Flomax 0.4mg qhs.    #Hyperkalemia- K of 5.6 without EKG changes. S/p 2L NS in ED and now on maintenance IVF.  - repeat BMP tonight for resolution Continue home Flomax 0.4mg qhs.    #Hyperkalemia- RESOLVED. K of 5.6 without EKG changes in ED  - continue to monitor BMP

## 2018-11-28 NOTE — PROGRESS NOTE ADULT - SUBJECTIVE AND OBJECTIVE BOX
CC/ HPI Patient is a 88 year old male with chronic obstructive pulmonary disease, coronary artery disease s/p PCI DAYANA to mRCA, pRCA, mCx, 2013, s/p right hemicolectomy for tubulovillous adenoma, 2017, admitted with urinary retention, diarrhea, dehydration and acute kidney injury, seen this morning the patient denies respiratory complaint.      PAST MEDICAL & SURGICAL HISTORY:  BPH (benign prostatic hyperplasia)  CAD (coronary artery disease)  HLD (hyperlipidemia)  H/O right hemicolectomy  S/P cataract extraction    SOCHX:  + tobacco,  -  alcohol    FMHX: FA/MO  - contributory     ROS reviewed below with positive findings marked (+) :  GEN:  fever, chills ENT: tracheostomy,   epistaxis,  sinusitis COR:+ CAD, CHF,  HTN, dysrhythmia PUL: +COPD, ILD, asthma, pneumonia GI: PEG, dysphagia, hemorrhage, other ELIZABETH: kidney disease, electrolyte disorder HEM:  anemia, thrombus, coagulopathy, cancer ENDO:  thyroid disease, diabetes mellitus CNS:  dementia, stroke, seizure, PSY:  depression, anxiety, other      MEDICATIONS  (STANDING):  aspirin enteric coated 81 milliGRAM(s) Oral daily  buDESOnide 160 MICROgram(s)/formoterol 4.5 MICROgram(s) Inhaler 2 Puff(s) Inhalation two times a day  clopidogrel Tablet 75 milliGRAM(s) Oral daily  dextrose 5% + sodium chloride 0.45%. 1000 milliLiter(s) (75 mL/Hr) IV Continuous <Continuous>  DULoxetine 20 milliGRAM(s) Oral two times a day  furosemide    Tablet 40 milliGRAM(s) Oral daily  gabapentin 100 milliGRAM(s) Oral daily  heparin  Injectable 5000 Unit(s) SubCutaneous every 8 hours  lisinopril 10 milliGRAM(s) Oral daily  mirtazapine 30 milliGRAM(s) Oral at bedtime  PARoxetine 20 milliGRAM(s) Oral daily  simvastatin 20 milliGRAM(s) Oral at bedtime  tamsulosin 0.4 milliGRAM(s) Oral at bedtime      Vital Signs Last 24 Hrs  T(C): 37 (28 Nov 2018 10:32), Max: 37 (28 Nov 2018 10:32)  T(F): 98.6 (28 Nov 2018 10:32), Max: 98.6 (28 Nov 2018 10:32)  HR: 70 (28 Nov 2018 10:32) (50 - 74)  BP: 113/61 (28 Nov 2018 10:32) (113/61 - 155/78)  RR: 18 (28 Nov 2018 10:32) (18 - 20)  SpO2: 97% (28 Nov 2018 10:32) (94% - 97%)    GENERAL:         comfortable,  - distress.  HEENT:            - trauma,  - icterus,  - injection,  - nasal discharge.  NECK:              - jugular venous distention, - thyromegaly.  LYMPH:           - lymphadenopathy, - masses.  RESP:               + clear,   - rales,   - rhonchi,   - wheezes.   COR:                S1S2   - gallops,  - rubs.  ABD:                bowel sounds,   soft, - tender, - distended.  EXT/MSC:         - cyanosis,  + clubbing,  - edema.    NEURO:             alert,   responds to stimuli.                          11.0   6.8   )-----------( 181      ( 28 Nov 2018 03:49 )             33.7     11-28    138  |  107  |  53<H>  ----------------------------<  78  4.9   |  18<L>  |  2.15<H>        CT Angio Chest PE Protocol  (04.23.17) Increased mediastinal and hilar lymphadenopathy is demonstrated. Coronary artery calcifications are again noted.  Interval development of small to moderate right and small left pleural effusions with compressive atelectasis of the posterior basal segments of the bilateral lower lobes. There are patchy nodular opacities in the posterior left lower lobe which enhance less than the atelectatic lung.          ASSESSMENT/PLAN    1)  Acute kidney injury  2)  Diarrhea  3)  Chronic obstructive pulmonary disease  4)  History pulmonary nodules    Satisfactory SpO2 (RA)  Renal function improving  Bronchodilators:  Atrovent/ albuterol q 4 – 6 hours as needed  Budesonide/formoterol twice daily  ID/Antibiotics: stool studies pending  Cardiac/HTN: BP management  GI: stool studies pending  Heme: Rx/VT prophylaxis   Discuss with medical team

## 2018-11-28 NOTE — PROGRESS NOTE ADULT - PROBLEM SELECTOR PLAN 1
Presenting with inability to urinate and found to have Cr 2.41 (baseline 1 as of 04/2017). FeNa c/w pre-renal etiology. Bladder scan in ED showing only 30cc urine in bladder. DDx most likely pre-renal DEBBIE d/t dehydration from prolonged diarrhea and decreased PO intake. Unlikely obstructive; pt has BPH but only 30cc seen in bladder and urine lytes more c/w pre-renal.  - patient able to void in ED after 1L NS bolus  - ordered for additional 1L NS bolus (EF 55%) followed by maintenance IVF set to stop after 12hrs  - repeat BMP after 2nd bolus for improvement, trend Cr  - f/u retroperitoneal US   - avoid nephrotoxic drugs, renally dose meds Presenting with inability to urinate w/ Cr 2.41 (baseline 1 as of 04/2017). FeNa c/w pre-renal etiology. Bladder scan in ED showing only 30cc urine in bladder. Likely pre-renal DEBBIE d/t dehydration from prolonged diarrhea and decreased PO intake.   - patient able to voiding after 2L IVF in ED and continued fluids for 12hours on RMF  - tolerating PO intake this afternoon  - retroperitoneal US with unremarkable exam of kidneys  - avoid nephrotoxic drugs, renally dose meds  - Cr downtrending, continue to monitor

## 2018-11-28 NOTE — PROGRESS NOTE ADULT - PROBLEM SELECTOR PLAN 2
Endorsing 3 weeks of watery, non-bloody, yellowish diarrhea. Dehydrated on admission likely 2/2 volume loss with diarrhea and decreased PO intake. No sick contacts, recent travel, f/c, recent antibiotic use or hospitalizations. DDx including malabsorptive diarrhea given hemicolectomy vs infectious, although less likely given no leukocytosis or systemic signs of infection.   - f/u stool osmolar gap (stool Na, K, osmolality), stool cxs, GI PCR  - continue with IVF rehydration and encourage PO intake  - consider GI consult in AM Endorsing 3 weeks of watery, non-bloody, yellowish diarrhea. Dehydrated on admission likely 2/2 volume loss with diarrhea and decreased PO intake. No sick contacts, recent travel, f/c, recent antibiotic use or hospitalizations. DDx including malabsorptive diarrhea given hemicolectomy vs infectious, although less likely given no leukocytosis or systemic signs of infection.   - f/u stool osmolar gap (stool Na, K, osmolality), stool cxs, GI PCR  - continue to encourage PO intake  - consider GI consult

## 2018-11-29 LAB
ANION GAP SERPL CALC-SCNC: 12 MMOL/L — SIGNIFICANT CHANGE UP (ref 5–17)
BLD GP AB SCN SERPL QL: NEGATIVE — SIGNIFICANT CHANGE UP
BUN SERPL-MCNC: 38 MG/DL — HIGH (ref 7–23)
CALCIUM SERPL-MCNC: 8.7 MG/DL — SIGNIFICANT CHANGE UP (ref 8.4–10.5)
CHLORIDE SERPL-SCNC: 105 MMOL/L — SIGNIFICANT CHANGE UP (ref 96–108)
CO2 SERPL-SCNC: 22 MMOL/L — SIGNIFICANT CHANGE UP (ref 22–31)
CREAT SERPL-MCNC: 1.73 MG/DL — HIGH (ref 0.5–1.3)
CULTURE RESULTS: SIGNIFICANT CHANGE UP
GLUCOSE SERPL-MCNC: 88 MG/DL — SIGNIFICANT CHANGE UP (ref 70–99)
HCT VFR BLD CALC: 35.4 % — LOW (ref 39–50)
HGB BLD-MCNC: 11.3 G/DL — LOW (ref 13–17)
MAGNESIUM SERPL-MCNC: 2 MG/DL — SIGNIFICANT CHANGE UP (ref 1.6–2.6)
MCHC RBC-ENTMCNC: 31.1 PG — SIGNIFICANT CHANGE UP (ref 27–34)
MCHC RBC-ENTMCNC: 31.9 G/DL — LOW (ref 32–36)
MCV RBC AUTO: 97.5 FL — SIGNIFICANT CHANGE UP (ref 80–100)
PLATELET # BLD AUTO: 171 K/UL — SIGNIFICANT CHANGE UP (ref 150–400)
POTASSIUM SERPL-MCNC: 4.6 MMOL/L — SIGNIFICANT CHANGE UP (ref 3.5–5.3)
POTASSIUM SERPL-SCNC: 4.6 MMOL/L — SIGNIFICANT CHANGE UP (ref 3.5–5.3)
RBC # BLD: 3.63 M/UL — LOW (ref 4.2–5.8)
RBC # FLD: 13.2 % — SIGNIFICANT CHANGE UP (ref 10.3–16.9)
RH IG SCN BLD-IMP: POSITIVE — SIGNIFICANT CHANGE UP
SODIUM SERPL-SCNC: 139 MMOL/L — SIGNIFICANT CHANGE UP (ref 135–145)
SPECIMEN SOURCE: SIGNIFICANT CHANGE UP
WBC # BLD: 5 K/UL — SIGNIFICANT CHANGE UP (ref 3.8–10.5)
WBC # FLD AUTO: 5 K/UL — SIGNIFICANT CHANGE UP (ref 3.8–10.5)

## 2018-11-29 PROCEDURE — 99222 1ST HOSP IP/OBS MODERATE 55: CPT

## 2018-11-29 RX ORDER — SOD SULF/SODIUM/NAHCO3/KCL/PEG
2000 SOLUTION, RECONSTITUTED, ORAL ORAL ONCE
Qty: 0 | Refills: 0 | Status: COMPLETED | OUTPATIENT
Start: 2018-11-29 | End: 2018-11-29

## 2018-11-29 RX ORDER — SOD SULF/SODIUM/NAHCO3/KCL/PEG
2000 SOLUTION, RECONSTITUTED, ORAL ORAL ONCE
Qty: 0 | Refills: 0 | Status: COMPLETED | OUTPATIENT
Start: 2018-11-30 | End: 2018-11-30

## 2018-11-29 RX ADMIN — LISINOPRIL 10 MILLIGRAM(S): 2.5 TABLET ORAL at 06:25

## 2018-11-29 RX ADMIN — TAMSULOSIN HYDROCHLORIDE 0.4 MILLIGRAM(S): 0.4 CAPSULE ORAL at 22:34

## 2018-11-29 RX ADMIN — Medication 1 APPLICATION(S): at 06:25

## 2018-11-29 RX ADMIN — Medication 1 APPLICATION(S): at 18:54

## 2018-11-29 RX ADMIN — DULOXETINE HYDROCHLORIDE 20 MILLIGRAM(S): 30 CAPSULE, DELAYED RELEASE ORAL at 18:54

## 2018-11-29 RX ADMIN — Medication 2000 MILLILITER(S): at 18:54

## 2018-11-29 RX ADMIN — BUDESONIDE AND FORMOTEROL FUMARATE DIHYDRATE 2 PUFF(S): 160; 4.5 AEROSOL RESPIRATORY (INHALATION) at 10:10

## 2018-11-29 RX ADMIN — HEPARIN SODIUM 5000 UNIT(S): 5000 INJECTION INTRAVENOUS; SUBCUTANEOUS at 22:35

## 2018-11-29 RX ADMIN — MIRTAZAPINE 30 MILLIGRAM(S): 45 TABLET, ORALLY DISINTEGRATING ORAL at 22:34

## 2018-11-29 RX ADMIN — BUDESONIDE AND FORMOTEROL FUMARATE DIHYDRATE 2 PUFF(S): 160; 4.5 AEROSOL RESPIRATORY (INHALATION) at 22:34

## 2018-11-29 RX ADMIN — HEPARIN SODIUM 5000 UNIT(S): 5000 INJECTION INTRAVENOUS; SUBCUTANEOUS at 15:21

## 2018-11-29 RX ADMIN — CLOPIDOGREL BISULFATE 75 MILLIGRAM(S): 75 TABLET, FILM COATED ORAL at 11:20

## 2018-11-29 RX ADMIN — Medication 40 MILLIGRAM(S): at 06:25

## 2018-11-29 RX ADMIN — Medication 20 MILLIGRAM(S): at 11:21

## 2018-11-29 RX ADMIN — DULOXETINE HYDROCHLORIDE 20 MILLIGRAM(S): 30 CAPSULE, DELAYED RELEASE ORAL at 06:25

## 2018-11-29 RX ADMIN — Medication 81 MILLIGRAM(S): at 11:21

## 2018-11-29 RX ADMIN — HEPARIN SODIUM 5000 UNIT(S): 5000 INJECTION INTRAVENOUS; SUBCUTANEOUS at 06:25

## 2018-11-29 RX ADMIN — GABAPENTIN 100 MILLIGRAM(S): 400 CAPSULE ORAL at 11:21

## 2018-11-29 RX ADMIN — SIMVASTATIN 20 MILLIGRAM(S): 20 TABLET, FILM COATED ORAL at 22:34

## 2018-11-29 NOTE — CONSULT NOTE ADULT - SUBJECTIVE AND OBJECTIVE BOX
88M with PMH of HTN, CAD s/p PCI (DAYANA to mRCA, pRCA, mCx 2013 with Dr. 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 Headley with pathology showing Tubulovillous adenoma who is now presened from Dr. Montalvo's office for urinary retention in setting of 3 weeks of diarrhea. Patient went to Dr. Montalvo's office for routine visit, when he mentioned 3 weeks of watery, yellowish-colored, NB diarrhea associated with mild cramping, approximately 2-3 episodes per day. His diarrhea would go away then return  up to 6 BMs in one day.  No recent hospitalizations or antibiotic use.     REVIEW OF SYSTEMS:  Constitutional: No fever, weight loss or fatigue  ENMT:  No difficulty hearing, tinnitus, vertigo; No sinus or throat pain  Respiratory: No cough, wheezing, chills or hemoptysis  Cardiovascular: No chest pain, palpitations, dizziness or leg swelling  Gastrointestinal: No abdominal or epigastric pain. No nausea, vomiting or hematemesis; No diarrhea today. No melena or hematochezia.  Skin: No itching, burning, rashes or lesions   Musculoskeletal: No joint pain or swelling; No muscle, back or extremity pain    PAST MEDICAL & SURGICAL HISTORY:  BPH (benign prostatic hyperplasia)  CAD (coronary artery disease)  HLD (hyperlipidemia)  H/O right hemicolectomy  S/P cataract extraction      FAMILY HISTORY:  No family history of cardiovascular disease (Father, Mother)      SOCIAL HISTORY:  Smoking Status: [ ] Current, [ ] Former, [ ] Never  Pack Years:    MEDICATIONS:  MEDICATIONS  (STANDING):  ammonium lactate 12% Lotion 1 Application(s) Topical two times a day  aspirin enteric coated 81 milliGRAM(s) Oral daily  buDESOnide 160 MICROgram(s)/formoterol 4.5 MICROgram(s) Inhaler 2 Puff(s) Inhalation two times a day  clopidogrel Tablet 75 milliGRAM(s) Oral daily  DULoxetine 20 milliGRAM(s) Oral two times a day  furosemide    Tablet 40 milliGRAM(s) Oral daily  gabapentin 100 milliGRAM(s) Oral daily  heparin  Injectable 5000 Unit(s) SubCutaneous every 8 hours  lisinopril 10 milliGRAM(s) Oral daily  mirtazapine 30 milliGRAM(s) Oral at bedtime  PARoxetine 20 milliGRAM(s) Oral daily  simvastatin 20 milliGRAM(s) Oral at bedtime  tamsulosin 0.4 milliGRAM(s) Oral at bedtime    MEDICATIONS  (PRN):  ALBUTerol/ipratropium for Nebulization 3 milliLiter(s) Nebulizer every 6 hours PRN Shortness of Breath and/or Wheezing      Allergies    No Known Allergies    Intolerances        Vital Signs Last 24 Hrs  T(C): 36.8 (30 Nov 2018 05:43), Max: 36.8 (30 Nov 2018 05:43)  T(F): 98.2 (30 Nov 2018 05:43), Max: 98.2 (30 Nov 2018 05:43)  HR: 56 (30 Nov 2018 05:43) (56 - 63)  BP: 111/59 (30 Nov 2018 05:43) (111/59 - 152/73)  BP(mean): --  RR: 16 (30 Nov 2018 05:43) (16 - 16)  SpO2: 96% (30 Nov 2018 05:43) (94% - 96%)        PHYSICAL EXAM:    General: thin, elderly; in no acute distress  HEENT: MMM, conjunctiva and sclera clear  Gastrointestinal: Soft, non-tender non-distended; Normal bowel sounds; No rebound or guarding  Extremities: Normal range of motion, No clubbing, cyanosis or edema  Neurological: Alert and oriented x3  Skin: Warm and dry. No obvious rash      LABS:                        10.7   5.8   )-----------( 186      ( 30 Nov 2018 06:16 )             33.5     11-30    139  |  101  |  33<H>  ----------------------------<  97  4.8   |  24  |  1.85<H>    Ca    8.8      30 Nov 2018 06:20  Mg     1.8     11-30            RADIOLOGY & ADDITIONAL STUDIES:

## 2018-11-29 NOTE — DIETITIAN INITIAL EVALUATION ADULT. - OTHER INFO
Pt seen for initial assessment. Admit: urinary retention w/ 3 weeks D, poor PO xfew days, found w/ pre-renal DEBBIE likely 2/2 dehydration from D, metabolic acidosis likely 2/2 starvation ketosis (now resolved). PMH: BPH, CAD S/P PCI, HLD, colonic mass S/P R hemicolectomy April 2017 w/ pathology showing Tubulovillous adenoma, dementia. Skin: no edema, intact. Pt seen sitting on bed, awake, alert, health proxy at bedside. Pt is on DASH/TLC diet with % PO intake today, however pt reports poor PO intake yesterday 2/2 D (noted receiving remeron). Reported UBW 68.1kg x1 month ago w/ ~5.9kg wt loss (~8.6% wt loss- question accuracy of wt loss due to D x3 weeks PTA (fluid loss vs fat/muscle loss)), current wt 62.3kg. Of note: pt and health proxy report wt x1 year ago of 54.5kg w/ ~13.6kg wt gain over the past year after hemicolectomy. NKFA. Denies N/V, reports Dx5 yesterday, no BM today, RN aware. Not reporting pain at this time. RD to follow up per protocol.

## 2018-11-29 NOTE — DIETITIAN INITIAL EVALUATION ADULT. - ORAL INTAKE PTA
Pt reports poor PO intake xfew days PTA due to D, however reports that he typically has good PO intake w/ consumption of 2 meals/day from [employer]

## 2018-11-29 NOTE — PROGRESS NOTE ADULT - SUBJECTIVE AND OBJECTIVE BOX
CC/ HPI Patient is a 88 year old male with chronic obstructive pulmonary disease, coronary artery disease s/p PCI DAYANA to mRCA, pRCA, mCx, 2013, s/p right hemicolectomy for tubulovillous adenoma, 2017, admitted with urinary retention, diarrhea, dehydration and acute kidney injury, seen this morning the patient denies respiratory complaint.      PAST MEDICAL & SURGICAL HISTORY:  BPH (benign prostatic hyperplasia)  CAD (coronary artery disease)  HLD (hyperlipidemia)  H/O right hemicolectomy  S/P cataract extraction    SOCHX:  + tobacco,  -  alcohol    FMHX: FA/MO  - contributory     ROS reviewed below with positive findings marked (+) :  GEN:  fever, chills ENT: tracheostomy,   epistaxis,  sinusitis COR:+ CAD, CHF,  HTN, dysrhythmia PUL: +COPD, ILD, asthma, pneumonia GI: PEG, dysphagia, hemorrhage, other ELIZABETH: kidney disease, electrolyte disorder HEM:  anemia, thrombus, coagulopathy, cancer ENDO:  thyroid disease, diabetes mellitus CNS:  dementia, stroke, seizure, PSY:  depression, anxiety, other        MEDICATIONS  (STANDING):  ammonium lactate 12% Lotion 1 Application(s) Topical two times a day  aspirin enteric coated 81 milliGRAM(s) Oral daily  buDESOnide 160 MICROgram(s)/formoterol 4.5 MICROgram(s) Inhaler 2 Puff(s) Inhalation two times a day  clopidogrel Tablet 75 milliGRAM(s) Oral daily  DULoxetine 20 milliGRAM(s) Oral two times a day  furosemide    Tablet 40 milliGRAM(s) Oral daily  gabapentin 100 milliGRAM(s) Oral daily  heparin  Injectable 5000 Unit(s) SubCutaneous every 8 hours  lisinopril 10 milliGRAM(s) Oral daily  mirtazapine 30 milliGRAM(s) Oral at bedtime  PARoxetine 20 milliGRAM(s) Oral daily  polyethylene glycol/electrolyte Solution. 2000 milliLiter(s) Oral once  simvastatin 20 milliGRAM(s) Oral at bedtime  tamsulosin 0.4 milliGRAM(s) Oral at bedtime    MEDICATIONS  (PRN):  ALBUTerol/ipratropium for Nebulization 3 milliLiter(s) Nebulizer every 6 hours PRN Shortness of Breath and/or Wheezing      Vital Signs Last 24 Hrs  T(C): 36.4 (29 Nov 2018 10:55), Max: 36.8 (28 Nov 2018 21:17)  T(F): 97.6 (29 Nov 2018 10:55), Max: 98.3 (28 Nov 2018 21:17)  HR: 63 (29 Nov 2018 10:55) (60 - 64)  BP: 121/61 (29 Nov 2018 10:55) (121/61 - 136/67)  BP(mean): --  RR: 16 (29 Nov 2018 10:55) (15 - 16)  SpO2: 94% (29 Nov 2018 10:55) (94% - 97%)    GENERAL:         comfortable,  - distress.  HEENT:            - trauma,  - icterus,  - injection,  - nasal discharge.  NECK:              - jugular venous distention, - thyromegaly.  LYMPH:           - lymphadenopathy, - masses.  RESP:               + clear,   - rales,   - rhonchi,   - wheezes.   COR:                S1S2   - gallops,  - rubs.  ABD:                bowel sounds,   soft, - tender, - distended.  EXT/MSC:         - cyanosis,  - clubbing,  - edema.    NEURO:             alert,   responds to stimuli.                          11.3   5.0   )-----------( 171      ( 29 Nov 2018 07:53 )             35.4     11-29    139  |  105  |  38<H>  ----------------------------<  88  4.6   |  22  |  1.73<H>        X-ray  Chest  (11.27.18 )   Frontal view of the chest is compared to 4/22/2017 and demonstrates low lung volumes.  Persistent small layering left pleural effusion. Improved aeration of the left lower lobe since prior study. No consolidation.      CT Angio Chest PE Protocol  (04.23.17) Increased mediastinal and hilar lymphadenopathy is demonstrated. Coronary artery calcifications are again noted.  Interval development of small to moderate right and small left pleural effusions with compressive atelectasis of the posterior basal segments of the bilateral lower lobes. There are patchy nodular opacities in the posterior left lower lobe which enhance less than the atelectatic lung.          ASSESSMENT/PLAN    1)  Acute kidney injury  2)  Diarrhea  3)  Chronic obstructive pulmonary disease  4)  History pulmonary nodules    Satisfactory SpO2 (RA)  Renal function continues to improved  Bronchodilators:  Atrovent/ albuterol q 4 – 6 hours as needed  Budesonide/formoterol twice daily  ID/Antibiotics: stool studies pending  Cardiac/HTN: BP management  GI: diarrhea reported resolved  Heme: Rx/VT prophylaxis   Discuss with medical team

## 2018-11-29 NOTE — PROGRESS NOTE ADULT - PROBLEM SELECTOR PLAN 6
Continue with home simvastatin 20mg qhs. Chronic dyspnea which has been worked-up extensively at [facility] and outpt by Dr. Gutiérrez (pulm). Was seen by Dr. Baptiste in ED (covering for Dr. Gutiérrez) who reviewed outpatient PFTs which were at pts baseline. Pt denies cough, wheezing, sputum production. Sat well on room air.  - c/w Symbicort 160/4.5 2 puffs daily (therapeutic interchange for Advair)  - f/u pulm recs

## 2018-11-29 NOTE — PROGRESS NOTE ADULT - PROBLEM SELECTOR PROBLEM 8
[facility] 
 
 
 [facility] [address] [facility] 
[phone] Patient: Zoey Daniels MRN: [id number] :1960 You are allergic to the following Allergen Reactions Penicillin G Unknown (comments) Daughter states patient has Pcn allergy Tolerates Evelyne IreneD Immunizations Administered for This Admission Name Date  
 TB Skin Test (PPD) Intradermal  Deferred (),  Deferred (), 2017 Recent Documentation Height Weight Breastfeeding? BMI OB Status Smoking Status 1.676 m 66.8 kg No 23.77 kg/m2 Menopause Former Smoker Emergency Contacts Name Discharge Info Relation Home Work Mobile LeeJyoti bangura DISCHARGE CAREGIVER [3] Daughter [21] [phone] MaurerGorge  Spouse [3] [phone] HenokShola veraShakira  Sister [23] [phone] About your hospitalization You were admitted on:  2017 You last received care in the:  [address] You were discharged on:  2017 Unit phone number:  [phone] Why you were hospitalized Your primary diagnosis was:  Metastatic Colon Cancer In Female (Hcc) Your diagnoses also included:  Brain Tumor (Hcc), Severe Protein-Calorie Malnutrition (Hcc) Providers Seen During Your Hospitalizations Provider Role Specialty Primary office phone Chau Tijerina MD Attending Provider Emergency Medicine [phone] Arcenio Lezama MD Attending Provider Internal Medicine [phone] Your Primary Care Physician (PCP) Primary Care Physician Office Phone Office Fax Daniella Harrison [phone] Follow-up Information Follow up With Details Comments Contact Info Gerard Lester DO   [address] [place] [address] 
[phone]  Jordan Hercules MD In 2 weeks hospital follow up [address] [facility]  
 [place] [address] [facility] [address] 
[phone] Zahida Forrester MD On 9/12/2017 labs 11:15 and appt 11:45, per claudia . [address] [facility] [address] [facility] [address] 
[phone] Sofia Root MD On 9/19/2017 appt 2:30 pm , per [address] 
[facility] [phone] [facility] [address] 
[phone] Your Appointments Wednesday September 06, 2017  2:30 PM EDT  
[facility] CONSULT with Lloyd Man MD  
[address]) [address] Risk [address]/O [address] [facility] [address]  
[phone] Wednesday September 06, 2017  3:30 PM EDT ROUTINE with JAVI Johnston [facility] [facility] Friday September 08, 2017  2:00 PM EDT ROUTINE with JAVI Johnston [facility] [facility] Tuesday September 12, 2017 To Be Determined ROUTINE with JAVI Johnston [facility] [facility] Tuesday September 12, 2017 11:15 AM EDT  
LAB with [address]  
[facility] OUTREACH INSURANCE [address]) [place] [phone] Tuesday September 12, 2017 11:45 AM EDT Follow Up with MD Cecile Uriarte Hematology and Oncology [facility]) C/ Leland Munoz [address] [facility] [address]  
[phone] Friday September 15, 2017 To Be Determined ROUTINE with JAVI Johnston [facility] [facility] Tuesday September 19, 2017 To Be Determined ROUTINE with JAVI Jessica [facility] ([address] [facility] [address]) [facility] ([address]) Tuesday September 19, 2017  2:30 PM EDT Global Post Op with Lyn Goode MD  
[facility] ([facility]) [address] [place] [address] Misael Edmondson  
[phone] Thursday September 21, 2017 To Be Determined SKILLLED NURSING DISCHARGE with JAVI Jessica [facility] ([address]) [facility] ([facility] [address] [facility] [address]) Current Discharge Medication List  
  
START taking these medications Dose & Instructions Dispensing Information Comments Morning Noon Evening Bedtime  
 dexamethasone 2 mg tablet Commonly known as:  DECADRON Dose:  4 mg Take 2 Tabs by mouth every six (6) hours. Quantity:  240 Tab Refills:  2  
     
  
   
  
   
  
   
  
  
 levETIRAcetam 1,000 mg tablet Commonly known as:  KEPPRA Dose:  1000 mg Take 1 Tab by mouth two (2) times a day. Quantity:  60 Tab Refills:  3  
     
  
   
   
   
  
  
 metFORMIN 500 mg tablet Commonly known as:  GLUCOPHAGE Dose:  500 mg Take 1 Tab by mouth two (2) times daily (with meals). Quantity:  60 Tab Refills:  2 CONTINUE these medications which have CHANGED Dose & Instructions Dispensing Information Comments Morning Noon Evening Bedtime PARoxetine 40 mg tablet Commonly known as:  PAXIL What changed:  when to take this Dose:  40 mg Take 1 Tab by mouth daily. Indications: GENERALIZED ANXIETY DISORDER, PANIC DISORDER Quantity:  30 Tab Refills:  2 CONTINUE these medications which have NOT CHANGED Dose & Instructions Dispensing Information Comments Morning Noon Evening Bedtime  
 cyclobenzaprine 10 mg tablet Commonly known as:  FLEXERIL Dose:  10 mg Take 1 Tab by mouth three (3) times daily (with meals). Quantity:  30 Tab Refills:  0  
     
  
   
  
   
  
   
  
 docusate sodium 100 mg capsule Commonly known as:  Thelma Parsley Dose:  100 mg Take 1 Cap by mouth two (2) times a day. Hold for liquid stool Quantity:  60 Cap Refills:  0  
     
   
   
   
  
 lidocaine-prilocaine topical cream  
Commonly known as:  EMLA Notes to Patient:  As directed Apply  to affected area as needed. Apply 1/2 inch cream to port site 90 minutes prior to access Quantity:  30 g Refills:  0 LORazepam 1 mg tablet Commonly known as:  ATIVAN Dose:  1 mg Take 1 Tab by mouth every four (4) hours as needed for Anxiety. Max Daily Amount: 6 mg. Quantity:  120 Tab Refills:  0  
     
   
   
   
  
 magnesium hydroxide 400 mg/5 mL suspension Commonly known as:  MILK OF MAGNESIA Dose:  30 mL Take 30 mL by mouth two (2) times a day for 30 days. Hold for liquid stool. Quantity:  1800 mL Refills:  0  
     
  
   
   
   
  
  
 metoprolol tartrate 50 mg tablet Commonly known as:  LOPRESSOR Dose:  50 mg Take 1 Tab by mouth every twelve (12) hours. Quantity:  60 Tab Refills:  0  
     
  
   
   
   
  
  
 ondansetron hcl 8 mg tablet Commonly known as:  John Cedeño Notes to Patient:  Take on as needed schedule Dose:  8 mg Take 1 Tab by mouth every eight (8) hours as needed for Nausea. Quantity:  90 Tab Refills:  3  
     
   
   
   
  
 oxyCODONE ER 20 mg ER tablet Commonly known as:  OxyCONTIN Dose:  20 mg Take 1 Tab by mouth every twelve (12) hours. Max Daily Amount: 40 mg.  
 Quantity:  30 Tab Refills:  0  
     
  
   
   
   
  
  
 oxyCODONE-acetaminophen  mg per tablet Commonly known as:  PERCOCET 10 Notes to Patient:  Take on as needed schedule Dose:  1 Tab Take 1 Tab by mouth every four (4) hours as needed. Max Daily Amount: 6 Tabs. Quantity:  40 Tab Refills:  0  
     
   
   
   
  
 pantoprazole 40 mg tablet Commonly known as:  PROTONIX Dose:  40 mg Take 1 Tab by mouth Daily (before breakfast). Quantity:  30 Tab Refills:  0  
     
  
   
   
   
  
 potassium chloride 20 mEq tablet Commonly known as:  K-DUR, KLOR-CON Dose:  40 mEq Take 2 Tabs by mouth two (2) times a day. Quantity:  120 Tab Refills:  0  
     
  
   
   
  
   
  
 prochlorperazine 10 mg tablet Commonly known as:  COMPAZINE Notes to Patient:  Take on as needed schedule Dose:  10 mg Take 1 Tab by mouth every six (6) hours as needed. Quantity:  120 Tab Refills:  3 XANAX 2 mg tablet Generic drug:  ALPRAZolam  
Notes to Patient:  Take on as needed schedule Dose:  2 mg Take 2 mg by mouth nightly as needed for Anxiety. Refills:  0 STOP taking these medications   
 enoxaparin 40 mg/0.4 mL Commonly known as:  LOVENOX  
   
  
 levoFLOXacin 750 mg tablet Commonly known as:  Mittie Bitter Where to Get Your Medications Information on where to get these meds will be given to you by the nurse or doctor. ! Ask your nurse or doctor about these medications  
  dexamethasone 2 mg tablet  
 levETIRAcetam 1,000 mg tablet  
 metFORMIN 500 mg tablet Discharge Instructions PT/OT per Home Health Diet: Regular Diet Wound Care: 2 small open wounds at incision site, need daily packing per Dr. Anahi Samaniego from Nurse The following personal items are in your possession at time of discharge: 
 
Dental Appliances: Uppers, Lowers, With patient Visual Aid: None Home Medications: None Jewelry: None Clothing: Pants, Shirt, With patient Other Valuables: None Personal Items Sent to Safe: none PATIENT INSTRUCTIONS: 
 
After general anesthesia or intravenous sedation, for 24 hours or while taking prescription Narcotics: · Limit your activities · Do not drive and operate hazardous machinery · Do not make important personal or business decisions · Do  not drink alcoholic beverages · If you have not urinated within 8 hours after discharge, please contact your surgeon on call. Report the following to your surgeon: 
· Excessive pain, swelling, redness or odor of or around the surgical area · Temperature over 100.5 · Nausea and vomiting lasting longer than 4 hours or if unable to take medications · Any signs of decreased circulation or nerve impairment to extremity: change in color, persistent  numbness, tingling, coldness or increase pain · Any questions What to do at Home: 
Recommended activity: Activity as tolerated, per MD instructions and PT If you experience any of the following symptoms fever > 100.5, nausea, vomiting, pain, chest pain, shortness of breath please follow up with MD. 
 
 
*  Please give a list of your current medications to your Primary Care Provider. *  Please update this list whenever your medications are discontinued, doses are 
    changed, or new medications (including over-the-counter products) are added. *  Please carry medication information at all times in case of emergency situations. These are general instructions for a healthy lifestyle: No smoking/ No tobacco products/ Avoid exposure to second hand smoke Surgeon General's Warning:  Quitting smoking now greatly reduces serious risk to your health. Obesity, smoking, and sedentary lifestyle greatly increases your risk for illness A healthy diet, regular physical exercise & weight monitoring are important for maintaining a healthy lifestyle You may be retaining fluid if you have a history of heart failure or if you experience any of the following symptoms:  Weight gain of 3 pounds or more overnight or 5 pounds in a week, increased swelling in our hands or feet or shortness of breath while lying flat in bed. Please call your doctor as soon as you notice any of these symptoms; do not wait until your next office visit. Recognize signs and symptoms of STROKE: 
 
F-face looks uneven A-arms unable to move or move unevenly S-speech slurred or non-existent T-time-call 911 as soon as signs and symptoms begin-DO NOT go Back to bed or wait to see if you get better-TIME IS BRAIN. Warning Signs of HEART ATTACK Call 911 if you have these symptoms: 
? Chest discomfort. Most heart attacks involve discomfort in the center of the chest that lasts more than a few minutes, or that goes away and comes back. It can feel like uncomfortable pressure, squeezing, fullness, or pain. ? Discomfort in other areas of the upper body. Symptoms can include pain or discomfort in one or both arms, the back, neck, jaw, or stomach. ? Shortness of breath with or without chest discomfort. ? Other signs may include breaking out in a cold sweat, nausea, or lightheadedness. Don't wait more than five minutes to call [address]! Fast action can save your life. Calling 911 is almost always the fastest way to get lifesaving treatment. Emergency Medical Services staff can begin treatment when they arrive  up to an hour sooner than if someone gets to the hospital by car. The discharge information has been reviewed with the patient. The patient verbalized understanding. Discharge medications reviewed with the patient and appropriate educational materials and side effects teaching were provided. Fatigue: Care Instructions Your Care Instructions Fatigue is a feeling of tiredness, exhaustion, or lack of energy. You may feel fatigue because of too much or not enough activity.  It can also come from stress, lack of sleep, boredom, and poor diet. Many medical problems, such as viral infections, can cause fatigue. Emotional problems, especially depression, are often the cause of fatigue. Fatigue is most often a symptom of another problem. Treatment for fatigue depends on the cause. For example, if you have fatigue because you have a certain health problem, treating this problem also treats your fatigue. If depression or anxiety is the cause, treatment may help. Follow-up care is a key part of your treatment and safety. Be sure to make and go to all appointments, and call your doctor if you are having problems. It's also a good idea to know your test results and keep a list of the medicines you take. How can you care for yourself at home? · Get regular exercise. But don't overdo it. Go back and forth between rest and exercise. · Get plenty of rest. 
· Eat a healthy diet. Do not skip meals, especially breakfast. 
· Reduce your use of caffeine, tobacco, and alcohol. Caffeine is most often found in coffee, tea, cola drinks, and chocolate. · Limit medicines that can cause fatigue. This includes tranquilizers and cold and allergy medicines. When should you call for help? Watch closely for changes in your health, and be sure to contact your doctor if: 
· You have new symptoms such as fever or a rash. · Your fatigue gets worse. · You have been feeling down, depressed, or hopeless. Or you may have lost interest in things that you usually enjoy. · You are not getting better as expected. Where can you learn more? Go to http://jose-lynn.info/. Enter G111 in the search box to learn more about \"Fatigue: Care Instructions. \" Current as of: March 20, 2017 Content Version: 11.3 © 2915-3978 [employer].  Care instructions adapted under license by [employer] (which disclaims liability or warranty for this information). If you have questions about a medical condition or this instruction, always ask your healthcare professional. [address] any warranty or liability for your use of this information. Weakness: Care Instructions Your Care Instructions Weakness is a lack of physical or muscle strength. You may feel that you need to make extra effort to move your arms, legs, or other muscles. Generalized weakness means that you feel weak in most areas of your body. Another type of weakness may affect just one muscle or group of muscles. You may feel weak and tired after you have done too much activity, such as taking an extra-long hike. This is not a serious problem. It often goes away on its own. Feeling weak can also be caused by medical conditions like thyroid problems, depression, or a virus. Sometimes the cause can be serious. Your doctor may want to do more tests to try to find the cause of the weakness. The doctor has checked you carefully, but problems can develop later. If you notice any problems or new symptoms, get medical treatment right away. Follow-up care is a key part of your treatment and safety. Be sure to make and go to all appointments, and call your doctor if you are having problems. It's also a good idea to know your test results and keep a list of the medicines you take. How can you care for yourself at home? · Rest when you feel tired. · Be safe with medicines. If your doctor prescribed medicine, take it exactly as prescribed. Call your doctor if you think you are having a problem with your medicine. You will get more details on the specific medicines your doctor prescribes. · Do not skip meals. Eating a balanced diet may increase your energy level. · Get some physical activity every day, but do not get too tired. When should you call for help? Call your doctor now or seek immediate medical care if: 
· You have new or worse weakness. · You are dizzy or lightheaded, or you feel like you may faint. Watch closely for changes in your health, and be sure to contact your doctor if: 
· You do not get better as expected. Where can you learn more? Go to http://jose-lynn.info/. Enter 779 [id number] 5154 in the search box to learn more about \"Weakness: Care Instructions. \" Current as of: March 20, 2017 Content Version: 11.3 © 8658-5876 [employer]. Care instructions adapted under license by Department of Health and Human Services (which disclaims liability or warranty for this information). If you have questions about a medical condition or this instruction, always ask your healthcare professional. [address] any warranty or liability for your use of this information. Discharge Orders None [employer] Announcement We are excited to announce that we are making your provider's discharge notes available to you in [employer]. You will see these notes when they are completed and signed by the physician that discharged you from your recent hospital stay. If you have any questions or concerns about any information you see in [employer], please call the Health Information Department where you were seen or reach out to your Primary Care Provider for more information about your plan of care. Introducing \[facility] [place] [facility]\"" & HEALTH SERVICES! Dear Yakov: 
Thank you for requesting a [employer] account. Our records indicate that you already have an active [employer] account. You can access your account anytime at [email or web address] Did you know that you can access your hospital and ER discharge instructions at any time in [employer]? You can also review all of your test results from your hospital stay or ER visit. Additional Information If you have questions, please visit the Frequently Asked Questions section of the [employer] website at [email or web address] Remember, MyChart is NOT to be used for urgent needs. For medical emergencies, dial 911. Now available from your iPhone and Android! General Information Please provide this summary of care documentation to your next provider. Patient Signature:  ____________________________________________________________ Date:  ____________________________________________________________  
  
Rema Faes Provider Signature:  ____________________________________________________________ Date:  ____________________________________________________________ Dementia Nutrition, metabolism, and development symptoms

## 2018-11-29 NOTE — PROGRESS NOTE ADULT - SUBJECTIVE AND OBJECTIVE BOX
OVERNIGHT EVENTS:    SUBJECTIVE / INTERVAL HPI: Patient seen and examined at bedside.     VITAL SIGNS:  Vital Signs Last 24 Hrs  T(C): 36.7 (2018 05:38), Max: 37 (2018 10:32)  T(F): 98 (2018 05:38), Max: 98.6 (2018 10:32)  HR: 60 (2018 05:38) (60 - 70)  BP: 136/67 (2018 05:38) (113/61 - 136/67)  BP(mean): --  RR: 15 (2018 05:38) (15 - 18)  SpO2: 97% (2018 05:38) (95% - 97%)        PHYSICAL EXAM:  General: NAD  HEENT: NC/AT, anicteric sclera; MMM  Neck: supple  Cardiovascular: +S1/S2, RRR  Respiratory: CTA B/L, no W/R/R  Gastrointestinal: soft, NT/ND; +BSx4  Extremities: WWP; no edema, clubbing or cyanosis  Vascular: 2+ radial, DP/PT pulses B/L  Neurological: AAOx3, no focal deficits    MEDICATIONS  (STANDING):  ammonium lactate 12% Lotion 1 Application(s) Topical two times a day  aspirin enteric coated 81 milliGRAM(s) Oral daily  buDESOnide 160 MICROgram(s)/formoterol 4.5 MICROgram(s) Inhaler 2 Puff(s) Inhalation two times a day  clopidogrel Tablet 75 milliGRAM(s) Oral daily  DULoxetine 20 milliGRAM(s) Oral two times a day  furosemide    Tablet 40 milliGRAM(s) Oral daily  gabapentin 100 milliGRAM(s) Oral daily  heparin  Injectable 5000 Unit(s) SubCutaneous every 8 hours  lisinopril 10 milliGRAM(s) Oral daily  mirtazapine 30 milliGRAM(s) Oral at bedtime  PARoxetine 20 milliGRAM(s) Oral daily  simvastatin 20 milliGRAM(s) Oral at bedtime  tamsulosin 0.4 milliGRAM(s) Oral at bedtime    MEDICATIONS  (PRN):  ALBUTerol/ipratropium for Nebulization 3 milliLiter(s) Nebulizer every 6 hours PRN Shortness of Breath and/or Wheezing      Allergies    No Known Allergies    Intolerances        LABS:                        11.3   5.0   )-----------( 171      ( 2018 07:53 )             35.4         139  |  105  |  38<H>  ----------------------------<  88  4.6   |  22  |  1.73<H>    Ca    8.7      2018 07:53  Phos  4.0       Mg     2.0         TPro  6.4  /  Alb  4.0  /  TBili  0.3  /  DBili  x   /  AST  13  /  ALT  8<L>  /  AlkPhos  56        Urinalysis Basic - ( 2018 22:21 )    Color: Yellow / Appearance: Clear / S.020 / pH: x  Gluc: x / Ketone: 15 mg/dL  / Bili: Small / Urobili: 0.2 E.U./dL   Blood: x / Protein: NEGATIVE mg/dL / Nitrite: NEGATIVE   Leuk Esterase: NEGATIVE / RBC: < 5 /HPF / WBC < 5 /HPF   Sq Epi: x / Non Sq Epi: 0-5 /HPF / Bacteria: Present /HPF      CAPILLARY BLOOD GLUCOSE              RADIOLOGY & ADDITIONAL TESTS: Reviewed. OVERNIGHT EVENTS: RONN.     SUBJECTIVE / INTERVAL HPI: Patient seen and examined at bedside. Patient states he feels well and feels more energetic today. He states he slept well overnight with no episodes of diarrhea since yesterday. He states that he has been making lots of urine with no dysuria. Patient denies n/v, abdominal pain, melena, hematochezia, SOB, CP.    VITAL SIGNS:  Vital Signs Last 24 Hrs  T(C): 36.7 (2018 05:38), Max: 37 (2018 10:32)  T(F): 98 (2018 05:38), Max: 98.6 (2018 10:32)  HR: 60 (2018 05:38) (60 - 70)  BP: 136/67 (2018 05:38) (113/61 - 136/67)  BP(mean): --  RR: 15 (2018 05:38) (15 - 18)  SpO2: 97% (2018 05:38) (95% - 97%)    PHYSICAL EXAM:  General: NAD  HEENT: NC/AT, anicteric sclera; MMM  Neck: supple  Cardiovascular: +S1/S2, RRR  Respiratory: CTA B/L, breath sounds slightly diminished on the R, no W/R/R, tachypneic  Gastrointestinal: soft, NT/ND; +BSx4  Extremities: WWP; no edema, clubbing or cyanosis  Vascular: 2+ radial, DP/PT pulses B/L  Derm: dry scaling skin on palms and shins improved since yesterday  Neurological: AAOx3, no focal deficits    MEDICATIONS  (STANDING):  ammonium lactate 12% Lotion 1 Application(s) Topical two times a day  aspirin enteric coated 81 milliGRAM(s) Oral daily  buDESOnide 160 MICROgram(s)/formoterol 4.5 MICROgram(s) Inhaler 2 Puff(s) Inhalation two times a day  clopidogrel Tablet 75 milliGRAM(s) Oral daily  DULoxetine 20 milliGRAM(s) Oral two times a day  furosemide    Tablet 40 milliGRAM(s) Oral daily  gabapentin 100 milliGRAM(s) Oral daily  heparin  Injectable 5000 Unit(s) SubCutaneous every 8 hours  lisinopril 10 milliGRAM(s) Oral daily  mirtazapine 30 milliGRAM(s) Oral at bedtime  PARoxetine 20 milliGRAM(s) Oral daily  simvastatin 20 milliGRAM(s) Oral at bedtime  tamsulosin 0.4 milliGRAM(s) Oral at bedtime    MEDICATIONS  (PRN):  ALBUTerol/ipratropium for Nebulization 3 milliLiter(s) Nebulizer every 6 hours PRN Shortness of Breath and/or Wheezing    Allergies  No Known Allergies    LABS:                11.3   5.0   )-----------( 171      ( 2018 07:53 )             35.4     139  |  105  |  38<H>  ----------------------------<  88  4.6   |  22  |  1.73<H>  Ca    8.7      2018 07:53  Phos  4.0       Mg     2.0       TPro  6.4  /  Alb  4.0  /  TBili  0.3  /  DBili  x   /  AST  13  /  ALT  8<L>  /  AlkPhos  56      Urinalysis Basic - ( 2018 22:21 )  Color: Yellow / Appearance: Clear / S.020 / pH: x  Gluc: x / Ketone: 15 mg/dL  / Bili: Small / Urobili: 0.2 E.U./dL   Blood: x / Protein: NEGATIVE mg/dL / Nitrite: NEGATIVE   Leuk Esterase: NEGATIVE / RBC: < 5 /HPF / WBC < 5 /HPF   Sq Epi: x / Non Sq Epi: 0-5 /HPF / Bacteria: Present /HPF    RADIOLOGY & ADDITIONAL TESTS: Reviewed.

## 2018-11-29 NOTE — DIETITIAN INITIAL EVALUATION ADULT. - ENERGY NEEDS
Ht: 175.3cm, Wt: 62.3kg, IBW: 72.7kg, 86%IBW.   Needs calculated based on [facility] standards of care. Needs adjusted for age

## 2018-11-29 NOTE — PROGRESS NOTE ADULT - PROBLEM SELECTOR PLAN 2
IMPROVING. Endorsing 3 weeks of watery, non-bloody, yellowish diarrhea. Dehydrated on admission likely 2/2 volume loss with diarrhea and decreased PO intake. No sick contacts, recent travel, f/c, recent antibiotic use or hospitalizations. DDx including malabsorptive diarrhea given hemicolectomy vs infectious, although less likely given no leukocytosis or systemic signs of infection.   - f/u stool osmolar gap (stool Na, K, osmolality), stool cxs, GI PCR  - continue to encourage PO intake  - GI consulted, awaiting recs from Dr. Hammer

## 2018-11-29 NOTE — PROGRESS NOTE ADULT - PROBLEM SELECTOR PLAN 8
-Continue with home Paroxetine 20  -Continue with home Mirtazapine 30mg qhs (for appetite stimulation)  -Continue with home Duloxetine 20mg BID F: D5 1/2 NS at 100cc/hr x 12hrs  E: replete K<4, Mg<2  N: Dash/TLC    VTE Prophylaxis: HSQ 5000 q8.  C: Full Code  D: JUAN

## 2018-11-29 NOTE — PROGRESS NOTE ADULT - PROBLEM SELECTOR PLAN 7
Chronic dyspnea which has been worked-up extensively at [facility] and outpt by Dr. Gutiérrez (pulm). Was seen by Dr. Baptiste in ED (covering for Dr. Gutiérrez) who reviewed outpatient PFTs which were at pts baseline. Pt denies cough, wheezing, sputum production. Sat well on room air.  - c/w Symbicort 160/4.5 2 puffs daily (therapeutic interchange for Advair)  - f/u pulm recs -Continue with home Paroxetine 20  -Continue with home Mirtazapine 30mg qhs (for appetite stimulation)  -Continue with home Duloxetine 20mg BID

## 2018-11-29 NOTE — PROGRESS NOTE ADULT - PROBLEM SELECTOR PLAN 9
F: D5 1/2 NS at 100cc/hr x 12hrs  E: replete K<4, Mg<2  N: Dash/TLC    VTE Prophylaxis: HSQ 5000 q8.  C: Full Code  D: JUAN 1) PCP Contacted on Admission: (Y/N) --> Name & Phone #: Dr. Montalvo  2) Date of Contact with PCP: 11/27/18  3) PCP Contacted at Discharge: (Y/N)  4) Summary of Handoff Given to PCP:   5) Post-Discharge Appointment Date and Location:

## 2018-11-29 NOTE — DIETITIAN INITIAL EVALUATION ADULT. - FACTORS AFF FOOD INTAKE
Pt reports some difficulty chewing 2/2 poor upper dentition (pt only wear lower full dentures), requesting change diet to soft. Denies difficulty swallowing/none

## 2018-11-29 NOTE — CONSULT NOTE ADULT - SUBJECTIVE AND OBJECTIVE BOX
REASON FOR CONSULT:    HISTORY OF PRESENT ILLNESS:  88M with PMH of HTN, CAD s/p PCI (DAYANA to mRCA, pRCA, mCx 2013 with Dr. 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 Headley with pathology showing Tubulovillous adenoma who is now presenting from Dr. Montalvo's office for urinary retention in setting of 3 weeks of diarrhea. Patient went to Dr. Montalvo's office for routine visit, when he mentioned 3 weeks of watery, yellowish-colored, NB diarrhea associated with mild cramping, approximately 2-3 episodes per day. Patient also endorsed inability to urinate since 2pm (presumed 2/2 dehydration from diarrhea) which prompted Dr. Montalvo to send patient to ED. Patient denies sick contacts, recent travel, abnormal food intake from baseline. No recent hospitalizations or antibiotic use. Patient has BPH but never had urinary retention to this extent in the past. He was urinating normal up to today; endorsing poor PO intake for the last few weeks since the diarrhea started. Drinks approx one small water bottle per day and 2 glasses red wine. Of note, patient has chronic SOB which has been worked-up extensively at [facility] and outpt by Dr. Gutiérrez (pulm). His SOB is at baseline, denies new cough, chest pain, palpitations, orthopnea, PND, edema or decreased exercise tolerance. ROS negative for fevers, chills, abd pain, nausea, vomiting.     In the ED, vitals were T 97.9F, HR 61, /65, RR 20, SpO2 95% room air. Labs notable for Hgb 11.9 (baseline prior 9-10), K 5.6, CO2 20, AG 18, BUN/Cr 57/2.41 (baseline 1 as of 04/2017) , Trop 0.04, . UA negative for LE, nitrite but positive for ketones. EKG with NSR w PAC and PVC, LAD, no ST-T wave abnormalities. CXR unremarkable. Bladder scan performed showing 30cc urine in bladder. Patient given 2L NS.    PAST MEDICAL & SURGICAL HISTORY:  BPH (benign prostatic hyperplasia)  CAD (coronary artery disease)  HLD (hyperlipidemia)  H/O right hemicolectomy  S/P cataract extraction      [ ] Diabetes   [ ] Hypertension  [ ] Hyperlipidemia  [ ] CAD  [ ] PCI  [ ] CABG    PREVIOUS DIAGNOSTIC TESTING:    [ ] Echocardiogram:  [ ]  Catheterization:  [ ] Stress Test:  	    MEDICATIONS:  furosemide    Tablet 40 milliGRAM(s) Oral daily  lisinopril 10 milliGRAM(s) Oral daily  tamsulosin 0.4 milliGRAM(s) Oral at bedtime      ALBUTerol/ipratropium for Nebulization 3 milliLiter(s) Nebulizer every 6 hours PRN  buDESOnide 160 MICROgram(s)/formoterol 4.5 MICROgram(s) Inhaler 2 Puff(s) Inhalation two times a day    DULoxetine 20 milliGRAM(s) Oral two times a day  gabapentin 100 milliGRAM(s) Oral daily  mirtazapine 30 milliGRAM(s) Oral at bedtime  PARoxetine 20 milliGRAM(s) Oral daily      simvastatin 20 milliGRAM(s) Oral at bedtime    ammonium lactate 12% Lotion 1 Application(s) Topical two times a day  aspirin enteric coated 81 milliGRAM(s) Oral daily  clopidogrel Tablet 75 milliGRAM(s) Oral daily  heparin  Injectable 5000 Unit(s) SubCutaneous every 8 hours      FAMILY HISTORY:  No family history of cardiovascular disease (Father, Mother)      SOCIAL HISTORY:    [ x] Non-smoker  [ ] Smoker  [ ] Alcohol    FAMILY HX: NC    Allergies    No Known Allergies    Intolerances    	    REVIEW OF SYSTEMS:    [x] as per HPI  CONSTITUTIONAL: No fever, weight loss, or fatigue  ENT:  No difficulty hearing, tinnitus, vertigo; No sinus or throat pain  RESPIRATORY: No cough, wheezing, chills or hemoptysis; No Shortness of Breath  CARDIOVASCULAR: No chest pain, palpitations, dizziness, or leg swelling  GASTROINTESTINAL: No abdominal or epigastric pain. No nausea, vomiting, or hematemesis; No diarrhea or constipation. No melena or hematochezia.  GENITOURINARY: No dysuria, frequency, hematuria, or incontinence  NEUROLOGICAL: No headaches, memory loss, loss of strength, numbness, or tremors  MUSCULOSKELETAL: No joint pain or swelling; No muscle, back, or extremity pain  [x] All others negative	  [ ] Unable to obtain    PHYSICAL EXAM:  T(C): 36.4 (11-29-18 @ 10:55), Max: 36.8 (11-28-18 @ 21:17)  HR: 63 (11-29-18 @ 10:55) (60 - 64)  BP: 121/61 (11-29-18 @ 10:55) (121/61 - 136/67)  RR: 16 (11-29-18 @ 10:55) (15 - 16)  SpO2: 94% (11-29-18 @ 10:55) (94% - 97%)  Wt(kg): --  I&O's Summary      Appearance: Normal	  HEENT:   Normal oral mucosa, PERRL, EOMI	  Lymphatic: No lymphadenopathy  Cardiovascular: Normal S1 S2, No JVD, No murmurs, No edema  Respiratory: Lungs clear to auscultation	  Psychiatry: A & O x 3, Mood & affect appropriate  Gastrointestinal:  Soft, Non-tender, + BS	  Skin: No rashes, No ecchymoses, No cyanosis	  Neurologic: Non-focal  Extremities: Normal range of motion, No clubbing, cyanosis or edema  Vascular: Peripheral pulses palpable 2+ bilaterally    TELEMETRY: 	    ECG:  < from: 12 Lead ECG (11.28.18 @ 17:18) >  Diagnosis Line Sinus rhythm with 1st degree AV block  Left axis deviation  Inferior infarct , age undetermined  Possible Anterior infarct , age undetermined    < end of copied text >    ECHO: < from: Echocardiogram (04.24.17 @ 11:17) >  Left ventricular hypertrophy presentThe left ventricular ejection   fraction is   estimated to be 50-55%Probably normal right ventricular size and   function.The   left atrial size is normal.Calcified aortic valve.There is mildaortic   regurgitation.No hemodynamically significant valvular aortic   stenosis.There is   trace to mild mitral regurgitation.There is trace to mild tricuspid   regurgitation.There was insufficient TR detected from which to calculate   pulmonary artery systolic pressure.  There is mild pulmonic   regurgitation.The   inferior vena cava is normal in size (<2.1 cm) with normal inspiratory   collapse (>50%) consistent with normal right atrial pressure.    < end of copied text >    STRESS:  CATH:  	  RADIOLOGY:  CXR: < from: Xray Chest 1 View-PORTABLE IMMEDIATE (11.27.18 @ 21:11) >  IMPRESSION: Frontal view of the chest is compared to 4/22/2017 and   demonstrates low lung volumes. Persistent small layering left pleural   effusion. Improved aeration of the left lower lobe since prior study. No   consolidation.      < end of copied text >    CT:  US:   	  	  LABS:	 	    CARDIAC MARKERS:                                  11.3   5.0   )-----------( 171      ( 29 Nov 2018 07:53 )             35.4     11-29    139  |  105  |  38<H>  ----------------------------<  88  4.6   |  22  |  1.73<H>    Ca    8.7      29 Nov 2018 07:53  Phos  4.0     11-27  Mg     2.0     11-29    TPro  6.4  /  Alb  4.0  /  TBili  0.3  /  DBili  x   /  AST  13  /  ALT  8<L>  /  AlkPhos  56  11-27    proBNP:   Lipid Profile:   HgA1c:   TSH:     ASSESSMENT/PLAN: 	    Problem: SOB (shortness of breath).   - Does not appear to be overloaded by PE, lungs CTAB, no JVD, O2Sat stable when supine  - Echo  Normal left ventricular size and wall thickness. The left ventricular wall motion is normal, no HD valvular dz, no effusion  - CXR reveals hyperinflation c/w emphysema, no congestion or infiltrate  - continue nebs/inhalers as ordered       Problem: CAD (coronary artery disease).  Plan: - s/p DAYANA x3 to LCx 2013   EF normal wall motion, no chest pain, EKG unchanged.   - c/w home ASA 81mg daily, Plavix 75mg daily, Lisinopril 10mg daily, simvastatin 20qhs  #Elevated troponin- trop 0.04 on admission. No chest pain, palpitations, at baseline SOB. Denies decreased exercise tolerance, PND, orthopnea, edema. EKG unchanged from previous, no ischemic changes. Likely type II MI in setting of dehydration.   - no further need to trend trops        Problem: Bradycardia.  Plan: - HR in 40s-60s, no BB.       Problem: Hyperlipidemia.  Plan: - Continue Simvastatin 20mg PO daily.

## 2018-11-29 NOTE — DIETITIAN INITIAL EVALUATION ADULT. - PROBLEM SELECTOR PLAN 7
Chronic dyspnea which has been worked-up extensively at [facility] and outpt by Dr. Gutiérrez (pulm). Was seen by Dr. Baptiste in ED (covering for Dr. Gutiérrez) who reviewed outpatient PFTs which were at pts baseline. Pt denies cough, wheezing, sputum production. Sat well on room air.  - c/w Symbicort 160/4.5 2 puffs daily (therapeutic interchange for Advair)  - f/u pulm recs

## 2018-11-29 NOTE — PROGRESS NOTE ADULT - PROBLEM SELECTOR PLAN 4
Hx of CAD s/p PCI with DAYANA to mRCA, pRCA, mCx 2013 with Dr. Varela. Was planned for dx cath early 2017 for evaluation of dyspnea, however was deferred d/t diagnosed colon mass requiring surgery. Has no outpatient cardiology f/u.  - c/w home ASA 81mg daily, Plavix 75mg daily, Lisinopril 10mg daily, simvastatin 20qhs    #Elevated troponin- trop 0.04 on admission. No chest pain, palpitations, at baseline SOB. Denies decreased exercise tolerance, PND, orthopnea, edema. EKG unchanged from previous, no ischemic changes. Likely type II MI in setting of dehydration.   - trop trended to peak Continue home Flomax 0.4mg qhs

## 2018-11-29 NOTE — PROGRESS NOTE ADULT - PROBLEM SELECTOR PLAN 1
Presenting with inability to urinate w/ Cr 2.41 (baseline 1 as of 04/2017). FeNa c/w pre-renal etiology. Bladder scan in ED showing only 30cc urine in bladder. Likely pre-renal DEBBIE d/t dehydration from prolonged diarrhea and decreased PO intake.   - patient able to voiding after 2L IVF in ED and continued fluids for 12hours on RMF  - tolerating PO intake this afternoon  - retroperitoneal US with unremarkable exam of kidneys  - avoid nephrotoxic drugs, renally dose meds  - Cr downtrending, continue to monitor Presenting with inability to urinate w/ Cr 2.41 (baseline 1 as of 04/2017). FeNa c/w pre-renal etiology. Bladder scan in ED showing only 30cc urine in bladder. Likely pre-renal DEBBIE d/t dehydration from prolonged diarrhea and decreased PO intake.   - patient able to void after 2L IVF in ED and continued fluids for 12hours on RMF  - tolerating PO intake  - retroperitoneal US with unremarkable exam of kidneys  - avoid nephrotoxic drugs, renally dose meds  - Cr downtrending, continue to monitor

## 2018-11-29 NOTE — DIETITIAN INITIAL EVALUATION ADULT. - NS AS NUTRI INTERV MEALS SNACK
Recommend add soft to DASH/TLC diet order. Encouraged PO intake, encouraged consumption of protein rich, energy dense foods.

## 2018-11-29 NOTE — PROGRESS NOTE ADULT - PROBLEM SELECTOR PLAN 2
Endorsing 3 weeks of watery, non-bloody, yellowish diarrhea. Dehydrated on admission likely 2/2 volume loss with diarrhea and decreased PO intake. No sick contacts, recent travel, f/c, recent antibiotic use or hospitalizations. DDx including malabsorptive diarrhea given hemicolectomy vs infectious, although less likely given no leukocytosis or systemic signs of infection.   - f/u stool osmolar gap (stool Na, K, osmolality), stool cxs, GI PCR  - continue to encourage PO intake  - consider GI consult IMPROVING. Endorsing 3 weeks of watery, non-bloody, yellowish diarrhea. Dehydrated on admission likely 2/2 volume loss with diarrhea and decreased PO intake. No sick contacts, recent travel, f/c, recent antibiotic use or hospitalizations. DDx including malabsorptive diarrhea given hemicolectomy vs infectious, although less likely given no leukocytosis or systemic signs of infection.   - f/u stool osmolar gap (stool Na, K, osmolality), stool cxs, GI PCR  - continue to encourage PO intake  - GI consulted, awaiting recs from Dr. Hammer

## 2018-11-29 NOTE — PROGRESS NOTE ADULT - PROBLEM SELECTOR PLAN 1
Presenting with inability to urinate w/ Cr 2.41 (baseline 1 as of 04/2017). FeNa c/w pre-renal etiology. Bladder scan in ED showing only 30cc urine in bladder. Likely pre-renal DEBBIE d/t dehydration from prolonged diarrhea and decreased PO intake.   - patient able to void after 2L IVF in ED and continued fluids for 12hours on RMF  - tolerating PO intake  - retroperitoneal US with unremarkable exam of kidneys  - avoid nephrotoxic drugs, renally dose meds  - Cr downtrending, continue to monitor

## 2018-11-29 NOTE — PROGRESS NOTE ADULT - SUBJECTIVE AND OBJECTIVE BOX
OVERNIGHT EVENTS: RONN.     SUBJECTIVE / INTERVAL HPI: Patient seen and examined at bedside. Patient states he feels well and feels more energetic today. He states he slept well overnight with no episodes of diarrhea since yesterday. He states that he has been making lots of urine with no dysuria. Patient denies n/v, abdominal pain, melena, hematochezia, SOB, CP.    VITAL SIGNS:  Vital Signs Last 24 Hrs  T(C): 36.7 (2018 05:38), Max: 37 (2018 10:32)  T(F): 98 (2018 05:38), Max: 98.6 (2018 10:32)  HR: 60 (2018 05:38) (60 - 70)  BP: 136/67 (2018 05:38) (113/61 - 136/67)  BP(mean): --  RR: 15 (2018 05:38) (15 - 18)  SpO2: 97% (2018 05:38) (95% - 97%)    PHYSICAL EXAM:  General: NAD  HEENT: NC/AT, anicteric sclera; MMM  Neck: supple  Cardiovascular: +S1/S2, RRR  Respiratory: CTA B/L, breath sounds slightly diminished on the R, no W/R/R, tachypneic  Gastrointestinal: soft, NT/ND; +BSx4  Extremities: WWP; no edema, clubbing or cyanosis  Vascular: 2+ radial, DP/PT pulses B/L  Derm: dry scaling skin on palms and shins improved since yesterday  Neurological: AAOx3, no focal deficits    MEDICATIONS  (STANDING):  ammonium lactate 12% Lotion 1 Application(s) Topical two times a day  aspirin enteric coated 81 milliGRAM(s) Oral daily  buDESOnide 160 MICROgram(s)/formoterol 4.5 MICROgram(s) Inhaler 2 Puff(s) Inhalation two times a day  clopidogrel Tablet 75 milliGRAM(s) Oral daily  DULoxetine 20 milliGRAM(s) Oral two times a day  furosemide    Tablet 40 milliGRAM(s) Oral daily  gabapentin 100 milliGRAM(s) Oral daily  heparin  Injectable 5000 Unit(s) SubCutaneous every 8 hours  lisinopril 10 milliGRAM(s) Oral daily  mirtazapine 30 milliGRAM(s) Oral at bedtime  PARoxetine 20 milliGRAM(s) Oral daily  simvastatin 20 milliGRAM(s) Oral at bedtime  tamsulosin 0.4 milliGRAM(s) Oral at bedtime    MEDICATIONS  (PRN):  ALBUTerol/ipratropium for Nebulization 3 milliLiter(s) Nebulizer every 6 hours PRN Shortness of Breath and/or Wheezing    Allergies  No Known Allergies    LABS:                11.3   5.0   )-----------( 171      ( 2018 07:53 )             35.4     139  |  105  |  38<H>  ----------------------------<  88  4.6   |  22  |  1.73<H>  Ca    8.7      2018 07:53  Phos  4.0       Mg     2.0       TPro  6.4  /  Alb  4.0  /  TBili  0.3  /  DBili  x   /  AST  13  /  ALT  8<L>  /  AlkPhos  56      Urinalysis Basic - ( 2018 22:21 )  Color: Yellow / Appearance: Clear / S.020 / pH: x  Gluc: x / Ketone: 15 mg/dL  / Bili: Small / Urobili: 0.2 E.U./dL   Blood: x / Protein: NEGATIVE mg/dL / Nitrite: NEGATIVE   Leuk Esterase: NEGATIVE / RBC: < 5 /HPF / WBC < 5 /HPF   Sq Epi: x / Non Sq Epi: 0-5 /HPF / Bacteria: Present /HPF    RADIOLOGY & ADDITIONAL TESTS: Reviewed.

## 2018-11-29 NOTE — PROGRESS NOTE ADULT - PROBLEM SELECTOR PLAN 3
Hx of CAD s/p PCI with DAYANA to mRCA, pRCA, mCx 2013 with Dr. Varela. Was planned for dx cath early 2017 for evaluation of dyspnea, however was deferred d/t diagnosed colon mass requiring surgery. Has no outpatient cardiology f/u.  - c/w home ASA 81mg daily, Plavix 75mg daily, Lisinopril 10mg daily, simvastatin 20qhs

## 2018-11-29 NOTE — PROGRESS NOTE ADULT - PROBLEM SELECTOR PLAN 10
1) PCP Contacted on Admission: (Y/N) --> Name & Phone #: Dr. Montalvo  2) Date of Contact with PCP: 11/27/18  3) PCP Contacted at Discharge: (Y/N)  4) Summary of Handoff Given to PCP:   5) Post-Discharge Appointment Date and Location:
1) PCP Contacted on Admission: (Y/N) --> Name & Phone #: Dr. Montalvo  2) Date of Contact with PCP: 11/27/18  3) PCP Contacted at Discharge: (Y/N)  4) Summary of Handoff Given to PCP:   5) Post-Discharge Appointment Date and Location:

## 2018-11-29 NOTE — PROGRESS NOTE ADULT - PROBLEM SELECTOR PLAN 6
Chronic dyspnea which has been worked-up extensively at [facility] and outpt by Dr. Gutiérrez (pulm). Was seen by Dr. Baptiste in ED (covering for Dr. Gutiérrez) who reviewed outpatient PFTs which were at pts baseline. Pt denies cough, wheezing, sputum production. Sat well on room air.  - c/w Symbicort 160/4.5 2 puffs daily (therapeutic interchange for Advair)  - f/u pulm recs

## 2018-11-30 ENCOUNTER — TRANSCRIPTION ENCOUNTER (OUTPATIENT)
Age: 83
End: 2018-11-30

## 2018-11-30 ENCOUNTER — RESULT REVIEW (OUTPATIENT)
Age: 83
End: 2018-11-30

## 2018-11-30 LAB
ANION GAP SERPL CALC-SCNC: 14 MMOL/L — SIGNIFICANT CHANGE UP (ref 5–17)
BUN SERPL-MCNC: 33 MG/DL — HIGH (ref 7–23)
CALCIUM SERPL-MCNC: 8.8 MG/DL — SIGNIFICANT CHANGE UP (ref 8.4–10.5)
CHLORIDE SERPL-SCNC: 101 MMOL/L — SIGNIFICANT CHANGE UP (ref 96–108)
CO2 SERPL-SCNC: 24 MMOL/L — SIGNIFICANT CHANGE UP (ref 22–31)
CREAT SERPL-MCNC: 1.85 MG/DL — HIGH (ref 0.5–1.3)
CULTURE RESULTS: SIGNIFICANT CHANGE UP
GLUCOSE BLDC GLUCOMTR-MCNC: 85 MG/DL — SIGNIFICANT CHANGE UP (ref 70–99)
GLUCOSE SERPL-MCNC: 97 MG/DL — SIGNIFICANT CHANGE UP (ref 70–99)
HCT VFR BLD CALC: 33.5 % — LOW (ref 39–50)
HGB BLD-MCNC: 10.7 G/DL — LOW (ref 13–17)
MAGNESIUM SERPL-MCNC: 1.8 MG/DL — SIGNIFICANT CHANGE UP (ref 1.6–2.6)
MCHC RBC-ENTMCNC: 31 PG — SIGNIFICANT CHANGE UP (ref 27–34)
MCHC RBC-ENTMCNC: 31.9 G/DL — LOW (ref 32–36)
MCV RBC AUTO: 97.1 FL — SIGNIFICANT CHANGE UP (ref 80–100)
PLATELET # BLD AUTO: 186 K/UL — SIGNIFICANT CHANGE UP (ref 150–400)
POTASSIUM SERPL-MCNC: 4.8 MMOL/L — SIGNIFICANT CHANGE UP (ref 3.5–5.3)
POTASSIUM SERPL-SCNC: 4.8 MMOL/L — SIGNIFICANT CHANGE UP (ref 3.5–5.3)
RBC # BLD: 3.45 M/UL — LOW (ref 4.2–5.8)
RBC # FLD: 13.2 % — SIGNIFICANT CHANGE UP (ref 10.3–16.9)
SODIUM SERPL-SCNC: 139 MMOL/L — SIGNIFICANT CHANGE UP (ref 135–145)
SPECIMEN SOURCE: SIGNIFICANT CHANGE UP
WBC # BLD: 5.8 K/UL — SIGNIFICANT CHANGE UP (ref 3.8–10.5)
WBC # FLD AUTO: 5.8 K/UL — SIGNIFICANT CHANGE UP (ref 3.8–10.5)

## 2018-11-30 RX ORDER — FUROSEMIDE 40 MG
1 TABLET ORAL
Qty: 0 | Refills: 0 | DISCHARGE
Start: 2018-11-30

## 2018-11-30 RX ADMIN — Medication 2000 MILLILITER(S): at 06:45

## 2018-11-30 RX ADMIN — MIRTAZAPINE 30 MILLIGRAM(S): 45 TABLET, ORALLY DISINTEGRATING ORAL at 21:48

## 2018-11-30 RX ADMIN — Medication 40 MILLIGRAM(S): at 06:44

## 2018-11-30 RX ADMIN — HEPARIN SODIUM 5000 UNIT(S): 5000 INJECTION INTRAVENOUS; SUBCUTANEOUS at 21:48

## 2018-11-30 RX ADMIN — LISINOPRIL 10 MILLIGRAM(S): 2.5 TABLET ORAL at 06:44

## 2018-11-30 RX ADMIN — TAMSULOSIN HYDROCHLORIDE 0.4 MILLIGRAM(S): 0.4 CAPSULE ORAL at 21:48

## 2018-11-30 RX ADMIN — SIMVASTATIN 20 MILLIGRAM(S): 20 TABLET, FILM COATED ORAL at 21:48

## 2018-11-30 RX ADMIN — GABAPENTIN 100 MILLIGRAM(S): 400 CAPSULE ORAL at 18:17

## 2018-11-30 RX ADMIN — BUDESONIDE AND FORMOTEROL FUMARATE DIHYDRATE 2 PUFF(S): 160; 4.5 AEROSOL RESPIRATORY (INHALATION) at 21:48

## 2018-11-30 RX ADMIN — DULOXETINE HYDROCHLORIDE 20 MILLIGRAM(S): 30 CAPSULE, DELAYED RELEASE ORAL at 18:17

## 2018-11-30 RX ADMIN — Medication 1 APPLICATION(S): at 18:18

## 2018-11-30 RX ADMIN — CLOPIDOGREL BISULFATE 75 MILLIGRAM(S): 75 TABLET, FILM COATED ORAL at 18:17

## 2018-11-30 RX ADMIN — DULOXETINE HYDROCHLORIDE 20 MILLIGRAM(S): 30 CAPSULE, DELAYED RELEASE ORAL at 06:44

## 2018-11-30 RX ADMIN — BUDESONIDE AND FORMOTEROL FUMARATE DIHYDRATE 2 PUFF(S): 160; 4.5 AEROSOL RESPIRATORY (INHALATION) at 09:46

## 2018-11-30 RX ADMIN — Medication 20 MILLIGRAM(S): at 18:17

## 2018-11-30 RX ADMIN — Medication 81 MILLIGRAM(S): at 18:17

## 2018-11-30 RX ADMIN — Medication 1 APPLICATION(S): at 06:44

## 2018-11-30 NOTE — DISCHARGE NOTE ADULT - HOSPITAL COURSE
88M with PMH of HTN, CAD s/p PCI (DAYANA to mRCA, pRCA, mCx 2013 with Dr. 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 Headley with pathology showing Tubulovillous adenoma presented from Dr. Montalvo's office for urinary retention in setting of 3 weeks of diarrhea. Patient had 3 weeks of watery, NB diarrhea associated with mild cramping, approximately 2-3 episodes per day. Patient also endorsed inability to urinate since earlier in the day. Patient has BPH but never had urinary retention to this extent in the past. Patient was found to have new DEBBIE. Patient was started on continuous IVF and after some time, diarrhea eventually subsided, patient made good urine, DEBBIE improved and overall condition improved. Source of diarrhea unclear. Stool samples were negative for viral and bacterial sources of diarrhea. Dr. Hammer performed a colonoscopy which showed abnormal mucosa in the sigmoid and rectum with congestion and loss of vascularity and multiple biopsies were taken. Patient will follow up in the outpatient setting with Dr. Montalvo. Of note, patient has chronic SOB which has been worked-up extensively at [facility] and outpt by Dr. Gutiérrez ([facility]).

## 2018-11-30 NOTE — DISCHARGE NOTE ADULT - CARE PLAN
Principal Discharge DX:	Diarrhea  Secondary Diagnosis:	Acute kidney injury Principal Discharge DX:	Diarrhea  Goal:	Stay hydrated and follow up with doctor  Assessment and plan of treatment:	You came into the hospital because you were having diarrhea for several days. When you arrived you were very dehydrated. We gave you fluids through IV and after some time your condition improved and you began to feel better. You had a colonoscopy done which you tolerated well. Some biopsies were taken  Secondary Diagnosis:	Acute kidney injury  Secondary Diagnosis:	Shortness of breath Principal Discharge DX:	Diarrhea  Goal:	Stay hydrated and follow up with doctor  Assessment and plan of treatment:	You came into the hospital because you were having diarrhea for several days. When you arrived you were very dehydrated. We gave you fluids through IV and after some time your condition improved and you began to feel better. You had a colonoscopy done to look for a source of your diarrhea, which you tolerated well. Some biopsies were taken. Dr. Montalvo will let you know the results of the biopsies. Please contact his office to make an appointment for follow up after you leave the hospital. If your diarrhea returns, please call Dr. montalvo for further management.  Secondary Diagnosis:	Acute kidney injury  Goal:	Stay hydrated  Assessment and plan of treatment:	Because you were losing a lot of fluid by your diarrhea and not eating or drinking very much, your kidneys did not receive enough fluid to function properly and became injured as we could tell by your labs. After you had IV fluid given to you and you began to eat better, your kidney function improved. Please continue to stay well hydrated.  Secondary Diagnosis:	Shortness of breath  Assessment and plan of treatment:	You have a known history of shortness of breath which you seen a lung specialist for in the past. Please continue to take the inhalers prescribed to you.

## 2018-11-30 NOTE — DISCHARGE NOTE ADULT - CARE PROVIDERS DIRECT ADDRESSES
,DirectAddress_Unknown ,DirectAddress_Unknown,[email or web address]

## 2018-11-30 NOTE — DISCHARGE NOTE ADULT - PLAN OF CARE
Stay hydrated and follow up with doctor You came into the hospital because you were having diarrhea for several days. When you arrived you were very dehydrated. We gave you fluids through IV and after some time your condition improved and you began to feel better. You had a colonoscopy done which you tolerated well. Some biopsies were taken You came into the hospital because you were having diarrhea for several days. When you arrived you were very dehydrated. We gave you fluids through IV and after some time your condition improved and you began to feel better. You had a colonoscopy done to look for a source of your diarrhea, which you tolerated well. Some biopsies were taken. Dr. Montalvo will let you know the results of the biopsies. Please contact his office to make an appointment for follow up after you leave the hospital. If your diarrhea returns, please call Dr. montalvo for further management. Stay hydrated Because you were losing a lot of fluid by your diarrhea and not eating or drinking very much, your kidneys did not receive enough fluid to function properly and became injured as we could tell by your labs. After you had IV fluid given to you and you began to eat better, your kidney function improved. Please continue to stay well hydrated. You have a known history of shortness of breath which you seen a lung specialist for in the past. Please continue to take the inhalers prescribed to you.

## 2018-11-30 NOTE — PROGRESS NOTE ADULT - SUBJECTIVE AND OBJECTIVE BOX
OVERNIGHT EVENTS: RONN.     SUBJECTIVE / INTERVAL HPI: GI improved, taking PO    VITAL SIGNS:  Vital Signs Last 24 Hrs  T(C): 36.7 (2018 05:38), Max: 37 (2018 10:32)  T(F): 98 (2018 05:38), Max: 98.6 (2018 10:32)  HR: 60 (2018 05:38) (60 - 70)  BP: 136/67 (2018 05:38) (113/61 - 136/67)  BP(mean): --  RR: 15 (2018 05:38) (15 - 18)  SpO2: 97% (2018 05:38) (95% - 97%)    PHYSICAL EXAM:  General: NAD  HEENT: NC/AT, anicteric sclera; MMM  Neck: supple  Cardiovascular: +S1/S2, RRR  Respiratory: CTA B/L, breath sounds slightly diminished on the R, no W/R/R, tachypneic  Gastrointestinal: soft, NT/ND; +BSx4  Extremities: WWP; no edema, clubbing or cyanosis  Vascular: 2+ radial, DP/PT pulses B/L  Derm: dry scaling skin on palms and shins improved since yesterday  Neurological: AAOx3, no focal deficits    MEDICATIONS  (STANDING):  ammonium lactate 12% Lotion 1 Application(s) Topical two times a day  aspirin enteric coated 81 milliGRAM(s) Oral daily  buDESOnide 160 MICROgram(s)/formoterol 4.5 MICROgram(s) Inhaler 2 Puff(s) Inhalation two times a day  clopidogrel Tablet 75 milliGRAM(s) Oral daily  DULoxetine 20 milliGRAM(s) Oral two times a day  furosemide    Tablet 40 milliGRAM(s) Oral daily  gabapentin 100 milliGRAM(s) Oral daily  heparin  Injectable 5000 Unit(s) SubCutaneous every 8 hours  lisinopril 10 milliGRAM(s) Oral daily  mirtazapine 30 milliGRAM(s) Oral at bedtime  PARoxetine 20 milliGRAM(s) Oral daily  simvastatin 20 milliGRAM(s) Oral at bedtime  tamsulosin 0.4 milliGRAM(s) Oral at bedtime    MEDICATIONS  (PRN):  ALBUTerol/ipratropium for Nebulization 3 milliLiter(s) Nebulizer every 6 hours PRN Shortness of Breath and/or Wheezing    Allergies  No Known Allergies    LABS:                11.3   5.0   )-----------( 171      ( 2018 07:53 )             35.4     139  |  105  |  38<H>  ----------------------------<  88  4.6   |  22  |  1.73<H>  Ca    8.7      2018 07:53  Phos  4.0       Mg     2.0       TPro  6.4  /  Alb  4.0  /  TBili  0.3  /  DBili  x   /  AST  13  /  ALT  8<L>  /  AlkPhos  56      Urinalysis Basic - ( 2018 22:21 )  Color: Yellow / Appearance: Clear / S.020 / pH: x  Gluc: x / Ketone: 15 mg/dL  / Bili: Small / Urobili: 0.2 E.U./dL   Blood: x / Protein: NEGATIVE mg/dL / Nitrite: NEGATIVE   Leuk Esterase: NEGATIVE / RBC: < 5 /HPF / WBC < 5 /HPF   Sq Epi: x / Non Sq Epi: 0-5 /HPF / Bacteria: Present /HPF    RADIOLOGY & ADDITIONAL TESTS: Reviewed.

## 2018-11-30 NOTE — DISCHARGE NOTE ADULT - NSTOBACCOHOTLINE_GEN_A_NCS
Detail Level: Detailed
[facility] Smokers Quitline ([id number])

## 2018-11-30 NOTE — DISCHARGE NOTE ADULT - MEDICATION SUMMARY - MEDICATIONS TO TAKE
I will START or STAY ON the medications listed below when I get home from the hospital:    Aspirin Enteric Coated 81 mg oral delayed release tablet  -- 1 tab(s) by mouth once a day  -- Indication: For CAD (coronary artery disease)    lisinopril 10 mg oral tablet  -- 1 tab(s) by mouth once a day  -- Indication: For Hypertension    Flomax 0.4 mg oral capsule  -- 1 cap(s) by mouth once a day  -- It is very important that you take or use this exactly as directed.  Do not skip doses or discontinue unless directed by your doctor.  May cause drowsiness.  Alcohol may intensify this effect.  Use care when operating dangerous machinery.  Some non-prescription drugs may aggravate your condition.  Read all labels carefully.  If a warning appears, check with your doctor before taking.  Swallow whole.  Do not crush.  Take with food or milk.    -- Indication: For BPH (benign prostatic hyperplasia)    gabapentin 100 mg oral capsule  --  by mouth once a day  -- Indication: For Pain    DULoxetine 20 mg oral delayed release capsule  -- 1 cap(s) by mouth 2 times a day  -- Indication: For Depression    mirtazapine 15 mg oral tablet  -- 2 tab(s) by mouth once a day (at bedtime)  -- Indication: For Depression    PARoxetine 20 mg oral tablet  -- 1 tab(s) by mouth once a day  -- Indication: For Depression    simvastatin 20 mg oral tablet  -- 1 tab(s) by mouth once a day (at bedtime)  -- Indication: For CAD (coronary artery disease)    clopidogrel 75 mg oral tablet  -- 1 tab(s) by mouth once a day  -- Indication: For CAD (coronary artery disease)    Advair Diskus 250 mcg-50 mcg inhalation powder  -- 1 puff(s) inhaled once a day  -- Indication: For Shortness of breath    furosemide 40 mg oral tablet  -- 1 tab(s) by mouth once a day  -- Indication: For Diuretic

## 2018-11-30 NOTE — DISCHARGE NOTE ADULT - CARE PROVIDER_API CALL
Elias Montalvo (MD), Internal Medicine  [address] [address]  Phone: [phone]  Fax: [phone] Elias Montalvo), Internal Medicine  [address] [address]  Phone: [phone]  Fax: [phone]    David Hammer), Medicine  [address] [address]  Phone: [phone]  Fax: [phone]

## 2018-11-30 NOTE — PROGRESS NOTE ADULT - SUBJECTIVE AND OBJECTIVE BOX
CC/ HPI Patient is a 88 year old male with chronic obstructive pulmonary disease, coronary artery disease s/p PCI DAYANA to mRCA, pRCA, mCx, 2013, s/p right hemicolectomy for tubulovillous adenoma, 2017, admitted with urinary retention, diarrhea, dehydration and acute kidney injury, seen this morning the patient denies respiratory complaint.      PAST MEDICAL & SURGICAL HISTORY:  BPH (benign prostatic hyperplasia)  CAD (coronary artery disease)  HLD (hyperlipidemia)  H/O right hemicolectomy  S/P cataract extraction    SOCHX:  + tobacco,  -  alcohol    FMHX: FA/MO  - contributory     ROS reviewed below with positive findings marked (+) :  GEN:  fever, chills ENT: tracheostomy,   epistaxis,  sinusitis COR:+ CAD, CHF,  HTN, dysrhythmia PUL: +COPD, ILD, asthma, pneumonia GI: PEG, dysphagia, hemorrhage, other ELIZABETH: kidney disease, electrolyte disorder HEM:  anemia, thrombus, coagulopathy, cancer ENDO:  thyroid disease, diabetes mellitus CNS:  dementia, stroke, seizure, PSY:  depression, anxiety, other        MEDICATIONS  (STANDING):  ammonium lactate 12% Lotion 1 Application(s) Topical two times a day  aspirin enteric coated 81 milliGRAM(s) Oral daily  buDESOnide 160 MICROgram(s)/formoterol 4.5 MICROgram(s) Inhaler 2 Puff(s) Inhalation two times a day  clopidogrel Tablet 75 milliGRAM(s) Oral daily  DULoxetine 20 milliGRAM(s) Oral two times a day  furosemide    Tablet 40 milliGRAM(s) Oral daily  gabapentin 100 milliGRAM(s) Oral daily  heparin  Injectable 5000 Unit(s) SubCutaneous every 8 hours  mirtazapine 30 milliGRAM(s) Oral at bedtime  PARoxetine 20 milliGRAM(s) Oral daily  simvastatin 20 milliGRAM(s) Oral at bedtime  tamsulosin 0.4 milliGRAM(s) Oral at bedtime    MEDICATIONS  (PRN):  ALBUTerol/ipratropium for Nebulization 3 milliLiter(s) Nebulizer every 6 hours PRN Shortness of Breath and/or Wheezing      Vital Signs Last 24 Hrs  T(C): 36.4 (30 Nov 2018 10:33), Max: 36.8 (30 Nov 2018 05:43)  T(F): 97.6 (30 Nov 2018 10:33), Max: 98.2 (30 Nov 2018 05:43)  HR: 60 (30 Nov 2018 10:33) (56 - 60)  BP: 107/66 (30 Nov 2018 10:33) (107/66 - 152/73)  BP(mean): --  RR: 16 (30 Nov 2018 10:33) (16 - 16)  SpO2: 95% (30 Nov 2018 10:33) (94% - 96%)    GENERAL:         comfortable,  - distress.  HEENT:            - trauma,  - icterus,  - injection,  - nasal discharge.  NECK:              - jugular venous distention, - thyromegaly.  LYMPH:           - lymphadenopathy, - masses.  RESP:               + clear,   - rales,   - rhonchi,   - wheezes.   COR:                S1S2   - gallops,  - rubs.  ABD:                bowel sounds,   soft, - tender, - distended.  EXT/MSC:         - cyanosis,  + clubbing,  - edema.    NEURO:             alert,   responds to stimuli.                          10.7   5.8   )-----------( 186      ( 30 Nov 2018 06:16 )             33.5     11-30    139  |  101  |  33<H>  ----------------------------<  97  4.8   |  24  |  1.85<H>        X-ray  Chest  (11.27.18 )   Frontal view of the chest is compared to 4/22/2017 and demonstrates low lung volumes.  Persistent small layering left pleural effusion. Improved aeration of the left lower lobe since prior study. No consolidation.      CT Angio Chest PE Protocol  (04.23.17) Increased mediastinal and hilar lymphadenopathy is demonstrated. Coronary artery calcifications are again noted.  Interval development of small to moderate right and small left pleural effusions with compressive atelectasis of the posterior basal segments of the bilateral lower lobes. There are patchy nodular opacities in the posterior left lower lobe which enhance less than the atelectatic lung.          ASSESSMENT/PLAN    1)  Acute kidney injury  2)  Diarrhea  3)  Chronic obstructive pulmonary disease  4)  History pulmonary nodules    Satisfactory SpO2 (RA)  CT chest follow up of thoracic adenopathy and patchy nodular opacity on prior study.  Bronchodilators:  Atrovent/ albuterol q 4 – 6 hours as needed  Budesonide/formoterol twice daily  ID/Antibiotics: stool studies pending  Cardiac/HTN: BP management  GI: diarrhea reported resolved  Heme: Rx/VT prophylaxis   Discuss with medical team

## 2018-11-30 NOTE — DISCHARGE NOTE ADULT - PATIENT PORTAL LINK FT
You can access the [employer] Patient Portal, offered by [facility], by registering with the following website: [email or web address]

## 2018-11-30 NOTE — PROGRESS NOTE ADULT - PROBLEM SELECTOR PLAN 6
Chronic dyspnea which has been worked-up extensively at [facility] and outpt by Dr. Gutiérrez (pulm). Was seen by Dr. Baptiste in ED (covering for Dr. Gutiérrez) who reviewed outpatient PFTs which were at pts baseline. Pt denies cough, wheezing, sputum production. Sat well on room air.  - c/w Symbicort 160/4.5 2 puffs daily (therapeutic interchange for Advair)  - f/u pulm recs

## 2018-12-01 VITALS
SYSTOLIC BLOOD PRESSURE: 132 MMHG | OXYGEN SATURATION: 99 % | RESPIRATION RATE: 16 BRPM | HEART RATE: 69 BPM | DIASTOLIC BLOOD PRESSURE: 73 MMHG | TEMPERATURE: 97 F

## 2018-12-01 PROCEDURE — 85027 COMPLETE CBC AUTOMATED: CPT

## 2018-12-01 PROCEDURE — 36415 COLL VENOUS BLD VENIPUNCTURE: CPT

## 2018-12-01 PROCEDURE — 87507 IADNA-DNA/RNA PROBE TQ 12-25: CPT

## 2018-12-01 PROCEDURE — 87045 FECES CULTURE AEROBIC BACT: CPT

## 2018-12-01 PROCEDURE — 83735 ASSAY OF MAGNESIUM: CPT

## 2018-12-01 PROCEDURE — 82553 CREATINE MB FRACTION: CPT

## 2018-12-01 PROCEDURE — 86850 RBC ANTIBODY SCREEN: CPT

## 2018-12-01 PROCEDURE — 87046 STOOL CULTR AEROBIC BACT EA: CPT

## 2018-12-01 PROCEDURE — 83880 ASSAY OF NATRIURETIC PEPTIDE: CPT

## 2018-12-01 PROCEDURE — 86900 BLOOD TYPING SEROLOGIC ABO: CPT

## 2018-12-01 PROCEDURE — 84100 ASSAY OF PHOSPHORUS: CPT

## 2018-12-01 PROCEDURE — 81001 URINALYSIS AUTO W/SCOPE: CPT

## 2018-12-01 PROCEDURE — 99285 EMERGENCY DEPT VISIT HI MDM: CPT | Mod: 25

## 2018-12-01 PROCEDURE — 80053 COMPREHEN METABOLIC PANEL: CPT

## 2018-12-01 PROCEDURE — 80048 BASIC METABOLIC PNL TOTAL CA: CPT

## 2018-12-01 PROCEDURE — 82550 ASSAY OF CK (CPK): CPT

## 2018-12-01 PROCEDURE — 94640 AIRWAY INHALATION TREATMENT: CPT

## 2018-12-01 PROCEDURE — 93005 ELECTROCARDIOGRAM TRACING: CPT

## 2018-12-01 PROCEDURE — 88305 TISSUE EXAM BY PATHOLOGIST: CPT

## 2018-12-01 PROCEDURE — 84540 ASSAY OF URINE/UREA-N: CPT

## 2018-12-01 PROCEDURE — 85025 COMPLETE CBC W/AUTO DIFF WBC: CPT

## 2018-12-01 PROCEDURE — 82962 GLUCOSE BLOOD TEST: CPT

## 2018-12-01 PROCEDURE — 76775 US EXAM ABDO BACK WALL LIM: CPT

## 2018-12-01 PROCEDURE — 86901 BLOOD TYPING SEROLOGIC RH(D): CPT

## 2018-12-01 PROCEDURE — 71045 X-RAY EXAM CHEST 1 VIEW: CPT

## 2018-12-01 PROCEDURE — 84484 ASSAY OF TROPONIN QUANT: CPT

## 2018-12-01 RX ADMIN — DULOXETINE HYDROCHLORIDE 20 MILLIGRAM(S): 30 CAPSULE, DELAYED RELEASE ORAL at 05:48

## 2018-12-01 RX ADMIN — Medication 81 MILLIGRAM(S): at 12:23

## 2018-12-01 RX ADMIN — Medication 1 APPLICATION(S): at 05:48

## 2018-12-01 RX ADMIN — BUDESONIDE AND FORMOTEROL FUMARATE DIHYDRATE 2 PUFF(S): 160; 4.5 AEROSOL RESPIRATORY (INHALATION) at 12:25

## 2018-12-01 RX ADMIN — GABAPENTIN 100 MILLIGRAM(S): 400 CAPSULE ORAL at 12:23

## 2018-12-01 RX ADMIN — HEPARIN SODIUM 5000 UNIT(S): 5000 INJECTION INTRAVENOUS; SUBCUTANEOUS at 05:48

## 2018-12-01 RX ADMIN — Medication 20 MILLIGRAM(S): at 13:01

## 2018-12-01 RX ADMIN — CLOPIDOGREL BISULFATE 75 MILLIGRAM(S): 75 TABLET, FILM COATED ORAL at 12:23

## 2018-12-01 RX ADMIN — Medication 40 MILLIGRAM(S): at 05:48

## 2018-12-01 NOTE — PROGRESS NOTE ADULT - ASSESSMENT
88M with PMH of HTN, CAD s/p PCI (DAYANA to mRCA, pRCA, mCx 2013 with Dr. Varela), BPH and colonic mass s/p right hemicolectomy (April 2017 with Dr. Headley) with pathology showing Tubulovillous adenoma who is now presenting from Dr. Montalvo's office for urinary retention, admitted for pre-renal DEBBIE 2/2 dehydration from diarrhea.
ASSESSMENT/PLAN    1)  Acute kidney injury  2)  Diarrhea  3)  Chronic obstructive pulmonary disease  4)  History pulmonary nodules    Satisfactory SpO2 (RA)  CT chest follow up of thoracic adenopathy and patchy nodular opacity on prior study.  Bronchodilators:  Atrovent/ albuterol q 4 – 6 hours as needed  Budesonide/formoterol twice daily  ID/Antibiotics: stool studies pending  Cardiac/HTN: BP management  GI: diarrhea reported resolved  Heme: Rx/VT prophylaxis   Discussed c ,
await biopsies, can follow up as out patient
ASSESSMENT/PLAN    1)  Acute kidney injury  2)  Diarrhea  3)  Chronic obstructive pulmonary disease  4)  History pulmonary nodules    Satisfactory SpO2 (RA)  Renal function follow closely  Bronchodilators:  Atrovent/ albuterol q 4 – 6 hours as needed  Budesonide/formoterol twice daily  ID/Antibiotics: stool studies pending  Cardiac/HTN: BP management  GI: stool studies pending  Heme: Rx/VT prophylaxis   Obtain CXR PA/LAT   Aspiration precautions  Discussed with managing team

## 2018-12-01 NOTE — PROGRESS NOTE ADULT - SUBJECTIVE AND OBJECTIVE BOX
Interventional, Pulmonary, Critical, Chest Special Procedures.    Pt was seen and fully examined by myself.     Time spent with patient in minutes:37    Patient is a 88y old  Male who presents with a chief complaint of DEBBIE (01 Dec 2018 13:20) the patient now pain free and eupneic on RA, GI feedback appreciated. The patient denies any new cough or sob.    HPI:  88M with PMH of HTN, CAD s/p PCI (DAYANA to mRCA, pRCA, mCx 2013 with Dr. 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 Headley with pathology showing Tubulovillous adenoma who is now presenting from Dr. Montalvo's office for urinary retention in setting of 3 weeks of diarrhea. Patient went to Dr. Montalvo's office for routine visit, when he mentioned 3 weeks of watery, yellowish-colored, NB diarrhea associated with mild cramping, approximately 2-3 episodes per day. Patient also endorsed inability to urinate since 2pm (presumed 2/2 dehydration from diarrhea) which prompted Dr. Montalvo to send patient to ED. Patient denies sick contacts, recent travel, abnormal food intake from baseline. No recent hospitalizations or antibiotic use. Patient has BPH but never had urinary retention to this extent in the past. He was urinating normal up to today; endorsing poor PO intake for the last few weeks since the diarrhea started. Drinks approx one small water bottle per day and 2 glasses red wine. Of note, patient has chronic SOB which has been worked-up extensively at [facility] and outpt by Dr. Gutiérrez (pulm). His SOB is at baseline, denies new cough, chest pain, palpitations, orthopnea, PND, edema or decreased exercise tolerance. ROS negative for fevers, chills, abd pain, nausea, vomiting.     In the ED, vitals were T 97.9F, HR 61, /65, RR 20, SpO2 95% room air. Labs notable for Hgb 11.9 (baseline prior 9-10), K 5.6, CO2 20, AG 18, BUN/Cr 57/2.41 (baseline 1 as of 04/2017) , Trop 0.04, . UA negative for LE, nitrite but positive for ketones. EKG with NSR w PAC and PVC, LAD, no ST-T wave abnormalities. CXR unremarkable. Bladder scan performed showing 30cc urine in bladder. Patient given 2L NS. (27 Nov 2018 22:29)    REVIEW OF SYSTEMS:  ROS reviewed below with positive findings marked (+) :  GEN:  fever, chills ENT: tracheostomy,   epistaxis,  sinusitis COR:+ CAD, CHF,  HTN, dysrhythmia PUL: +COPD, ILD, asthma, pneumonia GI: PEG, dysphagia, hemorrhage, other ELIZABETH: kidney disease, electrolyte disorder HEM:  anemia, thrombus, coagulopathy, cancer ENDO:  thyroid disease, diabetes mellitus CNS:  dementia, stroke, seizure, PSY:  depression, anxiety, other  PAST MEDICAL & SURGICAL HISTORY:  BPH (benign prostatic hyperplasia)  CAD (coronary artery disease)  HLD (hyperlipidemia)  H/O right hemicolectomy  S/P cataract extraction    FAMILY HISTORY:  No family history of cardiovascular disease (Father, Mother)    SOCIAL HISTORY:      - Tobacco     - ETOH    Allergies    No Known Allergies    Intolerances      Vital Signs Last 24 Hrs  T(C): 36.2 (01 Dec 2018 12:08), Max: 36.9 (30 Nov 2018 21:23)  T(F): 97.1 (01 Dec 2018 12:08), Max: 98.5 (30 Nov 2018 21:23)  HR: 69 (01 Dec 2018 12:08) (57 - 71)  BP: 132/73 (01 Dec 2018 12:08) (116/66 - 135/64)  BP(mean): --  RR: 16 (01 Dec 2018 12:08) (16 - 16)  SpO2: 99% (01 Dec 2018 12:08) (95% - 99%)      PHYSICAL EXAM:  GENERAL:         comfortable,  - distress.  HEENT:            - trauma,  - icterus,  - injection,  - nasal discharge.  NECK:              - jugular venous distention, - thyromegaly.  LYMPH:           - lymphadenopathy, - masses.  RESP:               + clear,   - rales,   - rhonchi,   - wheezes.   COR:                S1S2   - gallops,  - rubs.  ABD:                bowel sounds,   soft, - tender, - distended.  EXT/MSC:         - cyanosis,  + clubbing,  - edema.    NEURO:             alert,   responds to stimuli.  DEVICES:  - DENTURES   +IV R / L     - ETUBE   -TRACH   -CTUBE  R / L      LABS:                          10.7   5.8   )-----------( 186      ( 30 Nov 2018 06:16 )             33.5     11-30    139  |  101  |  33<H>  ----------------------------<  97  4.8   |  24  |  1.85<H>    Ca    8.8      30 Nov 2018 06:20  Mg     1.8     11-30        < from: Xray Chest 1 View-PORTABLE IMMEDIATE (11.27.18 @ 21:11) >    EXAM:  XR CHEST PORTABLE IMMED 1V                          PROCEDURE DATE:  11/27/2018          INTERPRETATION:  CLINICAL INDICATION: 88-year-old with shortness of   breath.      IMPRESSION: Frontal view of the chest is compared to 4/22/2017 and   demonstrates low lung volumes. Persistent small layering left pleural   effusion. Improved aeration of the left lower lobe since prior study. No   consolidation.    < end of copied text >  RADIOLOGY & ADDITIONAL STUDIES (The following images were personally reviewed):

## 2018-12-01 NOTE — PROGRESS NOTE ADULT - SUBJECTIVE AND OBJECTIVE BOX
Pt seen and examined  Had diarrhea  no pain    REVIEW OF SYSTEMS:  Constitutional: No fever,   Cardiovascular: No chest pain, palpitations, dizziness or leg swelling  Gastrointestinal: No abdominal or epigastric pain. No nausea, vomiting or hematemesis; + diarrhea No melena or hematochezia.  Skin: No itching, burning, rashes or lesions       MEDICATIONS:  MEDICATIONS  (STANDING):  ammonium lactate 12% Lotion 1 Application(s) Topical two times a day  aspirin enteric coated 81 milliGRAM(s) Oral daily  buDESOnide 160 MICROgram(s)/formoterol 4.5 MICROgram(s) Inhaler 2 Puff(s) Inhalation two times a day  clopidogrel Tablet 75 milliGRAM(s) Oral daily  DULoxetine 20 milliGRAM(s) Oral two times a day  furosemide    Tablet 40 milliGRAM(s) Oral daily  gabapentin 100 milliGRAM(s) Oral daily  heparin  Injectable 5000 Unit(s) SubCutaneous every 8 hours  mirtazapine 30 milliGRAM(s) Oral at bedtime  PARoxetine 20 milliGRAM(s) Oral daily  simvastatin 20 milliGRAM(s) Oral at bedtime  tamsulosin 0.4 milliGRAM(s) Oral at bedtime    MEDICATIONS  (PRN):  ALBUTerol/ipratropium for Nebulization 3 milliLiter(s) Nebulizer every 6 hours PRN Shortness of Breath and/or Wheezing      Allergies    No Known Allergies    Intolerances        Vital Signs Last 24 Hrs  T(C): 36.2 (01 Dec 2018 12:08), Max: 36.9 (30 Nov 2018 21:23)  T(F): 97.1 (01 Dec 2018 12:08), Max: 98.5 (30 Nov 2018 21:23)  HR: 69 (01 Dec 2018 12:08) (57 - 71)  BP: 132/73 (01 Dec 2018 12:08) (116/66 - 135/64)  BP(mean): --  RR: 16 (01 Dec 2018 12:08) (16 - 16)  SpO2: 99% (01 Dec 2018 12:08) (95% - 99%)      PHYSICAL EXAM:    General: Well developed; well nourished; in no acute distress  HEENT: MMM, conjunctiva and sclera clear  Gastrointestinal: Soft non-tender non-distended; Normal bowel sounds; No hepatosplenomegaly  Skin: Warm and dry. No obvious rash    LABS:      CBC Full  -  ( 30 Nov 2018 06:16 )  WBC Count : 5.8 K/uL  Hemoglobin : 10.7 g/dL  Hematocrit : 33.5 %  Platelet Count - Automated : 186 K/uL  Mean Cell Volume : 97.1 fL  Mean Cell Hemoglobin : 31.0 pg  Mean Cell Hemoglobin Concentration : 31.9 g/dL  Auto Neutrophil # : x  Auto Lymphocyte # : x  Auto Monocyte # : x  Auto Eosinophil # : x  Auto Basophil # : x  Auto Neutrophil % : x  Auto Lymphocyte % : x  Auto Monocyte % : x  Auto Eosinophil % : x  Auto Basophil % : x    11-30    139  |  101  |  33<H>  ----------------------------<  97  4.8   |  24  |  1.85<H>    Ca    8.8      30 Nov 2018 06:20  Mg     1.8     11-30                        RADIOLOGY & ADDITIONAL STUDIES (The following images were personally reviewed):

## 2018-12-01 NOTE — PROGRESS NOTE ADULT - ATTENDING COMMENTS
CAD--stable  Pulm-stable  DEBBIE--IV fluids  GI slow improvement  Discussed with GI--needs eval-SCOPE on Monday
CAD--stable  Pulm-stable  DEBBIE--IV fluids  GI slow improvement  Discussed with GI--needs eval-SCOPE on Monday
CV stable  GI improved Colon report discussed w resident  Renal status improved.  mood -at baseline  d/c home

## 2018-12-03 LAB — SURGICAL PATHOLOGY STUDY: SIGNIFICANT CHANGE UP

## 2018-12-07 DIAGNOSIS — Z90.49 ACQUIRED ABSENCE OF OTHER SPECIFIED PARTS OF DIGESTIVE TRACT: ICD-10-CM

## 2018-12-07 DIAGNOSIS — Z79.02 LONG TERM (CURRENT) USE OF ANTITHROMBOTICS/ANTIPLATELETS: ICD-10-CM

## 2018-12-07 DIAGNOSIS — E87.2 ACIDOSIS: ICD-10-CM

## 2018-12-07 DIAGNOSIS — F32.9 MAJOR DEPRESSIVE DISORDER, SINGLE EPISODE, UNSPECIFIED: ICD-10-CM

## 2018-12-07 DIAGNOSIS — F03.90 UNSPECIFIED DEMENTIA WITHOUT BEHAVIORAL DISTURBANCE: ICD-10-CM

## 2018-12-07 DIAGNOSIS — Z79.82 LONG TERM (CURRENT) USE OF ASPIRIN: ICD-10-CM

## 2018-12-07 DIAGNOSIS — N17.9 ACUTE KIDNEY FAILURE, UNSPECIFIED: ICD-10-CM

## 2018-12-07 DIAGNOSIS — K57.30 DIVERTICULOSIS OF LARGE INTESTINE WITHOUT PERFORATION OR ABSCESS WITHOUT BLEEDING: ICD-10-CM

## 2018-12-07 DIAGNOSIS — Z82.49 FAMILY HISTORY OF ISCHEMIC HEART DISEASE AND OTHER DISEASES OF THE CIRCULATORY SYSTEM: ICD-10-CM

## 2018-12-07 DIAGNOSIS — J44.9 CHRONIC OBSTRUCTIVE PULMONARY DISEASE, UNSPECIFIED: ICD-10-CM

## 2018-12-07 DIAGNOSIS — E78.5 HYPERLIPIDEMIA, UNSPECIFIED: ICD-10-CM

## 2018-12-07 DIAGNOSIS — I10 ESSENTIAL (PRIMARY) HYPERTENSION: ICD-10-CM

## 2018-12-07 DIAGNOSIS — E86.0 DEHYDRATION: ICD-10-CM

## 2018-12-07 DIAGNOSIS — R19.7 DIARRHEA, UNSPECIFIED: ICD-10-CM

## 2018-12-07 DIAGNOSIS — I21.A1 MYOCARDIAL INFARCTION TYPE 2: ICD-10-CM

## 2018-12-07 DIAGNOSIS — Z87.891 PERSONAL HISTORY OF NICOTINE DEPENDENCE: ICD-10-CM

## 2018-12-07 DIAGNOSIS — Z95.5 PRESENCE OF CORONARY ANGIOPLASTY IMPLANT AND GRAFT: ICD-10-CM

## 2018-12-07 DIAGNOSIS — N40.0 BENIGN PROSTATIC HYPERPLASIA WITHOUT LOWER URINARY TRACT SYMPTOMS: ICD-10-CM

## 2018-12-07 DIAGNOSIS — K64.4 RESIDUAL HEMORRHOIDAL SKIN TAGS: ICD-10-CM

## 2018-12-07 DIAGNOSIS — E87.5 HYPERKALEMIA: ICD-10-CM

## 2018-12-07 DIAGNOSIS — I25.10 ATHEROSCLEROTIC HEART DISEASE OF NATIVE CORONARY ARTERY WITHOUT ANGINA PECTORIS: ICD-10-CM

## 2020-02-14 VITALS
OXYGEN SATURATION: 97 % | HEIGHT: 69 IN | DIASTOLIC BLOOD PRESSURE: 67 MMHG | TEMPERATURE: 98 F | WEIGHT: 136.91 LBS | HEART RATE: 52 BPM | SYSTOLIC BLOOD PRESSURE: 202 MMHG | RESPIRATION RATE: 18 BRPM

## 2020-02-14 LAB
APTT BLD: 35.2 SEC — SIGNIFICANT CHANGE UP (ref 27.5–36.3)
HCT VFR BLD CALC: 36.2 % — LOW (ref 39–50)
HGB BLD-MCNC: 11.9 G/DL — LOW (ref 13–17)
INR BLD: 0.95 — SIGNIFICANT CHANGE UP (ref 0.88–1.16)
MCHC RBC-ENTMCNC: 32.5 PG — SIGNIFICANT CHANGE UP (ref 27–34)
MCHC RBC-ENTMCNC: 32.9 GM/DL — SIGNIFICANT CHANGE UP (ref 32–36)
MCV RBC AUTO: 98.9 FL — SIGNIFICANT CHANGE UP (ref 80–100)
NRBC # BLD: 0 /100 WBCS — SIGNIFICANT CHANGE UP (ref 0–0)
PLATELET # BLD AUTO: 266 K/UL — SIGNIFICANT CHANGE UP (ref 150–400)
PROTHROM AB SERPL-ACNC: 10.8 SEC — SIGNIFICANT CHANGE UP (ref 10–12.9)
RBC # BLD: 3.66 M/UL — LOW (ref 4.2–5.8)
RBC # FLD: 13 % — SIGNIFICANT CHANGE UP (ref 10.3–14.5)
WBC # BLD: 8.05 K/UL — SIGNIFICANT CHANGE UP (ref 3.8–10.5)
WBC # FLD AUTO: 8.05 K/UL — SIGNIFICANT CHANGE UP (ref 3.8–10.5)

## 2020-02-14 RX ORDER — VANCOMYCIN HCL 1 G
1000 VIAL (EA) INTRAVENOUS ONCE
Refills: 0 | Status: COMPLETED | OUTPATIENT
Start: 2020-02-14 | End: 2020-02-14

## 2020-02-14 RX ORDER — IPRATROPIUM/ALBUTEROL SULFATE 18-103MCG
3 AEROSOL WITH ADAPTER (GRAM) INHALATION ONCE
Refills: 0 | Status: COMPLETED | OUTPATIENT
Start: 2020-02-14 | End: 2020-02-14

## 2020-02-14 RX ADMIN — Medication 3 MILLILITER(S): at 23:30

## 2020-02-14 RX ADMIN — Medication 250 MILLIGRAM(S): at 23:30

## 2020-02-14 NOTE — ED ADULT NURSE NOTE - NSIMPLEMENTINTERV_GEN_ALL_ED
Implemented All Fall Risk Interventions:  [place] to call system. Call bell, personal items and telephone within reach. Instruct patient to call for assistance. Room bathroom lighting operational. Non-slip footwear when patient is off stretcher. Physically safe environment: no spills, clutter or unnecessary equipment. Stretcher in lowest position, wheels locked, appropriate side rails in place. Provide visual cue, wrist band, yellow gown, etc. Monitor gait and stability. Monitor for mental status changes and reorient to person, place, and time. Review medications for side effects contributing to fall risk. Reinforce activity limits and safety measures with patient and family.

## 2020-02-14 NOTE — ED ADULT NURSE NOTE - OBJECTIVE STATEMENT
Pt came in to ED c/o R LE swelling. Unilateral swelling started 2 days ago. Reports pain to R knee as well. (+) redness and swelling to RLE. No other symptoms reported.

## 2020-02-14 NOTE — ED PROVIDER NOTE - MUSCULOSKELETAL, MLM
Pulses intact bilaterally, DP/PT 2+. Spine appears normal, range of motion is not limited, no muscle or joint tenderness

## 2020-02-14 NOTE — ED PROVIDER NOTE - OBJECTIVE STATEMENT
89 y/o M with PMHx of cardiac stents, here for R leg pain and swelling. Patient noticed some redness to RLE 2 days ago, now with increased swelling and redness along with some pain at rest. No fever, numbness, tingling.

## 2020-02-14 NOTE — ED PROVIDER NOTE - CARE PLAN
Principal Discharge DX:	Cellulitis  Secondary Diagnosis:	COPD (chronic obstructive pulmonary disease)

## 2020-02-14 NOTE — ED ADULT NURSE REASSESSMENT NOTE - NS ED NURSE REASSESS COMMENT FT1
pt to US at this time, vancomycin infusing no redness or itching noted pt appears comfortable will monitor and reassess

## 2020-02-14 NOTE — ED PROVIDER NOTE - NEUROLOGICAL, MLM
RLE with 2+ pedal edema, with erythema of RLE starting from mid lower leg, extending down to foot. Ankle swelling bilaterally of R ankle, minimal tenderness.

## 2020-02-15 ENCOUNTER — INPATIENT (INPATIENT)
Facility: HOSPITAL | Age: 85
LOS: 2 days | Discharge: NURSING, REHAB OR LONG-TERM CARE | DRG: 603 | End: 2020-02-18
Attending: INTERNAL MEDICINE | Admitting: INTERNAL MEDICINE
Payer: MEDICARE

## 2020-02-15 DIAGNOSIS — Z91.89 OTHER SPECIFIED PERSONAL RISK FACTORS, NOT ELSEWHERE CLASSIFIED: ICD-10-CM

## 2020-02-15 DIAGNOSIS — L03.115 CELLULITIS OF RIGHT LOWER LIMB: ICD-10-CM

## 2020-02-15 DIAGNOSIS — Z90.49 ACQUIRED ABSENCE OF OTHER SPECIFIED PARTS OF DIGESTIVE TRACT: Chronic | ICD-10-CM

## 2020-02-15 DIAGNOSIS — I25.10 ATHEROSCLEROTIC HEART DISEASE OF NATIVE CORONARY ARTERY WITHOUT ANGINA PECTORIS: ICD-10-CM

## 2020-02-15 DIAGNOSIS — D64.9 ANEMIA, UNSPECIFIED: ICD-10-CM

## 2020-02-15 DIAGNOSIS — Z98.49 CATARACT EXTRACTION STATUS, UNSPECIFIED EYE: Chronic | ICD-10-CM

## 2020-02-15 DIAGNOSIS — N40.0 BENIGN PROSTATIC HYPERPLASIA WITHOUT LOWER URINARY TRACT SYMPTOMS: ICD-10-CM

## 2020-02-15 DIAGNOSIS — I10 ESSENTIAL (PRIMARY) HYPERTENSION: ICD-10-CM

## 2020-02-15 DIAGNOSIS — Z29.9 ENCOUNTER FOR PROPHYLACTIC MEASURES, UNSPECIFIED: ICD-10-CM

## 2020-02-15 DIAGNOSIS — E78.5 HYPERLIPIDEMIA, UNSPECIFIED: ICD-10-CM

## 2020-02-15 DIAGNOSIS — J44.9 CHRONIC OBSTRUCTIVE PULMONARY DISEASE, UNSPECIFIED: ICD-10-CM

## 2020-02-15 LAB
ALBUMIN SERPL ELPH-MCNC: 3.9 G/DL — SIGNIFICANT CHANGE UP (ref 3.3–5)
ALP SERPL-CCNC: 79 U/L — SIGNIFICANT CHANGE UP (ref 40–120)
ALT FLD-CCNC: 7 U/L — LOW (ref 10–45)
ANION GAP SERPL CALC-SCNC: 11 MMOL/L — SIGNIFICANT CHANGE UP (ref 5–17)
AST SERPL-CCNC: 15 U/L — SIGNIFICANT CHANGE UP (ref 10–40)
BILIRUB SERPL-MCNC: 0.2 MG/DL — SIGNIFICANT CHANGE UP (ref 0.2–1.2)
BUN SERPL-MCNC: 22 MG/DL — SIGNIFICANT CHANGE UP (ref 7–23)
CALCIUM SERPL-MCNC: 9 MG/DL — SIGNIFICANT CHANGE UP (ref 8.4–10.5)
CHLORIDE SERPL-SCNC: 100 MMOL/L — SIGNIFICANT CHANGE UP (ref 96–108)
CO2 SERPL-SCNC: 25 MMOL/L — SIGNIFICANT CHANGE UP (ref 22–31)
CREAT SERPL-MCNC: 1.21 MG/DL — SIGNIFICANT CHANGE UP (ref 0.5–1.3)
GLUCOSE SERPL-MCNC: 93 MG/DL — SIGNIFICANT CHANGE UP (ref 70–99)
POTASSIUM SERPL-MCNC: 4.6 MMOL/L — SIGNIFICANT CHANGE UP (ref 3.5–5.3)
POTASSIUM SERPL-SCNC: 4.6 MMOL/L — SIGNIFICANT CHANGE UP (ref 3.5–5.3)
PROCALCITONIN SERPL-MCNC: 0.09 NG/ML — SIGNIFICANT CHANGE UP (ref 0.02–0.1)
PROT SERPL-MCNC: 7 G/DL — SIGNIFICANT CHANGE UP (ref 6–8.3)
RETICS #: 49.1 K/UL — SIGNIFICANT CHANGE UP (ref 25–125)
RETICS/RBC NFR: 1.3 % — SIGNIFICANT CHANGE UP (ref 0.5–2.5)
SODIUM SERPL-SCNC: 136 MMOL/L — SIGNIFICANT CHANGE UP (ref 135–145)
TRANSFERRIN SERPL-MCNC: 186 MG/DL — LOW (ref 200–360)

## 2020-02-15 PROCEDURE — 93971 EXTREMITY STUDY: CPT | Mod: 26,RT

## 2020-02-15 PROCEDURE — 99285 EMERGENCY DEPT VISIT HI MDM: CPT

## 2020-02-15 PROCEDURE — 71045 X-RAY EXAM CHEST 1 VIEW: CPT | Mod: 26

## 2020-02-15 RX ORDER — MIRTAZAPINE 45 MG/1
30 TABLET, ORALLY DISINTEGRATING ORAL AT BEDTIME
Refills: 0 | Status: DISCONTINUED | OUTPATIENT
Start: 2020-02-15 | End: 2020-02-18

## 2020-02-15 RX ORDER — ENOXAPARIN SODIUM 100 MG/ML
40 INJECTION SUBCUTANEOUS EVERY 24 HOURS
Refills: 0 | Status: DISCONTINUED | OUTPATIENT
Start: 2020-02-15 | End: 2020-02-18

## 2020-02-15 RX ORDER — SIMVASTATIN 20 MG/1
20 TABLET, FILM COATED ORAL AT BEDTIME
Refills: 0 | Status: DISCONTINUED | OUTPATIENT
Start: 2020-02-15 | End: 2020-02-18

## 2020-02-15 RX ORDER — TAMSULOSIN HYDROCHLORIDE 0.4 MG/1
0.4 CAPSULE ORAL AT BEDTIME
Refills: 0 | Status: DISCONTINUED | OUTPATIENT
Start: 2020-02-15 | End: 2020-02-18

## 2020-02-15 RX ORDER — GABAPENTIN 400 MG/1
0 CAPSULE ORAL
Qty: 0 | Refills: 0 | DISCHARGE

## 2020-02-15 RX ORDER — IPRATROPIUM/ALBUTEROL SULFATE 18-103MCG
3 AEROSOL WITH ADAPTER (GRAM) INHALATION EVERY 6 HOURS
Refills: 0 | Status: DISCONTINUED | OUTPATIENT
Start: 2020-02-15 | End: 2020-02-18

## 2020-02-15 RX ORDER — DULOXETINE HYDROCHLORIDE 30 MG/1
20 CAPSULE, DELAYED RELEASE ORAL DAILY
Refills: 0 | Status: DISCONTINUED | OUTPATIENT
Start: 2020-02-15 | End: 2020-02-18

## 2020-02-15 RX ORDER — LISINOPRIL 2.5 MG/1
1 TABLET ORAL
Qty: 0 | Refills: 0 | DISCHARGE

## 2020-02-15 RX ORDER — LISINOPRIL 2.5 MG/1
10 TABLET ORAL DAILY
Refills: 0 | Status: DISCONTINUED | OUTPATIENT
Start: 2020-02-15 | End: 2020-02-17

## 2020-02-15 RX ORDER — ASPIRIN/CALCIUM CARB/MAGNESIUM 324 MG
81 TABLET ORAL DAILY
Refills: 0 | Status: DISCONTINUED | OUTPATIENT
Start: 2020-02-15 | End: 2020-02-18

## 2020-02-15 RX ORDER — CLOPIDOGREL BISULFATE 75 MG/1
1 TABLET, FILM COATED ORAL
Qty: 0 | Refills: 0 | DISCHARGE

## 2020-02-15 RX ORDER — ACETAMINOPHEN 500 MG
650 TABLET ORAL EVERY 6 HOURS
Refills: 0 | Status: DISCONTINUED | OUTPATIENT
Start: 2020-02-15 | End: 2020-02-18

## 2020-02-15 RX ORDER — CEFAZOLIN SODIUM 1 G
2000 VIAL (EA) INJECTION EVERY 8 HOURS
Refills: 0 | Status: DISCONTINUED | OUTPATIENT
Start: 2020-02-15 | End: 2020-02-16

## 2020-02-15 RX ADMIN — TAMSULOSIN HYDROCHLORIDE 0.4 MILLIGRAM(S): 0.4 CAPSULE ORAL at 22:14

## 2020-02-15 RX ADMIN — Medication 650 MILLIGRAM(S): at 22:14

## 2020-02-15 RX ADMIN — Medication 650 MILLIGRAM(S): at 22:50

## 2020-02-15 RX ADMIN — Medication 100 MILLIGRAM(S): at 19:12

## 2020-02-15 RX ADMIN — LISINOPRIL 10 MILLIGRAM(S): 2.5 TABLET ORAL at 03:41

## 2020-02-15 RX ADMIN — DULOXETINE HYDROCHLORIDE 20 MILLIGRAM(S): 30 CAPSULE, DELAYED RELEASE ORAL at 12:33

## 2020-02-15 RX ADMIN — ENOXAPARIN SODIUM 40 MILLIGRAM(S): 100 INJECTION SUBCUTANEOUS at 06:39

## 2020-02-15 RX ADMIN — SIMVASTATIN 20 MILLIGRAM(S): 20 TABLET, FILM COATED ORAL at 22:14

## 2020-02-15 RX ADMIN — Medication 100 MILLIGRAM(S): at 12:33

## 2020-02-15 RX ADMIN — Medication 81 MILLIGRAM(S): at 12:33

## 2020-02-15 RX ADMIN — Medication 1000 MILLIGRAM(S): at 00:30

## 2020-02-15 RX ADMIN — MIRTAZAPINE 30 MILLIGRAM(S): 45 TABLET, ORALLY DISINTEGRATING ORAL at 22:14

## 2020-02-15 NOTE — H&P ADULT - PROBLEM SELECTOR PLAN 8
1) PCP Contacted on Admission: (Y/N) --> Name & Phone #:  2) Date of Contact with PCP:  3) PCP Contacted at Discharge: (Y/N, N/A)  4) Summary of Handoff Given to PCP:   5) Post-Discharge Appointment Date and Location: F: none   E: replete PRN   N: dash/tlc   DVT: lovenox 40   Dispo: RMF

## 2020-02-15 NOTE — PROGRESS NOTE ADULT - ASSESSMENT
ASSESSMENT/PLAN 89M with PMH of HTN, CAD s/p PCI (DAYANA to mRCA, pRCA, mCx 2013 with Dr. 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 Headley with pathology showing Tubulovillous adenoma presented from home for 2-3d of RLE erythema meeting no SIRS criteria admitted for non-purulent cellulitis.     1. O2 observe off  2. Bronchodilators:  Atrovent/ albuterol q 4 – 6 hours as needed  3. Corticosteroids: off  4. ID/Antibiotics: on Ancef  5. Cardiac/HTN: optimize BP  6. GI: Rx/ prophylaxis c PPI/H2B  7. Heme: Rx/VT prophylaxis c SQH/SCD/AC on Enoxaparin  8. Aspiration precautions  9. Mobilize, OOB, PT  Discussed with managing team

## 2020-02-15 NOTE — PHYSICAL THERAPY INITIAL EVALUATION ADULT - ADDITIONAL COMMENTS
Patient lives in an elevator apartment with no steps to enter. Prior to admission, patient was independent for all functional mobility and ADLS with use of a straight cane for assistance.

## 2020-02-15 NOTE — H&P ADULT - PROBLEM SELECTOR PLAN 1
-3d of RLE erythema meeting no SIRS criteria admitted for non-purulent cellulitis. Non-purulent cellulitis. No hospitalization within a year, lower suspicion for MRSA  -C/w Cefazolin 2gram q8hr while admitted then transition to Keflex 500 four times a day for a total of 5d  -F/u Bcx

## 2020-02-15 NOTE — PHYSICAL THERAPY INITIAL EVALUATION ADULT - GENERAL OBSERVATIONS, REHAB EVAL
Received semi-Arriaga's position in bed with +hep lock, on room air, in no apparent distress. Patient appears to be resting comfortably in bed

## 2020-02-15 NOTE — H&P ADULT - PROBLEM SELECTOR PLAN 5
Pt previously on lisinopril. Was uncontrolled in ED   -Start Lisinopril 10 then uptitrate C/w  Simvastatin 20

## 2020-02-15 NOTE — H&P ADULT - NSHPLABSRESULTS_GEN_ALL_CORE
.  LABS:                         11.9   8.05  )-----------( 266      ( 14 Feb 2020 23:26 )             36.2     02-14    136  |  100  |  22  ----------------------------<  93  4.6   |  25  |  1.21    Ca    9.0      14 Feb 2020 23:26    TPro  7.0  /  Alb  3.9  /  TBili  0.2  /  DBili  x   /  AST  15  /  ALT  7<L>  /  AlkPhos  79  02-14    PT/INR - ( 14 Feb 2020 23:26 )   PT: 10.8 sec;   INR: 0.95          PTT - ( 14 Feb 2020 23:26 )  PTT:35.2 sec    CARDIAC MARKERS ( 14 Feb 2020 23:26 )  x     / 0.02 ng/mL / x     / x     / x          Serum Pro-Brain Natriuretic Peptide: 1531 pg/mL (02-14 @ 23:26)      < from: US Duplex Venous Lower Ext Ltd, Right (02.15.20 @ 00:13) >    IMPRESSION:  1.  No deep vein thrombosis seen.   2.  4.9 cm right Baker's cyst. Soft tissue edema in the right calf.      < end of copied text >

## 2020-02-15 NOTE — H&P ADULT - PROBLEM SELECTOR PLAN 6
Previously on advair? Not in exacerbation   Duonebs PRN Pt previously on lisinopril. Was uncontrolled in ED   -Start Lisinopril 10 then uptitrate Pt previously on lisinopril. Was uncontrolled in ED   -Start Lisinopril 10 then uptitrate    #Hypertensive Urgency   One reading of  that resolved without intervention. Asymptomatic. Pt not currently on anti-hypertensives.   -Plan as above

## 2020-02-15 NOTE — H&P ADULT - PROBLEM SELECTOR PLAN 2
CAD s/p PCI (DAYANA to mRCA, pRCA, mCx 2013 with Dr. Varela), EKG with old ischemic changes c/w previous   C/w ASA 81, Simvastatin 20

## 2020-02-15 NOTE — H&P ADULT - PROBLEM SELECTOR PLAN 7
F: none   E: replete PRN   N: dash/tlc   DVT: lovenox 40   Dispo: RMF Previously on advair? Not in exacerbation   Duonebs PRN

## 2020-02-15 NOTE — H&P ADULT - NSICDXPASTMEDICALHX_GEN_ALL_CORE_FT
PAST MEDICAL HISTORY:  BPH (benign prostatic hyperplasia)     CAD (coronary artery disease)     HLD (hyperlipidemia)

## 2020-02-15 NOTE — PHYSICAL THERAPY INITIAL EVALUATION ADULT - PERTINENT HX OF CURRENT PROBLEM, REHAB EVAL
89M with PMH of HTN, CAD s/p PCI (DAYANA to mRCA, pRCA, mCx 2013 with Dr. 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 Headley with pathology showing Tubulovillous adenoma presented from home for 2-3d of RLE erythema and swelling.

## 2020-02-15 NOTE — H&P ADULT - HISTORY OF PRESENT ILLNESS
89M with PMH of HTN, CAD s/p PCI (DAYANA to mRCA, pRCA, mCx 2013 with Dr. 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 Headley with pathology showing Tubulovillous adenoma presented from home for 2-3d of RLE erythema and swelling. Denies inciting factors/trauma/falls/travel. No weight loss, fevers, chills, decreased po, CP/pressure, cough, n/v/d/c, arthralgia, myalgia. Currently c/o pain behind right knee, chronic diarrhea since hemicolectomy. Last hospitalization one year ago for diarrhea/dehydration at [facility].     T97.6, HR 66, -162/67-88 RR 16, 96% on RA  WBC 8, Hb 12, Plt 266 Trop T .02.BNP 1531  EKG: NSR, first degree AVB, LAFB  CXR: hyperinflated, obscured L CPA  LE duplex: No deep vein thrombosis seen,  4.9 cm right Baker's cyst. Soft tissue edema in the right calf.  Vanco 1g, albuterol

## 2020-02-15 NOTE — PROGRESS NOTE ADULT - SUBJECTIVE AND OBJECTIVE BOX
Interventional, Pulmonary, Critical, Chest Special Procedures.    Pt was seen and fully examined by myself.     Time spent with patient in minutes:177    Patient is a 89y old  Male who presents with a chief complaint of  RLE pain. Events leading to this admission reviewed. The patient ill appwearing, eupneic on RA, answering questions, not really engaging. Denies trauma, denies aspiration, denies aany qther pain, eupneic on RA  HPI:  89M with PMH of HTN, CAD s/p PCI (DAYANA to mRCA, pRCA, mCx 2013 with Dr. 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 Headley with pathology showing Tubulovillous adenoma presented from home for 2-3d of RLE erythema and swelling. Denies inciting factors/trauma/falls/travel. No weight loss, fevers, chills, decreased po, CP/pressure, cough, n/v/d/c, arthralgia, myalgia. Currently c/o pain behind right knee, chronic diarrhea since hemicolectomy. Last hospitalization one year ago for diarrhea/dehydration at [facility].     T97.6, HR 66, -162/67-88 RR 16, 96% on RA  WBC 8, Hb 12, Plt 266 Trop T .02.BNP 1531  EKG: NSR, first degree AVB, LAFB  CXR: hyperinflated, obscured L CPA  LE duplex: No deep vein thrombosis seen,  4.9 cm right Baker's cyst. Soft tissue edema in the right calf.  Vanco 1g, albuterol (15 Feb 2020 02:40)      REVIEW OF SYSTEMS:  Constitutional: No fever, weight loss, chills +++ fatigue  Eyes: No eye pain, visual disturbances, or discharge  ENMT:  No difficulty hearing, tinnitus, vertigo; No sinus or throat pain. No epistaxis, dysphagia, dysphonia, hoarseness or odynophagia  Neck: No pain, stiffness or neck swelling.  No masses or deformities  Respiratory: No cough, wheezing, hemoptysis  - COPD  - ILD   - PE   - ASTHMA     - PNEUMONIA  Cardiovascular: No chest pain, dysrhythmia, palpitations, dizziness or edema + CAD   - CHF   - HTN  Gastrointestinal: No abdominal or epigastric pain. No nausea, vomiting or hematemesis; No diarrhea or constipation. No melena or hematochezia, Icterus.          Genitourinary: No dysuria, frequency, hematuria or incontinence   - CKD/DEBBIE      - ESRD  Neurological: No headaches, memory loss, loss of strength, numbness or tremors      -DEMENTIA     - STROKE    - SEIZURE  Skin: No itching, burning, rashes or lesions   Lymph Nodes: No enlarged glands  Endocrine: No heat or cold intolerance; No hair loss       - DM     - THYROID DISORDER  Musculoskeletal: No joint pain or swelling; No muscle, back  + RLE extremity pain erythema,  edema  Psychiatric: No depression, anxiety, mood swings or difficulty sleeping  Heme/Lymph: No easy bruising or bleeding gums         - ANEMIA      - CANCER   -COAGULOPATHY  Allergy and Immunologic: No hives or eczema    PAST MEDICAL & SURGICAL HISTORY:  BPH (benign prostatic hyperplasia)  CAD (coronary artery disease)  HLD (hyperlipidemia)  H/O right hemicolectomy  S/P cataract extraction    FAMILY HISTORY:  No family history of cardiovascular disease (Father, Mother)    SOCIAL HISTORY:      - Tobacco     - ETOH    Allergies    No Known Allergies    Intolerances      Vital Signs Last 24 Hrs  T(C): 36.4 (15 Feb 2020 05:00), Max: 36.6 (14 Feb 2020 22:18)  T(F): 97.6 (15 Feb 2020 05:00), Max: 97.9 (14 Feb 2020 22:18)  HR: 67 (15 Feb 2020 05:00) (52 - 67)  BP: 147/76 (15 Feb 2020 05:00) (147/76 - 202/67)  BP(mean): --  RR: 18 (15 Feb 2020 05:00) (16 - 18)  SpO2: 98% (15 Feb 2020 05:00) (97% - 98%)        MEDICATIONS:  MEDICATIONS  (STANDING):  aspirin enteric coated 81 milliGRAM(s) Oral daily  ceFAZolin   IVPB 2000 milliGRAM(s) IV Intermittent every 8 hours  DULoxetine 20 milliGRAM(s) Oral daily  enoxaparin Injectable 40 milliGRAM(s) SubCutaneous every 24 hours  lisinopril 10 milliGRAM(s) Oral daily  mirtazapine 30 milliGRAM(s) Oral at bedtime  simvastatin 20 milliGRAM(s) Oral at bedtime  tamsulosin 0.4 milliGRAM(s) Oral at bedtime    MEDICATIONS  (PRN):  albuterol/ipratropium for Nebulization 3 milliLiter(s) Nebulizer every 6 hours PRN Shortness of Breath and/or Wheezing      PHYSICAL EXAM:  Un Comfortable, no immediate  distress  Eyes: PERRL, EOM intact; conjunctiva and sclera clear  Head: Normocephalic;  No Trauma  ENMT: No nasal discharge, hoarseness, cough or hemoptysis  Neck: Supple; non tender; no masses or deformities.    No JVD  Respiratory: CTA,  - WHEEZING   - RHONCHI  - RALES  - CRACKLES.  Diminished breath sounds  BILATERAL  RIGHT  LEFT bases   Cardiovascular: Regular rate and rhythm. S1 and S2 Normal; No murmurs, gallops or rubs     - PPM/AICD  Gastrointestinal: Soft non-tender, non-distended; Normal bowel sounds; No hepatosplenomegaly.     -PEG    -  GT   - RUBIO  Genitourinary: No costovertebral angle tenderness. No dysuria  Extremities: AROM, No clubbing, cyanosis + RLE erythema and edema    Vascular: Peripheral pulses palpable 2+ bilaterally  Neurological: Alert and responisve to stimuli   Skin: Warm and dry. No obvious rash  Lymph Nodes: No acute cervical or supraclavicular adenopathy  Psychiatric: Cooperative and appropriate mood    DEVICES:  - DENTURES   +IV R / L     - ETUBE   -TRACH   -CTUBE  R / L    LABS:                          11.9   8.05  )-----------( 266      ( 14 Feb 2020 23:26 )             36.2     02-14    136  |  100  |  22  ----------------------------<  93  4.6   |  25  |  1.21    Ca    9.0      14 Feb 2020 23:26    TPro  7.0  /  Alb  3.9  /  TBili  0.2  /  DBili  x   /  AST  15  /  ALT  7<L>  /  AlkPhos  79  02-14    PT/INR - ( 14 Feb 2020 23:26 )   PT: 10.8 sec;   INR: 0.95          PTT - ( 14 Feb 2020 23:26 )  PTT:35.2 sec  < from: Xray Chest 1 View AP/PA (02.15.20 @ 00:54) >    EXAM:  XR CHEST AP OR PA 1V                          PROCEDURE DATE:  02/15/2020          INTERPRETATION:  Chest x-ray    Indication: COPD    A portable frontal view of the chest is compared to the prior study dated 11/27/2018. Heart size is mildlyenlarged. No pulmonary consolidation is seen. Skinfold overlies the right chest wall. No pleural effusion or pneumothorax is seen.      IMPRESSION: Cardiomegaly.    < end of copied text >  RADIOLOGY & ADDITIONAL STUDIES (The following images were personally reviewed):

## 2020-02-15 NOTE — H&P ADULT - ASSESSMENT
89M with PMH of HTN, CAD s/p PCI (DAYANA to mRCA, pRCA, mCx 2013 with Dr. 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 Headley with pathology showing Tubulovillous adenoma presented from home for 2-3d of RLE erythema meeting no SIRS criteria admitted for non-purulent cellulitis.

## 2020-02-15 NOTE — ED ADULT NURSE REASSESSMENT NOTE - NS ED NURSE REASSESS COMMENT FT1
Ambulated to bathroom using cane with standby assist. R LE swelling persists. Plan to be admitted under medicine service.

## 2020-02-15 NOTE — H&P ADULT - NSHPPHYSICALEXAM_GEN_ALL_CORE
.  VITAL SIGNS:  T(C): 36.4 (02-15-20 @ 01:01), Max: 36.6 (02-14-20 @ 22:18)  T(F): 97.6 (02-15-20 @ 01:01), Max: 97.9 (02-14-20 @ 22:18)  HR: 66 (02-15-20 @ 01:01) (52 - 66)  BP: 162/66 (02-15-20 @ 01:01) (162/66 - 202/67)  BP(mean): --  RR: 16 (02-15-20 @ 01:01) (16 - 18)  SpO2: 98% (02-15-20 @ 01:01) (97% - 98%)  Wt(kg): --    PHYSICAL EXAM:    Constitutional: WDWN resting comfortably in bed; NAD  Head: NC/AT  Eyes: PERRL, EOMI, anicteric sclera  ENT: no nasal discharge; uvula midline, no oropharyngeal erythema or exudates; MMM  Neck: supple; no JVD or thyromegaly  Respiratory: CTA B/L; no W/R/R, no retractions  Cardiac: +S1/S2; RRR; no M/R/G; PMI non-displaced  Gastrointestinal: soft, NT/ND; no rebound or guarding; +BSx4  Genitourinary: normal external genitalia  Back: spine midline, no bony tenderness or step-offs; no CVAT B/L  Extremities: WWP, no clubbing or cyanosis; no peripheral edema  Musculoskeletal: NROM x4; no joint swelling, tenderness or erythema  Vascular: 2+ radial, femoral, DP/PT pulses B/L  RLE: dry skin at toes, pink bland erythema, 1+ pedal edema over ankles to mid shin no obvious trauma/point of entry, no crepitus, skin c/d/i, no purulent drainage  LLE: dry skin  Dermatologic: skin warm, dry and intact; no rashes, wounds, or scars  Lymphatic: no submandibular or cervical LAD  Neurologic: AAOx3; CNII-XII grossly intact; no focal deficits; slightly tremulous  Psychiatric: affect and characteristics of appearance, verbalizations, behaviors are appropriate

## 2020-02-16 DIAGNOSIS — N40.0 BENIGN PROSTATIC HYPERPLASIA WITHOUT LOWER URINARY TRACT SYMPTOMS: ICD-10-CM

## 2020-02-16 DIAGNOSIS — I10 ESSENTIAL (PRIMARY) HYPERTENSION: ICD-10-CM

## 2020-02-16 DIAGNOSIS — E78.5 HYPERLIPIDEMIA, UNSPECIFIED: ICD-10-CM

## 2020-02-16 DIAGNOSIS — I25.10 ATHEROSCLEROTIC HEART DISEASE OF NATIVE CORONARY ARTERY WITHOUT ANGINA PECTORIS: ICD-10-CM

## 2020-02-16 LAB
ANION GAP SERPL CALC-SCNC: 13 MMOL/L — SIGNIFICANT CHANGE UP (ref 5–17)
BUN SERPL-MCNC: 17 MG/DL — SIGNIFICANT CHANGE UP (ref 7–23)
CALCIUM SERPL-MCNC: 8.5 MG/DL — SIGNIFICANT CHANGE UP (ref 8.4–10.5)
CHLORIDE SERPL-SCNC: 102 MMOL/L — SIGNIFICANT CHANGE UP (ref 96–108)
CO2 SERPL-SCNC: 21 MMOL/L — LOW (ref 22–31)
CREAT SERPL-MCNC: 1.12 MG/DL — SIGNIFICANT CHANGE UP (ref 0.5–1.3)
GLUCOSE SERPL-MCNC: 88 MG/DL — SIGNIFICANT CHANGE UP (ref 70–99)
HCT VFR BLD CALC: 32.6 % — LOW (ref 39–50)
HGB BLD-MCNC: 10.7 G/DL — LOW (ref 13–17)
MAGNESIUM SERPL-MCNC: 1.8 MG/DL — SIGNIFICANT CHANGE UP (ref 1.6–2.6)
MCHC RBC-ENTMCNC: 32.5 PG — SIGNIFICANT CHANGE UP (ref 27–34)
MCHC RBC-ENTMCNC: 32.8 GM/DL — SIGNIFICANT CHANGE UP (ref 32–36)
MCV RBC AUTO: 99.1 FL — SIGNIFICANT CHANGE UP (ref 80–100)
NRBC # BLD: 0 /100 WBCS — SIGNIFICANT CHANGE UP (ref 0–0)
PLATELET # BLD AUTO: 236 K/UL — SIGNIFICANT CHANGE UP (ref 150–400)
POTASSIUM SERPL-MCNC: 4.1 MMOL/L — SIGNIFICANT CHANGE UP (ref 3.5–5.3)
POTASSIUM SERPL-SCNC: 4.1 MMOL/L — SIGNIFICANT CHANGE UP (ref 3.5–5.3)
RBC # BLD: 3.29 M/UL — LOW (ref 4.2–5.8)
RBC # FLD: 12.9 % — SIGNIFICANT CHANGE UP (ref 10.3–14.5)
SODIUM SERPL-SCNC: 136 MMOL/L — SIGNIFICANT CHANGE UP (ref 135–145)
WBC # BLD: 7.3 K/UL — SIGNIFICANT CHANGE UP (ref 3.8–10.5)
WBC # FLD AUTO: 7.3 K/UL — SIGNIFICANT CHANGE UP (ref 3.8–10.5)

## 2020-02-16 RX ORDER — LISINOPRIL 2.5 MG/1
10 TABLET ORAL ONCE
Refills: 0 | Status: COMPLETED | OUTPATIENT
Start: 2020-02-16 | End: 2020-02-16

## 2020-02-16 RX ORDER — CEFAZOLIN SODIUM 1 G
2000 VIAL (EA) INJECTION EVERY 8 HOURS
Refills: 0 | Status: DISCONTINUED | OUTPATIENT
Start: 2020-02-16 | End: 2020-02-18

## 2020-02-16 RX ORDER — HYDROCHLOROTHIAZIDE 25 MG
12.5 TABLET ORAL DAILY
Refills: 0 | Status: DISCONTINUED | OUTPATIENT
Start: 2020-02-16 | End: 2020-02-18

## 2020-02-16 RX ADMIN — Medication 650 MILLIGRAM(S): at 22:45

## 2020-02-16 RX ADMIN — Medication 650 MILLIGRAM(S): at 07:09

## 2020-02-16 RX ADMIN — Medication 650 MILLIGRAM(S): at 15:50

## 2020-02-16 RX ADMIN — Medication 2000 MILLIGRAM(S): at 14:00

## 2020-02-16 RX ADMIN — Medication 650 MILLIGRAM(S): at 14:59

## 2020-02-16 RX ADMIN — SIMVASTATIN 20 MILLIGRAM(S): 20 TABLET, FILM COATED ORAL at 22:20

## 2020-02-16 RX ADMIN — MIRTAZAPINE 30 MILLIGRAM(S): 45 TABLET, ORALLY DISINTEGRATING ORAL at 22:20

## 2020-02-16 RX ADMIN — Medication 650 MILLIGRAM(S): at 06:37

## 2020-02-16 RX ADMIN — Medication 12.5 MILLIGRAM(S): at 10:55

## 2020-02-16 RX ADMIN — LISINOPRIL 10 MILLIGRAM(S): 2.5 TABLET ORAL at 17:15

## 2020-02-16 RX ADMIN — DULOXETINE HYDROCHLORIDE 20 MILLIGRAM(S): 30 CAPSULE, DELAYED RELEASE ORAL at 09:34

## 2020-02-16 RX ADMIN — Medication 81 MILLIGRAM(S): at 09:34

## 2020-02-16 RX ADMIN — TAMSULOSIN HYDROCHLORIDE 0.4 MILLIGRAM(S): 0.4 CAPSULE ORAL at 22:20

## 2020-02-16 RX ADMIN — LISINOPRIL 10 MILLIGRAM(S): 2.5 TABLET ORAL at 06:31

## 2020-02-16 RX ADMIN — Medication 650 MILLIGRAM(S): at 22:22

## 2020-02-16 RX ADMIN — Medication 2000 MILLIGRAM(S): at 22:20

## 2020-02-16 RX ADMIN — ENOXAPARIN SODIUM 40 MILLIGRAM(S): 100 INJECTION SUBCUTANEOUS at 06:31

## 2020-02-16 RX ADMIN — Medication 100 MILLIGRAM(S): at 02:44

## 2020-02-16 NOTE — PROGRESS NOTE ADULT - SUBJECTIVE AND OBJECTIVE BOX
Patient is a 89y old  Male who presents with a chief complaint of     HPI:  89M with PMH of HTN, CAD s/p PCI (DAYANA to mRCA, pRCA, mCx 2013 with Dr. 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 Headley with pathology showing Tubulovillous adenoma presented from home for 2-3d of RLE erythema and swelling. Denies inciting factors/trauma/falls/travel. No weight loss, fevers, chills, decreased po, CP/pressure, cough, n/v/d/c, arthralgia, myalgia. Currently c/o pain behind right knee, chronic diarrhea since hemicolectomy. Last hospitalization one year ago for diarrhea/dehydration at [facility].     T97.6, HR 66, -162/67-88 RR 16, 96% on RA  WBC 8, Hb 12, Plt 266 Trop T .02.BNP 1531  EKG: NSR, first degree AVB, LAFB  CXR: hyperinflated, obscured L CPA  LE duplex: No deep vein thrombosis seen,  4.9 cm right Baker's cyst. Soft tissue edema in the right calf.  Vanco 1g, albuterol (15 Feb 2020 02:40)    INTERVAL HPI/OVERNIGHT EVENTS:::    HEALTH ISSUES - PROBLEM Dx:  Anemia: Anemia  Transition of care performed with sharing of clinical summary: Transition of care performed with sharing of clinical summary  Prophylactic measure: Prophylactic measure  COPD (chronic obstructive pulmonary disease): COPD (chronic obstructive pulmonary disease)  HTN (hypertension): HTN (hypertension)  HLD (hyperlipidemia): HLD (hyperlipidemia)  BPH (benign prostatic hyperplasia): BPH (benign prostatic hyperplasia)  CAD (coronary artery disease): CAD (coronary artery disease)  Cellulitis of right lower extremity: Cellulitis of right lower extremity          PAST MEDICAL & SURGICAL HISTORY:  BPH (benign prostatic hyperplasia)  CAD (coronary artery disease)  HLD (hyperlipidemia)  H/O right hemicolectomy  S/P cataract extraction          Consultant NOTE  REVIEWED  (   )    REVIEW OF SYSTEMS:  [x] As per HPI  CONSTITUTIONAL: weakness  RESPIRATORY: No cough, wheezing, chills or hemoptysis; No Shortness of Breath  CARDIOVASCULAR: No chest pain, palpitations, dizziness, or leg swelling  GASTROINTESTINAL: No abdominal or epigastric pain. No nausea, vomiting, or hematemesis; No diarrhea or constipation. No melena or hematochezia.  MUSCULOSKELETAL: decr MS str  PSYCH    awake, alert       [x] All others negative	  [ ] Unable to obtain          Vital Signs Last 24 Hrs  T(C): 36.8 (16 Feb 2020 10:05), Max: 37.2 (15 Feb 2020 21:46)  T(F): 98.2 (16 Feb 2020 10:05), Max: 99 (15 Feb 2020 21:46)  HR: 72 (16 Feb 2020 10:05) (67 - 76)  BP: 159/77 (16 Feb 2020 10:05) (159/77 - 180/78)  BP(mean): --  RR: 15 (16 Feb 2020 10:05) (15 - 18)  SpO2: 97% (16 Feb 2020 10:05) (93% - 97%)        PHYSICAL EXAMINATION:                                    (    )  NO CHANGE  Appearance: Normal	  HEENT:   Normal oral mucosa, PERRL, EOMI	  Neck: Supple, + JVD/ - JVD; Carotid Bruit   Cardiovascular: Normal S1 S2, No JVD, No murmurs,   Respiratory: Lungs clear to auscultation/Decreased Breath Sounds/No Rales, Rhonchi, Wheezing	  Gastrointestinal:  Soft, Non-tender, + BS	  Skin: cellulits--improved  Extremities: edema LE  Vascular:  decr pulses --no ischemia  Neurologic: Non-focal  Psychiatry: A & O x 3, Mood & affect appropriate    acetaminophen   Tablet .. 650 milliGRAM(s) Oral every 6 hours PRN  albuterol/ipratropium for Nebulization 3 milliLiter(s) Nebulizer every 6 hours PRN  aspirin enteric coated 81 milliGRAM(s) Oral daily  ceFAZolin  Injectable. 2000 milliGRAM(s) IV Push every 8 hours  DULoxetine 20 milliGRAM(s) Oral daily  enoxaparin Injectable 40 milliGRAM(s) SubCutaneous every 24 hours  hydrochlorothiazide 12.5 milliGRAM(s) Oral daily  lisinopril 10 milliGRAM(s) Oral daily  mirtazapine 30 milliGRAM(s) Oral at bedtime  simvastatin 20 milliGRAM(s) Oral at bedtime  tamsulosin 0.4 milliGRAM(s) Oral at bedtime        PT/INR - ( 14 Feb 2020 23:26 )   PT: 10.8 sec;   INR: 0.95          PTT - ( 14 Feb 2020 23:26 )  PTT:35.2 sec    CARDIAC MARKERS ( 14 Feb 2020 23:26 )  x     / 0.02 ng/mL / x     / x     / x                                10.7   7.30  )-----------( 236      ( 16 Feb 2020 06:32 )             32.6     02-16    136  |  102  |  17  ----------------------------<  88  4.1   |  21<L>  |  1.12    Ca    8.5      16 Feb 2020 06:32  Mg     1.8     02-16    TPro  7.0  /  Alb  3.9  /  TBili  0.2  /  DBili  x   /  AST  15  /  ALT  7<L>  /  AlkPhos  79  02-14      CAPILLARY BLOOD GLUCOSE

## 2020-02-16 NOTE — PROGRESS NOTE ADULT - SUBJECTIVE AND OBJECTIVE BOX
Interventional, Pulmonary, Critical, Chest Special Procedures.    Pt was seen and fully examined by myself.     Time spent with patient in minutes:36    Patient is a 89y old  Male who presents with a chief complaint of cellulitis (16 Feb 2020 10:50) The patient feels and appears better today, eupneic on RA and pain free when seen.    HPI:  89M with PMH of HTN, CAD s/p PCI (DAYANA to mRCA, pRCA, mCx 2013 with Dr. 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 Headley with pathology showing Tubulovillous adenoma presented from home for 2-3d of RLE erythema and swelling. Denies inciting factors/trauma/falls/travel. No weight loss, fevers, chills, decreased po, CP/pressure, cough, n/v/d/c, arthralgia, myalgia. Currently c/o pain behind right knee, chronic diarrhea since hemicolectomy. Last hospitalization one year ago for diarrhea/dehydration at [facility].     T97.6, HR 66, -162/67-88 RR 16, 96% on RA  WBC 8, Hb 12, Plt 266 Trop T .02.BNP 1531  EKG: NSR, first degree AVB, LAFB  CXR: hyperinflated, obscured L CPA  LE duplex: No deep vein thrombosis seen,  4.9 cm right Baker's cyst. Soft tissue edema in the right calf.  Vanco 1g, albuterol (15 Feb 2020 02:40)      REVIEW OF SYSTEMS:  Constitutional: No fever, weight loss, chills +++ fatigue  Eyes: No eye pain, visual disturbances, or discharge  ENMT:  No difficulty hearing, tinnitus, vertigo; No sinus or throat pain. No epistaxis, dysphagia, dysphonia, hoarseness or odynophagia  Neck: No pain, stiffness or neck swelling.  No masses or deformities  Respiratory: No cough, wheezing, hemoptysis  - COPD  - ILD   - PE   - ASTHMA     - PNEUMONIA  Cardiovascular: No chest pain, dysrhythmia, palpitations, dizziness or edema + CAD   - CHF   - HTN  Gastrointestinal: No abdominal or epigastric pain. No nausea, vomiting or hematemesis; No diarrhea or constipation. No melena or hematochezia, Icterus.          Genitourinary: No dysuria, frequency, hematuria or incontinence   - CKD/DEBBIE      - ESRD  Neurological: No headaches, memory loss, loss of strength, numbness or tremors      -DEMENTIA     - STROKE    - SEIZURE  Skin: No itching, burning, rashes or lesions   Lymph Nodes: No enlarged glands  Endocrine: No heat or cold intolerance; No hair loss       - DM     - THYROID DISORDER  Musculoskeletal: No joint pain or swelling; No muscle, back  + RLE extremity pain erythema,  edema  Psychiatric: No depression, anxiety, mood swings or difficulty sleeping  Heme/Lymph: No easy bruising or bleeding gums         - ANEMIA      - CANCER   -COAGULOPATHY    PAST MEDICAL & SURGICAL HISTORY:  BPH (benign prostatic hyperplasia)  CAD (coronary artery disease)  HLD (hyperlipidemia)  H/O right hemicolectomy  S/P cataract extraction    FAMILY HISTORY:  No family history of cardiovascular disease (Father, Mother)    SOCIAL HISTORY:      - Tobacco     - ETOH    Allergies    No Known Allergies    Intolerances      Vital Signs Last 24 Hrs  T(C): 36.8 (16 Feb 2020 10:05), Max: 37.2 (15 Feb 2020 21:46)  T(F): 98.2 (16 Feb 2020 10:05), Max: 99 (15 Feb 2020 21:46)  HR: 72 (16 Feb 2020 10:05) (67 - 76)  BP: 159/77 (16 Feb 2020 10:05) (159/77 - 180/78)  BP(mean): --  RR: 15 (16 Feb 2020 10:05) (15 - 18)  SpO2: 97% (16 Feb 2020 10:05) (93% - 97%)        MEDICATIONS:  MEDICATIONS  (STANDING):  aspirin enteric coated 81 milliGRAM(s) Oral daily  ceFAZolin  Injectable. 2000 milliGRAM(s) IV Push every 8 hours  DULoxetine 20 milliGRAM(s) Oral daily  enoxaparin Injectable 40 milliGRAM(s) SubCutaneous every 24 hours  hydrochlorothiazide 12.5 milliGRAM(s) Oral daily  lisinopril 10 milliGRAM(s) Oral daily  mirtazapine 30 milliGRAM(s) Oral at bedtime  simvastatin 20 milliGRAM(s) Oral at bedtime  tamsulosin 0.4 milliGRAM(s) Oral at bedtime    MEDICATIONS  (PRN):  acetaminophen   Tablet .. 650 milliGRAM(s) Oral every 6 hours PRN Moderate Pain (4 - 6)  albuterol/ipratropium for Nebulization 3 milliLiter(s) Nebulizer every 6 hours PRN Shortness of Breath and/or Wheezing      PHYSICAL EXAM:  More Comfortable, no immediate  distress  Eyes: PERRL, EOM intact; conjunctiva and sclera clear  Head: Normocephalic;  No Trauma  ENMT: No nasal discharge, hoarseness, cough or hemoptysis  Neck: Supple; non tender; no masses or deformities.    No JVD  Respiratory: CTA,  - WHEEZING   - RHONCHI  - RALES  - CRACKLES.  Diminished breath sounds  BILATERAL  RIGHT  LEFT bases   Cardiovascular: Regular rate and rhythm. S1 and S2 Normal; No murmurs, gallops or rubs     - PPM/AICD  Gastrointestinal: Soft non-tender, non-distended; Normal bowel sounds; No hepatosplenomegaly.     -PEG    -  GT   - RUBIO  Genitourinary: No costovertebral angle tenderness. No dysuria  Extremities: AROM, No clubbing, cyanosis ,  RLE erythema and edema  grossly resolved  Vascular: Peripheral pulses palpable 2+ bilaterally  Neurological: Alert and responisve to stimuli   Skin: Warm and dry. No obvious rash  Lymph Nodes: No acute cervical or supraclavicular adenopathy  Psychiatric: Cooperative and appropriate mood    DEVICES:  - DENTURES   +IV R / L     - ETUBE   -TRACH   -CTUBE  R / L    LABS:                          10.7   7.30  )-----------( 236      ( 16 Feb 2020 06:32 )             32.6     02-16    136  |  102  |  17  ----------------------------<  88  4.1   |  21<L>  |  1.12    Ca    8.5      16 Feb 2020 06:32  Mg     1.8     02-16    TPro  7.0  /  Alb  3.9  /  TBili  0.2  /  DBili  x   /  AST  15  /  ALT  7<L>  /  AlkPhos  79  02-14    PT/INR - ( 14 Feb 2020 23:26 )   PT: 10.8 sec;   INR: 0.95          PTT - ( 14 Feb 2020 23:26 )  PTT:35.2 sec  < from: Xray Chest 1 View AP/PA (02.15.20 @ 00:54) >    EXAM:  XR CHEST AP OR PA 1V                          PROCEDURE DATE:  02/15/2020          INTERPRETATION:  Chest x-ray    Indication: COPD    A portable frontal view of the chest is compared to the prior study dated 11/27/2018. Heart size is mildlyenlarged. No pulmonary consolidation is seen. Skinfold overlies the right chest wall. No pleural effusion or pneumothorax is seen.      IMPRESSION: Cardiomegaly.    < end of copied text >  RADIOLOGY & ADDITIONAL STUDIES (The following images were personally reviewed):

## 2020-02-16 NOTE — PROGRESS NOTE ADULT - ASSESSMENT
ASSESSMENT/PLAN 89M with PMH of HTN, CAD s/p PCI (DAYANA to mRCA, pRCA, mCx 2013 with Dr. 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 Headley with pathology showing Tubulovillous adenoma presented from home for 2-3d of RLE erythema meeting no SIRS criteria admitted for non-purulent RLE cellulitis.     1. O2 observe off  2. Bronchodilators:  Atrovent/ albuterol q 4 – 6 hours as needed  3. Corticosteroids: off  4. ID/Antibiotics: on Ancef  5. Cardiac/HTN: optimize BP  6. GI: Rx/ prophylaxis c PPI/H2B  7. Heme: Rx/VT prophylaxis c SQH/SCD/AC on Enoxaparin  8. Aspiration precautions  9. Mobilize, OOB, PT  Discussed with managing team

## 2020-02-17 LAB
ANION GAP SERPL CALC-SCNC: 12 MMOL/L — SIGNIFICANT CHANGE UP (ref 5–17)
APPEARANCE UR: CLEAR — SIGNIFICANT CHANGE UP
BILIRUB UR-MCNC: NEGATIVE — SIGNIFICANT CHANGE UP
BUN SERPL-MCNC: 19 MG/DL — SIGNIFICANT CHANGE UP (ref 7–23)
CALCIUM SERPL-MCNC: 8.7 MG/DL — SIGNIFICANT CHANGE UP (ref 8.4–10.5)
CHLORIDE SERPL-SCNC: 102 MMOL/L — SIGNIFICANT CHANGE UP (ref 96–108)
CO2 SERPL-SCNC: 22 MMOL/L — SIGNIFICANT CHANGE UP (ref 22–31)
COLOR SPEC: YELLOW — SIGNIFICANT CHANGE UP
CREAT SERPL-MCNC: 1.28 MG/DL — SIGNIFICANT CHANGE UP (ref 0.5–1.3)
DIFF PNL FLD: ABNORMAL
GLUCOSE SERPL-MCNC: 90 MG/DL — SIGNIFICANT CHANGE UP (ref 70–99)
GLUCOSE UR QL: NEGATIVE — SIGNIFICANT CHANGE UP
HCT VFR BLD CALC: 34.8 % — LOW (ref 39–50)
HGB BLD-MCNC: 11.3 G/DL — LOW (ref 13–17)
KETONES UR-MCNC: NEGATIVE — SIGNIFICANT CHANGE UP
LEUKOCYTE ESTERASE UR-ACNC: NEGATIVE — SIGNIFICANT CHANGE UP
MCHC RBC-ENTMCNC: 32.2 PG — SIGNIFICANT CHANGE UP (ref 27–34)
MCHC RBC-ENTMCNC: 32.5 GM/DL — SIGNIFICANT CHANGE UP (ref 32–36)
MCV RBC AUTO: 99.1 FL — SIGNIFICANT CHANGE UP (ref 80–100)
NITRITE UR-MCNC: NEGATIVE — SIGNIFICANT CHANGE UP
NRBC # BLD: 0 /100 WBCS — SIGNIFICANT CHANGE UP (ref 0–0)
PH UR: 6.5 — SIGNIFICANT CHANGE UP (ref 5–8)
PLATELET # BLD AUTO: 262 K/UL — SIGNIFICANT CHANGE UP (ref 150–400)
POTASSIUM SERPL-MCNC: 4.6 MMOL/L — SIGNIFICANT CHANGE UP (ref 3.5–5.3)
POTASSIUM SERPL-SCNC: 4.6 MMOL/L — SIGNIFICANT CHANGE UP (ref 3.5–5.3)
PROT UR-MCNC: 30 MG/DL
RBC # BLD: 3.51 M/UL — LOW (ref 4.2–5.8)
RBC # FLD: 13 % — SIGNIFICANT CHANGE UP (ref 10.3–14.5)
SODIUM SERPL-SCNC: 136 MMOL/L — SIGNIFICANT CHANGE UP (ref 135–145)
SP GR SPEC: 1.01 — SIGNIFICANT CHANGE UP (ref 1–1.03)
UROBILINOGEN FLD QL: 0.2 E.U./DL — SIGNIFICANT CHANGE UP
WBC # BLD: 8.49 K/UL — SIGNIFICANT CHANGE UP (ref 3.8–10.5)
WBC # FLD AUTO: 8.49 K/UL — SIGNIFICANT CHANGE UP (ref 3.8–10.5)

## 2020-02-17 RX ORDER — LISINOPRIL 2.5 MG/1
10 TABLET ORAL ONCE
Refills: 0 | Status: COMPLETED | OUTPATIENT
Start: 2020-02-17 | End: 2020-02-17

## 2020-02-17 RX ORDER — LISINOPRIL 2.5 MG/1
20 TABLET ORAL DAILY
Refills: 0 | Status: DISCONTINUED | OUTPATIENT
Start: 2020-02-18 | End: 2020-02-18

## 2020-02-17 RX ADMIN — Medication 12.5 MILLIGRAM(S): at 06:31

## 2020-02-17 RX ADMIN — DULOXETINE HYDROCHLORIDE 20 MILLIGRAM(S): 30 CAPSULE, DELAYED RELEASE ORAL at 12:55

## 2020-02-17 RX ADMIN — ENOXAPARIN SODIUM 40 MILLIGRAM(S): 100 INJECTION SUBCUTANEOUS at 06:32

## 2020-02-17 RX ADMIN — LISINOPRIL 10 MILLIGRAM(S): 2.5 TABLET ORAL at 09:15

## 2020-02-17 RX ADMIN — Medication 81 MILLIGRAM(S): at 12:55

## 2020-02-17 RX ADMIN — LISINOPRIL 10 MILLIGRAM(S): 2.5 TABLET ORAL at 06:31

## 2020-02-17 RX ADMIN — Medication 2000 MILLIGRAM(S): at 06:31

## 2020-02-17 RX ADMIN — SIMVASTATIN 20 MILLIGRAM(S): 20 TABLET, FILM COATED ORAL at 21:59

## 2020-02-17 RX ADMIN — Medication 650 MILLIGRAM(S): at 19:26

## 2020-02-17 RX ADMIN — Medication 650 MILLIGRAM(S): at 06:31

## 2020-02-17 RX ADMIN — Medication 650 MILLIGRAM(S): at 13:30

## 2020-02-17 RX ADMIN — LISINOPRIL 10 MILLIGRAM(S): 2.5 TABLET ORAL at 17:29

## 2020-02-17 RX ADMIN — Medication 2000 MILLIGRAM(S): at 14:00

## 2020-02-17 RX ADMIN — TAMSULOSIN HYDROCHLORIDE 0.4 MILLIGRAM(S): 0.4 CAPSULE ORAL at 21:59

## 2020-02-17 RX ADMIN — MIRTAZAPINE 30 MILLIGRAM(S): 45 TABLET, ORALLY DISINTEGRATING ORAL at 21:59

## 2020-02-17 RX ADMIN — Medication 650 MILLIGRAM(S): at 19:56

## 2020-02-17 RX ADMIN — Medication 650 MILLIGRAM(S): at 07:00

## 2020-02-17 RX ADMIN — Medication 2000 MILLIGRAM(S): at 21:59

## 2020-02-17 RX ADMIN — Medication 650 MILLIGRAM(S): at 12:56

## 2020-02-17 NOTE — PROGRESS NOTE ADULT - SUBJECTIVE AND OBJECTIVE BOX
INTERVAL HPI/OVERNIGHT EVENTS: As per nurse and patient, no o/n events. Elevated BP, increased lisinopril to 20mg qd    SUBJECTIVE: Patient was seen and examined at bedside.  No complaints at this time. Patient denies: fever, chills, dizziness, weakness, HA, Changes in vision, CP, palpitations, SOB, cough, N/V/D/C, dysuria, changes in bowel movements, LE edema. ROS otherwise negative.    VITAL SIGNS:  T(F): 97.9 (02-17-20 @ 04:59)  HR: 71 (02-17-20 @ 04:59)  BP: 169/79 (02-17-20 @ 07:39)  RR: 20 (02-17-20 @ 04:59)  SpO2: 97% (02-17-20 @ 04:59)  Wt(kg): --    PHYSICAL EXAM:  Constitutional: WDWN resting comfortably in bed; NAD  HEENT: NC/AT. PERRL, EOMI, anicteric sclera, no nasal discharge; uvula midline, no oropharyngeal erythema or exudates; MMM, supple; no JVD or thyromegaly  Respiratory: CTA B/L; no W/R/R, no retractions  Cardiac: +S1/S2; RRR; no M/R/G; PMI non-displaced  Gastrointestinal: soft, NT/ND; no rebound or guarding; +BSx4  Genitourinary: normal external genitalia  Back: spine midline, no bony tenderness or step-offs; no CVAT B/L  Extremities: WWP, no clubbing or cyanosis; no peripheral edema  Musculoskeletal: NROM x4; no joint swelling, tenderness or erythema  Vascular: 2+ radial, femoral, DP/PT pulses B/L  RLE: dry skin at toes, pink bland erythema, 1+ pedal edema over ankles to mid shin no obvious trauma/point of entry, no crepitus, skin c/d/i, no purulent drainage  LLE: dry skin  Dermatologic: skin warm, dry and intact; no rashes, wounds, or scars  Lymphatic: no submandibular or cervical LAD  Neurologic: AAOx3; CNII-XII grossly intact; no focal deficits; slightly tremulous  Psychiatric: affect and characteristics of appearance, verbalizations, behaviors are appropriate    MEDICATIONS  (STANDING):  aspirin enteric coated 81 milliGRAM(s) Oral daily  ceFAZolin  Injectable. 2000 milliGRAM(s) IV Push every 8 hours  DULoxetine 20 milliGRAM(s) Oral daily  enoxaparin Injectable 40 milliGRAM(s) SubCutaneous every 24 hours  hydrochlorothiazide 12.5 milliGRAM(s) Oral daily  lisinopril 10 milliGRAM(s) Oral once  mirtazapine 30 milliGRAM(s) Oral at bedtime  simvastatin 20 milliGRAM(s) Oral at bedtime  tamsulosin 0.4 milliGRAM(s) Oral at bedtime    MEDICATIONS  (PRN):  acetaminophen   Tablet .. 650 milliGRAM(s) Oral every 6 hours PRN Moderate Pain (4 - 6)  albuterol/ipratropium for Nebulization 3 milliLiter(s) Nebulizer every 6 hours PRN Shortness of Breath and/or Wheezing      Allergies    No Known Allergies    Intolerances        LABS:                        11.3   8.49  )-----------( 262      ( 17 Feb 2020 06:18 )             34.8     02-17    136  |  102  |  19  ----------------------------<  90  4.6   |  22  |  1.28    Ca    8.7      17 Feb 2020 06:18  Mg     1.8     02-16            RADIOLOGY & ADDITIONAL TESTS:  Reviewed

## 2020-02-17 NOTE — PROGRESS NOTE ADULT - PROBLEM SELECTOR PLAN 9
1) PCP Contacted on Admission: (Y/N) --> Name & Phone #: [place]  2) Date of Contact with PCP:  3) PCP Contacted at Discharge: (Y/N, N/A)  4) Summary of Handoff Given to PCP:   5) Post-Discharge Appointment Date and Location:

## 2020-02-17 NOTE — PROGRESS NOTE ADULT - SUBJECTIVE AND OBJECTIVE BOX
CC/ HPI Patient is an 89 year old male with Hx COPD, HTN, CAD s/p PCI, DAYANA to mRCA, pRCA, mCx, 2013, colonic mass s/p right hemicolectomy 2017, admitted from home with RLE cellulitis, seen this morning the patient denies acute respiratory complaint.    PAST MEDICAL & SURGICAL HISTORY:  BPH (benign prostatic hyperplasia)  CAD (coronary artery disease)  HLD (hyperlipidemia)  H/O right hemicolectomy  S/P cataract extraction    SOCHX:  + tobacco,  -  alcohol        FAMILY HISTORY: FA/MO  No family history of cardiovascular disease (Father, Mother)    ROS reviewed below with positive findings marked (+) :  GEN:  fever, chills ENT: tracheostomy,   epistaxis,  sinusitis COR: +CAD, CHF,  +HTN, dysrhythmia PUL: COPD, ILD, asthma, pneumonia GI: PEG, dysphagia, hemorrhage, other ELIZABETH: kidney disease, electrolyte disorder HEM:  anemia, thrombus, coagulopathy, cancer ENDO:  thyroid disease, diabetes mellitus CNS:  dementia, stroke, seizure, PSY:  depression, anxiety, other      MEDICATIONS  (STANDING):  aspirin enteric coated 81 milliGRAM(s) Oral daily  ceFAZolin  Injectable. 2000 milliGRAM(s) IV Push every 8 hours  DULoxetine 20 milliGRAM(s) Oral daily  enoxaparin Injectable 40 milliGRAM(s) SubCutaneous every 24 hours  hydrochlorothiazide 12.5 milliGRAM(s) Oral daily  mirtazapine 30 milliGRAM(s) Oral at bedtime  simvastatin 20 milliGRAM(s) Oral at bedtime  tamsulosin 0.4 milliGRAM(s) Oral at bedtime    MEDICATIONS  (PRN):  acetaminophen   Tablet .. 650 milliGRAM(s) Oral every 6 hours PRN Moderate Pain (4 - 6)  albuterol/ipratropium for Nebulization 3 milliLiter(s) Nebulizer every 6 hours PRN Shortness of Breath and/or Wheezing      Vital Signs Last 24 Hrs  T(C): 36.5 (17 Feb 2020 15:42), Max: 36.9 (16 Feb 2020 21:55)  T(F): 97.7 (17 Feb 2020 15:42), Max: 98.5 (16 Feb 2020 21:55)  HR: 67 (17 Feb 2020 15:42) (62 - 71)  BP: 180/72 (17 Feb 2020 15:42) (148/77 - 182/80)  RR: 18 (17 Feb 2020 15:42) (18 - 20)  SpO2: 93% (17 Feb 2020 15:42) (93% - 97%)    GENERAL:         comfortable,  - distress.  HEENT:            - trauma,  - icterus,  - injection,  - nasal discharge.  NECK:              - jugular venous distention, - thyromegaly.  LYMPH:           - lymphadenopathy, - masses.  RESP:               + clear,   - rales,   - rhonchi,   - wheezes.   COR:                S1S2   - gallops,  - rubs.  ABD:                bowel sounds,   soft, - tender, - distended.  EXT/MSC:         RLE erythema, edema, - cyanosis,  - clubbing.    NEURO:           +  alert and oriented                         11.3   8.49  )-----------( 262      ( 17 Feb 2020 06:18 )             34.8     02-17    136  |  102  |  19  ----------------------------<  90  4.6   |  22  |  1.28      X-ray Chest  (02.15.20) Cardiomegaly.      ASSESSMENT/PLAN    1) RLE cellulitis  2) Coronary artery disease  3) Hypertension  4) Chronic obstructive pulmonary disease (history)    Satisfactory SpO2 (RA)  Bronchodilators:  Atrovent/ albuterol q 4 – 6 hours as needed  ID/Antibiotics: cefazolin, follow up cultures  Cardiac/HTN: BP control  GI: Rx/ prophylaxis c PPI/H2B  Heme: Rx/VT prophylaxis  Discussed with Dr. Baptiste 8

## 2020-02-17 NOTE — PROGRESS NOTE ADULT - PROBLEM SELECTOR PLAN 2
CAD s/p PCI (DAYANA to mRCA, pRCA, mCx 2013 with Dr. Varela), EKG with old ischemic changes c/w previous   -C/w ASA 81, Simvastatin 20

## 2020-02-17 NOTE — PROGRESS NOTE ADULT - ATTENDING COMMENTS
LE--cellulitis::  Continue Vancomycin  CV stable  pulm stable  d/c plan   may need ELISHA-UES
CV stable  LE improved  Cont IV KEFZOL  will d/c to ELISHA ON IV ab,, no picc line needed

## 2020-02-17 NOTE — CONSULT NOTE ADULT - SUBJECTIVE AND OBJECTIVE BOX
Patient is a 89y old  Male who presents with a chief complaint of cellulitis (17 Feb 2020 09:05)       HPI:  89M with PMH of HTN, CAD s/p PCI (DAYANA to mRCA, pRCA, mCx 2013 with Dr. 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 Headley with pathology showing Tubulovillous adenoma presented from home for 2-3d of RLE erythema and swelling. Denies inciting factors/trauma/falls/travel. No weight loss, fevers, chills, decreased po, CP/pressure, cough, n/v/d/c, arthralgia, myalgia. Currently c/o pain behind right knee, chronic diarrhea since hemicolectomy. Last hospitalization one year ago for diarrhea/dehydration at [facility].     T97.6, HR 66, -162/67-88 RR 16, 96% on RA  WBC 8, Hb 12, Plt 266 Trop T .02.BNP 1531  EKG: NSR, first degree AVB, LAFB  CXR: hyperinflated, obscured L CPA  LE duplex: No deep vein thrombosis seen,  4.9 cm right Baker's cyst. Soft tissue edema in the right calf.  Vanco 1g, albuterol (15 Feb 2020 02:40)      PAST MEDICAL & SURGICAL HISTORY:  BPH (benign prostatic hyperplasia)  CAD (coronary artery disease)  HLD (hyperlipidemia)  H/O right hemicolectomy  S/P cataract extraction      MEDICATIONS  (STANDING):  aspirin enteric coated 81 milliGRAM(s) Oral daily  ceFAZolin  Injectable. 2000 milliGRAM(s) IV Push every 8 hours  DULoxetine 20 milliGRAM(s) Oral daily  enoxaparin Injectable 40 milliGRAM(s) SubCutaneous every 24 hours  hydrochlorothiazide 12.5 milliGRAM(s) Oral daily  mirtazapine 30 milliGRAM(s) Oral at bedtime  simvastatin 20 milliGRAM(s) Oral at bedtime  tamsulosin 0.4 milliGRAM(s) Oral at bedtime    MEDICATIONS  (PRN):  acetaminophen   Tablet .. 650 milliGRAM(s) Oral every 6 hours PRN Moderate Pain (4 - 6)  albuterol/ipratropium for Nebulization 3 milliLiter(s) Nebulizer every 6 hours PRN Shortness of Breath and/or Wheezing      Social History:           -  Occupation: X           -  Home Living Status: lives alone in an elevator accessible apartment building           -  Prior Home Care Services: X    Baseline Functional Level Prior to Admission:           - ADL's:  independent           - ambulates with a cane    FAMILY HISTORY:  No family history of cardiovascular disease (Father, Mother)      CBC Full  -  ( 17 Feb 2020 06:18 )  WBC Count : 8.49 K/uL  RBC Count : 3.51 M/uL  Hemoglobin : 11.3 g/dL  Hematocrit : 34.8 %  Platelet Count - Automated : 262 K/uL  Mean Cell Volume : 99.1 fl  Mean Cell Hemoglobin : 32.2 pg  Mean Cell Hemoglobin Concentration : 32.5 gm/dL  Auto Neutrophil # : x  Auto Lymphocyte # : x  Auto Monocyte # : x  Auto Eosinophil # : x  Auto Basophil # : x  Auto Neutrophil % : x  Auto Lymphocyte % : x  Auto Monocyte % : x  Auto Eosinophil % : x  Auto Basophil % : x      02-17    136  |  102  |  19  ----------------------------<  90  4.6   |  22  |  1.28    Ca    8.7      17 Feb 2020 06:18  Mg     1.8     02-16              Radiology:    < from: US Duplex Venous Lower Ext Ltd, Right (02.15.20 @ 00:13) >  EXAM:  US DPLX LWR EXT VEINS LTD RT                          PROCEDURE DATE:  02/15/2020          INTERPRETATION:  VENOUS DUPLEX DOPPLER OF THE RIGHT LOWER EXTREMITY dated 2/15/2020 12:13 AM    INDICATION: Right leg pain and swelling.    TECHNIQUE:Duplex Doppler evaluation including gray-scale ultrasound imaging, color flow Doppler imaging, and Doppler spectral analysis of the veins of the right lower extremity was performed.     COMPARISON: None    FINDINGS:    Thigh veins: The right common femoral, femoral, popliteal, proximal greater saphenous, and proximal deep femoral veins are patent and free of thrombus. The veins are normally compressible and have normal phasic flow and augmentation response.    Calf veins: The paired peroneal and posterior tibial calf veins are patent.    Contralateral CFV: The contralateral (left) common femoral vein is patent and free of thrombus.    There is a 4.9 x 1.3 x 4.5 cm avascular complex cystic structure in the right popliteal fossa consistent witha Johnson's cyst. There is mild nonspecific edema of the subcutaneous soft tissues of the right calf.      IMPRESSION:  1.  No deep vein thrombosis seen.   2.  4.9 cm right Baker's cyst. Soft tissue edema in the right calf.      < from: Xray Chest 1 View AP/PA (02.15.20 @ 00:54) >  EXAM:  XR CHEST AP OR PA 1V                          PROCEDURE DATE:  02/15/2020          INTERPRETATION:  Chest x-ray    Indication: COPD    A portable frontal view of the chest is compared to the prior study dated 11/27/2018. Heart size is mildlyenlarged. No pulmonary consolidation is seen. Skinfold overlies the right chest wall. No pleural effusion or pneumothorax is seen.      IMPRESSION: Cardiomegaly.          Vital Signs Last 24 Hrs  T(C): 36.6 (17 Feb 2020 09:14), Max: 36.9 (16 Feb 2020 21:55)  T(F): 97.9 (17 Feb 2020 09:14), Max: 98.5 (16 Feb 2020 21:55)  HR: 67 (17 Feb 2020 09:14) (62 - 71)  BP: 178/65 (17 Feb 2020 09:14) (148/77 - 195/91)  BP(mean): --  RR: 20 (17 Feb 2020 09:14) (18 - 20)  SpO2: 94% (17 Feb 2020 09:14) (94% - 97%)    REVIEW OF SYSTEMS:    CONSTITUTIONAL:  fatigue  EYES: No eye pain, visual disturbances, or discharge  ENMT:  No difficulty hearing, tinnitus, vertigo; No sinus or throat pain  NECK: No pain or stiffness  BREASTS: No pain, masses, or nipple discharge  RESPIRATORY: No cough, wheezing, chills or hemoptysis; No shortness of breath  CARDIOVASCULAR: No chest pain, palpitations, dizziness, or leg swelling  GASTROINTESTINAL: No abdominal or epigastric pain. No nausea, vomiting, or hematemesis; No diarrhea or constipation. No melena or hematochezia.  GENITOURINARY: No dysuria, frequency, hematuria, or incontinence  NEUROLOGICAL: No headaches, memory loss, loss of strength, numbness, or tremors  SKIN: No itching, burning, rashes, or lesions   LYMPH NODES: No enlarged glands  ENDOCRINE: No heat or cold intolerance; No hair loss  MUSCULOSKELETAL: No joint pain or swelling; No muscle, back, or extremity pain  PSYCHIATRIC: No depression, anxiety, mood swings, or difficulty sleeping  HEME/LYMPH: No easy bruising, or bleeding gums  ALLERGY AND IMMUNOLOGIC: No hives or eczema  VASCULAR: R leg swelling        Physical Exam: 88 yo  gentleman lying in semi Arriaga's position, c/o fatigue    Head: normocephalic, atraumatic    Eyes: PERRLA, EOMI, no nystagmus, sclera anicteric    ENT: nasal discharge, uvula midline, no oropharyngeal erythema/exudate    Neck: supple, negative JVD, negative carotid bruits, no thyromegaly    Chest: CTA bilaterally, neg wheeze/ rhonchi/ rales/ crackles/ egophany    Cardiovascular: regular rate and rhythm, neg murmurs/rubs/gallops    Abdomen: soft, non distended, non tender to palpation in all 4 quadrants, negative rebound/guarding, normal bowel sounds    Extremities: LE edema, R leg erythema w/o purulence,  negative calf tenderness to palpation, negative Med's sign      :     Neurologic Exam:    Alert and oriented to person, place, date/year, speech fluent w/o dysarthria, recent and remote memory intact, repetition intact, comprehension intact    Cranial Nerves:     II:                       pupils equal, round and reactive to light, visual fields intact   III/ IV/VI:            extraocular movements intact, neg nystagmus, neg ptosis  V:                       facial sensation intact, V1-3 normal  VII:                     face symmetric, no droop, normal eye closure and smile  VIII:                    hearing intact to finger rub bilaterally  IX/ X:                 soft palate rise symmetrical  XI:                      head turning, shoulder shrug normal  XII:                     tongue midline    Motor Exam:    Upper Extremities:     RIght:  no focal weakness               negative drift    Left :    no focal weakness               negative drift    Lower Extremities:                 Right:   no focal weakness                 Left:      no focal weakness                 Sensory:    intact to LT/PP in all UE/LE dermatomes                     DTR:             = biceps/     triceps/     brachioradialis                      = patella/   medial hamstring/ankle                      neg clonus                      neg Babinski                          Gait:  not tested        PM&R Impression:    1) deconditioned  2) no focal weakness  3) LE cellulitis      Recommendations:    1) Physical therapy focusing on therapeutic exercises, bed mobility/transfer out of bed evaluation, progressive ambulation with assistive devices prn.    2) Anticipated Disposition Plan/Recs: probable subacute rehab placement

## 2020-02-17 NOTE — PROGRESS NOTE ADULT - PROBLEM SELECTOR PLAN 6
Pt previously on lisinopril. Was uncontrolled in ED. Start Lisinopril 10 then uptitrate  - c/w lisinopril 20mg    #Hypertensive Urgency   One reading of  that resolved without intervention. Asymptomatic. Pt not currently on anti-hypertensives.   -Plan as above

## 2020-02-17 NOTE — CONSULT NOTE ADULT - ASSESSMENT
per Internal Medicine    89M with PMH of HTN, CAD s/p PCI (DAYANA to mRCA, pRCA, mCx 2013 with Dr. 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 Headley with pathology showing Tubulovillous adenoma presented from home for 2-3d of RLE erythema meeting no SIRS criteria admitted for non-purulent cellulitis.     Problem/Plan - 1:  ·  Problem: Cellulitis of right lower extremity.  Plan: -3d of RLE erythema meeting no SIRS criteria admitted for non-purulent cellulitis. Non-purulent cellulitis. No hospitalization within a year, lower suspicion for MRSA  -C/w Cefazolin 2gram q8hr while admitted then transition to Keflex 500 four times a day for a total of 5d  -F/u Bcx.     Problem/Plan - 2:  ·  Problem: CAD (coronary artery disease).  Plan: CAD s/p PCI (DAYANA to mRCA, pRCA, mCx 2013 with Dr. Varela), EKG with old ischemic changes c/w previous   -C/w ASA 81, Simvastatin 20.     Problem/Plan - 3:  ·  Problem: BPH (benign prostatic hyperplasia).  Plan: -C/w Flomax 0.4 qHS.     Problem/Plan - 4:  ·  Problem: Anemia.  Plan: Normocytic anemia. At baseline  -F/u iron studies.     Problem/Plan - 5:  ·  Problem: HLD (hyperlipidemia).  Plan: -C/w  Simvastatin 20.     Problem/Plan - 6:  Problem: HTN (hypertension). Plan: Pt previously on lisinopril. Was uncontrolled in ED. Start Lisinopril 10 then uptitrate  - c/w lisinopril 20mg    #Hypertensive Urgency   One reading of  that resolved without intervention. Asymptomatic. Pt not currently on anti-hypertensives.   -Plan as above.    Problem/Plan - 7:  ·  Problem: COPD (chronic obstructive pulmonary disease).  Plan: Previously on advair? Not in exacerbation   -Duonebs PRN.     Problem/Plan - 8:  ·  Problem: Prophylactic measure.  Plan: F: none   E: replete PRN   N: dash/tlc   DVT: lovenox 40   Dispo: [facility]F.

## 2020-02-17 NOTE — PROGRESS NOTE ADULT - SUBJECTIVE AND OBJECTIVE BOX
INTERVAL HPI/OVERNIGHT EVENTS: As per nurse and patient, no o/n events. Elevated BP, increased lisinopril to 20mg qd    SUBJECTIVE:     VITAL SIGNS:  T(F): 97.9 (02-17-20 @ 04:59)  HR: 71 (02-17-20 @ 04:59)  BP: 169/79 (02-17-20 @ 07:39)  RR: 20 (02-17-20 @ 04:59)  SpO2: 97% (02-17-20 @ 04:59)  Wt(kg): --    PHYSICAL EXAM:  Constitutional: WDWN resting comfortably in bed; NAD  HEENT: NC/AT. PERRL, EOMI, anicteric sclera, no nasal discharge; uvula midline, no oropharyngeal erythema or exudates; MMM, supple; no JVD or thyromegaly  Respiratory: CTA B/L; no W/R/R, no retractions  Cardiac: +S1/S2; RRR; no M/R/G; PMI non-displaced  Gastrointestinal: soft, NT/ND; no rebound or guarding; +BSx4  Genitourinary: normal external genitalia  Back: spine midline, no bony tenderness or step-offs; no CVAT B/L  Extremities: WWP, no clubbing or cyanosis; no peripheral edema  Musculoskeletal: NROM x4; no joint swelling, tenderness or erythema  Vascular: 2+ radial, femoral, DP/PT pulses B/L  RLE: dry skin at toes, pink bland erythema, 1+ pedal edema over ankles to mid shin no obvious trauma/point of entry, no crepitus, skin c/d/i, no purulent drainage  LLE: dry skin  Dermatologic: skin warm, dry and intact; no rashes, wounds, or scars  Lymphatic: no submandibular or cervical LAD  Neurologic: AAOx3; CNII-XII grossly intact; no focal deficits; slightly tremulous  Psychiatric: affect and characteristics of appearance, verbalizations, behaviors are appropriate    MEDICATIONS  (STANDING):  aspirin enteric coated 81 milliGRAM(s) Oral daily  ceFAZolin  Injectable. 2000 milliGRAM(s) IV Push every 8 hours  DULoxetine 20 milliGRAM(s) Oral daily  enoxaparin Injectable 40 milliGRAM(s) SubCutaneous every 24 hours  hydrochlorothiazide 12.5 milliGRAM(s) Oral daily  lisinopril 10 milliGRAM(s) Oral once  mirtazapine 30 milliGRAM(s) Oral at bedtime  simvastatin 20 milliGRAM(s) Oral at bedtime  tamsulosin 0.4 milliGRAM(s) Oral at bedtime    MEDICATIONS  (PRN):  acetaminophen   Tablet .. 650 milliGRAM(s) Oral every 6 hours PRN Moderate Pain (4 - 6)  albuterol/ipratropium for Nebulization 3 milliLiter(s) Nebulizer every 6 hours PRN Shortness of Breath and/or Wheezing      Allergies    No Known Allergies    Intolerances        LABS:                        11.3   8.49  )-----------( 262      ( 17 Feb 2020 06:18 )             34.8     02-17    136  |  102  |  19  ----------------------------<  90  4.6   |  22  |  1.28    Ca    8.7      17 Feb 2020 06:18  Mg     1.8     02-16            RADIOLOGY & ADDITIONAL TESTS:  Reviewed

## 2020-02-18 ENCOUNTER — TRANSCRIPTION ENCOUNTER (OUTPATIENT)
Age: 85
End: 2020-02-18

## 2020-02-18 VITALS
TEMPERATURE: 97 F | SYSTOLIC BLOOD PRESSURE: 164 MMHG | OXYGEN SATURATION: 94 % | DIASTOLIC BLOOD PRESSURE: 71 MMHG | RESPIRATION RATE: 17 BRPM | HEART RATE: 64 BPM

## 2020-02-18 LAB
ANION GAP SERPL CALC-SCNC: 13 MMOL/L — SIGNIFICANT CHANGE UP (ref 5–17)
BUN SERPL-MCNC: 17 MG/DL — SIGNIFICANT CHANGE UP (ref 7–23)
CALCIUM SERPL-MCNC: 9.4 MG/DL — SIGNIFICANT CHANGE UP (ref 8.4–10.5)
CHLORIDE SERPL-SCNC: 100 MMOL/L — SIGNIFICANT CHANGE UP (ref 96–108)
CO2 SERPL-SCNC: 25 MMOL/L — SIGNIFICANT CHANGE UP (ref 22–31)
CREAT SERPL-MCNC: 1.16 MG/DL — SIGNIFICANT CHANGE UP (ref 0.5–1.3)
GLUCOSE SERPL-MCNC: 91 MG/DL — SIGNIFICANT CHANGE UP (ref 70–99)
HCT VFR BLD CALC: 35.7 % — LOW (ref 39–50)
HGB BLD-MCNC: 11.8 G/DL — LOW (ref 13–17)
MAGNESIUM SERPL-MCNC: 1.8 MG/DL — SIGNIFICANT CHANGE UP (ref 1.6–2.6)
MCHC RBC-ENTMCNC: 32.1 PG — SIGNIFICANT CHANGE UP (ref 27–34)
MCHC RBC-ENTMCNC: 33.1 GM/DL — SIGNIFICANT CHANGE UP (ref 32–36)
MCV RBC AUTO: 97 FL — SIGNIFICANT CHANGE UP (ref 80–100)
NRBC # BLD: 0 /100 WBCS — SIGNIFICANT CHANGE UP (ref 0–0)
PLATELET # BLD AUTO: 256 K/UL — SIGNIFICANT CHANGE UP (ref 150–400)
POTASSIUM SERPL-MCNC: 4.4 MMOL/L — SIGNIFICANT CHANGE UP (ref 3.5–5.3)
POTASSIUM SERPL-SCNC: 4.4 MMOL/L — SIGNIFICANT CHANGE UP (ref 3.5–5.3)
RBC # BLD: 3.68 M/UL — LOW (ref 4.2–5.8)
RBC # FLD: 12.7 % — SIGNIFICANT CHANGE UP (ref 10.3–14.5)
SODIUM SERPL-SCNC: 138 MMOL/L — SIGNIFICANT CHANGE UP (ref 135–145)
WBC # BLD: 7.93 K/UL — SIGNIFICANT CHANGE UP (ref 3.8–10.5)
WBC # FLD AUTO: 7.93 K/UL — SIGNIFICANT CHANGE UP (ref 3.8–10.5)

## 2020-02-18 PROCEDURE — 85610 PROTHROMBIN TIME: CPT

## 2020-02-18 PROCEDURE — 85027 COMPLETE CBC AUTOMATED: CPT

## 2020-02-18 PROCEDURE — 71045 X-RAY EXAM CHEST 1 VIEW: CPT

## 2020-02-18 PROCEDURE — 84145 PROCALCITONIN (PCT): CPT

## 2020-02-18 PROCEDURE — 99285 EMERGENCY DEPT VISIT HI MDM: CPT | Mod: 25

## 2020-02-18 PROCEDURE — 97116 GAIT TRAINING THERAPY: CPT

## 2020-02-18 PROCEDURE — 85730 THROMBOPLASTIN TIME PARTIAL: CPT

## 2020-02-18 PROCEDURE — 83880 ASSAY OF NATRIURETIC PEPTIDE: CPT

## 2020-02-18 PROCEDURE — 97110 THERAPEUTIC EXERCISES: CPT

## 2020-02-18 PROCEDURE — 80048 BASIC METABOLIC PNL TOTAL CA: CPT

## 2020-02-18 PROCEDURE — 96365 THER/PROPH/DIAG IV INF INIT: CPT

## 2020-02-18 PROCEDURE — 81001 URINALYSIS AUTO W/SCOPE: CPT

## 2020-02-18 PROCEDURE — 84466 ASSAY OF TRANSFERRIN: CPT

## 2020-02-18 PROCEDURE — 80053 COMPREHEN METABOLIC PANEL: CPT

## 2020-02-18 PROCEDURE — 36415 COLL VENOUS BLD VENIPUNCTURE: CPT

## 2020-02-18 PROCEDURE — 93971 EXTREMITY STUDY: CPT

## 2020-02-18 PROCEDURE — 87040 BLOOD CULTURE FOR BACTERIA: CPT

## 2020-02-18 PROCEDURE — 84484 ASSAY OF TROPONIN QUANT: CPT

## 2020-02-18 PROCEDURE — 97162 PT EVAL MOD COMPLEX 30 MIN: CPT

## 2020-02-18 PROCEDURE — 83735 ASSAY OF MAGNESIUM: CPT

## 2020-02-18 PROCEDURE — 85045 AUTOMATED RETICULOCYTE COUNT: CPT

## 2020-02-18 PROCEDURE — 94640 AIRWAY INHALATION TREATMENT: CPT

## 2020-02-18 RX ORDER — CEFAZOLIN SODIUM 1 G
2 VIAL (EA) INJECTION
Qty: 0 | Refills: 0 | DISCHARGE
Start: 2020-02-18

## 2020-02-18 RX ORDER — IPRATROPIUM/ALBUTEROL SULFATE 18-103MCG
3 AEROSOL WITH ADAPTER (GRAM) INHALATION
Qty: 0 | Refills: 0 | DISCHARGE
Start: 2020-02-18

## 2020-02-18 RX ORDER — LISINOPRIL 2.5 MG/1
20 TABLET ORAL EVERY 24 HOURS
Refills: 0 | Status: DISCONTINUED | OUTPATIENT
Start: 2020-02-18 | End: 2020-02-18

## 2020-02-18 RX ORDER — LISINOPRIL 2.5 MG/1
1 TABLET ORAL
Qty: 0 | Refills: 0 | DISCHARGE
Start: 2020-02-18

## 2020-02-18 RX ORDER — MAGNESIUM SULFATE 500 MG/ML
1 VIAL (ML) INJECTION ONCE
Refills: 0 | Status: COMPLETED | OUTPATIENT
Start: 2020-02-18 | End: 2020-02-18

## 2020-02-18 RX ADMIN — Medication 2000 MILLIGRAM(S): at 06:28

## 2020-02-18 RX ADMIN — ENOXAPARIN SODIUM 40 MILLIGRAM(S): 100 INJECTION SUBCUTANEOUS at 06:28

## 2020-02-18 RX ADMIN — DULOXETINE HYDROCHLORIDE 20 MILLIGRAM(S): 30 CAPSULE, DELAYED RELEASE ORAL at 13:29

## 2020-02-18 RX ADMIN — Medication 81 MILLIGRAM(S): at 13:29

## 2020-02-18 RX ADMIN — Medication 12.5 MILLIGRAM(S): at 06:28

## 2020-02-18 RX ADMIN — Medication 2000 MILLIGRAM(S): at 13:29

## 2020-02-18 RX ADMIN — Medication 650 MILLIGRAM(S): at 06:28

## 2020-02-18 RX ADMIN — Medication 100 GRAM(S): at 09:04

## 2020-02-18 RX ADMIN — Medication 650 MILLIGRAM(S): at 06:50

## 2020-02-18 RX ADMIN — LISINOPRIL 20 MILLIGRAM(S): 2.5 TABLET ORAL at 09:04

## 2020-02-18 NOTE — DISCHARGE NOTE PROVIDER - NSDCFUADDAPPT_GEN_ALL_CORE_FT
Please follow up with Dr. Montalvo. Please follow up with Dr. Montalvo, your primary care physician, upon discharge from [facility].

## 2020-02-18 NOTE — PROGRESS NOTE ADULT - PROBLEM SELECTOR PLAN 6
Pt previously on lisinopril. Was uncontrolled in ED. Started w/ Lisinopril 10 then uptitrate  - c/w lisinopril 20mg    #Hypertensive Urgency   One reading of  that resolved without intervention. Asymptomatic. Pt not currently on anti-hypertensives.   -Plan as above

## 2020-02-18 NOTE — PROGRESS NOTE ADULT - PROBLEM SELECTOR PROBLEM 1
Transition of care performed with sharing of clinical summary
Cellulitis of right lower extremity

## 2020-02-18 NOTE — DISCHARGE NOTE NURSING/CASE MANAGEMENT/SOCIAL WORK - PATIENT PORTAL LINK FT
You can access the FollowMyHealth Patient Portal offered by [facility] by registering at the following website: [email or web address]. By joining [facility]’s FollowMyHealth portal, you will also be able to view your health information using other applications (apps) compatible with our system.

## 2020-02-18 NOTE — PROGRESS NOTE ADULT - PROBLEM SELECTOR PROBLEM 2
Hyperlipidemia, unspecified hyperlipidemia type
CAD (coronary artery disease)

## 2020-02-18 NOTE — DISCHARGE NOTE NURSING/CASE MANAGEMENT/SOCIAL WORK - NSDCFUADDAPPT_GEN_ALL_CORE_FT
Please follow up with Dr. Montalvo, your primary care physician, upon discharge from [facility].

## 2020-02-18 NOTE — PROGRESS NOTE ADULT - PROBLEM SELECTOR PROBLEM 3
Benign prostatic hyperplasia, unspecified whether lower urinary tract symptoms present
BPH (benign prostatic hyperplasia)

## 2020-02-18 NOTE — DISCHARGE NOTE PROVIDER - HOSPITAL COURSE
#Discharge: do not delete        89M with PMH of HTN, CAD s/p PCI (DAYANA to mRCA, pRCA, mCx 2013 with Dr. 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 Headley with pathology showing Tubulovillous adenoma presented from home for 2-3d of RLE erythema meeting no SIRS criteria admitted for non-purulent cellulitis.         #Cellulitis of right lower extremity:    -RLE erythema meeting no SIRS criteria admitted for non-purulent cellulitis. No hospitalization within a year, lower suspicion for MRSA.    -Treated with Cefazolin 2gram q8hr while admitted (transition to Keflex 500 four times a day) for a total of 5d course (ends 2/19)    -Bcx negative        #HTN (hypertension)    Pt previously on lisinopril. Was uncontrolled in ED. Started w/ Lisinopril 10mg then up-titrated to 20mg    - lisinopril 20mg    - HCTZ 12.5 qd        #Hypertensive Urgency     One reading of  that resolved without intervention. Asymptomatic. Pt not currently on anti-hypertensives.         #CAD (coronary artery disease).     CAD s/p PCI (DAYANA to mRCA, pRCA, mCx 2013 with Dr. Varela), EKG with old ischemic changes c/w previous     -ASA 81, Simvastatin 20.         #BPH (benign prostatic hyperplasia)    -C/w Flomax 0.4 qHS.         #Anemia    Normocytic anemia. At baseline        #HLD (hyperlipidemia)    -C/w  Simvastatin 20.         COPD (chronic obstructive pulmonary disease)    reviously on advair? Not in exacerbation     -Duonebs PRN.         New medications: Cefazolin    Labs to be followed outpatient:     Exam to be followed outpatient: #Discharge: do not delete        89M with PMH of HTN, CAD s/p PCI (DAYANA to mRCA, pRCA, mCx 2013 with Dr. 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 Headley with pathology showing Tubulovillous adenoma presented from home for 2-3d of RLE erythema meeting no SIRS criteria admitted for non-purulent cellulitis.         #Cellulitis of right lower extremity:    -RLE erythema meeting no SIRS criteria admitted for non-purulent cellulitis. No hospitalization within a year, lower suspicion for MRSA.    -Treated with Cefazolin 2gram q8hr while admitted for a total of 5d course (ends 2/19)    -Bcx negative        #HTN (hypertension)    Pt previously on lisinopril. Was uncontrolled in ED. Started w/ Lisinopril 10mg then up-titrated to 20mg    - lisinopril 20mg    - HCTZ 12.5 qd        #Hypertensive Urgency     One reading of  that resolved without intervention. Asymptomatic. Pt not currently on anti-hypertensives.         #CAD (coronary artery disease).     CAD s/p PCI (DAYANA to mRCA, pRCA, mCx 2013 with Dr. Varela), EKG with old ischemic changes c/w previous     -ASA 81, Simvastatin 20.         #BPH (benign prostatic hyperplasia)    -C/w Flomax 0.4 qHS.         #Anemia    Normocytic anemia. At baseline        #HLD (hyperlipidemia)    -C/w  Simvastatin 20.         COPD (chronic obstructive pulmonary disease)    reviously on advair? Not in exacerbation     -Duonebs PRN.         New medications: Cefazolin    Labs to be followed outpatient:     Exam to be followed outpatient: #Discharge: do not delete        89M with PMH of HTN, CAD s/p PCI (DAYANA to mRCA, pRCA, mCx 2013 with Dr. 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 Headley with pathology showing Tubulovillous adenoma presented from home for 2-3d of RLE erythema meeting no SIRS criteria admitted for non-purulent cellulitis.         #Cellulitis of right lower extremity:    -RLE erythema meeting no SIRS criteria admitted for non-purulent cellulitis. No hospitalization within a year, lower suspicion for MRSA.    -Treated with Cefazolin 2gram q8hr while admitted for a total of 5d course (ends 2/19)    -Bcx negative        #HTN (hypertension)    Pt previously on lisinopril. Was uncontrolled in ED. Started w/ Lisinopril 10mg then up-titrated to 20mg    - lisinopril 20mg    - HCTZ 12.5 qd        #Hypertensive Urgency     One reading of  that resolved without intervention. Asymptomatic. Pt not currently on anti-hypertensives.         #CAD (coronary artery disease).     CAD s/p PCI (DAYANA to mRCA, pRCA, mCx 2013 with Dr. Varela), EKG with old ischemic changes c/w previous     -ASA 81, Simvastatin 20.         #BPH (benign prostatic hyperplasia)    -C/w Flomax 0.4 qHS.         #Anemia    Normocytic anemia. At baseline        #HLD (hyperlipidemia)    -C/w  Simvastatin 20.         COPD (chronic obstructive pulmonary disease)    reviously on advair? Not in exacerbation     -Duonebs PRN.         New medications: Cefazolin    Labs to be followed outpatient: None    Exam to be followed outpatient: None

## 2020-02-18 NOTE — DISCHARGE NOTE PROVIDER - CARE PROVIDER_API CALL
Elias Montalvo)  Internal Medicine  [address] [address]  Phone: [phone]  Fax: [phone]  Follow Up Time:

## 2020-02-18 NOTE — DISCHARGE NOTE PROVIDER - NSDCMRMEDTOKEN_GEN_ALL_CORE_FT
Aspirin Enteric Coated 81 mg oral delayed release tablet: 1 tab(s) orally once a day  DULoxetine 20 mg oral delayed release capsule: 1 cap(s) orally 2 times a day  Flomax 0.4 mg oral capsule: 1 cap(s) orally once a day  mirtazapine 15 mg oral tablet: 2 tab(s) orally once a day (at bedtime)  simvastatin 20 mg oral tablet: 1 tab(s) orally once a day (at bedtime) Aspirin Enteric Coated 81 mg oral delayed release tablet: 1 tab(s) orally once a day  ceFAZolin 2 g intravenous injection: 2 gram(s) intravenous every 8 hours, Please complete 6 more doses  DULoxetine 20 mg oral delayed release capsule: 1 cap(s) orally 2 times a day  Flomax 0.4 mg oral capsule: 1 cap(s) orally once a day  hydroCHLOROthiazide 12.5 mg oral capsule: 1 cap(s) orally once a day  ipratropium-albuterol 0.5 mg-2.5 mg/3 mLinhalation solution: 3 milliliter(s) inhaled every 6 hours, As needed, Shortness of Breath and/or Wheezing  lisinopril 20 mg oral tablet: 1 tab(s) orally every 24 hours  mirtazapine 15 mg oral tablet: 2 tab(s) orally once a day (at bedtime)  simvastatin 20 mg oral tablet: 1 tab(s) orally once a day (at bedtime) Aspirin Enteric Coated 81 mg oral delayed release tablet: 1 tab(s) orally once a day  ceFAZolin 2 g intravenous injection: 2 gram(s) intravenous every 8 hours, Please stop after 6 doses  DULoxetine 20 mg oral delayed release capsule: 1 cap(s) orally 2 times a day  Flomax 0.4 mg oral capsule: 1 cap(s) orally once a day  hydroCHLOROthiazide 12.5 mg oral capsule: 1 cap(s) orally once a day  ipratropium-albuterol 0.5 mg-2.5 mg/3 mLinhalation solution: 3 milliliter(s) inhaled every 6 hours, As needed, Shortness of Breath and/or Wheezing  lisinopril 20 mg oral tablet: 1 tab(s) orally every 24 hours  mirtazapine 15 mg oral tablet: 2 tab(s) orally once a day (at bedtime)  simvastatin 20 mg oral tablet: 1 tab(s) orally once a day (at bedtime)

## 2020-02-18 NOTE — PROGRESS NOTE ADULT - SUBJECTIVE AND OBJECTIVE BOX
CC/ HPI Patient is an 89 year old male with Hx COPD, HTN, CAD s/p PCI, DAYANA to mRCA, pRCA, mCx, 2013, colonic mass s/p right hemicolectomy 2017, admitted from home with RLE cellulitis, seen this morning the patient denies acute respiratory complaint.    PAST MEDICAL & SURGICAL HISTORY:  BPH (benign prostatic hyperplasia)  CAD (coronary artery disease)  HLD (hyperlipidemia)  H/O right hemicolectomy  S/P cataract extraction    SOCHX:  + tobacco,  -  alcohol        FAMILY HISTORY: FA/MO  No family history of cardiovascular disease (Father, Mother)    ROS reviewed below with positive findings marked (+) :  GEN:  fever, chills ENT: tracheostomy,   epistaxis,  sinusitis COR: +CAD, CHF,  +HTN, dysrhythmia PUL: COPD, ILD, asthma, pneumonia GI: PEG, dysphagia, hemorrhage, other ELIZABETH: kidney disease, electrolyte disorder HEM:  anemia, thrombus, coagulopathy, cancer ENDO:  thyroid disease, diabetes mellitus CNS:  dementia, stroke, seizure, PSY:  depression, anxiety, other      MEDICATIONS  (STANDING):  aspirin enteric coated 81 milliGRAM(s) Oral daily  ceFAZolin  Injectable. 2000 milliGRAM(s) IV Push every 8 hours  DULoxetine 20 milliGRAM(s) Oral daily  enoxaparin Injectable 40 milliGRAM(s) SubCutaneous every 24 hours  hydrochlorothiazide 12.5 milliGRAM(s) Oral daily  lisinopril 20 milliGRAM(s) Oral every 24 hours  mirtazapine 30 milliGRAM(s) Oral at bedtime  simvastatin 20 milliGRAM(s) Oral at bedtime  tamsulosin 0.4 milliGRAM(s) Oral at bedtime    MEDICATIONS  (PRN):  acetaminophen   Tablet .. 650 milliGRAM(s) Oral every 6 hours PRN Moderate Pain (4 - 6)  albuterol/ipratropium for Nebulization 3 milliLiter(s) Nebulizer every 6 hours PRN Shortness of Breath and/or Wheezing      Vital Signs Last 24 Hrs  T(C): 36.3 (18 Feb 2020 09:03), Max: 36.8 (17 Feb 2020 21:29)  T(F): 97.3 (18 Feb 2020 09:03), Max: 98.2 (17 Feb 2020 21:29)  HR: 64 (18 Feb 2020 09:03) (64 - 93)  BP: 164/71 (18 Feb 2020 09:03) (164/71 - 180/72)  RR: 17 (18 Feb 2020 09:03) (17 - 18)  SpO2: 94% (18 Feb 2020 09:03) (93% - 95%)    GENERAL:         comfortable,  - distress.  HEENT:            - trauma,  - icterus,  - injection,  - nasal discharge.  NECK:              - jugular venous distention, - thyromegaly.  LYMPH:           - lymphadenopathy, - masses.  RESP:              + clear,   - rales,   - rhonchi,   - wheezes.   COR:                S1S2   - gallops,  - rubs.  ABD:                bowel sounds,   soft, - tender, - distended.  EXT/MSC:         - cyanosis,  - clubbing, - edema.    NEURO:           + alert and oriented                             11.8   7.93  )-----------( 256      ( 18 Feb 2020 07:40 )             35.7     02-18    138  |  100  |  17  ----------------------------<  91  4.4   |  25  |  1.16    Ca    9.4      18 Feb 2020 07:40  Mg     1.8     02-18            X-ray Chest  (02.15.20) Cardiomegaly.      ASSESSMENT/PLAN    1) RLE cellulitis  2) Coronary artery disease  3) Hypertension  4) Chronic obstructive pulmonary disease    Satisfactory SpO2 (RA)  Bronchodilators:  Atrovent/ albuterol q 4 – 6 hours as needed  ID/Antibiotics: cefazolin, follow up cultures  Cardiac/HTN: BP control  GI: Rx/ prophylaxis c PPI/H2B  Heme: Rx/VT prophylaxis  Discuss with Dr. Montalvo

## 2020-02-18 NOTE — PROGRESS NOTE ADULT - SUBJECTIVE AND OBJECTIVE BOX
INTERVAL HPI/OVERNIGHT EVENTS: As per nurse and patient, no o/n events. UA negative yesterday. Patient still hypertensive, increased lisinopril to 20mg. Will consider increasing HCTZ. Cellulitis is much improved.     SUBJECTIVE: Patient was seen and examined at bedside.  No complaints at this time. Patient denies: fever, chills, dizziness, weakness, HA, Changes in vision, CP, palpitations, SOB, cough, N/V/D/C, dysuria, changes in bowel movements, LE edema. ROS otherwise negative.    VITAL SIGNS:  T(F): 98.2 (20 @ 05:00)  HR: 68 (20 @ 05:00)  BP: 172/69 (20 @ 05:00)  RR: 18 (20 @ 05:00)  SpO2: 95% (20 @ 05:00)  Wt(kg): --    PHYSICAL EXAM:  Constitutional: WDWN resting comfortably in bed; NAD  HEENT: NC/AT. PERRL, EOMI, anicteric sclera, no nasal discharge; uvula midline, no oropharyngeal erythema or exudates; MMM, supple; no JVD or thyromegaly  Respiratory: CTA B/L; no W/R/R, no retractions  Cardiac: +S1/S2; RRR; no M/R/G; PMI non-displaced  Gastrointestinal: soft, NT/ND; no rebound or guarding; +BSx4  Genitourinary: normal external genitalia  Back: spine midline, no bony tenderness or step-offs; no CVAT B/L  Extremities: WWP, no clubbing or cyanosis; no peripheral edema  Musculoskeletal: NROM x4; no joint swelling, tenderness or erythema  Vascular: 2+ radial, femoral, DP/PT pulses B/L  Original exam: RLE: dry skin at toes, pink bland erythema, 1+ pedal edema over ankles to mid shin no obvious trauma/point of entry, no crepitus, skin c/d/i, no purulent drainage LLE: dry skin  Now: no longer erythematous, skin no longer hot to touch.  Dermatologic: skin warm, dry and intact; no rashes, wounds, or scars  Lymphatic: no submandibular or cervical LAD  Neurologic: AAOx3; CNII-XII grossly intact; no focal deficits; slightly tremulous  Psychiatric: affect and characteristics of appearance, verbalizations, behaviors are appropriate    MEDICATIONS  (STANDING):  aspirin enteric coated 81 milliGRAM(s) Oral daily  ceFAZolin  Injectable. 2000 milliGRAM(s) IV Push every 8 hours  DULoxetine 20 milliGRAM(s) Oral daily  enoxaparin Injectable 40 milliGRAM(s) SubCutaneous every 24 hours  hydrochlorothiazide 12.5 milliGRAM(s) Oral daily  lisinopril 20 milliGRAM(s) Oral daily  mirtazapine 30 milliGRAM(s) Oral at bedtime  simvastatin 20 milliGRAM(s) Oral at bedtime  tamsulosin 0.4 milliGRAM(s) Oral at bedtime    MEDICATIONS  (PRN):  acetaminophen   Tablet .. 650 milliGRAM(s) Oral every 6 hours PRN Moderate Pain (4 - 6)  albuterol/ipratropium for Nebulization 3 milliLiter(s) Nebulizer every 6 hours PRN Shortness of Breath and/or Wheezing      Allergies    No Known Allergies    Intolerances        LABS:                        11.3   8.49  )-----------( 262      ( 2020 06:18 )             34.8     02-17    136  |  102  |  19  ----------------------------<  90  4.6   |  22  |  1.28    Ca    8.7      2020 06:18        Urinalysis Basic - ( 2020 14:47 )    Color: Yellow / Appearance: Clear / S.015 / pH: x  Gluc: x / Ketone: NEGATIVE  / Bili: Negative / Urobili: 0.2 E.U./dL   Blood: x / Protein: 30 mg/dL / Nitrite: NEGATIVE   Leuk Esterase: NEGATIVE / RBC: Many /HPF / WBC < 5 /HPF   Sq Epi: x / Non Sq Epi: 0-5 /HPF / Bacteria: Present /HPF        RADIOLOGY & ADDITIONAL TESTS:  Reviewed

## 2020-02-18 NOTE — PROGRESS NOTE ADULT - PROBLEM SELECTOR PROBLEM 4
Coronary artery disease, angina presence unspecified, unspecified vessel or lesion type, unspecified whether native or transplanted heart
Anemia

## 2020-02-18 NOTE — DISCHARGE NOTE PROVIDER - NSDCFUADDINST_GEN_ALL_CORE_FT
Please continue to advance your activity as tolerated, keeping yourself as active as possible, and continue with any strengthening exercises that you may tolerate.

## 2020-02-18 NOTE — DISCHARGE NOTE PROVIDER - NSDCCPCAREPLAN_GEN_ALL_CORE_FT
PRINCIPAL DISCHARGE DIAGNOSIS  Diagnosis: Cellulitis  Assessment and Plan of Treatment: You were found to have cellulitis of the right lower extremity. The cellulitis was non-purulent and had no drainage. You were placed on IV antibiotics, Cefazolin 2 gram 3x a day. Blood cultures were negative.   Should you have return of symptoms or new pain in the foot please return to the ED.   transition to Keflex 500 four times a day) for a total of 5d course (ends 2/19)      SECONDARY DISCHARGE DIAGNOSES  Diagnosis: HTN (hypertension)  Assessment and Plan of Treatment: You were found to have elevated blood pressure you were placed on hydrochlorathiazide 12.5mg daily and lisinopril 20mg daily. PRINCIPAL DISCHARGE DIAGNOSIS  Diagnosis: Cellulitis  Assessment and Plan of Treatment: You were found to have cellulitis of the right lower extremity. The cellulitis was non-purulent and had no drainage. You were placed on IV antibiotics, Cefazolin 2 gram q8, will recieve 6 more doses at rehab. Blood cultures were negative.   Should you have return of symptoms or new pain in the foot please return to the ED.         SECONDARY DISCHARGE DIAGNOSES  Diagnosis: HTN (hypertension)  Assessment and Plan of Treatment: You were found to have elevated blood pressure you were placed on hydrochlorathiazide 12.5mg daily and lisinopril 20mg daily. Can increase dosing as needed.  #CAD (coronary artery disease).   CAD s/p PCI (DAYANA to mRCA, pRCA, mCx 2013 with Dr. Varela), EKG with old ischemic changes c/w previous   -ASA 81, Simvastatin 20.   #BPH (benign prostatic hyperplasia)  -C/w Flomax 0.4 qHS.   #Anemia  Normocytic anemia. At baseline  #HLD (hyperlipidemia)  -C/w  Simvastatin 20.   COPD (chronic obstructive pulmonary disease)  reviously on advair? Not in exacerbation   -Duonebs PRN. PRINCIPAL DISCHARGE DIAGNOSIS  Diagnosis: Cellulitis  Assessment and Plan of Treatment: You were found to have cellulitis of the right lower extremity. The cellulitis was non-purulent and had no drainage. You were placed on IV antibiotics, Cefazolin 2 gram q8, will recieve 6 more doses at rehab. Blood cultures were negative.   Should you have return of symptoms or new pain in the foot please return to the ED.         SECONDARY DISCHARGE DIAGNOSES  Diagnosis: HTN (hypertension)  Assessment and Plan of Treatment: You have a known history of high blood pressure prior to your admission. To manage this you are on a medication called hydrochlorathiazide 12.5mg daily and lisinopril 20mg daily. High blood pressure can cause damage to your heart and kidneys and increases your risk of heart attack and stroke. To avoid this, It is important that you continue to take this medication when you are discharged so that you can continue to control your blood pressure. Additionally be sure to follow up with your primary care physician on a regular basis to make sure your blood pressure continues to be well controlled. If you experience symptoms such as but not limited to: sudden onset blurry vision, nausea, vomiting, chest pain, shortness of breath, or palpitations, please go to the nearest emergency room.

## 2020-02-20 LAB
CULTURE RESULTS: SIGNIFICANT CHANGE UP
CULTURE RESULTS: SIGNIFICANT CHANGE UP
SPECIMEN SOURCE: SIGNIFICANT CHANGE UP
SPECIMEN SOURCE: SIGNIFICANT CHANGE UP

## 2020-02-24 DIAGNOSIS — Z82.49 FAMILY HISTORY OF ISCHEMIC HEART DISEASE AND OTHER DISEASES OF THE CIRCULATORY SYSTEM: ICD-10-CM

## 2020-02-24 DIAGNOSIS — Z79.02 LONG TERM (CURRENT) USE OF ANTITHROMBOTICS/ANTIPLATELETS: ICD-10-CM

## 2020-02-24 DIAGNOSIS — N40.0 BENIGN PROSTATIC HYPERPLASIA WITHOUT LOWER URINARY TRACT SYMPTOMS: ICD-10-CM

## 2020-02-24 DIAGNOSIS — Z98.49 CATARACT EXTRACTION STATUS, UNSPECIFIED EYE: ICD-10-CM

## 2020-02-24 DIAGNOSIS — Z79.82 LONG TERM (CURRENT) USE OF ASPIRIN: ICD-10-CM

## 2020-02-24 DIAGNOSIS — E78.5 HYPERLIPIDEMIA, UNSPECIFIED: ICD-10-CM

## 2020-02-24 DIAGNOSIS — Z90.49 ACQUIRED ABSENCE OF OTHER SPECIFIED PARTS OF DIGESTIVE TRACT: ICD-10-CM

## 2020-02-24 DIAGNOSIS — I25.10 ATHEROSCLEROTIC HEART DISEASE OF NATIVE CORONARY ARTERY WITHOUT ANGINA PECTORIS: ICD-10-CM

## 2020-02-24 DIAGNOSIS — Z95.5 PRESENCE OF CORONARY ANGIOPLASTY IMPLANT AND GRAFT: ICD-10-CM

## 2020-02-24 DIAGNOSIS — J44.9 CHRONIC OBSTRUCTIVE PULMONARY DISEASE, UNSPECIFIED: ICD-10-CM

## 2020-02-24 DIAGNOSIS — L03.115 CELLULITIS OF RIGHT LOWER LIMB: ICD-10-CM

## 2020-02-24 DIAGNOSIS — D64.9 ANEMIA, UNSPECIFIED: ICD-10-CM

## 2020-02-24 DIAGNOSIS — I10 ESSENTIAL (PRIMARY) HYPERTENSION: ICD-10-CM

## 2020-02-24 DIAGNOSIS — I16.0 HYPERTENSIVE URGENCY: ICD-10-CM

## 2020-02-24 DIAGNOSIS — F32.9 MAJOR DEPRESSIVE DISORDER, SINGLE EPISODE, UNSPECIFIED: ICD-10-CM

## 2020-11-09 ENCOUNTER — INPATIENT (INPATIENT)
Facility: HOSPITAL | Age: 85
LOS: 3 days | Discharge: HOME | DRG: 190 | End: 2020-11-13
Attending: INTERNAL MEDICINE | Admitting: INTERNAL MEDICINE
Payer: MEDICARE

## 2020-11-09 VITALS
DIASTOLIC BLOOD PRESSURE: 68 MMHG | TEMPERATURE: 98 F | RESPIRATION RATE: 18 BRPM | OXYGEN SATURATION: 98 % | WEIGHT: 119.93 LBS | HEIGHT: 69 IN | HEART RATE: 50 BPM | SYSTOLIC BLOOD PRESSURE: 119 MMHG

## 2020-11-09 DIAGNOSIS — I10 ESSENTIAL (PRIMARY) HYPERTENSION: ICD-10-CM

## 2020-11-09 DIAGNOSIS — E78.5 HYPERLIPIDEMIA, UNSPECIFIED: ICD-10-CM

## 2020-11-09 DIAGNOSIS — N40.0 BENIGN PROSTATIC HYPERPLASIA WITHOUT LOWER URINARY TRACT SYMPTOMS: ICD-10-CM

## 2020-11-09 DIAGNOSIS — N18.4 CHRONIC KIDNEY DISEASE, STAGE 4 (SEVERE): ICD-10-CM

## 2020-11-09 DIAGNOSIS — I82.90 ACUTE EMBOLISM AND THROMBOSIS OF UNSPECIFIED VEIN: ICD-10-CM

## 2020-11-09 DIAGNOSIS — Z90.49 ACQUIRED ABSENCE OF OTHER SPECIFIED PARTS OF DIGESTIVE TRACT: Chronic | ICD-10-CM

## 2020-11-09 DIAGNOSIS — I20.0 UNSTABLE ANGINA: ICD-10-CM

## 2020-11-09 DIAGNOSIS — Z98.49 CATARACT EXTRACTION STATUS, UNSPECIFIED EYE: Chronic | ICD-10-CM

## 2020-11-09 LAB
CK MB CFR SERPL CALC: 1.9 NG/ML — SIGNIFICANT CHANGE UP (ref 0–6.7)
CK SERPL-CCNC: 36 U/L — SIGNIFICANT CHANGE UP (ref 30–200)
SARS-COV-2 RNA SPEC QL NAA+PROBE: SIGNIFICANT CHANGE UP
TROPONIN T SERPL-MCNC: 0.02 NG/ML — HIGH (ref 0–0.01)

## 2020-11-09 PROCEDURE — 71046 X-RAY EXAM CHEST 2 VIEWS: CPT | Mod: 26

## 2020-11-09 PROCEDURE — 93010 ELECTROCARDIOGRAM REPORT: CPT

## 2020-11-09 PROCEDURE — 99284 EMERGENCY DEPT VISIT MOD MDM: CPT | Mod: 25

## 2020-11-09 RX ORDER — DULOXETINE HYDROCHLORIDE 30 MG/1
30 CAPSULE, DELAYED RELEASE ORAL DAILY
Refills: 0 | Status: DISCONTINUED | OUTPATIENT
Start: 2020-11-09 | End: 2020-11-13

## 2020-11-09 RX ORDER — ACETAMINOPHEN 500 MG
650 TABLET ORAL EVERY 6 HOURS
Refills: 0 | Status: DISCONTINUED | OUTPATIENT
Start: 2020-11-09 | End: 2020-11-13

## 2020-11-09 RX ORDER — SODIUM CHLORIDE 9 MG/ML
1000 INJECTION INTRAMUSCULAR; INTRAVENOUS; SUBCUTANEOUS
Refills: 0 | Status: DISCONTINUED | OUTPATIENT
Start: 2020-11-09 | End: 2020-11-10

## 2020-11-09 RX ORDER — CLOPIDOGREL BISULFATE 75 MG/1
75 TABLET, FILM COATED ORAL DAILY
Refills: 0 | Status: DISCONTINUED | OUTPATIENT
Start: 2020-11-10 | End: 2020-11-11

## 2020-11-09 RX ORDER — CLOPIDOGREL BISULFATE 75 MG/1
600 TABLET, FILM COATED ORAL ONCE
Refills: 0 | Status: COMPLETED | OUTPATIENT
Start: 2020-11-09 | End: 2020-11-09

## 2020-11-09 RX ORDER — MIRTAZAPINE 45 MG/1
30 TABLET, ORALLY DISINTEGRATING ORAL AT BEDTIME
Refills: 0 | Status: DISCONTINUED | OUTPATIENT
Start: 2020-11-09 | End: 2020-11-13

## 2020-11-09 RX ORDER — ALBUTEROL 90 UG/1
2 AEROSOL, METERED ORAL EVERY 6 HOURS
Refills: 0 | Status: DISCONTINUED | OUTPATIENT
Start: 2020-11-09 | End: 2020-11-09

## 2020-11-09 RX ORDER — DULOXETINE HYDROCHLORIDE 30 MG/1
20 CAPSULE, DELAYED RELEASE ORAL DAILY
Refills: 0 | Status: DISCONTINUED | OUTPATIENT
Start: 2020-11-09 | End: 2020-11-09

## 2020-11-09 RX ORDER — ASPIRIN/CALCIUM CARB/MAGNESIUM 324 MG
243 TABLET ORAL ONCE
Refills: 0 | Status: COMPLETED | OUTPATIENT
Start: 2020-11-09 | End: 2020-11-09

## 2020-11-09 RX ORDER — TAMSULOSIN HYDROCHLORIDE 0.4 MG/1
0.4 CAPSULE ORAL AT BEDTIME
Refills: 0 | Status: DISCONTINUED | OUTPATIENT
Start: 2020-11-09 | End: 2020-11-13

## 2020-11-09 RX ORDER — ATORVASTATIN CALCIUM 80 MG/1
40 TABLET, FILM COATED ORAL AT BEDTIME
Refills: 0 | Status: DISCONTINUED | OUTPATIENT
Start: 2020-11-09 | End: 2020-11-13

## 2020-11-09 RX ORDER — ASPIRIN/CALCIUM CARB/MAGNESIUM 324 MG
81 TABLET ORAL DAILY
Refills: 0 | Status: DISCONTINUED | OUTPATIENT
Start: 2020-11-10 | End: 2020-11-11

## 2020-11-09 RX ORDER — HEPARIN SODIUM 5000 [USP'U]/ML
5000 INJECTION INTRAVENOUS; SUBCUTANEOUS EVERY 8 HOURS
Refills: 0 | Status: DISCONTINUED | OUTPATIENT
Start: 2020-11-09 | End: 2020-11-10

## 2020-11-09 RX ADMIN — HEPARIN SODIUM 5000 UNIT(S): 5000 INJECTION INTRAVENOUS; SUBCUTANEOUS at 22:03

## 2020-11-09 RX ADMIN — ATORVASTATIN CALCIUM 40 MILLIGRAM(S): 80 TABLET, FILM COATED ORAL at 22:03

## 2020-11-09 RX ADMIN — MIRTAZAPINE 30 MILLIGRAM(S): 45 TABLET, ORALLY DISINTEGRATING ORAL at 22:03

## 2020-11-09 RX ADMIN — TAMSULOSIN HYDROCHLORIDE 0.4 MILLIGRAM(S): 0.4 CAPSULE ORAL at 22:03

## 2020-11-09 RX ADMIN — SODIUM CHLORIDE 50 MILLILITER(S): 9 INJECTION INTRAMUSCULAR; INTRAVENOUS; SUBCUTANEOUS at 22:18

## 2020-11-09 RX ADMIN — CLOPIDOGREL BISULFATE 600 MILLIGRAM(S): 75 TABLET, FILM COATED ORAL at 19:01

## 2020-11-09 RX ADMIN — Medication 243 MILLIGRAM(S): at 16:00

## 2020-11-09 NOTE — PROGRESS NOTE ADULT - SUBJECTIVE AND OBJECTIVE BOX
Patient seen in office admitted with CP/SOB.  r/o ACS  Cardiology--Dr Martinez  Pulmonary-Dr. Marla Montalvo MD

## 2020-11-09 NOTE — ED PROVIDER NOTE - CLINICAL SUMMARY MEDICAL DECISION MAKING FREE TEXT BOX
89M with PMH of HTN, CAD s/p PCI (DAYANA to mRCA, pRCA, mCx 2013 w/ Dr. Varela), BPH and colonic mass (s/p right hemicolectomy 4/2017 w/ Dr. Headley) now presents with chest pain x3 days.  -EKG shows sinus bradycardia

## 2020-11-09 NOTE — H&P ADULT - HISTORY OF PRESENT ILLNESS
89 male with PMH of HTN, CAD s/p PCI (DAYANA to mRCA, pRCA, mCx 2013 w/ Dr. Varela), BPH and colonic mass (s/p right hemicolectomy 4/2017 w/ Dr. Headley) presented to [facility] ED with c/o of worsening, intermittent, dull, band-like CP, around with inferior aspect of chest, radiating to bilaterally chest x 3 days, decrease exercise tolerance a/w SOB and dyspnea on exertion.    Pt reports he saw his PCP Dr. Montalvo this morning who recommended that he comes to the ED for further evaluation.     In ED, VSS:  /68. HR 50, RR 18, Temp 97.7, O2Sat 98% RA.   EKG with SB @ 52 bpm, 1st deg AVB, LAFB, Q waves inferiorly.  Labs significant for elevated Trops 0.02, BNP 1, 100, WBC 9.00, H/H (11.7/36.6), Plts 250, Na 138, Potassium 5.0, BUN/Cr (50/1.44), Glu 115.  INR 0.91.  CXR (soft read):  Clear lungs.  Pt currently denies active chest at the current time.   Denies palpitations, diaphoresis, N/V, dizziness, syncope, orthopnea, PND, Abdominal pain, LE swelling or any other complaints at this time.  Pt states he took ASA 81mg x 1 dose enroute to the hospital, was given  x 1 in ED.  He is now admitted to cardiology for telemetry monitoring, R/O ACS and for unstable angina management.    89 male, lives alone, uses rolling walker, lost to cardiology follow up, PMHx  HTN, known CAD s/p PCI (DAYANA to mRCA, pRCA, mCx 2013 w/ Dr. Varela), Arthritis, BPH and colonic mass (s/p right hemicolectomy 4/2017 w/ Dr. Headley), most recent admission to [facility]  2/2020 for RLE Cellulitis presented to [facility] ED today with c/o of worsening, intermittent, dull, 4-5/10, band-like CP, around with inferior aspect of chest, radiating to bilaterally chest x 3 days with decrease exercise tolerance a/w SOB and dyspnea on exertion.    Pt reports he saw his PCP Dr. Montalvo this morning who recommended that he comes to the ED for further evaluation.     In ED, VSS:  /68. HR 50, RR 18, Temp 97.7, O2Sat 98% RA.   EKG with SB @ 52 bpm, 1st deg AVB, LAFB, Q waves inferiorly.  Labs significant for elevated Trops 0.02, BNP 1, 100, WBC 9.00, H/H (11.7/36.6), Plts 250, Na 138, Potassium 5.0, BUN/Cr (50/1.44), Glu 115.  INR 0.91.  CXR (soft read):  Clear lungs.  Pt currently denies active chest at the current time.   Denies palpitations, diaphoresis, N/V, dizziness, syncope, orthopnea, PND, Abdominal pain, LE swelling or any other complaints at this time.  Pt states he took ASA 81mg x 1 dose enroute to the hospital, was given  x 1 in ED.  He is now admitted to cardiology for telemetry monitoring, R/O ACS and for unstable angina management.    89 male, lives alone, uses rolling walker, lost to cardiology follow up, PMHx  HTN, known CAD s/p PCI (DAYANA to mRCA, pRCA, mCx 2013 w/ Dr. Varela), Arthritis, BPH and colonic mass (s/p right hemicolectomy 4/2017 w/ Dr. Headley), most recent admission to [facility]  2/2020 for RLE Cellulitis presented to [facility] ED today with c/o of worsening, intermittent, dull, 4-5/10, band-like CP, around with inferior aspect of chest, radiating to bilaterally chest x 3 days with decrease exercise tolerance a/w SOB and dyspnea on exertion.    Pt reports he saw his PCP Dr. Montalvo this morning who recommended that he comes to the ED for further evaluation.     In ED, VSS:  /68. HR 50, RR 18, Temp 97.7, O2Sat 98% RA.   EKG with SB @ 52 bpm, 1st deg AVB, LAFB, Q waves inferiorly.  Labs significant for elevated Trops 0.02, BNP 1, 100, WBC 9.00, H/H (11.7/36.6), Plts 250, Na 138, Potassium 5.0, BUN/Cr (50/1.44), Glu 115.  INR 0.91.  CXR (soft read):  Clear lungs.  Pt currently denies active chest at the current time.   Denies palpitations, diaphoresis, N/V, dizziness, syncope, orthopnea, PND, Abdominal pain, LE swelling or any other complaints at this time.  Pt states he took ASA 81mg x 1 dose enroute to the hospital, was given  x 1 in ED.  He is now admitted to cardiology for telemetry monitoring, R/O ACS and for ischemic work-up.    89 male, lives alone, uses rolling walker, lost to cardiology follow up, PMHx  HTN, known CAD s/p PCI (DAYANA to mRCA, pRCA, mCx 2013 w/ Dr. Varela), CKD (1.7 2.4)  Arthritis, BPH and colonic mass (s/p right hemicolectomy 4/2017 w/ Dr. Headley), most recent admission to [facility]  2/2020 for RLE Cellulitis presented to [facility] ED today with c/o of worsening, intermittent, dull, 4-5/10, band-like CP, around with inferior aspect of chest, radiating to bilaterally chest x 3 days with decrease exercise tolerance a/w SOB and dyspnea on exertion.    Pt reports he saw his PCP Dr. Montalvo this morning who recommended that he comes to the ED for further evaluation.     In ED, VSS:  /68. HR 50, RR 18, Temp 97.7, O2Sat 98% RA.   EKG with SB @ 52 bpm, 1st deg AVB, LAFB, Q waves inferiorly.  Labs significant for elevated Trops 0.02, BNP 1, 100, WBC 9.00, H/H (11.7/36.6), Plts 250, Na 138, Potassium 5.0, BUN/Cr (50/1.44), Glu 115.  INR 0.91.  CXR (soft read):  Clear lungs.  Pt currently denies active chest at the current time.   Denies palpitations, diaphoresis, N/V, dizziness, syncope, orthopnea, PND, Abdominal pain, LE swelling or any other complaints at this time.  Pt states he took ASA 81mg x 1 dose enroute to the hospital, was given  x 1 in ED.  He is now admitted to cardiology for telemetry monitoring, R/O ACS and for ischemic work-up.

## 2020-11-09 NOTE — H&P ADULT - NSHPLABSRESULTS_GEN_ALL_CORE
11.7   9.00  )-----------( 250      ( 09 Nov 2020 14:18 )             36.6       11-09    138  |  102  |  50<H>  ----------------------------<  115<H>  5.0   |  25  |  1.44<H>    Ca    10.2      09 Nov 2020 14:18    TPro  7.2  /  Alb  4.1  /  TBili  0.2  /  DBili  x   /  AST  17  /  ALT  10  /  AlkPhos  73  11-09      PT/INR - ( 09 Nov 2020 14:18 )   PT: 11.0 sec;   INR: 0.91          PTT - ( 09 Nov 2020 14:18 )  PTT:34.3 sec    CARDIAC MARKERS ( 09 Nov 2020 14:18 )  x     / 0.02 ng/mL / x     / x     / x                EKG:

## 2020-11-09 NOTE — H&P ADULT - ATTENDING COMMENTS
Pt seen and examined by me in ER  Hx c/w [facility], plan is cath  Assess echo  Pt went from NSR to PAF today, on heparin.  EP consult    Cardiac exam was difficult, very distant HS

## 2020-11-09 NOTE — ED ADULT NURSE NOTE - OBJECTIVE STATEMENT
Patient complains of chest pain this morning and shortness of breath. ECG done at time of arrival and sinus yasmine with 1st degree heart block, signed and reviewed by MD Zhou. Pt placed on cardiac monitor and cardiac enzymes sent as ordered. Patient denies any chest pain at this time. Dsypnea on exertion noted. EMS reports patient was given 162mg of ASA en route to hospital.

## 2020-11-09 NOTE — ED PROVIDER NOTE - ATTENDING CONTRIBUTION TO CARE
89yo M PMH as above, CP/SOB x 3 days, saw his pcp dr. Montalvo, this am who sent to ED. describes as band like in anterior chest, dull, w/ associated SOB. VSS, comfortable appearing elderly male. EKG in ED w/ no acute ischemia but symptoms concerning for angina, unstable. Plan for cards admission for further r/o ACS and management

## 2020-11-09 NOTE — ED ADULT NURSE NOTE - NSIMPLEMENTINTERV_GEN_ALL_ED
Implemented All Fall with Harm Risk Interventions:  [place] to call system. Call bell, personal items and telephone within reach. Instruct patient to call for assistance. Room bathroom lighting operational. Non-slip footwear when patient is off stretcher. Physically safe environment: no spills, clutter or unnecessary equipment. Stretcher in lowest position, wheels locked, appropriate side rails in place. Provide visual cue, wrist band, yellow gown, etc. Monitor gait and stability. Monitor for mental status changes and reorient to person, place, and time. Review medications for side effects contributing to fall risk. Reinforce activity limits and safety measures with patient and family. Provide visual clues: red socks.

## 2020-11-09 NOTE — ED PROVIDER NOTE - OBJECTIVE STATEMENT
89M with PMH of HTN, CAD s/p PCI (DAYANA to mRCA, pRCA, mCx 2013 w/ Dr. Varela), BPH and colonic mass (s/p right hemicolectomy 4/2017 w/ Dr. Headley) now presents with    Last hospitalization 2/2020 for RLE cellulitis. 89M with PMH of HTN, CAD s/p PCI (DAYANA to mRCA, pRCA, mCx 2013 w/ Dr. Varela), BPH and colonic mass (s/p right hemicolectomy 4/2017 w/ Dr. Headley) now presents with chest pain x3 days. Describes the pain as dull and band-like around inferior aspect of chest b/l. Also reports increased shortness of breath and dyspnea on exertion. Saw his PCP Dr. Montalvo this AM who recommended he come to the ED for angina-like symptoms. Denies any chest pain at the current time. Denies nausea, vomiting, abdominal pain, diaphoresis, or calf pain. In the ED, VSS, EKG shows sinus bradycardia.

## 2020-11-09 NOTE — H&P ADULT - PROBLEM SELECTOR PLAN 3
-on Simvastatin 20mg, interchanged to Atovastatin 40mg QHS  -F/U Lipid Panel in AM -Stable  -on Valsartan 160mg daily, now on hold 2/2 elevated Cr and contrast dye load  -Holding BB in the setting of bradycardia.  -Monitor BP

## 2020-11-09 NOTE — ED ADULT TRIAGE NOTE - CHIEF COMPLAINT QUOTE
Pt. states that he developed chest pain this morning and is now feeling SOB.  Pt. was given 81mg of aspirin en route to the hospital by EMS.

## 2020-11-09 NOTE — H&P ADULT - PROBLEM SELECTOR PLAN 2
-c/w Tamsulosin 0.4mg daily -DEBBIE on CKD Stage IV (baseline 1.7 -2.4), now with elevated BUN/Cr 50/1.44)  -c/w gentle hydration: NS @ 50cc x 4 hours  -EF unknown, monitor lung status while on IVF.   -F/U Cr in AM

## 2020-11-09 NOTE — H&P ADULT - PROBLEM SELECTOR PLAN 6
DVT ppx:  HSQ    Dispo: Cath in AM with Dr. Varela.  NPO @ [place].  Needs consent.     case d/w Dr. Sauceda

## 2020-11-09 NOTE — H&P ADULT - PROBLEM SELECTOR PLAN 1
-Mildly elevated trops 0.02 (noted chronically elevated 0.2-0.5 since 2018).  BNP 1100..  F/U  stat trops/EKG and 5:30AM  -EKG SB @ 52 bpm, 1st deg AVB, q waves inferiorly, non-ischemic.  -Obtain ECHO for EF and structural (no prior ECHO in chart)  -CXR with clear lungs  -History of known CAD, s/p prior PCI, most recent Cath @ [facility] 4/11/2013 with Dr. Varela DAYANA mLCX,  widely patent stent pRCA.  -Given ASA 243mg in ED, took ASA 81mg prior to coming in.  c/w ASA 81mg daily, simvastatin interchanged to atorvastatin 40mg daily.  -noted on Lisinopril and HCTZ in the past, appears to be D/C by Dr Montalvo due to hypotension  -Noted with SB, HR 50's, Lopressor 25mg D/C  -monitor on tele    - -Mildly elevated trops 0.02 (noted chronically elevated 0.2-0.5 since 2018).  BNP 1100..  F/U  stat trops/EKG and 5:30AM  -EKG SB @ 52 bpm, 1st deg AVB, q waves inferiorly, non-ischemic.  -Obtain ECHO for EF and structural (no prior ECHO in chart)  -CXR with clear lungs  -History of known CAD, s/p prior PCI, most recent Cath @ [facility] 4/11/2013 with Dr. Varela DAYANA mLCX,  widely patent stent pRCA.  -Scheduled for [facility] with possible 11/10 with Dr. Varela. NPO @ [place]  -Loaded with Plavix 600mg.  Given ASA 243mg in ED, took ASA 81mg prior to coming in.  c/w ASA 81mg daily, Plavix 75mg daily, simvastatin interchanged to atorvastatin 40mg daily.  -noted on Lisinopril and HCTZ in the past, appears to be D/C by Dr Montalvo and started on [id number] daily last filled on 9/2020  -Holding ARB in the setting of worsening CKD  -Appears clinically stable in ED, CP resolved, no SOB at rest, satting 98% RA. -Grade II-III TEN murmur appreciated.   -Gentle hydration overnight, NS @ 50cc x 8 hours, frequent lung checks, EF unknown.  -Noted with SB, HR 50's on tele, discontinued Lopressor 25mg daily.  -monitor on tele -Mildly elevated trops 0.02 (noted chronically elevated 0.2-0.5 since 2018).  BNP 1100.  F/U  stat trops/EKG and 5:30AM  -EKG SB @ 52 bpm, 1st deg AVB, q waves inferiorly, non-ischemic.  -Obtain ECHO for EF and structural (no prior ECHO in chart)  -CXR with clear lungs  -History of known CAD, s/p prior PCI, most recent Cath @ [facility] 4/11/2013 with Dr. Varela DAYANA mLCX,  widely patent stent pRCA.  -Scheduled for [facility] with possible 11/10 with Dr. Varela. NPO @ [place]  -Loaded with Plavix 600mg.  Given ASA 243mg in ED, took ASA 81mg prior to coming in.  c/w ASA 81mg daily, Plavix 75mg daily, simvastatin interchanged to atorvastatin 40mg daily.  -noted on Lisinopril and HCTZ in the past, appears to be D/C by Dr Montalvo and started on [id number] daily last filled on 9/2020  -Holding ARB in the setting of worsening CKD  -Appears clinically stable in ED, CP resolved, no SOB at rest, satting 98% RA. -Grade II-III TEN murmur appreciated.   -Gentle hydration overnight, NS @ 50cc x 8 hours, frequent lung checks, EF unknown.  -Noted with SB, HR 50's on tele, discontinued Lopressor 25mg daily.  -monitor on tele

## 2020-11-09 NOTE — ED PROVIDER NOTE - DIAGNOSTIC INTERPRETATION
CXR read: The heart appears to be within normal limits in transverse diameter.  No acute infiltrates, effusions or evidence of pulmonary vascular congestion.  The chest wall and surrounding bony structures appear normal.  ED Physician: Dr. Willow Us

## 2020-11-09 NOTE — H&P ADULT - ASSESSMENT
89 male, lives alone, uses rolling walker, lost to cardiology follow up, PMHx  HTN, known CAD s/p PCI (DAYANA to mRCA, pRCA, mCx 2013 w/ Dr. Varela), Arthritis, BPH and colonic mass (s/p right hemicolectomy 4/2017 w/ Dr. Headlye), most recent admission to [facility]  2/2020 for RLE Cellulitis presented to [facility] ED today with c/o unstable angina and is admitted to cardiology for telemetry monitoring, R/O ACS and for ischemic work-up

## 2020-11-10 LAB
A1C WITH ESTIMATED AVERAGE GLUCOSE RESULT: 5.3 % — SIGNIFICANT CHANGE UP (ref 4–5.6)
ALBUMIN SERPL ELPH-MCNC: 3.9 G/DL — SIGNIFICANT CHANGE UP (ref 3.3–5)
ALP SERPL-CCNC: 78 U/L — SIGNIFICANT CHANGE UP (ref 40–120)
ALT FLD-CCNC: 9 U/L — LOW (ref 10–45)
ANION GAP SERPL CALC-SCNC: 13 MMOL/L — SIGNIFICANT CHANGE UP (ref 5–17)
ANION GAP SERPL CALC-SCNC: 15 MMOL/L — SIGNIFICANT CHANGE UP (ref 5–17)
APTT BLD: 115.9 SEC — HIGH (ref 27.5–35.5)
APTT BLD: 172.1 SEC — CRITICAL HIGH (ref 27.5–35.5)
APTT BLD: 48.9 SEC — HIGH (ref 27.5–35.5)
AST SERPL-CCNC: 16 U/L — SIGNIFICANT CHANGE UP (ref 10–40)
BILIRUB SERPL-MCNC: 0.4 MG/DL — SIGNIFICANT CHANGE UP (ref 0.2–1.2)
BLD GP AB SCN SERPL QL: NEGATIVE — SIGNIFICANT CHANGE UP
BUN SERPL-MCNC: 36 MG/DL — HIGH (ref 7–23)
BUN SERPL-MCNC: 41 MG/DL — HIGH (ref 7–23)
CALCIUM SERPL-MCNC: 9.9 MG/DL — SIGNIFICANT CHANGE UP (ref 8.4–10.5)
CALCIUM SERPL-MCNC: 9.9 MG/DL — SIGNIFICANT CHANGE UP (ref 8.4–10.5)
CHLORIDE SERPL-SCNC: 103 MMOL/L — SIGNIFICANT CHANGE UP (ref 96–108)
CHLORIDE SERPL-SCNC: 106 MMOL/L — SIGNIFICANT CHANGE UP (ref 96–108)
CHOLEST SERPL-MCNC: 131 MG/DL — SIGNIFICANT CHANGE UP
CK MB CFR SERPL CALC: 3.6 NG/ML — SIGNIFICANT CHANGE UP (ref 0–6.7)
CK SERPL-CCNC: 106 U/L — SIGNIFICANT CHANGE UP (ref 30–200)
CO2 SERPL-SCNC: 22 MMOL/L — SIGNIFICANT CHANGE UP (ref 22–31)
CO2 SERPL-SCNC: 23 MMOL/L — SIGNIFICANT CHANGE UP (ref 22–31)
CREAT SERPL-MCNC: 1.21 MG/DL — SIGNIFICANT CHANGE UP (ref 0.5–1.3)
CREAT SERPL-MCNC: 1.24 MG/DL — SIGNIFICANT CHANGE UP (ref 0.5–1.3)
ESTIMATED AVERAGE GLUCOSE: 105 MG/DL — SIGNIFICANT CHANGE UP (ref 68–114)
GLUCOSE BLDC GLUCOMTR-MCNC: 104 MG/DL — HIGH (ref 70–99)
GLUCOSE SERPL-MCNC: 105 MG/DL — HIGH (ref 70–99)
GLUCOSE SERPL-MCNC: 117 MG/DL — HIGH (ref 70–99)
HCT VFR BLD CALC: 37.6 % — LOW (ref 39–50)
HCT VFR BLD CALC: 40.5 % — SIGNIFICANT CHANGE UP (ref 39–50)
HDLC SERPL-MCNC: 68 MG/DL — SIGNIFICANT CHANGE UP
HGB BLD-MCNC: 11.9 G/DL — LOW (ref 13–17)
HGB BLD-MCNC: 13 G/DL — SIGNIFICANT CHANGE UP (ref 13–17)
INR BLD: 0.99 — SIGNIFICANT CHANGE UP (ref 0.88–1.16)
INR BLD: 2.35 — HIGH (ref 0.88–1.16)
LIPID PNL WITH DIRECT LDL SERPL: 51 MG/DL — SIGNIFICANT CHANGE UP
MAGNESIUM SERPL-MCNC: 1.9 MG/DL — SIGNIFICANT CHANGE UP (ref 1.6–2.6)
MAGNESIUM SERPL-MCNC: 2 MG/DL — SIGNIFICANT CHANGE UP (ref 1.6–2.6)
MCHC RBC-ENTMCNC: 30.7 PG — SIGNIFICANT CHANGE UP (ref 27–34)
MCHC RBC-ENTMCNC: 31 PG — SIGNIFICANT CHANGE UP (ref 27–34)
MCHC RBC-ENTMCNC: 31.6 GM/DL — LOW (ref 32–36)
MCHC RBC-ENTMCNC: 32.1 GM/DL — SIGNIFICANT CHANGE UP (ref 32–36)
MCV RBC AUTO: 96.7 FL — SIGNIFICANT CHANGE UP (ref 80–100)
MCV RBC AUTO: 97.2 FL — SIGNIFICANT CHANGE UP (ref 80–100)
NON HDL CHOLESTEROL: 63 MG/DL — SIGNIFICANT CHANGE UP
NRBC # BLD: 0 /100 WBCS — SIGNIFICANT CHANGE UP (ref 0–0)
NRBC # BLD: 0 /100 WBCS — SIGNIFICANT CHANGE UP (ref 0–0)
PHOSPHATE SERPL-MCNC: 3.1 MG/DL — SIGNIFICANT CHANGE UP (ref 2.5–4.5)
PLATELET # BLD AUTO: 235 K/UL — SIGNIFICANT CHANGE UP (ref 150–400)
PLATELET # BLD AUTO: 273 K/UL — SIGNIFICANT CHANGE UP (ref 150–400)
POTASSIUM SERPL-MCNC: 4.6 MMOL/L — SIGNIFICANT CHANGE UP (ref 3.5–5.3)
POTASSIUM SERPL-MCNC: 5 MMOL/L — SIGNIFICANT CHANGE UP (ref 3.5–5.3)
POTASSIUM SERPL-SCNC: 4.6 MMOL/L — SIGNIFICANT CHANGE UP (ref 3.5–5.3)
POTASSIUM SERPL-SCNC: 5 MMOL/L — SIGNIFICANT CHANGE UP (ref 3.5–5.3)
PROT SERPL-MCNC: 7.5 G/DL — SIGNIFICANT CHANGE UP (ref 6–8.3)
PROTHROM AB SERPL-ACNC: 11.9 SEC — SIGNIFICANT CHANGE UP (ref 10.6–13.6)
PROTHROM AB SERPL-ACNC: 12 SEC — SIGNIFICANT CHANGE UP (ref 10.6–13.6)
RBC # BLD: 3.87 M/UL — LOW (ref 4.2–5.8)
RBC # BLD: 4.19 M/UL — LOW (ref 4.2–5.8)
RBC # FLD: 13.4 % — SIGNIFICANT CHANGE UP (ref 10.3–14.5)
RBC # FLD: 13.5 % — SIGNIFICANT CHANGE UP (ref 10.3–14.5)
RH IG SCN BLD-IMP: POSITIVE — SIGNIFICANT CHANGE UP
SODIUM SERPL-SCNC: 140 MMOL/L — SIGNIFICANT CHANGE UP (ref 135–145)
SODIUM SERPL-SCNC: 142 MMOL/L — SIGNIFICANT CHANGE UP (ref 135–145)
T4 AB SER-ACNC: 4.86 UG/DL — SIGNIFICANT CHANGE UP (ref 3.17–11.72)
TRIGL SERPL-MCNC: 58 MG/DL — SIGNIFICANT CHANGE UP
TROPONIN T SERPL-MCNC: 0.01 NG/ML — SIGNIFICANT CHANGE UP (ref 0–0.01)
TROPONIN T SERPL-MCNC: 0.02 NG/ML — HIGH (ref 0–0.01)
WBC # BLD: 8.15 K/UL — SIGNIFICANT CHANGE UP (ref 3.8–10.5)
WBC # BLD: 8.48 K/UL — SIGNIFICANT CHANGE UP (ref 3.8–10.5)
WBC # FLD AUTO: 8.15 K/UL — SIGNIFICANT CHANGE UP (ref 3.8–10.5)
WBC # FLD AUTO: 8.48 K/UL — SIGNIFICANT CHANGE UP (ref 3.8–10.5)

## 2020-11-10 PROCEDURE — 71045 X-RAY EXAM CHEST 1 VIEW: CPT | Mod: 26

## 2020-11-10 PROCEDURE — 99291 CRITICAL CARE FIRST HOUR: CPT

## 2020-11-10 PROCEDURE — 93010 ELECTROCARDIOGRAM REPORT: CPT

## 2020-11-10 PROCEDURE — 99222 1ST HOSP IP/OBS MODERATE 55: CPT

## 2020-11-10 PROCEDURE — 93306 TTE W/DOPPLER COMPLETE: CPT | Mod: 26

## 2020-11-10 PROCEDURE — 71275 CT ANGIOGRAPHY CHEST: CPT | Mod: 26

## 2020-11-10 RX ORDER — HEPARIN SODIUM 5000 [USP'U]/ML
600 INJECTION INTRAVENOUS; SUBCUTANEOUS
Qty: 25000 | Refills: 0 | Status: DISCONTINUED | OUTPATIENT
Start: 2020-11-10 | End: 2020-11-11

## 2020-11-10 RX ORDER — METOPROLOL TARTRATE 50 MG
25 TABLET ORAL ONCE
Refills: 0 | Status: COMPLETED | OUTPATIENT
Start: 2020-11-10 | End: 2020-11-10

## 2020-11-10 RX ORDER — HEPARIN SODIUM 5000 [USP'U]/ML
2000 INJECTION INTRAVENOUS; SUBCUTANEOUS EVERY 6 HOURS
Refills: 0 | Status: DISCONTINUED | OUTPATIENT
Start: 2020-11-10 | End: 2020-11-10

## 2020-11-10 RX ORDER — DIPHENOXYLATE HCL/ATROPINE 2.5-.025MG
1 TABLET ORAL EVERY 8 HOURS
Refills: 0 | Status: DISCONTINUED | OUTPATIENT
Start: 2020-11-10 | End: 2020-11-13

## 2020-11-10 RX ORDER — HEPARIN SODIUM 5000 [USP'U]/ML
600 INJECTION INTRAVENOUS; SUBCUTANEOUS
Qty: 25000 | Refills: 0 | Status: DISCONTINUED | OUTPATIENT
Start: 2020-11-10 | End: 2020-11-10

## 2020-11-10 RX ORDER — HEPARIN SODIUM 5000 [USP'U]/ML
INJECTION INTRAVENOUS; SUBCUTANEOUS
Qty: 25000 | Refills: 0 | Status: DISCONTINUED | OUTPATIENT
Start: 2020-11-10 | End: 2020-11-10

## 2020-11-10 RX ORDER — HEPARIN SODIUM 5000 [USP'U]/ML
4000 INJECTION INTRAVENOUS; SUBCUTANEOUS ONCE
Refills: 0 | Status: COMPLETED | OUTPATIENT
Start: 2020-11-10 | End: 2020-11-10

## 2020-11-10 RX ORDER — FUROSEMIDE 40 MG
40 TABLET ORAL ONCE
Refills: 0 | Status: COMPLETED | OUTPATIENT
Start: 2020-11-10 | End: 2020-11-10

## 2020-11-10 RX ORDER — IPRATROPIUM/ALBUTEROL SULFATE 18-103MCG
3 AEROSOL WITH ADAPTER (GRAM) INHALATION EVERY 6 HOURS
Refills: 0 | Status: DISCONTINUED | OUTPATIENT
Start: 2020-11-10 | End: 2020-11-11

## 2020-11-10 RX ORDER — HEPARIN SODIUM 5000 [USP'U]/ML
4000 INJECTION INTRAVENOUS; SUBCUTANEOUS EVERY 6 HOURS
Refills: 0 | Status: DISCONTINUED | OUTPATIENT
Start: 2020-11-10 | End: 2020-11-10

## 2020-11-10 RX ORDER — HEPARIN SODIUM 5000 [USP'U]/ML
500 INJECTION INTRAVENOUS; SUBCUTANEOUS
Qty: 25000 | Refills: 0 | Status: DISCONTINUED | OUTPATIENT
Start: 2020-11-10 | End: 2020-11-10

## 2020-11-10 RX ORDER — METOPROLOL TARTRATE 50 MG
12.5 TABLET ORAL EVERY 6 HOURS
Refills: 0 | Status: DISCONTINUED | OUTPATIENT
Start: 2020-11-10 | End: 2020-11-11

## 2020-11-10 RX ADMIN — HEPARIN SODIUM 600 UNIT(S)/HR: 5000 INJECTION INTRAVENOUS; SUBCUTANEOUS at 10:59

## 2020-11-10 RX ADMIN — ATORVASTATIN CALCIUM 40 MILLIGRAM(S): 80 TABLET, FILM COATED ORAL at 21:42

## 2020-11-10 RX ADMIN — Medication 12.5 MILLIGRAM(S): at 20:30

## 2020-11-10 RX ADMIN — CLOPIDOGREL BISULFATE 75 MILLIGRAM(S): 75 TABLET, FILM COATED ORAL at 15:37

## 2020-11-10 RX ADMIN — MIRTAZAPINE 30 MILLIGRAM(S): 45 TABLET, ORALLY DISINTEGRATING ORAL at 21:42

## 2020-11-10 RX ADMIN — TAMSULOSIN HYDROCHLORIDE 0.4 MILLIGRAM(S): 0.4 CAPSULE ORAL at 21:42

## 2020-11-10 RX ADMIN — Medication 40 MILLIGRAM(S): at 09:01

## 2020-11-10 RX ADMIN — Medication 25 MILLIGRAM(S): at 02:31

## 2020-11-10 RX ADMIN — HEPARIN SODIUM 1000 UNIT(S)/HR: 5000 INJECTION INTRAVENOUS; SUBCUTANEOUS at 02:30

## 2020-11-10 RX ADMIN — HEPARIN SODIUM 4000 UNIT(S): 5000 INJECTION INTRAVENOUS; SUBCUTANEOUS at 02:25

## 2020-11-10 RX ADMIN — DULOXETINE HYDROCHLORIDE 30 MILLIGRAM(S): 30 CAPSULE, DELAYED RELEASE ORAL at 17:47

## 2020-11-10 RX ADMIN — Medication 81 MILLIGRAM(S): at 15:37

## 2020-11-10 RX ADMIN — Medication 12.5 MILLIGRAM(S): at 15:37

## 2020-11-10 NOTE — PATIENT PROFILE ADULT - NSPROPOAURINARYCATHETER_GEN_A_NUR
Initial Anesthesia Post-op Note    Patient: Bel Rodriguez  Procedure(s) Performed: L3-4, L4-5 LAMINECTOMY AND TRANSFORAMINAL LUMBAR INTERBODY FUSION WITH INTERBODY CAGE AND PEDICAL SCREW INSTRUMENTATIONL3-4, L4-5 LAMINECTOMY AND TRANSFORAMINAL LUMBAR INTERBODY FUSION WITH INTERBODY CAGE AND PEDICAL SCREW INSTRUMENTATION  Anesthesia type: General    Vital Last Value   Temperature 37.1 °C (98.8 °F) (03/21/19 0626)   Pulse 85 (03/21/19 0626)   Respiratory Rate 16 (03/21/19 0626)   Non-Invasive   Blood Pressure 170/85 (03/21/19 0626)   Arterial  Blood Pressure     Pulse Oximetry 100 % (03/21/19 0626)     Last 24 I/O:     Intake/Output Summary (Last 24 hours) at 3/21/2019 1035  Last data filed at 3/21/2019 1030  Gross per 24 hour   Intake 1200 ml   Output 200 ml   Net 1000 ml       PATIENT LOCATION: PACU Phase 1  POST-OP VITAL SIGNS: stable  LEVEL OF CONSCIOUSNESS: awake, sedated and responds to stimulation  RESPIRATORY STATUS: spontaneous ventilation, unassisted and room air  CARDIOVASCULAR: blood pressure returned to baseline and stable  HYDRATION: euvolemic    PAIN MANAGEMENT: well controlled  NAUSEA: None  AIRWAY PATENCY: patent  POST-OP ASSESSMENT: no complications, patient tolerated procedure well with no complications, no evidence of recall and sufficiently recovered from acute administration of anesthesia effects and able to participate in evaluation  COMPLICATIONS: none  Comments: Extubated awake & SV in OR after suctioning. Breathing well to PACU. VSS. Denies discomfort. Report to RN. No apparent anesthetic complications.  HANDOFF:  Handoff to receiving nurse was performed and questions were answered       no

## 2020-11-10 NOTE — CONSULT NOTE ADULT - SUBJECTIVE AND OBJECTIVE BOX
Admitted from my office with Chest pain/SOB times 3 days.  c/o weakness.    INTERVAL HPI/OVERNIGHT EVENTS:   No overnight events   Afebrile, hemodynamically stable     ICU Vital Signs Last 24 Hrs  T(C): 36.9 (10 Nov 2020 09:01), Max: 36.9 (10 Nov 2020 09:01)  T(F): 98.4 (10 Nov 2020 09:01), Max: 98.4 (10 Nov 2020 09:01)  HR: 98 (10 Nov 2020 10:06) (50 - 98)  BP: 112/73 (10 Nov 2020 10:06) (112/73 - 172/72)  BP(mean): 85 (09 Nov 2020 17:15) (85 - 85)  ABP: --  ABP(mean): --  RR: 30 (10 Nov 2020 10:06) (18 - 40)  SpO2: 96% (10 Nov 2020 10:06) (90% - 99%)    I&O's Summary    10 Nov 2020 07:01  -  10 Nov 2020 11:59  --------------------------------------------------------  IN: 0 mL / OUT: 350 mL / NET: -350 mL          LABS:                        11.9   8.15  )-----------( 235      ( 10 Nov 2020 06:16 )             37.6     11-10    142  |  106  |  41<H>  ----------------------------<  117<H>  5.0   |  23  |  1.24    Ca    9.9      10 Nov 2020 06:16  Mg     2.0     11-10    TPro  7.2  /  Alb  4.1  /  TBili  0.2  /  DBili  x   /  AST  17  /  ALT  10  /  AlkPhos  73  11-09    PT/INR - ( 10 Nov 2020 06:16 )   PT: 12.0 sec;   INR: 2.35          PTT - ( 10 Nov 2020 06:16 )  PTT:172.1 sec    CAPILLARY BLOOD GLUCOSE      POCT Blood Glucose.: 104 mg/dL (10 Nov 2020 09:40)        RADIOLOGY & ADDITIONAL TESTS:    Consultant(s) Notes Reviewed:  [x ] YES  [ ] NO    MEDICATIONS  (STANDING):  aspirin enteric coated 81 milliGRAM(s) Oral daily  atorvastatin 40 milliGRAM(s) Oral at bedtime  clopidogrel Tablet 75 milliGRAM(s) Oral daily  DULoxetine 30 milliGRAM(s) Oral daily  heparin  Infusion. 600 Unit(s)/Hr (6 mL/Hr) IV Continuous <Continuous>  metoprolol tartrate 12.5 milliGRAM(s) Oral every 6 hours  mirtazapine 30 milliGRAM(s) Oral at bedtime  sodium chloride 0.9%. 1000 milliLiter(s) (50 mL/Hr) IV Continuous <Continuous>  tamsulosin 0.4 milliGRAM(s) Oral at bedtime    MEDICATIONS  (PRN):  acetaminophen   Tablet .. 650 milliGRAM(s) Oral every 6 hours PRN Moderate Pain (4 - 6)  heparin   Injectable 4000 Unit(s) IV Push every 6 hours PRN For aPTT less than 40  heparin   Injectable 2000 Unit(s) IV Push every 6 hours PRN For aPTT between 40 - 57      PHYSICAL EXAM:  GENERAL: Elderly thin male, NAD  HEAD:  Atraumatic, Normocephalic  EYES: EOMI, PERRLA, conjunctiva and sclera clear  NECK: Supple, No JVD, No LAD  NERVOUS SYSTEM:  Alert & Awake.   CHEST/LUNG: CTA B/L with good air entry; tachypneic at rest, no rales, rhonchi, or wheezing  HEART: Muffled, irregular, tachycardic; No appreciable murmurs  ABDOMEN: Soft, Nontender, Nondistended; Bowel sounds normoactive all 4 quadrants  EXTREMITIES:  2+ dp, radial b/l pulses, No clubbing, cyanosis, or edema  LYMPH: No lymphadenopathy noted  SKIN: No rashes or lesions    ROS  SEE HPI

## 2020-11-10 NOTE — CONSULT NOTE ADULT - ASSESSMENT
89 male, lives alone, uses rolling walker, lost to cardiology follow up, PMHx  HTN, known CAD s/p PCI (DAYANA to mRCA, pRCA, mCx 2013 w/ Dr. Varela), Arthritis, BPH and colonic mass (s/p right hemicolectomy 4/2017 w/ Dr. Headley), most recent admission to [facility]  2/2020 for RLE Cellulitis presented to PCP office Monday with complaint of 3 days of decreased exercise tolerance, SOB, VAN, and bandlike chest pain that radiates, physician encouraged him to come to the ED, presented with unchanged complaints and is admitted to cardiology critical care for telemetry monitoring, r/o ACS, r/o PE, and ischemic work-up     NEURO:  #Depression with mood instability and claustrophobia, currently being treated with Cymbalta daily and Remeron at night for sleep aid  - continue Cymbalta 30 mg daily and Remeron 30 mg nightly    PULM:  #VAN  Patient with 3 day history of VAN and chest pain with exertion. DDX includes CHF, PE, PNA, unstable angina. Physical exam significant for clear lung fields, no JVD or edema. CXR clear, making CHF less likely. CT-PE negative for filling defect. Pna unlikely given lack of infectious findings. Unstable angina posible, given slightly elevated troponin of 0.02, hx of CAD, and los of follow-up with his cardiologist.   - c/w heparin gtt, DAPT, and statin  - c/w lopressor 12.5 q6  - wean off HFNC    #PE rule out: Patient admitted with slightly elevated trops and increased BNP possibly due to strain, tachypnea with recent onset dyspnea, lungs CTA  - Heparin full ACS with goal PTT close to 70 for PE  - f/u CTA for PE r/o  - Duonebs Q6 PRN    #COPD: patient with known history of COPD, uses ipratropium/albuterol inhaler at home, no O2 use. Unclear O2 readings in ED, placed on Bipap and stepped down to [place] for work of breathing.  - Continue with duonebs Q6 PRN  - Hi-Rogerio NC 50 % FiO2 at 20 L currently    CARDIO:  #Unstable angina: Mildly elevated trops 0.02 (noted chronically elevated 0.2-0.5 since 2018), peaked in ED.  BNP 1100. CXR clear in ED, EKG SB @ 52 bpm, 1st deg AVB, q waves inferiorly, non-ischemic. ED echo shows mildly reduced left ventricular systolic function, EF 45-50%, and no regional wall motion abnormalities. History of known CAD, s/p prior PCI, most recent Cath @ [facility] 4/11/2013 with Dr. Avery RichardsX, widely patent stent pRCA.   -Loaded with Plavix 600mg and ASA 243mg in ED, took ASA 81mg prior to coming in.  -c/w ASA 81mg daily, Plavix 75mg daily, atorvastatin 40mg  -trend cardiac enzymes, serial ekgs  -Heparin drip currently at 5, with goal PTT range 58-99, target 70   -will follow PTT q6 until therapeutic x3, then daily    #New flutter with variable conduction: Noted on EKG 11/10  - currently rate controlled with lopressor 12.5 Q6 and on Heparin drip for full ACS protocol  - continue to monitor tele  - Daily EKG    #CAD: s/p prior PCI, most recent Cath @ [facility] 4/11/2013 with Dr. Avery Rubio,  widely patent stent pRCA.  - Originally scheduled for [facility] this AM, but cancelled 2/2 decreased likelihood of unstable angina  - Currently on DAPT with aspirin and plavix  - repeat trop 0.02     #HTN: Stable outpatient on Valsartan 160 mg daily and Metoprolol 25 BID after recent change from lisinopril and HCTZ  - on Valsartan 160mg daily, holding 2/2 contrast dye load and DEBBIE  - Metoprolol 12.5 Q6  - Monitor BP.     #HLD: on Simvastatin 20mg at home, interchanged to Atorvastatin 40mg QHS  - continue with Atorvastatin 40 mg   - Cholesterol 131, Triglycerides 58, HDL 68, LDL 51    GI:  #s/p hemicolectomy in 2017 for precancerous colonic mass  - takes lomotil 2/2 recurring diarrhea after this procedure, will continue PRN  - Currently NPO with no need for PPI, will reassess if unable to start diet    ENDO:  No active issues, A1c 5.6, ISS    RENAL:  #DEBBIE on CKD Stage IV: (baseline 1.7 -2.4), now with elevated BUN/Cr 50/1.44)  - gentle hydration in ED and o/n, discontinued  - EF 45-50% by bedside echo 11/10  - F/U Cr     :  #BPH: Patient on Tamsulosin 0.4 mg daily   - c/w Tamsulosin 0.4mg daily.     ID:  No current issues    HEME:  Maintain Active Type and Screen, patient currently on Heparin drip for ACS/PE r/o  - PTT therapeutic range 58-99 with goal of 70  - f/u PTT q6 until therapeutic x 3    ACCESS/LINES:  R and L peripheral IVs, condom catheter     PREVENTATIVE:  F: NONE  E: Replete PRN  N: NPO except meds  DVT PPx: Heparin drip full ACS  GI PPx: None  DISPO: CCU    FULL CODE              Problem/Plan - 6:  Problem: VTE (venous thromboembolism). Plan: DVT ppx:  HSQ

## 2020-11-10 NOTE — CHART NOTE - NSCHARTNOTEFT_GEN_A_CORE
Event not and Transfer to CCU    Alerted by RN patient w/ trouble breathing.  Upon eval of the patient he is tachypneic and satting in low 80s on 4L of nasal cannula.  Tachycardic HR 80-110s.  SBP 140s.   Alert and oriented x3.  denies any chest pain.  Trop downtrending.  Lung exam poor but appears clear.  No edema noted.  Patient went into new Afib w/ RVR overnight and thinking patient possibly became overloaded in the setting of tachycardia.  He received Lasix 40mg IV x1.  BB placed on hold this AM.  Stat CXR ordered.  Respiratory called for bipap.  Patient decompensating when attempting to place on bipap without improvement once bipap placed.  Rapid response called.  patient placed on non rebreather, and now satting % which was transitioned to high flow and satting % but remains tachypneic at 26 RR/min.  CCU fellow consulted to evaluate patient.  Per CCU fellow CXR w/ Clear lungs.  Respiratory distress possibly secondary to PE.  Stat CT Chest r/o PE ordered and Bedside Echo.  Patient was started on heparin gtt overnight for Afib and is continued now for Afib and r/o PE.  Given tachypnea and respiratory distress patient is now transferred to CCU for closer monitoring.

## 2020-11-10 NOTE — PROGRESS NOTE ADULT - SUBJECTIVE AND OBJECTIVE BOX
CC/ HPI Patient is an 89 year old male with Hx COPD, HTN, CAD s/p PCI, DAYANA to mRCA, pRCA, mCx, 2013, colonic mass s/p right hemicolectomy 2017, hospital stay for RLE cellulitis, Feb 2020, presented with unstable angina and is admitted to cardiology for telemetry monitoring, R/O ACS and for ischemic work-up, seen this morning the patient denies acute respiratory complaint.    PAST MEDICAL & SURGICAL HISTORY:  BPH (benign prostatic hyperplasia)  CAD (coronary artery disease)  HLD (hyperlipidemia)  H/O right hemicolectomy  S/P cataract extraction    SOCHX:  + tobacco,  -  alcohol        FAMILY HISTORY: FA/MO  No family history of cardiovascular disease (Father, Mother)    ROS reviewed below with positive findings marked (+) :  GEN:  fever, chills ENT: tracheostomy,   epistaxis,  sinusitis COR: +CAD, CHF,  +HTN, dysrhythmia PUL: COPD, ILD, asthma, pneumonia GI: PEG, dysphagia, hemorrhage, other ELIZABETH: kidney disease, electrolyte disorder HEM:  anemia, thrombus, coagulopathy, cancer ENDO:  thyroid disease, diabetes mellitus CNS:  dementia, stroke, seizure, PSY:  depression, anxiety, other      MEDICATIONS  (STANDING):  aspirin enteric coated 81 milliGRAM(s) Oral daily  atorvastatin 40 milliGRAM(s) Oral at bedtime  clopidogrel Tablet 75 milliGRAM(s) Oral daily  DULoxetine 30 milliGRAM(s) Oral daily  heparin  Infusion. 600 Unit(s)/Hr (6 mL/Hr) IV Continuous <Continuous>  metoprolol tartrate 12.5 milliGRAM(s) Oral every 6 hours  mirtazapine 30 milliGRAM(s) Oral at bedtime  sodium chloride 0.9%. 1000 milliLiter(s) (50 mL/Hr) IV Continuous <Continuous>  tamsulosin 0.4 milliGRAM(s) Oral at bedtime    MEDICATIONS  (PRN):  acetaminophen   Tablet .. 650 milliGRAM(s) Oral every 6 hours PRN Moderate Pain (4 - 6)  heparin   Injectable 4000 Unit(s) IV Push every 6 hours PRN For aPTT less than 40  heparin   Injectable 2000 Unit(s) IV Push every 6 hours PRN For aPTT between 40 - 57      Vital Signs Last 24 Hrs  T(C): 36.9 (10 Nov 2020 09:01), Max: 36.9 (10 Nov 2020 09:01)  T(F): 98.4 (10 Nov 2020 09:01), Max: 98.4 (10 Nov 2020 09:01)  HR: 98 (10 Nov 2020 10:06) (50 - 98)  BP: 112/73 (10 Nov 2020 10:06) (112/73 - 172/72)  BP(mean): 85 (09 Nov 2020 17:15) (85 - 85)  RR: 30 (10 Nov 2020 10:06) (18 - 40)  SpO2: 96% (10 Nov 2020 10:06) (90% - 99%)    GENERAL:         comfortable,  - distress.  HEENT:            - trauma,  - icterus,  - injection,  - nasal discharge.  NECK:              - jugular venous distention, - thyromegaly.  LYMPH:           - lymphadenopathy, - masses.  RESP:              + clear,   - rales,   - rhonchi,   - wheezes.   COR:                S1S2   - gallops,  - rubs.  ABD:                bowel sounds,   soft, - tender, - distended.  EXT/MSC:         - cyanosis,  - clubbing,  -edema.    NEURO:           + alert and oriented                             11.9   8.15  )-----------( 235      ( 10 Nov 2020 06:16 )             37.6     11-10    142  |  106  |  41<H>  ----------------------------<  117<H>  5.0   |  23  |  1.24        X-ray Chest (11.09.20)  The cardiac silhouette is enlarged. The aorta is tortuous with atheromatous calcifications.  No pneumothorax is visualized.  No pneumomediastinum is visualized.  No pleural effusion is visualized.  No air space consolidation is visualized. The lungs are hyperinflated.  No fracture is visualized. There is mild levocurvature of the thoracic spine.  Impression:  Enlarged cardiac silhouette.  Hyperinflated lungs which could be seen with asthma or chronic obstructive pulmonary disease, clinical correlation recommended.      ASSESSMENT/PLAN    1) Unstable angina  2) Coronary artery disease  3) Hypertension  4) Chronic obstructive pulmonary disease    Satisfactory SpO2 (RA)  Bronchodilators:  Atrovent/ albuterol q 4 – 6 hours as needed  ID/Antibiotics: cefazolin, follow up cultures  Cardiac/HTN: BP control  GI: Rx/ prophylaxis c PPI/H2B  Heme: Rx/VT prophylaxis  Discuss with Dr. Montalvo           CC/ HPI Patient is an 89 year old male with Hx COPD, HTN, CAD s/p PCI, DAYANA to mRCA, pRCA, mCx, 2013, colonic mass s/p right hemicolectomy 2017, hospital stay for RLE cellulitis, Feb 2020, presented with unstable angina and is admitted to cardiology for telemetry monitoring, R/O ACS and for ischemic work-up, seen this morning the patient denies acute respiratory complaint.    PAST MEDICAL & SURGICAL HISTORY:  BPH (benign prostatic hyperplasia)  CAD (coronary artery disease)  HLD (hyperlipidemia)  H/O right hemicolectomy  S/P cataract extraction    SOCHX:  + tobacco,  -  alcohol        FAMILY HISTORY: FA/MO  No family history of cardiovascular disease (Father, Mother)    ROS reviewed below with positive findings marked (+) :  GEN:  fever, chills ENT: tracheostomy,   epistaxis,  sinusitis COR: +CAD, CHF,  +HTN, dysrhythmia PUL: COPD, ILD, asthma, pneumonia GI: PEG, dysphagia, hemorrhage, other ELIZABETH: kidney disease, electrolyte disorder HEM:  anemia, thrombus, coagulopathy, cancer ENDO:  thyroid disease, diabetes mellitus CNS:  dementia, stroke, seizure, PSY:  depression, anxiety, other      MEDICATIONS  (STANDING):  aspirin enteric coated 81 milliGRAM(s) Oral daily  atorvastatin 40 milliGRAM(s) Oral at bedtime  clopidogrel Tablet 75 milliGRAM(s) Oral daily  DULoxetine 30 milliGRAM(s) Oral daily  heparin  Infusion. 600 Unit(s)/Hr (6 mL/Hr) IV Continuous <Continuous>  metoprolol tartrate 12.5 milliGRAM(s) Oral every 6 hours  mirtazapine 30 milliGRAM(s) Oral at bedtime  sodium chloride 0.9%. 1000 milliLiter(s) (50 mL/Hr) IV Continuous <Continuous>  tamsulosin 0.4 milliGRAM(s) Oral at bedtime    MEDICATIONS  (PRN):  acetaminophen   Tablet .. 650 milliGRAM(s) Oral every 6 hours PRN Moderate Pain (4 - 6)  heparin   Injectable 4000 Unit(s) IV Push every 6 hours PRN For aPTT less than 40  heparin   Injectable 2000 Unit(s) IV Push every 6 hours PRN For aPTT between 40 - 57      Vital Signs Last 24 Hrs  T(C): 36.9 (10 Nov 2020 09:01), Max: 36.9 (10 Nov 2020 09:01)  T(F): 98.4 (10 Nov 2020 09:01), Max: 98.4 (10 Nov 2020 09:01)  HR: 98 (10 Nov 2020 10:06) (50 - 98)  BP: 112/73 (10 Nov 2020 10:06) (112/73 - 172/72)  BP(mean): 85 (09 Nov 2020 17:15) (85 - 85)  RR: 30 (10 Nov 2020 10:06) (18 - 40)  SpO2: 96% (10 Nov 2020 10:06) (90% - 99%)    GENERAL:         comfortable,  - distress.  HEENT:            - trauma,  - icterus,  - injection,  - nasal discharge.  NECK:              - jugular venous distention, - thyromegaly.  LYMPH:           - lymphadenopathy, - masses.  RESP:              + clear,   - rales,   - rhonchi,   - wheezes.   COR:                S1S2   - gallops,  - rubs.  ABD:                bowel sounds,   soft, - tender, - distended.  EXT/MSC:         - cyanosis,  - clubbing,  -edema.    NEURO:           + alert and oriented                             11.9   8.15  )-----------( 235      ( 10 Nov 2020 06:16 )             37.6     11-10    142  |  106  |  41<H>  ----------------------------<  117<H>  5.0   |  23  |  1.24        X-ray Chest (11.09.20)  The cardiac silhouette is enlarged. The aorta is tortuous with atheromatous calcifications.  No pneumothorax is visualized.  No pneumomediastinum is visualized.  No pleural effusion is visualized.  No air space consolidation is visualized. The lungs are hyperinflated.  No fracture is visualized. There is mild levocurvature of the thoracic spine.  Impression:  Enlarged cardiac silhouette.  Hyperinflated lungs which could be seen with asthma or chronic obstructive pulmonary disease, clinical correlation recommended.      ASSESSMENT/PLAN    1) Unstable angina  2) Coronary artery disease  3) Hypertension  4) Chronic obstructive pulmonary disease    Oxygen as needed for a satisfactory SpO2  Atrovent/ albuterol q 4 – 6 hours as needed  Cardiac intervention planned   GI: Rx/ prophylaxis c PPI/H2B  Heme: Rx/VT receiving AC  Discuss with Dr. Montalvo

## 2020-11-10 NOTE — CONSULT NOTE ADULT - CONSULT REQUESTED BY NAME
CCU Per Dr Alford patent has to continue iron, If still doing goats milk will need folic acid too.    Mother reports that patient is no longer drinking goats milk.  Mother reports that patient in on Nutramigen formula.   Advised mother that if patient is on formula then he can stop the folic acid, however he does need to continue the iron.  Mother verbalized understanding.

## 2020-11-10 NOTE — PROGRESS NOTE ADULT - SUBJECTIVE AND OBJECTIVE BOX
***INCOMPLETE***  Patient is a 89 yo M received on CCU after CT for PE r/o performed. NAD, pleasant, currently reporting no chest pain or discomfort.      INTERVAL HPI/OVERNIGHT EVENTS:   No overnight events   Afebrile, hemodynamically stable     ICU Vital Signs Last 24 Hrs  T(C): 36.9 (10 Nov 2020 09:01), Max: 36.9 (10 Nov 2020 09:01)  T(F): 98.4 (10 Nov 2020 09:01), Max: 98.4 (10 Nov 2020 09:01)  HR: 98 (10 Nov 2020 10:06) (50 - 98)  BP: 112/73 (10 Nov 2020 10:06) (112/73 - 172/72)  BP(mean): 85 (09 Nov 2020 17:15) (85 - 85)  ABP: --  ABP(mean): --  RR: 30 (10 Nov 2020 10:06) (18 - 40)  SpO2: 96% (10 Nov 2020 10:06) (90% - 99%)    I&O's Summary    10 Nov 2020 07:01  -  10 Nov 2020 11:59  --------------------------------------------------------  IN: 0 mL / OUT: 350 mL / NET: -350 mL          LABS:                        11.9   8.15  )-----------( 235      ( 10 Nov 2020 06:16 )             37.6     11-10    142  |  106  |  41<H>  ----------------------------<  117<H>  5.0   |  23  |  1.24    Ca    9.9      10 Nov 2020 06:16  Mg     2.0     11-10    TPro  7.2  /  Alb  4.1  /  TBili  0.2  /  DBili  x   /  AST  17  /  ALT  10  /  AlkPhos  73  11-09    PT/INR - ( 10 Nov 2020 06:16 )   PT: 12.0 sec;   INR: 2.35          PTT - ( 10 Nov 2020 06:16 )  PTT:172.1 sec    CAPILLARY BLOOD GLUCOSE      POCT Blood Glucose.: 104 mg/dL (10 Nov 2020 09:40)        RADIOLOGY & ADDITIONAL TESTS:    Consultant(s) Notes Reviewed:  [x ] YES  [ ] NO    MEDICATIONS  (STANDING):  aspirin enteric coated 81 milliGRAM(s) Oral daily  atorvastatin 40 milliGRAM(s) Oral at bedtime  clopidogrel Tablet 75 milliGRAM(s) Oral daily  DULoxetine 30 milliGRAM(s) Oral daily  heparin  Infusion. 600 Unit(s)/Hr (6 mL/Hr) IV Continuous <Continuous>  metoprolol tartrate 12.5 milliGRAM(s) Oral every 6 hours  mirtazapine 30 milliGRAM(s) Oral at bedtime  sodium chloride 0.9%. 1000 milliLiter(s) (50 mL/Hr) IV Continuous <Continuous>  tamsulosin 0.4 milliGRAM(s) Oral at bedtime    MEDICATIONS  (PRN):  acetaminophen   Tablet .. 650 milliGRAM(s) Oral every 6 hours PRN Moderate Pain (4 - 6)  heparin   Injectable 4000 Unit(s) IV Push every 6 hours PRN For aPTT less than 40  heparin   Injectable 2000 Unit(s) IV Push every 6 hours PRN For aPTT between 40 - 57      PHYSICAL EXAM:  GENERAL: Elderly male, NAD  HEAD:  Atraumatic, Normocephalic  EYES: EOMI, PERRLA, conjunctiva and sclera clear  NECK: Supple, No JVD, No LAD  NERVOUS SYSTEM:  Alert & Awake.   CHEST/LUNG: CTA B/L with good air entry; No rales, rhonchi, or wheezing  HEART: Muffled, but regular rate and rhythm; No appreciable murmurs  ABDOMEN: Soft, Nontender, Nondistended; Bowel sounds normoactive all 4 quadrants  EXTREMITIES:  2+ Peripheral Pulses, No clubbing, cyanosis, or edema  LYMPH: No lymphadenopathy noted  SKIN: No rashes or lesions    Care Discussed with Consultants/Other Providers [ x] YES  [ ] NO ***INCOMPLETE***  Patient is a 89 yo M received on CCU after CT for PE r/o performed. NAD, pleasant, currently reporting no chest pain or discomfort.      INTERVAL HPI/OVERNIGHT EVENTS:   No overnight events   Afebrile, hemodynamically stable     ICU Vital Signs Last 24 Hrs  T(C): 36.9 (10 Nov 2020 09:01), Max: 36.9 (10 Nov 2020 09:01)  T(F): 98.4 (10 Nov 2020 09:01), Max: 98.4 (10 Nov 2020 09:01)  HR: 98 (10 Nov 2020 10:06) (50 - 98)  BP: 112/73 (10 Nov 2020 10:06) (112/73 - 172/72)  BP(mean): 85 (09 Nov 2020 17:15) (85 - 85)  ABP: --  ABP(mean): --  RR: 30 (10 Nov 2020 10:06) (18 - 40)  SpO2: 96% (10 Nov 2020 10:06) (90% - 99%)    I&O's Summary    10 Nov 2020 07:01  -  10 Nov 2020 11:59  --------------------------------------------------------  IN: 0 mL / OUT: 350 mL / NET: -350 mL          LABS:                        11.9   8.15  )-----------( 235      ( 10 Nov 2020 06:16 )             37.6     11-10    142  |  106  |  41<H>  ----------------------------<  117<H>  5.0   |  23  |  1.24    Ca    9.9      10 Nov 2020 06:16  Mg     2.0     11-10    TPro  7.2  /  Alb  4.1  /  TBili  0.2  /  DBili  x   /  AST  17  /  ALT  10  /  AlkPhos  73  11-09    PT/INR - ( 10 Nov 2020 06:16 )   PT: 12.0 sec;   INR: 2.35          PTT - ( 10 Nov 2020 06:16 )  PTT:172.1 sec    CAPILLARY BLOOD GLUCOSE      POCT Blood Glucose.: 104 mg/dL (10 Nov 2020 09:40)        RADIOLOGY & ADDITIONAL TESTS:    Consultant(s) Notes Reviewed:  [x ] YES  [ ] NO    MEDICATIONS  (STANDING):  aspirin enteric coated 81 milliGRAM(s) Oral daily  atorvastatin 40 milliGRAM(s) Oral at bedtime  clopidogrel Tablet 75 milliGRAM(s) Oral daily  DULoxetine 30 milliGRAM(s) Oral daily  heparin  Infusion. 600 Unit(s)/Hr (6 mL/Hr) IV Continuous <Continuous>  metoprolol tartrate 12.5 milliGRAM(s) Oral every 6 hours  mirtazapine 30 milliGRAM(s) Oral at bedtime  sodium chloride 0.9%. 1000 milliLiter(s) (50 mL/Hr) IV Continuous <Continuous>  tamsulosin 0.4 milliGRAM(s) Oral at bedtime    MEDICATIONS  (PRN):  acetaminophen   Tablet .. 650 milliGRAM(s) Oral every 6 hours PRN Moderate Pain (4 - 6)  heparin   Injectable 4000 Unit(s) IV Push every 6 hours PRN For aPTT less than 40  heparin   Injectable 2000 Unit(s) IV Push every 6 hours PRN For aPTT between 40 - 57      PHYSICAL EXAM:  GENERAL: Elderly male, NAD  HEAD:  Atraumatic, Normocephalic  EYES: EOMI, PERRLA, conjunctiva and sclera clear  NECK: Supple, No JVD, No LAD  NERVOUS SYSTEM:  Alert & Awake.   CHEST/LUNG: CTA B/L with good air entry; No rales, rhonchi, or wheezing  HEART: Muffled, but regular rate and rhythm; No appreciable murmurs  ABDOMEN: Soft, Nontender, Nondistended; Bowel sounds normoactive all 4 quadrants  EXTREMITIES:  2+ Peripheral Pulses, No clubbing, cyanosis, or edema  LYMPH: No lymphadenopathy noted  SKIN: No rashes or lesions    Care Discussed with Consultants/Other Providers [ x] YES  [ ] NO Patient is a 89 yo M received on CCU after CT for PE r/o performed. WILLIAMS, pleasant, currently reporting no chest pain or discomfort.      INTERVAL HPI/OVERNIGHT EVENTS:   No overnight events   Afebrile, hemodynamically stable     ICU Vital Signs Last 24 Hrs  T(C): 36.9 (10 Nov 2020 09:01), Max: 36.9 (10 Nov 2020 09:01)  T(F): 98.4 (10 Nov 2020 09:01), Max: 98.4 (10 Nov 2020 09:01)  HR: 98 (10 Nov 2020 10:06) (50 - 98)  BP: 112/73 (10 Nov 2020 10:06) (112/73 - 172/72)  BP(mean): 85 (09 Nov 2020 17:15) (85 - 85)  ABP: --  ABP(mean): --  RR: 30 (10 Nov 2020 10:06) (18 - 40)  SpO2: 96% (10 Nov 2020 10:06) (90% - 99%)    I&O's Summary    10 Nov 2020 07:01  -  10 Nov 2020 11:59  --------------------------------------------------------  IN: 0 mL / OUT: 350 mL / NET: -350 mL          LABS:                        11.9   8.15  )-----------( 235      ( 10 Nov 2020 06:16 )             37.6     11-10    142  |  106  |  41<H>  ----------------------------<  117<H>  5.0   |  23  |  1.24    Ca    9.9      10 Nov 2020 06:16  Mg     2.0     11-10    TPro  7.2  /  Alb  4.1  /  TBili  0.2  /  DBili  x   /  AST  17  /  ALT  10  /  AlkPhos  73  11-09    PT/INR - ( 10 Nov 2020 06:16 )   PT: 12.0 sec;   INR: 2.35          PTT - ( 10 Nov 2020 06:16 )  PTT:172.1 sec    CAPILLARY BLOOD GLUCOSE      POCT Blood Glucose.: 104 mg/dL (10 Nov 2020 09:40)        RADIOLOGY & ADDITIONAL TESTS:    Consultant(s) Notes Reviewed:  [x ] YES  [ ] NO    MEDICATIONS  (STANDING):  aspirin enteric coated 81 milliGRAM(s) Oral daily  atorvastatin 40 milliGRAM(s) Oral at bedtime  clopidogrel Tablet 75 milliGRAM(s) Oral daily  DULoxetine 30 milliGRAM(s) Oral daily  heparin  Infusion. 600 Unit(s)/Hr (6 mL/Hr) IV Continuous <Continuous>  metoprolol tartrate 12.5 milliGRAM(s) Oral every 6 hours  mirtazapine 30 milliGRAM(s) Oral at bedtime  sodium chloride 0.9%. 1000 milliLiter(s) (50 mL/Hr) IV Continuous <Continuous>  tamsulosin 0.4 milliGRAM(s) Oral at bedtime    MEDICATIONS  (PRN):  acetaminophen   Tablet .. 650 milliGRAM(s) Oral every 6 hours PRN Moderate Pain (4 - 6)  heparin   Injectable 4000 Unit(s) IV Push every 6 hours PRN For aPTT less than 40  heparin   Injectable 2000 Unit(s) IV Push every 6 hours PRN For aPTT between 40 - 57      PHYSICAL EXAM:  GENERAL: Elderly thin male, NAD  HEAD:  Atraumatic, Normocephalic  EYES: EOMI, PERRLA, conjunctiva and sclera clear  NECK: Supple, No JVD, No LAD  NERVOUS SYSTEM:  Alert & Awake.   CHEST/LUNG: CTA B/L with good air entry; tachypneic at rest, no rales, rhonchi, or wheezing  HEART: Muffled, irregular, tachycardic; No appreciable murmurs  ABDOMEN: Soft, Nontender, Nondistended; Bowel sounds normoactive all 4 quadrants  EXTREMITIES:  2+ dp, radial b/l pulses, No clubbing, cyanosis, or edema  LYMPH: No lymphadenopathy noted  SKIN: No rashes or lesions    Care Discussed with Consultants/Other Providers [ x] YES  [ ] NO

## 2020-11-10 NOTE — PROGRESS NOTE ADULT - ASSESSMENT
89 male, lives alone, uses rolling walker, lost to cardiology follow up, PMHx  HTN, known CAD s/p PCI (DAYANA to mRCA, pRCA, mCx 2013 w/ Dr. Varela), Arthritis, BPH and colonic mass (s/p right hemicolectomy 4/2017 w/ Dr. Headley), most recent admission to [facility]  2/2020 for RLE Cellulitis presented to [facility] ED yesterday with c/o unstable angina and is admitted to cardiology critical care for telemetry monitoring, r/o ACS, r/o PE, and for ischemic work-up     NEURO:      PULM:      CARDIO:  #Unstable angina: Mildly elevated trops 0.02 (noted chronically elevated 0.2-0.5 since 2018), peaked in ED.  BNP 1100. CXR clear in ED, EKG SB @ 52 bpm, 1st deg AVB, q waves inferiorly, non-ischemic.  -Obtain ECHO for EF and structural (no prior ECHO in chart)  -History of known CAD, s/p prior PCI, most recent Cath @ [facility] 4/11/2013 with Dr. Avery ANNE mLCX,  widely patent stent pRCA.  -Loaded with Plavix 600mg and ASA 243mg in ED, took ASA 81mg prior to coming in.    -c/w ASA 81mg daily, Plavix 75mg daily, simvastatin interchanged to atorvastatin 40mg daily.  -noted on Lisinopril and HCTZ in the past, appears to be D/C by Dr Montalvo and started on [id number] daily last filled on 9/2020  -Holding ARB in the setting of worsening CKD  -Gentle hydration overnight, NS @ 50cc x 8 hours, frequent lung checks, EF unknown.    #CAD: s/p prior PCI, most recent Cath @ [facility] 4/11/2013 with Dr. Avery ANNE mLCX,  widely patent stent pRCA.  - Originally schedule for [facility] this AM, but cancelled 2/2 decreased likelihood of unstable angina    #HTN (hypertension).  Plan: -Stable  -on Valsartan 160mg daily, now on hold 2/2 elevated Cr and contrast dye load  -Holding BB in the setting of bradycardia.  -Monitor BP.     ·  Problem: HLD (hyperlipidemia).  Plan: -on Simvastatin 20mg, interchanged to Atovastatin 40mg QHS  -F/U Lipid Panel in AM.     GI:      ENDO:      RENAL:  ·  Problem: Chronic kidney disease (CKD), stage IV (severe).  Plan: -DEBBIE on CKD Stage IV (baseline 1.7 -2.4), now with elevated BUN/Cr 50/1.44)  -c/w gentle hydration: NS @ 50cc x 4 hours  -EF unknown, monitor lung status while on IVF.   -F/U Cr in AM.     :  ·  Problem: BPH (benign prostatic hyperplasia).  Plan: -c/w Tamsulosin 0.4mg daily.     ID:      HEME:      ACCESS/LINES:  (IVs)      PREVENTATIVE:  F: NONE  E: Replete PRN  N: Diet, NPO except meds  DVT PPx: Lovenox 40 mg q24  GI PPx: None  DISPO: MICU    FULL CODE              Problem/Plan - 6:  Problem: VTE (venous thromboembolism). Plan: DVT ppx:  HSQ       89 male, lives alone, uses rolling walker, lost to cardiology follow up, PMHx  HTN, known CAD s/p PCI (DAYANA to mRCA, pRCA, mCx 2013 w/ Dr. Varela), Arthritis, BPH and colonic mass (s/p right hemicolectomy 4/2017 w/ Dr. Headley), most recent admission to [facility]  2/2020 for RLE Cellulitis presented to [facility] ED yesterday with c/o unstable angina and is admitted to cardiology critical care for telemetry monitoring, r/o ACS, r/o PE, and for ischemic work-up     NEURO:  No active issues or focal neuro deficits, at baseline mental status    PULM:  #PE rule out: Patient admitted with slightly elevated trops and increased BNP indicating strain, tachypnea,      CARDIO:  #Unstable angina: Mildly elevated trops 0.02 (noted chronically elevated 0.2-0.5 since 2018), peaked in ED.  BNP 1100. CXR clear in ED, EKG SB @ 52 bpm, 1st deg AVB, q waves inferiorly, non-ischemic.  -Obtain ECHO for EF and structural (no prior ECHO in chart)  -History of known CAD, s/p prior PCI, most recent Cath @ [facility] 4/11/2013 with Dr. Avery Rubio,  widely patent stent pRCA.  -Loaded with Plavix 600mg and ASA 243mg in ED, took ASA 81mg prior to coming in.    -c/w ASA 81mg daily, Plavix 75mg daily, simvastatin interchanged to atorvastatin 40mg daily.  -noted on Lisinopril and HCTZ in the past, appears to be D/C by Dr Montalvo and started on [id number] daily last filled on 9/2020  -Holding ARB in the setting of worsening CKD  -Gentle hydration overnight, NS @ 50cc x 8 hours, frequent lung checks, EF unknown.    #CAD: s/p prior PCI, most recent Cath @ [facility] 4/11/2013 with Dr. Avery RichardsX,  widely patent stent pRCA.  - Originally schedule for [facility] this AM, but cancelled 2/2 decreased likelihood of unstable angina    #HTN (hypertension).  Plan: -Stable  -on Valsartan 160mg daily, now on hold 2/2 elevated Cr and contrast dye load  -Holding BB in the setting of bradycardia.  -Monitor BP.     ·  Problem: HLD (hyperlipidemia).  Plan: -on Simvastatin 20mg, interchanged to Atovastatin 40mg QHS  -F/U Lipid Panel in AM.     GI:      ENDO:      RENAL:  ·  Problem: Chronic kidney disease (CKD), stage IV (severe).  Plan: -DEBBIE on CKD Stage IV (baseline 1.7 -2.4), now with elevated BUN/Cr 50/1.44)  -c/w gentle hydration: NS @ 50cc x 4 hours  -EF unknown, monitor lung status while on IVF.   -F/U Cr in AM.     :  ·  Problem: BPH (benign prostatic hyperplasia).  Plan: -c/w Tamsulosin 0.4mg daily.     ID:      HEME:      ACCESS/LINES:  (IVs)      PREVENTATIVE:  F: NONE  E: Replete PRN  N: Diet, NPO except meds  DVT PPx: Lovenox 40 mg q24  GI PPx: None  DISPO: MICU    FULL CODE              Problem/Plan - 6:  Problem: VTE (venous thromboembolism). Plan: DVT ppx:  HSQ       89 male, lives alone, uses rolling walker, lost to cardiology follow up, PMHx  HTN, known CAD s/p PCI (DAYANA to mRCA, pRCA, mCx 2013 w/ Dr. Varela), Arthritis, BPH and colonic mass (s/p right hemicolectomy 4/2017 w/ Dr. Headley), most recent admission to [facility]  2/2020 for RLE Cellulitis presented to [facility] ED yesterday with c/o unstable angina and is admitted to cardiology critical care for telemetry monitoring, r/o ACS, r/o PE, and for ischemic work-up     NEURO:  No active issues or focal neuro deficits, at baseline mental status  - Patient on Cymbalta and Remeron at home, will continue and f/u med rec for diagnoses    PULM:  #PE rule out: Patient admitted with slightly elevated trops and increased BNP possibly due to strain, tachypnea with recent onset dyspnea, lungs CTA  - Heparin full ACS with goal PTT close to 70 for PE  - f/u CTA for PE r/o  - Duonebs Q6 PRN    CARDIO:  #Unstable angina: Mildly elevated trops 0.02 (noted chronically elevated 0.2-0.5 since 2018), peaked in ED.  BNP 1100. CXR clear in ED, EKG SB @ 52 bpm, 1st deg AVB, q waves inferiorly, non-ischemic. ED echo shows mildly reduced left ventricular systolic function, EF 45-50%, and no regional wall motion abnormalities. History of known CAD, s/p prior PCI, most recent Cath @ [facility] 4/11/2013 with Dr. Varela DAYANA mLCX, widely patent stent pRCA.   -Loaded with Plavix 600mg and ASA 243mg in ED, took ASA 81mg prior to coming in.  -c/w ASA 81mg daily, Plavix 75mg daily, simvastatin interchanged to atorvastatin 40mg daily.  -noted on Lisinopril and HCTZ in the past, appears to be D/C by Dr Montalvo and started on [id number] daily last filled on 9/2020  -Holding ARB in the setting of worsening CKD  -Heparin drip currently at 5, with goal PTT range 58-99, target 70   -will follow PTT q6 until therapeutic x3, then daily    #New Onset Paroxysmal AFib: Noted on EKG and on echo 11/10  - currently rate controlled with lopressor 12.5 Q6 and on Heparin drip for full ACS protocol  - On DAPT with ASA and Plavix    #CAD: s/p prior PCI, most recent Cath @ [facility] 4/11/2013 with Dr. Avery ANNE mLCX,  widely patent stent pRCA.  - Originally scheduled for [facility] this AM, but cancelled 2/2 decreased likelihood of unstable angina    #HTN: Stable outpatient on Valsartan 160 mg daily after recent change from lisinopril and HCTZ  - on Valsartan 160mg daily, now on hold 2/2 contrast dye load and DEBBIE  - Metoprolol 12.5 Q6  - Monitor BP.     #HLD: on Simvastatin 20mg at home, interchanged to Atorvastatin 40mg QHS  - continue with Atorvastatin 40 mg   - Cholesterol    GI:  Currently NPO with no need for PPI, will reassess if unable to start diet    ENDO:  No active issues, ISS    RENAL:  #DEBBIE on CKD Stage IV: (baseline 1.7 -2.4), now with elevated BUN/Cr 50/1.44)  - gentle hydration in ED and o/n, discontinued  - EF 45-50% by bedside echo 11/10  - F/U Cr     :  #BPH: Patient on Tamsulosin 0.4 mg daily   - c/w Tamsulosin 0.4mg daily.     ID:  No current issues    HEME:  Maintain Active Type and Screen, patient currently on Heparin drip for ACS/PE r/o  - PTT therapeutic range 58-99 with goal of 70  - f/u PTT q6 until therapeutic x 3    ACCESS/LINES:  2 peripheral IVs, condom catheter,       PREVENTATIVE:  F: NONE  E: Replete PRN  N: NPO except meds  DVT PPx: Heparin drip full ACS  GI PPx: None  DISPO: CCU    FULL CODE              Problem/Plan - 6:  Problem: VTE (venous thromboembolism). Plan: DVT ppx:  HSQ       89 male, lives alone, uses rolling walker, lost to cardiology follow up, PMHx  HTN, known CAD s/p PCI (DAYANA to mRCA, pRCA, mCx 2013 w/ Dr. Varela), Arthritis, BPH and colonic mass (s/p right hemicolectomy 4/2017 w/ Dr. Headley), most recent admission to [facility]  2/2020 for RLE Cellulitis presented to PCP office Monday with complaint of 3 days of decreased exercise tolerance, SOB, VAN, and bandlike chest pain that radiates, physician encouraged him to come to the ED, presented with unchanged complaints and is admitted to cardiology critical care for telemetry monitoring, r/o ACS, r/o PE, and ischemic work-up     NEURO:  #Depression with mood instability and claustrophobia, currently being treated with Cymbalta daily and Remeron at night for sleep aid  - continue Cymbalta 30 mg daily and Remeron 30 mg nightly    PULM:  #PE rule out: Patient admitted with slightly elevated trops and increased BNP possibly due to strain, tachypnea with recent onset dyspnea, lungs CTA  - Heparin full ACS with goal PTT close to 70 for PE  - f/u CTA for PE r/o  - Duonebs Q6 PRN    #COPD: patient with known history of COPD, uses ipratropium/albuterol inhaler at home, no O2 use  - Continue with duonebs Q6 PRN  - Placed on Bipap in ED, but stepped down to [place] at 20 L currently    CARDIO:  #Unstable angina: Mildly elevated trops 0.02 (noted chronically elevated 0.2-0.5 since 2018), peaked in ED.  BNP 1100. CXR clear in ED, EKG SB @ 52 bpm, 1st deg AVB, q waves inferiorly, non-ischemic. ED echo shows mildly reduced left ventricular systolic function, EF 45-50%, and no regional wall motion abnormalities. History of known CAD, s/p prior PCI, most recent Cath @ [facility] 4/11/2013 with Dr. Varela DAYANA mLCX, widely patent stent pRCA.   -Loaded with Plavix 600mg and ASA 243mg in ED, took ASA 81mg prior to coming in.  -c/w ASA 81mg daily, Plavix 75mg daily, simvastatin interchanged to atorvastatin 40mg daily.  -noted on Lisinopril and HCTZ in the past, appears to be D/C by Dr Montalvo and started on [id number] daily last filled on 9/2020  -Holding ARB in the setting of worsening CKD  -Heparin drip currently at 5, with goal PTT range 58-99, target 70   -will follow PTT q6 until therapeutic x3, then daily    #New Onset Paroxysmal AFib: Noted on EKG and on echo 11/10  - currently rate controlled with lopressor 12.5 Q6 and on Heparin drip for full ACS protocol  - On DAPT with ASA and Plavix  - continue to monitor tele    #CAD: s/p prior PCI, most recent Cath @ [facility] 4/11/2013 with Dr. Avery ANNE mLCX,  widely patent stent pRCA.  - Originally scheduled for [facility] this AM, but cancelled 2/2 decreased likelihood of unstable angina    #HTN: Stable outpatient on Valsartan 160 mg daily and Metoprolol 25 BID after recent change from lisinopril and HCTZ  - on Valsartan 160mg daily, now on hold 2/2 contrast dye load and DEBBIE  - Metoprolol 12.5 Q6  - Monitor BP.     #HLD: on Simvastatin 20mg at home, interchanged to Atorvastatin 40mg QHS  - continue with Atorvastatin 40 mg   - Cholesterol 131, Triglycerides 58, HDL 68, LDL 51    GI:  #s/p hemicolectomy in 2017 for precancerous colonic mass  - takes lomotil 2/2 recurring diarrhea after this procedure, will continue PRN  - Currently NPO with no need for PPI, will reassess if unable to start diet    ENDO:  No active issues, A1c 5.6, ISS    RENAL:  #DEBBIE on CKD Stage IV: (baseline 1.7 -2.4), now with elevated BUN/Cr 50/1.44)  - gentle hydration in ED and o/n, discontinued  - EF 45-50% by bedside echo 11/10  - F/U Cr     :  #BPH: Patient on Tamsulosin 0.4 mg daily   - c/w Tamsulosin 0.4mg daily.     ID:  No current issues    HEME:  Maintain Active Type and Screen, patient currently on Heparin drip for ACS/PE r/o  - PTT therapeutic range 58-99 with goal of 70  - f/u PTT q6 until therapeutic x 3    ACCESS/LINES:  R and L peripheral IVs, condom catheter     PREVENTATIVE:  F: NONE  E: Replete PRN  N: NPO except meds  DVT PPx: Heparin drip full ACS  GI PPx: None  DISPO: CCU    FULL CODE              Problem/Plan - 6:  Problem: VTE (venous thromboembolism). Plan: DVT ppx:  HSQ       89 male, lives alone, uses rolling walker, lost to cardiology follow up, PMHx  HTN, known CAD s/p PCI (DAYANA to mRCA, pRCA, mCx 2013 w/ Dr. Varela), Arthritis, BPH and colonic mass (s/p right hemicolectomy 4/2017 w/ Dr. Headley), most recent admission to [facility]  2/2020 for RLE Cellulitis presented to PCP office Monday with complaint of 3 days of decreased exercise tolerance, SOB, VAN, and bandlike chest pain that radiates, physician encouraged him to come to the ED, presented with unchanged complaints and is admitted to cardiology critical care for telemetry monitoring, r/o ACS, r/o PE, and ischemic work-up     NEURO:  #Depression with mood instability and claustrophobia, currently being treated with Cymbalta daily and Remeron at night for sleep aid  - continue Cymbalta 30 mg daily and Remeron 30 mg nightly    PULM:  #PE rule out: Patient admitted with slightly elevated trops and increased BNP possibly due to strain, tachypnea with recent onset dyspnea, lungs CTA  - Heparin full ACS with goal PTT close to 70 for PE  - f/u CTA for PE r/o  - Duonebs Q6 PRN    #COPD: patient with known history of COPD, uses ipratropium/albuterol inhaler at home, no O2 use. Unclear O2 readings in ED, placed on Bipap and stepped down to [place] for work of breathing.  - Continue with duonebs Q6 PRN  - Hi-Rogerio NC 50 % FiO2 at 20 L currently    CARDIO:  #Unstable angina: Mildly elevated trops 0.02 (noted chronically elevated 0.2-0.5 since 2018), peaked in ED.  BNP 1100. CXR clear in ED, EKG SB @ 52 bpm, 1st deg AVB, q waves inferiorly, non-ischemic. ED echo shows mildly reduced left ventricular systolic function, EF 45-50%, and no regional wall motion abnormalities. History of known CAD, s/p prior PCI, most recent Cath @ [facility] 4/11/2013 with Dr. Varela DAYANA mLCX, widely patent stent pRCA.   -Loaded with Plavix 600mg and ASA 243mg in ED, took ASA 81mg prior to coming in.  -c/w ASA 81mg daily, Plavix 75mg daily, simvastatin interchanged to atorvastatin 40mg daily.  -noted on Lisinopril and HCTZ in the past, appears to be D/C by Dr Montalvo and started on [id number] daily last filled on 9/2020  -Holding ARB in the setting of worsening CKD  -trend cardiac enzymes, serial ekgs  -Heparin drip currently at 5, with goal PTT range 58-99, target 70   -will follow PTT q6 until therapeutic x3, then daily  -EKG showing Aflutter with variable conduction, continue with treatment as above.    #New Onset Paroxysmal AFib: Noted on EKG and on echo 11/10  - currently rate controlled with lopressor 12.5 Q6 and on Heparin drip for full ACS protocol  - On DAPT with ASA and Plavix  - continue to monitor tele    #CAD: s/p prior PCI, most recent Cath @ [facility] 4/11/2013 with Dr. Avery ANNE mLCX,  widely patent stent pRCA.  - Originally scheduled for [facility] this AM, but cancelled 2/2 decreased likelihood of unstable angina    #HTN: Stable outpatient on Valsartan 160 mg daily and Metoprolol 25 BID after recent change from lisinopril and HCTZ  - on Valsartan 160mg daily, now on hold 2/2 contrast dye load and DEBBIE  - Metoprolol 12.5 Q6  - Monitor BP.     #HLD: on Simvastatin 20mg at home, interchanged to Atorvastatin 40mg QHS  - continue with Atorvastatin 40 mg   - Cholesterol 131, Triglycerides 58, HDL 68, LDL 51    GI:  #s/p hemicolectomy in 2017 for precancerous colonic mass  - takes lomotil 2/2 recurring diarrhea after this procedure, will continue PRN  - Currently NPO with no need for PPI, will reassess if unable to start diet    ENDO:  No active issues, A1c 5.6, ISS    RENAL:  #DEBBIE on CKD Stage IV: (baseline 1.7 -2.4), now with elevated BUN/Cr 50/1.44)  - gentle hydration in ED and o/n, discontinued  - EF 45-50% by bedside echo 11/10  - F/U Cr     :  #BPH: Patient on Tamsulosin 0.4 mg daily   - c/w Tamsulosin 0.4mg daily.     ID:  No current issues    HEME:  Maintain Active Type and Screen, patient currently on Heparin drip for ACS/PE r/o  - PTT therapeutic range 58-99 with goal of 70  - f/u PTT q6 until therapeutic x 3    ACCESS/LINES:  R and L peripheral IVs, condom catheter     PREVENTATIVE:  F: NONE  E: Replete PRN  N: NPO except meds  DVT PPx: Heparin drip full ACS  GI PPx: None  DISPO: CCU    FULL CODE              Problem/Plan - 6:  Problem: VTE (venous thromboembolism). Plan: DVT ppx:  HSQ       89 male, lives alone, uses rolling walker, lost to cardiology follow up, PMHx  HTN, known CAD s/p PCI (DAYANA to mRCA, pRCA, mCx 2013 w/ Dr. Varela), Arthritis, BPH and colonic mass (s/p right hemicolectomy 4/2017 w/ Dr. Headley), most recent admission to [facility]  2/2020 for RLE Cellulitis presented to PCP office Monday with complaint of 3 days of decreased exercise tolerance, SOB, VAN, and bandlike chest pain that radiates, physician encouraged him to come to the ED, presented with unchanged complaints and is admitted to cardiology critical care for telemetry monitoring, r/o ACS, r/o PE, and ischemic work-up     NEURO:  #Depression with mood instability and claustrophobia, currently being treated with Cymbalta daily and Remeron at night for sleep aid  - continue Cymbalta 30 mg daily and Remeron 30 mg nightly    PULM:  #PE rule out: Patient admitted with slightly elevated trops and increased BNP possibly due to strain, tachypnea with recent onset dyspnea, lungs CTA  - Heparin full ACS with goal PTT close to 70 for PE  - f/u CTA for PE r/o  - Duonebs Q6 PRN    #COPD: patient with known history of COPD, uses ipratropium/albuterol inhaler at home, no O2 use. Unclear O2 readings in ED, placed on Bipap and stepped down to [place] for work of breathing.  - Continue with duonebs Q6 PRN  - Hi-[place] 50 % FiO2 at 20 L currently    CARDIO:  #Unstable angina: Mildly elevated trops 0.02 (noted chronically elevated 0.2-0.5 since 2018), peaked in ED.  BNP 1100. CXR clear in ED, EKG SB @ 52 bpm, 1st deg AVB, q waves inferiorly, non-ischemic. ED echo shows mildly reduced left ventricular systolic function, EF 45-50%, and no regional wall motion abnormalities. History of known CAD, s/p prior PCI, most recent Cath @ [facility] 4/11/2013 with Dr. Varela DAYANA mLCX, widely patent stent pRCA.   -Loaded with Plavix 600mg and ASA 243mg in ED, took ASA 81mg prior to coming in.  -c/w ASA 81mg daily, Plavix 75mg daily, simvastatin interchanged to atorvastatin 40mg daily.  -noted on Lisinopril and HCTZ in the past, appears to be D/C by Dr Montalvo and started on [id number] daily last filled on 9/2020  -Holding ARB in the setting of worsening CKD  -trend cardiac enzymes, serial ekgs  -Heparin drip currently at 5, with goal PTT range 58-99, target 70   -will follow PTT q6 until therapeutic x3, then daily  -EKG showing Aflutter with variable conduction, continue with treatment as above.    #New flutter with variable conduction: Noted on EKG 11/10  - currently rate controlled with lopressor 12.5 Q6 and on Heparin drip for full ACS protocol  - continue to monitor tele  - Daily EKG    #CAD: s/p prior PCI, most recent Cath @ [facility] 4/11/2013 with Dr. Avery ANNE mLCX,  widely patent stent pRCA.  - Originally scheduled for [facility] this AM, but cancelled 2/2 decreased likelihood of unstable angina  - Currently on DAPT with aspirin and plavix  - repeat trop 0.02     #HTN: Stable outpatient on Valsartan 160 mg daily and Metoprolol 25 BID after recent change from lisinopril and HCTZ  - on Valsartan 160mg daily, now on hold 2/2 contrast dye load and DEBBIE  - Metoprolol 12.5 Q6  - Monitor BP.     #HLD: on Simvastatin 20mg at home, interchanged to Atorvastatin 40mg QHS  - continue with Atorvastatin 40 mg   - Cholesterol 131, Triglycerides 58, HDL 68, LDL 51    GI:  #s/p hemicolectomy in 2017 for precancerous colonic mass  - takes lomotil 2/2 recurring diarrhea after this procedure, will continue PRN  - Currently NPO with no need for PPI, will reassess if unable to start diet    ENDO:  No active issues, A1c 5.6, ISS    RENAL:  #DEBBIE on CKD Stage IV: (baseline 1.7 -2.4), now with elevated BUN/Cr 50/1.44)  - gentle hydration in ED and o/n, discontinued  - EF 45-50% by bedside echo 11/10  - F/U Cr     :  #BPH: Patient on Tamsulosin 0.4 mg daily   - c/w Tamsulosin 0.4mg daily.     ID:  No current issues    HEME:  Maintain Active Type and Screen, patient currently on Heparin drip for ACS/PE r/o  - PTT therapeutic range 58-99 with goal of 70  - f/u PTT q6 until therapeutic x 3    ACCESS/LINES:  R and L peripheral IVs, condom catheter     PREVENTATIVE:  F: NONE  E: Replete PRN  N: NPO except meds  DVT PPx: Heparin drip full ACS  GI PPx: None  DISPO: CCU    FULL CODE              Problem/Plan - 6:  Problem: VTE (venous thromboembolism). Plan: DVT ppx:  HSQ       89 male, lives alone, uses rolling walker, lost to cardiology follow up, PMHx  HTN, known CAD s/p PCI (DAYANA to mRCA, pRCA, mCx 2013 w/ Dr. Varela), Arthritis, BPH and colonic mass (s/p right hemicolectomy 4/2017 w/ Dr. Headley), most recent admission to [facility]  2/2020 for RLE Cellulitis presented to PCP office Monday with complaint of 3 days of decreased exercise tolerance, SOB, VAN, and bandlike chest pain that radiates, physician encouraged him to come to the ED, presented with unchanged complaints and is admitted to cardiology critical care for telemetry monitoring, r/o ACS, r/o PE, and ischemic work-up     NEURO:  #Depression with mood instability and claustrophobia, currently being treated with Cymbalta daily and Remeron at night for sleep aid  - continue Cymbalta 30 mg daily and Remeron 30 mg nightly    PULM:  #VAN  Patient with 3 day history of VAN and chest pain with exertion. DDX includes CHF, PE, PNA, unstable angina. Physical exam significant for clear lung fields, no JVD or edema. CXR clear, making CHF less likely. CT-PE negative for filling defect. Pna unlikely given lack of infectious findings. Unstable angina posible, given slightly elevated troponin of 0.02, hx of CAD, and los of follow-up with his cardiologist.   - c/w heparin gtt, DAPT, and statin  - c/w lopressor 12.5 q6  - wean off HFNC    #PE rule out: Patient admitted with slightly elevated trops and increased BNP possibly due to strain, tachypnea with recent onset dyspnea, lungs CTA  - Heparin full ACS with goal PTT close to 70 for PE  - f/u CTA for PE r/o  - Duonebs Q6 PRN    #COPD: patient with known history of COPD, uses ipratropium/albuterol inhaler at home, no O2 use. Unclear O2 readings in ED, placed on Bipap and stepped down to [place] for work of breathing.  - Continue with duonebs Q6 PRN  - [place] 50 % FiO2 at 20 L currently    CARDIO:  #Unstable angina: Mildly elevated trops 0.02 (noted chronically elevated 0.2-0.5 since 2018), peaked in ED.  BNP 1100. CXR clear in ED, EKG SB @ 52 bpm, 1st deg AVB, q waves inferiorly, non-ischemic. ED echo shows mildly reduced left ventricular systolic function, EF 45-50%, and no regional wall motion abnormalities. History of known CAD, s/p prior PCI, most recent Cath @ [facility] 4/11/2013 with Dr. Avery RichardsX, widely patent stent pRCA.   -Loaded with Plavix 600mg and ASA 243mg in ED, took ASA 81mg prior to coming in.  -c/w ASA 81mg daily, Plavix 75mg daily, atorvastatin 40mg  -trend cardiac enzymes, serial ekgs  -Heparin drip currently at 5, with goal PTT range 58-99, target 70   -will follow PTT q6 until therapeutic x3, then daily    #New flutter with variable conduction: Noted on EKG 11/10  - currently rate controlled with lopressor 12.5 Q6 and on Heparin drip for full ACS protocol  - continue to monitor tele  - Daily EKG    #CAD: s/p prior PCI, most recent Cath @ [facility] 4/11/2013 with Dr. Avery Rubio,  widely patent stent pRCA.  - Originally scheduled for [facility] this AM, but cancelled 2/2 decreased likelihood of unstable angina  - Currently on DAPT with aspirin and plavix  - repeat trop 0.02     #HTN: Stable outpatient on Valsartan 160 mg daily and Metoprolol 25 BID after recent change from lisinopril and HCTZ  - on Valsartan 160mg daily, holding 2/2 contrast dye load and DEBBIE  - Metoprolol 12.5 Q6  - Monitor BP.     #HLD: on Simvastatin 20mg at home, interchanged to Atorvastatin 40mg QHS  - continue with Atorvastatin 40 mg   - Cholesterol 131, Triglycerides 58, HDL 68, LDL 51    GI:  #s/p hemicolectomy in 2017 for precancerous colonic mass  - takes lomotil 2/2 recurring diarrhea after this procedure, will continue PRN  - Currently NPO with no need for PPI, will reassess if unable to start diet    ENDO:  No active issues, A1c 5.6, ISS    RENAL:  #DEBBIE on CKD Stage IV: (baseline 1.7 -2.4), now with elevated BUN/Cr 50/1.44)  - gentle hydration in ED and o/n, discontinued  - EF 45-50% by bedside echo 11/10  - F/U Cr     :  #BPH: Patient on Tamsulosin 0.4 mg daily   - c/w Tamsulosin 0.4mg daily.     ID:  No current issues    HEME:  Maintain Active Type and Screen, patient currently on Heparin drip for ACS/PE r/o  - PTT therapeutic range 58-99 with goal of 70  - f/u PTT q6 until therapeutic x 3    ACCESS/LINES:  R and L peripheral IVs, condom catheter     PREVENTATIVE:  F: NONE  E: Replete PRN  N: NPO except meds  DVT PPx: Heparin drip full ACS  GI PPx: None  DISPO: CCU    FULL CODE              Problem/Plan - 6:  Problem: VTE (venous thromboembolism). Plan: DVT ppx:  HSQ

## 2020-11-11 DIAGNOSIS — I25.10 ATHEROSCLEROTIC HEART DISEASE OF NATIVE CORONARY ARTERY WITHOUT ANGINA PECTORIS: ICD-10-CM

## 2020-11-11 DIAGNOSIS — F32.9 MAJOR DEPRESSIVE DISORDER, SINGLE EPISODE, UNSPECIFIED: ICD-10-CM

## 2020-11-11 DIAGNOSIS — I48.91 UNSPECIFIED ATRIAL FIBRILLATION: ICD-10-CM

## 2020-11-11 DIAGNOSIS — R63.8 OTHER SYMPTOMS AND SIGNS CONCERNING FOOD AND FLUID INTAKE: ICD-10-CM

## 2020-11-11 DIAGNOSIS — J44.1 CHRONIC OBSTRUCTIVE PULMONARY DISEASE WITH (ACUTE) EXACERBATION: ICD-10-CM

## 2020-11-11 DIAGNOSIS — N17.9 ACUTE KIDNEY FAILURE, UNSPECIFIED: ICD-10-CM

## 2020-11-11 LAB
ALBUMIN SERPL ELPH-MCNC: 3.9 G/DL — SIGNIFICANT CHANGE UP (ref 3.3–5)
ALP SERPL-CCNC: 78 U/L — SIGNIFICANT CHANGE UP (ref 40–120)
ALT FLD-CCNC: 11 U/L — SIGNIFICANT CHANGE UP (ref 10–45)
ANION GAP SERPL CALC-SCNC: 14 MMOL/L — SIGNIFICANT CHANGE UP (ref 5–17)
APTT BLD: 61.9 SEC — HIGH (ref 27.5–35.5)
APTT BLD: 62.7 SEC — HIGH (ref 27.5–35.5)
AST SERPL-CCNC: 18 U/L — SIGNIFICANT CHANGE UP (ref 10–40)
BASOPHILS # BLD AUTO: 0.05 K/UL — SIGNIFICANT CHANGE UP (ref 0–0.2)
BASOPHILS NFR BLD AUTO: 0.6 % — SIGNIFICANT CHANGE UP (ref 0–2)
BILIRUB SERPL-MCNC: 0.3 MG/DL — SIGNIFICANT CHANGE UP (ref 0.2–1.2)
BUN SERPL-MCNC: 45 MG/DL — HIGH (ref 7–23)
CALCIUM SERPL-MCNC: 10 MG/DL — SIGNIFICANT CHANGE UP (ref 8.4–10.5)
CHLORIDE SERPL-SCNC: 106 MMOL/L — SIGNIFICANT CHANGE UP (ref 96–108)
CO2 SERPL-SCNC: 24 MMOL/L — SIGNIFICANT CHANGE UP (ref 22–31)
CREAT SERPL-MCNC: 1.54 MG/DL — HIGH (ref 0.5–1.3)
EOSINOPHIL # BLD AUTO: 0.45 K/UL — SIGNIFICANT CHANGE UP (ref 0–0.5)
EOSINOPHIL NFR BLD AUTO: 5.6 % — SIGNIFICANT CHANGE UP (ref 0–6)
GLUCOSE SERPL-MCNC: 108 MG/DL — HIGH (ref 70–99)
HCT VFR BLD CALC: 39 % — SIGNIFICANT CHANGE UP (ref 39–50)
HGB BLD-MCNC: 12.6 G/DL — LOW (ref 13–17)
IMM GRANULOCYTES NFR BLD AUTO: 0.3 % — SIGNIFICANT CHANGE UP (ref 0–1.5)
LYMPHOCYTES # BLD AUTO: 1.05 K/UL — SIGNIFICANT CHANGE UP (ref 1–3.3)
LYMPHOCYTES # BLD AUTO: 13.2 % — SIGNIFICANT CHANGE UP (ref 13–44)
MAGNESIUM SERPL-MCNC: 2 MG/DL — SIGNIFICANT CHANGE UP (ref 1.6–2.6)
MCHC RBC-ENTMCNC: 31.5 PG — SIGNIFICANT CHANGE UP (ref 27–34)
MCHC RBC-ENTMCNC: 32.3 GM/DL — SIGNIFICANT CHANGE UP (ref 32–36)
MCV RBC AUTO: 97.5 FL — SIGNIFICANT CHANGE UP (ref 80–100)
MONOCYTES # BLD AUTO: 0.85 K/UL — SIGNIFICANT CHANGE UP (ref 0–0.9)
MONOCYTES NFR BLD AUTO: 10.7 % — SIGNIFICANT CHANGE UP (ref 2–14)
NEUTROPHILS # BLD AUTO: 5.56 K/UL — SIGNIFICANT CHANGE UP (ref 1.8–7.4)
NEUTROPHILS NFR BLD AUTO: 69.6 % — SIGNIFICANT CHANGE UP (ref 43–77)
NRBC # BLD: 0 /100 WBCS — SIGNIFICANT CHANGE UP (ref 0–0)
PHOSPHATE SERPL-MCNC: 4.1 MG/DL — SIGNIFICANT CHANGE UP (ref 2.5–4.5)
PLATELET # BLD AUTO: 250 K/UL — SIGNIFICANT CHANGE UP (ref 150–400)
POTASSIUM SERPL-MCNC: 4.7 MMOL/L — SIGNIFICANT CHANGE UP (ref 3.5–5.3)
POTASSIUM SERPL-SCNC: 4.7 MMOL/L — SIGNIFICANT CHANGE UP (ref 3.5–5.3)
PROT SERPL-MCNC: 7.3 G/DL — SIGNIFICANT CHANGE UP (ref 6–8.3)
RBC # BLD: 4 M/UL — LOW (ref 4.2–5.8)
RBC # FLD: 13.6 % — SIGNIFICANT CHANGE UP (ref 10.3–14.5)
SARS-COV-2 IGG SERPL QL IA: NEGATIVE — SIGNIFICANT CHANGE UP
SARS-COV-2 IGM SERPL IA-ACNC: 0.09 INDEX — SIGNIFICANT CHANGE UP
SARS-COV-2 RNA SPEC QL NAA+PROBE: SIGNIFICANT CHANGE UP
SODIUM SERPL-SCNC: 144 MMOL/L — SIGNIFICANT CHANGE UP (ref 135–145)
WBC # BLD: 7.98 K/UL — SIGNIFICANT CHANGE UP (ref 3.8–10.5)
WBC # FLD AUTO: 7.98 K/UL — SIGNIFICANT CHANGE UP (ref 3.8–10.5)

## 2020-11-11 PROCEDURE — 71045 X-RAY EXAM CHEST 1 VIEW: CPT | Mod: 26

## 2020-11-11 PROCEDURE — 99291 CRITICAL CARE FIRST HOUR: CPT

## 2020-11-11 RX ORDER — PANTOPRAZOLE SODIUM 20 MG/1
40 TABLET, DELAYED RELEASE ORAL
Refills: 0 | Status: DISCONTINUED | OUTPATIENT
Start: 2020-11-11 | End: 2020-11-11

## 2020-11-11 RX ORDER — ENOXAPARIN SODIUM 100 MG/ML
30 INJECTION SUBCUTANEOUS EVERY 24 HOURS
Refills: 0 | Status: DISCONTINUED | OUTPATIENT
Start: 2020-11-11 | End: 2020-11-13

## 2020-11-11 RX ORDER — METOPROLOL TARTRATE 50 MG
25 TABLET ORAL
Refills: 0 | Status: DISCONTINUED | OUTPATIENT
Start: 2020-11-11 | End: 2020-11-13

## 2020-11-11 RX ORDER — IPRATROPIUM/ALBUTEROL SULFATE 18-103MCG
3 AEROSOL WITH ADAPTER (GRAM) INHALATION EVERY 6 HOURS
Refills: 0 | Status: DISCONTINUED | OUTPATIENT
Start: 2020-11-11 | End: 2020-11-13

## 2020-11-11 RX ORDER — ENOXAPARIN SODIUM 100 MG/ML
30 INJECTION SUBCUTANEOUS ONCE
Refills: 0 | Status: DISCONTINUED | OUTPATIENT
Start: 2020-11-11 | End: 2020-11-11

## 2020-11-11 RX ORDER — ASPIRIN/CALCIUM CARB/MAGNESIUM 324 MG
81 TABLET ORAL DAILY
Refills: 0 | Status: DISCONTINUED | OUTPATIENT
Start: 2020-11-11 | End: 2020-11-13

## 2020-11-11 RX ADMIN — Medication 25 MILLIGRAM(S): at 18:03

## 2020-11-11 RX ADMIN — MIRTAZAPINE 30 MILLIGRAM(S): 45 TABLET, ORALLY DISINTEGRATING ORAL at 22:35

## 2020-11-11 RX ADMIN — Medication 12.5 MILLIGRAM(S): at 07:26

## 2020-11-11 RX ADMIN — TAMSULOSIN HYDROCHLORIDE 0.4 MILLIGRAM(S): 0.4 CAPSULE ORAL at 22:35

## 2020-11-11 RX ADMIN — Medication 12.5 MILLIGRAM(S): at 01:09

## 2020-11-11 RX ADMIN — Medication 3 MILLILITER(S): at 16:24

## 2020-11-11 RX ADMIN — Medication 3 MILLILITER(S): at 22:35

## 2020-11-11 RX ADMIN — ATORVASTATIN CALCIUM 40 MILLIGRAM(S): 80 TABLET, FILM COATED ORAL at 22:35

## 2020-11-11 NOTE — PHYSICAL THERAPY INITIAL EVALUATION ADULT - PLANNED THERAPY INTERVENTIONS, PT EVAL
balance training/bed mobility training/postural re-education/strengthening/transfer training/gait training

## 2020-11-11 NOTE — DIETITIAN INITIAL EVALUATION ADULT. - PROBLEM SELECTOR PLAN 3
-Stable  -on Valsartan 160mg daily, now on hold 2/2 elevated Cr and contrast dye load  -Holding BB in the setting of bradycardia.  -Monitor BP

## 2020-11-11 NOTE — PROGRESS NOTE ADULT - SUBJECTIVE AND OBJECTIVE BOX
***Transfer Acceptance Note from CCU to [facility]***  Hospital Course:  89 y/o M w/ PMHx  HTN, known CAD s/p PCI w 3 DAYANA (mRCA, pRCA, mC in 2013 w/ Dr. Varela), CKD (Cr 1.7- 2.4), colonic mass (s/p right hemicolectomy 4/2017 w/ Dr. Headley), Arthritis, BPH presented  from PCP Dr. Montalvo's office for c/o of worsening CP and VAN x 3 days. On admission labs sig fig for Trop 0.02; BNP 1100; Cr 1.44. EKG Sinus yasmine w/out ischemic changes; TTE w/ EF 45-50% w/out wall motion abnormalities and valvulopathy. CXR negative for fluid overload and infiltrates.  Hospital course complicated by episode of paroxysmal A.fib and Hypoxia w/ tachycardia to 140s (started on HF NC). Pt admitted to CCU for tele and to r/o PE and ischemia as potential causes of acute hypoxic respiratory failure. PECT was negative for PE and COVID was r/o, negative X2, CXR was negative for CHF and PNA. Thus, plan was to take pt for Left heart cath this am due to possible Unstable angina in the setting of elevated trops and CP at rest, pt was loaded w/ ASA, Plavix and started on hep gtt in preoperation. Since overnight pt converted to sinus, was HD stable, had decreased O2 requirements after duonebs, he got weaned off HF and transitioned to NC and Left heart cath was cancelled (UA unlikely) and Hep gtt and Plavix were dc'ed. Acute Hypoxic respiratory failure was most likely 2/2 COPD exacerbation, pt started on Prednisone 40 mg X 5 days along q/ Duonebs q6hrs and is now stable for step down to [facility] for further management.    SUBJECTIVE / INTERVAL HPI: Patient seen and examined at bedside.     VITAL SIGNS:  Vital Signs Last 24 Hrs  T(C): 37 (11 Nov 2020 12:00), Max: 37.1 (11 Nov 2020 01:45)  T(F): 98.6 (11 Nov 2020 12:00), Max: 98.7 (11 Nov 2020 01:45)  HR: 59 (11 Nov 2020 15:00) (55 - 110)  BP: 143/63 (11 Nov 2020 15:00) (79/57 - 179/88)  BP(mean): 91 (11 Nov 2020 15:00) (63 - 119)  RR: 25 (11 Nov 2020 15:00) (0 - 59)  SpO2: 100% (11 Nov 2020 15:00) (80% - 100%)    PHYSICAL EXAM:    General: WDWN  HEENT: NC/AT; PERRL, anicteric sclera; MMM  Neck: supple  Cardiovascular: +S1/S2, RRR  Respiratory: CTA B/L; no W/R/R  Gastrointestinal: soft, NT/ND; +BSx4  Extremities: WWP; no edema, clubbing or cyanosis  Vascular: 2+ radial, DP/PT pulses B/L  Neurological: AAOx3; no focal deficits    MEDICATIONS:  MEDICATIONS  (STANDING):  albuterol/ipratropium for Nebulization. 3 milliLiter(s) Nebulizer every 6 hours  aspirin  chewable 81 milliGRAM(s) Oral daily  atorvastatin 40 milliGRAM(s) Oral at bedtime  DULoxetine 30 milliGRAM(s) Oral daily  enoxaparin Injectable 30 milliGRAM(s) SubCutaneous every 24 hours  metoprolol tartrate 25 milliGRAM(s) Oral two times a day  mirtazapine 30 milliGRAM(s) Oral at bedtime  predniSONE   Tablet 40 milliGRAM(s) Oral every 24 hours  tamsulosin 0.4 milliGRAM(s) Oral at bedtime    MEDICATIONS  (PRN):  acetaminophen   Tablet .. 650 milliGRAM(s) Oral every 6 hours PRN Moderate Pain (4 - 6)  diphenoxylate/atropine 1 Tablet(s) Oral every 8 hours PRN Diarrhea      ALLERGIES:  Allergies    No Known Allergies    Intolerances        LABS:                        12.6   7.98  )-----------( 250      ( 11 Nov 2020 05:15 )             39.0     11-11    144  |  106  |  45<H>  ----------------------------<  108<H>  4.7   |  24  |  1.54<H>    Ca    10.0      11 Nov 2020 05:15  Phos  4.1     11-11  Mg     2.0     11-11    TPro  7.3  /  Alb  3.9  /  TBili  0.3  /  DBili  x   /  AST  18  /  ALT  11  /  AlkPhos  78  11-11    PT/INR - ( 10 Nov 2020 12:56 )   PT: 11.9 sec;   INR: 0.99          PTT - ( 11 Nov 2020 05:15 )  PTT:61.9 sec    CAPILLARY BLOOD GLUCOSE      POCT Blood Glucose.: 104 mg/dL (10 Nov 2020 09:40)      RADIOLOGY & ADDITIONAL TESTS: Reviewed.

## 2020-11-11 NOTE — PROGRESS NOTE ADULT - PROBLEM SELECTOR PLAN 4
Pt has known hx of CKD (baseline 1.2) and was found to have superimposed DEBBIE with elevated BUN/Cr as high as 1.5 in the setting of recent contrast load for CT PE.  - f/u BMP  - Avoid nephrotoxins and renally dose meds  - Encourage PO intake

## 2020-11-11 NOTE — PROGRESS NOTE ADULT - PROBLEM SELECTOR PLAN 1
Mildly elevated trops 0.02 (noted chronically elevated 0.2-0.5 since 2018).  BNP 1100. EKG SB @ 52 bpm, 1st deg AVB, q waves inferiorly, non-ischemic.   -Scheduled for possible LHC this am, cancelled as pt clinically stable in ED, CP resolved, no SOB at rest, satting 98% RA. -Grade II-III TEN murmur appreciated.   -Gentle hydration overnight, NS @ 50cc x 8 hours, frequent lung checks, EF unknown.  -Noted with SB, HR 50's on tele, discontinued Lopressor 25mg daily.  -monitor on tele

## 2020-11-11 NOTE — DIETITIAN INITIAL EVALUATION ADULT. - PROBLEM SELECTOR PLAN 1
-Mildly elevated trops 0.02 (noted chronically elevated 0.2-0.5 since 2018).  BNP 1100.  F/U  stat trops/EKG and 5:30AM  -EKG SB @ 52 bpm, 1st deg AVB, q waves inferiorly, non-ischemic.  -Obtain ECHO for EF and structural (no prior ECHO in chart)  -CXR with clear lungs  -History of known CAD, s/p prior PCI, most recent Cath @ [facility] 4/11/2013 with Dr. Varela DAYANA mLCX,  widely patent stent pRCA.  -Scheduled for [facility] with possible 11/10 with Dr. Varela. NPO @ [facility]  -Loaded with Plavix 600mg.  Given ASA 243mg in ED, took ASA 81mg prior to coming in.  c/w ASA 81mg daily, Plavix 75mg daily, simvastatin interchanged to atorvastatin 40mg daily.  -noted on Lisinopril and HCTZ in the past, appears to be D/C by Dr Montalvo and started on [id number] daily last filled on 9/2020  -Holding ARB in the setting of worsening CKD  -Appears clinically stable in ED, CP resolved, no SOB at rest, satting 98% RA. -Grade II-III TEN murmur appreciated.   -Gentle hydration overnight, NS @ 50cc x 8 hours, frequent lung checks, EF unknown.  -Noted with SB, HR 50's on tele, discontinued Lopressor 25mg daily.  -monitor on tele

## 2020-11-11 NOTE — PROGRESS NOTE ADULT - ASSESSMENT
89 y/o M w/ PMHx  HTN, known CAD s/p PCI w 3 DAYANA (2013), CKD (Cr 1.7- 2.4), colonic mass (s/p right hemicolectomy 4/2017), Arthritis, BPH presented  from PCP Dr. Montalvo's office for c/o of worsening CP and VAN x 3 days. Hospital course complicated by episode of paroxysmal A.fib and Hypoxia w/ tachycardia to 140s, started on HF NC and admitted to CCU for tele and to r/o PE and ischemia as potential causes of acute hypoxic respiratory failure. PE CT was negative for PE and COVID was r/o (negative X2), CXR was negative for CHF and PNA  and Unstable angina is unlikely.  Acute Hypoxic respiratory failure most likely 2/2 COPD exacerbation, pt is off of HF on NC and stable for step down to [facility] for further management.    NEURO:  #Depression with mood instability and claustrophobia, currently being treated with Cymbalta daily and Remeron at night for sleep aid  - continue Cymbalta 30 mg daily and Remeron 30 mg nightly    PULM:  #VAN  Patient with 3 day history of VAN and chest pain with exertion. PMHx of COPD on Duonebs at home. DDX included CHF, PE, PNA, unstable angina vs COPD. Physical exam significant for clear lung fields, no JVD or edema, CXR clear, making CHF less likely. CT-PE negative. PNA unlikely given lack of infectious findings and CXR wnl. Unstable angina unlikely as pt stable this am w/ EKG in NSR. COPD exacerbation is most likely etiology as pt showed clinical improvement and weaned of HF after Duonebs.   -c/w Duonebs  -started on Prednisone 40 mg X 5 days for possible COPD exacerbation  -monitor O2 sat and wean off NC as tolerated    # COPD exacerbation: patient with known history of COPD, uses ipratropium/albuterol inhaler at home, no O2 use. In CCU was on HF overnight, weaned of HF to NC this am.  - Continue with duonebs   - started on Prednisone 40 mh X 5 days for possible COPD exacerbation   -monitor O2 sat and wean off NC as tolerated         CARDIO:  #Unstable angina: Mildly elevated trops 0.02 (noted chronically elevated 0.2-0.5 since 2018), peaked in ED.  BNP 1100. CXR clear in ED, EKG SB @ 52 bpm, 1st deg AVB, q waves inferiorly, non-ischemic. ED echo shows mildly reduced left ventricular systolic function, EF 45-50%, and no regional wall motion abnormalities. History of known CAD, s/p prior PCI. Loaded with Plavix and ASA and Heparin drip for potential LHC in am.  -LHC cancelled this am 2/2 decreased likelihood of unstable angina  -Hep gtt and Plavix dc'ed   -c/w ASA 81    # Paroxysmal Afib on EKG 11/10. overnight converted to NS. CHADVASc score 2.   -  c/w lopressor 25 BID (home med)    -  not starting AC inpt although CHADVASc score 2 as pt at high risk of adverse events 2/2 age and high risk for falls (lives alone and uses walker for ambulation), will let outpt PCP to have a conversation w/ pt and start as deems appropriate    #Hx of CAD: s/p prior PCI, most recent Cath @ [facility] 4/11/2013 with Dr. Varela DAYANA mLCX,  widely patent stent pRCA.  - Originally scheduled for LHC this AM, but cancelled 2/2 decreased likelihood of unstable angina  -  c/w ASA 81; Lopressor 25 BID and statins     #HTN: Stable outpatient on Valsartan 160 mg daily after recent change from lisinopril and HCTZ  - on Valsartan 160mg daily, holding 2/2 DEBBIE        #HLD: on Simvastatin 20mg at home, interchanged to Atorvastatin 40mg QHS  - continue with Atorvastatin 40 mg   - Cholesterol 131, Triglycerides 58, HDL 68, LDL 51    GI:  #s/p hemicolectomy in 2017 for precancerous colonic mass  - takes lomotil 2/2 recurring diarrhea after this procedure, will continue PRN      ENDO:  No active issues, A1c 5.6    RENAL:  #DEBBIE on CKD Stage IV: (baseline 1.7 -2.4), now with elevated BUN/Cr in the setting of recent contrast load for CT PE  - f/u Cr   -avoid nephrotoxins and renally dose meds     :  #BPH: Patient on Tamsulosin 0.4 mg daily   - c/w Tamsulosin 0.4mg daily.     ID:  No current issues    HEME:  -Maintain Active Type and Screen   -Lovenox ppx    ACCESS/LINES:  R and L peripheral IVs, condom catheter     PREVENTATIVE:  F: NONE  E: Replete PRN  N: DASH/TLC  DVT PPx: Lovenox   GI PPx: Protonix   DISPO: [facility]    FULL CODE           89 y/o M w/ PMHx  HTN, known CAD s/p PCI w 3 DAYANA (2013), CKD (Cr 1.7- 2.4), colonic mass (s/p right hemicolectomy 4/2017), Arthritis, BPH presented  from PCP Dr. Montalvo's office for c/o of worsening CP and VAN x 3 days. Hospital course complicated by episode of paroxysmal A.fib and Hypoxia w/ tachycardia to 140s, started on HF NC and admitted to CCU for tele and to r/o PE and ischemia as potential causes of acute hypoxic respiratory failure. PE CT was negative for PE and COVID was r/o (negative X2), CXR was negative for CHF and PNA  and Unstable angina is unlikely.  Acute Hypoxic respiratory failure most likely 2/2 COPD exacerbation, pt is off of HF on NC and stable for step down to [facility] for further management.

## 2020-11-11 NOTE — PROGRESS NOTE ADULT - ASSESSMENT
89 y/o M w/ PMHx  HTN, known CAD s/p PCI w 3 DAYANA (2013), CKD (Cr 1.7- 2.4), colonic mass (s/p right hemicolectomy 4/2017), Arthritis, BPH presented  from PCP Dr. Montalvo's office for c/o of worsening CP and VAN x 3 days. Hospital course complicated by episode of paroxysmal A.fib and Hypoxia w/ tachycardia to 140s, started on HF NC and admitted to CCU for tele and to r/o PE and ischemia as potential causes of acute hypoxic respiratory failure. PE CT was negative for PE and COVID was r/o (negative X2), CXR was negative for CHF and PNA  and Unstable angina is unlikely.  Acute Hypoxic respiratory failure most likely 2/2 COPD exacerbation, pt is off of HF on NC and stable for step down to [facility] for further management.

## 2020-11-11 NOTE — PHYSICAL THERAPY INITIAL EVALUATION ADULT - LEVEL OF INDEPENDENCE: SIT/STAND, REHAB EVAL
secondary pt complains feeling tired and attempt to lay down in bed despite of max encouragement from PT and RN/unable to perform

## 2020-11-11 NOTE — DIETITIAN INITIAL EVALUATION ADULT. - PROBLEM SELECTOR PLAN 2
-DEBBIE on CKD Stage IV (baseline 1.7 -2.4), now with elevated BUN/Cr 50/1.44)  -c/w gentle hydration: NS @ 50cc x 4 hours  -EF unknown, monitor lung status while on IVF.   -F/U Cr in AM

## 2020-11-11 NOTE — PROGRESS NOTE ADULT - SUBJECTIVE AND OBJECTIVE BOX
Interventional, Pulmonary, Critical, Chest Special Procedures.    Pt was seen and fully examined by myself.     Time spent with patient in minutes:87    Patient is a 89y old  Male who presents with a chief complaint of CAD/COPD (11 Nov 2020 08:53) Events leading to this admission and hospital course reviewed. The patient ill appearing, dozing off when seen, not engaging, mouth breathing pattern.    HPI:  89 male, lives alone, uses rolling walker, lost to cardiology follow up, PMHx  HTN, known CAD s/p PCI (DAYANA to mRCA, pRCA, mCx 2013 w/ Dr. Varela), CKD (1.7 2.4)  Arthritis, BPH and colonic mass (s/p right hemicolectomy 4/2017 w/ Dr. Headley), most recent admission to [facility]  2/2020 for RLE Cellulitis presented to [facility] ED today with c/o of worsening, intermittent, dull, 4-5/10, band-like CP, around with inferior aspect of chest, radiating to bilaterally chest x 3 days with decrease exercise tolerance a/w SOB and dyspnea on exertion.    Pt reports he saw his PCP Dr. Montalvo this morning who recommended that he comes to the ED for further evaluation.     In ED, VSS:  /68. HR 50, RR 18, Temp 97.7, O2Sat 98% RA.   EKG with SB @ 52 bpm, 1st deg AVB, LAFB, Q waves inferiorly.  Labs significant for elevated Trops 0.02, BNP 1, 100, WBC 9.00, H/H (11.7/36.6), Plts 250, Na 138, Potassium 5.0, BUN/Cr (50/1.44), Glu 115.  INR 0.91.  CXR (soft read):  Clear lungs.  Pt currently denies active chest at the current time.   Denies palpitations, diaphoresis, N/V, dizziness, syncope, orthopnea, PND, Abdominal pain, LE swelling or any other complaints at this time.  Pt states he took ASA 81mg x 1 dose enroute to the hospital, was given  x 1 in ED.  He is now admitted to cardiology for telemetry monitoring, R/O ACS and for ischemic work-up.    (09 Nov 2020 17:15)      REVIEW OF SYSTEMS:  ROS reviewed below with positive findings marked (+) :  GEN:  fever, chills ENT: tracheostomy,   epistaxis,  sinusitis COR: +CAD, CHF,  +HTN, dysrhythmia PUL: COPD, ILD, asthma, pneumonia GI: PEG, dysphagia, hemorrhage, other ELIZABETH: kidney disease, electrolyte disorder HEM:  anemia, thrombus, coagulopathy, cancer ENDO:  thyroid disease, diabetes mellitus CNS:  dementia, stroke, seizure, PSY:  depression, anxiety, other    PAST MEDICAL & SURGICAL HISTORY:  BPH (benign prostatic hyperplasia)    CAD (coronary artery disease)    HLD (hyperlipidemia)    H/O right hemicolectomy    S/P cataract extraction      FAMILY HISTORY:  No family history of cardiovascular disease (Father, Mother)      SOCIAL HISTORY:      - Tobacco     - ETOH    Allergies    No Known Allergies    Intolerances      Vital Signs Last 24 Hrs  T(C): 37 (11 Nov 2020 12:00), Max: 37.1 (11 Nov 2020 01:45)  T(F): 98.6 (11 Nov 2020 12:00), Max: 98.7 (11 Nov 2020 01:45)  HR: 60 (11 Nov 2020 13:13) (55 - 110)  BP: 125/58 (11 Nov 2020 13:13) (79/57 - 179/88)  BP(mean): 84 (11 Nov 2020 13:13) (63 - 119)  RR: 20 (11 Nov 2020 13:13) (0 - 59)  SpO2: 100% (11 Nov 2020 13:13) (80% - 100%)    11-10 @ 07:01 - 11-11 @ 07:00  --------------------------------------------------------  IN: 501 mL / OUT: 1000 mL / NET: -499 mL    11-11 @ 07:01 - 11-11 @ 14:09  --------------------------------------------------------  IN: 12 mL / OUT: 600 mL / NET: -588 mL          MEDICATIONS:  MEDICATIONS  (STANDING):  albuterol/ipratropium for Nebulization. 3 milliLiter(s) Nebulizer every 6 hours  aspirin  chewable 81 milliGRAM(s) Oral daily  atorvastatin 40 milliGRAM(s) Oral at bedtime  DULoxetine 30 milliGRAM(s) Oral daily  enoxaparin Injectable 30 milliGRAM(s) SubCutaneous every 24 hours  metoprolol tartrate 25 milliGRAM(s) Oral two times a day  mirtazapine 30 milliGRAM(s) Oral at bedtime  predniSONE   Tablet 40 milliGRAM(s) Oral every 24 hours  tamsulosin 0.4 milliGRAM(s) Oral at bedtime    MEDICATIONS  (PRN):  acetaminophen   Tablet .. 650 milliGRAM(s) Oral every 6 hours PRN Moderate Pain (4 - 6)  diphenoxylate/atropine 1 Tablet(s) Oral every 8 hours PRN Diarrhea      PHYSICAL EXAM:  Un Comfortable, no immediate distress  Eyes: PERRL, EOM intact; conjunctiva and sclera clear  Head: Normocephalic;  No Trauma  ENMT: No nasal discharge, hoarseness, cough or hemoptysis  Neck: Supple; non tender; no masses or deformities.    No JVD  Respiratory:  - WHEEZING   - RHONCHI  - RALES  - CRACKLES.  Diminished breath sounds  BILATERAL  RIGHT  LEFT bases, prolonged expiratory phase  Cardiovascular: Regular rate and rhythm. S1 and S2 Normal; No murmurs, gallops or rubs     - PPM/AICD  Gastrointestinal: Soft non-tender, non-distended; Normal bowel sounds; No hepatosplenomegaly.     -PEG    -  GT   - RUBIO  Genitourinary: No costovertebral angle tenderness. No dysuria  Extremities: AROM, No clubbing, cyanosis or edema    Vascular: Peripheral pulses palpable 2+ bilaterally  Neurological: dozing off when seen  Skin: Warm and dry. No obvious rash  Lymph Nodes: No acute cervical or supraclavicular adenopathy  Psychiatric: not engaging    DEVICES:  - DENTURES   +IV R / L     - ETUBE   -TRACH   -CTUBE  R / L    LABS:                          12.6   7.98  )-----------( 250      ( 11 Nov 2020 05:15 )             39.0     11-11    144  |  106  |  45<H>  ----------------------------<  108<H>  4.7   |  24  |  1.54<H>    Ca    10.0      11 Nov 2020 05:15  Phos  4.1     11-11  Mg     2.0     11-11    TPro  7.3  /  Alb  3.9  /  TBili  0.3  /  DBili  x   /  AST  18  /  ALT  11  /  AlkPhos  78  11-11    PT/INR - ( 10 Nov 2020 12:56 )   PT: 11.9 sec;   INR: 0.99          PTT - ( 11 Nov 2020 05:15 )  PTT:61.9 sec  RADIOLOGY & ADDITIONAL STUDIES (The following images were personally reviewed):< from: CT Angio Chest PE Protocol w/ IV Cont (11.10.20 @ 12:42) >  EXAM:  CT ANGIO CHEST PE PROTOCOL IC                          *** ADDENDUM 11/11/2020  ***    Multiplanar and maximum intensity projection 3D reconstructions were performed in the sagittal and coronal planes.    *** END OF ADDENDUM 11/11/2020  ***      PROCEDURE DATE:  11/10/2020          INTERPRETATION:  CLINICAL HISTORY: 89-year-old with unstable angina. For detection of pulmonary embolism. Chest pain for 3 days. Shortness of breath and dyspnea on exertion.          FINDINGS: CT angiography of the chest was performed with the administration of intravenous contrast. Reconstructions were performed in the sagittal and coronal planes. Comparison is made to prior studies dated 4/23/2017 1/14/2014 and additional studies dating back to 3/12/2013and 2/27/2013.    Evaluation of the chest demonstrates the visualized thyroid gland is normal in appearance. There is moderate cardiomegaly. There is moderate bilateral gynecomastia. There is no pleural and no pericardial effusion. Negative for thoracic inlet, axillary, mediastinal or hilar lymphadenopathy. There is severe coronary arterial calcification. There is moderate aortic valvular calcification, which can correlate with degree of aortic stenosis. Negative for mitral annular calcification.No fiordaliza central or segmental pulmonary embolus with adequate opacification of the pulmonary artery and its branches. 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 There are is enlargement of the main pulmonary artery measuring 3.0 cm, consistent with pulmonary arterial hypertension. Negative for intraluminal thrombus within the left atrial appendage. There is severe diffuse vascular calcification.  Evaluation of the pulmonary vasculature demonstrates no endobronchial lesion. There is no pulmonary consolidation or mass. Evaluation of the upper abdomen is unremarkable. Evaluation of the osseous structures are unremarkable.          IMPRESSION:    Negative for pulmonary embolism.    Negative for pulmonary consolidation.    < end of copied text >

## 2020-11-11 NOTE — PROGRESS NOTE ADULT - PROBLEM SELECTOR PLAN 2
Pt has known hx of AFib on Lopressor 25mg BID at home. Afib on EKG 11/10. overnight converted to NS. CHADVASc score 2.   -  c/w Lopressor 25 BID (home med)    -  Plan for discharge on Toprol 50mg daily  -  not starting AC inpt although CHADVASc score 2 as pt at high risk of adverse events 2/2 age and high risk for falls (lives alone and uses walker for ambulation)  - Discharge with instructions for f/u with PCP re: starting AC as appropriate

## 2020-11-11 NOTE — PROGRESS NOTE ADULT - SUBJECTIVE AND OBJECTIVE BOX
Hospital Course:  89 y/o M w/ PMHx  HTN, known CAD s/p PCI w 3 DAYANA (mRCA, pRCA, mC in 2013 w/ Dr. Varela), CKD (Cr 1.7- 2.4), colonic mass (s/p right hemicolectomy 4/2017 w/ Dr. Headley), Arthritis, BPH presented  from PCP Dr. Montalvo's office for c/o of worsening CP and VAN x 3 days. On admission labs sig fig for Trop 0.02; BNP 1100; Cr 1.44. EKG Sinus yasmine w/out ischemic changes; TTE w/ EF 45-50% w/out wall motion abnormalities and valvulopathy. CXR negative for fluid overload and infiltrates.  Hospital course complicated by episode of paroxysmal A.fib and Hypoxia w/ tachycardia to 140s (started on HF NC). Pt admitted to CCU for tele and to r/o PE and ischemia as potential causes of acute hypoxic respiratory failure. PECT was negative for PE and COVID was r/o, negative X2, CXR was negative for CHF and PNA. Thus, plan was to take pt for Left heart cath this am due to possible Unstable angina in the setting of elevated trops and CP at rest, pt was loaded w/ ASA, Plavix and started on hep gtt in preoperation. Since overnight pt converted to sinus, was HD stable, had decreased O2 requirements after duonebs, he got weaned off HF and transitioned to NC and Left heart cath was cancelled (UA unlikely) and Hep gtt and Plavix were dc'ed. Acute Hypoxic respiratory failure was most likely 2/2 COPD exacerbation, pt started on Prednisone 40 mg X 5 days along q/ Duonebs q6hrs and is now stable for step down to [facility] for further management.    SUBJECTIVE / INTERVAL HPI: Patient seen and examined at bedside.     VITAL SIGNS:  Vital Signs Last 24 Hrs  T(C): 37 (11 Nov 2020 12:00), Max: 37.1 (11 Nov 2020 01:45)  T(F): 98.6 (11 Nov 2020 12:00), Max: 98.7 (11 Nov 2020 01:45)  HR: 59 (11 Nov 2020 15:00) (55 - 110)  BP: 143/63 (11 Nov 2020 15:00) (79/57 - 179/88)  BP(mean): 91 (11 Nov 2020 15:00) (63 - 119)  RR: 25 (11 Nov 2020 15:00) (0 - 59)  SpO2: 100% (11 Nov 2020 15:00) (80% - 100%)    PHYSICAL EXAM:    General: WDWN  HEENT: NC/AT; PERRL, anicteric sclera; MMM  Neck: supple  Cardiovascular: +S1/S2, RRR  Respiratory: CTA B/L; no W/R/R  Gastrointestinal: soft, NT/ND; +BSx4  Extremities: WWP; no edema, clubbing or cyanosis  Vascular: 2+ radial, DP/PT pulses B/L  Neurological: AAOx3; no focal deficits    MEDICATIONS:  MEDICATIONS  (STANDING):  albuterol/ipratropium for Nebulization. 3 milliLiter(s) Nebulizer every 6 hours  aspirin  chewable 81 milliGRAM(s) Oral daily  atorvastatin 40 milliGRAM(s) Oral at bedtime  DULoxetine 30 milliGRAM(s) Oral daily  enoxaparin Injectable 30 milliGRAM(s) SubCutaneous every 24 hours  metoprolol tartrate 25 milliGRAM(s) Oral two times a day  mirtazapine 30 milliGRAM(s) Oral at bedtime  predniSONE   Tablet 40 milliGRAM(s) Oral every 24 hours  tamsulosin 0.4 milliGRAM(s) Oral at bedtime    MEDICATIONS  (PRN):  acetaminophen   Tablet .. 650 milliGRAM(s) Oral every 6 hours PRN Moderate Pain (4 - 6)  diphenoxylate/atropine 1 Tablet(s) Oral every 8 hours PRN Diarrhea      ALLERGIES:  Allergies    No Known Allergies    Intolerances        LABS:                        12.6   7.98  )-----------( 250      ( 11 Nov 2020 05:15 )             39.0     11-11    144  |  106  |  45<H>  ----------------------------<  108<H>  4.7   |  24  |  1.54<H>    Ca    10.0      11 Nov 2020 05:15  Phos  4.1     11-11  Mg     2.0     11-11    TPro  7.3  /  Alb  3.9  /  TBili  0.3  /  DBili  x   /  AST  18  /  ALT  11  /  AlkPhos  78  11-11    PT/INR - ( 10 Nov 2020 12:56 )   PT: 11.9 sec;   INR: 0.99          PTT - ( 11 Nov 2020 05:15 )  PTT:61.9 sec    CAPILLARY BLOOD GLUCOSE      POCT Blood Glucose.: 104 mg/dL (10 Nov 2020 09:40)      RADIOLOGY & ADDITIONAL TESTS: Reviewed.

## 2020-11-11 NOTE — PROGRESS NOTE ADULT - ASSESSMENT
89 y/o M w/ PMHx  HTN, known CAD s/p PCI w 3 DAYANA (2013), CKD (Cr 1.7- 2.4), colonic mass (s/p right hemicolectomy 4/2017), Arthritis, BPH presented  from PCP Dr. Montalvo's office for c/o of worsening CP and VAN x 3 days. Hospital course complicated by episode of paroxysmal A.fib and Hypoxia w/ tachycardia to 140s, started on HF NC and admitted to CCU for tele and to r/o PE and ischemia as potential causes of acute hypoxic respiratory failure. PE CT was negative for PE and COVID was r/o (negative X2), CXR was negative for CHF and PNA  and Unstable angina is unlikely.  Acute Hypoxic respiratory failure most likely 2/2 COPD exacerbation, pt is off of HF on NC and stable for step down to [facility] for further management.    NEURO:  #Depression with mood instability and claustrophobia, currently being treated with Cymbalta daily and Remeron at night for sleep aid  - continue Cymbalta 30 mg daily and Remeron 30 mg nightly    PULM:  #VAN  Patient with 3 day history of VAN and chest pain with exertion. PMHx of COPD on Duonebs at home. DDX included CHF, PE, PNA, unstable angina vs COPD. Physical exam significant for clear lung fields, no JVD or edema, CXR clear, making CHF less likely. CT-PE negative. PNA unlikely given lack of infectious findings and CXR wnl. Unstable angina unlikely as pt stable this am w/ EKG in NSR. COPD exacerbation is most likely etiology as pt showed clinical improvement and weaned of HF after Duonebs.   -c/w Duonebs  -started on Prednisone 40 mg X 5 days for possible COPD exacerbation  -monitor O2 sat and wean off NC as tolerated    # COPD exacerbation: patient with known history of COPD, uses ipratropium/albuterol inhaler at home, no O2 use. In CCU was on HF overnight, weaned of HF to NC this am.  - Continue with duonebs   - started on Prednisone 40 mh X 5 days for possible COPD exacerbation   -monitor O2 sat and wean off NC as tolerated         CARDIO:  #Unstable angina: Mildly elevated trops 0.02 (noted chronically elevated 0.2-0.5 since 2018), peaked in ED.  BNP 1100. CXR clear in ED, EKG SB @ 52 bpm, 1st deg AVB, q waves inferiorly, non-ischemic. ED echo shows mildly reduced left ventricular systolic function, EF 45-50%, and no regional wall motion abnormalities. History of known CAD, s/p prior PCI. Loaded with Plavix and ASA and Heparin drip for potential LHC in am.  -LHC cancelled this am 2/2 decreased likelihood of unstable angina  -Hep gtt and Plavix dc'ed   -c/w ASA 81    # Paroxysmal Afib on EKG 11/10. overnight converted to NS. CHADVASc score 2.   -  c/w lopressor 25 BID (home med)    -  not starting AC inpt although CHADVASc score 2 as pt at high risk of adverse events 2/2 age and high risk for falls (lives alone and uses walker for ambulation), will let outpt PCP to have a conversation w/ pt and start as deems appropriate    #Hx of CAD: s/p prior PCI, most recent Cath @ [facility] 4/11/2013 with Dr. Varela DAYANA mLCX,  widely patent stent pRCA.  - Originally scheduled for LHC this AM, but cancelled 2/2 decreased likelihood of unstable angina  -  c/w ASA 81; Lopressor 25 BID and statins     #HTN: Stable outpatient on Valsartan 160 mg daily after recent change from lisinopril and HCTZ  - on Valsartan 160mg daily, holding 2/2 DEBBIE        #HLD: on Simvastatin 20mg at home, interchanged to Atorvastatin 40mg QHS  - continue with Atorvastatin 40 mg   - Cholesterol 131, Triglycerides 58, HDL 68, LDL 51    GI:  #s/p hemicolectomy in 2017 for precancerous colonic mass  - takes lomotil 2/2 recurring diarrhea after this procedure, will continue PRN      ENDO:  No active issues, A1c 5.6    RENAL:  #DEBBIE on CKD Stage IV: (baseline 1.7 -2.4), now with elevated BUN/Cr in the setting of recent contrast load for CT PE  - f/u Cr   -avoid nephrotoxins and renally dose meds     :  #BPH: Patient on Tamsulosin 0.4 mg daily   - c/w Tamsulosin 0.4mg daily.     ID:  No current issues    HEME:  -Maintain Active Type and Screen   -Lovenox ppx    ACCESS/LINES:  R and L peripheral IVs, condom catheter     PREVENTATIVE:  F: NONE  E: Replete PRN  N: DASH/TLC  DVT PPx: Lovenox   GI PPx: Protonix   DISPO: RM[facility]    FULL CODE

## 2020-11-11 NOTE — PROGRESS NOTE ADULT - SUBJECTIVE AND OBJECTIVE BOX
Transfer from CCU to    Hospital course:  89 y/o M w/ PMHx  HTN, known CAD s/p PCI w 3 DAYANA (mRCA, pRCA, mC in 2013 w/ Dr. Varela), CKD (Cr 1.7- 2.4), colonic mass (s/p right hemicolectomy 4/2017 w/ Dr. Headley), Arthritis, BPH presented  from PCP Dr. Montalvo's office for c/o of worsening CP and VAN x 3 days. On admission labs sig fig for Trop 0.02; BNP 1100; Cr 1.44. EKG Sinus yasmine w/out ischemic changes; TTE w/ EF 45-50% w/out wall motion abnormalities and valvulopathy. CXR negative for fluid overload and infiltrates.  Hospital course complicated by episode of paroxysmal A.fib and Hypoxia w/ tachycardia to 140s (started on HF NC). Pt admitted to CCU for tele and to r/o PE and ischemia as potential causes of acute hypoxic respiratory failure. PECT was negative for PE and COVID was r/o (negative X2), CXR was negative for CHF and PNA. Thus, plan was to take pt for Left heart cath this am due to possible Unstable angina in the setting of elevated trops and CP at rest, pt was loaded w/ ASA, Plavix and started on hep gtt in preoperation. Since pt overnight pt converted to sinus, was HD stable, had decreased O2 requirements after duonebs, he got weaned off HF and transitioned to NC and Left heart cath was cancelled and Hep gtt and Plavix were dc'ed. We believe acute Hypoxic respiratory failure was likely 2/2 COPD exacerbation and started pt on Prednisone 40 mg X 5 days along q/ Duonebs q6hrs. Pt is now stable for step down to [facility] for further management.    INTERVAL HPI/OVERNIGHT EVENTS:  Overnight pt got 2nd COVID swab, negative. On tele was tachy w/ some PVC's but NSR. Patient was seen and examined at bedside. AOX3, w/ no complaints. On ROS denied:  CP, palpitations, SOB, cough, N/V, dysuria or pain.      VITAL SIGNS:  T(F): 97.6 (11-11-20 @ 09:00)  HR: 62 (11-11-20 @ 10:00)  BP: 143/69 (11-11-20 @ 10:00)  RR: 28 (11-11-20 @ 10:00)  SpO2: 100% (11-11-20 @ 10:00)  Wt(kg): --      11-10-20 @ 07:01  -  11-11-20 @ 07:00  --------------------------------------------------------  IN: 501 mL / OUT: 1000 mL / NET: -499 mL    11-11-20 @ 07:01  -  11-11-20 @ 10:57  --------------------------------------------------------  IN: 12 mL / OUT: 200 mL / NET: -188 mL        PHYSICAL EXAM:    Constitutional: WDWN resting comfortably in bed; NAD  Neck: supple; no JVD or thyromegaly  Respiratory: CTA B/L; no W/R/R, no retractions  Cardiac: +S1/S2; RRR; no M/R/G  Gastrointestinal: soft, NT/ND; no rebound or guarding; +BSx4  Extremities: WWP, no clubbing or cyanosis; no peripheral edema  Vascular: 2+ radial, DP/PT pulses B/L  Dermatologic: skin warm, dry and intact; no rashes, wounds, or scars  Neurologic: AAOx3        MEDICATIONS  (STANDING):  aspirin  chewable 81 milliGRAM(s) Oral daily  atorvastatin 40 milliGRAM(s) Oral at bedtime  DULoxetine 30 milliGRAM(s) Oral daily  metoprolol tartrate 25 milliGRAM(s) Oral two times a day  mirtazapine 30 milliGRAM(s) Oral at bedtime  predniSONE   Tablet 40 milliGRAM(s) Oral every 24 hours  tamsulosin 0.4 milliGRAM(s) Oral at bedtime    MEDICATIONS  (PRN):  acetaminophen   Tablet .. 650 milliGRAM(s) Oral every 6 hours PRN Moderate Pain (4 - 6)  albuterol/ipratropium for Nebulization. 3 milliLiter(s) Nebulizer every 6 hours PRN Shortness of Breath and/or Wheezing  diphenoxylate/atropine 1 Tablet(s) Oral every 8 hours PRN Diarrhea      Allergies    No Known Allergies    Intolerances        LABS:                        12.6   7.98  )-----------( 250      ( 11 Nov 2020 05:15 )             39.0     11-11    144  |  106  |  45<H>  ----------------------------<  108<H>  4.7   |  24  |  1.54<H>    Ca    10.0      11 Nov 2020 05:15  Phos  4.1     11-11  Mg     2.0     11-11    TPro  7.3  /  Alb  3.9  /  TBili  0.3  /  DBili  x   /  AST  18  /  ALT  11  /  AlkPhos  78  11-11    PT/INR - ( 10 Nov 2020 12:56 )   PT: 11.9 sec;   INR: 0.99          PTT - ( 11 Nov 2020 05:15 )  PTT:61.9 sec      RADIOLOGY & ADDITIONAL TESTS:  Reviewed      Transfer from CCU to    Hospital course:  89 y/o M w/ PMHx  HTN, known CAD s/p PCI w 3 DAYANA (mRCA, pRCA, mC in 2013 w/ Dr. Varela), CKD (Cr 1.7- 2.4), colonic mass (s/p right hemicolectomy 4/2017 w/ Dr. eHadley), Arthritis, BPH presented  from PCP Dr. Montalvo's office for c/o of worsening CP and VAN x 3 days. On admission labs sig fig for Trop 0.02; BNP 1100; Cr 1.44. EKG Sinus yasmine w/out ischemic changes; TTE w/ EF 45-50% w/out wall motion abnormalities and valvulopathy. CXR negative for fluid overload and infiltrates.  Hospital course complicated by episode of paroxysmal A.fib and Hypoxia w/ tachycardia to 140s (started on HF NC). Pt admitted to CCU for tele and to r/o PE and ischemia as potential causes of acute hypoxic respiratory failure. PECT was negative for PE and COVID was r/o, negative X2, CXR was negative for CHF and PNA. Thus, plan was to take pt for Left heart cath this am due to possible Unstable angina in the setting of elevated trops and CP at rest, pt was loaded w/ ASA, Plavix and started on hep gtt in preoperation. Since overnight pt converted to sinus, was HD stable, had decreased O2 requirements after duonebs, he got weaned off HF and transitioned to NC and Left heart cath was cancelled and Hep gtt and Plavix were dc'ed. Acute Hypoxic respiratory failure was most likely 2/2 COPD exacerbation, pt started on Prednisone 40 mg X 5 days along q/ Duonebs q6hrs and is now stable for step down to [facility] for further management.    INTERVAL HPI/OVERNIGHT EVENTS:  Overnight pt got 2nd COVID swab, negative. On tele was tachy w/ some PVC's but NSR. Patient was seen and examined at bedside. AOX3, w/ no complaints. On ROS denied:  CP, palpitations, SOB, cough, N/V, dysuria or pain.      VITAL SIGNS:  T(F): 97.6 (11-11-20 @ 09:00)  HR: 62 (11-11-20 @ 10:00)  BP: 143/69 (11-11-20 @ 10:00)  RR: 28 (11-11-20 @ 10:00)  SpO2: 100% (11-11-20 @ 10:00)  Wt(kg): --      11-10-20 @ 07:01  -  11-11-20 @ 07:00  --------------------------------------------------------  IN: 501 mL / OUT: 1000 mL / NET: -499 mL    11-11-20 @ 07:01  -  11-11-20 @ 10:57  --------------------------------------------------------  IN: 12 mL / OUT: 200 mL / NET: -188 mL        PHYSICAL EXAM:    Constitutional: WDWN resting comfortably in bed; NAD  Neck: supple; no JVD or thyromegaly  Respiratory: CTA B/L; no W/R/R, no retractions  Cardiac: +S1/S2; RRR; no M/R/G  Gastrointestinal: soft, NT/ND; no rebound or guarding; +BSx4  Extremities: WWP, no clubbing or cyanosis; no peripheral edema  Vascular: 2+ radial, DP/PT pulses B/L  Dermatologic: skin warm, dry and intact; no rashes, wounds, or scars  Neurologic: AAOx3        MEDICATIONS  (STANDING):  aspirin  chewable 81 milliGRAM(s) Oral daily  atorvastatin 40 milliGRAM(s) Oral at bedtime  DULoxetine 30 milliGRAM(s) Oral daily  metoprolol tartrate 25 milliGRAM(s) Oral two times a day  mirtazapine 30 milliGRAM(s) Oral at bedtime  predniSONE   Tablet 40 milliGRAM(s) Oral every 24 hours  tamsulosin 0.4 milliGRAM(s) Oral at bedtime    MEDICATIONS  (PRN):  acetaminophen   Tablet .. 650 milliGRAM(s) Oral every 6 hours PRN Moderate Pain (4 - 6)  albuterol/ipratropium for Nebulization. 3 milliLiter(s) Nebulizer every 6 hours PRN Shortness of Breath and/or Wheezing  diphenoxylate/atropine 1 Tablet(s) Oral every 8 hours PRN Diarrhea      Allergies    No Known Allergies    Intolerances        LABS:                        12.6   7.98  )-----------( 250      ( 11 Nov 2020 05:15 )             39.0     11-11    144  |  106  |  45<H>  ----------------------------<  108<H>  4.7   |  24  |  1.54<H>    Ca    10.0      11 Nov 2020 05:15  Phos  4.1     11-11  Mg     2.0     11-11    TPro  7.3  /  Alb  3.9  /  TBili  0.3  /  DBili  x   /  AST  18  /  ALT  11  /  AlkPhos  78  11-11    PT/INR - ( 10 Nov 2020 12:56 )   PT: 11.9 sec;   INR: 0.99          PTT - ( 11 Nov 2020 05:15 )  PTT:61.9 sec      RADIOLOGY & ADDITIONAL TESTS:  Reviewed      Transfer from CCU to    Hospital course:  89 y/o M w/ PMHx  HTN, known CAD s/p PCI w 3 DAYANA (mRCA, pRCA, mC in 2013 w/ Dr. Varela), CKD (Cr 1.7- 2.4), colonic mass (s/p right hemicolectomy 4/2017 w/ Dr. Headley), Arthritis, BPH presented  from PCP Dr. Montalvo's office for c/o of worsening CP and VAN x 3 days. On admission labs sig fig for Trop 0.02; BNP 1100; Cr 1.44. EKG Sinus yasmine w/out ischemic changes; TTE w/ EF 45-50% w/out wall motion abnormalities and valvulopathy. CXR negative for fluid overload and infiltrates.  Hospital course complicated by episode of paroxysmal A.fib and Hypoxia w/ tachycardia to 140s (started on HF NC). Pt admitted to CCU for tele and to r/o PE and ischemia as potential causes of acute hypoxic respiratory failure. PECT was negative for PE and COVID was r/o, negative X2, CXR was negative for CHF and PNA. Thus, plan was to take pt for Left heart cath this am due to possible Unstable angina in the setting of elevated trops and CP at rest, pt was loaded w/ ASA, Plavix and started on hep gtt in preoperation. Since overnight pt converted to sinus, was HD stable, had decreased O2 requirements after duonebs, he got weaned off HF and transitioned to NC and Left heart cath was cancelled (UA unlikely) and Hep gtt and Plavix were dc'ed. Acute Hypoxic respiratory failure was most likely 2/2 COPD exacerbation, pt started on Prednisone 40 mg X 5 days along q/ Duonebs q6hrs and is now stable for step down to [facility] for further management.    INTERVAL HPI/OVERNIGHT EVENTS:  Overnight pt got 2nd COVID swab, negative. On tele was tachy w/ some PVC's but NSR. Patient was seen and examined at bedside. AOX3, w/ no complaints. On ROS denied:  CP, palpitations, SOB, cough, N/V, dysuria or pain.      VITAL SIGNS:  T(F): 97.6 (11-11-20 @ 09:00)  HR: 62 (11-11-20 @ 10:00)  BP: 143/69 (11-11-20 @ 10:00)  RR: 28 (11-11-20 @ 10:00)  SpO2: 100% (11-11-20 @ 10:00)  Wt(kg): --      11-10-20 @ 07:01  -  11-11-20 @ 07:00  --------------------------------------------------------  IN: 501 mL / OUT: 1000 mL / NET: -499 mL    11-11-20 @ 07:01  -  11-11-20 @ 10:57  --------------------------------------------------------  IN: 12 mL / OUT: 200 mL / NET: -188 mL        PHYSICAL EXAM:    Constitutional: WDWN resting comfortably in bed; NAD  Neck: supple; no JVD or thyromegaly  Respiratory: CTA B/L; no W/R/R, no retractions  Cardiac: +S1/S2; RRR; no M/R/G  Gastrointestinal: soft, NT/ND; no rebound or guarding; +BSx4  Extremities: WWP, no clubbing or cyanosis; no peripheral edema  Vascular: 2+ radial, DP/PT pulses B/L  Dermatologic: skin warm, dry and intact; no rashes, wounds, or scars  Neurologic: AAOx3        MEDICATIONS  (STANDING):  aspirin  chewable 81 milliGRAM(s) Oral daily  atorvastatin 40 milliGRAM(s) Oral at bedtime  DULoxetine 30 milliGRAM(s) Oral daily  metoprolol tartrate 25 milliGRAM(s) Oral two times a day  mirtazapine 30 milliGRAM(s) Oral at bedtime  predniSONE   Tablet 40 milliGRAM(s) Oral every 24 hours  tamsulosin 0.4 milliGRAM(s) Oral at bedtime    MEDICATIONS  (PRN):  acetaminophen   Tablet .. 650 milliGRAM(s) Oral every 6 hours PRN Moderate Pain (4 - 6)  albuterol/ipratropium for Nebulization. 3 milliLiter(s) Nebulizer every 6 hours PRN Shortness of Breath and/or Wheezing  diphenoxylate/atropine 1 Tablet(s) Oral every 8 hours PRN Diarrhea      Allergies    No Known Allergies    Intolerances        LABS:                        12.6   7.98  )-----------( 250      ( 11 Nov 2020 05:15 )             39.0     11-11    144  |  106  |  45<H>  ----------------------------<  108<H>  4.7   |  24  |  1.54<H>    Ca    10.0      11 Nov 2020 05:15  Phos  4.1     11-11  Mg     2.0     11-11    TPro  7.3  /  Alb  3.9  /  TBili  0.3  /  DBili  x   /  AST  18  /  ALT  11  /  AlkPhos  78  11-11    PT/INR - ( 10 Nov 2020 12:56 )   PT: 11.9 sec;   INR: 0.99          PTT - ( 11 Nov 2020 05:15 )  PTT:61.9 sec      RADIOLOGY & ADDITIONAL TESTS:  Reviewed

## 2020-11-11 NOTE — PROGRESS NOTE ADULT - PROBLEM SELECTOR PLAN 1
Pt has known history of COPD, uses ipratropium/albuterol inhaler at home, no home O2 use. In CCU was on HFNC overnight, weaned off to NC this AM 11/11. Pt presented with 3 day history of VAN and chest pain with exertion. In the CCU, pt was worked up for DDX including CHF, PE, PNA, unstable angina vs COPD. Physical exam significant for clear lung fields, no JVD or edema, CXR clear; CT-PE negative; PNA unlikely given lack of infectious findings and CXR wnl; Unstable angina unlikely as pt stable this am w/ EKG in NSR. COPD exacerbation is most likely etiology as pt showed clinical improvement and weaned of HF after Duonebs.   - Continue with duonebs PRN  - Pt now saturating well with 99% SpO2 after being weaned to RA  - Initially started on Prednisone 40mg, discontinued due to clinical improvement and risk of adverse cardiac events given extensive CAD hx  - PT consult tomorrow 11/12 for discharge planning

## 2020-11-11 NOTE — DIETITIAN INITIAL EVALUATION ADULT. - OTHER INFO
89 male PMHx HTN, known CAD s/p PCI (DAYANA to mRCA, pRCA, mCx 2013), CKD (1.7 2.4),  Arthritis, BPH and colonic mass (s/p right hemicolectomy 4/2017), most recent admission to [facility] 2/2020 for RLE Cellulitis presented to [facility] ED with c/o of worsening, intermittent, dull, 4-5/10, band-like CP, around with inferior aspect of chest, radiating to bilaterally chest x 3 days with decrease exercise tolerance a/w SOB and dyspnea on exertion. Pt now admitted to cardiology for telemetry monitoring, R/O ACS, R/O PE and for ischemic work-up. Planned for [facility]. DDX includes CHF, PE, PNA, unstable angina. ED echo shows mildly reduced left ventricular systolic function, EF 45-50%, and no regional wall motion abnormalities. Since admission pt transferred to CCU for closer monitorin d/t tachypnea, new AFIB RVR and respiratory distress. 89 male PMHx HTN, known CAD s/p PCI (DAYANA to mRCA, pRCA, mCx 2013), CKD (1.7 2.4),  Arthritis, BPH and colonic mass (s/p right hemicolectomy 4/2017), most recent admission to [facility] 2/2020 for RLE Cellulitis presented to [facility] ED with c/o of worsening, intermittent, dull, 4-5/10, band-like CP, around with inferior aspect of chest, radiating to bilaterally chest x 3 days with decrease exercise tolerance a/w SOB and dyspnea on exertion. Pt now admitted to cardiology for telemetry monitoring, R/O ACS, R/O PE and for ischemic work-up. Planned for [facility]. DDX includes CHF, PE, PNA, unstable angina. ED echo shows mildly reduced left ventricular systolic function, EF 45-50%, and no regional wall motion abnormalities. Since admission pt transferred to CCU for closer monitoring d/t tachypnea, new AFIB RVR and respiratory distress. Pain- none per flow sheets. No edema. Stage I Sacrum pressure ulcer. Ernie Mcmahon.   Attempted to see pt various times today, pt sleeping during time of all RD visits. Family present, able to provide some information. Pt lives alone and gets meals on wheels, also takes ensure PTA of which he is "fully stocked up on." %PO intake PTA not stated, however reported d/t wt loss pt needs ensure to maintain wt. Assume consuming <75% EER. UBW/Amount of wt lost not stated. Of note pt admitted in 2018 with wt of 137 pounds (had reported wt loss and  pounds). Pt now admitted with wt of 120 pounds 11/10 suggesting 17 pound wt loss x2 years (12% body wt loss). Unable to conduct NFPE as pt sleeping however per visual noted mild wasting to Temples/Shoulder, Mild/Moderate wasting to orbital fat pads. RN reports pt "All bones." Malnutrition Note placed today. Pt had been ordered for NPO@12 expect meds 11/10 + DASH consCHO evening snack with Ensure enlivex3/day 11/9. RN reports NPO during breakfast with PO diet resumed for lunch however pt slept through meal. Assume pt if awake for meal limited intake, likely DASH/consCHO not needed given presumed poor PO intake during admission.  Please see below for nutritions recommendations. Recs made with team.

## 2020-11-11 NOTE — DIETITIAN INITIAL EVALUATION ADULT. - ADD RECOMMEND
Monitor PO intake/appetite, GI distress, diet tolerance - s/s of issues chewing/swallowing, labs, weights.  Monitor Skin- MVI daily, Vit C 500mg/day. Monitor GOC. RD to remain available for additional nutrition interventions as needed.

## 2020-11-11 NOTE — PHYSICAL THERAPY INITIAL EVALUATION ADULT - PERTINENT HX OF CURRENT PROBLEM, REHAB EVAL
Pt is a 89 yo male presented to [facility] ED with c/o unstable angina and is admitted to cardiology for telemetry monitoring, R/O ACS and for ischemic work-up

## 2020-11-11 NOTE — PHYSICAL THERAPY INITIAL EVALUATION ADULT - GENERAL OBSERVATIONS, REHAB EVAL
Pt received semi supine in bed with +NC~2L, +EKG, +hep-lock, +[place], NAD. Pt left as found, NAD, call zhanna in reach, DARRELL Adams present,

## 2020-11-11 NOTE — DIETITIAN INITIAL EVALUATION ADULT. - OTHER CALCULATIONS
current body wt used for energy calculations, adjusted for age based on malnutrition, Skin, Renal (will Increase protein As feasible); fluids per team

## 2020-11-11 NOTE — PROGRESS NOTE ADULT - PROBLEM SELECTOR PLAN 3
Pt has known hx of CAD s/p prior PCI, most recent Cath @ [facility] 4/11/2013 with Dr. Varela DAYANA mLCX, widely patent stent pRCA. Initial concern for unstable angina given mildly elevated trops 0.02 (noted chronically elevated 0.2-0.5 since 2018), peaked in ED.  BNP 1100. CXR clear in ED, EKG SB @ 52 bpm, 1st deg AVB, q waves inferiorly, non-ischemic. ED echo shows mildly reduced left ventricular systolic function, EF 45-50%, and no regional wall motion abnormalities.  -[facility] cancelled this am 2/2 decreased likelihood of unstable angina  -Hep gtt and Plavix dc'ed   -c/w ASA 81, Lopressor 25 BID, and statins   -Originally scheduled for [facility] 11/11, but cancelled 2/2 decreased likelihood of unstable angina

## 2020-11-12 DIAGNOSIS — I48.0 PAROXYSMAL ATRIAL FIBRILLATION: ICD-10-CM

## 2020-11-12 DIAGNOSIS — E78.00 PURE HYPERCHOLESTEROLEMIA, UNSPECIFIED: ICD-10-CM

## 2020-11-12 DIAGNOSIS — I10 ESSENTIAL (PRIMARY) HYPERTENSION: ICD-10-CM

## 2020-11-12 DIAGNOSIS — I25.10 ATHEROSCLEROTIC HEART DISEASE OF NATIVE CORONARY ARTERY WITHOUT ANGINA PECTORIS: ICD-10-CM

## 2020-11-12 LAB
ANION GAP SERPL CALC-SCNC: 13 MMOL/L — SIGNIFICANT CHANGE UP (ref 5–17)
ANION GAP SERPL CALC-SCNC: 18 MMOL/L — HIGH (ref 5–17)
APPEARANCE UR: CLEAR — SIGNIFICANT CHANGE UP
BACTERIA # UR AUTO: PRESENT /HPF
BASOPHILS # BLD AUTO: 0.05 K/UL — SIGNIFICANT CHANGE UP (ref 0–0.2)
BASOPHILS NFR BLD AUTO: 0.5 % — SIGNIFICANT CHANGE UP (ref 0–2)
BILIRUB UR-MCNC: ABNORMAL
BUN SERPL-MCNC: 63 MG/DL — HIGH (ref 7–23)
BUN SERPL-MCNC: 67 MG/DL — HIGH (ref 7–23)
CALCIUM SERPL-MCNC: 9.3 MG/DL — SIGNIFICANT CHANGE UP (ref 8.4–10.5)
CALCIUM SERPL-MCNC: 9.8 MG/DL — SIGNIFICANT CHANGE UP (ref 8.4–10.5)
CHLORIDE SERPL-SCNC: 101 MMOL/L — SIGNIFICANT CHANGE UP (ref 96–108)
CHLORIDE SERPL-SCNC: 102 MMOL/L — SIGNIFICANT CHANGE UP (ref 96–108)
CO2 SERPL-SCNC: 21 MMOL/L — LOW (ref 22–31)
CO2 SERPL-SCNC: 26 MMOL/L — SIGNIFICANT CHANGE UP (ref 22–31)
COLOR SPEC: YELLOW — SIGNIFICANT CHANGE UP
COMMENT - URINE: SIGNIFICANT CHANGE UP
CREAT ?TM UR-MCNC: 169 MG/DL — SIGNIFICANT CHANGE UP
CREAT SERPL-MCNC: 2.31 MG/DL — HIGH (ref 0.5–1.3)
CREAT SERPL-MCNC: 2.56 MG/DL — HIGH (ref 0.5–1.3)
DIFF PNL FLD: ABNORMAL
EOSINOPHIL # BLD AUTO: 0.49 K/UL — SIGNIFICANT CHANGE UP (ref 0–0.5)
EOSINOPHIL NFR BLD AUTO: 5.4 % — SIGNIFICANT CHANGE UP (ref 0–6)
GLUCOSE SERPL-MCNC: 135 MG/DL — HIGH (ref 70–99)
GLUCOSE SERPL-MCNC: 89 MG/DL — SIGNIFICANT CHANGE UP (ref 70–99)
GLUCOSE UR QL: NEGATIVE — SIGNIFICANT CHANGE UP
HCT VFR BLD CALC: 34.8 % — LOW (ref 39–50)
HGB BLD-MCNC: 11.3 G/DL — LOW (ref 13–17)
HYALINE CASTS # UR AUTO: SIGNIFICANT CHANGE UP /LPF (ref 0–2)
IMM GRANULOCYTES NFR BLD AUTO: 0.3 % — SIGNIFICANT CHANGE UP (ref 0–1.5)
KETONES UR-MCNC: NEGATIVE — SIGNIFICANT CHANGE UP
LEUKOCYTE ESTERASE UR-ACNC: ABNORMAL
LYMPHOCYTES # BLD AUTO: 1.45 K/UL — SIGNIFICANT CHANGE UP (ref 1–3.3)
LYMPHOCYTES # BLD AUTO: 15.9 % — SIGNIFICANT CHANGE UP (ref 13–44)
MAGNESIUM SERPL-MCNC: 2 MG/DL — SIGNIFICANT CHANGE UP (ref 1.6–2.6)
MCHC RBC-ENTMCNC: 31.5 PG — SIGNIFICANT CHANGE UP (ref 27–34)
MCHC RBC-ENTMCNC: 32.5 GM/DL — SIGNIFICANT CHANGE UP (ref 32–36)
MCV RBC AUTO: 96.9 FL — SIGNIFICANT CHANGE UP (ref 80–100)
MONOCYTES # BLD AUTO: 1.11 K/UL — HIGH (ref 0–0.9)
MONOCYTES NFR BLD AUTO: 12.2 % — SIGNIFICANT CHANGE UP (ref 2–14)
NEUTROPHILS # BLD AUTO: 6 K/UL — SIGNIFICANT CHANGE UP (ref 1.8–7.4)
NEUTROPHILS NFR BLD AUTO: 65.7 % — SIGNIFICANT CHANGE UP (ref 43–77)
NITRITE UR-MCNC: NEGATIVE — SIGNIFICANT CHANGE UP
NRBC # BLD: 0 /100 WBCS — SIGNIFICANT CHANGE UP (ref 0–0)
PH UR: 6 — SIGNIFICANT CHANGE UP (ref 5–8)
PLATELET # BLD AUTO: 235 K/UL — SIGNIFICANT CHANGE UP (ref 150–400)
POTASSIUM SERPL-MCNC: 4.3 MMOL/L — SIGNIFICANT CHANGE UP (ref 3.5–5.3)
POTASSIUM SERPL-MCNC: 4.5 MMOL/L — SIGNIFICANT CHANGE UP (ref 3.5–5.3)
POTASSIUM SERPL-SCNC: 4.3 MMOL/L — SIGNIFICANT CHANGE UP (ref 3.5–5.3)
POTASSIUM SERPL-SCNC: 4.5 MMOL/L — SIGNIFICANT CHANGE UP (ref 3.5–5.3)
PROT UR-MCNC: ABNORMAL MG/DL
RBC # BLD: 3.59 M/UL — LOW (ref 4.2–5.8)
RBC # FLD: 13.5 % — SIGNIFICANT CHANGE UP (ref 10.3–14.5)
RBC CASTS # UR COMP ASSIST: < 5 /HPF — SIGNIFICANT CHANGE UP
SODIUM SERPL-SCNC: 140 MMOL/L — SIGNIFICANT CHANGE UP (ref 135–145)
SODIUM SERPL-SCNC: 141 MMOL/L — SIGNIFICANT CHANGE UP (ref 135–145)
SODIUM UR-SCNC: 37 MMOL/L — SIGNIFICANT CHANGE UP
SP GR SPEC: 1.02 — SIGNIFICANT CHANGE UP (ref 1–1.03)
UROBILINOGEN FLD QL: 0.2 E.U./DL — SIGNIFICANT CHANGE UP
WBC # BLD: 9.13 K/UL — SIGNIFICANT CHANGE UP (ref 3.8–10.5)
WBC # FLD AUTO: 9.13 K/UL — SIGNIFICANT CHANGE UP (ref 3.8–10.5)
WBC UR QL: < 5 /HPF — SIGNIFICANT CHANGE UP

## 2020-11-12 PROCEDURE — 99232 SBSQ HOSP IP/OBS MODERATE 35: CPT

## 2020-11-12 RX ORDER — SODIUM CHLORIDE 9 MG/ML
1000 INJECTION INTRAMUSCULAR; INTRAVENOUS; SUBCUTANEOUS
Refills: 0 | Status: DISCONTINUED | OUTPATIENT
Start: 2020-11-12 | End: 2020-11-13

## 2020-11-12 RX ORDER — SODIUM CHLORIDE 9 MG/ML
500 INJECTION INTRAMUSCULAR; INTRAVENOUS; SUBCUTANEOUS ONCE
Refills: 0 | Status: COMPLETED | OUTPATIENT
Start: 2020-11-12 | End: 2020-11-12

## 2020-11-12 RX ORDER — BUDESONIDE, MICRONIZED 100 %
0.25 POWDER (GRAM) MISCELLANEOUS
Refills: 0 | Status: DISCONTINUED | OUTPATIENT
Start: 2020-11-12 | End: 2020-11-13

## 2020-11-12 RX ADMIN — Medication 3 MILLILITER(S): at 16:54

## 2020-11-12 RX ADMIN — Medication 3 MILLILITER(S): at 23:09

## 2020-11-12 RX ADMIN — Medication 25 MILLIGRAM(S): at 18:39

## 2020-11-12 RX ADMIN — TAMSULOSIN HYDROCHLORIDE 0.4 MILLIGRAM(S): 0.4 CAPSULE ORAL at 23:09

## 2020-11-12 RX ADMIN — Medication 3 MILLILITER(S): at 04:13

## 2020-11-12 RX ADMIN — Medication 81 MILLIGRAM(S): at 12:56

## 2020-11-12 RX ADMIN — ENOXAPARIN SODIUM 30 MILLIGRAM(S): 100 INJECTION SUBCUTANEOUS at 12:56

## 2020-11-12 RX ADMIN — DULOXETINE HYDROCHLORIDE 30 MILLIGRAM(S): 30 CAPSULE, DELAYED RELEASE ORAL at 12:56

## 2020-11-12 RX ADMIN — Medication 0.25 MILLIGRAM(S): at 18:39

## 2020-11-12 RX ADMIN — Medication 3 MILLILITER(S): at 10:08

## 2020-11-12 RX ADMIN — SODIUM CHLORIDE 100 MILLILITER(S): 9 INJECTION INTRAMUSCULAR; INTRAVENOUS; SUBCUTANEOUS at 16:54

## 2020-11-12 RX ADMIN — MIRTAZAPINE 30 MILLIGRAM(S): 45 TABLET, ORALLY DISINTEGRATING ORAL at 23:09

## 2020-11-12 RX ADMIN — Medication 25 MILLIGRAM(S): at 06:29

## 2020-11-12 RX ADMIN — ATORVASTATIN CALCIUM 40 MILLIGRAM(S): 80 TABLET, FILM COATED ORAL at 23:09

## 2020-11-12 RX ADMIN — SODIUM CHLORIDE 500 MILLILITER(S): 9 INJECTION INTRAMUSCULAR; INTRAVENOUS; SUBCUTANEOUS at 16:54

## 2020-11-12 NOTE — PROGRESS NOTE ADULT - PROBLEM SELECTOR PLAN 2
Pt has known hx of CKD (baseline 1.2) and was found to have superimposed DEBBIE with elevated BUN/Cr as high as 1.5 in the setting of recent contrast load for CT PE. Pt now found to have increase in Creatinine to 2.56 this AM 11/12.  - UA positive for small LE, 1-2 hyaline casts, trace blood, otherwise negative  - f/u Urine lytes for DEBBIE etiology  - f/u repeat afternoon BMP  - Avoid nephrotoxins and renally dose meds  - Encourage PO intake

## 2020-11-12 NOTE — PROGRESS NOTE ADULT - PROBLEM SELECTOR PLAN 1
Pt has known history of COPD, uses ipratropium/albuterol inhaler at home, no home O2 use. In CCU was on HFNC overnight, weaned off to NC this AM 11/11. Pt presented with 3 day history of VAN and chest pain with exertion. In the CCU, pt was worked up for DDX including CHF, PE, PNA, unstable angina vs COPD. Physical exam significant for clear lung fields, no JVD or edema, CXR clear; CT-PE negative; PNA unlikely given lack of infectious findings and CXR wnl; Unstable angina unlikely as pt stable this am w/ EKG in NSR. COPD exacerbation is most likely etiology as pt showed clinical improvement and weaned of HF after Duonebs.   - Continue with duonebs PRN  - Pt now saturating well with 99% SpO2 after being weaned to RA  - Initially started on Prednisone 40mg, discontinued due to clinical improvement and risk of adverse cardiac events given extensive CAD hx  - PT consult tomorrow 11/12 for discharge planning Pt has known history of COPD, uses ipratropium/albuterol inhaler at home, no home O2 use. In CCU was on HFNC overnight, weaned off to NC this AM 11/11. Pt presented with 3 day history of VAN and chest pain with exertion. In the CCU, pt was worked up for DDX including CHF, PE, PNA, unstable angina vs COPD. Physical exam significant for clear lung fields, no JVD or edema, CXR clear; CT-PE negative; PNA unlikely given lack of infectious findings and CXR wnl; Unstable angina unlikely as pt stable this am w/ EKG in NSR. COPD exacerbation is most likely etiology as pt showed clinical improvement and weaned of HF after Duonebs.   - Continue with duonebs PRN  - Per Pulmonology, c/w Pulmicort 0.25mg BID  - Pt continues to saturate well with 99% SpO2 after being weaned to RA  - PT recommends d/c home with home PT

## 2020-11-12 NOTE — PROGRESS NOTE ADULT - SUBJECTIVE AND OBJECTIVE BOX
Interventional, Pulmonary, Critical, Chest Special Procedures.    Pt was seen and fully examined by myself.     Time spent with patient in minutes:37    Patient is a 89y old  Male who presents with a chief complaint of respiratory failure (12 Nov 2020 08:06) The patient ill appearing, eupneic at rest, not really engaging    HPI:  89 male, lives alone, uses rolling walker, lost to cardiology follow up, PMHx  HTN, known CAD s/p PCI (DAYANA to mRCA, pRCA, mCx 2013 w/ Dr. Varela), CKD (1.7 2.4)  Arthritis, BPH and colonic mass (s/p right hemicolectomy 4/2017 w/ Dr. Headely), most recent admission to [facility]  2/2020 for RLE Cellulitis presented to [facility] ED today with c/o of worsening, intermittent, dull, 4-5/10, band-like CP, around with inferior aspect of chest, radiating to bilaterally chest x 3 days with decrease exercise tolerance a/w SOB and dyspnea on exertion.    Pt reports he saw his PCP Dr. Montalvo this morning who recommended that he comes to the ED for further evaluation.     In ED, VSS:  /68. HR 50, RR 18, Temp 97.7, O2Sat 98% RA.   EKG with SB @ 52 bpm, 1st deg AVB, LAFB, Q waves inferiorly.  Labs significant for elevated Trops 0.02, BNP 1, 100, WBC 9.00, H/H (11.7/36.6), Plts 250, Na 138, Potassium 5.0, BUN/Cr (50/1.44), Glu 115.  INR 0.91.  CXR (soft read):  Clear lungs.  Pt currently denies active chest at the current time.   Denies palpitations, diaphoresis, N/V, dizziness, syncope, orthopnea, PND, Abdominal pain, LE swelling or any other complaints at this time.  Pt states he took ASA 81mg x 1 dose enroute to the hospital, was given  x 1 in ED.  He is now admitted to cardiology for telemetry monitoring, R/O ACS and for ischemic work-up.    (09 Nov 2020 17:15)      REVIEW OF SYSTEMS:  ROS reviewed below with positive findings marked (+) :  GEN:  fever, chills ENT: tracheostomy,   epistaxis,  sinusitis COR: +CAD, CHF,  +HTN, dysrhythmia PUL: COPD, ILD, asthma, pneumonia GI: PEG, dysphagia, hemorrhage, other ELIZABETH: kidney disease, electrolyte disorder HEM:  anemia, thrombus, coagulopathy, cancer ENDO:  thyroid disease, diabetes mellitus CNS:  dementia, stroke, seizure, PSY:  depression, anxiety, other  PAST MEDICAL & SURGICAL HISTORY:  BPH (benign prostatic hyperplasia)    CAD (coronary artery disease)    HLD (hyperlipidemia)    H/O right hemicolectomy    S/P cataract extraction      FAMILY HISTORY:  No family history of cardiovascular disease (Father, Mother)      SOCIAL HISTORY:      - Tobacco     - ETOH    Allergies    No Known Allergies    Intolerances      Vital Signs Last 24 Hrs  T(C): 36.6 (12 Nov 2020 08:49), Max: 36.6 (11 Nov 2020 21:36)  T(F): 97.9 (12 Nov 2020 08:49), Max: 97.9 (12 Nov 2020 08:49)  HR: 54 (12 Nov 2020 08:49) (54 - 67)  BP: 145/73 (12 Nov 2020 08:49) (101/55 - 148/63)  BP(mean): 80 (11 Nov 2020 16:00) (80 - 91)  RR: 19 (12 Nov 2020 08:49) (18 - 25)  SpO2: 97% (12 Nov 2020 08:49) (97% - 100%)    11-11 @ 07:01  -  11-12 @ 07:00  --------------------------------------------------------  IN: 12 mL / OUT: 600 mL / NET: -588 mL    11-12 @ 07:01  -  11-12 @ 14:45  --------------------------------------------------------  IN: 265 mL / OUT: 250 mL / NET: 15 mL          MEDICATIONS:  MEDICATIONS  (STANDING):  albuterol/ipratropium for Nebulization. 3 milliLiter(s) Nebulizer every 6 hours  aspirin  chewable 81 milliGRAM(s) Oral daily  atorvastatin 40 milliGRAM(s) Oral at bedtime  buDESOnide    Inhalation Suspension 0.25 milliGRAM(s) Inhalation two times a day  DULoxetine 30 milliGRAM(s) Oral daily  enoxaparin Injectable 30 milliGRAM(s) SubCutaneous every 24 hours  metoprolol tartrate 25 milliGRAM(s) Oral two times a day  mirtazapine 30 milliGRAM(s) Oral at bedtime  tamsulosin 0.4 milliGRAM(s) Oral at bedtime    MEDICATIONS  (PRN):  acetaminophen   Tablet .. 650 milliGRAM(s) Oral every 6 hours PRN Moderate Pain (4 - 6)  diphenoxylate/atropine 1 Tablet(s) Oral every 8 hours PRN Diarrhea      PHYSICAL EXAM:  Un Comfortable, no immediate distress  Eyes: PERRL, EOM intact; conjunctiva and sclera clear  Head: Normocephalic;  No Trauma  ENMT: No nasal discharge, hoarseness, cough or hemoptysis  Neck: Supple; non tender; no masses or deformities.    No JVD  Respiratory:  - WHEEZING   - RHONCHI  - RALES  - CRACKLES.  Diminished breath sounds  BILATERAL  RIGHT  LEFT bases, prolonged expiratory phase  Cardiovascular: Regular rate and rhythm. S1 and S2 Normal; No murmurs, gallops or rubs     - PPM/AICD  Gastrointestinal: Soft non-tender, non-distended; Normal bowel sounds; No hepatosplenomegaly.     -PEG    -  GT   - RUBIO  Genitourinary: No costovertebral angle tenderness. No dysuria  Extremities: AROM, No clubbing, cyanosis or edema    Vascular: Peripheral pulses palpable 2+ bilaterally  Neurological: dozing off when seen  Skin: Warm and dry. No obvious rash  Lymph Nodes: No acute cervical or supraclavicular adenopathy  Psychiatric: not engaging    DEVICES:  - DENTURES   +IV R / L     - ETUBE   -TRACH   -CTUBE  R / L    LABS:                          11.3   9.13  )-----------( 235      ( 12 Nov 2020 06:05 )             34.8     11-12    141  |  102  |  63<H>  ----------------------------<  89  4.5   |  21<L>  |  2.56<H>    Ca    9.3      12 Nov 2020 06:05  Phos  4.1     11-11  Mg     2.0     11-12    TPro  7.3  /  Alb  3.9  /  TBili  0.3  /  DBili  x   /  AST  18  /  ALT  11  /  AlkPhos  78  11-11    PTT - ( 11 Nov 2020 05:15 )  PTT:61.9 sec  RADIOLOGY & ADDITIONAL STUDIES (The following images were personally reviewed):< from: Xray Chest 1 View- PORTABLE-Routine (Xray Chest 1 View- PORTABLE-Routine in AM.) (11.11.20 @ 06:11) >    EXAM:  XR CHEST PORTABLE ROUTINE 1V                          PROCEDURE DATE:  11/11/2020          INTERPRETATION:  XR CHEST dated 11/11/2020 6:11 AM    CLINICAL INFORMATION: Male, 89 years old.  ccu.    PRIOR STUDIES: 11/10/2020    FINDINGS: Art size, mediastinal and hilar contours are unchanged. Persistent cardiomegaly. Senescent changes of the thoracic aorta are again noted. There may be silhouetting of the left hemidiaphragm and a newly developing retrocardiac pneumonia and/or atelectasis versus left-sided pleural effusion cannot be excluded. The mid lung zones are clear.    IMPRESSION:  Cardiomegaly with senescent changes of the thoracic aorta.  Question newly developing retrocardiac opacity versus compositional shadow. Follow-up imaging to confirm resolution.    < end of copied text >

## 2020-11-12 NOTE — PROGRESS NOTE ADULT - PROBLEM SELECTOR PLAN 1
Pt has known history of COPD, uses ipratropium/albuterol inhaler at home, no home O2 use. In CCU was on HFNC overnight, weaned off to NC this AM 11/11. Pt presented with 3 day history of VAN and chest pain with exertion. In the CCU, pt was worked up for DDX including CHF, PE, PNA, unstable angina vs COPD. Physical exam significant for clear lung fields, no JVD or edema, CXR clear; CT-PE negative; PNA unlikely given lack of infectious findings and CXR wnl; Unstable angina unlikely as pt stable this am w/ EKG in NSR. COPD exacerbation is most likely etiology as pt showed clinical improvement and weaned of HF after Duonebs.   - Continue with duonebs PRN  - Per Pulmonology, c/w Pulmicort 0.25mg BID  - Pt continues to saturate well with 99% SpO2 after being weaned to RA  - PT recommends d/c home with home PT

## 2020-11-12 NOTE — CONSULT NOTE ADULT - ASSESSMENT
per Internal Medicine    89 y/o M w/ PMHx  HTN, known CAD s/p PCI w 3 DAYANA (2013), CKD (Cr 1.7- 2.4), colonic mass (s/p right hemicolectomy 4/2017), Arthritis, BPH presented  from PCP Dr. Montalvo's office for c/o of worsening CP and VAN x 3 days. Hospital course complicated by episode of paroxysmal A.fib and Hypoxia w/ tachycardia to 140s, started on HF NC and admitted to CCU for tele and to r/o PE and ischemia as potential causes of acute hypoxic respiratory failure. PE CT was negative for PE and COVID was r/o (negative X2), CXR was negative for CHF and PNA  and Unstable angina is unlikely.  Acute Hypoxic respiratory failure most likely 2/2 COPD exacerbation, pt is off of HF on NC and stable for step down to [facility] for further management.        Nutritional Assessment:  · Nutritional Assessment	This patient has been assessed with a concern for Malnutrition and has been determined to have a diagnosis/diagnoses of Moderate protein-calorie malnutrition.    This patient is being managed with:   Diet DASH/TLC-  Sodium & Cholesterol Restricted  Consistent Carbohydrate {Evening Snack} (CSTCHOSN)  Supplement Feeding Modality:  Oral  Ensure Enlive Cans or Servings Per Day:  1       Frequency:  Three Times a day  Entered: Nov 9 2020  6:24PM    Problem/Plan - 1:  ·  Problem: COPD exacerbation.  Plan: Pt has known history of COPD, uses ipratropium/albuterol inhaler at home, no home O2 use. In CCU was on HFNC overnight, weaned off to NC this AM 11/11. Pt presented with 3 day history of VAN and chest pain with exertion. In the CCU, pt was worked up for DDX including CHF, PE, PNA, unstable angina vs COPD. Physical exam significant for clear lung fields, no JVD or edema, CXR clear; CT-PE negative; PNA unlikely given lack of infectious findings and CXR wnl; Unstable angina unlikely as pt stable this am w/ EKG in NSR. COPD exacerbation is most likely etiology as pt showed clinical improvement and weaned of HF after Duonebs.   - Continue with duonebs PRN  - Pt now saturating well with 99% SpO2 after being weaned to RA  - Initially started on Prednisone 40mg, discontinued due to clinical improvement and risk of adverse cardiac events given extensive CAD hx  - PT consult tomorrow 11/12 for discharge planning.     Problem/Plan - 2:  ·  Problem: Atrial fibrillation.  Plan: Pt has known hx of AFib on Lopressor 25mg BID at home. Afib on EKG 11/10. overnight converted to NS. CHADVASc score 2.   -  c/w Lopressor 25 BID (home med)    -  Plan for discharge on Toprol 50mg daily  -  not starting AC inpt although CHADVASc score 2 as pt at high risk of adverse events 2/2 age and high risk for falls (lives alone and uses walker for ambulation)  - Discharge with instructions for f/u with PCP re: starting AC as appropriate.     Problem/Plan - 3:  ·  Problem: CAD (coronary artery disease).  Plan: Pt has known hx of CAD s/p prior PCI, most recent Cath @ [facility] 4/11/2013 with Dr. Varela DAYANA mLCX, widely patent stent pRCA. Initial concern for unstable angina given mildly elevated trops 0.02 (noted chronically elevated 0.2-0.5 since 2018), peaked in ED.  BNP 1100. CXR clear in ED, EKG SB @ 52 bpm, 1st deg AVB, q waves inferiorly, non-ischemic. ED echo shows mildly reduced left ventricular systolic function, EF 45-50%, and no regional wall motion abnormalities.  -[facility] cancelled this am 2/2 decreased likelihood of unstable angina  -Hep gtt and Plavix dc'ed   -c/w ASA 81, Lopressor 25 BID, and statins   -Originally scheduled for [facility] 11/11, but cancelled 2/2 decreased likelihood of unstable angina.     Problem/Plan - 4:  ·  Problem: DEBBIE (acute kidney injury).  Plan: Pt has known hx of CKD (baseline 1.2) and was found to have superimposed DEBBIE with elevated BUN/Cr as high as 1.5 in the setting of recent contrast load for CT PE.  - f/u BMP  - Avoid nephrotoxins and renally dose meds  - Encourage PO intake.     Problem/Plan - 5:  ·  Problem: HTN (hypertension).  Plan: Pt has known hx of HTN on home Valsartan 160 mg daily after recent change from lisinopril and HCTZ  - on Valsartan 160mg daily, holding 2/2 DEBBIE.     Problem/Plan - 6:  Problem: HLD (hyperlipidemia). Plan: Pt has known hx of HLD on Simvastatin 20mg at home, interchanged to Atorvastatin 40mg QHS  - continue with Atorvastatin 40 mg   - Cholesterol 131, Triglycerides 58, HDL 68, LDL 51.    Problem/Plan - 7:  ·  Problem: BPH (benign prostatic hyperplasia).  Plan: PMH of BPH on home Tamsulosin 0.4 mg daily   - c/w Tamsulosin 0.4mg daily.     Problem/Plan - 8:  ·  Problem: Depression.  Plan: Depression with mood instability and claustrophobia, currently being treated with Cymbalta daily and Remeron at night for sleep aid  - continue Cymbalta 30 mg daily and Remeron 30 mg nightly.     Problem/Plan - 9:  ·  Problem: Nutrition, metabolism, and development symptoms.  Plan: F: NONE  E: Replete PRN  N: DASH/TLC  DVT PPx: Lovenox   GI PPx: Protonix   DISPO: RMF    FULL CODE.

## 2020-11-12 NOTE — PROGRESS NOTE ADULT - ASSESSMENT
ASSESSMENT/PLAN    1) Unstable angina  2) Coronary artery disease  3) Hypertension  4) Chronic obstructive pulmonary disease exacerbation  5) DEBBIE on CKD    Oxygen as needed for a satisfactory SpO2, attempt now off  Atrovent/ albuterol q 4 – 6 hours as needed  Cardiac intervention planned   GI: Rx/ prophylaxis c PPI/H2B  Heme: Rx/VT receiving AC  Manage DEBBIE  Repeat CXR today to verify 11/11 CXR findings  Discuss with Dr. Montalvo,

## 2020-11-12 NOTE — PROGRESS NOTE ADULT - PROBLEM SELECTOR PLAN 4
Pt has known hx of CAD s/p prior PCI, most recent Cath @ [facility] 4/11/2013 with Dr. Varela DAYANA mLCX, widely patent stent pRCA. Initial concern for unstable angina given mildly elevated trops 0.02 (noted chronically elevated 0.2-0.5 since 2018), peaked in ED.  BNP 1100. CXR clear in ED, EKG SB @ 52 bpm, 1st deg AVB, q waves inferiorly, non-ischemic. ED echo shows mildly reduced left ventricular systolic function, EF 45-50%, and no regional wall motion abnormalities.  Cath cancelled prior to stepdown from CCU; Heparin gtt and Plavix held as well.  -c/w ASA 81, Lopressor 25 BID, and statins

## 2020-11-12 NOTE — CONSULT NOTE ADULT - SUBJECTIVE AND OBJECTIVE BOX
Patient is a 89y old  Male who presents with a chief complaint of respiratory failure (12 Nov 2020 08:06)       HPI:  89 male, lives alone, uses rolling walker, lost to cardiology follow up, PMHx  HTN, known CAD s/p PCI (DAYANA to mRCA, pRCA, mCx 2013 w/ Dr. Varela), CKD (1.7 2.4)  Arthritis, BPH and colonic mass (s/p right hemicolectomy 4/2017 w/ Dr. Headley), most recent admission to [facility]  2/2020 for RLE Cellulitis presented to [facility] ED today with c/o of worsening, intermittent, dull, 4-5/10, band-like CP, around with inferior aspect of chest, radiating to bilaterally chest x 3 days with decrease exercise tolerance a/w SOB and dyspnea on exertion.    Pt reports he saw his PCP Dr. Montalvo this morning who recommended that he comes to the ED for further evaluation.     In ED, VSS:  /68. HR 50, RR 18, Temp 97.7, O2Sat 98% RA.   EKG with SB @ 52 bpm, 1st deg AVB, LAFB, Q waves inferiorly.  Labs significant for elevated Trops 0.02, BNP 1, 100, WBC 9.00, H/H (11.7/36.6), Plts 250, Na 138, Potassium 5.0, BUN/Cr (50/1.44), Glu 115.  INR 0.91.  CXR (soft read):  Clear lungs.  Pt currently denies active chest at the current time.   Denies palpitations, diaphoresis, N/V, dizziness, syncope, orthopnea, PND, Abdominal pain, LE swelling or any other complaints at this time.  Pt states he took ASA 81mg x 1 dose enroute to the hospital, was given  x 1 in ED.  He is now admitted to cardiology for telemetry monitoring, R/O ACS and for ischemic work-up.    (09 Nov 2020 17:15)      PAST MEDICAL & SURGICAL HISTORY:  BPH (benign prostatic hyperplasia)    CAD (coronary artery disease)    HLD (hyperlipidemia)    H/O right hemicolectomy    S/P cataract extraction        MEDICATIONS  (STANDING):  albuterol/ipratropium for Nebulization. 3 milliLiter(s) Nebulizer every 6 hours  aspirin  chewable 81 milliGRAM(s) Oral daily  atorvastatin 40 milliGRAM(s) Oral at bedtime  buDESOnide    Inhalation Suspension 0.25 milliGRAM(s) Inhalation two times a day  DULoxetine 30 milliGRAM(s) Oral daily  enoxaparin Injectable 30 milliGRAM(s) SubCutaneous every 24 hours  metoprolol tartrate 25 milliGRAM(s) Oral two times a day  mirtazapine 30 milliGRAM(s) Oral at bedtime  tamsulosin 0.4 milliGRAM(s) Oral at bedtime    MEDICATIONS  (PRN):  acetaminophen   Tablet .. 650 milliGRAM(s) Oral every 6 hours PRN Moderate Pain (4 - 6)  diphenoxylate/atropine 1 Tablet(s) Oral every 8 hours PRN Diarrhea      Social History:           -  Home Living Status: lives alone in an elevator accessible apartment building           -  Prior Home Care Services:   x    Baseline Functional Level Prior to Admission:           - ADL's/ IADL's:    patient states he is independent           - ambulatory status PTA:  walked with a rollator    FAMILY HISTORY:  No family history of cardiovascular disease (Father, Mother)        CBC Full  -  ( 12 Nov 2020 06:05 )  WBC Count : 9.13 K/uL  RBC Count : 3.59 M/uL  Hemoglobin : 11.3 g/dL  Hematocrit : 34.8 %  Platelet Count - Automated : 235 K/uL  Mean Cell Volume : 96.9 fl  Mean Cell Hemoglobin : 31.5 pg  Mean Cell Hemoglobin Concentration : 32.5 gm/dL  Auto Neutrophil # : 6.00 K/uL  Auto Lymphocyte # : 1.45 K/uL  Auto Monocyte # : 1.11 K/uL  Auto Eosinophil # : 0.49 K/uL  Auto Basophil # : 0.05 K/uL  Auto Neutrophil % : 65.7 %  Auto Lymphocyte % : 15.9 %  Auto Monocyte % : 12.2 %  Auto Eosinophil % : 5.4 %  Auto Basophil % : 0.5 %      11-12    141  |  102  |  63<H>  ----------------------------<  89  4.5   |  21<L>  |  2.56<H>    Ca    9.3      12 Nov 2020 06:05  Phos  4.1     11-11  Mg     2.0     11-12    TPro  7.3  /  Alb  3.9  /  TBili  0.3  /  DBili  x   /  AST  18  /  ALT  11  /  AlkPhos  78  11-11            Radiology:    < from: Xray Chest 1 View- PORTABLE-Routine (Xray Chest 1 View- PORTABLE-Routine in AM.) (11.11.20 @ 06:11) >  EXAM:  XR CHEST PORTABLE ROUTINE 1V                          PROCEDURE DATE:  11/11/2020          INTERPRETATION:  XR CHEST dated 11/11/2020 6:11 AM    CLINICAL INFORMATION: Male, 89 years old.  ccu.    PRIOR STUDIES: 11/10/2020    FINDINGS: Art size, mediastinal and hilar contours are unchanged. Persistent cardiomegaly. Senescent changes of the thoracic aorta are again noted. There may be silhouetting of the left hemidiaphragm and a newly developing retrocardiac pneumonia and/or atelectasis versus left-sided pleural effusion cannot be excluded. The mid lung zones are clear.    IMPRESSION:  Cardiomegaly with senescent changes of the thoracic aorta.  Question newly developing retrocardiac opacity versus compositional shadow. Follow-up imaging to confirm resolution.        < from: CT Angio Chest PE Protocol w/ IV Cont (11.10.20 @ 12:42) >  EXAM:  CT ANGIO CHEST PE PROTOCOL IC                          *** ADDENDUM 11/11/2020  ***    Multiplanar and maximum intensity projection 3D reconstructions were performed in the sagittal and coronal planes.    *** END OF ADDENDUM 11/11/2020  ***      PROCEDURE DATE:  11/10/2020          INTERPRETATION:  CLINICAL HISTORY: 89-year-old with unstable angina. For detection of pulmonary embolism. Chest pain for 3 days. Shortness of breath and dyspnea on exertion.          FINDINGS: CT angiography of the chest was performed with the administration of intravenous contrast. Reconstructions were performed in the sagittal and coronal planes. Comparison is made to prior studies dated 4/23/2017 1/14/2014 and additional studies dating back to 3/12/2013and 2/27/2013.    Evaluation of the chest demonstrates the visualized thyroid gland is normal in appearance. There is moderate cardiomegaly. There is moderate bilateral gynecomastia. There is no pleural and no pericardial effusion. Negative for thoracic inlet, axillary, mediastinal or hilar lymphadenopathy. There is severe coronary arterial calcification. There is moderate aortic valvular calcification, which can correlate with degree of aortic stenosis. Negative for mitral annular calcification.No fiordaliza central or segmental pulmonary embolus with adequate opacification of the pulmonary artery and its branches. 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 There are is enlargement of the main pulmonary artery measuring 3.0 cm, consistent with pulmonary arterial hypertension. Negative for intraluminal thrombus within the left atrial appendage. There is severe diffuse vascular calcification.  Evaluation of the pulmonary vasculature demonstrates no endobronchial lesion. There is no pulmonary consolidation or mass. Evaluation of the upper abdomen is unremarkable. Evaluation of the osseous structures are unremarkable.          IMPRESSION:    Negative for pulmonary embolism.    Negative for pulmonary consolidation.              Vital Signs Last 24 Hrs  T(C): 36.6 (12 Nov 2020 08:49), Max: 36.6 (11 Nov 2020 21:36)  T(F): 97.9 (12 Nov 2020 08:49), Max: 97.9 (12 Nov 2020 08:49)  HR: 54 (12 Nov 2020 08:49) (54 - 67)  BP: 145/73 (12 Nov 2020 08:49) (101/55 - 148/63)  BP(mean): 80 (11 Nov 2020 16:00) (80 - 92)  RR: 19 (12 Nov 2020 08:49) (18 - 25)  SpO2: 97% (12 Nov 2020 08:49) (97% - 100%)    REVIEW OF SYSTEMS: per HPI        Physical Exam: frail 89 yo  gentleman lying in semi Arriaga's position, no acute complaints    Head: normocephalic, atraumatic    Eyes: PERRLA, EOMI, no nystagmus, sclera anicteric    ENT: nasal discharge, uvula midline, no oropharyngeal erythema/exudate    Neck: supple, negative JVD, negative carotid bruits, no thyromegaly    Chest: CTA bilaterally, neg wheeze/ rhonchi/ rales/ crackles/ egophany    Cardiovascular: regular rate and rhythm, neg murmurs/rubs/gallops    Abdomen: soft, non distended, non tender to palpation in all 4 quadrants, negative rebound/guarding, normal bowel sounds    Extremities: WWP, neg cyanosis/clubbing/edema, negative calf tenderness to palpation, negative Med's sign    Musculoskeletal:      :     Neurologic Exam:    Alert and oriented to person, place, date/year, speech fluent w/o dysarthria, recent and remote memory intact, repetition intact, comprehension intact,  attention/concentration intact, fund of knowledge appropriate    Cranial Nerves:     II:                       pupils equal, round and reactive to light, visual fields intact   III/ IV/VI:            extraocular movements intact, neg nystagmus, neg ptosis  V:                       facial sensation intact, V1-3 normal  VII:                     face symmetric, no droop, normal eye closure and smile  VIII:                    hearing intact to finger rub bilaterally  IX/ X:                 soft palate rise symmetrical  XI:                      head turning, shoulder shrug normal  XII:                     tongue midline    Motor Exam:    Upper Extremities:     Right: no focal weakness > 3+/5              pronator drift: neg    Left:     : no focal weakness > 3+/5             pronator drift: neg      Lower Extremities:    Right:  : no focal weakness > 3+/5    Left :  : no focal weakness > 3+/5               Sensation: Intact to light touch x 4 extremities,                                         DTR:            = biceps/     triceps/     brachioradialis                      = patella/   medial hamstring/ankle                      neg clonus                      neg Babinski                        Gait:  not tested        PM&R Impression:    1) deconditioned  2) no focal weakness    Plan:    1) Physical therapy focusing on therapeutic exercises, bed mobility/transfer out of bed evaluation, progressive ambulation with assistive devices prn.    2) Anticipated Disposition Plan/Recs:    d/c home with home physical therapy

## 2020-11-12 NOTE — PROGRESS NOTE ADULT - PROBLEM SELECTOR PLAN 4
Pt has known hx of CKD (baseline 1.2) and was found to have superimposed DEBBIE with elevated BUN/Cr as high as 1.5 in the setting of recent contrast load for CT PE.  - f/u BMP  - Avoid nephrotoxins and renally dose meds  - Encourage PO intake Pt has known hx of CAD s/p prior PCI, most recent Cath @ [facility] 4/11/2013 with Dr. Varela DAYANA mLCX, widely patent stent pRCA. Initial concern for unstable angina given mildly elevated trops 0.02 (noted chronically elevated 0.2-0.5 since 2018), peaked in ED.  BNP 1100. CXR clear in ED, EKG SB @ 52 bpm, 1st deg AVB, q waves inferiorly, non-ischemic. ED echo shows mildly reduced left ventricular systolic function, EF 45-50%, and no regional wall motion abnormalities.  Cath cancelled prior to stepdown from CCU; Heparin gtt and Plavix held as well.  -c/w ASA 81, Lopressor 25 BID, and statins

## 2020-11-12 NOTE — PROGRESS NOTE ADULT - SUBJECTIVE AND OBJECTIVE BOX
INTERVAL HISTORY:  breathing comfortably  	  MEDICATIONS:  metoprolol tartrate 25 milliGRAM(s) Oral two times a day  tamsulosin 0.4 milliGRAM(s) Oral at bedtime      albuterol/ipratropium for Nebulization. 3 milliLiter(s) Nebulizer every 6 hours    acetaminophen   Tablet .. 650 milliGRAM(s) Oral every 6 hours PRN  DULoxetine 30 milliGRAM(s) Oral daily  mirtazapine 30 milliGRAM(s) Oral at bedtime    diphenoxylate/atropine 1 Tablet(s) Oral every 8 hours PRN    atorvastatin 40 milliGRAM(s) Oral at bedtime    aspirin  chewable 81 milliGRAM(s) Oral daily  enoxaparin Injectable 30 milliGRAM(s) SubCutaneous every 24 hours        PHYSICAL EXAM:  T(C): 36.3 (11-12-20 @ 05:00), Max: 37 (11-11-20 @ 12:00)  HR: 59 (11-12-20 @ 05:00) (55 - 98)  BP: 148/63 (11-12-20 @ 05:00) (91/52 - 148/63)  RR: 19 (11-12-20 @ 05:00) (18 - 48)  SpO2: 100% (11-12-20 @ 05:00) (97% - 100%)  Wt(kg): --  I&O's Summary    11 Nov 2020 07:01  -  12 Nov 2020 07:00  --------------------------------------------------------  IN: 12 mL / OUT: 600 mL / NET: -588 mL          Appearance: Normal	  Cardiovascular: Normal S1 S2, No JVD, TEN, No edema  Respiratory: Lungs clear to auscultation	BS are quiet  Psychiatry: A & O x 3, Mood & affect appropriate  Gastrointestinal:  Soft, Non-tender, + BS	  Skin: No rashes, No ecchymoses, No cyanosis  Neurologic: Non-focal  Extremities:  No clubbing, cyanosis or edema       TELEMETRY: 	    ECG:  	  RADIOLOGY:   DIAGNOSTIC TESTING:  [ ] Echocardiogram:  [ ]  Catheterization:  [ ] Stress Test:    OTHER: 	    LABS:	 	    CARDIAC MARKERS:                                  11.3   9.13  )-----------( 235      ( 12 Nov 2020 06:05 )             34.8     11-12    141  |  102  |  63<H>  ----------------------------<  89  4.5   |  21<L>  |  2.56<H>    Ca    9.3      12 Nov 2020 06:05  Phos  4.1     11-11  Mg     2.0     11-12    TPro  7.3  /  Alb  3.9  /  TBili  0.3  /  DBili  x   /  AST  18  /  ALT  11  /  AlkPhos  78  11-11    proBNP:   Lipid Profile:   HgA1c:   TSH:     ASSESSMENT/PLAN:

## 2020-11-12 NOTE — PROGRESS NOTE ADULT - PROBLEM SELECTOR PLAN 3
Pt has known hx of CAD s/p prior PCI, most recent Cath @ [facility] 4/11/2013 with Dr. Varela DAYANA mLCX, widely patent stent pRCA. Initial concern for unstable angina given mildly elevated trops 0.02 (noted chronically elevated 0.2-0.5 since 2018), peaked in ED.  BNP 1100. CXR clear in ED, EKG SB @ 52 bpm, 1st deg AVB, q waves inferiorly, non-ischemic. ED echo shows mildly reduced left ventricular systolic function, EF 45-50%, and no regional wall motion abnormalities.  -[facility] cancelled this am 2/2 decreased likelihood of unstable angina  -Hep gtt and Plavix dc'ed   -c/w ASA 81, Lopressor 25 BID, and statins   -Originally scheduled for [facility] 11/11, but cancelled 2/2 decreased likelihood of unstable angina Pt has known hx of AFib on Lopressor 25mg BID at home. Afib on EKG 11/10. overnight converted to NS. CHADVASc score 2.   -  c/w Lopressor 25 BID (home med)    -  Plan for discharge on Toprol 50mg daily  -  not starting AC inpt although CHADVASc score 2 as pt at high risk of adverse events 2/2 age and high risk for falls (lives alone and uses walker for ambulation)  - Discharge with instructions for f/u with PCP re: starting AC as appropriate

## 2020-11-12 NOTE — PROGRESS NOTE ADULT - SUBJECTIVE AND OBJECTIVE BOX
OVERNIGHT EVENTS: RONN    SUBJECTIVE / INTERVAL HPI: Patient seen and examined at bedside. Pt denies any palpitations     VITAL SIGNS:  Vital Signs Last 24 Hrs  T(C): 36.6 (12 Nov 2020 08:49), Max: 36.6 (11 Nov 2020 21:36)  T(F): 97.9 (12 Nov 2020 08:49), Max: 97.9 (12 Nov 2020 08:49)  HR: 54 (12 Nov 2020 08:49) (54 - 67)  BP: 145/73 (12 Nov 2020 08:49) (101/55 - 148/63)  BP(mean): 80 (11 Nov 2020 16:00) (80 - 92)  RR: 19 (12 Nov 2020 08:49) (18 - 25)  SpO2: 97% (12 Nov 2020 08:49) (97% - 100%)    PHYSICAL EXAM:    General: WDWN  HEENT: NC/AT; PERRL, anicteric sclera; MMM  Neck: supple  Cardiovascular: +S1/S2, RRR  Respiratory: CTA B/L; no W/R/R  Gastrointestinal: soft, NT/ND; +BSx4  Extremities: WWP; no edema, clubbing or cyanosis  Vascular: 2+ radial, DP/PT pulses B/L  Neurological: AAOx3; no focal deficits    MEDICATIONS:  MEDICATIONS  (STANDING):  albuterol/ipratropium for Nebulization. 3 milliLiter(s) Nebulizer every 6 hours  aspirin  chewable 81 milliGRAM(s) Oral daily  atorvastatin 40 milliGRAM(s) Oral at bedtime  buDESOnide    Inhalation Suspension 0.25 milliGRAM(s) Inhalation two times a day  DULoxetine 30 milliGRAM(s) Oral daily  enoxaparin Injectable 30 milliGRAM(s) SubCutaneous every 24 hours  metoprolol tartrate 25 milliGRAM(s) Oral two times a day  mirtazapine 30 milliGRAM(s) Oral at bedtime  tamsulosin 0.4 milliGRAM(s) Oral at bedtime    MEDICATIONS  (PRN):  acetaminophen   Tablet .. 650 milliGRAM(s) Oral every 6 hours PRN Moderate Pain (4 - 6)  diphenoxylate/atropine 1 Tablet(s) Oral every 8 hours PRN Diarrhea      ALLERGIES:  Allergies    No Known Allergies    Intolerances        LABS:                        11.3   9.13  )-----------( 235      ( 12 Nov 2020 06:05 )             34.8     11-12    141  |  102  |  63<H>  ----------------------------<  89  4.5   |  21<L>  |  2.56<H>    Ca    9.3      12 Nov 2020 06:05  Phos  4.1     11-11  Mg     2.0     11-12    TPro  7.3  /  Alb  3.9  /  TBili  0.3  /  DBili  x   /  AST  18  /  ALT  11  /  AlkPhos  78  11-11    PTT - ( 11 Nov 2020 05:15 )  PTT:61.9 sec    CAPILLARY BLOOD GLUCOSE          RADIOLOGY & ADDITIONAL TESTS: Reviewed. OVERNIGHT EVENTS: RONN    SUBJECTIVE / INTERVAL HPI: Patient seen and examined at bedside. Pt denies any palpitations, chest pain, abdominal pain, headache, lightheadedness.    VITAL SIGNS:  Vital Signs Last 24 Hrs  T(C): 36.6 (12 Nov 2020 08:49), Max: 36.6 (11 Nov 2020 21:36)  T(F): 97.9 (12 Nov 2020 08:49), Max: 97.9 (12 Nov 2020 08:49)  HR: 54 (12 Nov 2020 08:49) (54 - 67)  BP: 145/73 (12 Nov 2020 08:49) (101/55 - 148/63)  BP(mean): 80 (11 Nov 2020 16:00) (80 - 92)  RR: 19 (12 Nov 2020 08:49) (18 - 25)  SpO2: 97% (12 Nov 2020 08:49) (97% - 100%)    PHYSICAL EXAM:    General: WDWN  HEENT: NC/AT; PERRL, anicteric sclera; MMM  Neck: supple  Cardiovascular: +S1/S2, RRR, decrescendo systolic murmur most prominent over aorta  Respiratory: CTA B/L; no W/R/R  Gastrointestinal: soft, NT/ND; +BSx4  Extremities: WWP; no edema, clubbing or cyanosis  Vascular: 2+ radial, DP/PT pulses B/L  Neurological: AAOx3; no focal deficits    MEDICATIONS:  MEDICATIONS  (STANDING):  albuterol/ipratropium for Nebulization. 3 milliLiter(s) Nebulizer every 6 hours  aspirin  chewable 81 milliGRAM(s) Oral daily  atorvastatin 40 milliGRAM(s) Oral at bedtime  buDESOnide    Inhalation Suspension 0.25 milliGRAM(s) Inhalation two times a day  DULoxetine 30 milliGRAM(s) Oral daily  enoxaparin Injectable 30 milliGRAM(s) SubCutaneous every 24 hours  metoprolol tartrate 25 milliGRAM(s) Oral two times a day  mirtazapine 30 milliGRAM(s) Oral at bedtime  tamsulosin 0.4 milliGRAM(s) Oral at bedtime    MEDICATIONS  (PRN):  acetaminophen   Tablet .. 650 milliGRAM(s) Oral every 6 hours PRN Moderate Pain (4 - 6)  diphenoxylate/atropine 1 Tablet(s) Oral every 8 hours PRN Diarrhea      ALLERGIES:  Allergies    No Known Allergies    Intolerances        LABS:                        11.3   9.13  )-----------( 235      ( 12 Nov 2020 06:05 )             34.8     11-12    141  |  102  |  63<H>  ----------------------------<  89  4.5   |  21<L>  |  2.56<H>    Ca    9.3      12 Nov 2020 06:05  Phos  4.1     11-11  Mg     2.0     11-12    TPro  7.3  /  Alb  3.9  /  TBili  0.3  /  DBili  x   /  AST  18  /  ALT  11  /  AlkPhos  78  11-11    PTT - ( 11 Nov 2020 05:15 )  PTT:61.9 sec    CAPILLARY BLOOD GLUCOSE          RADIOLOGY & ADDITIONAL TESTS: Reviewed.

## 2020-11-12 NOTE — PROGRESS NOTE ADULT - PROBLEM SELECTOR PLAN 2
Pt has known hx of AFib on Lopressor 25mg BID at home. Afib on EKG 11/10. overnight converted to NS. CHADVASc score 2.   -  c/w Lopressor 25 BID (home med)    -  Plan for discharge on Toprol 50mg daily  -  not starting AC inpt although CHADVASc score 2 as pt at high risk of adverse events 2/2 age and high risk for falls (lives alone and uses walker for ambulation)  - Discharge with instructions for f/u with PCP re: starting AC as appropriate Pt has known hx of CKD (baseline 1.2) and was found to have superimposed DEBBIE with elevated BUN/Cr as high as 1.5 in the setting of recent contrast load for CT PE. Pt now found to have increase in Creatinine to 2.56 this AM 11/12.  - UA positive for small LE, 1-2 hyaline casts, trace blood, otherwise negative  - f/u Urine lytes for DEBBIE etiology  - f/u repeat afternoon BMP  - Avoid nephrotoxins and renally dose meds  - Encourage PO intake

## 2020-11-12 NOTE — PROGRESS NOTE ADULT - ATTENDING COMMENTS
Pt is a 89 y/o man c afib, COPD s/p PCI ('13), BPH, arthritis, colonic mass who p/w dyspnea for eval.    Pt tachypneic, dyspneic with 80%s  Pt started on hi flow NRB    Chest CT: no PE, no consolidation  TTE: EF 45-50%    afeb, 119/68, HR 50, 100% Hi flow 40L/40%    Hgb 12.6  Na 144  Cr 1.54    BNP 1100  Joe 0.02    - pt with acute hypoxemic resp failure  - LAUREANO, no PE  - would d/c plavix & hep in pt  - pt with CHADS VAsC 2, but 89 y/o - consider discussion with outpt MD for chronic PO AC  - would wean O2, consider trial of COPD exacerbation meds  - give prednisone, nebs
CA-stable  COPD--approved with nebs  PT  d/c HOME with PT  O 2 not needed
CAD--stable  COPD--started on po prednisone/nebs  negative PE  OOB  PT--  d/c plan

## 2020-11-12 NOTE — PROGRESS NOTE ADULT - PROBLEM SELECTOR PLAN 9
F: NONE  E: Replete PRN  N: DASH/TLC  DVT PPx: Lovenox   GI PPx: Protonix   DISPO: RMF    FULL CODE

## 2020-11-12 NOTE — PROGRESS NOTE ADULT - PROBLEM SELECTOR PLAN 7
PMH of BPH on home Tamsulosin 0.4 mg daily   - c/w Tamsulosin 0.4mg daily.

## 2020-11-12 NOTE — PROGRESS NOTE ADULT - SUBJECTIVE AND OBJECTIVE BOX
OVERNIGHT EVENTS: RONN    SUBJECTIVE / INTERVAL HPI: No complaints    VITAL SIGNS:  Vital Signs Last 24 Hrs  T(C): 36.6 (12 Nov 2020 08:49), Max: 36.6 (11 Nov 2020 21:36)  T(F): 97.9 (12 Nov 2020 08:49), Max: 97.9 (12 Nov 2020 08:49)  HR: 54 (12 Nov 2020 08:49) (54 - 67)  BP: 145/73 (12 Nov 2020 08:49) (101/55 - 148/63)  BP(mean): 80 (11 Nov 2020 16:00) (80 - 92)  RR: 19 (12 Nov 2020 08:49) (18 - 25)  SpO2: 97% (12 Nov 2020 08:49) (97% - 100%)    PHYSICAL EXAM:    General: WDWN  HEENT: NC/AT; PERRL, anicteric sclera; MMM  Neck: supple  Cardiovascular: +S1/S2, RRR, decrescendo systolic murmur most prominent over aorta  Respiratory: CTA B/L; no W/R/R  Gastrointestinal: soft, NT/ND; +BSx4  Extremities: WWP; no edema, clubbing or cyanosis  Vascular: 2+ radial, DP/PT pulses B/L  Neurological: AAOx3; no focal deficits    MEDICATIONS:  MEDICATIONS  (STANDING):  albuterol/ipratropium for Nebulization. 3 milliLiter(s) Nebulizer every 6 hours  aspirin  chewable 81 milliGRAM(s) Oral daily  atorvastatin 40 milliGRAM(s) Oral at bedtime  buDESOnide    Inhalation Suspension 0.25 milliGRAM(s) Inhalation two times a day  DULoxetine 30 milliGRAM(s) Oral daily  enoxaparin Injectable 30 milliGRAM(s) SubCutaneous every 24 hours  metoprolol tartrate 25 milliGRAM(s) Oral two times a day  mirtazapine 30 milliGRAM(s) Oral at bedtime  tamsulosin 0.4 milliGRAM(s) Oral at bedtime    MEDICATIONS  (PRN):  acetaminophen   Tablet .. 650 milliGRAM(s) Oral every 6 hours PRN Moderate Pain (4 - 6)  diphenoxylate/atropine 1 Tablet(s) Oral every 8 hours PRN Diarrhea      ALLERGIES:  Allergies    No Known Allergies    Intolerances        LABS:                        11.3   9.13  )-----------( 235      ( 12 Nov 2020 06:05 )             34.8     11-12    141  |  102  |  63<H>  ----------------------------<  89  4.5   |  21<L>  |  2.56<H>    Ca    9.3      12 Nov 2020 06:05  Phos  4.1     11-11  Mg     2.0     11-12    TPro  7.3  /  Alb  3.9  /  TBili  0.3  /  DBili  x   /  AST  18  /  ALT  11  /  AlkPhos  78  11-11    PTT - ( 11 Nov 2020 05:15 )  PTT:61.9 sec    CAPILLARY BLOOD GLUCOSE          RADIOLOGY & ADDITIONAL TESTS: Reviewed.

## 2020-11-12 NOTE — PROGRESS NOTE ADULT - PROBLEM SELECTOR PLAN 8
Depression with mood instability and claustrophobia, currently being treated with Cymbalta daily and Remeron at night for sleep aid  - continue Cymbalta 30 mg daily and Remeron 30 mg nightly

## 2020-11-13 ENCOUNTER — TRANSCRIPTION ENCOUNTER (OUTPATIENT)
Age: 85
End: 2020-11-13

## 2020-11-13 VITALS
HEART RATE: 88 BPM | SYSTOLIC BLOOD PRESSURE: 116 MMHG | OXYGEN SATURATION: 97 % | DIASTOLIC BLOOD PRESSURE: 73 MMHG | TEMPERATURE: 98 F | RESPIRATION RATE: 18 BRPM

## 2020-11-13 LAB
ANION GAP SERPL CALC-SCNC: 12 MMOL/L — SIGNIFICANT CHANGE UP (ref 5–17)
BASOPHILS # BLD AUTO: 0.03 K/UL — SIGNIFICANT CHANGE UP (ref 0–0.2)
BASOPHILS NFR BLD AUTO: 0.4 % — SIGNIFICANT CHANGE UP (ref 0–2)
BUN SERPL-MCNC: 62 MG/DL — HIGH (ref 7–23)
CALCIUM SERPL-MCNC: 9 MG/DL — SIGNIFICANT CHANGE UP (ref 8.4–10.5)
CHLORIDE SERPL-SCNC: 111 MMOL/L — HIGH (ref 96–108)
CO2 SERPL-SCNC: 22 MMOL/L — SIGNIFICANT CHANGE UP (ref 22–31)
CREAT SERPL-MCNC: 1.85 MG/DL — HIGH (ref 0.5–1.3)
EOSINOPHIL # BLD AUTO: 0.36 K/UL — SIGNIFICANT CHANGE UP (ref 0–0.5)
EOSINOPHIL NFR BLD AUTO: 4.7 % — SIGNIFICANT CHANGE UP (ref 0–6)
GLUCOSE SERPL-MCNC: 104 MG/DL — HIGH (ref 70–99)
HCT VFR BLD CALC: 32.8 % — LOW (ref 39–50)
HGB BLD-MCNC: 10.4 G/DL — LOW (ref 13–17)
IMM GRANULOCYTES NFR BLD AUTO: 0.3 % — SIGNIFICANT CHANGE UP (ref 0–1.5)
LYMPHOCYTES # BLD AUTO: 1 K/UL — SIGNIFICANT CHANGE UP (ref 1–3.3)
LYMPHOCYTES # BLD AUTO: 13 % — SIGNIFICANT CHANGE UP (ref 13–44)
MAGNESIUM SERPL-MCNC: 1.9 MG/DL — SIGNIFICANT CHANGE UP (ref 1.6–2.6)
MCHC RBC-ENTMCNC: 31.1 PG — SIGNIFICANT CHANGE UP (ref 27–34)
MCHC RBC-ENTMCNC: 31.7 GM/DL — LOW (ref 32–36)
MCV RBC AUTO: 98.2 FL — SIGNIFICANT CHANGE UP (ref 80–100)
MONOCYTES # BLD AUTO: 0.75 K/UL — SIGNIFICANT CHANGE UP (ref 0–0.9)
MONOCYTES NFR BLD AUTO: 9.7 % — SIGNIFICANT CHANGE UP (ref 2–14)
NEUTROPHILS # BLD AUTO: 5.56 K/UL — SIGNIFICANT CHANGE UP (ref 1.8–7.4)
NEUTROPHILS NFR BLD AUTO: 71.9 % — SIGNIFICANT CHANGE UP (ref 43–77)
NRBC # BLD: 0 /100 WBCS — SIGNIFICANT CHANGE UP (ref 0–0)
PLATELET # BLD AUTO: 200 K/UL — SIGNIFICANT CHANGE UP (ref 150–400)
POTASSIUM SERPL-MCNC: 4.3 MMOL/L — SIGNIFICANT CHANGE UP (ref 3.5–5.3)
POTASSIUM SERPL-SCNC: 4.3 MMOL/L — SIGNIFICANT CHANGE UP (ref 3.5–5.3)
RBC # BLD: 3.34 M/UL — LOW (ref 4.2–5.8)
RBC # FLD: 13.3 % — SIGNIFICANT CHANGE UP (ref 10.3–14.5)
SODIUM SERPL-SCNC: 145 MMOL/L — SIGNIFICANT CHANGE UP (ref 135–145)
WBC # BLD: 7.72 K/UL — SIGNIFICANT CHANGE UP (ref 3.8–10.5)
WBC # FLD AUTO: 7.72 K/UL — SIGNIFICANT CHANGE UP (ref 3.8–10.5)

## 2020-11-13 PROCEDURE — U0003: CPT

## 2020-11-13 PROCEDURE — 82962 GLUCOSE BLOOD TEST: CPT

## 2020-11-13 PROCEDURE — 82570 ASSAY OF URINE CREATININE: CPT

## 2020-11-13 PROCEDURE — 84100 ASSAY OF PHOSPHORUS: CPT

## 2020-11-13 PROCEDURE — 93005 ELECTROCARDIOGRAM TRACING: CPT

## 2020-11-13 PROCEDURE — 97162 PT EVAL MOD COMPLEX 30 MIN: CPT

## 2020-11-13 PROCEDURE — 71045 X-RAY EXAM CHEST 1 VIEW: CPT

## 2020-11-13 PROCEDURE — 83880 ASSAY OF NATRIURETIC PEPTIDE: CPT

## 2020-11-13 PROCEDURE — 86901 BLOOD TYPING SEROLOGIC RH(D): CPT

## 2020-11-13 PROCEDURE — 71046 X-RAY EXAM CHEST 2 VIEWS: CPT

## 2020-11-13 PROCEDURE — 84436 ASSAY OF TOTAL THYROXINE: CPT

## 2020-11-13 PROCEDURE — 84484 ASSAY OF TROPONIN QUANT: CPT

## 2020-11-13 PROCEDURE — 84300 ASSAY OF URINE SODIUM: CPT

## 2020-11-13 PROCEDURE — 97116 GAIT TRAINING THERAPY: CPT

## 2020-11-13 PROCEDURE — 80061 LIPID PANEL: CPT

## 2020-11-13 PROCEDURE — 83735 ASSAY OF MAGNESIUM: CPT

## 2020-11-13 PROCEDURE — 71045 X-RAY EXAM CHEST 1 VIEW: CPT | Mod: 26

## 2020-11-13 PROCEDURE — 99285 EMERGENCY DEPT VISIT HI MDM: CPT | Mod: 25

## 2020-11-13 PROCEDURE — 82553 CREATINE MB FRACTION: CPT

## 2020-11-13 PROCEDURE — 85027 COMPLETE CBC AUTOMATED: CPT

## 2020-11-13 PROCEDURE — 86850 RBC ANTIBODY SCREEN: CPT

## 2020-11-13 PROCEDURE — 83036 HEMOGLOBIN GLYCOSYLATED A1C: CPT

## 2020-11-13 PROCEDURE — 85025 COMPLETE CBC W/AUTO DIFF WBC: CPT

## 2020-11-13 PROCEDURE — 81001 URINALYSIS AUTO W/SCOPE: CPT

## 2020-11-13 PROCEDURE — 82550 ASSAY OF CK (CPK): CPT

## 2020-11-13 PROCEDURE — 71275 CT ANGIOGRAPHY CHEST: CPT

## 2020-11-13 PROCEDURE — 99232 SBSQ HOSP IP/OBS MODERATE 35: CPT

## 2020-11-13 PROCEDURE — 85730 THROMBOPLASTIN TIME PARTIAL: CPT

## 2020-11-13 PROCEDURE — 86900 BLOOD TYPING SEROLOGIC ABO: CPT

## 2020-11-13 PROCEDURE — 80053 COMPREHEN METABOLIC PANEL: CPT

## 2020-11-13 PROCEDURE — 93306 TTE W/DOPPLER COMPLETE: CPT

## 2020-11-13 PROCEDURE — 80048 BASIC METABOLIC PNL TOTAL CA: CPT

## 2020-11-13 PROCEDURE — 94640 AIRWAY INHALATION TREATMENT: CPT

## 2020-11-13 PROCEDURE — 36415 COLL VENOUS BLD VENIPUNCTURE: CPT

## 2020-11-13 PROCEDURE — 85610 PROTHROMBIN TIME: CPT

## 2020-11-13 PROCEDURE — 86769 SARS-COV-2 COVID-19 ANTIBODY: CPT

## 2020-11-13 RX ORDER — METOPROLOL TARTRATE 50 MG
1 TABLET ORAL
Qty: 0 | Refills: 0 | DISCHARGE

## 2020-11-13 RX ORDER — DULOXETINE HYDROCHLORIDE 30 MG/1
1 CAPSULE, DELAYED RELEASE ORAL
Qty: 0 | Refills: 0 | DISCHARGE
Start: 2020-11-13

## 2020-11-13 RX ORDER — VALSARTAN 80 MG/1
1 TABLET ORAL
Qty: 0 | Refills: 0 | DISCHARGE

## 2020-11-13 RX ORDER — DULOXETINE HYDROCHLORIDE 30 MG/1
1 CAPSULE, DELAYED RELEASE ORAL
Qty: 0 | Refills: 0 | DISCHARGE

## 2020-11-13 RX ORDER — IPRATROPIUM BROMIDE 0.2 MG/ML
2 SOLUTION, NON-ORAL INHALATION
Qty: 8.4 | Refills: 0
Start: 2020-11-13 | End: 2020-12-12

## 2020-11-13 RX ORDER — ALBUTEROL 90 UG/1
2 AEROSOL, METERED ORAL
Qty: 8.4 | Refills: 0
Start: 2020-11-13 | End: 2020-12-12

## 2020-11-13 RX ORDER — APIXABAN 2.5 MG/1
1 TABLET, FILM COATED ORAL
Qty: 60 | Refills: 0
Start: 2020-11-13 | End: 2020-12-12

## 2020-11-13 RX ORDER — METOPROLOL TARTRATE 50 MG
1 TABLET ORAL
Qty: 90 | Refills: 0
Start: 2020-11-13 | End: 2020-12-12

## 2020-11-13 RX ADMIN — ENOXAPARIN SODIUM 30 MILLIGRAM(S): 100 INJECTION SUBCUTANEOUS at 14:06

## 2020-11-13 RX ADMIN — DULOXETINE HYDROCHLORIDE 30 MILLIGRAM(S): 30 CAPSULE, DELAYED RELEASE ORAL at 14:05

## 2020-11-13 RX ADMIN — Medication 3 MILLILITER(S): at 14:06

## 2020-11-13 RX ADMIN — SODIUM CHLORIDE 100 MILLILITER(S): 9 INJECTION INTRAMUSCULAR; INTRAVENOUS; SUBCUTANEOUS at 06:40

## 2020-11-13 RX ADMIN — Medication 3 MILLILITER(S): at 04:30

## 2020-11-13 RX ADMIN — Medication 25 MILLIGRAM(S): at 06:39

## 2020-11-13 RX ADMIN — Medication 0.25 MILLIGRAM(S): at 06:39

## 2020-11-13 RX ADMIN — Medication 81 MILLIGRAM(S): at 14:06

## 2020-11-13 NOTE — PROGRESS NOTE ADULT - PROBLEM SELECTOR PLAN 6
this was a major cause of his SOB
this was a major cause of his SOB
Pt has known hx of HLD on Simvastatin 20mg at home, interchanged to Atorvastatin 40mg QHS  - continue with Atorvastatin 40 mg   - Cholesterol 131, Triglycerides 58, HDL 68, LDL 51
DVT ppx:  HSQ    Dispo: Cath in AM with Dr. Varela.  NPO @ [facility].  Needs consent.     case d/w Dr. Sauceda
Pt has known hx of HLD on Simvastatin 20mg at home, interchanged to Atorvastatin 40mg QHS  - continue with Atorvastatin 40 mg   - Cholesterol 131, Triglycerides 58, HDL 68, LDL 51

## 2020-11-13 NOTE — PROGRESS NOTE ADULT - SUBJECTIVE AND OBJECTIVE BOX
CC/ HPI Patient is an 89 year old male with Hx COPD, HTN, CAD s/p PCI, DAYANA to mRCA, pRCA, mCx, 2013, colonic mass s/p right hemicolectomy 2017, hospital stay for RLE cellulitis, Feb 2020, admitted for ischemic work-up, found to have exacerbation of chronic obstructive pulmonary disease, seen this morning the patient denies acute respiratory complaint.    PAST MEDICAL & SURGICAL HISTORY:  BPH (benign prostatic hyperplasia)  CAD (coronary artery disease)  HLD (hyperlipidemia)  H/O right hemicolectomy  S/P cataract extraction    SOCHX:  + tobacco,  -  alcohol        FAMILY HISTORY: FA/MO  No family history of cardiovascular disease (Father, Mother)    ROS reviewed below with positive findings marked (+) :  GEN:  fever, chills ENT: tracheostomy,   epistaxis,  sinusitis COR: +CAD, CHF,  +HTN, dysrhythmia PUL: COPD, ILD, asthma, pneumonia GI: PEG, dysphagia, hemorrhage, other ELIZABETH: kidney disease, electrolyte disorder HEM:  anemia, thrombus, coagulopathy, cancer ENDO:  thyroid disease, diabetes mellitus CNS:  dementia, stroke, seizure, PSY:  depression, anxiety, other      MEDICATIONS  (STANDING):  albuterol/ipratropium for Nebulization. 3 milliLiter(s) Nebulizer every 6 hours  aspirin  chewable 81 milliGRAM(s) Oral daily  atorvastatin 40 milliGRAM(s) Oral at bedtime  buDESOnide    Inhalation Suspension 0.25 milliGRAM(s) Inhalation two times a day  DULoxetine 30 milliGRAM(s) Oral daily  enoxaparin Injectable 30 milliGRAM(s) SubCutaneous every 24 hours  metoprolol tartrate 25 milliGRAM(s) Oral two times a day  mirtazapine 30 milliGRAM(s) Oral at bedtime  tamsulosin 0.4 milliGRAM(s) Oral at bedtime    MEDICATIONS  (PRN):  acetaminophen   Tablet .. 650 milliGRAM(s) Oral every 6 hours PRN Moderate Pain (4 - 6)  diphenoxylate/atropine 1 Tablet(s) Oral every 8 hours PRN Diarrhea      Vital Signs Last 24 Hrs  T(C): 36.7 (13 Nov 2020 09:39), Max: 37 (12 Nov 2020 20:40)  T(F): 98 (13 Nov 2020 09:39), Max: 98.6 (12 Nov 2020 20:40)  HR: 88 (13 Nov 2020 09:39) (64 - 97)  BP: 116/73 (13 Nov 2020 09:39) (116/73 - 146/75)  RR: 18 (13 Nov 2020 09:39) (18 - 18)  SpO2: 97% (13 Nov 2020 09:39) (95% - 97%)    GENERAL:         comfortable,  - distress.  HEENT:            - trauma,  - icterus,  - injection,  - nasal discharge.  NECK:              - jugular venous distention, - thyromegaly.  LYMPH:           - lymphadenopathy, - masses.  RESP:              + clear,   - rales,   - rhonchi,   - wheezes.   COR:                S1S2   - gallops,  - rubs.  ABD:                bowel sounds,   soft, - tender, - distended.  EXT/MSC:         - cyanosis,  - clubbing, - edema.    NEURO:           + alert and oriented                             10.4   7.72  )-----------( 200      ( 13 Nov 2020 07:11 )             32.8     11-13    145  |  111<H>  |  62<H>  ----------------------------<  104<H>  4.3   |  22  |  1.85<H>    Ca    9.0      13 Nov 2020 07:11  Mg     1.9     11-13          CT Angio Chest PE Protocol w/ IV Cont (11.10.20)   Evaluation of the chest demonstrates the visualized thyroid gland is normal in appearance. There is moderate cardiomegaly. There is moderate bilateral gynecomastia. There is no pleural and no pericardial effusion. Negative for thoracic inlet, axillary, mediastinal or hilar lymphadenopathy. There is severe coronary arterial calcification. There is moderate aortic valvular calcification, which can correlate with degree of aortic stenosis. Negative for mitral annular calcification.  No fiordaliza central or segmental pulmonary embolus with adequate opacification of the pulmonary artery and its branches. 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 There are is enlargement of the main pulmonary artery measuring 3.0 cm, consistent with pulmonary arterial hypertension. Negative for intraluminal thrombus within the left atrial appendage. There is severe diffuse vascular calcification.  Evaluation of the pulmonary vasculature demonstrates no endobronchial lesion. There is no pulmonary consolidation or mass. Evaluation of the upper abdomen is unremarkable. Evaluation of the osseous structures are unremarkable.      IMPRESSION:  Negative for pulmonary embolism.  Negative for pulmonary consolidation.      X-ray Chest (11.09.20)  The cardiac silhouette is enlarged. The aorta is tortuous with atheromatous calcifications.  No pneumothorax is visualized.  No pneumomediastinum is visualized.  No pleural effusion is visualized.  No air space consolidation is visualized. The lungs are hyperinflated.  No fracture is visualized. There is mild levocurvature of the thoracic spine.  Impression:  Enlarged cardiac silhouette.  Hyperinflated lungs which could be seen with asthma or chronic obstructive pulmonary disease, clinical correlation recommended.    ASSESSMENT/PLAN    1) Coronary artery disease  2) Hypertension  3) Chronic obstructive pulmonary disease    Oxygen as needed for a satisfactory SpO2  Bedside spirometry  Atrovent/ albuterol q 4 – 6 hours as needed  Add budesonide, consider Symbicort, Spiriva  Discussed with Dr. Baptiste who will cover Nov 14-15.

## 2020-11-13 NOTE — DISCHARGE NOTE PROVIDER - CARE PROVIDER_API CALL
Elias Montalvo  INTERNAL MEDICINE  [address] [address]  Phone: [phone]  Fax: [phone]  Established Patient  Follow Up Time: 1 week

## 2020-11-13 NOTE — PROGRESS NOTE ADULT - SUBJECTIVE AND OBJECTIVE BOX
INTERVAL HISTORY:  back in AF by exam today  	  MEDICATIONS:  metoprolol tartrate 25 milliGRAM(s) Oral two times a day  tamsulosin 0.4 milliGRAM(s) Oral at bedtime      albuterol/ipratropium for Nebulization. 3 milliLiter(s) Nebulizer every 6 hours  buDESOnide    Inhalation Suspension 0.25 milliGRAM(s) Inhalation two times a day    acetaminophen   Tablet .. 650 milliGRAM(s) Oral every 6 hours PRN  DULoxetine 30 milliGRAM(s) Oral daily  mirtazapine 30 milliGRAM(s) Oral at bedtime    diphenoxylate/atropine 1 Tablet(s) Oral every 8 hours PRN    atorvastatin 40 milliGRAM(s) Oral at bedtime    aspirin  chewable 81 milliGRAM(s) Oral daily  enoxaparin Injectable 30 milliGRAM(s) SubCutaneous every 24 hours        PHYSICAL EXAM:  T(C): 36.8 (11-13-20 @ 05:52), Max: 37 (11-12-20 @ 20:40)  HR: 91 (11-13-20 @ 05:52) (54 - 97)  BP: 132/70 (11-13-20 @ 05:52) (123/60 - 146/75)  RR: 18 (11-13-20 @ 05:52) (18 - 19)  SpO2: 95% (11-13-20 @ 05:52) (95% - 97%)  Wt(kg): --  I&O's Summary    12 Nov 2020 07:01  -  13 Nov 2020 07:00  --------------------------------------------------------  IN: 1402 mL / OUT: 900 mL / NET: 502 mL          Appearance: Normal	  Cardiovascular: Irregular, variable S1 S2, No JVD, TEN, No edema  Respiratory: Lungs clear to auscultation	  Psychiatry: A & O x 3, Mood & affect appropriate  Gastrointestinal:  Soft, Non-tender, + BS	  Skin: No rashes, No ecchymoses, No cyanosis  Neurologic: Non-focal  Extremities:   No clubbing, cyanosis or edema  Vascular: Peripheral pulses palpable 2+ bilaterally    TELEMETRY: 	    ECG:  	  RADIOLOGY:   DIAGNOSTIC TESTING:  [ ] Echocardiogram:  [ ]  Catheterization:  [ ] Stress Test:    OTHER: 	    LABS:	 	    CARDIAC MARKERS:                                  10.4   7.72  )-----------( 200      ( 13 Nov 2020 07:11 )             32.8     11-13    145  |  111<H>  |  62<H>  ----------------------------<  104<H>  4.3   |  22  |  1.85<H>    Ca    9.0      13 Nov 2020 07:11  Mg     1.9     11-13      proBNP:   Lipid Profile:   HgA1c:   TSH:     ASSESSMENT/PLAN:

## 2020-11-13 NOTE — DISCHARGE NOTE PROVIDER - HOSPITAL COURSE
#Discharge: do not delete    Patient is 89 yo M with past medical history of COPD, arthritis, BPH, HTN, CAD s/p PCI w 3 DAYANA, CKD, colonic mass s/p R hemicolectomy in 2017,   Presented with dyspnea and chest pain for 3 days, found to have acute COPD exacerbation    89 y/o M w/ PMHx  HTN, known CAD s/p PCI w 3 DAYANA (mRCA, pRCA, mC in 2013 w/ Dr. Varela), CKD (Cr 1.7- 2.4), colonic mass (s/p right hemicolectomy 4/2017 w/ Dr. Headley), Arthritis, BPH presented  from PCP Dr. Montalvo's office for c/o of worsening CP and VAN x 3 days. On admission labs sig fig for Trop 0.02; BNP 1100; Cr 1.44. EKG Sinus yasmine w/out ischemic changes; TTE w/ EF 45-50% w/out wall motion abnormalities and valvulopathy. CXR negative for fluid overload and infiltrates.  Hospital course complicated by episode of paroxysmal A.fib and Hypoxia w/ tachycardia to 140s (started on HF NC). Pt admitted to CCU for tele and to r/o PE and ischemia as potential causes of acute hypoxic respiratory failure. PECT was negative for PE and COVID was r/o, negative X2, CXR was negative for CHF and PNA. Thus, plan was to take pt for Left heart cath this am due to possible Unstable angina in the setting of elevated trops and CP at rest, pt was loaded w/ ASA, Plavix and started on hep gtt in preoperation. Since overnight pt converted to sinus, was HD stable, had decreased O2 requirements after duonebs, he got weaned off HF and transitioned to NC and Left heart cath was cancelled (UA unlikely) and Hep gtt and Plavix were dc'ed. Acute Hypoxic respiratory failure was most likely 2/2 COPD exacerbation, pt started on Prednisone 40 mg X 5 days along q/ Duonebs q6hrs and is now stable for step down to RM[facility] for further management.    Problem List/Main Diagnoses (system-based):   Inpatient treatment course:   New medications:   Labs to be followed outpatient:   Exam to be followed outpatient:    #Discharge: do not delete    Patient is 89 yo M with past medical history of COPD, arthritis, AFib, BPH, HTN, CAD s/p PCI w 3 DAYANA, CKD, colonic mass s/p R hemicolectomy in 2017,   Presented with dyspnea and chest pain for 3 days, found to have acute COPD exacerbation  89 y/o M w/ PMHx  HTN, known CAD s/p PCI w 3 DAYANA (mRCA, pRCA, mC in 2013 w/ Dr. Varela), CKD (Cr 1.7- 2.4), colonic mass (s/p right hemicolectomy 4/2017 w/ Dr. Headley), Arthritis, BPH presented  from PCP Dr. Montalvo's office for c/o of worsening CP and VAN x 3 days. On admission labs sig fig for Trop 0.02; BNP 1100; Cr 1.44. EKG Sinus yasmine w/out ischemic changes; TTE w/ EF 45-50% w/out wall motion abnormalities and valvulopathy. CXR negative for fluid overload and infiltrates.  Hospital course complicated by episode of paroxysmal A.fib and Hypoxia w/ tachycardia to 140s (started on HF NC). Pt admitted to CCU for tele and to r/o PE and ischemia as potential causes of acute hypoxic respiratory failure. PECT was negative for PE and COVID was r/o, negative X2, CXR was negative for CHF and PNA. Thus, plan was to take pt for Left heart cath this am due to possible Unstable angina in the setting of elevated trops and CP at rest, pt was loaded w/ ASA, Plavix and started on hep gtt in preoperation. Since overnight pt converted to sinus, was HD stable, had decreased O2 requirements after duonebs, he got weaned off HF and transitioned to NC and Left heart cath was cancelled (UA unlikely) and Hep gtt and Plavix were dc'ed. Acute Hypoxic respiratory failure was most likely 2/2 COPD exacerbation, pt started on Prednisone 40 mg X 5 days along q/ Duonebs q6hrs and is now stable for step down to [facility] for further management.    Problem List/Main Diagnoses (system-based):     1. COPD Exacerbation  - Pt presented with dyspnea on exertion and chest pain for 3 days and was found to have Trop 0.02, BNP 1100, and Creatinine 1.44 with EKG showing no signs of ischemic changes. TTE w/ EF 45-50% w/out wall motion abnormalities and valvulopathy. CXR negative for fluid overload and infiltrates.  Hospital course complicated by episode of paroxysmal A.fib and Hypoxia w/ tachycardia to 140s (started on HF NC). Pt admitted to CCU for tele and to r/o PE and ischemia as potential causes of acute hypoxic respiratory failure. PECT was negative for PE and COVID was r/o, negative X2, CXR was negative for CHF and PNA. Thus, plan was to take pt for Left heart cath this am due to possible Unstable angina in the setting of elevated trops and CP at rest, pt was loaded w/ ASA, Plavix and started on hep gtt in preoperation. Since overnight pt converted to sinus, was HD stable, had decreased O2 requirements after duonebs, he got weaned off HF and transitioned to NC and Left heart cath was cancelled (UA unlikely) and Hep gtt and Plavix were dc'ed. Acute Hypoxic respiratory failure was most likely 2/2 COPD exacerbation, pt was started on Pulmicort BID and duonebs q6h. Pt was stepped down to RMF and weaned off of supplemental O2 with SpO2 96% on RA. Pt will be discharged with instructions to continue his home medications and to follow up with Dr. Montalvo, his primary physician.    2. Pre-Renal DEBBIE  - Pt discharge was delayed by DEBBIE with Creatinine elevation to 2.6. Urine lytes showed FeNa of 0.4%, suggestive of pre-renal DEBBIE. Pt was given IVF (NS) with resolution of his DEBBIE evidenced by improvement of his Creatinine to 1.6 (reported baseline of 1.7 previously). Pt is now fit for discharge home with instructions to follow up with Dr. Montalvo in 1 week.    Inpatient treatment course: None  New medications: None  Labs to be followed outpatient: None  Exam to be followed outpatient: None   #Discharge: do not delete    Patient is 89 yo M with past medical history of COPD, arthritis, AFib, BPH, HTN, CAD s/p PCI w 3 DAYANA, CKD, colonic mass s/p R hemicolectomy in 2017,   Presented with dyspnea and chest pain for 3 days, found to have acute COPD exacerbation  89 y/o M w/ PMHx  HTN, known CAD s/p PCI w 3 DAYANA (mRCA, pRCA, mC in 2013 w/ Dr. Varela), CKD (Cr 1.7- 2.4), colonic mass (s/p right hemicolectomy 4/2017 w/ Dr. Headley), Arthritis, BPH presented  from PCP Dr. Montalvo's office for c/o of worsening CP and VAN x 3 days. On admission labs sig fig for Trop 0.02; BNP 1100; Cr 1.44. EKG Sinus yasmine w/out ischemic changes; TTE w/ EF 45-50% w/out wall motion abnormalities and valvulopathy. CXR negative for fluid overload and infiltrates.  Hospital course complicated by episode of paroxysmal A.fib and Hypoxia w/ tachycardia to 140s (started on HF NC). Pt admitted to CCU for tele and to r/o PE and ischemia as potential causes of acute hypoxic respiratory failure. PECT was negative for PE and COVID was r/o, negative X2, CXR was negative for CHF and PNA. Thus, plan was to take pt for Left heart cath this am due to possible Unstable angina in the setting of elevated trops and CP at rest, pt was loaded w/ ASA, Plavix and started on hep gtt in preoperation. Since overnight pt converted to sinus, was HD stable, had decreased O2 requirements after duonebs, he got weaned off HF and transitioned to NC and Left heart cath was cancelled (UA unlikely) and Hep gtt and Plavix were dc'ed. Acute Hypoxic respiratory failure was most likely 2/2 COPD exacerbation with control on steroid inhalers    Problem List/Main Diagnoses (system-based):     1. COPD Exacerbation  - Pt presented with dyspnea on exertion and chest pain for 3 days and was found to have Trop 0.02, BNP 1100, and Creatinine 1.44 with EKG showing no signs of ischemic changes. TTE w/ EF 45-50% w/out wall motion abnormalities and valvulopathy. CXR negative for fluid overload and infiltrates.  Hospital course complicated by episode of paroxysmal A.fib and Hypoxia w/ tachycardia to 140s (started on HF NC). Pt admitted to CCU for tele and to r/o PE and ischemia as potential causes of acute hypoxic respiratory failure. PECT was negative for PE and COVID was r/o, negative X2, CXR was negative for CHF and PNA. Thus, plan was to take pt for Left heart cath this am due to possible Unstable angina in the setting of elevated trops and CP at rest, pt was loaded w/ ASA, Plavix and started on hep gtt in preoperation. Since overnight pt converted to sinus, was HD stable, had decreased O2 requirements after duonebs, he got weaned off HF and transitioned to NC and Left heart cath was cancelled (UA unlikely) and Hep gtt and Plavix were dc'ed. Acute Hypoxic respiratory failure was most likely 2/2 COPD exacerbation, pt was started on Pulmicort BID and duonebs q6h. Pt was stepped down to RMF and weaned off of supplemental O2 with SpO2 96% on RA. Pt will be discharged with instructions to continue his home medications and to follow up with Dr. Montalvo, his primary physician.    2. Pre-Renal DEBBIE  - Pt discharge was delayed by DEBBIE with Creatinine elevation to 2.6. Urine lytes showed FeNa of 0.4%, suggestive of pre-renal DEBBIE. Pt was given IVF (NS) with resolution of his DEBBIE evidenced by improvement of his Creatinine to 1.6 (reported baseline of 1.7 previously). Pt is now fit for discharge home with instructions to follow up with Dr. Montalvo in 1 week.    Inpatient treatment course: None  New medications: None  Labs to be followed outpatient: None  Exam to be followed outpatient: None   #Discharge: do not delete    Patient is 89 yo M with past medical history of COPD, arthritis, AFib, BPH, HTN, CAD s/p PCI w 3 DAYANA, CKD, colonic mass s/p R hemicolectomy in 2017,   Presented with dyspnea and chest pain for 3 days, found to have acute COPD exacerbation  89 y/o M w/ PMHx  HTN, known CAD s/p PCI w 3 DAYANA (mRCA, pRCA, mC in 2013 w/ Dr. Varela), CKD (Cr 1.7- 2.4), colonic mass (s/p right hemicolectomy 4/2017 w/ Dr. Headley), Arthritis, BPH presented  from PCP Dr. Montalvo's office for c/o of worsening CP and VAN x 3 days. On admission labs sig fig for Trop 0.02; BNP 1100; Cr 1.44. EKG Sinus yasmine w/out ischemic changes; TTE w/ EF 45-50% w/out wall motion abnormalities and valvulopathy. CXR negative for fluid overload and infiltrates.  Hospital course complicated by episode of paroxysmal A.fib and Hypoxia w/ tachycardia to 140s (started on HF NC). Pt admitted to CCU for tele and to r/o PE and ischemia as potential causes of acute hypoxic respiratory failure. PECT was negative for PE and COVID was r/o, negative X2, CXR was negative for CHF and PNA. Thus, plan was to take pt for Left heart cath this am due to possible Unstable angina in the setting of elevated trops and CP at rest, pt was loaded w/ ASA, Plavix and started on hep gtt in preoperation. Since overnight pt converted to sinus, was HD stable, had decreased O2 requirements after duonebs, he got weaned off HF and transitioned to NC and Left heart cath was cancelled (UA unlikely) and Hep gtt and Plavix were dc'ed. Acute Hypoxic respiratory failure was most likely 2/2 COPD exacerbation with control on steroid inhalers    Problem List/Main Diagnoses (system-based):     1. COPD Exacerbation  - Pt presented with dyspnea on exertion and chest pain for 3 days and was found to have Trop 0.02, BNP 1100, and Creatinine 1.44 with EKG showing no signs of ischemic changes. TTE w/ EF 45-50% w/out wall motion abnormalities and valvulopathy. CXR negative for fluid overload and infiltrates.  Hospital course complicated by episode of paroxysmal A.fib and Hypoxia w/ tachycardia to 140s (started on HF NC). Pt admitted to CCU for tele and to r/o PE and ischemia as potential causes of acute hypoxic respiratory failure. PECT was negative for PE and COVID was r/o, negative X2, CXR was negative for CHF and PNA. Thus, plan was to take pt for Left heart cath this am due to possible Unstable angina in the setting of elevated trops and CP at rest, pt was loaded w/ ASA, Plavix and started on hep gtt in preoperation. Since overnight pt converted to sinus, was HD stable, had decreased O2 requirements after duonebs, he got weaned off HF and transitioned to NC and Left heart cath was cancelled (UA unlikely) and Hep gtt and Plavix were dc'ed. Acute Hypoxic respiratory failure was most likely 2/2 COPD exacerbation, pt was started on Pulmicort BID and duonebs q6h. Pt was stepped down to RMF and weaned off of supplemental O2 with SpO2 96% on RA. Pt will be discharged with instructions to continue on Atrovent and Albuterol and to follow up with Dr. Montalvo, his primary physician.    2. Pre-Renal DEBBIE  - Pt discharge was delayed by DEBBIE with Creatinine elevation to 2.6. Urine lytes showed FeNa of 0.4%, suggestive of pre-renal DEBBIE. Pt was given IVF (NS) with resolution of his DEBBIE evidenced by improvement of his Creatinine to 1.6 (reported baseline of 1.7 previously). Pt is now fit for discharge home with instructions to follow up with Dr. Montalvo in 1 week.    3. Afib with RVR  - Hospital course complicated by Afib with RVR.  Home lopressor uptitrated to 25mg q8.  CHADSVASC score 2 and patient with fall risk so Eliquis 2.5mg BID deferred until outpatient discussion with PCP    Inpatient treatment course: None  New medications: Albuterol, Atrovent, Metoprolol 25mg TID  Labs to be followed outpatient: None  Exam to be followed outpatient: None

## 2020-11-13 NOTE — PROGRESS NOTE ADULT - NUTRITIONAL ASSESSMENT
ASSESSMENT/PLAN 89 y/o M w/ PMHx  HTN, known CAD s/p PCI w 3 DAYANA (2013), CKD (Cr 1.7- 2.4), colonic mass (s/p right hemicolectomy 4/2017), Arthritis, BPH for CP and VAN x 3 days. Hospital course complicated by episode of paroxysmal A.fib and Hypoxia w/ tachycardia to 140s, started on HF NC and admitted to CCU for tele and to r/o PE and ischemia as potential causes of acute hypoxic respiratory failure. PE CT was negative for PE and COVID was r/o (negative X2), Acute Hypoxic respiratory failure most likely 2/2 COPD exacerbation    1. O2 attempt 2LNC humidify  2. Bronchodilators:  Atrovent/ Xopenex  q 4 – 6 hours as needed  3. Corticosteroids: Taper off systemic, continue Pulmicort  4. ID/Antibiotics: off  5. Cardiac/HTN: optimize BP/HR  6. GI: Rx/ prophylaxis c PPI/H2B  7. Heme: Rx/VT prophylaxis c SQH/SCD/AC on Enoxaparin and ASA  8. Aspiration precautions at all times  Discussed with managing team CCU
This patient has been assessed with a concern for Malnutrition and has been determined to have a diagnosis/diagnoses of Moderate protein-calorie malnutrition.    This patient is being managed with:   Diet DASH/TLC-  Sodium & Cholesterol Restricted  Consistent Carbohydrate {Evening Snack} (CSTCHOSN)  Supplement Feeding Modality:  Oral  Ensure Enlive Cans or Servings Per Day:  1       Frequency:  Three Times a day  Entered: Nov 9 2020  6:24PM    

## 2020-11-13 NOTE — PROGRESS NOTE ADULT - PROBLEM SELECTOR PLAN 5
continue statin
continue statin
Pt has known hx of HTN on home Valsartan 160 mg daily after recent change from lisinopril and HCTZ  - on Valsartan 160mg daily, holding 2/2 DEBBIE
-c/w Tamsulosin 0.4mg daily
Pt has known hx of HTN on home Valsartan 160 mg daily after recent change from lisinopril and HCTZ  - on Valsartan 160mg daily, holding 2/2 DEBBIE

## 2020-11-13 NOTE — PROGRESS NOTE ADULT - PROBLEM SELECTOR PROBLEM 5
HTN (hypertension)
Pure hypercholesterolemia
Pure hypercholesterolemia
HTN (hypertension)
BPH (benign prostatic hyperplasia)

## 2020-11-13 NOTE — DISCHARGE NOTE PROVIDER - NSDCCPCAREPLAN_GEN_ALL_CORE_FT
PRINCIPAL DISCHARGE DIAGNOSIS  Diagnosis: COPD exacerbation  Assessment and Plan of Treatment:        PRINCIPAL DISCHARGE DIAGNOSIS  Diagnosis: COPD exacerbation  Assessment and Plan of Treatment: You were admitted to [facility] because you had trouble breathing and experienced chest pain. After an extensive medical workup in our Coronary Care Unit, your heart was found to be in stable condition and it was determined that your symptoms were due to a COPD exacerbation. Please be sure to take your regularly prescribed inhaler as needed for trouble breathing upon your discharge home.   Please also be sure to follow up with Dr. Montalvo on November 20 at 11:15am to discuss your hospitalization. Please return to the hospital if you experience worsening chest pain, palpitations, nausea, vomiting, fever, or loss of consciousness.       PRINCIPAL DISCHARGE DIAGNOSIS  Diagnosis: COPD exacerbation  Assessment and Plan of Treatment: You were admitted to [facility] because you had trouble breathing and experienced chest pain. After an extensive medical workup in our Coronary Care Unit, your heart was found to be in stable condition and it was determined that your symptoms were due to a COPD exacerbation. Please continue to use your Atrovent inhaler 2 times a day.   If you are feeling short of breath you can use your albuterol inhaler as needed.  These inhalers were sent to your pharmacy.  Please also be sure to follow up with Dr. Montalvo on November 20 at 11:15am to discuss your hospitalization. Please return to the hospital if you experience worsening chest pain, palpitations, nausea, vomiting, fever, or loss of consciousness.      SECONDARY DISCHARGE DIAGNOSES  Diagnosis: Atrial fibrillation  Assessment and Plan of Treatment: While in the hospital you were diagnosed with atrial fibrillation. This is an abnormal heart beat that, if left uncontrolled, can cause symptoms like chest pain and shortness of breath. Please continue taking Metoprolol 25mg three times a day to control your heart rate.  Because of this abnormal rhythm you should consider starting a blood thinner called Eliquis, to avoid blood clots.  This was not started yet as you should discuss the risk of falls on a blood thinner with your primary care doctor prior to starting.  Please follow up with your primary care physician for further management of your atrial fibrillation at your appointment on 11/20.      Diagnosis: Hypertension  Assessment and Plan of Treatment: You were previously diagnosed with high blood pressure. Because your Metoprolol dose was increased, your other blood pressure medications were stopped and your blood pressure has remained normal.  Please continue taking only Metoprolol three times a day for your blood pressure and follow up with your primary care physician for further surveillance and management of your high blood pressure.

## 2020-11-13 NOTE — DISCHARGE NOTE PROVIDER - NSDCMRMEDTOKEN_GEN_ALL_CORE_FT
Aspirin Enteric Coated 81 mg oral delayed release tablet: 1 tab(s) orally once a day  ceFAZolin 2 g intravenous injection: 2 gram(s) intravenous every 8 hours, Please stop after 6 doses  DULoxetine 20 mg oral delayed release capsule: 1 cap(s) orally 2 times a day  Flomax 0.4 mg oral capsule: 1 cap(s) orally once a day  hydroCHLOROthiazide 12.5 mg oral capsule: 1 cap(s) orally once a day  ipratropium-albuterol 0.5 mg-2.5 mg/3 mLinhalation solution: 3 milliliter(s) inhaled every 6 hours, As needed, Shortness of Breath and/or Wheezing  lisinopril 20 mg oral tablet: 1 tab(s) orally every 24 hours  Metoprolol Tartrate 25 mg oral tablet: 1 tab(s) orally 2 times a day  mirtazapine 15 mg oral tablet: 2 tab(s) orally once a day (at bedtime)  simvastatin 20 mg oral tablet: 1 tab(s) orally once a day (at bedtime)  valsartan 160 mg oral tablet: 1 tab(s) orally once a day   Albuterol (Eqv-ProAir HFA) 90 mcg/inh inhalation aerosol: 2 puff(s) inhaled every 6 hours, As Needed -for shortness of breath and/or wheezing   Aspirin Enteric Coated 81 mg oral delayed release tablet: 1 tab(s) orally once a day  Atrovent HFA 17 mcg/inh inhalation aerosol: 2 puff(s) by metered dose inhaler 4 times a day   DULoxetine 30 mg oral delayed release capsule: 1 cap(s) orally once a day  Flomax 0.4 mg oral capsule: 1 cap(s) orally once a day  Metoprolol Tartrate 25 mg oral tablet: 1 tab(s) orally every 8 hours   mirtazapine 15 mg oral tablet: 2 tab(s) orally once a day (at bedtime)  simvastatin 20 mg oral tablet: 1 tab(s) orally once a day (at bedtime)

## 2020-11-13 NOTE — PROGRESS NOTE ADULT - PROBLEM SELECTOR PROBLEM 1
Paroxysmal atrial fibrillation
Paroxysmal atrial fibrillation
COPD exacerbation
Unstable angina

## 2020-11-13 NOTE — PROGRESS NOTE ADULT - PROBLEM SELECTOR PLAN 1
In A Fib by exam and on BB.  Would increase Lopressor to 25 q 8 hrs.    [id number] score = 4.  Will need to be on an oral agent at home, suggest Eliquis 2.5 mg BID

## 2020-11-13 NOTE — PROGRESS NOTE ADULT - PROBLEM SELECTOR PROBLEM 6
HLD (hyperlipidemia)
COPD exacerbation
COPD exacerbation
HLD (hyperlipidemia)
VTE (venous thromboembolism)

## 2020-11-13 NOTE — DISCHARGE NOTE NURSING/CASE MANAGEMENT/SOCIAL WORK - PATIENT PORTAL LINK FT
You can access the FollowMyHealth Patient Portal offered by [facility] by registering at the following website: [email or web address]. By joining [employer]’s FollowMyHealth portal, you will also be able to view your health information using other applications (apps) compatible with our system.

## 2020-11-14 ENCOUNTER — NON-APPOINTMENT (OUTPATIENT)
Age: 85
End: 2020-11-14

## 2020-11-16 ENCOUNTER — NON-APPOINTMENT (OUTPATIENT)
Age: 85
End: 2020-11-16

## 2020-11-17 DIAGNOSIS — I12.9 HYPERTENSIVE CHRONIC KIDNEY DISEASE WITH STAGE 1 THROUGH STAGE 4 CHRONIC KIDNEY DISEASE, OR UNSPECIFIED CHRONIC KIDNEY DISEASE: ICD-10-CM

## 2020-11-17 DIAGNOSIS — I48.0 PAROXYSMAL ATRIAL FIBRILLATION: ICD-10-CM

## 2020-11-17 DIAGNOSIS — I48.92 UNSPECIFIED ATRIAL FLUTTER: ICD-10-CM

## 2020-11-17 DIAGNOSIS — F40.240 CLAUSTROPHOBIA: ICD-10-CM

## 2020-11-17 DIAGNOSIS — J96.01 ACUTE RESPIRATORY FAILURE WITH HYPOXIA: ICD-10-CM

## 2020-11-17 DIAGNOSIS — E78.5 HYPERLIPIDEMIA, UNSPECIFIED: ICD-10-CM

## 2020-11-17 DIAGNOSIS — I25.110 ATHEROSCLEROTIC HEART DISEASE OF NATIVE CORONARY ARTERY WITH UNSTABLE ANGINA PECTORIS: ICD-10-CM

## 2020-11-17 DIAGNOSIS — N17.9 ACUTE KIDNEY FAILURE, UNSPECIFIED: ICD-10-CM

## 2020-11-17 DIAGNOSIS — R00.1 BRADYCARDIA, UNSPECIFIED: ICD-10-CM

## 2020-11-17 DIAGNOSIS — F32.9 MAJOR DEPRESSIVE DISORDER, SINGLE EPISODE, UNSPECIFIED: ICD-10-CM

## 2020-11-17 DIAGNOSIS — M13.80 OTHER SPECIFIED ARTHRITIS, UNSPECIFIED SITE: ICD-10-CM

## 2020-11-17 DIAGNOSIS — N18.4 CHRONIC KIDNEY DISEASE, STAGE 4 (SEVERE): ICD-10-CM

## 2020-11-17 DIAGNOSIS — J44.1 CHRONIC OBSTRUCTIVE PULMONARY DISEASE WITH (ACUTE) EXACERBATION: ICD-10-CM

## 2020-11-17 DIAGNOSIS — N40.0 BENIGN PROSTATIC HYPERPLASIA WITHOUT LOWER URINARY TRACT SYMPTOMS: ICD-10-CM

## 2020-11-17 DIAGNOSIS — E44.0 MODERATE PROTEIN-CALORIE MALNUTRITION: ICD-10-CM

## 2020-11-17 DIAGNOSIS — I44.0 ATRIOVENTRICULAR BLOCK, FIRST DEGREE: ICD-10-CM

## 2020-11-17 DIAGNOSIS — Z79.82 LONG TERM (CURRENT) USE OF ASPIRIN: ICD-10-CM

## 2020-11-17 DIAGNOSIS — Z95.5 PRESENCE OF CORONARY ANGIOPLASTY IMPLANT AND GRAFT: ICD-10-CM

## 2020-11-17 DIAGNOSIS — R00.0 TACHYCARDIA, UNSPECIFIED: ICD-10-CM

## 2020-11-18 ENCOUNTER — NON-APPOINTMENT (OUTPATIENT)
Age: 85
End: 2020-11-18

## 2020-11-23 ENCOUNTER — APPOINTMENT (OUTPATIENT)
Dept: CARE COORDINATION | Facility: HOME HEALTH | Age: 85
End: 2020-11-23
Payer: MEDICARE

## 2020-11-23 VITALS
HEART RATE: 58 BPM | TEMPERATURE: 98.4 F | RESPIRATION RATE: 16 BRPM | SYSTOLIC BLOOD PRESSURE: 110 MMHG | OXYGEN SATURATION: 97 % | DIASTOLIC BLOOD PRESSURE: 60 MMHG

## 2020-11-23 DIAGNOSIS — J44.9 CHRONIC OBSTRUCTIVE PULMONARY DISEASE, UNSPECIFIED: ICD-10-CM

## 2020-11-23 PROCEDURE — 99348 HOME/RES VST EST LOW MDM 30: CPT

## 2020-12-02 ENCOUNTER — NON-APPOINTMENT (OUTPATIENT)
Age: 85
End: 2020-12-02

## 2020-12-14 DIAGNOSIS — Z87.438 PERSONAL HISTORY OF OTHER DISEASES OF MALE GENITAL ORGANS: ICD-10-CM

## 2020-12-14 DIAGNOSIS — Z78.9 OTHER SPECIFIED HEALTH STATUS: ICD-10-CM

## 2020-12-14 DIAGNOSIS — Z86.79 PERSONAL HISTORY OF OTHER DISEASES OF THE CIRCULATORY SYSTEM: ICD-10-CM

## 2020-12-14 DIAGNOSIS — Z86.59 PERSONAL HISTORY OF OTHER MENTAL AND BEHAVIORAL DISORDERS: ICD-10-CM

## 2020-12-14 DIAGNOSIS — Z86.39 PERSONAL HISTORY OF OTHER ENDOCRINE, NUTRITIONAL AND METABOLIC DISEASE: ICD-10-CM

## 2020-12-14 DIAGNOSIS — N17.9 ACUTE KIDNEY FAILURE, UNSPECIFIED: ICD-10-CM

## 2020-12-14 DIAGNOSIS — Z87.09 PERSONAL HISTORY OF OTHER DISEASES OF THE RESPIRATORY SYSTEM: ICD-10-CM

## 2020-12-14 PROBLEM — J44.9 COPD (CHRONIC OBSTRUCTIVE PULMONARY DISEASE): Status: ACTIVE | Noted: 2020-12-14

## 2020-12-14 RX ORDER — METOPROLOL TARTRATE 25 MG/1
25 TABLET, FILM COATED ORAL
Refills: 0 | Status: ACTIVE | COMMUNITY

## 2020-12-14 RX ORDER — TAMSULOSIN HYDROCHLORIDE 0.4 MG/1
0.4 CAPSULE ORAL
Refills: 0 | Status: ACTIVE | COMMUNITY

## 2020-12-14 RX ORDER — ALBUTEROL SULFATE 90 UG/1
INHALANT RESPIRATORY (INHALATION)
Refills: 0 | Status: ACTIVE | COMMUNITY

## 2020-12-14 RX ORDER — IPRATROPIUM BROMIDE 17 UG/1
AEROSOL, METERED RESPIRATORY (INHALATION)
Refills: 0 | Status: ACTIVE | COMMUNITY

## 2020-12-14 RX ORDER — SIMVASTATIN 20 MG/1
20 TABLET, FILM COATED ORAL
Refills: 0 | Status: ACTIVE | COMMUNITY

## 2020-12-14 RX ORDER — MIRTAZAPINE 7.5 MG/1
TABLET, FILM COATED ORAL
Refills: 0 | Status: ACTIVE | COMMUNITY

## 2020-12-14 RX ORDER — DULOXETINE HYDROCHLORIDE 30 MG/1
30 CAPSULE, DELAYED RELEASE ORAL
Refills: 0 | Status: ACTIVE | COMMUNITY

## 2020-12-14 NOTE — PLAN
[FreeTextEntry1] : CV and pulmonary status stable\par Adhere to all medications including demonstrating proper use of inhalers\par Encourage the use of proper breathing techniques such as pursed lip breathing or leaning forward during exhalation.\par Increase activity as tolerated and maintain optimal activity levels. \par Continue coughing and deep breathing exercises \par Receive routine pneumococcal and influenza vaccinations.\par Identify early signs and sx of disease and notify NP/MD.\par Maintain proper nutrition and hydration.\par Follow up with Pulmonologist within 7 days of discharge.\par Instructed the patient to call TCM Team or CCC with any questions or concerns.\par \par \par

## 2020-12-14 NOTE — ASSESSMENT
[FreeTextEntry1] : 89 year old Male w/ PMHx HTN, known CAD s/p PCI w 3 DAYANA (mRCA, pRCA, mC in 2013 w/ Dr. Varela), CKD (Cr 1.7- 2.4), colonic mass (s/p right hemicolectomy 4/2017 w/ Dr. Headley), Arthritis, BPH presented from PCP Dr. Montalvo's office for c/o of worsening CP and VAN x 3 days. On admission labs sig fig for Trop 0.02; BNP 1100; Cr 1.44. EKG Sinus yasmine w/out ischemic changes; TTE w/ EF 45-50% w/out wall motion abnormalities and valvulopathy. CXR negative for fluid overload and infiltrates. Hospital course complicated by episode of paroxysmal A.fib and Hypoxia w/ tachycardia to 140s (started on HF NC). Pt admitted to CCU for tele and to r/o PE and ischemia as potential causes of acute hypoxic respiratory failure. PECT was negative for PE and COVID was r/o, negative X2, CXR was negative for CHF and PNA. Thus, plan was to take pt for Left heart cath this am due to possible Unstable angina in the setting of elevated trops and CP at rest, pt was loaded w/ ASA, Plavix and started on hep gtt in preoperation. Since overnight pt converted to sinus, was HD stable, had decreased O2 requirements after duonebs, he got weaned off HF and transitioned to NC and Left heart cath was cancelled (UA unlikely) and Hep gtt and Plavix were dc'ed. Acute Hypoxic respiratory failure was most likely 2/2 COPD exacerbation with control on steroid inhalers.  The patient was discharged home with follow up care.\par \par \par \par

## 2020-12-14 NOTE — HISTORY OF PRESENT ILLNESS
[de-identified] : This patient is Enrolled in the STAR Program through [employer]\par Copied As per [facility] Discharge Summary\par \par \par 89 year old Male w/ PMHx HTN, known CAD s/p PCI w 3 DAYANA (mRCA, pRCA, mC in 2013 w/ Dr. Varela), CKD (Cr 1.7- 2.4), colonic mass (s/p right hemicolectomy 4/2017 w/ Dr. Headley), Arthritis, BPH presented from PCP Dr. Montalvo's office for c/o of worsening CP and VAN x 3 days. On admission labs sig fig for Trop 0.02; BNP 1100; Cr 1.44. EKG Sinus yasmine w/out ischemic changes; TTE w/ EF 45-50% w/out wall motion abnormalities and valvulopathy. CXR negative for fluid overload and infiltrates. Hospital course complicated by episode of paroxysmal A.fib and Hypoxia w/ tachycardia to 140s (started on HF NC). Pt admitted to CCU for tele and to r/o PE and ischemia as potential causes of acute hypoxic respiratory failure. PECT was negative for PE and COVID was r/o, negative X2, CXR was negative for CHF and PNA. Thus, plan was to take pt for Left heart cath this am due to possible Unstable angina in the setting of elevated trops and CP at rest, pt was loaded w/ ASA, Plavix and started on hep gtt in preoperation. Since overnight pt converted to sinus, was HD stable, had decreased O2 requirements after duonebs, he got weaned off HF and transitioned to NC and Left heart cath was cancelled (UA unlikely) and Hep gtt and Plavix were dc'ed. Acute Hypoxic respiratory failure was most likely 2/2 COPD exacerbation with control on steroid inhalers.  The patient was discharged home with follow up care.\par \par The patient lives alone and does not demonstrate active COVID symptoms.  He denies chest pain, shortness of breath, cough, hemoptysis, fever, palpitations, syncope.  The patient has a niece and nephew involved with his care.\par \par \par

## 2020-12-14 NOTE — PHYSICAL EXAM
[No Acute Distress] : no acute distress [Well Nourished] : well nourished [Normal Sclera/Conjunctiva] : normal sclera/conjunctiva [Normal Outer Ear/Nose] : the outer ears and nose were normal in appearance [No JVD] : no jugular venous distention [No Respiratory Distress] : no respiratory distress  [No Accessory Muscle Use] : no accessory muscle use [Clear to Auscultation] : lungs were clear to auscultation bilaterally [Normal Rate] : normal rate  [Regular Rhythm] : with a regular rhythm [Normal S1, S2] : normal S1 and S2 [No Edema] : there was no peripheral edema [Soft] : abdomen soft [Non Tender] : non-tender [Non-distended] : non-distended [No CVA Tenderness] : no CVA  tenderness [No Joint Swelling] : no joint swelling [No Rash] : no rash [Normal Affect] : the affect was normal [Normal Insight/Judgement] : insight and judgment were intact [de-identified] : Uses cane for support

## 2021-01-01 NOTE — PATIENT PROFILE ADULT - FUNCTIONAL SCREEN CURRENT LEVEL: BATHING, MLM
Nursery  Record    Subjective:     TRES Rodriguez is a female infant born on 2021 at 12:28 PM . She weighed  2.47 kg and measured 18\" in length. Apgars were 8 and 9. Presentation was  Vertex    Maternal Data:       Rupture Date: 2021  Rupture Time: 7:43 AM  Delivery Type: , Low Transverse   Delivery Resuscitation: Suctioning-bulb; Tactile Stimulation    Number of Vessels: 3 Vessels    Cord Events: None  Meconium Stained:    Amniotic Fluid Description: Clear;Blood stained     Information for the patient's mother:  Janine Scott [[id number]]   Gestational Age: 37w0d   Prenatal Labs:  Lab Results   Component Value Date/Time    ABO/Rh(D) O POSITIVE 2021 05:08 PM    HBsAg, External negative 2020    HIV, External non-reactive 2020    Rubella, External 3.44-immune 2020    RPR, External non-reactive 2020    Gonorrhea, External negative 2020    Chlamydia, External negative 2020    GrBStrep, External positive 2021    ABO,Rh O positive 2020            Prenatal Ultrasound:       Objective:     Visit Vitals  Pulse 136   Temp 98.4 °F (36.9 °C)   Resp 46   Ht 45.7 cm   Wt 2.36 kg   HC 31.5 cm   SpO2 100%   BMI 11.29 kg/m²       Results for orders placed or performed during the hospital encounter of 21   BILIRUBIN, TOTAL   Result Value Ref Range    Bilirubin, total 8.0 (H) <7.2 MG/DL   GLUCOSE, POC   Result Value Ref Range    Glucose (POC) 40 (LL) 50 - 110 mg/dL    Performed by Marcelino Beard RN    GLUCOSE, POC   Result Value Ref Range    Glucose (POC) 46 (LL) 50 - 110 mg/dL    Performed by Edgard Meraz    GLUCOSE, POC   Result Value Ref Range    Glucose (POC) 37 (LL) 50 - 110 mg/dL    Performed by Marcelino Beard RN    GLUCOSE, POC   Result Value Ref Range    Glucose (POC) 57 50 - 110 mg/dL    Performed by Kapil Elena RN    GLUCOSE, POC   Result Value Ref Range    Glucose (POC) 55 50 - 110 mg/dL    Performed by Kapil Elena RN GLUCOSE, POC   Result Value Ref Range    Glucose (POC) 64 50 - 110 mg/dL    Performed by Aj Lawson RN    GLUCOSE, POC   Result Value Ref Range    Glucose (POC) 68 50 - 110 mg/dL    Performed by Cornelio Barney    CORD BLOOD EVALUATION   Result Value Ref Range    ABO/Rh(D) O POSITIVE     KEDAR IgG NEG     Bilirubin if KEDAR pos: IF DIRECT EZIO POSITIVE, BILIRUBIN TO FOLLOW       Recent Results (from the past 24 hour(s))   GLUCOSE, POC    Collection Time: 05/08/21 10:28 AM   Result Value Ref Range    Glucose (POC) 68 50 - 110 mg/dL    Performed by Cornelio Barney    BILIRUBIN, TOTAL    Collection Time: 05/09/21  4:30 AM   Result Value Ref Range    Bilirubin, total 8.0 (H) <7.2 MG/DL       Patient Vitals for the past 72 hrs:   Pre Ductal O2 Sat (%)   05/08/21 1330 100     Patient Vitals for the past 72 hrs:   Post Ductal O2 Sat (%)   05/08/21 1330 100        Feeding Method Used: Breast feeding, Bottle  Breast Milk: Nursing  Formula: Yes  Formula Type: Similac Pro-Advance  Reason for Formula Supplementation : Mother's choice    Physical Exam:    Code for table:  O No abnormality  X Abnormally (describe abnormal findings) Admission Exam  CODE Admission Exam  Description of  Findings   General Appearance 0 Vigorous SGA infant   Skin 0    Head, Neck 0 Mild molding, AFOF   Eyes 0 +RR OU   Ears, Nose, & Throat 0 Palate intact   Thorax 0    Lungs 0 CTAB   Heart 0 RRR, no murmur, 2+ pulses   Abdomen 0 Soft, no mass, 3v cord   Genitalia 0    Anus 0    Trunk and Spine 0 No dimples/gabrielle   Extremities 0 No hip clicks/clunks   Reflexes 0 Symmetric kristel, +Grasp/suck   Examiner  MD [email or web address]     Code for table:  O No abnormality  X Abnormally (describe abnormal findings) DischargeExam  CODE Discharge Exam  Description of  Findings   General Appearance 0 Active, well appearing; lusty cry   Skin 0 Pink; mod generalized jaundiced, warm, dry, no lesions   Head, Neck 0 AF soft, flat; sutures approximated   Eyes 0    Ears, Nose, & Throat 0 Ears normal external structure/alignment; nares patent; hard palate intact   Thorax 0 Symmetrical chest excursion; clavicles intact   Lungs 0 CTA bilat; comfortable respiratory effort   Heart 0 RRR without murmur; cap refill 3 sec; strong equal palpable pulses    Abdomen 0 Soft, non--distended, non-tender; active bowel sounds; no palpable mass; cord dry   Genitalia 0 female   Anus 0 patent   Trunk and Spine 0 Straight vertebral column; no tuft, no dimple   Extremities 0 FROME x 4; negative Ortolani/Bernal manuevers   Reflexes 0 Strong suck/[place] present; strong equal grasps   Examiner  Ninoska Yates NNP-BC on 5/10/21 at [facility]       Immunization History   Administered Date(s) Administered    Hep B, Adol/Ped 2021       Hearing Screen:  Hearing Screen: Yes (21)  Left Ear: Pass (21)  Right Ear: Pass (58)    Metabolic Screen:  Initial  Screen Completed: Yes (21 0420)    21 0420 Yes        Pre/Post Ductal O2 Sats (since admission)    Date/Time Pre Ductal O2 Sat (%) Post Ductal O2 Sat (%)   21 1330 100 100        Car Seat Information (since admission)  Date/Time Infant Car Seat Trial/ Challenge   21 0040 Pass         Assessment/Plan:     Active Problems:    Liveborn infant, born in hospital, delivered by  (2021)      [place] small for gestational age, 2574-0115 grams (2021)         Impression on admission:Early term SGA infant delivered via C/S after fetal intolerance of IOL for GHTN, IUGR. Mom GBS+, received PCN x 3 prior to delivery, ROM at delivery. Mom O+/infant O+/KEDAR neg. Breastfeeding, has stooled. PE as above. Accuchecks thus far 40, 46. Plan: serial accuchecks due to SGA status. PMD will be Tyrone Lund. MD [email or web address]    Progress Note:   Maternal request for formula supplementation. Infant breast fed x 4, no LATCH score(s);   formula supplementation, taking at least 13 mls with each offering. No new weight noted at the time of this chart review. Voids x 1 and stools x 2 noted. Blood sugars stable per protocal. PLAN: Continue the care of the . [facility] on 21 at 0509. ADDENDUM: VSS. Infant active/vigorous/well appearing; VSS. Physical assessment as documented: AF soft/flat; sutures approximated; nares patent; hard palate intact; lungs CTA bilat with equal aeration, comfortable respiratory effort, symmetrical chest excursion; heart tones RRR without murmur; cap refill 3 sec; strong equal palpable pulses x 4; abdomen soft, non-distended, non-tender, no palpable mass; active bowel sounds; skin pink/warm/dry, no lesions; activity appropriate for gestational age; +suck/Danika, strong equal grasps, + Babinski. [facility] on 21 at 0600  ADDENDUM:  Updated mother on infant's assessment. Addressed maternal concerns of \"spitting up\". No further parental concerns. [facility] on 21 at . Progress Note: Pink, active and alert. Weight down 4.453% to 2.36kgs. Breast fed x 6 and taking formula per maternal preference 10-15 mls. Void x 3,stool x 4. PE wnl: no audible murmur, pulses and perfusion wnl. Abd soft, non distended. Spit x 1 . Parents updated. P: Normal  care  [facility] 21 0945     Impression on Discharge: Infant active/vigorous/well appearing; VSS. Physical assessment as documented above. Infant breast fed with formula supplementation. Weight loss at 5.8% since birth. Voids x 4 and stools x 3 noted. Discharge bili of 11.1mg/dL at 62 hours of life, which is in the low intermediate risk zone ( LL= 14.8). PLAN: Discharge home. Follow up with pediatrician. [facility] on 5/10/21 at 0600. ADDENDUM: Mother updated on infant's assessment and weight. Discussed  follow up pediatrician appointment (to be obtained preferably 24 hour after discharge); pediatrician appointment not made at this time.  Opportunity for parental questions/answers provided; no concerns verbalized at this time. Ninoska Hooper Fly NNP-BC on 5/10/21 at 36  Addendum: follow with peds tomorrow at 10 am--([facility])        Discharge weight:    Wt Readings from Last 1 Encounters:   05/09/21 2.36 kg (1 %, Z= -2.22)*     * Growth percentiles are based on WHO (Girls, 0-2 years) data. 2 = assistive person

## 2021-02-25 NOTE — PROGRESS NOTE ADULT - PROBLEM/PLAN-9
DISPLAY PLAN FREE TEXT
No

## 2021-08-25 NOTE — DIETITIAN INITIAL EVALUATION ADULT. - NS AS NUTRI INTERV MEDICAL AND FOOD SUPPLEMENTS
Good Afternoon!     I just received a voicemail from Valerie at [facility]. She said that she cannot start the referral for this patient because of the diagnosis and she needs the diagnosis code related to the previous procedure.     Fax number: [phone]  Call back number: [phone]    Thanks,  Iman   (I am at [place] this week or I  would have forwarded the voicemail)   [employer]

## 2021-11-10 ENCOUNTER — APPOINTMENT (OUTPATIENT)
Dept: VASCULAR SURGERY | Facility: CLINIC | Age: 86
End: 2021-11-10
Payer: MEDICARE

## 2021-11-10 VITALS
HEIGHT: 68 IN | HEART RATE: 88 BPM | BODY MASS INDEX: 20.31 KG/M2 | SYSTOLIC BLOOD PRESSURE: 148 MMHG | WEIGHT: 134 LBS | DIASTOLIC BLOOD PRESSURE: 74 MMHG

## 2021-11-10 PROCEDURE — 93880 EXTRACRANIAL BILAT STUDY: CPT

## 2021-11-10 PROCEDURE — 93978 VASCULAR STUDY: CPT

## 2021-11-10 PROCEDURE — 99204 OFFICE O/P NEW MOD 45 MIN: CPT

## 2021-11-16 NOTE — ASSESSMENT
[Arterial/Venous Disease] : arterial/venous disease [Medication Management] : medication management [Aneurysm Surgery] : aneurysm surgery [FreeTextEntry1] : 89 y/o M w/ AAA (supra- and infra-renal) with mild abdominal tenderness. On exam, abdominal tenderness on deep palpation. LEs warm and well perfused. Abdominal US demonstrated AAA w/ calcification at the infrarenal segment of the aorta with a diameter of  2.6 x 2.8 cm and at the suprarenal segment with a diameter of 4.0 x 4.3 cm. Carotid arterial US demonstrated < 50% stenosis b/l. I have furthered reviewed CTA available and uploaded to our system. I explained to him and his nephew the findings and treatment options. Due to his age and long medical hx, I explained all the risks that come with the procedure (which would be a thoracoabdominal open repair) vs deciding to leave the aneurysm alone. He should f/u in 2 months. I will update Dr. Montalvo directly. He should have a consult with a cardiologist to also evaluate his cardiac status as he has not been followed by one in a long time.

## 2021-11-16 NOTE — PHYSICAL EXAM
[Abdomen Tenderness] : ~T ~M Abdominal tenderness [No Rash or Lesion] : No rash or lesion [Alert] : alert [Oriented to Person] : oriented to person [Oriented to Place] : oriented to place [Oriented to Time] : oriented to time [Calm] : calm [Respiratory Effort] : normal respiratory effort [Normal Rate and Rhythm] : normal rate and rhythm [2+] : left 2+ [1+] : left 1+ [Ankle Swelling (On Exam)] : not present [de-identified] : Hard of hearing, appears older than states age [de-identified] : NC/AT  [de-identified] : Supple  [de-identified] : Abdominal tenderness on deep palpation, palpable aneurysm [de-identified] : FROM 5/5 x 4.

## 2021-11-16 NOTE — PROCEDURE
[FreeTextEntry1] : Abdominal arterial US ordered today to evaluate aortic aneurysm: AAA w/ calcification at the infrarenal segment of the aorta with a diameter of  2.6 x 2.8 cm and at the suprarenal segment with a diameter of 4.0 x 4.3 cm.\par \par Carotid arterial US ordered today to visualize carotid arteries: < 50% stenosis b/l.

## 2021-11-16 NOTE — HISTORY OF PRESENT ILLNESS
[FreeTextEntry1] : 89 y/o M presents for evaluation of AAA referred by Dr. Montalvo. He has a PMH of COPD, former smoker, arthritis, Afib, BPH, HTN, CAD s/p PCI w/ 3 DAYANA, CKD, colonic mass s/p R hemicolectomy in 2017. He recently was admitted to the ED 2/2 SOB. Work up completed, demonstrated he has a AAA (reports scanned in outside medical records). His nephew explains that he was in excruciating pain when he had the last abdominal duplex completed at the hospital. Denies any neurologic symptoms, known family aneurysms, unusual abdominal or back pain, .  \par \par He is accompanied by his nephew who is his healthcare proxy. 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 \par \par FHx:\par Father - CAD/MI, passed away\par Mother - natural causes, passed away\par Sisters x3 - all passed way. Medical hx includes multiple myeloma, sepsis, CAd, cerebral palsy

## 2021-11-16 NOTE — CONSULT LETTER
[Dear  ___] : Dear  [unfilled], [FreeTextEntry2] : Elias Montalvo MD\par [address]\[place] [address]  [FreeTextEntry1] : Thank you for referring Mr Sharan Ndiaye for evaluation of an abdominal aortic aneurysm. He is accompanied by his nephew who is his healthcare proxy. He is a frail 89 year old with cardiopulmonary comorbidities. 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 The aneurysm was found during work-up in the hospital after admission for shortness of breath. Denies any chest, unusual abdominal or back pain. He has no known family history of aneurysms. \par \par On exam, abdomen is soft, nondistended but the aneurysm appears to be slightly sensitive on palpation.  He has femoral, popliteal and pedal pulses that are palpable.  Blood pressure is 148/74, heart rate 88 b/min. \par \par After careful review of the CT images, the aneurysm has a max diameter measuring 4.3 cm at the suprarenal section. Abdominal ultrasound completed in the office correlates with CT findings of suprarenal 4.3cm AAA and 2.8cm infrarenal AAA. \par \par I had a long discussion with Mr Ndiaye and his nephew.  Aneurysm repair would require a thoracoabdominal incision.  The morbidity of this type of an operation in a frail 89-year-old man is quite high.  I had a discussion about this with him and his nephew and gently encourage them not to consider open repair.  He lives independently now and it is unlikely that he would be able to do following her surgery.  However, if he is insistent upon proceeding with aneurysm repair this can be done.  Prior to consideration of open surgery cardiac and pulmonary assessment is mandatory.  In addition, since the aneurysm is not yet 5 cm in size I think we should continue to observe him.  I have asked him to come back in 1 to 2 months so that we can perform a follow-up scan to see if there is any growth.  It goes without saying that unfortunately he is not a candidate for endovascular repair.   \par \par I am sure that we will be discussing his care in the coming months.  Many thanks for allowing me to participate in his care.  My complete EMR office note is below for your records.  [FreeTextEntry3] : Sincerely, \par \par Rogelio Akhtar M.D. \par , Surgical Services [facility]\par , Department of Surgery [facility]\par Professor of Surgery, [employer] at [facility]

## 2021-11-16 NOTE — ADDENDUM
[FreeTextEntry1] : This note was written by Jessica Regan on 11/10/2021 acting as scribe for Hermilo Allan M.D.\par \par  I, Dr. Rogelio Akhtar, personally performed the evaluation and management (E/M) services for this new patient.  That E/M includes conducting the initial examination, assessing all conditions, and establishing the plan of care.  Today, my ACP, Della Horta NP, was here to observe my evaluation and management services for this patient to be followed going forward.

## 2022-01-12 ENCOUNTER — APPOINTMENT (OUTPATIENT)
Dept: VASCULAR SURGERY | Facility: CLINIC | Age: 87
End: 2022-01-12
Payer: MEDICARE

## 2022-01-12 VITALS
BODY MASS INDEX: 20.31 KG/M2 | WEIGHT: 134 LBS | HEIGHT: 68 IN | DIASTOLIC BLOOD PRESSURE: 71 MMHG | HEART RATE: 85 BPM | SYSTOLIC BLOOD PRESSURE: 136 MMHG

## 2022-01-12 DIAGNOSIS — Z87.891 PERSONAL HISTORY OF NICOTINE DEPENDENCE: ICD-10-CM

## 2022-01-12 DIAGNOSIS — I71.4 ABDOMINAL AORTIC ANEURYSM, W/OUT RUPTURE: ICD-10-CM

## 2022-01-12 PROCEDURE — 93978 VASCULAR STUDY: CPT

## 2022-01-12 PROCEDURE — 99213 OFFICE O/P EST LOW 20 MIN: CPT

## 2022-01-18 NOTE — DATA REVIEWED
[FreeTextEntry1] : Carotid artery duplex Nov 2021: < 50% stenosis b/l. \par \par Abdominal arterial US Nov 2021: AAA w/ calcification at the infrarenal segment of the aorta with a diameter of 2.6 x 2.8 cm and at the suprarenal segment with a diameter of 4.0 x 4.3 cm.

## 2022-01-18 NOTE — ADDENDUM
[FreeTextEntry1] : I, Dr. Rogelio Akhtar, personally performed the evaluation and management (E/M) services for this established patient who presents today with (a) new problem(s)/exacerbation of (an) existing condition(s).  That E/M includes conducting the examination, assessing all new/exacerbated conditions, and establishing a new plan of care.  Today, my ACP, Della Horta NP, was here to observe my evaluation and management services for this new problem/exacerbated condition to be followed going forward.

## 2022-01-18 NOTE — ASSESSMENT
[Arterial/Venous Disease] : arterial/venous disease [Medication Management] : medication management [Aneurysm Surgery] : aneurysm surgery [FreeTextEntry1] : 89 y/o M w/ AAA (supra- and infra-renal). On exam, abdomen soft, aneurysm palpable, nontender. LEs warm and well perfused. Abdominal US demonstrated AAA w/ calcification at the infrarenal segment of the aorta with a diameter of  3.0 cm and at the suprarenal segment with a diameter of 4.3 cm, stable from previous study. We discussed once more with him and his nephew, findings today and treatment options once aneurysm warrants repair. Due to his age and long medical hx, I explained all the risks that come with the procedure (which would be a thoracoabdominal open repair) vs deciding to leave the aneurysm alone. He should f/u in 6 months.

## 2022-01-18 NOTE — PROCEDURE
[FreeTextEntry1] : Abdominal arterial US ordered today to evaluate aortic aneurysm: AAA w/ calcification at the infrarenal segment of the aorta with a diameter of 3.0 cm and at the suprarenal segment with a diameter of 4.3 cm.

## 2022-01-18 NOTE — CONSULT LETTER
[Dear  ___] : Dear  [unfilled], [FreeTextEntry2] : Elias Montalvo MD\par [address]\[place] [address]  [FreeTextEntry1] : I saw Mr Sharan Ndiaye for early followup to monitor this thoracoabdominal aortic aneurysm. During the last appointment, I was concerned the aneurysm was slightly sensitive to palpation and I wanted to monitor mostly for any growth.  Today, he reports feeling well. Denies any unusual abdominal or back pain, weight loss or changes in bowel habits.   In the interim he had a coronary artery stent placed at [place].\par \par On exam, abdomen is soft, nontender. Legs are warm, well perfused with no edema. Blood pressure 136/71, heart rate 85 b/min.\par \par Abdominal ultrasound completed today shows the suprarenal and infrarenal aneurysm remain stable in size at 4.3 cm and 3.0cm, respectively. \par \par I am pleased Mr Ndiaye's thoracoabdominal aortic aneurysms remain stable in size. We discussed once more if the aneurysm reaches a certain size, he would require an open repair with a thoracoabdominal incision. I feel comfortable seeing him again in 6 months for another study and evaluation. He should stay active and his blood pressure under control. \par \par My complete EMR office note is below for your records. If you have any questions, please do not hesitate to contact me.  [FreeTextEntry3] : Sincerely, \par \par Rogelio Akhtar M.D. \par , Surgical Services [facility]\par , Department of Surgery [facility]\par Professor of Surgery, [employer] at [facility]

## 2022-01-18 NOTE — HISTORY OF PRESENT ILLNESS
[FreeTextEntry1] : 89 y/o M w/ AAA referred originally by Dr. Montalvo. He has a PMH of COPD, former smoker, arthritis, Afib, BPH, HTN, CAD s/p PCI w/ 3 DAYANA (Nov 2021), CKD, colonic mass s/p R hemicolectomy in 2017. During his last appointment and after careful review of imaging, pt was deemed not a surgical candidate for endovascular repair of the AAA. The AAA has a supra and infrarenal component and he would require a thoracoabdominal, open repair. He was advised to come to see us in 1-2 months to monitor any growth. Today, he reports feeling well. Denies any neurologic symptoms, known family aneurysms, unusual abdominal or back pain, change sin bowel habits or weight loss.  \par \par He is accompanied by his nephew who is his healthcare proxy. 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 \par \par FHx:\par Father - CAD/MI, passed away\par Mother - natural causes, passed away\par Sisters x3 - all passed way. Medical hx includes multiple myeloma, sepsis, CAd, cerebral palsy

## 2022-01-18 NOTE — PHYSICAL EXAM
[Respiratory Effort] : normal respiratory effort [Normal Rate and Rhythm] : normal rate and rhythm [2+] : left 2+ [1+] : left 1+ [No Rash or Lesion] : No rash or lesion [Alert] : alert [Oriented to Person] : oriented to person [Oriented to Place] : oriented to place [Oriented to Time] : oriented to time [Calm] : calm [Ankle Swelling (On Exam)] : not present [Abdomen Tenderness] : ~T ~M No abdominal tenderness [de-identified] : Hard of hearing, appears older than states age [de-identified] : NC/AT  [de-identified] : Supple  [de-identified] : Palpable aneurysm [de-identified] : FROM 5/5 x 4.

## 2022-01-28 NOTE — DIETITIAN INITIAL EVALUATION ADULT. - 30 CAL TO
Caller: DAHIANA BUSTAMANTE     Relationship to Patient: MOTHER    Phone Number: [phone]    Reason for Call: PATIENT IS RETURNING CALL TO [facility] ABOUT SURGERY FROM 1/26/22. PATIENT IS STILL THROWING UP BROWN COLORED SUBSTANCE AND HE FEELS DRAINED. REQUESTING CALL BACK ASAP. THANK YOU       [phone]

## 2022-03-18 VITALS
DIASTOLIC BLOOD PRESSURE: 64 MMHG | OXYGEN SATURATION: 98 % | SYSTOLIC BLOOD PRESSURE: 120 MMHG | HEART RATE: 74 BPM | RESPIRATION RATE: 16 BRPM | TEMPERATURE: 99 F

## 2022-03-19 ENCOUNTER — INPATIENT (INPATIENT)
Facility: HOSPITAL | Age: 87
LOS: 3 days | Discharge: HOME | DRG: 871 | End: 2022-03-23
Attending: INTERNAL MEDICINE | Admitting: INTERNAL MEDICINE
Payer: MEDICARE

## 2022-03-19 VITALS
RESPIRATION RATE: 19 BRPM | HEART RATE: 74 BPM | SYSTOLIC BLOOD PRESSURE: 154 MMHG | DIASTOLIC BLOOD PRESSURE: 68 MMHG | OXYGEN SATURATION: 95 % | HEIGHT: 68 IN | WEIGHT: 134.92 LBS | TEMPERATURE: 98 F

## 2022-03-19 DIAGNOSIS — F41.9 ANXIETY DISORDER, UNSPECIFIED: ICD-10-CM

## 2022-03-19 DIAGNOSIS — R91.8 OTHER NONSPECIFIC ABNORMAL FINDING OF LUNG FIELD: ICD-10-CM

## 2022-03-19 DIAGNOSIS — J44.9 CHRONIC OBSTRUCTIVE PULMONARY DISEASE, UNSPECIFIED: ICD-10-CM

## 2022-03-19 DIAGNOSIS — Z90.49 ACQUIRED ABSENCE OF OTHER SPECIFIED PARTS OF DIGESTIVE TRACT: Chronic | ICD-10-CM

## 2022-03-19 DIAGNOSIS — N40.0 BENIGN PROSTATIC HYPERPLASIA WITHOUT LOWER URINARY TRACT SYMPTOMS: ICD-10-CM

## 2022-03-19 DIAGNOSIS — E78.5 HYPERLIPIDEMIA, UNSPECIFIED: ICD-10-CM

## 2022-03-19 DIAGNOSIS — Z98.49 CATARACT EXTRACTION STATUS, UNSPECIFIED EYE: Chronic | ICD-10-CM

## 2022-03-19 DIAGNOSIS — N18.30 CHRONIC KIDNEY DISEASE, STAGE 3 UNSPECIFIED: ICD-10-CM

## 2022-03-19 DIAGNOSIS — I10 ESSENTIAL (PRIMARY) HYPERTENSION: ICD-10-CM

## 2022-03-19 DIAGNOSIS — C18.9 MALIGNANT NEOPLASM OF COLON, UNSPECIFIED: ICD-10-CM

## 2022-03-19 DIAGNOSIS — R63.8 OTHER SYMPTOMS AND SIGNS CONCERNING FOOD AND FLUID INTAKE: ICD-10-CM

## 2022-03-19 DIAGNOSIS — I25.10 ATHEROSCLEROTIC HEART DISEASE OF NATIVE CORONARY ARTERY WITHOUT ANGINA PECTORIS: ICD-10-CM

## 2022-03-19 DIAGNOSIS — J18.9 PNEUMONIA, UNSPECIFIED ORGANISM: ICD-10-CM

## 2022-03-19 DIAGNOSIS — I71.4 ABDOMINAL AORTIC ANEURYSM, WITHOUT RUPTURE: ICD-10-CM

## 2022-03-19 LAB
ALBUMIN SERPL ELPH-MCNC: 4.3 G/DL — SIGNIFICANT CHANGE UP (ref 3.3–5)
ALP SERPL-CCNC: 78 U/L — SIGNIFICANT CHANGE UP (ref 40–120)
ALT FLD-CCNC: 12 U/L — SIGNIFICANT CHANGE UP (ref 10–45)
ANION GAP SERPL CALC-SCNC: 14 MMOL/L — SIGNIFICANT CHANGE UP (ref 5–17)
APPEARANCE UR: CLEAR — SIGNIFICANT CHANGE UP
AST SERPL-CCNC: 16 U/L — SIGNIFICANT CHANGE UP (ref 10–40)
BACTERIA # UR AUTO: PRESENT /HPF
BASE EXCESS BLDV CALC-SCNC: -1.3 MMOL/L — SIGNIFICANT CHANGE UP (ref -2–3)
BASOPHILS # BLD AUTO: 0.06 K/UL — SIGNIFICANT CHANGE UP (ref 0–0.2)
BASOPHILS NFR BLD AUTO: 0.3 % — SIGNIFICANT CHANGE UP (ref 0–2)
BILIRUB SERPL-MCNC: 0.3 MG/DL — SIGNIFICANT CHANGE UP (ref 0.2–1.2)
BILIRUB UR-MCNC: NEGATIVE — SIGNIFICANT CHANGE UP
BUN SERPL-MCNC: 32 MG/DL — HIGH (ref 7–23)
CA-I SERPL-SCNC: 1.24 MMOL/L — SIGNIFICANT CHANGE UP (ref 1.15–1.33)
CALCIUM SERPL-MCNC: 9.4 MG/DL — SIGNIFICANT CHANGE UP (ref 8.4–10.5)
CHLORIDE SERPL-SCNC: 106 MMOL/L — SIGNIFICANT CHANGE UP (ref 96–108)
CO2 BLDV-SCNC: 25.6 MMOL/L — SIGNIFICANT CHANGE UP (ref 22–26)
CO2 SERPL-SCNC: 20 MMOL/L — LOW (ref 22–31)
COLOR SPEC: YELLOW — SIGNIFICANT CHANGE UP
COMMENT - URINE: SIGNIFICANT CHANGE UP
CREAT SERPL-MCNC: 1.52 MG/DL — HIGH (ref 0.5–1.3)
DIFF PNL FLD: NEGATIVE — SIGNIFICANT CHANGE UP
EGFR: 43 ML/MIN/1.73M2 — LOW
EOSINOPHIL # BLD AUTO: 0.37 K/UL — SIGNIFICANT CHANGE UP (ref 0–0.5)
EOSINOPHIL NFR BLD AUTO: 1.9 % — SIGNIFICANT CHANGE UP (ref 0–6)
EPI CELLS # UR: SIGNIFICANT CHANGE UP /HPF (ref 0–5)
GAS PNL BLDV: 137 MMOL/L — SIGNIFICANT CHANGE UP (ref 136–145)
GAS PNL BLDV: SIGNIFICANT CHANGE UP
GAS PNL BLDV: SIGNIFICANT CHANGE UP
GLUCOSE SERPL-MCNC: 124 MG/DL — HIGH (ref 70–99)
GLUCOSE UR QL: NEGATIVE — SIGNIFICANT CHANGE UP
HCO3 BLDV-SCNC: 24 MMOL/L — SIGNIFICANT CHANGE UP (ref 22–29)
HCT VFR BLD CALC: 37.3 % — LOW (ref 39–50)
HGB BLD-MCNC: 12.4 G/DL — LOW (ref 13–17)
IMM GRANULOCYTES NFR BLD AUTO: 0.6 % — SIGNIFICANT CHANGE UP (ref 0–1.5)
KETONES UR-MCNC: NEGATIVE — SIGNIFICANT CHANGE UP
LACTATE SERPL-SCNC: 1.1 MMOL/L — SIGNIFICANT CHANGE UP (ref 0.5–2)
LEGIONELLA AG UR QL: NEGATIVE — SIGNIFICANT CHANGE UP
LEUKOCYTE ESTERASE UR-ACNC: ABNORMAL
LYMPHOCYTES # BLD AUTO: 0.65 K/UL — LOW (ref 1–3.3)
LYMPHOCYTES # BLD AUTO: 3.4 % — LOW (ref 13–44)
MCHC RBC-ENTMCNC: 33.2 GM/DL — SIGNIFICANT CHANGE UP (ref 32–36)
MCHC RBC-ENTMCNC: 33.2 PG — SIGNIFICANT CHANGE UP (ref 27–34)
MCV RBC AUTO: 99.7 FL — SIGNIFICANT CHANGE UP (ref 80–100)
MONOCYTES # BLD AUTO: 0.95 K/UL — HIGH (ref 0–0.9)
MONOCYTES NFR BLD AUTO: 5 % — SIGNIFICANT CHANGE UP (ref 2–14)
MRSA PCR RESULT.: NEGATIVE — SIGNIFICANT CHANGE UP
NEUTROPHILS # BLD AUTO: 16.83 K/UL — HIGH (ref 1.8–7.4)
NEUTROPHILS NFR BLD AUTO: 88.8 % — HIGH (ref 43–77)
NITRITE UR-MCNC: NEGATIVE — SIGNIFICANT CHANGE UP
NRBC # BLD: 0 /100 WBCS — SIGNIFICANT CHANGE UP (ref 0–0)
NT-PROBNP SERPL-SCNC: 715 PG/ML — HIGH (ref 0–300)
PCO2 BLDV: 43 MMHG — SIGNIFICANT CHANGE UP (ref 42–55)
PH BLDV: 7.36 — SIGNIFICANT CHANGE UP (ref 7.32–7.43)
PH UR: 7 — SIGNIFICANT CHANGE UP (ref 5–8)
PLATELET # BLD AUTO: 250 K/UL — SIGNIFICANT CHANGE UP (ref 150–400)
PO2 BLDV: <29 MMHG — LOW (ref 25–45)
POTASSIUM BLDV-SCNC: 4.9 MMOL/L — SIGNIFICANT CHANGE UP (ref 3.5–5.1)
POTASSIUM SERPL-MCNC: 4.9 MMOL/L — SIGNIFICANT CHANGE UP (ref 3.5–5.3)
POTASSIUM SERPL-SCNC: 4.9 MMOL/L — SIGNIFICANT CHANGE UP (ref 3.5–5.3)
PROCALCITONIN SERPL-MCNC: 0.1 NG/ML — SIGNIFICANT CHANGE UP (ref 0.02–0.1)
PROT SERPL-MCNC: 7.5 G/DL — SIGNIFICANT CHANGE UP (ref 6–8.3)
PROT UR-MCNC: 30 MG/DL
RAPID RVP RESULT: SIGNIFICANT CHANGE UP
RBC # BLD: 3.74 M/UL — LOW (ref 4.2–5.8)
RBC # FLD: 13 % — SIGNIFICANT CHANGE UP (ref 10.3–14.5)
RBC CASTS # UR COMP ASSIST: < 5 /HPF — SIGNIFICANT CHANGE UP
S AUREUS DNA NOSE QL NAA+PROBE: POSITIVE
S PNEUM AG UR QL: NEGATIVE — SIGNIFICANT CHANGE UP
SAO2 % BLDV: 30.5 % — LOW (ref 67–88)
SARS-COV-2 RNA SPEC QL NAA+PROBE: NEGATIVE — SIGNIFICANT CHANGE UP
SARS-COV-2 RNA SPEC QL NAA+PROBE: SIGNIFICANT CHANGE UP
SODIUM SERPL-SCNC: 140 MMOL/L — SIGNIFICANT CHANGE UP (ref 135–145)
SP GR SPEC: 1.01 — SIGNIFICANT CHANGE UP (ref 1–1.03)
TROPONIN T SERPL-MCNC: 0.01 NG/ML — SIGNIFICANT CHANGE UP (ref 0–0.01)
UROBILINOGEN FLD QL: 0.2 E.U./DL — SIGNIFICANT CHANGE UP
WBC # BLD: 18.98 K/UL — HIGH (ref 3.8–10.5)
WBC # FLD AUTO: 18.98 K/UL — HIGH (ref 3.8–10.5)
WBC UR QL: < 5 /HPF — SIGNIFICANT CHANGE UP

## 2022-03-19 PROCEDURE — 71275 CT ANGIOGRAPHY CHEST: CPT | Mod: 26,MA

## 2022-03-19 PROCEDURE — 71045 X-RAY EXAM CHEST 1 VIEW: CPT | Mod: 26

## 2022-03-19 PROCEDURE — 93010 ELECTROCARDIOGRAM REPORT: CPT

## 2022-03-19 PROCEDURE — 99285 EMERGENCY DEPT VISIT HI MDM: CPT | Mod: CS,25

## 2022-03-19 RX ORDER — METOPROLOL TARTRATE 50 MG
25 TABLET ORAL
Refills: 0 | Status: DISCONTINUED | OUTPATIENT
Start: 2022-03-19 | End: 2022-03-23

## 2022-03-19 RX ORDER — TAMSULOSIN HYDROCHLORIDE 0.4 MG/1
0.4 CAPSULE ORAL AT BEDTIME
Refills: 0 | Status: DISCONTINUED | OUTPATIENT
Start: 2022-03-19 | End: 2022-03-23

## 2022-03-19 RX ORDER — MIRTAZAPINE 45 MG/1
30 TABLET, ORALLY DISINTEGRATING ORAL AT BEDTIME
Refills: 0 | Status: DISCONTINUED | OUTPATIENT
Start: 2022-03-19 | End: 2022-03-23

## 2022-03-19 RX ORDER — CLOPIDOGREL BISULFATE 75 MG/1
5 TABLET, FILM COATED ORAL DAILY
Refills: 0 | Status: DISCONTINUED | OUTPATIENT
Start: 2022-03-19 | End: 2022-03-19

## 2022-03-19 RX ORDER — CEFTRIAXONE 500 MG/1
1000 INJECTION, POWDER, FOR SOLUTION INTRAMUSCULAR; INTRAVENOUS EVERY 24 HOURS
Refills: 0 | Status: COMPLETED | OUTPATIENT
Start: 2022-03-20 | End: 2022-03-23

## 2022-03-19 RX ORDER — ENOXAPARIN SODIUM 100 MG/ML
30 INJECTION SUBCUTANEOUS EVERY 24 HOURS
Refills: 0 | Status: DISCONTINUED | OUTPATIENT
Start: 2022-03-19 | End: 2022-03-22

## 2022-03-19 RX ORDER — ISOSORBIDE MONONITRATE 60 MG/1
30 TABLET, EXTENDED RELEASE ORAL DAILY
Refills: 0 | Status: DISCONTINUED | OUTPATIENT
Start: 2022-03-19 | End: 2022-03-23

## 2022-03-19 RX ORDER — AZITHROMYCIN 500 MG/1
250 TABLET, FILM COATED ORAL EVERY 24 HOURS
Refills: 0 | Status: DISCONTINUED | OUTPATIENT
Start: 2022-03-19 | End: 2022-03-19

## 2022-03-19 RX ORDER — AMLODIPINE BESYLATE 2.5 MG/1
5 TABLET ORAL DAILY
Refills: 0 | Status: DISCONTINUED | OUTPATIENT
Start: 2022-03-19 | End: 2022-03-23

## 2022-03-19 RX ORDER — CLOPIDOGREL BISULFATE 75 MG/1
75 TABLET, FILM COATED ORAL DAILY
Refills: 0 | Status: DISCONTINUED | OUTPATIENT
Start: 2022-03-19 | End: 2022-03-23

## 2022-03-19 RX ORDER — SIMVASTATIN 20 MG/1
20 TABLET, FILM COATED ORAL AT BEDTIME
Refills: 0 | Status: DISCONTINUED | OUTPATIENT
Start: 2022-03-19 | End: 2022-03-23

## 2022-03-19 RX ORDER — DULOXETINE HYDROCHLORIDE 30 MG/1
30 CAPSULE, DELAYED RELEASE ORAL DAILY
Refills: 0 | Status: DISCONTINUED | OUTPATIENT
Start: 2022-03-19 | End: 2022-03-23

## 2022-03-19 RX ORDER — ASPIRIN/CALCIUM CARB/MAGNESIUM 324 MG
81 TABLET ORAL DAILY
Refills: 0 | Status: DISCONTINUED | OUTPATIENT
Start: 2022-03-19 | End: 2022-03-23

## 2022-03-19 RX ORDER — SODIUM CHLORIDE 9 MG/ML
1900 INJECTION INTRAMUSCULAR; INTRAVENOUS; SUBCUTANEOUS ONCE
Refills: 0 | Status: COMPLETED | OUTPATIENT
Start: 2022-03-19 | End: 2022-03-19

## 2022-03-19 RX ORDER — ALBUTEROL 90 UG/1
1 AEROSOL, METERED ORAL EVERY 6 HOURS
Refills: 0 | Status: DISCONTINUED | OUTPATIENT
Start: 2022-03-19 | End: 2022-03-23

## 2022-03-19 RX ORDER — ACETAMINOPHEN 500 MG
650 TABLET ORAL ONCE
Refills: 0 | Status: COMPLETED | OUTPATIENT
Start: 2022-03-19 | End: 2022-03-19

## 2022-03-19 RX ORDER — LANOLIN ALCOHOL/MO/W.PET/CERES
3 CREAM (GRAM) TOPICAL AT BEDTIME
Refills: 0 | Status: DISCONTINUED | OUTPATIENT
Start: 2022-03-19 | End: 2022-03-23

## 2022-03-19 RX ORDER — AZITHROMYCIN 500 MG/1
250 TABLET, FILM COATED ORAL DAILY
Refills: 0 | Status: DISCONTINUED | OUTPATIENT
Start: 2022-03-19 | End: 2022-03-19

## 2022-03-19 RX ORDER — CEFTRIAXONE 500 MG/1
1000 INJECTION, POWDER, FOR SOLUTION INTRAMUSCULAR; INTRAVENOUS EVERY 24 HOURS
Refills: 0 | Status: DISCONTINUED | OUTPATIENT
Start: 2022-03-19 | End: 2022-03-19

## 2022-03-19 RX ORDER — VANCOMYCIN HCL 1 G
1000 VIAL (EA) INTRAVENOUS ONCE
Refills: 0 | Status: COMPLETED | OUTPATIENT
Start: 2022-03-19 | End: 2022-03-19

## 2022-03-19 RX ORDER — PIPERACILLIN AND TAZOBACTAM 4; .5 G/20ML; G/20ML
3.38 INJECTION, POWDER, LYOPHILIZED, FOR SOLUTION INTRAVENOUS ONCE
Refills: 0 | Status: COMPLETED | OUTPATIENT
Start: 2022-03-19 | End: 2022-03-19

## 2022-03-19 RX ADMIN — TAMSULOSIN HYDROCHLORIDE 0.4 MILLIGRAM(S): 0.4 CAPSULE ORAL at 22:29

## 2022-03-19 RX ADMIN — SODIUM CHLORIDE 1900 MILLILITER(S): 9 INJECTION INTRAMUSCULAR; INTRAVENOUS; SUBCUTANEOUS at 03:44

## 2022-03-19 RX ADMIN — Medication 250 MILLIGRAM(S): at 05:03

## 2022-03-19 RX ADMIN — Medication 650 MILLIGRAM(S): at 04:15

## 2022-03-19 RX ADMIN — Medication 81 MILLIGRAM(S): at 11:18

## 2022-03-19 RX ADMIN — AMLODIPINE BESYLATE 5 MILLIGRAM(S): 2.5 TABLET ORAL at 13:49

## 2022-03-19 RX ADMIN — DULOXETINE HYDROCHLORIDE 30 MILLIGRAM(S): 30 CAPSULE, DELAYED RELEASE ORAL at 11:18

## 2022-03-19 RX ADMIN — Medication 25 MILLIGRAM(S): at 18:12

## 2022-03-19 RX ADMIN — PIPERACILLIN AND TAZOBACTAM 200 GRAM(S): 4; .5 INJECTION, POWDER, LYOPHILIZED, FOR SOLUTION INTRAVENOUS at 03:44

## 2022-03-19 RX ADMIN — SIMVASTATIN 20 MILLIGRAM(S): 20 TABLET, FILM COATED ORAL at 22:29

## 2022-03-19 RX ADMIN — PIPERACILLIN AND TAZOBACTAM 3.38 GRAM(S): 4; .5 INJECTION, POWDER, LYOPHILIZED, FOR SOLUTION INTRAVENOUS at 04:15

## 2022-03-19 RX ADMIN — Medication 650 MILLIGRAM(S): at 03:44

## 2022-03-19 RX ADMIN — ISOSORBIDE MONONITRATE 30 MILLIGRAM(S): 60 TABLET, EXTENDED RELEASE ORAL at 13:49

## 2022-03-19 RX ADMIN — AZITHROMYCIN 250 MILLIGRAM(S): 500 TABLET, FILM COATED ORAL at 11:18

## 2022-03-19 RX ADMIN — CLOPIDOGREL BISULFATE 75 MILLIGRAM(S): 75 TABLET, FILM COATED ORAL at 18:12

## 2022-03-19 RX ADMIN — CEFTRIAXONE 100 MILLIGRAM(S): 500 INJECTION, POWDER, FOR SOLUTION INTRAMUSCULAR; INTRAVENOUS at 11:18

## 2022-03-19 RX ADMIN — Medication 1000 MILLIGRAM(S): at 06:00

## 2022-03-19 RX ADMIN — SODIUM CHLORIDE 1900 MILLILITER(S): 9 INJECTION INTRAMUSCULAR; INTRAVENOUS; SUBCUTANEOUS at 04:45

## 2022-03-19 RX ADMIN — ENOXAPARIN SODIUM 30 MILLIGRAM(S): 100 INJECTION SUBCUTANEOUS at 22:29

## 2022-03-19 RX ADMIN — MIRTAZAPINE 30 MILLIGRAM(S): 45 TABLET, ORALLY DISINTEGRATING ORAL at 22:29

## 2022-03-19 NOTE — H&P ADULT - PROBLEM SELECTOR PLAN 3
Two 1.8 cm solid nodules in the left lower lobe, new since 11/10/2020 seen on CT angio PE Protocol  - Concern for malignancy given 30 pack year smoking history  - Repeat CT chest in 3-6 months for surveillance and consider pulmonology consult this admission

## 2022-03-19 NOTE — H&P ADULT - PROBLEM SELECTOR PLAN 12
F: s/p 1900 mL NS  E: replenish PRN  N: DASH    DVT: Lovenox 40 mg q24    DIspo: pending o2 needs; likely at baseline functional status. PT consult

## 2022-03-19 NOTE — H&P ADULT - PROBLEM SELECTOR PLAN 10
F: s/p 1900 mL NS  E: replenish PRN  N: DASH    DVT: Lovenox 40 mg q24    DIspo: pending o2 needs; likely at baseline functional status. PT consult s/p resection with Dr. Headley  - Lung nodules as above could represent metastases Continue home Simvastatin

## 2022-03-19 NOTE — ED ADULT TRIAGE NOTE - HEIGHT IN CM
Abdomen , soft, nontender, nondistended , no guarding or rigidity , no masses palpable , normal bowel sounds , Liver and Spleen , no hepatomegaly present , no hepatosplenomegaly , liver nontender , spleen not palpable
172.72

## 2022-03-19 NOTE — H&P ADULT - HISTORY OF PRESENT ILLNESS
Mr. Ndiaye is a 89 year old man with a PMHx  HTN, known CAD s/p PCI w 3 DAYANA (mRCA, pRCA, mC in 2013 w/ Dr. Varela), CKD (Cr 1.7- 2.4), colonic mass (s/p right hemicolectomy 4/2017 w/ Dr. Headley), Arthritis, BPH presented from nursing home with c/o of worsening CP    In the ED, he was febrile to 101.2 F (rectally), HR 74, RR 19, and sPO2 96% on room air. His labs were significant for WBC 18.98, Hgb 12.4, , HCO3 20, Cr 1.52, Trop 0.01, and Pro-. EKG showed first degree heart block Mr. Ndiaye is a 89 year old man with a PMHx  HTN, known CAD s/p PCI w 3 DAYANA (mRCA, pRCA, mC in 2013 w/ Dr. Varela), CKD (Cr 1.7- 2.4), COPD (not on home O2), colonic mass (s/p right hemicolectomy 4/2017 w/ Dr. Headley), Arthritis, BPH who presents from home with SOB x 1 day. Mr. Ndiaye states he was in his usual state of health (All ADLs besides cooking) until 3/18 when he began experiencing SOB that he associated with chest pain upon deep inspiration and weakness/malaise. His SOB is worse with exertion but denies any wheezing. He endorses a nonproductive cough. He does not attribute his symptoms to a COPD exacerbation like his prior hospitalizations. On review of symptoms, he endorses 1-2 episodes of diarrhea. He denies any sick contacts, recent antibiotic use, chest pain with exertion, orthopnea, paroxysmal nocturnal dyspnea.     In the ED, he was febrile to 101.2 F (rectally), HR 74, RR 19, and sPO2 96% on room air. His labs were significant for WBC 18.98, Hgb 12.4, , HCO3 20, Cr 1.52, Trop 0.01, and Pro-. EKG showed first degree heart block. CXR, although rotated, is not suggestive of focal infiltrates with hyperinflation. CT PE showed No central or lobar pulmonary emboli with concern for upper lobe infiltrates bilaterally with 2 solid nodules in LLL and an aortic aneurysm measuring 4.2 cm. He was given tylenol 650 mg, zosyn 3.375, vancomycin, 1g, and 1.9 L NS. He was admitted for sepsis 2/2 PNA.

## 2022-03-19 NOTE — ED PROVIDER NOTE - PHYSICAL EXAMINATION
Constitutional: Elderly, frail, alert, oriented to person, place, time/situation and in no apparent distress.  ENMT: Airway patent. Normal MM  Eyes: Clear bilaterally  Cardiac: Normal rate, regular rhythm.  Heart sounds S1, S2.  No murmurs, rubs or gallops. No JVD  Respiratory: Coarse BS that clear w/ coughing. No increased WOB, hypoxia, or accessory mm use. Pt speaks in full sentences. RR 20  Gastrointestinal: Abd soft, NT, ND, NABS. No guarding, rebound, or rigidity. No pulsatile abdominal masses. No organomegaly appreciated. No CVAT   Musculoskeletal: Range of motion is not limited. No LE edema or calf ttp  Neuro: Alert and oriented x 3, face symmetric and speech fluent. Strength 5/5 x 4 ext and symmetric, nml gross motor movement, nml gait. No focal deficits noted.  Skin: Skin normal color for race, warm, dry and intact. No evidence of rash.  Psych: Alert and oriented to person, place, time/situation. normal mood and affect. no apparent risk to self or others.

## 2022-03-19 NOTE — CONSULT NOTE ADULT - ASSESSMENT
per Internal Medicine    89 year old man with a PMHx  HTN, known CAD s/p PCI w 3 DAYANA (mRCA, pRCA, mC in 2013 w/ Dr. Varela), CKD (Cr 1.7- 2.4), COPD (not on home O2), colonic mass (s/p right hemicolectomy 4/2017 w/ Dr. Headley), Arthritis, BPH who presents from home with SOB x 1 day. He was admitted for sepsis 2/2 PNA.    Problem/Plan - 1:  ·  Problem: Sepsis due to pneumonia.   ·  Plan: Meeting sepsis criteria on arrival, SIRS 2/4 (WBC, Fever) with presumed respiratory source given cough and subjective SOB. Lactate 1.1 on arrival  - s/p Vanc and zosyn in ED with 1900 mL fluid resuscitation   - CXR did not show significant focal consolidation; CT PE protocol performed and showed potential upper lobe infiltrates, GGOs, and lung nodules (see below)  - Placed on oxygen; however, likely not needed due to COPD diagnosis   - Blood cultures draw; f/u result  - RVP added on to COVID swab on arrival; f/u result (rationale below)  - Working diagnosis of CAP vs. Viral PNA; f/u legionella antigen (episode of diarrhea), strep antigen  - Procal added on to rule out bacterial PNA  - Start Ceftriaxone 1 g and Azithromycin 250 mg daily x 5 days for CAP coverage  - Goal O2 88-92%.    Problem/Plan - 2:  ·  Problem: COPD, mild.   ·  Plan: Hospitalized in 2020 for COPD exacerbation; however, unlikely to be in exacerbation due to lack of wheezing on exam and intermittent oxygen requirements  - F/U RVP for triggering event of SOB  - Not on Home O2 and uses albuterol PRN  - Goal O2 88-92%.    Problem/Plan - 3:  ·  Problem: Lung nodule, multiple.   ·  Plan: Two 1.8 cm solid nodules in the left lower lobe, new since 11/10/2020 seen on CT angio PE Protocol  - Concern for malignancy given 30 pack year smoking history  - Repeat CT chest in 3-6 months for surveillance and consider pulmonology consult this admission.    Problem/Plan - 4:  ·  Problem: CAD (coronary artery disease).   ·  Plan: s/p PCI w 3 DAYANA (mRCA, pRCA, mC in 2013 w/ Dr. Varela)  - Continue home ASA 81 mg and Plavix 75 mg    #HFmrEF  - TTE on chart review showed EF 45-50%  - Euvolemic on exam but significant murmur  - Continue home metoprolol and consider succinate switch.    Problem/Plan - 5:  ·  Problem: AAA (abdominal aortic aneurysm).  ·  Plan: AAA seen on CT 4.2 cm in diameter  - Continue medical management with BP control  - Likely surveillance US as outpatient.    Problem/Plan - 6:  ·  Problem: HTN (hypertension).  ·  Plan: Continue home metoprolol, Amlodipine and ISMO daily  - For metoprolol, increase dose or restore TID regimen  - Continue to monitor.    Problem/Plan - 7:  ·  Problem: Stage 3 chronic kidney disease.  ·  Plan: Baseline kidney function Cr ~1.7; and 1.5 on admission  - Continue to monitor with daily BMP  - No electrolyte derangements   - Avoid nephrotoxic medications.    Problem/Plan - 8:  ·  Problem: BPH without urinary obstruction.  ·  Plan: Continue home tamsulosin.    Problem/Plan - 9:  ·  Problem: Anxiety and depression.  ·  Plan: Continue home Mirtazepine and Duloxetine.    Problem/Plan - 10:  ·  Problem: HLD (hyperlipidemia).  ·  Plan; Continue home Simvastatin.    Problem/Plan - 11:  ·  Problem: Localized cancer of colon.  ·  Plan: s/p resection with Dr. Headley  - Lung nodules as above could represent metastases.    Problem/Plan - 12:  ·  Problem: Nutrition, metabolism, and development symptoms.   ·  Plan: F: s/p 1900 mL NS  E: replenish PRN  N: DASH    DVT: Lovenox 40 mg q24    DIspo: pending o2 needs; likely at baseline functional status. PT consult.

## 2022-03-19 NOTE — H&P ADULT - PROBLEM SELECTOR PLAN 11
F: s/p 1900 mL NS  E: replenish PRN  N: DASH    DVT: Lovenox 40 mg q24    DIspo: pending o2 needs; likely at baseline functional status. PT consult s/p resection with Dr. Headley  - Lung nodules as above could represent metastases

## 2022-03-19 NOTE — ED PROVIDER NOTE - CLINICAL SUMMARY MEDICAL DECISION MAKING FREE TEXT BOX
Pt p/w SOB, coarse BS on exam, warm touch. Found to have rectal temperature, high wbc. Sepsis labs / IVF / broad spectrum abx. Check CT for further eval for PNA / CTA. Anticipate admission. Pt denotes DNR / DNI, MOLST filled out

## 2022-03-19 NOTE — H&P ADULT - NSHPSOCIALHISTORY_GEN_ALL_CORE
Mr. Ndiaye has a 30 pack year smoking history (quit 40 years ago), drinks 2 glasses of wine nightly, and denies any recreational drug use. He lives alone and performs all ADLs except meal preparation. He receives meals from [employer]. He served in the [employer].

## 2022-03-19 NOTE — H&P ADULT - PROBLEM SELECTOR PLAN 1
Meeting sepsis criteria on arrival, SIRS 2/4 (WBC, Fever) with presumed respiratory source given cough and subjective SOB. Lactate 1.1 on arrival  - s/p Vanc and zosyn in ED with 1900 mL fluid resuscitation   - CXR did not show significant focal consolidation; CT PE protocol performed and showed potential upper lobe infiltrates, GGOs, and lung nodules (see below)  - Placed on oxygen; however, likely not needed due to COPD diagnosis   - Blood cultures draw; f/u result  - RVP added on to COVID swab on arrival; f/u result (rationale below)  - Working diagnosis of CAP vs. Viral PNA; f/u legionella antigen (episode of diarrhea), strep antigen  - Procal added on to rule out bacterial PNA  - Start Ceftriaxone 1 g and Azithromycin 250 mg daily x 5 days for CAP coverage  - Goal O2 88-92%

## 2022-03-19 NOTE — CONSULT NOTE ADULT - SUBJECTIVE AND OBJECTIVE BOX
Patient is a 89y old  Male who presents with a chief complaint of Chest Pain (19 Mar 2022 07:28)       HPI:  Mr. Ndiaye is a 89 year old man with a PMHx  HTN, known CAD s/p PCI w 3 DAYANA (mRCA, pRCA, mC in  w/ Dr. Varela), CKD (Cr 1.7- 2.4), COPD (not on home O2), colonic mass (s/p right hemicolectomy 2017 w/ Dr. Headley), Arthritis, BPH who presents from home with SOB x 1 day. Mr. Ndiaye states he was in his usual state of health (All ADLs besides cooking) until 3/18 when he began experiencing SOB that he associated with chest pain upon deep inspiration and weakness/malaise. His SOB is worse with exertion but denies any wheezing. He endorses a nonproductive cough. He does not attribute his symptoms to a COPD exacerbation like his prior hospitalizations. On review of symptoms, he endorses 1-2 episodes of diarrhea. He denies any sick contacts, recent antibiotic use, chest pain with exertion, orthopnea, paroxysmal nocturnal dyspnea.     In the ED, he was febrile to 101.2 F (rectally), HR 74, RR 19, and sPO2 96% on room air. His labs were significant for WBC 18.98, Hgb 12.4, , HCO3 20, Cr 1.52, Trop 0.01, and Pro-. EKG showed first degree heart block. CXR, although rotated, is not suggestive of focal infiltrates with hyperinflation. CT PE showed No central or lobar pulmonary emboli with concern for upper lobe infiltrates bilaterally with 2 solid nodules in LLL and an aortic aneurysm measuring 4.2 cm. He was given tylenol 650 mg, zosyn 3.375, vancomycin, 1g, and 1.9 L NS. He was admitted for sepsis 2/2 PNA.    (19 Mar 2022 07:28)      PAST MEDICAL & SURGICAL HISTORY:  HLD (hyperlipidemia)    CAD (coronary artery disease)    BPH (benign prostatic hyperplasia)    S/P cataract extraction    H/O right hemicolectomy        MEDICATIONS  (STANDING):  amLODIPine   Tablet 5 milliGRAM(s) Oral daily  aspirin enteric coated 81 milliGRAM(s) Oral daily  azithromycin   Tablet 250 milliGRAM(s) Oral every 24 hours  cefTRIAXone   IVPB 1000 milliGRAM(s) IV Intermittent every 24 hours  clopidogrel Tablet 5 milliGRAM(s) Oral daily  DULoxetine 30 milliGRAM(s) Oral daily  isosorbide   mononitrate ER Tablet (IMDUR) 30 milliGRAM(s) Oral daily  metoprolol tartrate 25 milliGRAM(s) Oral two times a day  mirtazapine 30 milliGRAM(s) Oral at bedtime  simvastatin 20 milliGRAM(s) Oral at bedtime  tamsulosin 0.4 milliGRAM(s) Oral at bedtime    MEDICATIONS  (PRN):  ALBUTerol    90 MICROgram(s) HFA Inhaler 1 Puff(s) Inhalation every 6 hours PRN Shortness of Breath and/or Wheezing  melatonin 3 milliGRAM(s) Oral at bedtime PRN Insomnia      FAMILY HISTORY:  No family history of cardiovascular disease (Father, Mother)        CBC Full  -  ( 19 Mar 2022 02:55 )  WBC Count : 18.98 K/uL  RBC Count : 3.74 M/uL  Hemoglobin : 12.4 g/dL  Hematocrit : 37.3 %  Platelet Count - Automated : 250 K/uL  Mean Cell Volume : 99.7 fl  Mean Cell Hemoglobin : 33.2 pg  Mean Cell Hemoglobin Concentration : 33.2 gm/dL  Auto Neutrophil # : 16.83 K/uL  Auto Lymphocyte # : 0.65 K/uL  Auto Monocyte # : 0.95 K/uL  Auto Eosinophil # : 0.37 K/uL  Auto Basophil # : 0.06 K/uL  Auto Neutrophil % : 88.8 %  Auto Lymphocyte % : 3.4 %  Auto Monocyte % : 5.0 %  Auto Eosinophil % : 1.9 %  Auto Basophil % : 0.3 %          140  |  106  |  32<H>  ----------------------------<  124<H>  4.9   |  20<L>  |  1.52<H>    Ca    9.4      19 Mar 2022 02:55    TPro  7.5  /  Alb  4.3  /  TBili  0.3  /  DBili  x   /  AST  16  /  ALT  12  /  AlkPhos  78  -      Urinalysis Basic - ( 19 Mar 2022 05:05 )    Color: Yellow / Appearance: Clear / S.010 / pH: x  Gluc: x / Ketone: NEGATIVE  / Bili: Negative / Urobili: 0.2 E.U./dL   Blood: x / Protein: 30 mg/dL / Nitrite: NEGATIVE   Leuk Esterase: Trace / RBC: < 5 /HPF / WBC < 5 /HPF   Sq Epi: x / Non Sq Epi: 0-5 /HPF / Bacteria: Present /HPF          Radiology:    < from: Xray Chest 1 View-PORTABLE IMMEDIATE (22 @ 02:49) >  ACC: [id number] EXAM:  XR CHEST PORTABLE IMMED 1V                          PROCEDURE DATE:  2022          INTERPRETATION:  Clinical History: Chest pain    Frontal examination of the chest demonstrates the heart to be within   normal limits in transverse diameter. Left basilar infiltrates.   Calcification involving thoracic aorta. Mild levoscoliosis thoracic spine.    IMPRESSION: Left basilar infiltrates      < from: CT Angio Chest PE Protocol w/ IV Cont (22 @ 06:01) >    ACC: [id number] EXAM:  CT ANGIO CHEST PULM ART [facility]                          PROCEDURE DATE:  2022          INTERPRETATION:  *******************PLEASE SEE BOTTOM OF REPORT FOR FINAL   ATTENDING RADIOLOGIST INTERPRETATION******************    PROCEDURE INFORMATION:  Exam: CTA Chest With Contrast  Exam date and time: 3/19/2022 4:48 AM  Age: 89 years old  Clinical indication: Pain; Chest pressure    TECHNIQUE:  Imaging protocol: Computed tomographic angiography of the chest with   contrast.  3D rendering (Not supervised by radiologist): MIP and/or 3D reconstructed   images were created  by the technologist.    COMPARISON:  CT ANGIO CHEST PULMONARY EMBOLISM WITH IV CONTRAST 11/10/2020 11:53 AM    FINDINGS:  Limitations: Image quality is degraded by respiratory motion artifact and   by artifact from the patient's left arm, which was not elevated during   imaging.    Pulmonary arteries: There are no central or lobar pulmonary emboli.   Characterization of smaller caliber pulmonary arterial branches is   limited by the degree of respiratory motion artifact. The main pulmonary   artery is normal in caliber.  Aorta: There is aneurysmal dilatation of the mid abdominal aorta which   measures up to 4.2 cm in the axial plane, previously notincluded in the   field of view. Normal caliber thoracic aorta. . There is extensive   atherosclerotic mural calcification of the visualized aorta.  Lungs: Respiratory motion artifact limits evaluation of the lung   parenchyma. Two adjacent solid nodules are newly identified in the   lateral left lower lobe, each measuring up to 1.8 cm (series 6, image   148). There are several faint patchy ground-glass opacities in the upper   lobes, also new, nonspecific though most likely infectious or   inflammatory in etiology. There is unchanged minor linear  scarring in the right lower lobe. There is no new consolidation.  Pleural spaces: Unremarkable. No pleural effusion or pneumothorax.  Heart: The heart is mildly enlarged. There are moderate coronary artery   calcifications. No pericardial effusion. Aortic and mitral annular   calcifications are noted.  Lymph nodes: No pathologically enlarged mediastinal, hilar, or axillary   lymph nodes.  Bones/joints: Multilevel spondylosis in the visualized spine. No   suspicious osseous lesions.  Soft tissues: Unremarkable.      PRELIMINARY  IMPRESSION:    1. No central or lobar pulmonary emboli. Study degraded by respiratory   motion artifact.  2. Two 1.8 cm solid nodules in the left lower lobe, new since 11/10/2020.   See management recommendations below.  4. Incompletely imaged abdominal aortic aneurysm measuring up to 4.2 cm   as described above.  4. Several faint ground-glass alveolar opacities in both upper lobes ,   new since 11/10/2020, nonspecific though most likely infectious or   inflammatory in etiology.  5. Additional incidental/nonemergent findings are discussed in the body   of the report.              Vital Signs Last 24 Hrs  T(C): 36.8 (19 Mar 2022 09:08), Max: 38.4 (19 Mar 2022 03:28)  T(F): 98.3 (19 Mar 2022 09:08), Max: 101.2 (19 Mar 2022 03:28)  HR: 62 (19 Mar 2022 09:08) (62 - 80)  BP: 141/60 (19 Mar 2022 09:08) (132/49 - 154/68)  BP(mean): --  RR: 18 (19 Mar 2022 09:08) (16 - 19)  SpO2: 97% (19 Mar 2022 09:08) (95% - 98%)        REVIEW OF SYSTEMS:    CONSTITUTIONAL: No fever, weight loss, or fatigue  EYES: No eye pain, visual disturbances, or discharge  ENMT:  No difficulty hearing, tinnitus, vertigo; No sinus or throat pain  NECK: No pain or stiffness  BREASTS: No pain, masses, or nipple discharge  RESPIRATORY:  per HPI  CARDIOVASCULAR: No chest pain, palpitations, dizziness, or leg swelling  GASTROINTESTINAL: No abdominal or epigastric pain. No nausea, vomiting, or hematemesis; No diarrhea or constipation. No melena or hematochezia.  GENITOURINARY: No dysuria, frequency, hematuria, or incontinence  NEUROLOGICAL: No headaches, memory loss, loss of strength, numbness, or tremors  SKIN: No itching, burning, rashes, or lesions   LYMPH NODES: No enlarged glands  ENDOCRINE: No heat or cold intolerance; No hair loss  MUSCULOSKELETAL: No joint pain or swelling; No muscle, back, or extremity pain  PSYCHIATRIC: No depression, anxiety, mood swings, or difficulty sleeping  HEME/LYMPH: No easy bruising, or bleeding gums  ALLERGY AND IMMUNOLOGIC: No hives or eczema  VASCULAR: no swelling, erythema,           Physical Exam:  89 yo  gentleman lying in semi Arriaga's position, awake, alert, c/o feeling tired    Head: normocephalic, atraumatic    Eyes: PERRLA, EOMI, no nystagmus, sclera anicteric    ENT: nasal discharge, uvula midline, no oropharyngeal erythema/exudate    Neck: supple, negative JVD, negative carotid bruits, no thyromegaly    Chest: coarse breath sounds left base    Cardiovascular: regular rate and rhythm, neg murmurs/rubs/gallops    Abdomen: soft, non distended, non tender to palpation in all 4 quadrants, negative rebound/guarding, normal bowel sounds    Extremities: WWP, neg cyanosis/clubbing/edema, negative calf tenderness to palpation, negative Med's sign      Neurologic Exam:    Alert and oriented x 3      Motor Exam:    Right UE:             > 3+/5 throughout    Left UE:              > 3+/5 throughout    Right LE:            > 3+/5 throughout    Left LE:              > 3+/5 throughout               Sensation:           intact to light touch x 4 extremities                                                 DTR:                  biceps/brachioradialis: equal                                                      patella/ankle: equal                           Gait:  not tested              PM&R Impression:    1) deconditioned  2) no focal weakness    Recommendations/ Plan :    1) Physical / Occupational therapy focusing on therapeutic exercises, bed mobility/transfer out of bed evaluation, progressive ambulation with assistive devices prn.    2) Anticipated Disposition Plan/Recs:    pending functional progress

## 2022-03-19 NOTE — ED PROVIDER NOTE - OBJECTIVE STATEMENT
Pt w/ PMHx HTN ,HLD, known CAD s/p PCI w 3 DAYANA (mRCA, pRCA, mC in 2013 w/ Dr. Varela), CKD (Cr 1.7- 2.4), colonic mass (s/p right hemicolectomy 4/2017 w/ Dr. Headley), Arthritis, BPH, pAF, now p/w SOB x 1 day. HE reports associated intermittent L sided CP, non radiating, that is worse w/ deep inspiration. He denies f/c, cough. No LE edema or calf pain. No orthopnea. + vaccinated against COVID19

## 2022-03-19 NOTE — H&P ADULT - PROBLEM SELECTOR PLAN 9
s/p resection with Dr. Headley  - Lung nodules as above could represent metastases Continue home Simvastatin Continue home Mirtazepine and Duloxetine Partial Purse String (Simple) Text: Given the location of the defect and the characteristics of the surrounding skin a simple purse string closure was deemed most appropriate.  Undermining was performed circumfirentially around the surgical defect.  A purse string suture was then placed and tightened. Wound tension only allowed a partial closure of the circular defect.

## 2022-03-19 NOTE — ED ADULT NURSE NOTE - OBJECTIVE STATEMENT
pt bibems, ambulates with cane, c/o intermittent chest pain since 2 pm yesterday. pt bibems, ambulates with cane, c/o intermittent chest pain and VAN since 2 pm yesterday.

## 2022-03-19 NOTE — ED ADULT NURSE NOTE - NSIMPLEMENTINTERV_GEN_ALL_ED
Implemented All Fall with Harm Risk Interventions:  [place] to call system. Call bell, personal items and telephone within reach. Instruct patient to call for assistance. Room bathroom lighting operational. Non-slip footwear when patient is off stretcher. Physically safe environment: no spills, clutter or unnecessary equipment. Stretcher in lowest position, wheels locked, appropriate side rails in place. Provide visual cue, wrist band, yellow gown, etc. Monitor gait and stability. Monitor for mental status changes and reorient to person, place, and time. Review medications for side effects contributing to fall risk. Reinforce activity limits and safety measures with patient and family. Provide visual clues: red socks.

## 2022-03-19 NOTE — H&P ADULT - NSHPPHYSICALEXAM_GEN_ALL_CORE
VITAL SIGNS:  T(C): 36.9 (03-19-22 @ 05:09), Max: 38.4 (03-19-22 @ 03:28)  T(F): 98.4 (03-19-22 @ 05:09), Max: 101.2 (03-19-22 @ 03:28)  HR: 80 (03-19-22 @ 05:09) (74 - 80)  BP: 149/65 (03-19-22 @ 05:09) (149/65 - 154/68)  BP(mean): --  RR: 16 (03-19-22 @ 05:09) (16 - 19)  SpO2: 98% (03-19-22 @ 05:09) (95% - 98%)  Wt(kg): --    PHYSICAL EXAM:    Constitutional: WDWN, lying comfortably in bed, NAD  Head: Nc/At  Eyes: PERRL, EOMI, clear conjunctiva  ENT: no nasal discharge; uvula midline, no oropharyngeal erythema or exudates; MMM  Neck: supple; no JVD or thyromegaly  Respiratory: CTA b/l, no wheezes, rales, or rhonchi  Cardiac: +S1/S2, +RRR, no murmurs, rubs, or gallops  Gastrointestinal: soft, non-tender, non-distended, no rebound/guarding, no palpable masses, normoactive bowel sounds x4  Back: spine midline, no bony tenderness or step-offs; no CVAT B/L  Extremities: WWP, no clubbing or cyanosis, no peripheral edema  Musculoskeletal: NROM x4; no joint swelling, tenderness or erythema  Vascular: 2+ radial and DP pulses b/l  Dermatologic: skin warm, dry and intact, no rashes, wounds, or scars  Lymphatic: no submandibular or cervical LAD  Neurologic: AAOx3, CNII-XII grossly intact, no focal deficits  Psychiatric: affect and characteristics of appearance, verbalizations, behaviors are appropriate VITAL SIGNS:  T(C): 36.9 (03-19-22 @ 05:09), Max: 38.4 (03-19-22 @ 03:28)  T(F): 98.4 (03-19-22 @ 05:09), Max: 101.2 (03-19-22 @ 03:28)  HR: 80 (03-19-22 @ 05:09) (74 - 80)  BP: 149/65 (03-19-22 @ 05:09) (149/65 - 154/68)  RR: 16 (03-19-22 @ 05:09) (16 - 19)  SpO2: 98% (03-19-22 @ 05:09) (95% - 98%)      PHYSICAL EXAM:  Constitutional: WDWN, lying comfortably in bed, NAD  HEENT: no retractions, no JVD,   Respiratory: coarse breath sounds, mild belly breathing without costal retraction or SCM use  Cardiac: RRR, normal S1/S2, Loud 4/6 systolic murmur  Gastrointestinal: soft, non-tender, non-distended, no rebound/guarding, no palpable masses, normoactive bowel sounds x4  Extremities: WWP, no clubbing or cyanosis, no peripheral edema  Musculoskeletal: NROM x4; no joint swelling, tenderness or erythema  Vascular: 2+ radial and DP pulses b/l  Dermatologic: skin warm, dry and intact, no rashes, wounds, or scars  Lymphatic: no submandibular or cervical LAD  Neurologic: AAOx3, CNII-XII grossly intact, no focal deficits  Psychiatric: affect and characteristics of appearance, verbalizations, behaviors are appropriate VITAL SIGNS:  T(C): 36.9 (03-19-22 @ 05:09), Max: 38.4 (03-19-22 @ 03:28)  T(F): 98.4 (03-19-22 @ 05:09), Max: 101.2 (03-19-22 @ 03:28)  HR: 80 (03-19-22 @ 05:09) (74 - 80)  BP: 149/65 (03-19-22 @ 05:09) (149/65 - 154/68)  RR: 16 (03-19-22 @ 05:09) (16 - 19)  SpO2: 98% (03-19-22 @ 05:09) (95% - 98%)      PHYSICAL EXAM:  Constitutional: WDWN, lying comfortably in bed, NAD  HEENT: no retractions, no JVD, EOMI, nonicteric sclera  Respiratory: coarse breath sounds, mild belly breathing without costal retraction or SCM use  Cardiac: RRR, normal S1/S2, Loud 4/6 systolic murmur  Gastrointestinal: soft, non-tender, non-distended, no rebound/guarding, no palpable masses, normoactive bowel sounds x4  Extremities: WWP, no clubbing or cyanosis, no peripheral edema  Musculoskeletal: NROM x4; no joint swelling, tenderness or erythema  Vascular: 2+ radial and DP pulses b/l  Dermatologic: skin warm, dry and intact, no rashes, wounds, or scars  Lymphatic: no submandibular or cervical LAD  Neurologic: AAOx3, CNII-XII grossly intact, no focal deficits  Psychiatric: affect and characteristics of appearance, verbalizations, behaviors are appropriate

## 2022-03-19 NOTE — H&P ADULT - ASSESSMENT
Mr. Ndiaye is a 89 year old man with a PMHx  HTN, known CAD s/p PCI w 3 DAYANA (mRCA, pRCA, mC in 2013 w/ Dr. Varela), CKD (Cr 1.7- 2.4), COPD (not on home O2), colonic mass (s/p right hemicolectomy 4/2017 w/ Dr. Headley), Arthritis, BPH who presents from home with SOB x 1 day. He was admitted for sepsis 2/2 PNA.

## 2022-03-19 NOTE — PATIENT PROFILE ADULT - FALL HARM RISK - HARM RISK INTERVENTIONS

## 2022-03-19 NOTE — H&P ADULT - NSHPLABSRESULTS_GEN_ALL_CORE
LABS:                         12.4   18.98 )-----------( 250      ( 19 Mar 2022 02:55 )             37.3         140  |  106  |  32<H>  ----------------------------<  124<H>  4.9   |  20<L>  |  1.52<H>    Ca    9.4      19 Mar 2022 02:55    TPro  7.5  /  Alb  4.3  /  TBili  0.3  /  DBili  x   /  AST  16  /  ALT  12  /  AlkPhos  78        Urinalysis Basic - ( 19 Mar 2022 05:05 )    Color: Yellow / Appearance: Clear / S.010 / pH: x  Gluc: x / Ketone: NEGATIVE  / Bili: Negative / Urobili: 0.2 E.U./dL   Blood: x / Protein: 30 mg/dL / Nitrite: NEGATIVE   Leuk Esterase: Trace / RBC: < 5 /HPF / WBC < 5 /HPF   Sq Epi: x / Non Sq Epi: 0-5 /HPF / Bacteria: Present /HPF      CARDIAC MARKERS ( 19 Mar 2022 02:55 )  x     / 0.01 ng/mL / x     / x     / x          Serum Pro-Brain Natriuretic Peptide: 715 pg/mL ( @ 02:55)    Lactate, Blood: 1.1 mmol/L ( @ 04:08)      RADIOLOGY, EKG & ADDITIONAL TESTS: Reviewed.

## 2022-03-19 NOTE — H&P ADULT - PROBLEM SELECTOR PLAN 5
Continue home metoprolol but BID   - Increase dose or restore TID regimen  - Continue to monitor Continue home metoprolol, Amlodipine and ISMO daily  - For metoprolol, increase dose or restore TID regimen  - Continue to monitor AAA seen on CT 4.2 cm in diameter  - Continue medical management with BP control  - Likely surveillance US as outpatient

## 2022-03-19 NOTE — ED PROVIDER NOTE - WR INTERPRETATION 1
CXR negative - No pneumothorax, No opacities, No free airCXR negative - No infiltrates, No consolidation, No atelectasis seenCXR negative - No CHF, No cardiomegaly, No pleural effusions. L sided nodular opacities

## 2022-03-19 NOTE — H&P ADULT - PROBLEM SELECTOR PLAN 6
Baseline kidney function Cr ~1.7; and 1.5 on admission  - Continue to monitor with daily BMP  - No electrolyte derangements   - Avoid nephrotoxic medications Continue home metoprolol, Amlodipine and ISMO daily  - For metoprolol, increase dose or restore TID regimen  - Continue to monitor

## 2022-03-19 NOTE — H&P ADULT - PROBLEM SELECTOR PLAN 4
s/p PCI w 3 DAYANA (mRCA, pRCA, mC in 2013 w/ Dr. Varela)  - Continue home ASA 81 mg    #HFmrEF  - TTE on chart review showed EF 45-50%  - Euvolemic on exam but significant murmur  - Continue home metoprolol and consider succinate switch s/p PCI w 3 DAYANA (mRCA, pRCA, mC in 2013 w/ Dr. Varela)  - Continue home ASA 81 mg and Plavix 75 mg    #HFmrEF  - TTE on chart review showed EF 45-50%  - Euvolemic on exam but significant murmur  - Continue home metoprolol and consider succinate switch

## 2022-03-19 NOTE — H&P ADULT - PROBLEM SELECTOR PLAN 7
Continue home tamsulosin Baseline kidney function Cr ~1.7; and 1.5 on admission  - Continue to monitor with daily BMP  - No electrolyte derangements   - Avoid nephrotoxic medications

## 2022-03-19 NOTE — H&P ADULT - PROBLEM SELECTOR PLAN 2
Hospitalized in 2020 for COPD exacerbation; however, unlikely to be in exacerbation due to lack of wheezing on exam and intermittent oxygen requirements  - F/U RVP for triggering event of SOB  - Not on Home O2 and uses albuterol PRN  - Goal O2 88-92%

## 2022-03-20 LAB
ALBUMIN SERPL ELPH-MCNC: 3 G/DL — LOW (ref 3.3–5)
ALP SERPL-CCNC: 62 U/L — SIGNIFICANT CHANGE UP (ref 40–120)
ALT FLD-CCNC: 13 U/L — SIGNIFICANT CHANGE UP (ref 10–45)
ANION GAP SERPL CALC-SCNC: 10 MMOL/L — SIGNIFICANT CHANGE UP (ref 5–17)
AST SERPL-CCNC: 16 U/L — SIGNIFICANT CHANGE UP (ref 10–40)
BASOPHILS # BLD AUTO: 0.06 K/UL — SIGNIFICANT CHANGE UP (ref 0–0.2)
BASOPHILS NFR BLD AUTO: 0.4 % — SIGNIFICANT CHANGE UP (ref 0–2)
BILIRUB SERPL-MCNC: 0.2 MG/DL — SIGNIFICANT CHANGE UP (ref 0.2–1.2)
BUN SERPL-MCNC: 21 MG/DL — SIGNIFICANT CHANGE UP (ref 7–23)
CALCIUM SERPL-MCNC: 8.5 MG/DL — SIGNIFICANT CHANGE UP (ref 8.4–10.5)
CHLORIDE SERPL-SCNC: 112 MMOL/L — HIGH (ref 96–108)
CO2 SERPL-SCNC: 18 MMOL/L — LOW (ref 22–31)
CREAT SERPL-MCNC: 1.31 MG/DL — HIGH (ref 0.5–1.3)
EGFR: 51 ML/MIN/1.73M2 — LOW
EOSINOPHIL # BLD AUTO: 0.57 K/UL — HIGH (ref 0–0.5)
EOSINOPHIL NFR BLD AUTO: 3.9 % — SIGNIFICANT CHANGE UP (ref 0–6)
GLUCOSE SERPL-MCNC: 78 MG/DL — SIGNIFICANT CHANGE UP (ref 70–99)
HCT VFR BLD CALC: 30 % — LOW (ref 39–50)
HGB BLD-MCNC: 9.8 G/DL — LOW (ref 13–17)
IMM GRANULOCYTES NFR BLD AUTO: 0.4 % — SIGNIFICANT CHANGE UP (ref 0–1.5)
LYMPHOCYTES # BLD AUTO: 0.71 K/UL — LOW (ref 1–3.3)
LYMPHOCYTES # BLD AUTO: 4.8 % — LOW (ref 13–44)
MAGNESIUM SERPL-MCNC: 2 MG/DL — SIGNIFICANT CHANGE UP (ref 1.6–2.6)
MCHC RBC-ENTMCNC: 32.7 GM/DL — SIGNIFICANT CHANGE UP (ref 32–36)
MCHC RBC-ENTMCNC: 32.9 PG — SIGNIFICANT CHANGE UP (ref 27–34)
MCV RBC AUTO: 100.7 FL — HIGH (ref 80–100)
MONOCYTES # BLD AUTO: 0.85 K/UL — SIGNIFICANT CHANGE UP (ref 0–0.9)
MONOCYTES NFR BLD AUTO: 5.8 % — SIGNIFICANT CHANGE UP (ref 2–14)
NEUTROPHILS # BLD AUTO: 12.48 K/UL — HIGH (ref 1.8–7.4)
NEUTROPHILS NFR BLD AUTO: 84.7 % — HIGH (ref 43–77)
NRBC # BLD: 0 /100 WBCS — SIGNIFICANT CHANGE UP (ref 0–0)
PHOSPHATE SERPL-MCNC: 3 MG/DL — SIGNIFICANT CHANGE UP (ref 2.5–4.5)
PLATELET # BLD AUTO: 190 K/UL — SIGNIFICANT CHANGE UP (ref 150–400)
POTASSIUM SERPL-MCNC: 4.3 MMOL/L — SIGNIFICANT CHANGE UP (ref 3.5–5.3)
POTASSIUM SERPL-SCNC: 4.3 MMOL/L — SIGNIFICANT CHANGE UP (ref 3.5–5.3)
PROT SERPL-MCNC: 5.6 G/DL — LOW (ref 6–8.3)
RBC # BLD: 2.98 M/UL — LOW (ref 4.2–5.8)
RBC # FLD: 13.2 % — SIGNIFICANT CHANGE UP (ref 10.3–14.5)
SODIUM SERPL-SCNC: 140 MMOL/L — SIGNIFICANT CHANGE UP (ref 135–145)
WBC # BLD: 14.73 K/UL — HIGH (ref 3.8–10.5)
WBC # FLD AUTO: 14.73 K/UL — HIGH (ref 3.8–10.5)

## 2022-03-20 RX ADMIN — Medication 25 MILLIGRAM(S): at 06:39

## 2022-03-20 RX ADMIN — SIMVASTATIN 20 MILLIGRAM(S): 20 TABLET, FILM COATED ORAL at 22:08

## 2022-03-20 RX ADMIN — CLOPIDOGREL BISULFATE 75 MILLIGRAM(S): 75 TABLET, FILM COATED ORAL at 11:54

## 2022-03-20 RX ADMIN — ISOSORBIDE MONONITRATE 30 MILLIGRAM(S): 60 TABLET, EXTENDED RELEASE ORAL at 11:54

## 2022-03-20 RX ADMIN — TAMSULOSIN HYDROCHLORIDE 0.4 MILLIGRAM(S): 0.4 CAPSULE ORAL at 22:08

## 2022-03-20 RX ADMIN — Medication 81 MILLIGRAM(S): at 11:55

## 2022-03-20 RX ADMIN — ENOXAPARIN SODIUM 30 MILLIGRAM(S): 100 INJECTION SUBCUTANEOUS at 22:07

## 2022-03-20 RX ADMIN — MIRTAZAPINE 30 MILLIGRAM(S): 45 TABLET, ORALLY DISINTEGRATING ORAL at 22:07

## 2022-03-20 RX ADMIN — Medication 25 MILLIGRAM(S): at 18:10

## 2022-03-20 RX ADMIN — CEFTRIAXONE 100 MILLIGRAM(S): 500 INJECTION, POWDER, FOR SOLUTION INTRAMUSCULAR; INTRAVENOUS at 09:59

## 2022-03-20 RX ADMIN — DULOXETINE HYDROCHLORIDE 30 MILLIGRAM(S): 30 CAPSULE, DELAYED RELEASE ORAL at 11:55

## 2022-03-20 RX ADMIN — AMLODIPINE BESYLATE 5 MILLIGRAM(S): 2.5 TABLET ORAL at 06:39

## 2022-03-21 ENCOUNTER — TRANSCRIPTION ENCOUNTER (OUTPATIENT)
Age: 87
End: 2022-03-21

## 2022-03-21 LAB
-  AMPICILLIN/SULBACTAM: SIGNIFICANT CHANGE UP
-  AMPICILLIN: SIGNIFICANT CHANGE UP
-  CEFAZOLIN: SIGNIFICANT CHANGE UP
-  CEFEPIME: SIGNIFICANT CHANGE UP
-  CEFTRIAXONE: SIGNIFICANT CHANGE UP
-  CIPROFLOXACIN: SIGNIFICANT CHANGE UP
-  ERTAPENEM: SIGNIFICANT CHANGE UP
-  GENTAMICIN: SIGNIFICANT CHANGE UP
-  NITROFURANTOIN: SIGNIFICANT CHANGE UP
-  PIPERACILLIN/TAZOBACTAM: SIGNIFICANT CHANGE UP
-  TOBRAMYCIN: SIGNIFICANT CHANGE UP
-  TRIMETHOPRIM/SULFAMETHOXAZOLE: SIGNIFICANT CHANGE UP
ANION GAP SERPL CALC-SCNC: 13 MMOL/L — SIGNIFICANT CHANGE UP (ref 5–17)
BUN SERPL-MCNC: 24 MG/DL — HIGH (ref 7–23)
CALCIUM SERPL-MCNC: 8.9 MG/DL — SIGNIFICANT CHANGE UP (ref 8.4–10.5)
CHLORIDE SERPL-SCNC: 108 MMOL/L — SIGNIFICANT CHANGE UP (ref 96–108)
CO2 SERPL-SCNC: 17 MMOL/L — LOW (ref 22–31)
CREAT SERPL-MCNC: 1.4 MG/DL — HIGH (ref 0.5–1.3)
CULTURE RESULTS: SIGNIFICANT CHANGE UP
EGFR: 47 ML/MIN/1.73M2 — LOW
FOLATE SERPL-MCNC: 5.6 NG/ML — SIGNIFICANT CHANGE UP
GLUCOSE BLDC GLUCOMTR-MCNC: 76 MG/DL — SIGNIFICANT CHANGE UP (ref 70–99)
GLUCOSE SERPL-MCNC: 70 MG/DL — SIGNIFICANT CHANGE UP (ref 70–99)
HCT VFR BLD CALC: 32.8 % — LOW (ref 39–50)
HGB BLD-MCNC: 10.8 G/DL — LOW (ref 13–17)
MAGNESIUM SERPL-MCNC: 1.9 MG/DL — SIGNIFICANT CHANGE UP (ref 1.6–2.6)
MCHC RBC-ENTMCNC: 32.9 GM/DL — SIGNIFICANT CHANGE UP (ref 32–36)
MCHC RBC-ENTMCNC: 32.9 PG — SIGNIFICANT CHANGE UP (ref 27–34)
MCV RBC AUTO: 100 FL — SIGNIFICANT CHANGE UP (ref 80–100)
METHOD TYPE: SIGNIFICANT CHANGE UP
NRBC # BLD: 0 /100 WBCS — SIGNIFICANT CHANGE UP (ref 0–0)
ORGANISM # SPEC MICROSCOPIC CNT: SIGNIFICANT CHANGE UP
ORGANISM # SPEC MICROSCOPIC CNT: SIGNIFICANT CHANGE UP
PHOSPHATE SERPL-MCNC: 2.8 MG/DL — SIGNIFICANT CHANGE UP (ref 2.5–4.5)
PLATELET # BLD AUTO: 217 K/UL — SIGNIFICANT CHANGE UP (ref 150–400)
POTASSIUM SERPL-MCNC: 4.4 MMOL/L — SIGNIFICANT CHANGE UP (ref 3.5–5.3)
POTASSIUM SERPL-SCNC: 4.4 MMOL/L — SIGNIFICANT CHANGE UP (ref 3.5–5.3)
RBC # BLD: 3.28 M/UL — LOW (ref 4.2–5.8)
RBC # FLD: 12.8 % — SIGNIFICANT CHANGE UP (ref 10.3–14.5)
SODIUM SERPL-SCNC: 138 MMOL/L — SIGNIFICANT CHANGE UP (ref 135–145)
SPECIMEN SOURCE: SIGNIFICANT CHANGE UP
TSH SERPL-MCNC: 1.96 UIU/ML — SIGNIFICANT CHANGE UP (ref 0.27–4.2)
VIT B12 SERPL-MCNC: 811 PG/ML — SIGNIFICANT CHANGE UP (ref 232–1245)
WBC # BLD: 10.98 K/UL — HIGH (ref 3.8–10.5)
WBC # FLD AUTO: 10.98 K/UL — HIGH (ref 3.8–10.5)

## 2022-03-21 PROCEDURE — 78815 PET IMAGE W/CT SKULL-THIGH: CPT | Mod: 26,PI

## 2022-03-21 RX ADMIN — DULOXETINE HYDROCHLORIDE 30 MILLIGRAM(S): 30 CAPSULE, DELAYED RELEASE ORAL at 12:11

## 2022-03-21 RX ADMIN — Medication 81 MILLIGRAM(S): at 12:10

## 2022-03-21 RX ADMIN — Medication 25 MILLIGRAM(S): at 17:46

## 2022-03-21 RX ADMIN — CLOPIDOGREL BISULFATE 75 MILLIGRAM(S): 75 TABLET, FILM COATED ORAL at 12:11

## 2022-03-21 RX ADMIN — ISOSORBIDE MONONITRATE 30 MILLIGRAM(S): 60 TABLET, EXTENDED RELEASE ORAL at 12:10

## 2022-03-21 RX ADMIN — Medication 25 MILLIGRAM(S): at 06:55

## 2022-03-21 RX ADMIN — SIMVASTATIN 20 MILLIGRAM(S): 20 TABLET, FILM COATED ORAL at 22:11

## 2022-03-21 RX ADMIN — AMLODIPINE BESYLATE 5 MILLIGRAM(S): 2.5 TABLET ORAL at 06:55

## 2022-03-21 RX ADMIN — TAMSULOSIN HYDROCHLORIDE 0.4 MILLIGRAM(S): 0.4 CAPSULE ORAL at 22:10

## 2022-03-21 RX ADMIN — MIRTAZAPINE 30 MILLIGRAM(S): 45 TABLET, ORALLY DISINTEGRATING ORAL at 22:11

## 2022-03-21 RX ADMIN — ENOXAPARIN SODIUM 30 MILLIGRAM(S): 100 INJECTION SUBCUTANEOUS at 22:10

## 2022-03-21 RX ADMIN — CEFTRIAXONE 100 MILLIGRAM(S): 500 INJECTION, POWDER, FOR SOLUTION INTRAMUSCULAR; INTRAVENOUS at 10:17

## 2022-03-21 NOTE — DISCHARGE NOTE PROVIDER - NSDCMRMEDTOKEN_GEN_ALL_CORE_FT
Albuterol (Eqv-ProAir HFA) 90 mcg/inh inhalation aerosol: 2 puff(s) inhaled every 6 hours, As Needed -for shortness of breath and/or wheezing   amLODIPine 5 mg oral tablet: 1 tab(s) orally once a day  Aspirin Enteric Coated 81 mg oral delayed release tablet: 1 tab(s) orally once a day  clopidogrel 75 mg oral tablet: 1 tab(s) orally once a day  DULoxetine 30 mg oral delayed release capsule: 1 cap(s) orally once a day  Flomax 0.4 mg oral capsule: 1 cap(s) orally once a day  isosorbide mononitrate 30 mg oral tablet, extended release: 1 tab(s) orally once a day (in the morning)  Lomotil 2.5 mg-0.025 mg oral tablet: 2 tab(s) orally 4 times a day  Metoprolol Tartrate 25 mg oral tablet: 1 tab(s) orally every 8 hours   mirtazapine 15 mg oral tablet: 2 tab(s) orally once a day (at bedtime)  simvastatin 20 mg oral tablet: 1 tab(s) orally once a day (at bedtime)

## 2022-03-21 NOTE — DISCHARGE NOTE PROVIDER - CARE PROVIDER_API CALL
[place])  [place] [address]  Phone: [phone]  Fax: [phone]  Follow Up Time:    Toni Baptiste)  Medicine  [address] [place] [address]  Phone: [phone]  Fax: [phone]  Follow Up Time:     Yinka Gutiérrez)  Critical Care Medicine; Pulmonary Disease  [address] [place] [address], [address]  [place] [address]  Phone: [phone]  Fax: [phone]  Follow Up Time:     Edis Martinez)  Cardiovascular Disease; Internal Medicine  Cardiology [facility], [address] [place] [address]  Phone: [phone]  Fax: [phone]  Follow Up Time:

## 2022-03-21 NOTE — DISCHARGE NOTE PROVIDER - CARE PROVIDERS DIRECT ADDRESSES
,DirectAddress_Unknown ,DirectAddress_Unknown,DirectAddress_Unknown,[email or web address]

## 2022-03-21 NOTE — PHYSICAL THERAPY INITIAL EVALUATION ADULT - MANUAL MUSCLE TESTING RESULTS, REHAB EVAL
At least 3/5 BL UE based on ability to perform antigravity mobility. Grossly 3/5 throughout bilat knee flex/ext, and hip flex.

## 2022-03-21 NOTE — DISCHARGE NOTE PROVIDER - HOSPITAL COURSE
#discharge- Do not delete    Senthil is a 89 year old man with a PMHx  HTN, known CAD s/p PCI w 3 DAYANA (mRCA, pRCA, mC in 2013 w/ Dr. Varela), CKD (Cr 1.7- 2.4), COPD (not on home O2), colonic mass (s/p right hemicolectomy 4/2017 w/ Dr. Headley), Arthritis, BPH who presents from home with SOB x 1 day. Mr. Ndiaye states he was in his usual state of health (All ADLs besides cooking) until 3/18 when he began experiencing SOB that he associated with chest pain upon deep inspiration and weakness/malaise. His SOB is worse with exertion but denies any wheezing. He endorses a nonproductive cough. He does not attribute his symptoms to a COPD exacerbation like his prior hospitalizations. On review of symptoms, he endorses 1-2 episodes of diarrhea. He denies any sick contacts, recent antibiotic use, chest pain with exertion, orthopnea, paroxysmal nocturnal dyspnea.     In the ED, he was febrile to 101.2 F (rectally), HR 74, RR 19, and sPO2 96% on room air. His labs were significant for WBC 18.98, Hgb 12.4, , HCO3 20, Cr 1.52, Trop 0.01, and Pro-. EKG showed first degree heart block. CXR, although rotated, is not suggestive of focal infiltrates with hyperinflation. CT PE showed No central or lobar pulmonary emboli with concern for upper lobe infiltrates bilaterally with 2 solid nodules in LLL and an aortic aneurysm measuring 4.2 cm. He was given tylenol 650 mg, zosyn 3.375, vancomycin, 1g, and 1.9 L NS. He was admitted for sepsis 2/2 PNA.       #COPD, mild.   ·  Plan: Meeting sepsis criteria on arrival, SIRS 2/4 (WBC, Fever) with presumed respiratory source given cough and subjective SOB. Lactate 1.1 on arrival  - s/p Vanc and zosyn in ED with 1900 mL fluid resuscitation   - CXR did not show significant focal consolidation; CT PE protocol performed and showed potential upper lobe infiltrates, GGOs, and lung nodules (see below)  - Placed on oxygen; however, likely not needed due to COPD diagnosis   - Working diagnosis of CAP vs. Viral PNA; f/u legionella antigen (episode of diarrhea), strep antigen  - Procal added on to rule out bacterial PNA  - Start Ceftriaxone 1 g and Azithromycin 250 mg daily x 5 days for CAP coverage  - Goal O2 88-92%.    #HTN (hypertension).   ·  Plan: Hospitalized in 2020 for COPD exacerbation; however, unlikely to be in exacerbation due to lack of wheezing on exam and intermittent oxygen requirements  - F/U RVP for triggering event of SOB  - Not on Home O2 and uses albuterol PRN  - Goal O2 88-92%.    #Stage 3 chronic kidney disease.   ·  Plan: Two 1.8 cm solid nodules in the left lower lobe, new since 11/10/2020 seen on CT angio PE Protocol  - Concern for malignancy given 30 pack year smoking history  - Repeat CT chest in 3-6 months for surveillance and consider pulmonology consult this admission.    #BPH without urinary obstruction.   ·  Plan: s/p PCI w 3 DAYANA (mRCA, pRCA, mC in 2013 w/ Dr. Varela)  - Continue home ASA 81 mg and Plavix 75 mg    #HFmrEF  - TTE on chart review showed EF 45-50%  - Euvolemic on exam but significant murmur  - Continue home metoprolol    #HLD (hyperlipidemia).   ·  Plan: AAA seen on CT 4.2 cm in diameter  - Continue medical management with BP control  - Likely surveillance US as outpatient.    #CAD (coronary artery disease).   ·  Plan: Continue home metoprolol, Amlodipine and ISMO daily    #Lung nodule, multiple.   ·  Plan: Continue home tamsulosin.    #Anxiety and depression.   ·  Plan: Continue home Mirtazepine and Duloxetine.    #HLD (hyperlipidemia).   ·  Plan; Continue home Simvastatin.    #Localized cancer of colon.   ·  Plan: s/p resection with Dr. Headley  - Lung nodules as above could represent metastases.   #discharge- Do not delete    Senthil is a 89 year old man with a PMHx  HTN, known CAD s/p PCI w 3 DAYANA (mRCA, pRCA, mC in 2013 w/ Dr. Varela), CKD (Cr 1.7- 2.4), COPD (not on home O2), colonic mass (s/p right hemicolectomy 4/2017 w/ Dr. Headley), Arthritis, BPH who presents from home with SOB x 1 day. Mr. Ndiaye states he was in his usual state of health (All ADLs besides cooking) until 3/18 when he began experiencing SOB that he associated with chest pain upon deep inspiration and weakness/malaise. His SOB is worse with exertion but denies any wheezing. He endorses a nonproductive cough. He does not attribute his symptoms to a COPD exacerbation like his prior hospitalizations. On review of symptoms, he endorses 1-2 episodes of diarrhea. He denies any sick contacts, recent antibiotic use, chest pain with exertion, orthopnea, paroxysmal nocturnal dyspnea.     In the ED, he was febrile to 101.2 F (rectally), HR 74, RR 19, and sPO2 96% on room air. His labs were significant for WBC 18.98, Hgb 12.4, , HCO3 20, Cr 1.52, Trop 0.01, and Pro-. EKG showed first degree heart block. CXR, although rotated, is not suggestive of focal infiltrates with hyperinflation. CT PE showed No central or lobar pulmonary emboli with concern for upper lobe infiltrates bilaterally with 2 solid nodules in LLL and an aortic aneurysm measuring 4.2 cm. He was given tylenol 650 mg, zosyn 3.375, vancomycin, 1g, and 1.9 L NS. He was admitted for sepsis 2/2 PNA.     #COPD, mild.   ·  Plan: Meeting sepsis criteria on arrival, SIRS 2/4 (WBC, Fever) with presumed respiratory source given cough and subjective SOB. Lactate 1.1 on arrival  - CXR did not show significant focal consolidation; CT PE protocol performed and showed potential upper lobe infiltrates, GGOs, and lung nodules (see below)  - s/p Ceftriaxone 1 g and Azithromycin 250 mg daily x 5 days for CAP coverage  - Goal O2 88-92%.    #HTN (hypertension).   ·  Plan: Hospitalized in 2020 for COPD exacerbation; however, unlikely to be in exacerbation due to lack of wheezing on exam and intermittent oxygen requirements  - Not on Home O2 and uses albuterol PRN  - Goal O2 88-92%.    #Stage 3 chronic kidney disease.   ·  Plan: Two 1.8 cm solid nodules in the left lower lobe, new since 11/10/2020 seen on CT angio PE Protocol  - Concern for malignancy given 30 pack year smoking history, PET scan completed    #BPH without urinary obstruction.   ·  Plan: s/p PCI w 3 DAYANA (mRCA, pRCA, mC in 2013 w/ Dr. Varela)  - Continue home ASA 81 mg and Plavix 75 mg    #HFmrEF  - TTE on chart review showed EF 45-50%  - Euvolemic on exam but significant murmur  - Continue home metoprolol    #HLD (hyperlipidemia).   ·  Plan: AAA seen on CT 4.2 cm in diameter  - Continue medical management with BP control  - Likely surveillance US as outpatient.    #CAD (coronary artery disease).   ·  Plan: Continue home metoprolol, Amlodipine and ISMO daily    #Lung nodule, multiple.   ·  Plan: Continue home tamsulosin.    #Anxiety and depression.   ·  Plan: Continue home Mirtazepine and Duloxetine.    #HLD (hyperlipidemia).   ·  Plan; Continue home Simvastatin.    #Localized cancer of colon.   ·  Plan: s/p resection with Dr. Headley  - Lung nodules as above could represent metastases.   #discharge- Do not delete    Senthil is a 89 year old man with a PMHx  HTN, known CAD s/p PCI w 3 DAYANA (mRCA, pRCA, mC in 2013 w/ Dr. Varela), CKD (Cr 1.7- 2.4), COPD (not on home O2), colonic mass (s/p right hemicolectomy 4/2017 w/ Dr. Headley), Arthritis, BPH who presents from home with SOB x 1 day. Mr. Ndiaye states he was in his usual state of health (All ADLs besides cooking) until 3/18 when he began experiencing SOB that he associated with chest pain upon deep inspiration and weakness/malaise. His SOB is worse with exertion but denies any wheezing. He endorses a nonproductive cough. He does not attribute his symptoms to a COPD exacerbation like his prior hospitalizations. On review of symptoms, he endorses 1-2 episodes of diarrhea. He denies any sick contacts, recent antibiotic use, chest pain with exertion, orthopnea, paroxysmal nocturnal dyspnea.     In the ED, he was febrile to 101.2 F (rectally), HR 74, RR 19, and sPO2 96% on room air. His labs were significant for WBC 18.98, Hgb 12.4, , HCO3 20, Cr 1.52, Trop 0.01, and Pro-. EKG showed first degree heart block. CXR, although rotated, is not suggestive of focal infiltrates with hyperinflation. CT PE showed No central or lobar pulmonary emboli with concern for upper lobe infiltrates bilaterally with 2 solid nodules in LLL and an aortic aneurysm measuring 4.2 cm. He was given tylenol 650 mg, zosyn 3.375, vancomycin, 1g, and 1.9 L NS. He was admitted for sepsis 2/2 PNA. PET scan done which showed SONAM nodule. Recommend following up outpatient for further workup.     #COPD, mild.   ·  Plan: Meeting sepsis criteria on arrival, SIRS 2/4 (WBC, Fever) with presumed respiratory source given cough and subjective SOB. Lactate 1.1 on arrival  - CXR did not show significant focal consolidation; CT PE protocol performed and showed potential upper lobe infiltrates, GGOs, and lung nodules (see below)  - s/p Ceftriaxone 1 g and Azithromycin 250 mg daily x 5 days for CAP coverage  - Goal O2 88-92%.    #HTN (hypertension).   ·  Plan: Hospitalized in 2020 for COPD exacerbation; however, unlikely to be in exacerbation due to lack of wheezing on exam and intermittent oxygen requirements  - Not on Home O2 and uses albuterol PRN  - Goal O2 88-92%.    #Stage 3 chronic kidney disease.   ·  Plan: Two 1.8 cm solid nodules in the left lower lobe, new since 11/10/2020 seen on CT angio PE Protocol  - Concern for malignancy given 30 pack year smoking history, PET scan completed    #BPH without urinary obstruction.   ·  Plan: s/p PCI w 3 DAYANA (mRCA, pRCA, mC in 2013 w/ Dr. Varela)  - Continue home ASA 81 mg and Plavix 75 mg    #HFmrEF  - TTE on chart review showed EF 45-50%  - Euvolemic on exam but significant murmur  - Continue home metoprolol    #HLD (hyperlipidemia).   ·  Plan: AAA seen on CT 4.2 cm in diameter  - Continue medical management with BP control  - Likely surveillance US as outpatient.    #CAD (coronary artery disease).   ·  Plan: Continue home metoprolol, Amlodipine and ISMO daily    #Lung nodule, multiple.   ·  Plan: Continue home tamsulosin.    #Anxiety and depression.   ·  Plan: Continue home Mirtazepine and Duloxetine.    #HLD (hyperlipidemia).   ·  Plan; Continue home Simvastatin.    #Localized cancer of colon.   ·  Plan: s/p resection with Dr. Headley  - Lung nodules as above could represent metastases.    Discharge to HOME  Meds to be stopped: None  Meds to be continued: ALL  PE to be followed: Pulmonology  Labs to be followed: CBC, BMP #discharge- Do not delete    Senthil is a 89 year old man with a PMHx  HTN, known CAD s/p PCI w 3 DAYANA (mRCA, pRCA, mC in 2013 w/ Dr. Varela), CKD (Cr 1.7- 2.4), COPD (not on home O2), colonic mass (s/p right hemicolectomy 4/2017 w/ Dr. Headley), Arthritis, BPH who presents from home with SOB x 1 day. Mr. Ndiaye states he was in his usual state of health (All ADLs besides cooking) until 3/18 when he began experiencing SOB that he associated with chest pain upon deep inspiration and weakness/malaise. His SOB is worse with exertion but denies any wheezing. He endorses a nonproductive cough. He does not attribute his symptoms to a COPD exacerbation like his prior hospitalizations. On review of symptoms, he endorses 1-2 episodes of diarrhea. He denies any sick contacts, recent antibiotic use, chest pain with exertion, orthopnea, paroxysmal nocturnal dyspnea.     In the ED, he was febrile to 101.2 F (rectally), HR 74, RR 19, and sPO2 96% on room air. His labs were significant for WBC 18.98, Hgb 12.4, , HCO3 20, Cr 1.52, Trop 0.01, and Pro-. EKG showed first degree heart block. CXR, although rotated, is not suggestive of focal infiltrates with hyperinflation. CT PE showed No central or lobar pulmonary emboli with concern for upper lobe infiltrates bilaterally with 2 solid nodules in LLL and an aortic aneurysm measuring 4.2 cm. He was given tylenol 650 mg, zosyn 3.375, vancomycin, 1g, and 1.9 L NS. He was admitted for sepsis 2/2 PNA. PET scan done which showed SONAM nodule. Recommend following up outpatient for further workup.     #Lung nodule  -PET Scan showed SONAM nodule  -Patient will need to be off AC for minimum of 5 days  -Can follow up as outpatient with IR for further biopsy.    #COPD, mild.   ·  Plan: Meeting sepsis criteria on arrival, SIRS 2/4 (WBC, Fever) with presumed respiratory source given cough and subjective SOB. Lactate 1.1 on arrival  - CXR did not show significant focal consolidation; CT PE protocol performed and showed potential upper lobe infiltrates, GGOs, and lung nodules (see below)  - s/p Ceftriaxone 1 g and Azithromycin 250 mg daily x 5 days for CAP coverage  - Goal O2 88-92%.    #HTN (hypertension).   ·  Plan: Hospitalized in 2020 for COPD exacerbation; however, unlikely to be in exacerbation due to lack of wheezing on exam and intermittent oxygen requirements  - Not on Home O2 and uses albuterol PRN  - Goal O2 88-92%.    #Stage 3 chronic kidney disease.   ·  Plan: Two 1.8 cm solid nodules in the left lower lobe, new since 11/10/2020 seen on CT angio PE Protocol  - Concern for malignancy given 30 pack year smoking history, PET scan completed    #BPH without urinary obstruction.   ·  Plan: s/p PCI w 3 DAYANA (mRCA, pRCA, mC in 2013 w/ Dr. Varela)  - Continue home ASA 81 mg and Plavix 75 mg    #HFmrEF  - TTE on chart review showed EF 45-50%  - Euvolemic on exam but significant murmur  - Continue home metoprolol    #HLD (hyperlipidemia).   ·  Plan: AAA seen on CT 4.2 cm in diameter  - Continue medical management with BP control  - Likely surveillance US as outpatient.    #CAD (coronary artery disease).   ·  Plan: Continue home metoprolol, Amlodipine and ISMO daily    #Lung nodule, multiple.   ·  Plan: Continue home tamsulosin.    #Anxiety and depression.   ·  Plan: Continue home Mirtazepine and Duloxetine.    #HLD (hyperlipidemia).   ·  Plan; Continue home Simvastatin.    #Localized cancer of colon.   ·  Plan: s/p resection with Dr. Headley  - Lung nodules as above could represent metastases.    Discharge to HOME  Meds to be stopped: None  Meds to be continued: ALL  PE to be followed: Pulmonology  Labs to be followed: CBC, BMP

## 2022-03-21 NOTE — PHYSICAL THERAPY INITIAL EVALUATION ADULT - GAIT DEVIATIONS NOTED, PT EVAL
Pt slightly unsteady w/ gait. Dec step length/keyshawn noted however able to amb w/o assistance. Maneuver rollator well without assistance. Good aerobic endurance, no SOB. SpO2 @ 91% post-amb./decreased keyshawn/decreased step length/decreased weight-shifting ability

## 2022-03-21 NOTE — PHYSICAL THERAPY INITIAL EVALUATION ADULT - PERTINENT HX OF CURRENT PROBLEM, REHAB EVAL
89 year old man with a PMHx  HTN, known CAD s/p PCI w 3 DAYANA (mRCA, pRCA, mC in 2013 w/ Dr. Varela), CKD (Cr 1.7- 2.4), COPD (not on home O2), colonic mass (s/p right hemicolectomy 4/2017 w/ Dr. Headley), Arthritis, BPH who presents from home with SOB x 1 day. Mr. Ndiaye states he was in his usual state of health  until 3/18 when he began experiencing SOB that he associated with chest pain upon deep inspiration

## 2022-03-21 NOTE — DISCHARGE NOTE PROVIDER - PROVIDER TOKENS
PROVIDER:[TOKEN:[[id number]]] PROVIDER:[TOKEN:[[id number]]],PROVIDER:[TOKEN:[[id number]]],PROVIDER:[TOKEN:[[id number]]]

## 2022-03-21 NOTE — PHYSICAL THERAPY INITIAL EVALUATION ADULT - GAIT DISTANCE, PT EVAL
5 steps forward, 5 steps backward limited by IV 5 steps forward, 5 steps backward, 3 side steps limited by IV

## 2022-03-21 NOTE — DISCHARGE NOTE PROVIDER - NSDCCPCAREPLAN_GEN_ALL_CORE_FT
PRINCIPAL DISCHARGE DIAGNOSIS  Diagnosis: Pneumonia  Assessment and Plan of Treatment: Infection that inflames air sacs in one or both lungs, which may fill with fluid. With pneumonia, the air sacs may fill with fluid or pus. The infection can be life-threatening to infants, children, and people over 65. Symptoms include cough with phlegm or pus, fever, chills, and difficulty breathing. Antibiotics can treat many forms of pneumonia. Some forms of pneumonia can be prevented by vaccines.         PRINCIPAL DISCHARGE DIAGNOSIS  Diagnosis: Pneumonia  Assessment and Plan of Treatment: Infection that inflames air sacs in one or both lungs, which may fill with fluid. With pneumonia, the air sacs may fill with fluid or pus. The infection can be life-threatening to infants, children, and people over 65. Symptoms include cough with phlegm or pus, fever, chills, and difficulty breathing. Antibiotics can treat many forms of pneumonia. Some forms of pneumonia can be prevented by vaccines.        SECONDARY DISCHARGE DIAGNOSES  Diagnosis: HTN (hypertension)  Assessment and Plan of Treatment: Hypertension is the medical term for high blood pressure. Blood pressure refers to the pressure that blood applies to the inner walls of the arteries. Arteries carry blood from the heart to other organs and parts of the body. Untreated high blood pressure increases the strain on the heart and arteries, eventually causing organ damage. High blood pressure increases the risk of heart failure, heart attack (myocardial infarction), stroke, and kidney failure. High blood pressure does not usually cause any symptoms. Treatment of hypertension usually begins with lifestyle changes. Making these lifestyle changes involves little or no risk. Recommended changes often include reducing the amount of salt in your diet, losing weight if you are overweight or obese, avoiding drinking too much alcohol, stopping smoking and exercising at least 30 minutes per day most days of the week. If you are prescribed medication for your hypertension it is important to take these as prescribed to prevent the possible complications of uncontrolled hypertension.      Diagnosis: COPD, mild  Assessment and Plan of Treatment: COPD is a lung disease that makes it hard to breathe. In people with COPD, the airways (the branching tubes that carry air within the lungs) become narrow and damaged. This makes people feel out of breath and tired. COPD can be a serious illness. It cannot be cured and it usually gets worse over time. But there are treatments that can help. The most common cause of COPD is smoking. Smoke can damage the lungs forever and cause COPD. Common symptoms include shortness of breath, wheezing, and worsening cough and mucus production. COPD can put you at an increased risk for lung infections, lung cancer and heart problems. If you smoke, the most important thing you can do for your COPD is to stop smoking. There are a lot of medicines to treat COPD. Most people use inhalers that help open up their airways or decrease swelling in the airways. Often people need more than one inhaler at a time. You might need to take a steroid medicine in a pill for a flare of COPD. If the disease gets worse, you might need to use oxygen. Pulmonary rehabilitation may be recommended and can teach you how to improve your symptoms through exercises and different ways to breathe. RARELY, people with severe COPD will have surgery to remove the most damaged parts of their lung. This surgery can reduce symptoms, but it does not always work. In order to prevent COPD exacerbations it is important that you take your prescribed medications and follow the recommendations of your primary care and pulmonary doctors. If your shortness of breath worsens or your experience an increase in or change of your sputum production you should seek evaluation by your primary care doctor, or, if severe, an emergency room.       PRINCIPAL DISCHARGE DIAGNOSIS  Diagnosis: Pneumonia  Assessment and Plan of Treatment: Infection that inflames air sacs in one or both lungs, which may fill with fluid. With pneumonia, the air sacs may fill with fluid or pus. The infection can be life-threatening to infants, children, and people over 65. Symptoms include cough with phlegm or pus, fever, chills, and difficulty breathing. Antibiotics can treat many forms of pneumonia. Some forms of pneumonia can be prevented by vaccines.  Please follow up with Dr. Yinka Gutiérrez   [phone]        SECONDARY DISCHARGE DIAGNOSES  Diagnosis: Lung nodule  Assessment and Plan of Treatment: On further imaging (PET scan) you were found to have left upper lobe lung nodule. You can follow this up outpatient if you wish. Please contact Dr. Yinka Gutiérrez [phone]      Diagnosis: HTN (hypertension)  Assessment and Plan of Treatment: Hypertension is the medical term for high blood pressure. Blood pressure refers to the pressure that blood applies to the inner walls of the arteries. Arteries carry blood from the heart to other organs and parts of the body. Untreated high blood pressure increases the strain on the heart and arteries, eventually causing organ damage. High blood pressure increases the risk of heart failure, heart attack (myocardial infarction), stroke, and kidney failure. High blood pressure does not usually cause any symptoms. Treatment of hypertension usually begins with lifestyle changes. Making these lifestyle changes involves little or no risk. Recommended changes often include reducing the amount of salt in your diet, losing weight if you are overweight or obese, avoiding drinking too much alcohol, stopping smoking and exercising at least 30 minutes per day most days of the week. If you are prescribed medication for your hypertension it is important to take these as prescribed to prevent the possible complications of uncontrolled hypertension.  Please follow up with Dr. Martinez for further management of heart medications.       Diagnosis: COPD, mild  Assessment and Plan of Treatment: COPD is a lung disease that makes it hard to breathe. In people with COPD, the airways (the branching tubes that carry air within the lungs) become narrow and damaged. This makes people feel out of breath and tired. COPD can be a serious illness. It cannot be cured and it usually gets worse over time. But there are treatments that can help. The most common cause of COPD is smoking. Smoke can damage the lungs forever and cause COPD. Common symptoms include shortness of breath, wheezing, and worsening cough and mucus production. COPD can put you at an increased risk for lung infections, lung cancer and heart problems. If you smoke, the most important thing you can do for your COPD is to stop smoking. There are a lot of medicines to treat COPD. Most people use inhalers that help open up their airways or decrease swelling in the airways. Often people need more than one inhaler at a time. You might need to take a steroid medicine in a pill for a flare of COPD. If the disease gets worse, you might need to use oxygen. Pulmonary rehabilitation may be recommended and can teach you how to improve your symptoms through exercises and different ways to breathe. RARELY, people with severe COPD will have surgery to remove the most damaged parts of their lung. This surgery can reduce symptoms, but it does not always work. In order to prevent COPD exacerbations it is important that you take your prescribed medications and follow the recommendations of your primary care and pulmonary doctors. If your shortness of breath worsens or your experience an increase in or change of your sputum production you should seek evaluation by your primary care doctor, or, if severe, an emergency room.

## 2022-03-21 NOTE — PHYSICAL THERAPY INITIAL EVALUATION ADULT - ADDITIONAL COMMENTS
Pt currently resides alone in elevator apt, 1 [address]. Primarily amb w/ rollator (in household and community) however also has quadcane. Denies falls within past 6 months. Denies HHA, however states having friend and nephew to contact when requiring assistance. Indep w/ showering and dressing. Denies home O2. Pt [place]; no hearing aids.

## 2022-03-22 LAB
ALBUMIN SERPL ELPH-MCNC: 3.4 G/DL — SIGNIFICANT CHANGE UP (ref 3.3–5)
ALP SERPL-CCNC: 64 U/L — SIGNIFICANT CHANGE UP (ref 40–120)
ALT FLD-CCNC: 9 U/L — LOW (ref 10–45)
ANION GAP SERPL CALC-SCNC: 15 MMOL/L — SIGNIFICANT CHANGE UP (ref 5–17)
AST SERPL-CCNC: 15 U/L — SIGNIFICANT CHANGE UP (ref 10–40)
BASOPHILS # BLD AUTO: 0.03 K/UL — SIGNIFICANT CHANGE UP (ref 0–0.2)
BASOPHILS NFR BLD AUTO: 0.4 % — SIGNIFICANT CHANGE UP (ref 0–2)
BILIRUB SERPL-MCNC: 0.3 MG/DL — SIGNIFICANT CHANGE UP (ref 0.2–1.2)
BUN SERPL-MCNC: 18 MG/DL — SIGNIFICANT CHANGE UP (ref 7–23)
CALCIUM SERPL-MCNC: 9.1 MG/DL — SIGNIFICANT CHANGE UP (ref 8.4–10.5)
CHLORIDE SERPL-SCNC: 106 MMOL/L — SIGNIFICANT CHANGE UP (ref 96–108)
CO2 SERPL-SCNC: 18 MMOL/L — LOW (ref 22–31)
CREAT SERPL-MCNC: 1.19 MG/DL — SIGNIFICANT CHANGE UP (ref 0.5–1.3)
EGFR: 58 ML/MIN/1.73M2 — LOW
EOSINOPHIL # BLD AUTO: 0.67 K/UL — HIGH (ref 0–0.5)
EOSINOPHIL NFR BLD AUTO: 8.3 % — HIGH (ref 0–6)
GLUCOSE SERPL-MCNC: 69 MG/DL — LOW (ref 70–99)
HCT VFR BLD CALC: 31.5 % — LOW (ref 39–50)
HGB BLD-MCNC: 10.5 G/DL — LOW (ref 13–17)
IMM GRANULOCYTES NFR BLD AUTO: 0.5 % — SIGNIFICANT CHANGE UP (ref 0–1.5)
LYMPHOCYTES # BLD AUTO: 0.9 K/UL — LOW (ref 1–3.3)
LYMPHOCYTES # BLD AUTO: 11.2 % — LOW (ref 13–44)
MAGNESIUM SERPL-MCNC: 1.8 MG/DL — SIGNIFICANT CHANGE UP (ref 1.6–2.6)
MCHC RBC-ENTMCNC: 32.2 PG — SIGNIFICANT CHANGE UP (ref 27–34)
MCHC RBC-ENTMCNC: 33.3 GM/DL — SIGNIFICANT CHANGE UP (ref 32–36)
MCV RBC AUTO: 96.6 FL — SIGNIFICANT CHANGE UP (ref 80–100)
MONOCYTES # BLD AUTO: 0.84 K/UL — SIGNIFICANT CHANGE UP (ref 0–0.9)
MONOCYTES NFR BLD AUTO: 10.4 % — SIGNIFICANT CHANGE UP (ref 2–14)
NEUTROPHILS # BLD AUTO: 5.57 K/UL — SIGNIFICANT CHANGE UP (ref 1.8–7.4)
NEUTROPHILS NFR BLD AUTO: 69.2 % — SIGNIFICANT CHANGE UP (ref 43–77)
NRBC # BLD: 0 /100 WBCS — SIGNIFICANT CHANGE UP (ref 0–0)
PHOSPHATE SERPL-MCNC: 2.5 MG/DL — SIGNIFICANT CHANGE UP (ref 2.5–4.5)
PLATELET # BLD AUTO: 227 K/UL — SIGNIFICANT CHANGE UP (ref 150–400)
POTASSIUM SERPL-MCNC: 4.2 MMOL/L — SIGNIFICANT CHANGE UP (ref 3.5–5.3)
POTASSIUM SERPL-SCNC: 4.2 MMOL/L — SIGNIFICANT CHANGE UP (ref 3.5–5.3)
PROT SERPL-MCNC: 6 G/DL — SIGNIFICANT CHANGE UP (ref 6–8.3)
RBC # BLD: 3.26 M/UL — LOW (ref 4.2–5.8)
RBC # FLD: 12.8 % — SIGNIFICANT CHANGE UP (ref 10.3–14.5)
SODIUM SERPL-SCNC: 139 MMOL/L — SIGNIFICANT CHANGE UP (ref 135–145)
WBC # BLD: 8.05 K/UL — SIGNIFICANT CHANGE UP (ref 3.8–10.5)
WBC # FLD AUTO: 8.05 K/UL — SIGNIFICANT CHANGE UP (ref 3.8–10.5)

## 2022-03-22 RX ORDER — ENOXAPARIN SODIUM 100 MG/ML
40 INJECTION SUBCUTANEOUS EVERY 24 HOURS
Refills: 0 | Status: DISCONTINUED | OUTPATIENT
Start: 2022-03-22 | End: 2022-03-23

## 2022-03-22 RX ADMIN — TAMSULOSIN HYDROCHLORIDE 0.4 MILLIGRAM(S): 0.4 CAPSULE ORAL at 21:46

## 2022-03-22 RX ADMIN — ENOXAPARIN SODIUM 40 MILLIGRAM(S): 100 INJECTION SUBCUTANEOUS at 21:46

## 2022-03-22 RX ADMIN — ISOSORBIDE MONONITRATE 30 MILLIGRAM(S): 60 TABLET, EXTENDED RELEASE ORAL at 12:16

## 2022-03-22 RX ADMIN — AMLODIPINE BESYLATE 5 MILLIGRAM(S): 2.5 TABLET ORAL at 06:53

## 2022-03-22 RX ADMIN — Medication 25 MILLIGRAM(S): at 17:19

## 2022-03-22 RX ADMIN — MIRTAZAPINE 30 MILLIGRAM(S): 45 TABLET, ORALLY DISINTEGRATING ORAL at 21:46

## 2022-03-22 RX ADMIN — SIMVASTATIN 20 MILLIGRAM(S): 20 TABLET, FILM COATED ORAL at 21:47

## 2022-03-22 RX ADMIN — Medication 81 MILLIGRAM(S): at 12:15

## 2022-03-22 RX ADMIN — CEFTRIAXONE 100 MILLIGRAM(S): 500 INJECTION, POWDER, FOR SOLUTION INTRAMUSCULAR; INTRAVENOUS at 10:13

## 2022-03-22 RX ADMIN — CLOPIDOGREL BISULFATE 75 MILLIGRAM(S): 75 TABLET, FILM COATED ORAL at 12:16

## 2022-03-22 RX ADMIN — DULOXETINE HYDROCHLORIDE 30 MILLIGRAM(S): 30 CAPSULE, DELAYED RELEASE ORAL at 12:16

## 2022-03-22 RX ADMIN — Medication 25 MILLIGRAM(S): at 06:53

## 2022-03-23 ENCOUNTER — TRANSCRIPTION ENCOUNTER (OUTPATIENT)
Age: 87
End: 2022-03-23

## 2022-03-23 VITALS
SYSTOLIC BLOOD PRESSURE: 177 MMHG | OXYGEN SATURATION: 94 % | HEART RATE: 63 BPM | RESPIRATION RATE: 20 BRPM | DIASTOLIC BLOOD PRESSURE: 69 MMHG | TEMPERATURE: 98 F

## 2022-03-23 LAB
ALBUMIN SERPL ELPH-MCNC: 3.4 G/DL — SIGNIFICANT CHANGE UP (ref 3.3–5)
ALP SERPL-CCNC: 66 U/L — SIGNIFICANT CHANGE UP (ref 40–120)
ALT FLD-CCNC: 8 U/L — LOW (ref 10–45)
ANION GAP SERPL CALC-SCNC: 13 MMOL/L — SIGNIFICANT CHANGE UP (ref 5–17)
AST SERPL-CCNC: 14 U/L — SIGNIFICANT CHANGE UP (ref 10–40)
BASOPHILS # BLD AUTO: 0.05 K/UL — SIGNIFICANT CHANGE UP (ref 0–0.2)
BASOPHILS NFR BLD AUTO: 0.6 % — SIGNIFICANT CHANGE UP (ref 0–2)
BILIRUB SERPL-MCNC: 0.3 MG/DL — SIGNIFICANT CHANGE UP (ref 0.2–1.2)
BUN SERPL-MCNC: 15 MG/DL — SIGNIFICANT CHANGE UP (ref 7–23)
CALCIUM SERPL-MCNC: 9.3 MG/DL — SIGNIFICANT CHANGE UP (ref 8.4–10.5)
CHLORIDE SERPL-SCNC: 104 MMOL/L — SIGNIFICANT CHANGE UP (ref 96–108)
CO2 SERPL-SCNC: 22 MMOL/L — SIGNIFICANT CHANGE UP (ref 22–31)
CREAT SERPL-MCNC: 1.26 MG/DL — SIGNIFICANT CHANGE UP (ref 0.5–1.3)
EGFR: 54 ML/MIN/1.73M2 — LOW
EOSINOPHIL # BLD AUTO: 0.61 K/UL — HIGH (ref 0–0.5)
EOSINOPHIL NFR BLD AUTO: 7.5 % — HIGH (ref 0–6)
GLUCOSE SERPL-MCNC: 81 MG/DL — SIGNIFICANT CHANGE UP (ref 70–99)
HCT VFR BLD CALC: 33.2 % — LOW (ref 39–50)
HGB BLD-MCNC: 11.3 G/DL — LOW (ref 13–17)
IMM GRANULOCYTES NFR BLD AUTO: 0.4 % — SIGNIFICANT CHANGE UP (ref 0–1.5)
LYMPHOCYTES # BLD AUTO: 0.88 K/UL — LOW (ref 1–3.3)
LYMPHOCYTES # BLD AUTO: 10.9 % — LOW (ref 13–44)
MAGNESIUM SERPL-MCNC: 1.7 MG/DL — SIGNIFICANT CHANGE UP (ref 1.6–2.6)
MCHC RBC-ENTMCNC: 32.4 PG — SIGNIFICANT CHANGE UP (ref 27–34)
MCHC RBC-ENTMCNC: 34 GM/DL — SIGNIFICANT CHANGE UP (ref 32–36)
MCV RBC AUTO: 95.1 FL — SIGNIFICANT CHANGE UP (ref 80–100)
MONOCYTES # BLD AUTO: 1.02 K/UL — HIGH (ref 0–0.9)
MONOCYTES NFR BLD AUTO: 12.6 % — SIGNIFICANT CHANGE UP (ref 2–14)
NEUTROPHILS # BLD AUTO: 5.51 K/UL — SIGNIFICANT CHANGE UP (ref 1.8–7.4)
NEUTROPHILS NFR BLD AUTO: 68 % — SIGNIFICANT CHANGE UP (ref 43–77)
NRBC # BLD: 0 /100 WBCS — SIGNIFICANT CHANGE UP (ref 0–0)
PHOSPHATE SERPL-MCNC: 2.8 MG/DL — SIGNIFICANT CHANGE UP (ref 2.5–4.5)
PLATELET # BLD AUTO: 212 K/UL — SIGNIFICANT CHANGE UP (ref 150–400)
POTASSIUM SERPL-MCNC: 4.2 MMOL/L — SIGNIFICANT CHANGE UP (ref 3.5–5.3)
POTASSIUM SERPL-SCNC: 4.2 MMOL/L — SIGNIFICANT CHANGE UP (ref 3.5–5.3)
PROT SERPL-MCNC: 6.4 G/DL — SIGNIFICANT CHANGE UP (ref 6–8.3)
RBC # BLD: 3.49 M/UL — LOW (ref 4.2–5.8)
RBC # FLD: 12.6 % — SIGNIFICANT CHANGE UP (ref 10.3–14.5)
SODIUM SERPL-SCNC: 139 MMOL/L — SIGNIFICANT CHANGE UP (ref 135–145)
WBC # BLD: 8.1 K/UL — SIGNIFICANT CHANGE UP (ref 3.8–10.5)
WBC # FLD AUTO: 8.1 K/UL — SIGNIFICANT CHANGE UP (ref 3.8–10.5)

## 2022-03-23 PROCEDURE — 82607 VITAMIN B-12: CPT

## 2022-03-23 PROCEDURE — 87640 STAPH A DNA AMP PROBE: CPT

## 2022-03-23 PROCEDURE — 84484 ASSAY OF TROPONIN QUANT: CPT

## 2022-03-23 PROCEDURE — A9552: CPT

## 2022-03-23 PROCEDURE — 87899 AGENT NOS ASSAY W/OPTIC: CPT

## 2022-03-23 PROCEDURE — 93005 ELECTROCARDIOGRAM TRACING: CPT

## 2022-03-23 PROCEDURE — 85025 COMPLETE CBC W/AUTO DIFF WBC: CPT

## 2022-03-23 PROCEDURE — 99285 EMERGENCY DEPT VISIT HI MDM: CPT | Mod: 25

## 2022-03-23 PROCEDURE — 80048 BASIC METABOLIC PNL TOTAL CA: CPT

## 2022-03-23 PROCEDURE — 71045 X-RAY EXAM CHEST 1 VIEW: CPT

## 2022-03-23 PROCEDURE — 96365 THER/PROPH/DIAG IV INF INIT: CPT

## 2022-03-23 PROCEDURE — 36415 COLL VENOUS BLD VENIPUNCTURE: CPT

## 2022-03-23 PROCEDURE — 82746 ASSAY OF FOLIC ACID SERUM: CPT

## 2022-03-23 PROCEDURE — 87641 MR-STAPH DNA AMP PROBE: CPT

## 2022-03-23 PROCEDURE — 87635 SARS-COV-2 COVID-19 AMP PRB: CPT

## 2022-03-23 PROCEDURE — 87040 BLOOD CULTURE FOR BACTERIA: CPT

## 2022-03-23 PROCEDURE — 83605 ASSAY OF LACTIC ACID: CPT

## 2022-03-23 PROCEDURE — 85027 COMPLETE CBC AUTOMATED: CPT

## 2022-03-23 PROCEDURE — 0225U NFCT DS DNA&RNA 21 SARSCOV2: CPT

## 2022-03-23 PROCEDURE — 84100 ASSAY OF PHOSPHORUS: CPT

## 2022-03-23 PROCEDURE — 84295 ASSAY OF SERUM SODIUM: CPT

## 2022-03-23 PROCEDURE — 71275 CT ANGIOGRAPHY CHEST: CPT | Mod: MA

## 2022-03-23 PROCEDURE — 87186 SC STD MICRODIL/AGAR DIL: CPT

## 2022-03-23 PROCEDURE — 80053 COMPREHEN METABOLIC PANEL: CPT

## 2022-03-23 PROCEDURE — 83735 ASSAY OF MAGNESIUM: CPT

## 2022-03-23 PROCEDURE — 96367 TX/PROPH/DG ADDL SEQ IV INF: CPT

## 2022-03-23 PROCEDURE — 84145 PROCALCITONIN (PCT): CPT

## 2022-03-23 PROCEDURE — 83880 ASSAY OF NATRIURETIC PEPTIDE: CPT

## 2022-03-23 PROCEDURE — 82962 GLUCOSE BLOOD TEST: CPT

## 2022-03-23 PROCEDURE — 97161 PT EVAL LOW COMPLEX 20 MIN: CPT

## 2022-03-23 PROCEDURE — 84132 ASSAY OF SERUM POTASSIUM: CPT

## 2022-03-23 PROCEDURE — 84443 ASSAY THYROID STIM HORMONE: CPT

## 2022-03-23 PROCEDURE — 87449 NOS EACH ORGANISM AG IA: CPT

## 2022-03-23 PROCEDURE — 82330 ASSAY OF CALCIUM: CPT

## 2022-03-23 PROCEDURE — 82803 BLOOD GASES ANY COMBINATION: CPT

## 2022-03-23 PROCEDURE — 78815 PET IMAGE W/CT SKULL-THIGH: CPT

## 2022-03-23 PROCEDURE — 81001 URINALYSIS AUTO W/SCOPE: CPT

## 2022-03-23 PROCEDURE — 87086 URINE CULTURE/COLONY COUNT: CPT

## 2022-03-23 RX ORDER — AMLODIPINE BESYLATE 2.5 MG/1
10 TABLET ORAL EVERY 24 HOURS
Refills: 0 | Status: DISCONTINUED | OUTPATIENT
Start: 2022-03-24 | End: 2022-03-23

## 2022-03-23 RX ORDER — AMLODIPINE BESYLATE 2.5 MG/1
5 TABLET ORAL ONCE
Refills: 0 | Status: COMPLETED | OUTPATIENT
Start: 2022-03-23 | End: 2022-03-23

## 2022-03-23 RX ADMIN — DULOXETINE HYDROCHLORIDE 30 MILLIGRAM(S): 30 CAPSULE, DELAYED RELEASE ORAL at 12:20

## 2022-03-23 RX ADMIN — ISOSORBIDE MONONITRATE 30 MILLIGRAM(S): 60 TABLET, EXTENDED RELEASE ORAL at 12:20

## 2022-03-23 RX ADMIN — CEFTRIAXONE 100 MILLIGRAM(S): 500 INJECTION, POWDER, FOR SOLUTION INTRAMUSCULAR; INTRAVENOUS at 10:25

## 2022-03-23 RX ADMIN — AMLODIPINE BESYLATE 5 MILLIGRAM(S): 2.5 TABLET ORAL at 06:48

## 2022-03-23 RX ADMIN — CLOPIDOGREL BISULFATE 75 MILLIGRAM(S): 75 TABLET, FILM COATED ORAL at 12:20

## 2022-03-23 RX ADMIN — Medication 81 MILLIGRAM(S): at 12:20

## 2022-03-23 RX ADMIN — Medication 25 MILLIGRAM(S): at 06:48

## 2022-03-23 RX ADMIN — AMLODIPINE BESYLATE 5 MILLIGRAM(S): 2.5 TABLET ORAL at 07:31

## 2022-03-23 NOTE — PROGRESS NOTE ADULT - PROBLEM SELECTOR PLAN 8
Continue home tamsulosin

## 2022-03-23 NOTE — PROGRESS NOTE ADULT - PROBLEM SELECTOR PLAN 9
Continue home Mirtazepine and Duloxetine

## 2022-03-23 NOTE — PROGRESS NOTE ADULT - PROBLEM SELECTOR PLAN 4
s/p PCI w 3 DAYANA (mRCA, pRCA, mC in 2013 w/ Dr. Varela)  - Continue home ASA 81 mg and Plavix 75 mg    #HFmrEF  - TTE on chart review showed EF 45-50%  - Euvolemic on exam but significant murmur  - Continue home metoprolol and consider succinate switch

## 2022-03-23 NOTE — PROGRESS NOTE ADULT - PROBLEM SELECTOR PLAN 6
Continue home metoprolol, Amlodipine and ISMO daily  - For metoprolol, increase dose or restore TID regimen  - Continue to monitor

## 2022-03-23 NOTE — DISCHARGE NOTE NURSING/CASE MANAGEMENT/SOCIAL WORK - NSDCPEFALRISK_GEN_ALL_CORE
For information on Fall & Injury Prevention, visit: [email or web address] OR  [email or web address] OR  https://www.cdc.gov/steadi/patient.html

## 2022-03-23 NOTE — PROGRESS NOTE ADULT - REASON FOR ADMISSION
Chest Pain
Chest Pain  SOB
Chest Pain
Chest Pain  PN
Chest Pain

## 2022-03-23 NOTE — PROGRESS NOTE ADULT - PROBLEM SELECTOR PLAN 10
Continue home Simvastatin

## 2022-03-23 NOTE — PROGRESS NOTE ADULT - PROBLEM SELECTOR PROBLEM 11
Localized cancer of colon

## 2022-03-23 NOTE — PROGRESS NOTE ADULT - SUBJECTIVE AND OBJECTIVE BOX
Interventional, Pulmonary, Critical, Chest Special Procedures.    Pt was seen and fully examined by myself.     Time spent with patient in minutes: 37    Patient is a 89y old  Male who presents with a chief complaint of Chest Pain (20 Mar 2022 13:43) The patient more alert and engaging today, cooperatig c PT, eupneic on 2LNC.    HPI:  Mr. Ndiaye is a 89 year old man with a PMHx  HTN, known CAD s/p PCI w 3 DAYANA (mRCA, pRCA, mC in 2013 w/ Dr. Varela), CKD (Cr 1.7- 2.4), COPD (not on home O2), colonic mass (s/p right hemicolectomy 4/2017 w/ Dr. Headley), Arthritis, BPH who presents from home with SOB x 1 day. Mr. Ndiaye states he was in his usual state of health (All ADLs besides cooking) until 3/18 when he began experiencing SOB that he associated with chest pain upon deep inspiration and weakness/malaise. His SOB is worse with exertion but denies any wheezing. He endorses a nonproductive cough. He does not attribute his symptoms to a COPD exacerbation like his prior hospitalizations. On review of symptoms, he endorses 1-2 episodes of diarrhea. He denies any sick contacts, recent antibiotic use, chest pain with exertion, orthopnea, paroxysmal nocturnal dyspnea.     In the ED, he was febrile to 101.2 F (rectally), HR 74, RR 19, and sPO2 96% on room air. His labs were significant for WBC 18.98, Hgb 12.4, , HCO3 20, Cr 1.52, Trop 0.01, and Pro-. EKG showed first degree heart block. CXR, although rotated, is not suggestive of focal infiltrates with hyperinflation. CT PE showed No central or lobar pulmonary emboli with concern for upper lobe infiltrates bilaterally with 2 solid nodules in LLL and an aortic aneurysm measuring 4.2 cm. He was given tylenol 650 mg, zosyn 3.375, vancomycin, 1g, and 1.9 L NS. He was admitted for sepsis 2/2 PNA.    (19 Mar 2022 07:28)      REVIEW OF SYSTEMS:  Constitutional: No fever, weight loss, chills +++ fatigue  Eyes: No eye pain, visual disturbances, or discharge  ENMT:  some  difficulty hearing, tinnitus, vertigo; No sinus or throat pain. No epistaxis, dysphagia, dysphonia, hoarseness or odynophagia  Neck: No pain, stiffness or neck swelling.  No masses or deformities  Respiratory: +cough, no wheezing, hemoptysis  + COPD  - ILD   - PE   - ASTHMA     - PNEUMONIA  Cardiovascular: + inspiratory chest pain, no dysrhythmia, palpitations, dizziness or edema + CAD   - CHF   -+HTN  Gastrointestinal: No abdominal or epigastric pain. No nausea, vomiting or hematemesis; + diarrhea . No melena or hematochezia, Icterus.          Genitourinary: No dysuria, frequency, hematuria or incontinence   + CKD/DEBBIE      - ESRD  Neurological: No headaches, memory loss, loss of strength, numbness or tremors      -DEMENTIA     - STROKE    - SEIZURE  Skin: No itching, burning, rashes or lesions   Lymph Nodes: No enlarged glands  Endocrine: No heat or cold intolerance; No hair loss       - DM     - THYROID DISORDER  Musculoskeletal: No joint pain or swelling; No muscle, back or extremity pain, No edema  Psychiatric: No depression, anxiety, mood swings or difficulty sleeping  Heme/Lymph: No easy bruising or bleeding gums         - ANEMIA      - pt uncertain about CANCER   -COAGULOPATHY  Allergy and Immunologic: No hives or eczema    PAST MEDICAL & SURGICAL HISTORY:  HLD (hyperlipidemia)    CAD (coronary artery disease)    BPH (benign prostatic hyperplasia)    S/P cataract extraction    H/O right hemicolectomy      FAMILY HISTORY:  No family history of cardiovascular disease (Father, Mother)      SOCIAL HISTORY:      - Tobacco     - ETOH    Allergies    No Known Allergies    Intolerances      Vital Signs Last 24 Hrs  T(C): 36.4 (21 Mar 2022 05:57), Max: 37.1 (20 Mar 2022 17:13)  T(F): 97.6 (21 Mar 2022 05:57), Max: 98.7 (20 Mar 2022 17:13)  HR: 58 (21 Mar 2022 05:57) (54 - 71)  BP: 165/64 (21 Mar 2022 05:57) (122/61 - 165/64)  BP(mean): --  RR: 18 (21 Mar 2022 05:57) (18 - 18)  SpO2: 94% (21 Mar 2022 05:57) (94% - 96%)    03-20 @ 07:01  -  03-21 @ 07:00  --------------------------------------------------------  IN: 0 mL / OUT: 1275 mL / NET: -1275 mL          MEDICATIONS:  MEDICATIONS  (STANDING):  amLODIPine   Tablet 5 milliGRAM(s) Oral daily  aspirin enteric coated 81 milliGRAM(s) Oral daily  cefTRIAXone   IVPB 1000 milliGRAM(s) IV Intermittent every 24 hours  clopidogrel Tablet 75 milliGRAM(s) Oral daily  DULoxetine 30 milliGRAM(s) Oral daily  enoxaparin Injectable 30 milliGRAM(s) SubCutaneous every 24 hours  isosorbide   mononitrate ER Tablet (IMDUR) 30 milliGRAM(s) Oral daily  metoprolol tartrate 25 milliGRAM(s) Oral two times a day  mirtazapine 30 milliGRAM(s) Oral at bedtime  simvastatin 20 milliGRAM(s) Oral at bedtime  tamsulosin 0.4 milliGRAM(s) Oral at bedtime    MEDICATIONS  (PRN):  ALBUTerol    90 MICROgram(s) HFA Inhaler 1 Puff(s) Inhalation every 6 hours PRN Shortness of Breath and/or Wheezing  melatonin 3 milliGRAM(s) Oral at bedtime PRN Insomnia      PHYSICAL EXAM:  Un Comfortable, no immediate distress  Eyes: PERRL, EOM intact; conjunctiva and sclera clear  Head: Normocephalic;  No Trauma  ENMT: No nasal discharge, hoarseness, cough or hemoptysis  Neck: Supple; non tender; no masses or deformities.    No JVD  Respiratory:  - WHEEZING   + scattered  RHONCHI  - RALES  + CRACKLES.  Diminished breath sounds   LEFT base   Cardiovascular: Regular rate and rhythm. S1 and S2 Normal; No murmurs, gallops or rubs     - PPM/AICD  Gastrointestinal: Soft non-tender, non-distended; Normal bowel sounds; No hepatosplenomegaly.     -PEG    -  GT   +RUBIO  Genitourinary: No costovertebral angle tenderness. No dysuria  Extremities: AROM, No clubbing, cyanosis or edema    Vascular: Peripheral pulses palpable 2+ bilaterally  Neurological: Alert and responisve to stimuli   Skin: Warm and dry. No obvious rash  Lymph Nodes: No acute cervical or supraclavicular adenopathy  Psychiatric: not really engaging     DEVICES:  - DENTURES   +IV R / L     - ETUBE   -TRACH   -CTUBE  R / L    LABS:                          10.8   10.98 )-----------( 217      ( 21 Mar 2022 10:23 )             32.8     03-20    140  |  112<H>  |  21  ----------------------------<  78  4.3   |  18<L>  |  1.31<H>    Ca    8.5      20 Mar 2022 06:41  Phos  3.0     03-20  Mg     2.0     03-20    TPro  5.6<L>  /  Alb  3.0<L>  /  TBili  0.2  /  DBili  x   /  AST  16  /  ALT  13  /  AlkPhos  62  03-20      RADIOLOGY & ADDITIONAL STUDIES (The following images were personally reviewed):
Interventional, Pulmonary, Critical, Chest Special Procedures.    Pt was seen and fully examined by myself.     Time spent with patient in minutes: 37    Patient is a 89y old  Male who presents with a chief complaint of Chest Pain (23 Mar 2022 11:26) The patient emaciated and ill appearing, albeit eupneic now on RA and pain free. PET CT findings discussed c him. The patient desires BX.     HPI:  Mr. Ndiaye is a 89 year old man with a PMHx  HTN, known CAD s/p PCI w 3 DAYANA (mRCA, pRCA, mC in 2013 w/ Dr. Varela), CKD (Cr 1.7- 2.4), COPD (not on home O2), colonic mass (s/p right hemicolectomy 4/2017 w/ Dr. Headley), Arthritis, BPH who presents from home with SOB x 1 day. Mr. Ndiaye states he was in his usual state of health (All ADLs besides cooking) until 3/18 when he began experiencing SOB that he associated with chest pain upon deep inspiration and weakness/malaise. His SOB is worse with exertion but denies any wheezing. He endorses a nonproductive cough. He does not attribute his symptoms to a COPD exacerbation like his prior hospitalizations. On review of symptoms, he endorses 1-2 episodes of diarrhea. He denies any sick contacts, recent antibiotic use, chest pain with exertion, orthopnea, paroxysmal nocturnal dyspnea.     In the ED, he was febrile to 101.2 F (rectally), HR 74, RR 19, and sPO2 96% on room air. His labs were significant for WBC 18.98, Hgb 12.4, , HCO3 20, Cr 1.52, Trop 0.01, and Pro-. EKG showed first degree heart block. CXR, although rotated, is not suggestive of focal infiltrates with hyperinflation. CT PE showed No central or lobar pulmonary emboli with concern for upper lobe infiltrates bilaterally with 2 solid nodules in LLL and an aortic aneurysm measuring 4.2 cm. He was given tylenol 650 mg, zosyn 3.375, vancomycin, 1g, and 1.9 L NS. He was admitted for sepsis 2/2 PNA.    (19 Mar 2022 07:28)      REVIEW OF SYSTEMS:  Constitutional: No fever, weight loss, chills +++ fatigue  Eyes: No eye pain, visual disturbances, or discharge  ENMT:  some  difficulty hearing, tinnitus, vertigo; No sinus or throat pain. No epistaxis, dysphagia, dysphonia, hoarseness or odynophagia  Neck: No pain, stiffness or neck swelling.  No masses or deformities  Respiratory: +cough, no wheezing, hemoptysis  + COPD  - ILD   - PE   - ASTHMA     - PNEUMONIA  Cardiovascular: + inspiratory chest pain, no dysrhythmia, palpitations, dizziness or edema + CAD   - CHF   -+HTN  Gastrointestinal: No abdominal or epigastric pain. No nausea, vomiting or hematemesis; + diarrhea . No melena or hematochezia, Icterus.          Genitourinary: No dysuria, frequency, hematuria or incontinence   + CKD/DEBBIE      - ESRD  Neurological: No headaches, memory loss, loss of strength, numbness or tremors      -DEMENTIA     - STROKE    - SEIZURE  Skin: No itching, burning, rashes or lesions   Lymph Nodes: No enlarged glands  Endocrine: No heat or cold intolerance; No hair loss       - DM     - THYROID DISORDER  Musculoskeletal: No joint pain or swelling; No muscle, back or extremity pain, No edema  Psychiatric: No depression, anxiety, mood swings or difficulty sleeping  Heme/Lymph: No easy bruising or bleeding gums         - ANEMIA      - pt uncertain about CANCER   -COAGULOPATHY  Allergy and Immunologic: No hives or eczema    PAST MEDICAL & SURGICAL HISTORY:  HLD (hyperlipidemia)    CAD (coronary artery disease)    BPH (benign prostatic hyperplasia)    S/P cataract extraction    H/O right hemicolectomy      FAMILY HISTORY:  No family history of cardiovascular disease (Father, Mother)      SOCIAL HISTORY:      - Tobacco     - ETOH    Allergies    No Known Allergies    Intolerances      Vital Signs Last 24 Hrs  T(C): 36.8 (23 Mar 2022 09:44), Max: 36.8 (23 Mar 2022 09:44)  T(F): 98.3 (23 Mar 2022 09:44), Max: 98.3 (23 Mar 2022 09:44)  HR: 63 (23 Mar 2022 09:44) (58 - 65)  BP: 177/69 (23 Mar 2022 09:44) (156/72 - 177/69)  BP(mean): --  RR: 20 (23 Mar 2022 09:44) (17 - 20)  SpO2: 94% (23 Mar 2022 09:44) (94% - 99%)        MEDICATIONS:  MEDICATIONS  (STANDING):  DULoxetine 30 milliGRAM(s) Oral daily  isosorbide   mononitrate ER Tablet (IMDUR) 30 milliGRAM(s) Oral daily  metoprolol tartrate 25 milliGRAM(s) Oral two times a day  mirtazapine 30 milliGRAM(s) Oral at bedtime  simvastatin 20 milliGRAM(s) Oral at bedtime  tamsulosin 0.4 milliGRAM(s) Oral at bedtime    MEDICATIONS  (PRN):  ALBUTerol    90 MICROgram(s) HFA Inhaler 1 Puff(s) Inhalation every 6 hours PRN Shortness of Breath and/or Wheezing  melatonin 3 milliGRAM(s) Oral at bedtime PRN Insomnia      PHYSICAL EXAM:  Un Comfortable, no immediate distress  Eyes: PERRL, EOM intact; conjunctiva and sclera clear  Head: Normocephalic;  No Trauma  ENMT: No nasal discharge, hoarseness, cough or hemoptysis  Neck: Supple; non tender; no masses or deformities.    No JVD  Respiratory:  - WHEEZING   + scattered  RHONCHI  - RALES  + CRACKLES.  Diminished breath sounds   LEFT base   Cardiovascular: Regular rate and rhythm. S1 and S2 Normal; No murmurs, gallops or rubs     - PPM/AICD  Gastrointestinal: Soft non-tender, non-distended; Normal bowel sounds; No hepatosplenomegaly.     -PEG    -  GT   +RUBIO  Genitourinary: No costovertebral angle tenderness. No dysuria  Extremities: AROM, No clubbing, cyanosis or edema    Vascular: Peripheral pulses palpable 2+ bilaterally  Neurological: Alert and responisve to stimuli   Skin: Warm and dry. No obvious rash  Lymph Nodes: No acute cervical or supraclavicular adenopathy  Psychiatric: not really engaging     DEVICES:  - DENTURES   +IV R / L     - ETUBE   -TRACH   -CTUBE  R / L    LABS:                          11.3   8.10  )-----------( 212      ( 23 Mar 2022 07:32 )             33.2     03-23    139  |  104  |  15  ----------------------------<  81  4.2   |  22  |  1.26    Ca    9.3      23 Mar 2022 07:32  Phos  2.8     03-23  Mg     1.7     03-23    TPro  6.4  /  Alb  3.4  /  TBili  0.3  /  DBili  x   /  AST  14  /  ALT  8<L>  /  AlkPhos  66  03-23      RADIOLOGY & ADDITIONAL STUDIES (The following images were personally reviewed):
Interventional, Pulmonary, Critical, Chest Special Procedures.    Pt was seen and fully examined by myself.     Time spent with patient in minutes:167    Patient is a 89y old  Male who presents with a chief complaint of Chest Pain (19 Mar 2022 15:12) Events leading to this admission reviewed. The patient ill appearing, emaciated, tolerating NC and pain free  when seen, answering simple questions, not really engaging. Denies chest trauma, denies aspiration, reports some dry cough    HPI:  Mr. Ndiaye is a 89 year old man with a PMHx  HTN, known CAD s/p PCI w 3 DAYANA (mRCA, pRCA, mC in 2013 w/ Dr. Varela), CKD (Cr 1.7- 2.4), COPD (not on home O2), colonic mass (s/p right hemicolectomy 4/2017 w/ Dr. Headley), Arthritis, BPH who presents from home with SOB x 1 day. Mr. Ndiaye states he was in his usual state of health (All ADLs besides cooking) until 3/18 when he began experiencing SOB that he associated with chest pain upon deep inspiration and weakness/malaise. His SOB is worse with exertion but denies any wheezing. He endorses a nonproductive cough. He does not attribute his symptoms to a COPD exacerbation like his prior hospitalizations. On review of symptoms, he endorses 1-2 episodes of diarrhea. He denies any sick contacts, recent antibiotic use, chest pain with exertion, orthopnea, paroxysmal nocturnal dyspnea.     In the ED, he was febrile to 101.2 F (rectally), HR 74, RR 19, and sPO2 96% on room air. His labs were significant for WBC 18.98, Hgb 12.4, , HCO3 20, Cr 1.52, Trop 0.01, and Pro-. EKG showed first degree heart block. CXR, although rotated, is not suggestive of focal infiltrates with hyperinflation. CT PE showed No central or lobar pulmonary emboli with concern for upper lobe infiltrates bilaterally with 2 solid nodules in LLL and an aortic aneurysm measuring 4.2 cm. He was given tylenol 650 mg, zosyn 3.375, vancomycin, 1g, and 1.9 L NS. He was admitted for sepsis 2/2 PNA.    (19 Mar 2022 07:28)      REVIEW OF SYSTEMS:  Constitutional: No fever, weight loss, chills +++ fatigue  Eyes: No eye pain, visual disturbances, or discharge  ENMT:  some  difficulty hearing, tinnitus, vertigo; No sinus or throat pain. No epistaxis, dysphagia, dysphonia, hoarseness or odynophagia  Neck: No pain, stiffness or neck swelling.  No masses or deformities  Respiratory: +cough, no wheezing, hemoptysis  + COPD  - ILD   - PE   - ASTHMA     - PNEUMONIA  Cardiovascular: + inspiratory chest pain, no dysrhythmia, palpitations, dizziness or edema + CAD   - CHF   -+HTN  Gastrointestinal: No abdominal or epigastric pain. No nausea, vomiting or hematemesis; + diarrhea . No melena or hematochezia, Icterus.          Genitourinary: No dysuria, frequency, hematuria or incontinence   + CKD/DEBBIE      - ESRD  Neurological: No headaches, memory loss, loss of strength, numbness or tremors      -DEMENTIA     - STROKE    - SEIZURE  Skin: No itching, burning, rashes or lesions   Lymph Nodes: No enlarged glands  Endocrine: No heat or cold intolerance; No hair loss       - DM     - THYROID DISORDER  Musculoskeletal: No joint pain or swelling; No muscle, back or extremity pain, No edema  Psychiatric: No depression, anxiety, mood swings or difficulty sleeping  Heme/Lymph: No easy bruising or bleeding gums         - ANEMIA      - pt uncertain about CANCER   -COAGULOPATHY  Allergy and Immunologic: No hives or eczema    PAST MEDICAL & SURGICAL HISTORY:  HLD (hyperlipidemia)    CAD (coronary artery disease)    BPH (benign prostatic hyperplasia)    S/P cataract extraction    H/O right hemicolectomy      FAMILY HISTORY:  No family history of cardiovascular disease (Father, Mother)      SOCIAL HISTORY:      - Tobacco     - ETOH    Allergies    No Known Allergies    Intolerances      Vital Signs Last 24 Hrs  T(C): 36.9 (19 Mar 2022 15:54), Max: 38.4 (19 Mar 2022 03:28)  T(F): 98.4 (19 Mar 2022 15:54), Max: 101.2 (19 Mar 2022 03:28)  HR: 62 (19 Mar 2022 15:54) (62 - 80)  BP: 146/66 (19 Mar 2022 15:54) (132/49 - 154/68)  BP(mean): --  RR: 18 (19 Mar 2022 15:54) (16 - 19)  SpO2: 95% (19 Mar 2022 15:54) (95% - 98%)        MEDICATIONS:  MEDICATIONS  (STANDING):  amLODIPine   Tablet 5 milliGRAM(s) Oral daily  aspirin enteric coated 81 milliGRAM(s) Oral daily  cefTRIAXone   IVPB 1000 milliGRAM(s) IV Intermittent every 24 hours  clopidogrel Tablet 75 milliGRAM(s) Oral daily  DULoxetine 30 milliGRAM(s) Oral daily  isosorbide   mononitrate ER Tablet (IMDUR) 30 milliGRAM(s) Oral daily  metoprolol tartrate 25 milliGRAM(s) Oral two times a day  mirtazapine 30 milliGRAM(s) Oral at bedtime  simvastatin 20 milliGRAM(s) Oral at bedtime  tamsulosin 0.4 milliGRAM(s) Oral at bedtime    MEDICATIONS  (PRN):  ALBUTerol    90 MICROgram(s) HFA Inhaler 1 Puff(s) Inhalation every 6 hours PRN Shortness of Breath and/or Wheezing  melatonin 3 milliGRAM(s) Oral at bedtime PRN Insomnia      PHYSICAL EXAM:  Un Comfortable, no immediate distress  Eyes: PERRL, EOM intact; conjunctiva and sclera clear  Head: Normocephalic;  No Trauma  ENMT: No nasal discharge, hoarseness, cough or hemoptysis  Neck: Supple; non tender; no masses or deformities.    No JVD  Respiratory:  - WHEEZING   + scattered  RHONCHI  - RALES  + CRACKLES.  Diminished breath sounds   LEFT base   Cardiovascular: Regular rate and rhythm. S1 and S2 Normal; No murmurs, gallops or rubs     - PPM/AICD  Gastrointestinal: Soft non-tender, non-distended; Normal bowel sounds; No hepatosplenomegaly.     -PEG    -  GT   - RUBIO  Genitourinary: No costovertebral angle tenderness. No dysuria  Extremities: AROM, No clubbing, cyanosis or edema    Vascular: Peripheral pulses palpable 2+ bilaterally  Neurological: Alert and responisve to stimuli   Skin: Warm and dry. No obvious rash  Lymph Nodes: No acute cervical or supraclavicular adenopathy  Psychiatric: not really engaging     DEVICES:  - DENTURES   +IV R / L     - ETUBE   -TRACH   -CTUBE  R / L    LABS:                          12.4   18.98 )-----------( 250      ( 19 Mar 2022 02:55 )             37.3     03-19    140  |  106  |  32<H>  ----------------------------<  124<H>  4.9   |  20<L>  |  1.52<H>    Ca    9.4      19 Mar 2022 02:55    TPro  7.5  /  Alb  4.3  /  TBili  0.3  /  DBili  x   /  AST  16  /  ALT  12  /  AlkPhos  78  03-19      RADIOLOGY & ADDITIONAL STUDIES (The following images were personally reviewed):< from: CT Angio Chest PE Protocol w/ IV Cont (03.19.22 @ 06:01) >  ACC: [id number] EXAM:  CT ANGIO CHEST PULM ART [facility]                          PROCEDURE DATE:  03/19/2022          INTERPRETATION:  *******************PLEASE SEE BOTTOM OF REPORT FOR FINAL   ATTENDING RADIOLOGIST INTERPRETATION******************    PROCEDURE INFORMATION:  Exam: CTA Chest With Contrast  Exam date and time: 3/19/2022 4:48 AM  Age: 89 years old  Clinical indication: Pain; Chest pressure    TECHNIQUE:  Imaging protocol: Computed tomographic angiography of the chest with   contrast.  3D rendering (Not supervised by radiologist): MIP and/or 3D reconstructed   images were created  by the technologist.    COMPARISON:  CT ANGIO CHEST PULMONARY EMBOLISM WITH IV CONTRAST 11/10/2020 11:53 AM    FINDINGS:  Limitations: Image quality is degraded by respiratory motion artifact and   by artifact from the patient's left arm, which was not elevated during   imaging.    Pulmonary arteries: There are no central or lobar pulmonary emboli.   Characterization of smaller caliber pulmonary arterial branches is   limited by the degree of respiratory motion artifact. The main pulmonary   artery is normal in caliber.  Aorta: There is aneurysmal dilatation of the mid abdominal aorta which   measures up to 4.2 cm in the axial plane, previously notincluded in the   field of view. Normal caliber thoracic aorta. . There is extensive   atherosclerotic mural calcification of the visualized aorta.  Lungs: Respiratory motion artifact limits evaluation of the lung   parenchyma. Two adjacent solid nodules are newly identified in the   lateral left lower lobe, each measuring up to 1.8 cm (series 6, image   148). There are several faint patchy ground-glass opacities in the upper   lobes, also new, nonspecific though most likely infectious or   inflammatory in etiology. There is unchanged minor linear  scarring in the right lower lobe. There is no new consolidation.  Pleural spaces: Unremarkable. No pleural effusion or pneumothorax.  Heart: The heart is mildly enlarged. There are moderate coronary artery   calcifications. No pericardial effusion. Aortic and mitral annular   calcifications are noted.  Lymph nodes: No pathologically enlarged mediastinal, hilar, or axillary   lymph nodes.  Bones/joints: Multilevel spondylosis in the visualized spine. No   suspicious osseous lesions.  Soft tissues: Unremarkable.      PRELIMINARY  IMPRESSION:    1. No central or lobar pulmonary emboli. Study degraded by respiratory   motion artifact.  2. Two 1.8 cm solid nodules in the left lower lobe, new since 11/10/2020.   See management recommendations below.  4. Incompletely imaged abdominal aortic aneurysm measuring up to 4.2 cm   as described above.  4. Several faint ground-glass alveolar opacities in both upper lobes ,   new since 11/10/2020, nonspecific though most likely infectious or   inflammatory in etiology.  5. Additional incidental/nonemergent findings are discussed in the body   of the report.    Fleischner Society guidelines for follow-up and management of multiple   pulmonary nodulesgreater than 8 mm: For patients at low risk (minimal or   absent history of smoking and of other known risk factors), recommend   follow-up chest CT at 3-6 months and consider followup at 18 - 24 months.   For patients at high risk (history of smoking or of other known risk   factors), recommend initial follow-up chest CT at 3-6 months, then at   18-24 months.    Thank you for allowing us to participate in the care of your patient.  Dictated and Authenticated by: Dania Lawson MD  03/19/2022 5:59 AM Eastern Time ([place] & [place])    The above report was submitted by the [facility] attending radiologist and   copied to [employer] by resident Dr. Gould.    ==========================================================  FINAL ATTENDING RADIOLOGIST INTERPRETATION: Modifications to the   preliminary report as follows: No pulmonary embolism seen. Since November   10, 2020, two new solid pulmonary nodules in left lower lobe measuring up   to 1.8 cm, possibly primary or secondary lung malignancy. Recommend FDG   PET/CT and/or tissue sampling. Couple borderline enlarged mediastinal   nodes, possibly neoplastic or inflammatory/reactive etiology, for   example: Right lower paratracheal 1.5 x 1.0 cm and left lower   paratracheal 1.7 x 1.0 cm (series 5 image 57). New couple faint   groundglass alveolar opacities in upper lobes, nonspecific, probably   infectious or inflammatory etiology. Aortic valvular, aortic and coronary   artery calcifications. Common origin innominate and left common carotid   artery. Unchanged borderline fusiform aneurysm proximal descending   thoracic aorta up to 3.5 cm. Unchanged visualized portions of infrarenal   aortic aneurysm up to 4.2 cm, similar to April 12, 2017.    < end of copied text >  
Patient is a 89y old  Male who presents with a chief complaint of Chest Pain      HPI:  Mr. Ndiaye is a 89 year old man with a PMHx  HTN, known CAD s/p PCI w 3 DAYANA (mRCA, pRCA, mC in  w/ Dr. Varela), CKD (Cr 1.7- 2.4), COPD (not on home O2), colonic mass (s/p right hemicolectomy 2017 w/ Dr. Headley), Arthritis, BPH who presents from home with SOB x 1 day. Mr. Ndiaye states he was in his usual state of health (All ADLs besides cooking) until 3/18 when he began experiencing SOB that he associated with chest pain upon deep inspiration and weakness/malaise. His SOB is worse with exertion but denies any wheezing. He endorses a nonproductive cough. He does not attribute his symptoms to a COPD exacerbation like his prior hospitalizations. On review of symptoms, he endorses 1-2 episodes of diarrhea. He denies any sick contacts, recent antibiotic use, chest pain with exertion, orthopnea, paroxysmal nocturnal dyspnea.     In the ED, he was febrile to 101.2 F (rectally), HR 74, RR 19, and sPO2 96% on room air. His labs were significant for WBC 18.98, Hgb 12.4, , HCO3 20, Cr 1.52, Trop 0.01, and Pro-. EKG showed first degree heart block. CXR, although rotated, is not suggestive of focal infiltrates with hyperinflation. CT PE showed No central or lobar pulmonary emboli with concern for upper lobe infiltrates bilaterally with 2 solid nodules in LLL and an aortic aneurysm measuring 4.2 cm. He was given tylenol 650 mg, zosyn 3.375, vancomycin, 1g, and 1.9 L NS. He was admitted for sepsis 2/2 PNA.    (19 Mar 2022 07:28)    INTERVAL HPI/OVERNIGHT EVENTS:: NAEO, pending PET Scan    HEALTH ISSUES - PROBLEM Dx:  Sepsis due to pneumonia    COPD, mild    HTN (hypertension)    Stage 3 chronic kidney disease    BPH without urinary obstruction    HLD (hyperlipidemia)    CAD (coronary artery disease)    Localized cancer of colon    Nutrition, metabolism, and development symptoms    Lung nodule, multiple    Anxiety and depression    AAA (abdominal aortic aneurysm)            PAST MEDICAL & SURGICAL HISTORY:  HLD (hyperlipidemia)    CAD (coronary artery disease)    BPH (benign prostatic hyperplasia)    S/P cataract extraction    H/O right hemicolectomy            Consultant NOTE  REVIEWED  (   )    REVIEW OF SYSTEMS:  [x] As per HPI  CONSTITUTIONAL: weakness  RESPIRATORY: cough   CARDIOVASCULAR: No chest pain, palpitations, dizziness, or leg swelling  GASTROINTESTINAL: No abdominal or epigastric pain. No nausea, vomiting, or hematemesis; No diarrhea or constipation. No melena or hematochezia.  MUSCULOSKELETAL: No joint pain or swelling; No muscle, back, or extremity pain  PSYCH    awake, alert       [x] All others negative	  [ ] Unable to obtain          Vital Signs Last 24 Hrs  T(C): 36.4 (20 Mar 2022 08:12), Max: 37 (19 Mar 2022 20:28)  T(F): 97.6 (20 Mar 2022 08:12), Max: 98.6 (19 Mar 2022 20:28)  HR: 54 (20 Mar 2022 08:12) (52 - 62)  BP: 135/84 (20 Mar 2022 08:12) (117/57 - 146/66)  BP(mean): --  RR: 16 (20 Mar 2022 08:12) (16 - 18)  SpO2: 98% (20 Mar 2022 08:12) (95% - 98%)        03-19 @ 07:01  -  03-20 @ 07:00  --------------------------------------------------------  IN: 0 mL / OUT: 500 mL / NET: -500 mL      PHYSICAL EXAMINATION:                                    (    )  NO CHANGE  Appearance: Normal	  HEENT:   Normal oral mucosa, PERRL, EOMI	  Neck: Supple, + JVD/ - JVD; Carotid Bruit   Cardiovascular: Normal S1 S2, mur   Respiratory: Lungs bilat rhonchi  Gastrointestinal:  Soft, Non-tender, + BS	  Skin: No rashes, No ecchymoses, No cyanosis  Extremities: Normal range of motion, No clubbing, cyanosis or edema  Vascular: Peripheral pulses palpable 2+ bilaterally  Neurologic: Non-focal  Psychiatry: A & O x 3, Mood & affect appropriate    ALBUTerol    90 MICROgram(s) HFA Inhaler 1 Puff(s) Inhalation every 6 hours PRN  amLODIPine   Tablet 5 milliGRAM(s) Oral daily  aspirin enteric coated 81 milliGRAM(s) Oral daily  cefTRIAXone   IVPB 1000 milliGRAM(s) IV Intermittent every 24 hours  clopidogrel Tablet 75 milliGRAM(s) Oral daily  DULoxetine 30 milliGRAM(s) Oral daily  enoxaparin Injectable 30 milliGRAM(s) SubCutaneous every 24 hours  isosorbide   mononitrate ER Tablet (IMDUR) 30 milliGRAM(s) Oral daily  melatonin 3 milliGRAM(s) Oral at bedtime PRN  metoprolol tartrate 25 milliGRAM(s) Oral two times a day  mirtazapine 30 milliGRAM(s) Oral at bedtime  simvastatin 20 milliGRAM(s) Oral at bedtime  tamsulosin 0.4 milliGRAM(s) Oral at bedtime            CARDIAC MARKERS ( 19 Mar 2022 02:55 )  x     / 0.01 ng/mL / x     / x     / x                                9.8    14.73 )-----------( 190      ( 20 Mar 2022 06:41 )             30.0     03-20    140  |  112<H>  |  21  ----------------------------<  78  4.3   |  18<L>  |  1.31<H>    Ca    8.5      20 Mar 2022 06:41  Phos  3.0     03-20  Mg     2.0     03-20    TPro  5.6<L>  /  Alb  3.0<L>  /  TBili  0.2  /  DBili  x   /  AST  16  /  ALT  13  /  AlkPhos  62  03-20      CAPILLARY BLOOD GLUCOSE        Urinalysis Basic - ( 19 Mar 2022 05:05 )    Color: Yellow / Appearance: Clear / S.010 / pH: x  Gluc: x / Ketone: NEGATIVE  / Bili: Negative / Urobili: 0.2 E.U./dL   Blood: x / Protein: 30 mg/dL / Nitrite: NEGATIVE   Leuk Esterase: Trace / RBC: < 5 /HPF / WBC < 5 /HPF   Sq Epi: x / Non Sq Epi: 0-5 /HPF / Bacteria: Present /HPF    
CC/ HPI Patient is a 89 year old male with CAD s/p PCI, HTN, CKD, COPD, colonic mass, s/p right hemicolectomy, 2017 who was admitted with sepsis secondary to pneumonia, seen this morning the patient denies respiratory complaint.    PAST MEDICAL & SURGICAL HISTORY:  HLD (hyperlipidemia)  CAD (coronary artery disease)  BPH (benign prostatic hyperplasia)  S/P cataract extraction  H/O right hemicolectomy    FAMILY HISTORY: FA/MO  - contributory   No family history of cardiovascular disease (Father, Mother)      MEDICATIONS  (STANDING):  amLODIPine   Tablet 5 milliGRAM(s) Oral daily  aspirin enteric coated 81 milliGRAM(s) Oral daily  cefTRIAXone   IVPB 1000 milliGRAM(s) IV Intermittent every 24 hours  clopidogrel Tablet 75 milliGRAM(s) Oral daily  DULoxetine 30 milliGRAM(s) Oral daily  enoxaparin Injectable 40 milliGRAM(s) SubCutaneous every 24 hours  isosorbide   mononitrate ER Tablet (IMDUR) 30 milliGRAM(s) Oral daily  metoprolol tartrate 25 milliGRAM(s) Oral two times a day  mirtazapine 30 milliGRAM(s) Oral at bedtime  simvastatin 20 milliGRAM(s) Oral at bedtime  tamsulosin 0.4 milliGRAM(s) Oral at bedtime    MEDICATIONS  (PRN):  ALBUTerol    90 MICROgram(s) HFA Inhaler 1 Puff(s) Inhalation every 6 hours PRN Shortness of Breath and/or Wheezing  melatonin 3 milliGRAM(s) Oral at bedtime PRN Insomnia      Vital Signs Last 24 Hrs  T(C): 36.4 (22 Mar 2022 20:22), Max: 36.9 (22 Mar 2022 08:59)  T(F): 97.5 (22 Mar 2022 20:22), Max: 98.5 (22 Mar 2022 08:59)  HR: 58 (22 Mar 2022 20:22) (58 - 65)  BP: 166/75 (22 Mar 2022 20:22) (156/72 - 177/73)  RR: 18 (22 Mar 2022 20:22) (17 - 18)  SpO2: 97% (22 Mar 2022 20:22) (94% - 97%)    GENERAL:         comfortable,  - distress.  HEENT:            - trauma,  - icterus,  - injection,  - nasal discharge.  NECK:              - jugular venous distention, - thyromegaly.  LYMPH:           - lymphadenopathy, - masses.  RESP:              + clear,   - rales,   - rhonchi,   - wheezes.   COR:                S1S2   - gallops,  - rubs.  ABD:                bowel sounds,   soft, - tender, - distended.  EXT/MSC:         - cyanosis,  - clubbing, - edema.    NEURO:           +  alert and oriented                          10.5   8.05  )-----------( 227      ( 22 Mar 2022 06:56 )             31.5     03-22    139  |  106  |  18  ----------------------------<  69<L>  4.2   |  18<L>  |  1.19    Ca    9.1      22 Mar 2022 06:56  Phos  2.5     03-22  Mg     1.8     03-22      CT Angio Chest PE Protocol w/ IV Cont (03.19.22)   1. No central or lobar pulmonary emboli. Study degraded by respiratory motion artifact.  2. Two 1.8 cm solid nodules in the left lower lobe, new since 11/10/2020.   4. Incompletely imaged abdominal aortic aneurysm measuring up to 4.2 cm as described above.  4. Several faint ground-glass alveolar opacities in both upper lobes , new since 11/10/2020, nonspecific though most likely infectious or inflammatory in etiology.  5. Additional incidental/nonemergent findings are discussed in the body of the report.        ASSESSMENT/PLAN    1) Pneumonia  2) Chronic obstructive pulmonary disease  3) Pulmonary nodules    Oxygen as needed for a satisfactory SpO2  Bedside spirometry to evaluate lung function.  Bronchodilators:  Atrovent/ albuterol q 4 – 6 hours as needed  Corticosteroids: consider adding inhaled budesonide  ID/Antibiotics: ceftriaxone, status post azithromycin  Cardiac/HTN: hemodynamically stable  GI: Rx/ prophylaxis c PPI/H2B  Heme: Rx/VT prophylaxis  Discuss with Dr. Baptiste who will cover March 23.      
  Physical Medicine and Rehabilitation Progress Note:    Patient is a 89y old  Male who presents with a chief complaint of Chest Pain (21 Mar 2022 12:06)      HPI:  Mr. Ndiaye is a 89 year old man with a PMHx  HTN, known CAD s/p PCI w 3 DAYANA (mRCA, pRCA, mC in 2013 w/ Dr. Varela), CKD (Cr 1.7- 2.4), COPD (not on home O2), colonic mass (s/p right hemicolectomy 4/2017 w/ Dr. Headley), Arthritis, BPH who presents from home with SOB x 1 day. Mr. Ndiaye states he was in his usual state of health (All ADLs besides cooking) until 3/18 when he began experiencing SOB that he associated with chest pain upon deep inspiration and weakness/malaise. His SOB is worse with exertion but denies any wheezing. He endorses a nonproductive cough. He does not attribute his symptoms to a COPD exacerbation like his prior hospitalizations. On review of symptoms, he endorses 1-2 episodes of diarrhea. He denies any sick contacts, recent antibiotic use, chest pain with exertion, orthopnea, paroxysmal nocturnal dyspnea.     In the ED, he was febrile to 101.2 F (rectally), HR 74, RR 19, and sPO2 96% on room air. His labs were significant for WBC 18.98, Hgb 12.4, , HCO3 20, Cr 1.52, Trop 0.01, and Pro-. EKG showed first degree heart block. CXR, although rotated, is not suggestive of focal infiltrates with hyperinflation. CT PE showed No central or lobar pulmonary emboli with concern for upper lobe infiltrates bilaterally with 2 solid nodules in LLL and an aortic aneurysm measuring 4.2 cm. He was given tylenol 650 mg, zosyn 3.375, vancomycin, 1g, and 1.9 L NS. He was admitted for sepsis 2/2 PNA.    (19 Mar 2022 07:28)                            10.8   10.98 )-----------( 217      ( 21 Mar 2022 10:23 )             32.8       03-21    138  |  108  |  24<H>  ----------------------------<  70  4.4   |  17<L>  |  1.40<H>    Ca    8.9      21 Mar 2022 10:23  Phos  2.8     03-21  Mg     1.9     03-21    TPro  5.6<L>  /  Alb  3.0<L>  /  TBili  0.2  /  DBili  x   /  AST  16  /  ALT  13  /  AlkPhos  62  03-20    Vital Signs Last 24 Hrs  T(C): 36.8 (21 Mar 2022 08:45), Max: 37.1 (20 Mar 2022 17:13)  T(F): 98.2 (21 Mar 2022 08:45), Max: 98.7 (20 Mar 2022 17:13)  HR: 60 (21 Mar 2022 10:30) (54 - 71)  BP: 168/67 (21 Mar 2022 11:10) (122/61 - 168/67)  BP(mean): --  RR: 18 (21 Mar 2022 08:45) (18 - 18)  SpO2: 92% (21 Mar 2022 10:30) (92% - 96%)    MEDICATIONS  (STANDING):  amLODIPine   Tablet 5 milliGRAM(s) Oral daily  aspirin enteric coated 81 milliGRAM(s) Oral daily  cefTRIAXone   IVPB 1000 milliGRAM(s) IV Intermittent every 24 hours  clopidogrel Tablet 75 milliGRAM(s) Oral daily  DULoxetine 30 milliGRAM(s) Oral daily  enoxaparin Injectable 30 milliGRAM(s) SubCutaneous every 24 hours  isosorbide   mononitrate ER Tablet (IMDUR) 30 milliGRAM(s) Oral daily  metoprolol tartrate 25 milliGRAM(s) Oral two times a day  mirtazapine 30 milliGRAM(s) Oral at bedtime  simvastatin 20 milliGRAM(s) Oral at bedtime  tamsulosin 0.4 milliGRAM(s) Oral at bedtime    MEDICATIONS  (PRN):  ALBUTerol    90 MICROgram(s) HFA Inhaler 1 Puff(s) Inhalation every 6 hours PRN Shortness of Breath and/or Wheezing  melatonin 3 milliGRAM(s) Oral at bedtime PRN Insomnia    Currently Undergoing Physical/ Occupational Therapy at bedside.    PT/OT Functional Status Assessment:       Previous Level of Function:     · Additional Comments  Pt currently resides alone in elevator apt, 1 ROMIE. Primarily amb w/ rollator (in household and community) however also has quadcane. Denies falls within past 6 months. Denies HHA, however states having friend and nephew to contact when requiring assistance. Indep w/ showering and dressing. Denies home O2. Pt [place]; no hearing aids.      Cognitive Status Examination:   · Orientation  oriented to person, place, time and situation    · Level of Consciousness  alert; [place] - no hearing aids    · Follows Commands and Answers Questions  100% of the time    · Personal Safety and Judgment  intact      Range of Motion Exam:   · Active Range of Motion Examination  no Active ROM deficits were identified      Manual Muscle Testing:   · Manual Muscle Testing Results  At least 3/5 BL UE based on ability to perform antigravity mobility. Grossly 3/5 throughout bilat knee flex/ext, and hip flex.      Bed Mobility: Rolling/Turning:     · Level of [place]  supervision      Bed Mobility: Scooting/Bridging:     · Level of [place]  contact guard      Bed Mobility: Sit to Supine:     · Level of [place]  contact guard      Bed Mobility: Supine to Sit:     · Level of [place]  contact guard      Bed Mobility Analysis:     · Bed Mobility Limitations  decreased ability to use legs for bridging/pushing    · Impairments Contributing to Impaired Bed Mobility  decreased strength; impaired postural control      Transfer: Sit to Stand:     · Level of [place]  contact guard    · Assistive Device  rollator      Transfer: Stand to Sit:     · Level of [place]  contact guard    · Assistive Device  rollator      Sit/Stand Transfer Safety Analysis:     · Transfer Safety Concerns Noted  decreased weight-shifting ability; decreased sequencing ability    · Impairments Contributing to Impaired Transfers  decreased strength; impaired postural control; forward head posture noted      Gait Skills:     · Level of [place]  contact guard    · Assistive Device  rollator    · Gait Distance  5 steps forward, 5 steps backward, 3 side steps limited by IV            Gait Analysis:     · Gait Pattern Used  2-point gait    · Gait Deviations Noted  decreased keyshawn; decreased weight-shifting ability; Pt slightly unsteady w/ gait. Dec step length/keyshawn noted however able to amb w/o assistance. Maneuver rollator well without assistance. Good aerobic endurance, no SOB. SpO2 @ 91% post-amb.; decreased step length    · Impairments Contributing to Gait Deviations  decreased strength      Balance Skills Assessment:     · Sitting Balance: Static  good balance    · Sitting Balance: Dynamic  good minus    · Sit-to-Stand Balance  good minus    · Standing Balance: Static  good minus    · Standing Balance: Dynamic  good minus    · Systems Impairment Contributing to Balance Disturbance  musculoskeletal    · Identified Impairments Contributing to Balance Disturbance  decreased strength      Sensory Examination:   Sensory Examination:    Grossly Intact:   · Gross Sensory Examination  Grossly intact to light touch sensation throughout BLE and BUE        Clinical Impressions:   · Criteria for Skilled Therapeutic Interventions  impairments found; functional limitations in following categories; risk reduction/prevention; rehab potential; therapy frequency; anticipated discharge recommendation    · Impairments Found (describe specific impairments)  muscle strength; gross motor; gait, locomotion, and balance; posture    · Functional Limitations in Following Categories (describe specific limitations)  home management; work; community/leisure    · Rehab Potential  good, to achieve stated therapy goals    · Therapy Frequency  2-3x/week          PM&R Impression: as above    Current Disposition Plan Recommendations:    d/c home, home physical therapy            
Interventional, Pulmonary, Critical, Chest Special Procedures.    Pt was seen and fully examined by myself.     Time spent with patient in minutes:    Patient is a 89y old  Male who presents with a chief complaint of Chest Pain  SOB (20 Mar 2022 10:46)The patient ill appearing, emaciated, tolerating NC and pain free  when seen, answering simple questions, not really engaging.    HPI:  Mr. Ndiaye is a 89 year old man with a PMHx  HTN, known CAD s/p PCI w 3 DAYANA (mRCA, pRCA, mC in 2013 w/ Dr. Varela), CKD (Cr 1.7- 2.4), COPD (not on home O2), colonic mass (s/p right hemicolectomy 4/2017 w/ Dr. Headley), Arthritis, BPH who presents from home with SOB x 1 day. Mr. Ndiaye states he was in his usual state of health (All ADLs besides cooking) until 3/18 when he began experiencing SOB that he associated with chest pain upon deep inspiration and weakness/malaise. His SOB is worse with exertion but denies any wheezing. He endorses a nonproductive cough. He does not attribute his symptoms to a COPD exacerbation like his prior hospitalizations. On review of symptoms, he endorses 1-2 episodes of diarrhea. He denies any sick contacts, recent antibiotic use, chest pain with exertion, orthopnea, paroxysmal nocturnal dyspnea.     In the ED, he was febrile to 101.2 F (rectally), HR 74, RR 19, and sPO2 96% on room air. His labs were significant for WBC 18.98, Hgb 12.4, , HCO3 20, Cr 1.52, Trop 0.01, and Pro-. EKG showed first degree heart block. CXR, although rotated, is not suggestive of focal infiltrates with hyperinflation. CT PE showed No central or lobar pulmonary emboli with concern for upper lobe infiltrates bilaterally with 2 solid nodules in LLL and an aortic aneurysm measuring 4.2 cm. He was given tylenol 650 mg, zosyn 3.375, vancomycin, 1g, and 1.9 L NS. He was admitted for sepsis 2/2 PNA.    (19 Mar 2022 07:28)      REVIEW OF SYSTEMS:  Constitutional: No fever, weight loss, chills +++ fatigue  Eyes: No eye pain, visual disturbances, or discharge  ENMT:  some  difficulty hearing, tinnitus, vertigo; No sinus or throat pain. No epistaxis, dysphagia, dysphonia, hoarseness or odynophagia  Neck: No pain, stiffness or neck swelling.  No masses or deformities  Respiratory: +cough, no wheezing, hemoptysis  + COPD  - ILD   - PE   - ASTHMA     - PNEUMONIA  Cardiovascular: + inspiratory chest pain, no dysrhythmia, palpitations, dizziness or edema + CAD   - CHF   -+HTN  Gastrointestinal: No abdominal or epigastric pain. No nausea, vomiting or hematemesis; + diarrhea . No melena or hematochezia, Icterus.          Genitourinary: No dysuria, frequency, hematuria or incontinence   + CKD/DEBBIE      - ESRD  Neurological: No headaches, memory loss, loss of strength, numbness or tremors      -DEMENTIA     - STROKE    - SEIZURE  Skin: No itching, burning, rashes or lesions   Lymph Nodes: No enlarged glands  Endocrine: No heat or cold intolerance; No hair loss       - DM     - THYROID DISORDER  Musculoskeletal: No joint pain or swelling; No muscle, back or extremity pain, No edema  Psychiatric: No depression, anxiety, mood swings or difficulty sleeping  Heme/Lymph: No easy bruising or bleeding gums         - ANEMIA      - pt uncertain about CANCER   -COAGULOPATHY  Allergy and Immunologic: No hives or eczema    PAST MEDICAL & SURGICAL HISTORY:  HLD (hyperlipidemia)    CAD (coronary artery disease)    BPH (benign prostatic hyperplasia)    S/P cataract extraction    H/O right hemicolectomy      FAMILY HISTORY:  No family history of cardiovascular disease (Father, Mother)      SOCIAL HISTORY:      - Tobacco     - ETOH    Allergies    No Known Allergies    Intolerances      Vital Signs Last 24 Hrs  T(C): 36.4 (20 Mar 2022 08:12), Max: 37 (19 Mar 2022 20:28)  T(F): 97.6 (20 Mar 2022 08:12), Max: 98.6 (19 Mar 2022 20:28)  HR: 54 (20 Mar 2022 08:12) (52 - 62)  BP: 135/84 (20 Mar 2022 08:12) (117/57 - 146/66)  BP(mean): --  RR: 16 (20 Mar 2022 08:12) (16 - 18)  SpO2: 98% (20 Mar 2022 08:12) (95% - 98%)    03-19 @ 07:01  -  03-20 @ 07:00  --------------------------------------------------------  IN: 0 mL / OUT: 500 mL / NET: -500 mL    03-20 @ 07:01  -  03-20 @ 13:43  --------------------------------------------------------  IN: 0 mL / OUT: 475 mL / NET: -475 mL          MEDICATIONS:  MEDICATIONS  (STANDING):  amLODIPine   Tablet 5 milliGRAM(s) Oral daily  aspirin enteric coated 81 milliGRAM(s) Oral daily  cefTRIAXone   IVPB 1000 milliGRAM(s) IV Intermittent every 24 hours  clopidogrel Tablet 75 milliGRAM(s) Oral daily  DULoxetine 30 milliGRAM(s) Oral daily  enoxaparin Injectable 30 milliGRAM(s) SubCutaneous every 24 hours  isosorbide   mononitrate ER Tablet (IMDUR) 30 milliGRAM(s) Oral daily  metoprolol tartrate 25 milliGRAM(s) Oral two times a day  mirtazapine 30 milliGRAM(s) Oral at bedtime  simvastatin 20 milliGRAM(s) Oral at bedtime  tamsulosin 0.4 milliGRAM(s) Oral at bedtime    MEDICATIONS  (PRN):  ALBUTerol    90 MICROgram(s) HFA Inhaler 1 Puff(s) Inhalation every 6 hours PRN Shortness of Breath and/or Wheezing  melatonin 3 milliGRAM(s) Oral at bedtime PRN Insomnia      PHYSICAL EXAM:  Un Comfortable, no immediate distress  Eyes: PERRL, EOM intact; conjunctiva and sclera clear  Head: Normocephalic;  No Trauma  ENMT: No nasal discharge, hoarseness, cough or hemoptysis  Neck: Supple; non tender; no masses or deformities.    No JVD  Respiratory:  - WHEEZING   + scattered  RHONCHI  - RALES  + CRACKLES.  Diminished breath sounds   LEFT base   Cardiovascular: Regular rate and rhythm. S1 and S2 Normal; No murmurs, gallops or rubs     - PPM/AICD  Gastrointestinal: Soft non-tender, non-distended; Normal bowel sounds; No hepatosplenomegaly.     -PEG    -  GT   - RUBIO  Genitourinary: No costovertebral angle tenderness. No dysuria  Extremities: AROM, No clubbing, cyanosis or edema    Vascular: Peripheral pulses palpable 2+ bilaterally  Neurological: Alert and responisve to stimuli   Skin: Warm and dry. No obvious rash  Lymph Nodes: No acute cervical or supraclavicular adenopathy  Psychiatric: not really engaging     DEVICES:  - DENTURES   +IV R / L     - ETUBE   -TRACH   -CTUBE  R / L    LABS:                          9.8    14.73 )-----------( 190      ( 20 Mar 2022 06:41 )             30.0     03-20    140  |  112<H>  |  21  ----------------------------<  78  4.3   |  18<L>  |  1.31<H>    Ca    8.5      20 Mar 2022 06:41  Phos  3.0     03-20  Mg     2.0     03-20    TPro  5.6<L>  /  Alb  3.0<L>  /  TBili  0.2  /  DBili  x   /  AST  16  /  ALT  13  /  AlkPhos  62  03-20      RADIOLOGY & ADDITIONAL STUDIES (The following images were personally reviewed):
Patient is a 89y old  Male who presents with a chief complaint of Chest Pain      HPI:  Mr. Ndiaye is a 89 year old man with a PMHx  HTN, known CAD s/p PCI w 3 DAYANA (mRCA, pRCA, mC in  w/ Dr. Varela), CKD (Cr 1.7- 2.4), COPD (not on home O2), colonic mass (s/p right hemicolectomy 2017 w/ Dr. Headley), Arthritis, BPH who presents from home with SOB x 1 day. Mr. Ndiaye states he was in his usual state of health (All ADLs besides cooking) until 3/18 when he began experiencing SOB that he associated with chest pain upon deep inspiration and weakness/malaise. His SOB is worse with exertion but denies any wheezing. He endorses a nonproductive cough. He does not attribute his symptoms to a COPD exacerbation like his prior hospitalizations. On review of symptoms, he endorses 1-2 episodes of diarrhea. He denies any sick contacts, recent antibiotic use, chest pain with exertion, orthopnea, paroxysmal nocturnal dyspnea.     In the ED, he was febrile to 101.2 F (rectally), HR 74, RR 19, and sPO2 96% on room air. His labs were significant for WBC 18.98, Hgb 12.4, , HCO3 20, Cr 1.52, Trop 0.01, and Pro-. EKG showed first degree heart block. CXR, although rotated, is not suggestive of focal infiltrates with hyperinflation. CT PE showed No central or lobar pulmonary emboli with concern for upper lobe infiltrates bilaterally with 2 solid nodules in LLL and an aortic aneurysm measuring 4.2 cm. He was given tylenol 650 mg, zosyn 3.375, vancomycin, 1g, and 1.9 L NS. He was admitted for sepsis 2/2 PNA.    (19 Mar 2022 07:28)    INTERVAL HPI/OVERNIGHT EVENTS:: NAEO, had PET scan yesterday. Will see if biopsy needs to be followed up in patient or outpatient.    HEALTH ISSUES - PROBLEM Dx:  Sepsis due to pneumonia    COPD, mild    HTN (hypertension)    Stage 3 chronic kidney disease    BPH without urinary obstruction    HLD (hyperlipidemia)    CAD (coronary artery disease)    Localized cancer of colon    Nutrition, metabolism, and development symptoms    Lung nodule, multiple    Anxiety and depression    AAA (abdominal aortic aneurysm)            PAST MEDICAL & SURGICAL HISTORY:  HLD (hyperlipidemia)    CAD (coronary artery disease)    BPH (benign prostatic hyperplasia)    S/P cataract extraction    H/O right hemicolectomy            Consultant NOTE  REVIEWED  (   )    REVIEW OF SYSTEMS:  [x] As per HPI  CONSTITUTIONAL: weakness  RESPIRATORY: cough   CARDIOVASCULAR: No chest pain, palpitations, dizziness, or leg swelling  GASTROINTESTINAL: No abdominal or epigastric pain. No nausea, vomiting, or hematemesis; No diarrhea or constipation. No melena or hematochezia.  MUSCULOSKELETAL: No joint pain or swelling; No muscle, back, or extremity pain  PSYCH    awake, alert       [x] All others negative	  [ ] Unable to obtain          Vital Signs Last 24 Hrs  T(C): 36.4 (20 Mar 2022 08:12), Max: 37 (19 Mar 2022 20:28)  T(F): 97.6 (20 Mar 2022 08:12), Max: 98.6 (19 Mar 2022 20:28)  HR: 54 (20 Mar 2022 08:12) (52 - 62)  BP: 135/84 (20 Mar 2022 08:12) (117/57 - 146/66)  BP(mean): --  RR: 16 (20 Mar 2022 08:12) (16 - 18)  SpO2: 98% (20 Mar 2022 08:12) (95% - 98%)        03-19 @ 07:01  -  03-20 @ 07:00  --------------------------------------------------------  IN: 0 mL / OUT: 500 mL / NET: -500 mL      PHYSICAL EXAMINATION:                                    (    )  NO CHANGE  Appearance: Normal	  HEENT:   Normal oral mucosa, PERRL, EOMI	  Neck: Supple, + JVD/ - JVD; Carotid Bruit   Cardiovascular: Normal S1 S2, mur   Respiratory: Lungs bilat rhonchi  Gastrointestinal:  Soft, Non-tender, + BS	  Skin: No rashes, No ecchymoses, No cyanosis  Extremities: Normal range of motion, No clubbing, cyanosis or edema  Vascular: Peripheral pulses palpable 2+ bilaterally  Neurologic: Non-focal  Psychiatry: A & O x 3, Mood & affect appropriate    ALBUTerol    90 MICROgram(s) HFA Inhaler 1 Puff(s) Inhalation every 6 hours PRN  amLODIPine   Tablet 5 milliGRAM(s) Oral daily  aspirin enteric coated 81 milliGRAM(s) Oral daily  cefTRIAXone   IVPB 1000 milliGRAM(s) IV Intermittent every 24 hours  clopidogrel Tablet 75 milliGRAM(s) Oral daily  DULoxetine 30 milliGRAM(s) Oral daily  enoxaparin Injectable 30 milliGRAM(s) SubCutaneous every 24 hours  isosorbide   mononitrate ER Tablet (IMDUR) 30 milliGRAM(s) Oral daily  melatonin 3 milliGRAM(s) Oral at bedtime PRN  metoprolol tartrate 25 milliGRAM(s) Oral two times a day  mirtazapine 30 milliGRAM(s) Oral at bedtime  simvastatin 20 milliGRAM(s) Oral at bedtime  tamsulosin 0.4 milliGRAM(s) Oral at bedtime            CARDIAC MARKERS ( 19 Mar 2022 02:55 )  x     / 0.01 ng/mL / x     / x     / x                                9.8    14.73 )-----------( 190      ( 20 Mar 2022 06:41 )             30.0     03-20    140  |  112<H>  |  21  ----------------------------<  78  4.3   |  18<L>  |  1.31<H>    Ca    8.5      20 Mar 2022 06:41  Phos  3.0     03-20  Mg     2.0     03-20    TPro  5.6<L>  /  Alb  3.0<L>  /  TBili  0.2  /  DBili  x   /  AST  16  /  ALT  13  /  AlkPhos  62  03-20      CAPILLARY BLOOD GLUCOSE        Urinalysis Basic - ( 19 Mar 2022 05:05 )    Color: Yellow / Appearance: Clear / S.010 / pH: x  Gluc: x / Ketone: NEGATIVE  / Bili: Negative / Urobili: 0.2 E.U./dL   Blood: x / Protein: 30 mg/dL / Nitrite: NEGATIVE   Leuk Esterase: Trace / RBC: < 5 /HPF / WBC < 5 /HPF   Sq Epi: x / Non Sq Epi: 0-5 /HPF / Bacteria: Present /HPF  < from: CT Angio Chest PE Protocol w/ IV Cont (03.19.22 @ 06:01) >  1. No central or lobar pulmonary emboli. Study degraded by respiratory   motion artifact.  2. Two 1.8 cm solid nodules in the left lower lobe, new since 11/10/2020.   See management recommendations below.  4. Incompletely imaged abdominal aortic aneurysm measuring up to 4.2 cm   as described above.  4. Several faint ground-glass alveolar opacities in both upper lobes ,   new since 11/10/2020, nonspecific though most likely infectious or   inflammatory in etiology.  5. Additional incidental/nonemergent findings are discussed in the body   of the report.    Fleischner Society guidelines for follow-up and management of multiple   pulmonary nodulesgreater than 8 mm: For patients at low risk (minimal or   absent history of smoking and of other known risk factors), recommend   follow-up chest CT at 3-6 months and consider followup at 18 - 24 months.   For patients at high risk (history of smoking or of other known risk   factors), recommend initial follow-up chest CT at 3-6 months, then at   18-24 months.    Thank you for allowing us to participate in the care of your patient.  Dictated and Authenticated by: Dania Lawson MD  2022 5:59 AM Eastern Time ([place] & [place])    The above report was submitted by the [facility] attending radiologist and   copied to PowerScribe by resident Dr. Gould.    ==========================================================  FINAL ATTENDING RADIOLOGIST INTERPRETATION: Modifications to the   preliminary report as follows: No pulmonary embolism seen. Since November   10, 2020, two new solid pulmonary nodules in left lower lobe measuring up   to 1.8 cm, possibly primary or secondary lung malignancy. Recommend FDG   PET/CT and/or tissue sampling. Couple borderline enlarged mediastinal   nodes, possibly neoplastic or inflammatory/reactive etiology, for   example: Right lower paratracheal 1.5 x 1.0 cm and left lower   paratracheal 1.7 x 1.0 cm (series 5 image 57). New couple faint     < end of copied text >  AWAIT PET /CT  
Patient is a 89y old  Male who presents with a chief complaint of Chest Pain (19 Mar 2022 16:17)      HPI:  Mr. Ndiaye is a 89 year old man with a PMHx  HTN, known CAD s/p PCI w 3 DAYANA (mRCA, pRCA, mC in  w/ Dr. Varela), CKD (Cr 1.7- 2.4), COPD (not on home O2), colonic mass (s/p right hemicolectomy 2017 w/ Dr. Headley), Arthritis, BPH who presents from home with SOB x 1 day. Mr. Ndiaye states he was in his usual state of health (All ADLs besides cooking) until 3/18 when he began experiencing SOB that he associated with chest pain upon deep inspiration and weakness/malaise. His SOB is worse with exertion but denies any wheezing. He endorses a nonproductive cough. He does not attribute his symptoms to a COPD exacerbation like his prior hospitalizations. On review of symptoms, he endorses 1-2 episodes of diarrhea. He denies any sick contacts, recent antibiotic use, chest pain with exertion, orthopnea, paroxysmal nocturnal dyspnea.     In the ED, he was febrile to 101.2 F (rectally), HR 74, RR 19, and sPO2 96% on room air. His labs were significant for WBC 18.98, Hgb 12.4, , HCO3 20, Cr 1.52, Trop 0.01, and Pro-. EKG showed first degree heart block. CXR, although rotated, is not suggestive of focal infiltrates with hyperinflation. CT PE showed No central or lobar pulmonary emboli with concern for upper lobe infiltrates bilaterally with 2 solid nodules in LLL and an aortic aneurysm measuring 4.2 cm. He was given tylenol 650 mg, zosyn 3.375, vancomycin, 1g, and 1.9 L NS. He was admitted for sepsis 2/2 PNA.    (19 Mar 2022 07:28)    INTERVAL HPI/OVERNIGHT EVENTS:::    HEALTH ISSUES - PROBLEM Dx:  Sepsis due to pneumonia    COPD, mild    HTN (hypertension)    Stage 3 chronic kidney disease    BPH without urinary obstruction    HLD (hyperlipidemia)    CAD (coronary artery disease)    Localized cancer of colon    Nutrition, metabolism, and development symptoms    Lung nodule, multiple    Anxiety and depression    AAA (abdominal aortic aneurysm)            PAST MEDICAL & SURGICAL HISTORY:  HLD (hyperlipidemia)    CAD (coronary artery disease)    BPH (benign prostatic hyperplasia)    S/P cataract extraction    H/O right hemicolectomy            Consultant NOTE  REVIEWED  (   )    REVIEW OF SYSTEMS:  [x] As per HPI  CONSTITUTIONAL: weakness  RESPIRATORY: cough   CARDIOVASCULAR: No chest pain, palpitations, dizziness, or leg swelling  GASTROINTESTINAL: No abdominal or epigastric pain. No nausea, vomiting, or hematemesis; No diarrhea or constipation. No melena or hematochezia.  MUSCULOSKELETAL: No joint pain or swelling; No muscle, back, or extremity pain  PSYCH    awake, alert       [x] All others negative	  [ ] Unable to obtain          Vital Signs Last 24 Hrs  T(C): 36.4 (20 Mar 2022 08:12), Max: 37 (19 Mar 2022 20:28)  T(F): 97.6 (20 Mar 2022 08:12), Max: 98.6 (19 Mar 2022 20:28)  HR: 54 (20 Mar 2022 08:12) (52 - 62)  BP: 135/84 (20 Mar 2022 08:12) (117/57 - 146/66)  BP(mean): --  RR: 16 (20 Mar 2022 08:12) (16 - 18)  SpO2: 98% (20 Mar 2022 08:12) (95% - 98%)        03-19 @ 07:01  -  03-20 @ 07:00  --------------------------------------------------------  IN: 0 mL / OUT: 500 mL / NET: -500 mL      PHYSICAL EXAMINATION:                                    (    )  NO CHANGE  Appearance: Normal	  HEENT:   Normal oral mucosa, PERRL, EOMI	  Neck: Supple, + JVD/ - JVD; Carotid Bruit   Cardiovascular: Normal S1 S2, mur   Respiratory: Lungs bilat rhonchi  Gastrointestinal:  Soft, Non-tender, + BS	  Skin: No rashes, No ecchymoses, No cyanosis  Extremities: Normal range of motion, No clubbing, cyanosis or edema  Vascular: Peripheral pulses palpable 2+ bilaterally  Neurologic: Non-focal  Psychiatry: A & O x 3, Mood & affect appropriate    ALBUTerol    90 MICROgram(s) HFA Inhaler 1 Puff(s) Inhalation every 6 hours PRN  amLODIPine   Tablet 5 milliGRAM(s) Oral daily  aspirin enteric coated 81 milliGRAM(s) Oral daily  cefTRIAXone   IVPB 1000 milliGRAM(s) IV Intermittent every 24 hours  clopidogrel Tablet 75 milliGRAM(s) Oral daily  DULoxetine 30 milliGRAM(s) Oral daily  enoxaparin Injectable 30 milliGRAM(s) SubCutaneous every 24 hours  isosorbide   mononitrate ER Tablet (IMDUR) 30 milliGRAM(s) Oral daily  melatonin 3 milliGRAM(s) Oral at bedtime PRN  metoprolol tartrate 25 milliGRAM(s) Oral two times a day  mirtazapine 30 milliGRAM(s) Oral at bedtime  simvastatin 20 milliGRAM(s) Oral at bedtime  tamsulosin 0.4 milliGRAM(s) Oral at bedtime            CARDIAC MARKERS ( 19 Mar 2022 02:55 )  x     / 0.01 ng/mL / x     / x     / x                                9.8    14.73 )-----------( 190      ( 20 Mar 2022 06:41 )             30.0     03-20    140  |  112<H>  |  21  ----------------------------<  78  4.3   |  18<L>  |  1.31<H>    Ca    8.5      20 Mar 2022 06:41  Phos  3.0     03-20  Mg     2.0     03-20    TPro  5.6<L>  /  Alb  3.0<L>  /  TBili  0.2  /  DBili  x   /  AST  16  /  ALT  13  /  AlkPhos  62  03-20      CAPILLARY BLOOD GLUCOSE        Urinalysis Basic - ( 19 Mar 2022 05:05 )    Color: Yellow / Appearance: Clear / S.010 / pH: x  Gluc: x / Ketone: NEGATIVE  / Bili: Negative / Urobili: 0.2 E.U./dL   Blood: x / Protein: 30 mg/dL / Nitrite: NEGATIVE   Leuk Esterase: Trace / RBC: < 5 /HPF / WBC < 5 /HPF   Sq Epi: x / Non Sq Epi: 0-5 /HPF / Bacteria: Present /HPF    
Patient is a 89y old  Male who presents with a chief complaint of Chest Pain      HPI:  Mr. Ndiaye is a 89 year old man with a PMHx  HTN, known CAD s/p PCI w 3 DAYANA (mRCA, pRCA, mC in  w/ Dr. Varela), CKD (Cr 1.7- 2.4), COPD (not on home O2), colonic mass (s/p right hemicolectomy 2017 w/ Dr. Headley), Arthritis, BPH who presents from home with SOB x 1 day. Mr. Ndiaye states he was in his usual state of health (All ADLs besides cooking) until 3/18 when he began experiencing SOB that he associated with chest pain upon deep inspiration and weakness/malaise. His SOB is worse with exertion but denies any wheezing. He endorses a nonproductive cough. He does not attribute his symptoms to a COPD exacerbation like his prior hospitalizations. On review of symptoms, he endorses 1-2 episodes of diarrhea. He denies any sick contacts, recent antibiotic use, chest pain with exertion, orthopnea, paroxysmal nocturnal dyspnea.     In the ED, he was febrile to 101.2 F (rectally), HR 74, RR 19, and sPO2 96% on room air. His labs were significant for WBC 18.98, Hgb 12.4, , HCO3 20, Cr 1.52, Trop 0.01, and Pro-. EKG showed first degree heart block. CXR, although rotated, is not suggestive of focal infiltrates with hyperinflation. CT PE showed No central or lobar pulmonary emboli with concern for upper lobe infiltrates bilaterally with 2 solid nodules in LLL and an aortic aneurysm measuring 4.2 cm. He was given tylenol 650 mg, zosyn 3.375, vancomycin, 1g, and 1.9 L NS. He was admitted for sepsis 2/2 PNA.    (19 Mar 2022 07:28)    INTERVAL HPI/OVERNIGHT EVENTS:: NAEO, pending PET Scan    HEALTH ISSUES - PROBLEM Dx:  Sepsis due to pneumonia    COPD, mild    HTN (hypertension)    Stage 3 chronic kidney disease    BPH without urinary obstruction    HLD (hyperlipidemia)    CAD (coronary artery disease)    Localized cancer of colon    Nutrition, metabolism, and development symptoms    Lung nodule, multiple    Anxiety and depression    AAA (abdominal aortic aneurysm)            PAST MEDICAL & SURGICAL HISTORY:  HLD (hyperlipidemia)    CAD (coronary artery disease)    BPH (benign prostatic hyperplasia)    S/P cataract extraction    H/O right hemicolectomy            Consultant NOTE  REVIEWED  (   )    REVIEW OF SYSTEMS:  [x] As per HPI  CONSTITUTIONAL: weakness  RESPIRATORY: cough   CARDIOVASCULAR: No chest pain, palpitations, dizziness, or leg swelling  GASTROINTESTINAL: No abdominal or epigastric pain. No nausea, vomiting, or hematemesis; No diarrhea or constipation. No melena or hematochezia.  MUSCULOSKELETAL: No joint pain or swelling; No muscle, back, or extremity pain  PSYCH    awake, alert       [x] All others negative	  [ ] Unable to obtain          Vital Signs Last 24 Hrs  T(C): 36.4 (20 Mar 2022 08:12), Max: 37 (19 Mar 2022 20:28)  T(F): 97.6 (20 Mar 2022 08:12), Max: 98.6 (19 Mar 2022 20:28)  HR: 54 (20 Mar 2022 08:12) (52 - 62)  BP: 135/84 (20 Mar 2022 08:12) (117/57 - 146/66)  BP(mean): --  RR: 16 (20 Mar 2022 08:12) (16 - 18)  SpO2: 98% (20 Mar 2022 08:12) (95% - 98%)        03-19 @ 07:01  -  03-20 @ 07:00  --------------------------------------------------------  IN: 0 mL / OUT: 500 mL / NET: -500 mL      PHYSICAL EXAMINATION:                                    (    )  NO CHANGE  Appearance: Normal	  HEENT:   Normal oral mucosa, PERRL, EOMI	  Neck: Supple, + JVD/ - JVD; Carotid Bruit   Cardiovascular: Normal S1 S2, mur   Respiratory: Lungs bilat rhonchi  Gastrointestinal:  Soft, Non-tender, + BS	  Skin: No rashes, No ecchymoses, No cyanosis  Extremities: Normal range of motion, No clubbing, cyanosis or edema  Vascular: Peripheral pulses palpable 2+ bilaterally  Neurologic: Non-focal  Psychiatry: A & O x 3, Mood & affect appropriate    ALBUTerol    90 MICROgram(s) HFA Inhaler 1 Puff(s) Inhalation every 6 hours PRN  amLODIPine   Tablet 5 milliGRAM(s) Oral daily  aspirin enteric coated 81 milliGRAM(s) Oral daily  cefTRIAXone   IVPB 1000 milliGRAM(s) IV Intermittent every 24 hours  clopidogrel Tablet 75 milliGRAM(s) Oral daily  DULoxetine 30 milliGRAM(s) Oral daily  enoxaparin Injectable 30 milliGRAM(s) SubCutaneous every 24 hours  isosorbide   mononitrate ER Tablet (IMDUR) 30 milliGRAM(s) Oral daily  melatonin 3 milliGRAM(s) Oral at bedtime PRN  metoprolol tartrate 25 milliGRAM(s) Oral two times a day  mirtazapine 30 milliGRAM(s) Oral at bedtime  simvastatin 20 milliGRAM(s) Oral at bedtime  tamsulosin 0.4 milliGRAM(s) Oral at bedtime            CARDIAC MARKERS ( 19 Mar 2022 02:55 )  x     / 0.01 ng/mL / x     / x     / x                                9.8    14.73 )-----------( 190      ( 20 Mar 2022 06:41 )             30.0     03-20    140  |  112<H>  |  21  ----------------------------<  78  4.3   |  18<L>  |  1.31<H>    Ca    8.5      20 Mar 2022 06:41  Phos  3.0     03-20  Mg     2.0     03-20    TPro  5.6<L>  /  Alb  3.0<L>  /  TBili  0.2  /  DBili  x   /  AST  16  /  ALT  13  /  AlkPhos  62  03-20      CAPILLARY BLOOD GLUCOSE        Urinalysis Basic - ( 19 Mar 2022 05:05 )    Color: Yellow / Appearance: Clear / S.010 / pH: x  Gluc: x / Ketone: NEGATIVE  / Bili: Negative / Urobili: 0.2 E.U./dL   Blood: x / Protein: 30 mg/dL / Nitrite: NEGATIVE   Leuk Esterase: Trace / RBC: < 5 /HPF / WBC < 5 /HPF   Sq Epi: x / Non Sq Epi: 0-5 /HPF / Bacteria: Present /HPF    
Patient is a 89y old  Male who presents with a chief complaint of Chest Pain      HPI:  Mr. Ndiaye is a 89 year old man with a PMHx  HTN, known CAD s/p PCI w 3 DAYANA (mRCA, pRCA, mC in  w/ Dr. Varela), CKD (Cr 1.7- 2.4), COPD (not on home O2), colonic mass (s/p right hemicolectomy 2017 w/ Dr. Headley), Arthritis, BPH who presents from home with SOB x 1 day. Mr. Ndiaye states he was in his usual state of health (All ADLs besides cooking) until 3/18 when he began experiencing SOB that he associated with chest pain upon deep inspiration and weakness/malaise. His SOB is worse with exertion but denies any wheezing. He endorses a nonproductive cough. He does not attribute his symptoms to a COPD exacerbation like his prior hospitalizations. On review of symptoms, he endorses 1-2 episodes of diarrhea. He denies any sick contacts, recent antibiotic use, chest pain with exertion, orthopnea, paroxysmal nocturnal dyspnea.     In the ED, he was febrile to 101.2 F (rectally), HR 74, RR 19, and sPO2 96% on room air. His labs were significant for WBC 18.98, Hgb 12.4, , HCO3 20, Cr 1.52, Trop 0.01, and Pro-. EKG showed first degree heart block. CXR, although rotated, is not suggestive of focal infiltrates with hyperinflation. CT PE showed No central or lobar pulmonary emboli with concern for upper lobe infiltrates bilaterally with 2 solid nodules in LLL and an aortic aneurysm measuring 4.2 cm. He was given tylenol 650 mg, zosyn 3.375, vancomycin, 1g, and 1.9 L NS. He was admitted for sepsis 2/2 PNA.    (19 Mar 2022 07:28)    INTERVAL HPI/OVERNIGHT EVENTS:: NAEO, had PET scan yesterday. Will see if biopsy needs to be followed up in patient or outpatient.    HEALTH ISSUES - PROBLEM Dx:  Sepsis due to pneumonia    COPD, mild    HTN (hypertension)    Stage 3 chronic kidney disease    BPH without urinary obstruction    HLD (hyperlipidemia)    CAD (coronary artery disease)    Localized cancer of colon    Nutrition, metabolism, and development symptoms    Lung nodule, multiple    Anxiety and depression    AAA (abdominal aortic aneurysm)            PAST MEDICAL & SURGICAL HISTORY:  HLD (hyperlipidemia)    CAD (coronary artery disease)    BPH (benign prostatic hyperplasia)    S/P cataract extraction    H/O right hemicolectomy            Consultant NOTE  REVIEWED  (   )    REVIEW OF SYSTEMS:  [x] As per HPI  CONSTITUTIONAL: weakness  RESPIRATORY: cough   CARDIOVASCULAR: No chest pain, palpitations, dizziness, or leg swelling  GASTROINTESTINAL: No abdominal or epigastric pain. No nausea, vomiting, or hematemesis; No diarrhea or constipation. No melena or hematochezia.  MUSCULOSKELETAL: No joint pain or swelling; No muscle, back, or extremity pain  PSYCH    awake, alert       [x] All others negative	  [ ] Unable to obtain          Vital Signs Last 24 Hrs  T(C): 36.4 (20 Mar 2022 08:12), Max: 37 (19 Mar 2022 20:28)  T(F): 97.6 (20 Mar 2022 08:12), Max: 98.6 (19 Mar 2022 20:28)  HR: 54 (20 Mar 2022 08:12) (52 - 62)  BP: 135/84 (20 Mar 2022 08:12) (117/57 - 146/66)  BP(mean): --  RR: 16 (20 Mar 2022 08:12) (16 - 18)  SpO2: 98% (20 Mar 2022 08:12) (95% - 98%)        03-19 @ 07:01  -  03-20 @ 07:00  --------------------------------------------------------  IN: 0 mL / OUT: 500 mL / NET: -500 mL      PHYSICAL EXAMINATION:                                    (    )  NO CHANGE  Appearance: Normal	  HEENT:   Normal oral mucosa, PERRL, EOMI	  Neck: Supple, + JVD/ - JVD; Carotid Bruit   Cardiovascular: Normal S1 S2, mur   Respiratory: Lungs bilat rhonchi  Gastrointestinal:  Soft, Non-tender, + BS	  Skin: No rashes, No ecchymoses, No cyanosis  Extremities: Normal range of motion, No clubbing, cyanosis or edema  Vascular: Peripheral pulses palpable 2+ bilaterally  Neurologic: Non-focal  Psychiatry: A & O x 3, Mood & affect appropriate    ALBUTerol    90 MICROgram(s) HFA Inhaler 1 Puff(s) Inhalation every 6 hours PRN  amLODIPine   Tablet 5 milliGRAM(s) Oral daily  aspirin enteric coated 81 milliGRAM(s) Oral daily  cefTRIAXone   IVPB 1000 milliGRAM(s) IV Intermittent every 24 hours  clopidogrel Tablet 75 milliGRAM(s) Oral daily  DULoxetine 30 milliGRAM(s) Oral daily  enoxaparin Injectable 30 milliGRAM(s) SubCutaneous every 24 hours  isosorbide   mononitrate ER Tablet (IMDUR) 30 milliGRAM(s) Oral daily  melatonin 3 milliGRAM(s) Oral at bedtime PRN  metoprolol tartrate 25 milliGRAM(s) Oral two times a day  mirtazapine 30 milliGRAM(s) Oral at bedtime  simvastatin 20 milliGRAM(s) Oral at bedtime  tamsulosin 0.4 milliGRAM(s) Oral at bedtime            CARDIAC MARKERS ( 19 Mar 2022 02:55 )  x     / 0.01 ng/mL / x     / x     / x                                9.8    14.73 )-----------( 190      ( 20 Mar 2022 06:41 )             30.0     03-20    140  |  112<H>  |  21  ----------------------------<  78  4.3   |  18<L>  |  1.31<H>    Ca    8.5      20 Mar 2022 06:41  Phos  3.0     03-20  Mg     2.0     03-20    TPro  5.6<L>  /  Alb  3.0<L>  /  TBili  0.2  /  DBili  x   /  AST  16  /  ALT  13  /  AlkPhos  62  03-20      CAPILLARY BLOOD GLUCOSE        Urinalysis Basic - ( 19 Mar 2022 05:05 )    Color: Yellow / Appearance: Clear / S.010 / pH: x  Gluc: x / Ketone: NEGATIVE  / Bili: Negative / Urobili: 0.2 E.U./dL   Blood: x / Protein: 30 mg/dL / Nitrite: NEGATIVE   Leuk Esterase: Trace / RBC: < 5 /HPF / WBC < 5 /HPF   Sq Epi: x / Non Sq Epi: 0-5 /HPF / Bacteria: Present /HPF  < from: CT Angio Chest PE Protocol w/ IV Cont (03.19.22 @ 06:01) >  1. No central or lobar pulmonary emboli. Study degraded by respiratory   motion artifact.  2. Two 1.8 cm solid nodules in the left lower lobe, new since 11/10/2020.   See management recommendations below.  4. Incompletely imaged abdominal aortic aneurysm measuring up to 4.2 cm   as described above.  4. Several faint ground-glass alveolar opacities in both upper lobes ,   new since 11/10/2020, nonspecific though most likely infectious or   inflammatory in etiology.  5. Additional incidental/nonemergent findings are discussed in the body   of the report.    Fleischner Society guidelines for follow-up and management of multiple   pulmonary nodulesgreater than 8 mm: For patients at low risk (minimal or   absent history of smoking and of other known risk factors), recommend   follow-up chest CT at 3-6 months and consider followup at 18 - 24 months.   For patients at high risk (history of smoking or of other known risk   factors), recommend initial follow-up chest CT at 3-6 months, then at   18-24 months.    Thank you for allowing us to participate in the care of your patient.  Dictated and Authenticated by: Dania Lawson MD  2022 5:59 AM Eastern Time ([place] & [place])    The above report was submitted by the [facility] attending radiologist and   copied to PowerScribe by resident Dr. Gould.    ==========================================================  FINAL ATTENDING RADIOLOGIST INTERPRETATION: Modifications to the   preliminary report as follows: No pulmonary embolism seen. Since November   10, 2020, two new solid pulmonary nodules in left lower lobe measuring up   to 1.8 cm, possibly primary or secondary lung malignancy. Recommend FDG   PET/CT and/or tissue sampling. Couple borderline enlarged mediastinal   nodes, possibly neoplastic or inflammatory/reactive etiology, for   example: Right lower paratracheal 1.5 x 1.0 cm and left lower   paratracheal 1.7 x 1.0 cm (series 5 image 57). New couple faint     < end of copied text >  AWAIT PET /CT  
Patient is a 89y old  Male who presents with a chief complaint of Chest Pain      HPI:  Mr. Ndiaye is a 89 year old man with a PMHx  HTN, known CAD s/p PCI w 3 DAYANA (mRCA, pRCA, mC in  w/ Dr. Varela), CKD (Cr 1.7- 2.4), COPD (not on home O2), colonic mass (s/p right hemicolectomy 2017 w/ Dr. Headley), Arthritis, BPH who presents from home with SOB x 1 day. Mr. Ndiaye states he was in his usual state of health (All ADLs besides cooking) until 3/18 when he began experiencing SOB that he associated with chest pain upon deep inspiration and weakness/malaise. His SOB is worse with exertion but denies any wheezing. He endorses a nonproductive cough. He does not attribute his symptoms to a COPD exacerbation like his prior hospitalizations. On review of symptoms, he endorses 1-2 episodes of diarrhea. He denies any sick contacts, recent antibiotic use, chest pain with exertion, orthopnea, paroxysmal nocturnal dyspnea.     In the ED, he was febrile to 101.2 F (rectally), HR 74, RR 19, and sPO2 96% on room air. His labs were significant for WBC 18.98, Hgb 12.4, , HCO3 20, Cr 1.52, Trop 0.01, and Pro-. EKG showed first degree heart block. CXR, although rotated, is not suggestive of focal infiltrates with hyperinflation. CT PE showed No central or lobar pulmonary emboli with concern for upper lobe infiltrates bilaterally with 2 solid nodules in LLL and an aortic aneurysm measuring 4.2 cm. He was given tylenol 650 mg, zosyn 3.375, vancomycin, 1g, and 1.9 L NS. He was admitted for sepsis 2/2 PNA.    (19 Mar 2022 07:28)    INTERVAL HPI/OVERNIGHT EVENTS:: NAEO, had PET scan yesterday. Will see if biopsy needs to be followed up in patient or outpatient.    HEALTH ISSUES - PROBLEM Dx:  Sepsis due to pneumonia    COPD, mild    HTN (hypertension)    Stage 3 chronic kidney disease    BPH without urinary obstruction    HLD (hyperlipidemia)    CAD (coronary artery disease)    Localized cancer of colon    Nutrition, metabolism, and development symptoms    Lung nodule, multiple    Anxiety and depression    AAA (abdominal aortic aneurysm)            PAST MEDICAL & SURGICAL HISTORY:  HLD (hyperlipidemia)    CAD (coronary artery disease)    BPH (benign prostatic hyperplasia)    S/P cataract extraction    H/O right hemicolectomy            Consultant NOTE  REVIEWED  (   )    REVIEW OF SYSTEMS:  [x] As per HPI  CONSTITUTIONAL: weakness  RESPIRATORY: cough   CARDIOVASCULAR: No chest pain, palpitations, dizziness, or leg swelling  GASTROINTESTINAL: No abdominal or epigastric pain. No nausea, vomiting, or hematemesis; No diarrhea or constipation. No melena or hematochezia.  MUSCULOSKELETAL: No joint pain or swelling; No muscle, back, or extremity pain  PSYCH    awake, alert       [x] All others negative	  [ ] Unable to obtain          Vital Signs Last 24 Hrs  T(C): 36.4 (20 Mar 2022 08:12), Max: 37 (19 Mar 2022 20:28)  T(F): 97.6 (20 Mar 2022 08:12), Max: 98.6 (19 Mar 2022 20:28)  HR: 54 (20 Mar 2022 08:12) (52 - 62)  BP: 135/84 (20 Mar 2022 08:12) (117/57 - 146/66)  BP(mean): --  RR: 16 (20 Mar 2022 08:12) (16 - 18)  SpO2: 98% (20 Mar 2022 08:12) (95% - 98%)        03-19 @ 07:01  -  03-20 @ 07:00  --------------------------------------------------------  IN: 0 mL / OUT: 500 mL / NET: -500 mL      PHYSICAL EXAMINATION:                                    (    )  NO CHANGE  Appearance: Normal	  HEENT:   Normal oral mucosa, PERRL, EOMI	  Neck: Supple, + JVD/ - JVD; Carotid Bruit   Cardiovascular: Normal S1 S2, mur   Respiratory: Lungs bilat rhonchi  Gastrointestinal:  Soft, Non-tender, + BS	  Skin: No rashes, No ecchymoses, No cyanosis  Extremities: Normal range of motion, No clubbing, cyanosis or edema  Vascular: Peripheral pulses palpable 2+ bilaterally  Neurologic: Non-focal  Psychiatry: A & O x 3, Mood & affect appropriate    ALBUTerol    90 MICROgram(s) HFA Inhaler 1 Puff(s) Inhalation every 6 hours PRN  amLODIPine   Tablet 5 milliGRAM(s) Oral daily  aspirin enteric coated 81 milliGRAM(s) Oral daily  cefTRIAXone   IVPB 1000 milliGRAM(s) IV Intermittent every 24 hours  clopidogrel Tablet 75 milliGRAM(s) Oral daily  DULoxetine 30 milliGRAM(s) Oral daily  enoxaparin Injectable 30 milliGRAM(s) SubCutaneous every 24 hours  isosorbide   mononitrate ER Tablet (IMDUR) 30 milliGRAM(s) Oral daily  melatonin 3 milliGRAM(s) Oral at bedtime PRN  metoprolol tartrate 25 milliGRAM(s) Oral two times a day  mirtazapine 30 milliGRAM(s) Oral at bedtime  simvastatin 20 milliGRAM(s) Oral at bedtime  tamsulosin 0.4 milliGRAM(s) Oral at bedtime            CARDIAC MARKERS ( 19 Mar 2022 02:55 )  x     / 0.01 ng/mL / x     / x     / x                                9.8    14.73 )-----------( 190      ( 20 Mar 2022 06:41 )             30.0     03-20    140  |  112<H>  |  21  ----------------------------<  78  4.3   |  18<L>  |  1.31<H>    Ca    8.5      20 Mar 2022 06:41  Phos  3.0     03-20  Mg     2.0     03-20    TPro  5.6<L>  /  Alb  3.0<L>  /  TBili  0.2  /  DBili  x   /  AST  16  /  ALT  13  /  AlkPhos  62  03-20      CAPILLARY BLOOD GLUCOSE        Urinalysis Basic - ( 19 Mar 2022 05:05 )    Color: Yellow / Appearance: Clear / S.010 / pH: x  Gluc: x / Ketone: NEGATIVE  / Bili: Negative / Urobili: 0.2 E.U./dL   Blood: x / Protein: 30 mg/dL / Nitrite: NEGATIVE   Leuk Esterase: Trace / RBC: < 5 /HPF / WBC < 5 /HPF   Sq Epi: x / Non Sq Epi: 0-5 /HPF / Bacteria: Present /HPF

## 2022-03-23 NOTE — PROGRESS NOTE ADULT - PROBLEM SELECTOR PLAN 11
s/p resection with Dr. Headley  - Lung nodules as above could represent metastases

## 2022-03-23 NOTE — PROGRESS NOTE ADULT - PROBLEM SELECTOR PLAN 7
Baseline kidney function Cr ~1.7; and 1.5 on admission  - Continue to monitor with daily BMP  - No electrolyte derangements   - Avoid nephrotoxic medications

## 2022-03-23 NOTE — PROGRESS NOTE ADULT - ASSESSMENT
ASSESSMENT/PLANMr. Ndiaye is a 89 year old man with a PMHx  HTN, known CAD s/p PCI w 3 DAYANA (mRCA, pRCA, mC in 2013 w/ Dr. Varela), CKD (Cr 1.7- 2.4), COPD (not on home O2), colonic mass (s/p right hemicolectomy 4/2017 w/ Dr. Headley), Arthritis, BPH who presents from home with SOB x 1 day. He was admitted for sepsis 2/2 PNA. LL Lobe  Lung nodules concern for metastatic disease.     1. O2 2LNC at this time   2. Bronchodilators:  Atrovent/ albuterol q 4 – 6 hours as needed  3. Corticosteroids: off   4. ID/Antibiotics: Ceftriaxone/ S/P  Zithromax   5. Cardiac/HTN: optimize BP, Cardiology feedback   6. GI: Rx/ prophylaxis c PPI/H2B  7. Heme: Rx/VT prophylaxis pt on Enoxaparin, ASA, Plavix   8. Aspiration precautions at all times   9. PET CT ordered  to stratify further mngmnt and GOC   Discussed with managing team
Mr. Ndiaye is a 89 year old man with a PMHx  HTN, known CAD s/p PCI w 3 DAYANA (mRCA, pRCA, mC in 2013 w/ Dr. Varela), CKD (Cr 1.7- 2.4), COPD (not on home O2), colonic mass (s/p right hemicolectomy 4/2017 w/ Dr. Headley), Arthritis, BPH who presents from home with SOB x 1 day. He was admitted for sepsis 2/2 PNA.  PN  CAD  CHF  CKD  --Improved with IV ab  CV stable  OOB  PT    patient would not be a candidate for any invasive procedure.  risk>>> benefit
ASSESSMENT/PLANMr. Ndiaye is a 89 year old man with a PMHx  HTN, known CAD s/p PCI w 3 DAYANA (mRCA, pRCA, mC in 2013 w/ Dr. Varela), CKD (Cr 1.7- 2.4), COPD (not on home O2), colonic mass (s/p right hemicolectomy 4/2017 w/ Dr. Headley), Arthritis, BPH who presents from home with SOB x 1 day. He was admitted for sepsis 2/2 PNA. LL Lobe  Lung nodules concern for metastatic disease.     1. O2 2LNC at this time   2. Bronchodilators:  Atrovent/ albuterol q 4 – 6 hours as needed  3. Corticosteroids: off   4. ID/Antibiotics: Ceftriaxone/Zithromax   5. Cardiac/HTN: opptimize BP, Cardiology feedback   6. GI: Rx/ prophylaxis c PPI/H2B  7. Heme: Rx/VT prophylaxis pt on ASA, Plavix   8. Aspiration precautions at all times   9. Recommend PET CT to stratify further mngmnt and GOOC   Discussed with managing team      
ASSESSMENT/PLANMr. Ndiaye is a 89 year old man with a PMHx  HTN, known CAD s/p PCI w 3 DAYANA (mRCA, pRCA, mC in 2013 w/ Dr. Varela), CKD (Cr 1.7- 2.4), COPD (not on home O2), colonic mass (s/p right hemicolectomy 4/2017 w/ Dr. Headley), Arthritis, BPH who presents from home with SOB x 1 day. He was admitted for sepsis 2/2 PNA. LL Lobe  Lung nodules concern for metastatic disease.     1. V0elccfxo now off   2. Bronchodilators:  Atrovent/ albuterol q 4 – 6 hours as needed  3. Corticosteroids: off   4. ID/Antibiotics: now off Ceftriaxone/ S/P  Zithromax   5. Cardiac/HTN: optimize BP, Cardiology feedback   6. GI: Rx/ prophylaxis c PPI/H2B  7. Heme: Rx/VT prophylaxis pt on Enoxaparin, ASA, Plavix   8. Aspiration precautions at all times   9. CT guided LLL BX by IR planned   10.Mobilize, PT  Discussed with managing team,  
SEPSIS  PN  CAD  COPD  AAA--not acute, not a surgical candidate  HTN;--keep sys BP < 130
ASSESSMENT/PLANMr. Ndiaye is a 89 year old man with a PMHx  HTN, known CAD s/p PCI w 3 DAYANA (mRCA, pRCA, mC in 2013 w/ Dr. Varela), CKD (Cr 1.7- 2.4), COPD (not on home O2), colonic mass (s/p right hemicolectomy 4/2017 w/ Dr. Headley), Arthritis, BPH who presents from home with SOB x 1 day. He was admitted for sepsis 2/2 PNA. LL Lobe  Lung nodules concern for metastatic disease.     1. O2 2LNC at this time   2. Bronchodilators:  Atrovent/ albuterol q 4 – 6 hours as needed  3. Corticosteroids: off   4. ID/Antibiotics: Ceftriaxone/ S/P  Zithromax   5. Cardiac/HTN: optimize BP, Cardiology feedback   6. GI: Rx/ prophylaxis c PPI/H2B  7. Heme: Rx/VT prophylaxis pt on Enoxaparin, ASA, Plavix   8. Aspiration precautions at all times   9. PET CT today to stratify further mngmnt and GOC  10.Mobilize, PT  Discussed with managing team
per Internal Medicine     89 year old man with a PMHx  HTN, known CAD s/p PCI w 3 DAYANA (mRCA, pRCA, mC in 2013 w/ Dr. Varela), CKD (Cr 1.7- 2.4), COPD (not on home O2), colonic mass (s/p right hemicolectomy 4/2017 w/ Dr. Headley), Arthritis, BPH who presents from home with SOB x 1 day. He was admitted for sepsis 2/2 PNA.    Problem/Plan - 1:  ·  Problem: COPD, mild.   ·  Plan: Meeting sepsis criteria on arrival, SIRS 2/4 (WBC, Fever) with presumed respiratory source given cough and subjective SOB. Lactate 1.1 on arrival  - s/p Vanc and zosyn in ED with 1900 mL fluid resuscitation   - CXR did not show significant focal consolidation; CT PE protocol performed and showed potential upper lobe infiltrates, GGOs, and lung nodules (see below)  - Placed on oxygen; however, likely not needed due to COPD diagnosis   - Blood cultures draw; f/u result  - RVP added on to COVID swab on arrival; f/u result (rationale below)  - Working diagnosis of CAP vs. Viral PNA; f/u legionella antigen (episode of diarrhea), strep antigen  - Procal added on to rule out bacterial PNA  - Start Ceftriaxone 1 g and Azithromycin 250 mg daily x 5 days for CAP coverage  - Goal O2 88-92%.    Problem/Plan - 2:  ·  Problem: HTN (hypertension).   ·  Plan: Hospitalized in 2020 for COPD exacerbation; however, unlikely to be in exacerbation due to lack of wheezing on exam and intermittent oxygen requirements  - F/U RVP for triggering event of SOB  - Not on Home O2 and uses albuterol PRN  - Goal O2 88-92%.    Problem/Plan - 3:  ·  Problem: Stage 3 chronic kidney disease.   ·  Plan: Two 1.8 cm solid nodules in the left lower lobe, new since 11/10/2020 seen on CT angio PE Protocol  - Concern for malignancy given 30 pack year smoking history  - Repeat CT chest in 3-6 months for surveillance and consider pulmonology consult this admission.    Problem/Plan - 4:  ·  Problem: BPH without urinary obstruction.   ·  Plan: s/p PCI w 3 DAYANA (mRCA, pRCA, mC in 2013 w/ Dr. Varela)  - Continue home ASA 81 mg and Plavix 75 mg    #HFmrEF  - TTE on chart review showed EF 45-50%  - Euvolemic on exam but significant murmur  - Continue home metoprolol and consider succinate switch.    Problem/Plan - 5:  ·  Problem: HLD (hyperlipidemia).   ·  Plan: AAA seen on CT 4.2 cm in diameter  - Continue medical management with BP control  - Likely surveillance US as outpatient.    Problem/Plan - 6:  ·  Problem: CAD (coronary artery disease).   ·  Plan: Continue home metoprolol, Amlodipine and ISMO daily  - For metoprolol, increase dose or restore TID regimen  - Continue to monitor.    Problem/Plan - 7:  ·  Problem: Nutrition, metabolism, and development symptoms.   ·  Plan: Baseline kidney function Cr ~1.7; and 1.5 on admission  - Continue to monitor with daily BMP  - No electrolyte derangements   - Avoid nephrotoxic medications.    Problem/Plan - 8:  ·  Problem: Lung nodule, multiple.   ·  Plan: Continue home tamsulosin.    Problem/Plan - 9:  ·  Problem: Anxiety and depression.   ·  Plan: Continue home Mirtazepine and Duloxetine.    Problem/Plan - 10:  ·  Problem: HLD (hyperlipidemia).   ·  Plan; Continue home Simvastatin.    Problem/Plan - 11:  ·  Problem: Localized cancer of colon.   ·  Plan: s/p resection with Dr. Headley  - Lung nodules as above could represent metastases.    Problem/Plan - 12:  ·  Problem: Nutrition, metabolism, and development symptoms.   ·  Plan: F: s/p 1900 mL NS  E: replenish PRN  N: DASH    DVT: Lovenox 40 mg q24  
Mr. Ndiaye is a 89 year old man with a PMHx  HTN, known CAD s/p PCI w 3 DAYANA (mRCA, pRCA, mC in 2013 w/ Dr. Varela), CKD (Cr 1.7- 2.4), COPD (not on home O2), colonic mass (s/p right hemicolectomy 4/2017 w/ Dr. Headley), Arthritis, BPH who presents from home with SOB x 1 day. He was admitted for sepsis 2/2 PNA.  PN  CAD  CHF  CKD  --Improved with IV ab  CV stable  OOB  PT    patient would not be a candidate for any invasive procedure.  risk>>> benefit
Mr. Ndiaye is a 89 year old man with a PMHx  HTN, known CAD s/p PCI w 3 DAYANA (mRCA, pRCA, mC in 2013 w/ Dr. Varela), CKD (Cr 1.7- 2.4), COPD (not on home O2), colonic mass (s/p right hemicolectomy 4/2017 w/ Dr. Headley), Arthritis, BPH who presents from home with SOB x 1 day. He was admitted for sepsis 2/2 PNA.
Mr. Ndiaye is a 89 year old man with a PMHx  HTN, known CAD s/p PCI w 3 DAYANA (mRCA, pRCA, mC in 2013 w/ Dr. Varela), CKD (Cr 1.7- 2.4), COPD (not on home O2), colonic mass (s/p right hemicolectomy 4/2017 w/ Dr. Headley), Arthritis, BPH who presents from home with SOB x 1 day. He was admitted for sepsis 2/2 PNA.  PN  CAD  CHF  CKD  --Improved with IV ab  CV stable  OOB  PT
Mr. Ndiaye is a 89 year old man with a PMHx  HTN, known CAD s/p PCI w 3 DAYANA (mRCA, pRCA, mC in 2013 w/ Dr. Varela), CKD (Cr 1.7- 2.4), COPD (not on home O2), colonic mass (s/p right hemicolectomy 4/2017 w/ Dr. Headley), Arthritis, BPH who presents from home with SOB x 1 day. He was admitted for sepsis 2/2 PNA.  PN  CAD  CHF  CKD  --Improved with IV ab  CV stable  OOB  PT

## 2022-03-23 NOTE — DISCHARGE NOTE NURSING/CASE MANAGEMENT/SOCIAL WORK - PATIENT PORTAL LINK FT
You can access the FollowMyHealth Patient Portal offered by [facility] by registering at the following website: [email or web address]. By joining [facility]’s FollowMyHealth portal, you will also be able to view your health information using other applications (apps) compatible with our system.

## 2022-03-23 NOTE — PROGRESS NOTE ADULT - PROBLEM SELECTOR PLAN 5
AAA seen on CT 4.2 cm in diameter  - Continue medical management with BP control  - Likely surveillance US as outpatient

## 2022-03-23 NOTE — PROGRESS NOTE ADULT - PROVIDER SPECIALTY LIST ADULT
Pulmonology
Pulmonology
Rehab Medicine
Pulmonology
Internal Medicine

## 2022-03-23 NOTE — PROGRESS NOTE ADULT - PROBLEM SELECTOR PROBLEM 8
Lung nodule, multiple

## 2022-03-25 ENCOUNTER — APPOINTMENT (OUTPATIENT)
Dept: CARE COORDINATION | Facility: HOME HEALTH | Age: 87
End: 2022-03-25
Payer: MEDICARE

## 2022-03-25 DIAGNOSIS — J18.9 PNEUMONIA, UNSPECIFIED ORGANISM: ICD-10-CM

## 2022-03-25 DIAGNOSIS — Z95.5 PRESENCE OF CORONARY ANGIOPLASTY IMPLANT AND GRAFT: ICD-10-CM

## 2022-03-25 DIAGNOSIS — R91.8 OTHER NONSPECIFIC ABNORMAL FINDING OF LUNG FIELD: ICD-10-CM

## 2022-03-25 DIAGNOSIS — E78.5 HYPERLIPIDEMIA, UNSPECIFIED: ICD-10-CM

## 2022-03-25 DIAGNOSIS — Z87.448 PERSONAL HISTORY OF OTHER DISEASES OF URINARY SYSTEM: ICD-10-CM

## 2022-03-25 DIAGNOSIS — Z87.01 PERSONAL HISTORY OF PNEUMONIA (RECURRENT): ICD-10-CM

## 2022-03-25 DIAGNOSIS — F41.9 ANXIETY DISORDER, UNSPECIFIED: ICD-10-CM

## 2022-03-25 DIAGNOSIS — I50.22 CHRONIC SYSTOLIC (CONGESTIVE) HEART FAILURE: ICD-10-CM

## 2022-03-25 DIAGNOSIS — N18.30 CHRONIC KIDNEY DISEASE, STAGE 3 UNSPECIFIED: ICD-10-CM

## 2022-03-25 DIAGNOSIS — I48.0 PAROXYSMAL ATRIAL FIBRILLATION: ICD-10-CM

## 2022-03-25 DIAGNOSIS — Z85.038 PERSONAL HISTORY OF OTHER MALIGNANT NEOPLASM OF LARGE INTESTINE: ICD-10-CM

## 2022-03-25 DIAGNOSIS — J44.9 CHRONIC OBSTRUCTIVE PULMONARY DISEASE, UNSPECIFIED: ICD-10-CM

## 2022-03-25 DIAGNOSIS — I13.0 HYPERTENSIVE HEART AND CHRONIC KIDNEY DISEASE WITH HEART FAILURE AND STAGE 1 THROUGH STAGE 4 CHRONIC KIDNEY DISEASE, OR UNSPECIFIED CHRONIC KIDNEY DISEASE: ICD-10-CM

## 2022-03-25 DIAGNOSIS — I71.4 ABDOMINAL AORTIC ANEURYSM, WITHOUT RUPTURE: ICD-10-CM

## 2022-03-25 DIAGNOSIS — I25.10 ATHEROSCLEROTIC HEART DISEASE OF NATIVE CORONARY ARTERY WITHOUT ANGINA PECTORIS: ICD-10-CM

## 2022-03-25 DIAGNOSIS — M19.90 UNSPECIFIED OSTEOARTHRITIS, UNSPECIFIED SITE: ICD-10-CM

## 2022-03-25 DIAGNOSIS — N40.0 BENIGN PROSTATIC HYPERPLASIA WITHOUT LOWER URINARY TRACT SYMPTOMS: ICD-10-CM

## 2022-03-25 DIAGNOSIS — A41.9 SEPSIS, UNSPECIFIED ORGANISM: ICD-10-CM

## 2022-03-25 DIAGNOSIS — R53.1 WEAKNESS: ICD-10-CM

## 2022-03-25 PROCEDURE — 99348 HOME/RES VST EST LOW MDM 30: CPT

## 2022-03-28 PROBLEM — J18.9 PNA (PNEUMONIA): Status: ACTIVE | Noted: 2022-03-28

## 2022-03-28 PROBLEM — Z85.038 HISTORY OF MALIGNANT NEOPLASM OF COLON: Status: RESOLVED | Noted: 2022-03-28 | Resolved: 2022-03-28

## 2022-03-28 PROBLEM — Z87.448 HISTORY OF CHRONIC KIDNEY DISEASE: Status: RESOLVED | Noted: 2022-03-28 | Resolved: 2022-03-28

## 2022-03-28 PROBLEM — Z87.01 HISTORY OF PNEUMONIA: Status: RESOLVED | Noted: 2022-03-28 | Resolved: 2022-03-28

## 2022-03-28 RX ORDER — ISOSORBIDE MONONITRATE 30 MG/1
30 TABLET, EXTENDED RELEASE ORAL
Qty: 90 | Refills: 0 | Status: ACTIVE | COMMUNITY
Start: 2022-01-10

## 2022-03-28 RX ORDER — VALSARTAN 320 MG/1
320 TABLET, COATED ORAL
Qty: 90 | Refills: 0 | Status: DISCONTINUED | COMMUNITY
Start: 2021-07-21 | End: 2022-03-28

## 2022-03-28 RX ORDER — AMLODIPINE BESYLATE 2.5 MG/1
2.5 TABLET ORAL
Qty: 30 | Refills: 0 | Status: DISCONTINUED | COMMUNITY
Start: 2021-11-03 | End: 2022-03-28

## 2022-03-28 RX ORDER — DIPHENOXYLATE HYDROCHLORIDE AND ATROPINE SULFATE 2.5; .025 MG/1; MG/1
TABLET ORAL
Refills: 0 | Status: ACTIVE | COMMUNITY

## 2022-03-28 RX ORDER — CLOPIDOGREL BISULFATE 75 MG/1
75 TABLET, FILM COATED ORAL
Qty: 90 | Refills: 0 | Status: ACTIVE | COMMUNITY
Start: 2021-11-20

## 2022-03-28 RX ORDER — AMLODIPINE BESYLATE 5 MG/1
5 TABLET ORAL
Qty: 90 | Refills: 0 | Status: ACTIVE | COMMUNITY
Start: 2021-11-11

## 2022-03-28 NOTE — PHYSICAL EXAM
[No Acute Distress] : no acute distress [Well Developed] : well developed [Well Nourished] : well nourished [Normal Sclera/Conjunctiva] : normal sclera/conjunctiva [Normal Outer Ear/Nose] : the outer ears and nose were normal in appearance [No JVD] : no jugular venous distention [No Respiratory Distress] : no respiratory distress  [No Accessory Muscle Use] : no accessory muscle use [Clear to Auscultation] : lungs were clear to auscultation bilaterally [Normal Rate] : normal rate  [Normal S1, S2] : normal S1 and S2 [Regular Rhythm] : with a regular rhythm [No Edema] : there was no peripheral edema [Non Tender] : non-tender [de-identified] : + cough, intermittent [de-identified] : Calm [de-identified] : Awake and alert

## 2022-03-28 NOTE — HISTORY OF PRESENT ILLNESS
[de-identified] : This patient is Enrolled in the [employer] Program through [employer]\par Copied As per [facility] Discharge Summary\par \par "89 year old man with a PMHx  HTN, known CAD s/p PCI w 3 DAYANA (mRCA, pRCA, mC in 2013 w/ Dr. Varela), CKD (Cr 1.7- 2.4), COPD (not on home O2), colonic mass (s/p right hemicolectomy 4/2017 w/ Dr. Headley), Arthritis, BPH who presents from home with SOB x 1 day. Mr. Ndiaye states he was in his usual state of health (All ADLs besides cooking) until 3/18 when he began experiencing SOB that he associated with chest pain upon deep inspiration and weakness/malaise. His SOB is worse with exertion but denies any wheezing. He \par endorses a nonproductive cough. He does not attribute his symptoms to a COPD exacerbation like his prior hospitalizations. On review of symptoms, he endorses 1-2 episodes of diarrhea. He denies any sick contacts, recent antibiotic use, chest pain with exertion, orthopnea, paroxysmal nocturnal dyspnea. In the ED, he was febrile to 101.2 F (rectally), HR 74, RR 19, and sPO2 96% on room air. His labs were significant for WBC 18.98, Hgb 12.4, , HCO3 20, Cr 1.52, Trop 0.01, and Pro-. EKG showed first degree heart block. CXR, \par although rotated, is not suggestive of focal infiltrates with hyperinflation. CT PE showed No central or lobar pulmonary emboli with concern for upper lobe infiltrates bilaterally with 2 solid nodules in LLL and an aortic aneurysm measuring 4.2 cm. He was given tylenol 650 mg, zosyn 3.375, vancomycin, 1g, and 1.9 L NS. He was admitted for sepsis 2/2 PNA. PET scan done which showed SONAM \par nodule. Recommend following up outpatient for further workup. " The patient was discharged in stable condition with follow up care.\par \par Pt is in no acute distress. The patient denies chest pain, shortness of breath, hemoptysis, fever, palpitations, syncope.  Pt notes an intermittent cough continued on antibiotic therapy.    Pt lives alone and has a nephew who assists him.

## 2022-03-28 NOTE — ASSESSMENT
[FreeTextEntry1] : "89 year old man with a PMHx  HTN, known CAD s/p PCI w 3 DAYANA (mRCA, pRCA, mC in 2013 w/ Dr. Varela), CKD (Cr 1.7- 2.4), COPD (not on home O2), colonic mass (s/p right hemicolectomy 4/2017 w/ Dr. Headley), Arthritis, BPH who presents from home with SOB x 1 day. Mr. Ndiaye states he was in his usual state of health (All ADLs besides cooking) until 3/18 when he began experiencing SOB that he associated with chest pain upon deep inspiration and weakness/malaise. His SOB is worse with exertion but denies any wheezing. He \par endorses a nonproductive cough. He does not attribute his symptoms to a COPD exacerbation like his prior hospitalizations. On review of symptoms, he endorses 1-2 episodes of diarrhea. He denies any sick contacts, recent antibiotic use, chest pain with exertion, orthopnea, paroxysmal nocturnal dyspnea. In the ED, he was febrile to 101.2 F (rectally), HR 74, RR 19, and sPO2 96% on room air. His labs were significant for WBC 18.98, Hgb 12.4, , HCO3 20, Cr 1.52, Trop 0.01, and Pro-. EKG showed first degree heart block. CXR, \par although rotated, is not suggestive of focal infiltrates with hyperinflation. CT PE showed No central or lobar pulmonary emboli with concern for upper lobe infiltrates bilaterally with 2 solid nodules in LLL and an aortic aneurysm measuring 4.2 cm. He was given tylenol 650 mg, zosyn 3.375, vancomycin, 1g, and 1.9 L NS. He was admitted for sepsis 2/2 PNA. PET scan done which showed SONAM \par nodule. Recommend following up outpatient for further workup. " The patient was discharged in stable condition with follow up care.\par \par PNA: stable\par Continue antibiotic therapy\par Continue turn, cough and deep breathing exercises\par Montior for signs and symptoms of infection \par Reviewed when to call medical provider if symptoms worsen \par Follow up with PCP\par

## 2022-03-28 NOTE — PLAN
[FreeTextEntry1] : CV and pulmonary status stable\par Adhere to all medications i\par Increase activity as tolerated and maintain optimal activity levels. \par Continue coughing and deep breathing exercises including use of Incentive Spirometry.\par Receive routine pneumococcal and influenza vaccinations.\par Maintain proper nutrition and adequate hydration.\par Notify NP for worsening symptoms including fever, chills, SOB, CP, increased cough and secretions.\par Follow up with MD\par Instructed the patient to call TCM Team or CCC with any questions or concerns.\par Home Care Referral sent to [facility] for additional support post discharge\par PCP was notified \par \par

## 2022-03-28 NOTE — COUNSELING
[Fall prevention counseling provided] : Fall prevention counseling provided [Adequate lighting] : Adequate lighting [No throw rugs] : No throw rugs [Use proper foot wear] : Use proper foot wear [Use recommended devices] : Use recommended devices [Sleep ___ hours/day] : Sleep [unfilled] hours/day [Behavioral health counseling provided] : Behavioral health counseling provided [Engage in a relaxing activity] : Engage in a relaxing activity [Plan in advance] : Plan in advance [Good understanding] : Patient has a good understanding of lifestyle changes and steps needed to achieve self management goal [de-identified] : Nephew assists pt with medical care.  Life alert in place

## 2022-05-05 NOTE — PROGRESS NOTE ADULT - PROBLEM SELECTOR PLAN 1
Patient presents on dental [place] with signed consent from legal gaurdian for dental tx  Medical history- no changes reported child is ASA II  Patient denies any constitutional symptoms  Chief complaint: Here for a check up  Pain scale 0 out of 10- no pain reported  Extraoral exam: WNL, no lymphadenopathy, TMJ WNL  Intraoral exam: soft tissue WNL  Mixed Dentition  No clinical caries noted  Plaque - moderate generalized accumulation  Unable to assess occlusion  Radiographs:  2 BW taken at last hygiene visit evaluated  Prophnoni polish & Fluoride Varnish  Beh: Fr 2, moving around, gagging, grabbing things with his hands  Needed frequent breaks  Recommended to re-evaluate for sealants at next recall       NV: periodic exam (due in four days), prophy/FL November 2022 Presenting with inability to urinate w/ Cr 2.41 (baseline 1 as of 04/2017). FeNa c/w pre-renal etiology. Bladder scan in ED showing only 30cc urine in bladder. Likely pre-renal DEBBIE d/t dehydration from prolonged diarrhea and decreased PO intake.   - patient able to void after 2L IVF in ED and continued fluids for 12hours on RMF  - tolerating PO intake  - retroperitoneal US with unremarkable exam of kidneys  - avoid nephrotoxic drugs, renally dose meds  - Cr downtrending, continue to monitor

## 2022-06-20 NOTE — PATIENT PROFILE ADULT. - FALLEN IN THE PAST
Goal Outcome Evaluation:  Plan of Care Reviewed With: patient        Progress: no change  Outcome Evaluation: Pt is 41yo man admitted with abdominal pain and possible SBO. He has been tolerating ice chips without N/V. Pain has been controlled per MAR. VSS at this time. Will continue to monitor.   no

## 2022-07-11 NOTE — PHYSICAL EXAM
[Respiratory Effort] : normal respiratory effort [Normal Rate and Rhythm] : normal rate and rhythm [2+] : left 2+ [1+] : left 1+ [Ankle Swelling (On Exam)] : not present [Abdomen Tenderness] : ~T ~M No abdominal tenderness [No Rash or Lesion] : No rash or lesion [Alert] : alert [Oriented to Person] : oriented to person [Oriented to Place] : oriented to place [Oriented to Time] : oriented to time [Calm] : calm [de-identified] : Hard of hearing, appears older than states age [de-identified] : NC/AT  [de-identified] : Supple  [de-identified] : Palpable aneurysm [de-identified] : FROM 5/5 x 4.

## 2022-07-11 NOTE — ASSESSMENT
[FreeTextEntry1] : 89 y/o M w/ AAA (supra- and infra-renal). On exam, abdomen soft, aneurysm palpable, nontender. LEs warm and well perfused. Abdominal US demonstrated AAA w/ calcification at the infrarenal segment of the aorta with a diameter of  3.0 cm and at the suprarenal segment with a diameter of 4.3 cm, stable from previous study. We discussed once more with him and his nephew, findings today and treatment options once aneurysm warrants repair. Due to his age and long medical hx, I explained all the risks that come with the procedure (which would be a thoracoabdominal open repair) vs deciding to leave the aneurysm alone. He should f/u in 6 months. [Arterial/Venous Disease] : arterial/venous disease [Medication Management] : medication management [Aneurysm Surgery] : aneurysm surgery

## 2022-07-11 NOTE — CONSULT LETTER
[Dear  ___] : Dear  [unfilled], [FreeTextEntry2] : Elias Montalvo MD\par [address]\[place] [address]  [FreeTextEntry3] : Sincerely, \par \par Rogelio Akhtar M.D. \par , Surgical Services [facility]\par , Department of Surgery [facility]\par Professor of Surgery, [employer] at [facility]

## 2022-07-11 NOTE — HISTORY OF PRESENT ILLNESS
[FreeTextEntry1] : 89 y/o M w/ AAA referred originally by Dr. Montalvo. He has a PMH of COPD, former smoker, arthritis, Afib, BPH, HTN, CAD s/p PCI w/ 3 DAYANA (Nov 2021), CKD, colonic mass s/p R hemicolectomy in 2017. During his last appointment and after careful review of imaging, pt was deemed not a surgical candidate for endovascular repair of the AAA. The AAA has a supra and infrarenal component and he would require a thoracoabdominal, open repair. \par \par Today, he reports feeling well. Denies any neurologic symptoms, known family aneurysms, unusual abdominal or back pain, change sin bowel habits or weight loss.  \par \par He is accompanied by his nephew who is his healthcare proxy. 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 \par \par FHx:\par Father - CAD/MI, passed away\par Mother - natural causes, passed away\par Sisters x3 - all passed way. Medical hx includes multiple myeloma, sepsis, CAd, cerebral palsy

## 2022-07-13 ENCOUNTER — APPOINTMENT (OUTPATIENT)
Dept: VASCULAR SURGERY | Facility: CLINIC | Age: 87
End: 2022-07-13

## 2022-07-19 ENCOUNTER — INPATIENT (INPATIENT)
Facility: HOSPITAL | Age: 87
LOS: 3 days | Discharge: HOME HEALTH | DRG: 393 | End: 2022-07-23
Attending: INTERNAL MEDICINE | Admitting: INTERNAL MEDICINE
Payer: MEDICARE

## 2022-07-19 VITALS
DIASTOLIC BLOOD PRESSURE: 57 MMHG | SYSTOLIC BLOOD PRESSURE: 110 MMHG | OXYGEN SATURATION: 95 % | TEMPERATURE: 97 F | HEIGHT: 68 IN | RESPIRATION RATE: 20 BRPM | HEART RATE: 61 BPM | WEIGHT: 117.07 LBS

## 2022-07-19 DIAGNOSIS — Z90.49 ACQUIRED ABSENCE OF OTHER SPECIFIED PARTS OF DIGESTIVE TRACT: Chronic | ICD-10-CM

## 2022-07-19 DIAGNOSIS — Z78.9 OTHER SPECIFIED HEALTH STATUS: ICD-10-CM

## 2022-07-19 DIAGNOSIS — J44.9 CHRONIC OBSTRUCTIVE PULMONARY DISEASE, UNSPECIFIED: ICD-10-CM

## 2022-07-19 DIAGNOSIS — E78.5 HYPERLIPIDEMIA, UNSPECIFIED: ICD-10-CM

## 2022-07-19 DIAGNOSIS — Z98.49 CATARACT EXTRACTION STATUS, UNSPECIFIED EYE: Chronic | ICD-10-CM

## 2022-07-19 DIAGNOSIS — I25.10 ATHEROSCLEROTIC HEART DISEASE OF NATIVE CORONARY ARTERY WITHOUT ANGINA PECTORIS: ICD-10-CM

## 2022-07-19 DIAGNOSIS — N40.0 BENIGN PROSTATIC HYPERPLASIA WITHOUT LOWER URINARY TRACT SYMPTOMS: ICD-10-CM

## 2022-07-19 DIAGNOSIS — I10 ESSENTIAL (PRIMARY) HYPERTENSION: ICD-10-CM

## 2022-07-19 DIAGNOSIS — E87.2 ACIDOSIS: ICD-10-CM

## 2022-07-19 DIAGNOSIS — N18.9 CHRONIC KIDNEY DISEASE, UNSPECIFIED: ICD-10-CM

## 2022-07-19 DIAGNOSIS — R19.7 DIARRHEA, UNSPECIFIED: ICD-10-CM

## 2022-07-19 DIAGNOSIS — Z29.9 ENCOUNTER FOR PROPHYLACTIC MEASURES, UNSPECIFIED: ICD-10-CM

## 2022-07-19 LAB
ALBUMIN SERPL ELPH-MCNC: 3.8 G/DL — SIGNIFICANT CHANGE UP (ref 3.3–5)
ALP SERPL-CCNC: 69 U/L — SIGNIFICANT CHANGE UP (ref 40–120)
ALT FLD-CCNC: 12 U/L — SIGNIFICANT CHANGE UP (ref 10–45)
ANION GAP SERPL CALC-SCNC: 13 MMOL/L — SIGNIFICANT CHANGE UP (ref 5–17)
APPEARANCE UR: ABNORMAL
APTT BLD: 30 SEC — SIGNIFICANT CHANGE UP (ref 27.5–35.5)
AST SERPL-CCNC: 14 U/L — SIGNIFICANT CHANGE UP (ref 10–40)
BASE EXCESS BLDV CALC-SCNC: -6.3 MMOL/L — LOW (ref -2–3)
BASOPHILS # BLD AUTO: 0.05 K/UL — SIGNIFICANT CHANGE UP (ref 0–0.2)
BASOPHILS NFR BLD AUTO: 0.5 % — SIGNIFICANT CHANGE UP (ref 0–2)
BILIRUB SERPL-MCNC: 0.2 MG/DL — SIGNIFICANT CHANGE UP (ref 0.2–1.2)
BILIRUB UR-MCNC: NEGATIVE — SIGNIFICANT CHANGE UP
BUN SERPL-MCNC: 56 MG/DL — HIGH (ref 7–23)
CA-I SERPL-SCNC: 1.32 MMOL/L — SIGNIFICANT CHANGE UP (ref 1.15–1.33)
CALCIUM SERPL-MCNC: 9.6 MG/DL — SIGNIFICANT CHANGE UP (ref 8.4–10.5)
CHLORIDE SERPL-SCNC: 109 MMOL/L — HIGH (ref 96–108)
CO2 BLDV-SCNC: 20.9 MMOL/L — LOW (ref 22–26)
CO2 SERPL-SCNC: 19 MMOL/L — LOW (ref 22–31)
COLOR SPEC: YELLOW — SIGNIFICANT CHANGE UP
CREAT SERPL-MCNC: 1.87 MG/DL — HIGH (ref 0.5–1.3)
DIFF PNL FLD: NEGATIVE — SIGNIFICANT CHANGE UP
EGFR: 34 ML/MIN/1.73M2 — LOW
EOSINOPHIL # BLD AUTO: 0.68 K/UL — HIGH (ref 0–0.5)
EOSINOPHIL NFR BLD AUTO: 6.4 % — HIGH (ref 0–6)
GAS PNL BLDV: 136 MMOL/L — SIGNIFICANT CHANGE UP (ref 136–145)
GAS PNL BLDV: SIGNIFICANT CHANGE UP
GLUCOSE BLDC GLUCOMTR-MCNC: 250 MG/DL — HIGH (ref 70–99)
GLUCOSE SERPL-MCNC: 106 MG/DL — HIGH (ref 70–99)
GLUCOSE UR QL: NEGATIVE — SIGNIFICANT CHANGE UP
HCO3 BLDV-SCNC: 20 MMOL/L — LOW (ref 22–29)
HCT VFR BLD CALC: 28.1 % — LOW (ref 39–50)
HGB BLD-MCNC: 8.9 G/DL — LOW (ref 13–17)
IMM GRANULOCYTES NFR BLD AUTO: 0.4 % — SIGNIFICANT CHANGE UP (ref 0–1.5)
INR BLD: 1.02 — SIGNIFICANT CHANGE UP (ref 0.88–1.16)
KETONES UR-MCNC: NEGATIVE — SIGNIFICANT CHANGE UP
LACTATE SERPL-SCNC: 0.9 MMOL/L — SIGNIFICANT CHANGE UP (ref 0.5–2)
LEUKOCYTE ESTERASE UR-ACNC: NEGATIVE — SIGNIFICANT CHANGE UP
LIDOCAIN IGE QN: 33 U/L — SIGNIFICANT CHANGE UP (ref 7–60)
LYMPHOCYTES # BLD AUTO: 0.71 K/UL — LOW (ref 1–3.3)
LYMPHOCYTES # BLD AUTO: 6.6 % — LOW (ref 13–44)
MCHC RBC-ENTMCNC: 30.8 PG — SIGNIFICANT CHANGE UP (ref 27–34)
MCHC RBC-ENTMCNC: 31.7 GM/DL — LOW (ref 32–36)
MCV RBC AUTO: 97.2 FL — SIGNIFICANT CHANGE UP (ref 80–100)
MONOCYTES # BLD AUTO: 0.86 K/UL — SIGNIFICANT CHANGE UP (ref 0–0.9)
MONOCYTES NFR BLD AUTO: 8 % — SIGNIFICANT CHANGE UP (ref 2–14)
NEUTROPHILS # BLD AUTO: 8.36 K/UL — HIGH (ref 1.8–7.4)
NEUTROPHILS NFR BLD AUTO: 78.1 % — HIGH (ref 43–77)
NITRITE UR-MCNC: NEGATIVE — SIGNIFICANT CHANGE UP
NRBC # BLD: 0 /100 WBCS — SIGNIFICANT CHANGE UP (ref 0–0)
PCO2 BLDV: 40 MMHG — LOW (ref 42–55)
PH BLDV: 7.3 — LOW (ref 7.32–7.43)
PH UR: 5.5 — SIGNIFICANT CHANGE UP (ref 5–8)
PLATELET # BLD AUTO: 301 K/UL — SIGNIFICANT CHANGE UP (ref 150–400)
PO2 BLDV: 48 MMHG — HIGH (ref 25–45)
POTASSIUM BLDV-SCNC: 5.5 MMOL/L — HIGH (ref 3.5–5.1)
POTASSIUM SERPL-MCNC: 5.4 MMOL/L — HIGH (ref 3.5–5.3)
POTASSIUM SERPL-SCNC: 5.4 MMOL/L — HIGH (ref 3.5–5.3)
PROT SERPL-MCNC: 6.8 G/DL — SIGNIFICANT CHANGE UP (ref 6–8.3)
PROT UR-MCNC: NEGATIVE MG/DL — SIGNIFICANT CHANGE UP
PROTHROM AB SERPL-ACNC: 12.2 SEC — SIGNIFICANT CHANGE UP (ref 10.5–13.4)
RBC # BLD: 2.89 M/UL — LOW (ref 4.2–5.8)
RBC # FLD: 13.6 % — SIGNIFICANT CHANGE UP (ref 10.3–14.5)
SAO2 % BLDV: 77 % — SIGNIFICANT CHANGE UP (ref 67–88)
SARS-COV-2 RNA SPEC QL NAA+PROBE: NEGATIVE — SIGNIFICANT CHANGE UP
SODIUM SERPL-SCNC: 141 MMOL/L — SIGNIFICANT CHANGE UP (ref 135–145)
SP GR SPEC: 1.01 — SIGNIFICANT CHANGE UP (ref 1–1.03)
UROBILINOGEN FLD QL: 0.2 E.U./DL — SIGNIFICANT CHANGE UP
WBC # BLD: 10.7 K/UL — HIGH (ref 3.8–10.5)
WBC # FLD AUTO: 10.7 K/UL — HIGH (ref 3.8–10.5)

## 2022-07-19 PROCEDURE — 74176 CT ABD & PELVIS W/O CONTRAST: CPT | Mod: 26,MA

## 2022-07-19 PROCEDURE — 93010 ELECTROCARDIOGRAM REPORT: CPT

## 2022-07-19 PROCEDURE — 99285 EMERGENCY DEPT VISIT HI MDM: CPT

## 2022-07-19 PROCEDURE — 71045 X-RAY EXAM CHEST 1 VIEW: CPT | Mod: 26

## 2022-07-19 RX ORDER — CLOPIDOGREL BISULFATE 75 MG/1
75 TABLET, FILM COATED ORAL DAILY
Refills: 0 | Status: DISCONTINUED | OUTPATIENT
Start: 2022-07-19 | End: 2022-07-23

## 2022-07-19 RX ORDER — SODIUM CHLORIDE 9 MG/ML
500 INJECTION INTRAMUSCULAR; INTRAVENOUS; SUBCUTANEOUS ONCE
Refills: 0 | Status: COMPLETED | OUTPATIENT
Start: 2022-07-19 | End: 2022-07-19

## 2022-07-19 RX ORDER — IPRATROPIUM/ALBUTEROL SULFATE 18-103MCG
3 AEROSOL WITH ADAPTER (GRAM) INHALATION EVERY 6 HOURS
Refills: 0 | Status: DISCONTINUED | OUTPATIENT
Start: 2022-07-19 | End: 2022-07-23

## 2022-07-19 RX ORDER — INSULIN HUMAN 100 [IU]/ML
5 INJECTION, SOLUTION SUBCUTANEOUS ONCE
Refills: 0 | Status: COMPLETED | OUTPATIENT
Start: 2022-07-19 | End: 2022-07-19

## 2022-07-19 RX ORDER — ASPIRIN/CALCIUM CARB/MAGNESIUM 324 MG
81 TABLET ORAL DAILY
Refills: 0 | Status: DISCONTINUED | OUTPATIENT
Start: 2022-07-19 | End: 2022-07-23

## 2022-07-19 RX ORDER — DEXTROSE 50 % IN WATER 50 %
50 SYRINGE (ML) INTRAVENOUS ONCE
Refills: 0 | Status: COMPLETED | OUTPATIENT
Start: 2022-07-19 | End: 2022-07-19

## 2022-07-19 RX ORDER — TAMSULOSIN HYDROCHLORIDE 0.4 MG/1
0.4 CAPSULE ORAL AT BEDTIME
Refills: 0 | Status: DISCONTINUED | OUTPATIENT
Start: 2022-07-19 | End: 2022-07-23

## 2022-07-19 RX ORDER — MIRTAZAPINE 45 MG/1
30 TABLET, ORALLY DISINTEGRATING ORAL AT BEDTIME
Refills: 0 | Status: DISCONTINUED | OUTPATIENT
Start: 2022-07-19 | End: 2022-07-23

## 2022-07-19 RX ORDER — SIMVASTATIN 20 MG/1
20 TABLET, FILM COATED ORAL AT BEDTIME
Refills: 0 | Status: DISCONTINUED | OUTPATIENT
Start: 2022-07-19 | End: 2022-07-23

## 2022-07-19 RX ORDER — SODIUM CHLORIDE 9 MG/ML
1000 INJECTION INTRAMUSCULAR; INTRAVENOUS; SUBCUTANEOUS
Refills: 0 | Status: DISCONTINUED | OUTPATIENT
Start: 2022-07-19 | End: 2022-07-19

## 2022-07-19 RX ORDER — DIATRIZOATE MEGLUMINE 180 MG/ML
30 INJECTION, SOLUTION INTRAVESICAL ONCE
Refills: 0 | Status: COMPLETED | OUTPATIENT
Start: 2022-07-19 | End: 2022-07-19

## 2022-07-19 RX ORDER — HEPARIN SODIUM 5000 [USP'U]/ML
5000 INJECTION INTRAVENOUS; SUBCUTANEOUS EVERY 8 HOURS
Refills: 0 | Status: DISCONTINUED | OUTPATIENT
Start: 2022-07-19 | End: 2022-07-23

## 2022-07-19 RX ORDER — BUDESONIDE AND FORMOTEROL FUMARATE DIHYDRATE 160; 4.5 UG/1; UG/1
2 AEROSOL RESPIRATORY (INHALATION)
Refills: 0 | Status: DISCONTINUED | OUTPATIENT
Start: 2022-07-19 | End: 2022-07-23

## 2022-07-19 RX ORDER — ALBUTEROL 90 UG/1
10 AEROSOL, METERED ORAL ONCE
Refills: 0 | Status: COMPLETED | OUTPATIENT
Start: 2022-07-19 | End: 2022-07-19

## 2022-07-19 RX ADMIN — DIATRIZOATE MEGLUMINE 30 MILLILITER(S): 180 INJECTION, SOLUTION INTRAVESICAL at 17:01

## 2022-07-19 RX ADMIN — SODIUM CHLORIDE 500 MILLILITER(S): 9 INJECTION INTRAMUSCULAR; INTRAVENOUS; SUBCUTANEOUS at 17:01

## 2022-07-19 RX ADMIN — Medication 50 MILLILITER(S): at 18:59

## 2022-07-19 RX ADMIN — INSULIN HUMAN 5 UNIT(S): 100 INJECTION, SOLUTION SUBCUTANEOUS at 19:15

## 2022-07-19 RX ADMIN — ALBUTEROL 10 MILLIGRAM(S): 90 AEROSOL, METERED ORAL at 19:12

## 2022-07-19 NOTE — ED ADULT TRIAGE NOTE - CHIEF COMPLAINT QUOTE
Patient arrives via EMS from doctor's office for eval of SOB & hypotension 80/50s in the office.  18 g IV placed L FA in field, approx 300ml NS total, c/o shortness of breath, chronic diarrhea due to colon resection.

## 2022-07-19 NOTE — ED ADULT NURSE NOTE - NSIMPLEMENTINTERV_GEN_ALL_ED
Implemented All Fall with Harm Risk Interventions:  [place] to call system. Call bell, personal items and telephone within reach. Instruct patient to call for assistance. Room bathroom lighting operational. Non-slip footwear when patient is off stretcher. Physically safe environment: no spills, clutter or unnecessary equipment. Stretcher in lowest position, wheels locked, appropriate side rails in place. Provide visual cue, wrist band, yellow gown, etc. Monitor gait and stability. Monitor for mental status changes and reorient to person, place, and time. Review medications for side effects contributing to fall risk. Reinforce activity limits and safety measures with patient and family. Provide visual clues: red socks.

## 2022-07-19 NOTE — H&P ADULT - PROBLEM SELECTOR PLAN 8
-c/w home tamsulosin 0.4mg -c/w home simvastatin 20mg at bedtime      #Depression  -c/w home mirtazapine 15 daily

## 2022-07-19 NOTE — H&P ADULT - ASSESSMENT
89 year old male with PMH HTN, CAD s/p PCI w 3 DAYANA (mRCA, pRCA, mC in 2013 w/ Dr. Varela), CKD (Cr 1.7- 2.4), COPD (not on home O2), colonic mass (s/p right hemicolectomy 4/2017 w/ Dr. Headley), Arthritis, aortic aneurysm, BPH, HLD, EF 45-50%, lung nodules, presenting with worsening chronic diarrhea and need for social work eval given concern for ability to care for self.

## 2022-07-19 NOTE — H&P ADULT - PROBLEM SELECTOR PLAN 6
#CAD s/p PCI w 3 DAYANA (mRCA, pRCA, mC in 2013 w/ Dr. Varela)  -c/w home ASA and statin    #CHF  Last EF 45-50% in Nov 2020    #AAA seen on CT 4.2 cm in diameter  Stable on CT done 7/19    #Prolonged CO interval  -EKG at admission showed regularly prolonged CO but was poor quality. Previous note reports 1st degree HB as well.  -f/u repeat EKG Hb 8.9, MCV 97  Baseline 10-11  Likely 2/2 chronic disease and malnutrition    Plan:  -f/u iron studies, b12, folate

## 2022-07-19 NOTE — H&P ADULT - NSHPPHYSICALEXAM_GEN_ALL_CORE
GENERAL: Anxious, tachypneic,   HEAD:  Atraumatic, Normocephalic  EYES: EOMI, PERRLA, conjunctiva and sclera clear  ENT: Moist mucous membranes  NECK: Supple, No JVD  CHEST/LUNG: Clear to auscultation bilaterally; No rales, rhonchi, wheezing, or rubs. +Mildly labored, +Tachypneic (at baseline per pt)  HEART: Regular rate and rhythm; No murmurs, rubs, or gallops  ABDOMEN: BSx4; Soft, nondistended. +Mildly tender to palpation LLQ  EXTREMITIES:  2+ Peripheral Pulses, brisk capillary refill. No clubbing, cyanosis, or edema  NERVOUS SYSTEM:  A&Ox3, no focal deficits. Some memory deficits and difficulty with communication. Full strength throughout.   SKIN: No rashes or lesions

## 2022-07-19 NOTE — ED ADULT NURSE NOTE - OBJECTIVE STATEMENT
89 y.o male a&ox3, speaking in full sentences, no acute distress. Pt reports to ED with nephew c/o SOB. Pt sent from PCP for hypotension, sob. Pt given 300 cc of NS en route, BP increased to 110 systolic. Pt lungs clear, no accessory muscle use noted, mildly tachypneic. Denying SOB at present. Pt denies CP, n/v, dizziness, abdominal discomfort. Pt has chronic diarrhea due to colon resection. Pt has 18g access to right forearm.

## 2022-07-19 NOTE — H&P ADULT - PROBLEM SELECTOR PLAN 2
Pt admitted with bicarb of 19 (about at baseline per chart review) and tachypneic (at baseline per pt)  pH on VBG was 7.3  Anion gap normal   Pt is likely metabolic acidosis 2/2 chronic diarrhea and CKD    Plan:  -treat diarrhea and rehydrate as above  -continue to monitor   -consider bicarb if worsening    #Hyperkalemia   s/p hyperK cocktail in ED. No EKG changes.   -Likely 2/2 metabolic acidosis  -Continue to monitor

## 2022-07-19 NOTE — H&P ADULT - NSHPREVIEWOFSYSTEMS_GEN_ALL_CORE
REVIEW OF SYSTEMS:  CONSTITUTIONAL: No weakness, fevers or chills  EYES/ENT: No visual changes;  No vertigo or throat pain   NECK: No pain or stiffness  RESPIRATORY: No wheezing, hemoptysis; No shortness of breath; +chronic cough   CARDIOVASCULAR: No chest pain or palpitations  GASTROINTESTINAL: No nausea, vomiting, or hematemesis; No melena or hematochezia. +Diarrhea, +Abd pain  GENITOURINARY: No dysuria, frequency or hematuria  NEUROLOGICAL: No numbness or weakness  SKIN: No itching, rashes

## 2022-07-19 NOTE — H&P ADULT - PROBLEM SELECTOR PLAN 7
-c/w home simvastatin 20mg at bedtime      #Depression  -c/w home mirtazapine 15 daily #CAD s/p PCI w 3 DAYANA (mRCA, pRCA, mC in 2013 w/ Dr. Varela)  -c/w home ASA and statin    #CHF  Last EF 45-50% in Nov 2020    #AAA seen on CT 4.2 cm in diameter  Stable on CT done 7/19    #Prolonged [place] interval  -EKG at admission showed regularly prolonged KY but was poor quality. Previous note reports 1st degree HB as well.  -f/u repeat EKG

## 2022-07-19 NOTE — H&P ADULT - PROBLEM SELECTOR PLAN 1
Pt admitted for diarrhea and hypotension (found on in PCP office, not on admission)  Pt has chronic diarrhea since right hemicolectomy 4/2017 w/ Dr. Headley done for colonic mass, but he reports it has been worsening recently.  Diarrhea is nonbloody   Pt takes Lomotil at home  No concern for c diff given no severe leukocytosis, no fever, and no recent abx  CT showed bladder herniating as a left inguinal hernia and sigmoid diverticulosis  s/p almost a total 1 L NS in ED   Pt likely has progressive worsening of chronic diarrhea 2/2 hemicolectomy and diverticulosis. Possible hypotension prior to admission was likely 2/2 low PO intake and dehydration. Low concern for infection given no fever, WBC 10.7     Plan:  -f/u GI PCR and blood cultures   -Continue to monitor and continue gentle hydration (caution in CHF) and encourage PO intake   -Consider restarting lomotil vs loperamide if GI PCR is negative

## 2022-07-19 NOTE — ED PROVIDER NOTE - OBJECTIVE STATEMENT
89M PMH HTN, CAD s/p PCI w 3 DAYANA (mRCA, pRCA, mC in 2013 w/ Dr. Varela), CKD (Cr 1.7- 2.4), COPD (not on home O2), colonic mass (s/p right hemicolectomy 4/2017 w/ Dr. Headley), Arthritis, aortic aneurysm, BPH, HLD, EF 45-50%, lung nodules, p/w diarrhea/hypotension. Pt w/ diarrhea x5d, several episodes per day. Was at routine appt w/ PMD Dr. Montalvo today, was noted to be hypotensive 80s/50s, referred to ED. Given 300cc IVF PTA. Pt notes chronic unchanged SOB. Has hx of intermittent diarrhea, presumed 2/2 colon resection. Occasional mild nausea, vomited once 5 days ago. No other systemic symptoms.   Last admitted March 2022 for SOB, fever, presumed 2/2 PNA. Here in ED w/ nephew. Pt lives alone, able to perform ADLs.   Denies fevers, chills, black stool, bloody stool, dysuria, hematuria, urinary frequency, focal weakness/numbness, lightheadedness, back pain, testicular/penile pain, rashes, joint pains, recent travel, recent antibiotic use, sick contacts, CP, rhinorrhea, nasal congestion, sore throat, cough.  meds: advair, amlodpine, asa, atrovent, plavix, B12, diphenoxylate-atropine, isosorbide mononitrate, metoprolol, mirtazapine, simvastatin, tamsulosin, valsartan.

## 2022-07-19 NOTE — ED PROVIDER NOTE - PROGRESS NOTE DETAILS
[place]: Hgb 8.9 (11.3 March 2022), K 5.4, Cl 109, bicarb 19, BUC/cr mildly increased to 56/1.87, pH 7.3, other labs grossly wnl. Remains well appearing. vitals remain stable. Has not given urine or stool sample yet (low suspicion c-dif). COVID neg. No hyperkalemic EKG changes. Will give meds for hyperkalemia. CT pending. Attempted to contact Dr. Montalvo, no answer. Will reassess. Matthew: d/w dr. montalvo - given low BP in office (was 80s/50s several times), decreased PO intake, age, lives alone, pt will likely benfit from admission at least for supportive care. CT/UA pending. no stool sample given yet. Will admit to Dr. Montalvo if no surgical pathology. [place]: vitals remain stable. no stool sample yet. has not urinated yet. no abd pain. will gently hydrate. ct results pending, will admit medicine for further care. updated pt.

## 2022-07-19 NOTE — ED PROVIDER NOTE - TOBACCO USE
Pt alert and oriented during the shift. Pt denies SI, HI or AVH along with anxiety and depression. Pt is ambulating with a walker and is doing well. Pt is cooperative with medications and is tolerating meals and fluids w/o difficulty. Unknown if ever smoked

## 2022-07-19 NOTE — ED PROVIDER NOTE - CARE PLAN
1 Principal Discharge DX:	Diarrhea   Principal Discharge DX:	Diarrhea  Secondary Diagnosis:	DEBBIE (acute kidney injury)

## 2022-07-19 NOTE — ED PROVIDER NOTE - SECONDARY DIAGNOSIS.
DEBBIE (acute kidney injury) Gabapentin Counseling: I discussed with the patient the risks of gabapentin including but not limited to dizziness, somnolence, fatigue and ataxia.

## 2022-07-19 NOTE — H&P ADULT - PROBLEM SELECTOR PLAN 9
Concern for pt's ability to care for self at home    -f/u PT, SW, Case management recs -c/w home tamsulosin 0.4mg

## 2022-07-19 NOTE — H&P ADULT - PROBLEM SELECTOR PLAN 3
Pt with h/o COPD, not on home O2. Takes advair and albuterol. Pt is tachypnic but he says this is his baseline. No wheeze, no hypoxia. CXR shows LL opacity, similar to previous study in March when he was admitted for COPD exacerbation. He has a chronic cough which he reports is his baseline.    Plan:  -c/w duonebs and symbicort while inpatient and discharge on home advair and albuterol     #3 to 4 cm LLL nodule lesion  Unchanged on CT done 7/19  -f/u outpatient -> was meant to get IR guided biopsy done after discharge in March. Unclear if this happened.

## 2022-07-19 NOTE — H&P ADULT - PROBLEM SELECTOR PLAN 10
F: None   E: Replete with caution in CKD  N: Dash diet  DVT: Heparin 5u q8   Code: FULL Concern for pt's ability to care for self at home    -f/u PT, SW, Case management recs

## 2022-07-19 NOTE — H&P ADULT - HISTORY OF PRESENT ILLNESS
89 year old male with PMH HTN, CAD s/p PCI w 3 DAYANA (mRCA, pRCA, mC in 2013 w/ Dr. Varela), CKD (Cr 1.7- 2.4), COPD (not on home O2), colonic mass (s/p right hemicolectomy 4/2017 w/ Dr. Headley), Arthritis, aortic aneurysm, BPH, HLD, EF 45-50%, lung nodules, presenting after seeing his PCP Dr. Montalvo in the office on day of admission. Dr. Montalvo recorded a low BP and told patient to report to ED. Pt has chronic diarrhea, but reports it has been worsening for the past week. He is a poor historian and specifics of history were difficult to obtain. He reports left lower quadrant abd pain as well. He lives alone and has a nephew who checks in on him. He reports he takes care of all of his ADLs, but given his difficulty in explaining his current issues, there is concern about his ability to truly care for himself.    	Denies fevers, chills, black stool, bloody stool, dysuria, hematuria, urinary frequency, focal weakness/numbness, lightheadedness, back pain, testicular/penile pain, rashes, joint pains, recent travel, recent antibiotic use, sick contacts, CP, rhinorrhea, nasal congestion, sore throat, cough (pt has a chronic cough which has not changed).    ED COURSE:  Vitals: T 97.4, HR 61, /57, RR 20, SpO2 95% on 4 L NC -> 99% on room air  Note that 20 was recorded as respiratory rate, but when writer evaluated patient, RR was 40-50 - patient reports this is his baseline. Not hypoxic.    Orthostatics negative in ED at 4pm    Significant Labs: WBC 10.7, Hb 8.9, Potassium 5.4, Cl 109, CO2 19, BUN 56, Cr 1.87  VBG: pH 7.3, Co2 20.9, pCO2 40, pO2 48, HCO3 20, O2 sat 77  Covid negative  UA negative    Imaging:   CT A/P:   -Bladder herniating as a left inguinal hernia  -Sigmoid diverticulosis  -Suprarenal aortic aneurysm and extensive arterial calcification - stable   -3 to 4 cm LLL nodule lesion unchanged    CXR  -Left lower opacity similar to previous in March    Interventions: Albuterol x1, Insulin regular 5u, Dextrose 50%, 500ml NS plus NS started at 125 ml/hr 89 year old male with PMH HTN, CAD s/p PCI w 3 DAYANA (mRCA, pRCA, mC in 2013 w/ Dr. Varela), CKD (Cr 1.7- 2.4), COPD (not on home O2), colonic mass (s/p right hemicolectomy 4/2017 w/ Dr. Headley), Arthritis, aortic aneurysm, BPH, HLD, EF 45-50%, lung nodules, presenting after seeing his PCP Dr. Montalvo in the office on day of admission. Dr. Montalvo recorded a low BP and told patient to report to ED. Pt has chronic diarrhea, but reports it has been worsening for the past week. He is a poor historian and specifics of history were difficult to obtain. He reports left lower quadrant abd pain as well. He lives alone and has a nephew who checks in on him. He reports he takes care of all of his ADLs, but given his difficulty in explaining his current issues, there is concern about his ability to truly care for himself.    	Denies fevers, chills, black stool, bloody stool, dysuria, hematuria, urinary frequency, focal weakness/numbness, lightheadedness, back pain, testicular/penile pain, rashes, joint pains, recent travel, recent antibiotic use, sick contacts, CP, rhinorrhea, nasal congestion, sore throat, cough (pt has a chronic cough which has not changed).    ED COURSE:  Vitals: T 97.4, HR 61, /57, RR 20, SpO2 95% on 4 L NC -> 99% on room air  Note that 20 was recorded as respiratory rate, but when writer evaluated patient, RR was 40-50 - patient reports this is his baseline. Not hypoxic.    Orthostatics negative in ED at 4pm    Significant Labs: WBC 10.7, Hb 8.9, Potassium 5.4, Cl 109, CO2 19, BUN 56, Cr 1.87  VBG: pH 7.3, Co2 20.9, pCO2 40, pO2 48, HCO3 20, O2 sat 77  Covid negative  UA negative    Imaging:   CT A/P:   -Bladder herniating as a left inguinal hernia  -Sigmoid diverticulosis  -Suprarenal aortic aneurysm and extensive arterial calcification - stable   -3 to 4 cm LLL nodule lesion unchanged    CXR  -Left lower opacity similar to previous in March    EKG - 1st degree HB. No ST or T wave abnormalities.    Interventions: Albuterol x1, Insulin regular 5u, Dextrose 50%, 500ml NS plus NS started at 125 ml/hr

## 2022-07-19 NOTE — H&P ADULT - PROBLEM SELECTOR PLAN 4
Home med regimen: Valsartan 320mg daily, Metoprolol tartrate 25mg q8, amlodipine 5mg daily, isosorbide mononitrate 30mg daily.    -hold meds above given soft BP and restart when appropriate

## 2022-07-19 NOTE — ED ADULT NURSE REASSESSMENT NOTE - NS ED NURSE REASSESS COMMENT FT1
pt returned from CT scan, awaiting results. Medicated per MAR for hyperkalemia. Verbalizes understanding of medication.

## 2022-07-19 NOTE — H&P ADULT - NSICDXPASTMEDICALHX_GEN_ALL_CORE_FT
PAST MEDICAL HISTORY:  BPH (benign prostatic hyperplasia)     CAD (coronary artery disease)     COPD, moderate     HLD (hyperlipidemia)

## 2022-07-19 NOTE — ED PROVIDER NOTE - CLINICAL SUMMARY MEDICAL DECISION MAKING FREE TEXT BOX
89M PMH HTN, CAD s/p PCI w 3 DAYANA (mRCA, pRCA, mC in 2013 w/ Dr. Varela), CKD (Cr 1.7- 2.4), COPD (not on home O2), colonic mass (s/p right hemicolectomy 4/2017 w/ Dr. Headley), Arthritis, aortic aneurysm, BPH, HLD, EF 45-50%, lung nodules, p/w diarrhea/hypotension. Pt w/ diarrhea x5d, several episodes per day. Was at routine appt w/ PMD Dr. Montalvo today, was noted to be hypotensive 80s/50s, referred to ED. Given 300cc IVF PTA. Pt notes chronic unchanged SOB. Has hx of intermittent diarrhea, presumed 2/2 colon resection. Occasional mild nausea, vomited once 5 days ago. No other systemic symptoms.   Vitals now wnl, exam as above.  ddx: Possible dehydration 2/2 diarrhea. Possible colitis. SOB is chronic and likely 2/2 COPD.   Will check labs, CT, UA, CXR, judicious IVF, reassess.

## 2022-07-20 DIAGNOSIS — D64.9 ANEMIA, UNSPECIFIED: ICD-10-CM

## 2022-07-20 LAB
A1C WITH ESTIMATED AVERAGE GLUCOSE RESULT: 5.4 % — SIGNIFICANT CHANGE UP (ref 4–5.6)
BLD GP AB SCN SERPL QL: NEGATIVE — SIGNIFICANT CHANGE UP
CHOLEST SERPL-MCNC: 100 MG/DL — SIGNIFICANT CHANGE UP
CULTURE RESULTS: SIGNIFICANT CHANGE UP
ESTIMATED AVERAGE GLUCOSE: 108 MG/DL — SIGNIFICANT CHANGE UP (ref 68–114)
FERRITIN SERPL-MCNC: 85 NG/ML — SIGNIFICANT CHANGE UP (ref 30–400)
FOLATE SERPL-MCNC: 14.3 NG/ML — SIGNIFICANT CHANGE UP
HDLC SERPL-MCNC: 38 MG/DL — LOW
IRON SATN MFR SERPL: 21 % — SIGNIFICANT CHANGE UP (ref 16–55)
IRON SATN MFR SERPL: 45 UG/DL — SIGNIFICANT CHANGE UP (ref 45–165)
LEGIONELLA AG UR QL: NEGATIVE — SIGNIFICANT CHANGE UP
LIPID PNL WITH DIRECT LDL SERPL: 47 MG/DL — SIGNIFICANT CHANGE UP
NON HDL CHOLESTEROL: 62 MG/DL — SIGNIFICANT CHANGE UP
RAPID RVP RESULT: SIGNIFICANT CHANGE UP
RH IG SCN BLD-IMP: POSITIVE — SIGNIFICANT CHANGE UP
SARS-COV-2 RNA SPEC QL NAA+PROBE: SIGNIFICANT CHANGE UP
SPECIMEN SOURCE: SIGNIFICANT CHANGE UP
TIBC SERPL-MCNC: 211 UG/DL — LOW (ref 220–430)
TRIGL SERPL-MCNC: 75 MG/DL — SIGNIFICANT CHANGE UP
UIBC SERPL-MCNC: 166 UG/DL — SIGNIFICANT CHANGE UP (ref 110–370)
VIT B12 SERPL-MCNC: 590 PG/ML — SIGNIFICANT CHANGE UP (ref 232–1245)

## 2022-07-20 RX ORDER — PANTOPRAZOLE SODIUM 20 MG/1
40 TABLET, DELAYED RELEASE ORAL
Refills: 0 | Status: DISCONTINUED | OUTPATIENT
Start: 2022-07-20 | End: 2022-07-23

## 2022-07-20 RX ADMIN — Medication 3 MILLILITER(S): at 17:31

## 2022-07-20 RX ADMIN — CLOPIDOGREL BISULFATE 75 MILLIGRAM(S): 75 TABLET, FILM COATED ORAL at 13:30

## 2022-07-20 RX ADMIN — HEPARIN SODIUM 5000 UNIT(S): 5000 INJECTION INTRAVENOUS; SUBCUTANEOUS at 05:16

## 2022-07-20 RX ADMIN — Medication 81 MILLIGRAM(S): at 13:30

## 2022-07-20 RX ADMIN — BUDESONIDE AND FORMOTEROL FUMARATE DIHYDRATE 2 PUFF(S): 160; 4.5 AEROSOL RESPIRATORY (INHALATION) at 10:25

## 2022-07-20 RX ADMIN — TAMSULOSIN HYDROCHLORIDE 0.4 MILLIGRAM(S): 0.4 CAPSULE ORAL at 22:37

## 2022-07-20 RX ADMIN — Medication 3 MILLILITER(S): at 10:24

## 2022-07-20 RX ADMIN — Medication 3 MILLILITER(S): at 22:37

## 2022-07-20 RX ADMIN — Medication 3 MILLILITER(S): at 05:16

## 2022-07-20 RX ADMIN — HEPARIN SODIUM 5000 UNIT(S): 5000 INJECTION INTRAVENOUS; SUBCUTANEOUS at 22:38

## 2022-07-20 RX ADMIN — SIMVASTATIN 20 MILLIGRAM(S): 20 TABLET, FILM COATED ORAL at 22:38

## 2022-07-20 RX ADMIN — HEPARIN SODIUM 5000 UNIT(S): 5000 INJECTION INTRAVENOUS; SUBCUTANEOUS at 13:30

## 2022-07-20 RX ADMIN — BUDESONIDE AND FORMOTEROL FUMARATE DIHYDRATE 2 PUFF(S): 160; 4.5 AEROSOL RESPIRATORY (INHALATION) at 22:37

## 2022-07-20 NOTE — DIETITIAN INITIAL EVALUATION ADULT - ADD RECOMMEND
1. DASHTLC diet, rec include Ensure Enlive BID (700 kcal, 40g protein, 360 mL free H2O)  >> Rec change to diet to soft-bite sized diet for ease of chewing  2. Monitor %PO intake, encourage as able  3. BM and pain regimen per team  4. Monitor BMP, BG, renal indices, LFTs, lytes, replete prn  5. rec include MVI

## 2022-07-20 NOTE — PROGRESS NOTE ADULT - SUBJECTIVE AND OBJECTIVE BOX
Patient is a 89y old  Male who presents with a chief complaint of Low blood pressure (2022 17:00)      HPI:  89 year old male with PMH HTN, CAD s/p PCI w 3 DAYANA (mRCA, pRCA, mC in  w/ Dr. Varela), CKD (Cr 1.7- 2.4), COPD (not on home O2), colonic mass (s/p right hemicolectomy 2017 w/ Dr. Headley), Arthritis, aortic aneurysm, BPH, HLD, EF 45-50%, lung nodules, presenting after seeing his PCP Dr. Montalvo in the office on day of admission. Dr. Montalvo recorded a low BP and told patient to report to ED. Pt has chronic diarrhea, but reports it has been worsening for the past week. He is a poor historian and specifics of history were difficult to obtain. He reports left lower quadrant abd pain as well. He lives alone and has a nephew who checks in on him. He reports he takes care of all of his ADLs, but given his difficulty in explaining his current issues, there is concern about his ability to truly care for himself.    	Denies fevers, chills, black stool, bloody stool, dysuria, hematuria, urinary frequency, focal weakness/numbness, lightheadedness, back pain, testicular/penile pain, rashes, joint pains, recent travel, recent antibiotic use, sick contacts, CP, rhinorrhea, nasal congestion, sore throat, cough (pt has a chronic cough which has not changed).    ED COURSE:  Vitals: T 97.4, HR 61, /57, RR 20, SpO2 95% on 4 L NC -> 99% on room air  Note that 20 was recorded as respiratory rate, but when writer evaluated patient, RR was 40-50 - patient reports this is his baseline. Not hypoxic.    Orthostatics negative in ED at 4pm    Significant Labs: WBC 10.7, Hb 8.9, Potassium 5.4, Cl 109, CO2 19, BUN 56, Cr 1.87  VBG: pH 7.3, Co2 20.9, pCO2 40, pO2 48, HCO3 20, O2 sat 77  Covid negative  UA negative    Imaging:   CT A/P:   -Bladder herniating as a left inguinal hernia  -Sigmoid diverticulosis  -Suprarenal aortic aneurysm and extensive arterial calcification - stable   -3 to 4 cm LLL nodule lesion unchanged    CXR  -Left lower opacity similar to previous in March    EKG - 1st degree HB. No ST or T wave abnormalities.    Interventions: Albuterol x1, Insulin regular 5u, Dextrose 50%, 500ml NS plus NS started at 125 ml/hr (2022 22:34)    INTERVAL HPI/OVERNIGHT EVENTS:::    HEALTH ISSUES - PROBLEM Dx:  HLD (hyperlipidemia)    CAD (coronary artery disease)    BPH (benign prostatic hyperplasia)    COPD, moderate    Diarrhea    Metabolic acidosis    Prophylactic measure    CKD (chronic kidney disease)    HTN (hypertension)    Need for follow-up by     Anemia            PAST MEDICAL & SURGICAL HISTORY:  HLD (hyperlipidemia)      CAD (coronary artery disease)      BPH (benign prostatic hyperplasia)      COPD, moderate      S/P cataract extraction      H/O right hemicolectomy              Consultant NOTE  REVIEWED  (   )    REVIEW OF SYSTEMS:  [x] As per HPI  CONSTITUTIONAL: No fever, weight loss, or fatigue  RESPIRATORY: No cough, wheezing, chills or hemoptysis; No Shortness of Breath  CARDIOVASCULAR: No chest pain, palpitations, dizziness, or leg swelling  GASTROINTESTINAL: No abdominal or epigastric pain. No nausea, vomiting, or hematemesis; No diarrhea or constipation. No melena or hematochezia.  MUSCULOSKELETAL: No joint pain or swelling; No muscle, back, or extremity pain  PSYCH    awake, alert       [x] All others negative	  [ ] Unable to obtain          Vital Signs Last 24 Hrs  T(C): 36.7 (2022 16:57), Max: 37.1 (2022 11:43)  T(F): 98.1 (2022 16:57), Max: 98.7 (2022 11:43)  HR: 69 (2022 16:57) (66 - 88)  BP: 138/65 (2022 16:57) (132/58 - 157/66)  BP(mean): --  RR: 19 (2022 16:57) (17 - 20)  SpO2: 98% (2022 16:57) (94% - 99%)    Parameters below as of 2022 16:57  Patient On (Oxygen Delivery Method): nasal cannula  O2 Flow (L/min): 2          -19 @ 07:01  -  07-20 @ 07:00  --------------------------------------------------------  IN: 0 mL / OUT: 190 mL / NET: -190 mL      PHYSICAL EXAMINATION:                                    (    )  NO CHANGE  Appearance: frail  HEENT:    dry oral mucosa, PERRL, EOMI	  Neck: Supple, + JVD/ - JVD; Carotid Bruit   Cardiovascular: Normal S1 S2, No JVD, mur  Respiratory: Lungs clear to auscultation/Decreased Breath Sounds/No Rales, Rhonchi, Wheezing	  Gastrointestinal:  Soft, Non-tender, + BS	  Skin: No rashes, No ecchymoses, No cyanosis dry  Extremities: Normal range of motion, No clubbing, cyanosis or edema  Vascular: Peripheral pulses palpable 2+ bilaterally  Neurologic: Non-focal  Psychiatry: A & O x 3, Mood & affect appropriate    albuterol/ipratropium for Nebulization 3 milliLiter(s) Nebulizer every 6 hours  aspirin enteric coated 81 milliGRAM(s) Oral daily  budesonide 160 MICROgram(s)/formoterol 4.5 MICROgram(s) Inhaler 2 Puff(s) Inhalation two times a day  clopidogrel Tablet 75 milliGRAM(s) Oral daily  heparin   Injectable 5000 Unit(s) SubCutaneous every 8 hours  mirtazapine 30 milliGRAM(s) Oral at bedtime  simvastatin 20 milliGRAM(s) Oral at bedtime  tamsulosin 0.4 milliGRAM(s) Oral at bedtime        PT/INR - ( 2022 16:48 )   PT: 12.2 sec;   INR: 1.02          PTT - ( 2022 16:48 )  PTT:30.0 sec                              8.9    10.70 )-----------( 301      ( 2022 16:48 )             28.1     07-19    141  |  109<H>  |  56<H>  ----------------------------<  106<H>  5.4<H>   |  19<L>  |  1.87<H>    Ca    9.6      2022 16:48    TPro  6.8  /  Alb  3.8  /  TBili  0.2  /  DBili  x   /  AST  14  /  ALT  12  /  AlkPhos  69  07-19      CAPILLARY BLOOD GLUCOSE        Urinalysis Basic - ( 2022 21:04 )    Color: Yellow / Appearance: SL Cloudy / S.015 / pH: x  Gluc: x / Ketone: NEGATIVE  / Bili: Negative / Urobili: 0.2 E.U./dL   Blood: x / Protein: NEGATIVE mg/dL / Nitrite: NEGATIVE   Leuk Esterase: NEGATIVE / RBC: x / WBC x   Sq Epi: x / Non Sq Epi: x / Bacteria: x

## 2022-07-20 NOTE — PHYSICAL THERAPY INITIAL EVALUATION ADULT - ADDITIONAL COMMENTS
Patient reports previously independent with most ADLs although his nephew and friend visit often to assist as needed. Patient has no additional HHAs. Patient reports using "Meals on Wheels" services. Patient denies history of mechanical falls within the past 6 months.

## 2022-07-20 NOTE — PHYSICAL THERAPY INITIAL EVALUATION ADULT - IMPAIRED TRANSFERS: SIT/STAND, REHAB EVAL
endurance decreased/impaired balance/decreased flexibility/impaired postural control/decreased strength

## 2022-07-20 NOTE — PHYSICAL THERAPY INITIAL EVALUATION ADULT - PHYSICAL ASSIST/NONPHYSICAL ASSIST: TOILET, REHAB EVAL
stand <-> sit; able to don and doff pants with belt with supervision; urinated in sitting position/verbal cues

## 2022-07-20 NOTE — DIETITIAN NUTRITION RISK NOTIFICATION - TREATMENT: THE FOLLOWING DIET HAS BEEN RECOMMENDED
Diet, DASH/TLC:   Sodium & Cholesterol Restricted (07-19-22 @ 22:36) [Active]       Diet, DASH/TLC:   Sodium & Cholesterol Restricted (07-19-22 @ 22:36) [Active]    1. DASHTLC diet, rec include Ensure Enlive BID (700 kcal, 40g protein, 360 mL free H2O)  >> Rec change to diet to soft-bite sized diet for ease of chewing  2. Monitor %PO intake, encourage as able  3. BM and pain regimen per team  4. Monitor BMP, BG, renal indices, LFTs, lytes, replete prn  5. rec include MVI

## 2022-07-20 NOTE — PROGRESS NOTE ADULT - SUBJECTIVE AND OBJECTIVE BOX
Patient is a 89 M who presents with a chief complaint of DIARRHEA ACUTE KIDNEY INJURY     (2022 13:37)      INTERVAL HPI/OVERNIGHT EVENTS:  Patient seen and examined at bedside. No acute events overnight, pt in no acute distress. Pt has not had BM since admission to hospital. Denies fever, chills, chest pain, shortness of breath, sore throat, congestion, nausea, vomiting, constipation, dysuria, hematuria, urinary retention, urinary frequency, focal weakness/numbness, headache, lightheadedness, dizziness, joint pain, recent travel, sick contacts.    FAMILY HISTORY:  No family history of cardiovascular disease (Father, Mother)      T(C): 36.7 (22 @ 16:57), Max: 37.1 (22 @ 11:43)  HR: 69 (22 @ 16:57) (66 - 88)  BP: 138/65 (22 @ 16:57) (132/58 - 157/66)  RR: 19 (22 @ 16:57) (17 - 20)  SpO2: 98% (22 @ 16:57) (94% - 99%)      Parameters below as of 2022 16:57  Patient On (Oxygen Delivery Method): nasal cannula  O2 Flow (L/min): 2    No Known Allergies      PHYSICAL EXAM:  GENERAL: NAD, lying comfortably in bed, A&Ox3  HEENT: Atraumatic, normocephalic, EOMI, PERRLA, conjunctiva & sclera clear, moist mucous membranes, neck supple, no JVD   NERVOUS SYSTEM: No focal deficits. Some memory deficits and difficulty with communication. Full strength throughout.  CHEST/LUNG: +Mildly labored, tachypneic (at baseline per pt). Clear to auscultation bilaterally; No rales, rhonchi, wheezing, or rubs.  HEART: Regular rate and rhythm; No murmurs, rubs, or gallops  ABDOMEN: Soft, Nondistended; Bowel sounds present. Mildly tender to palpation in LLQ with reducible inguinal hernia present.  EXTREMITIES:  2+ Peripheral Pulses, No clubbing, cyanosis, or edema  SKIN: No rashes or lesions    Consultant(s) Notes Reviewed:  [x ] YES  [ ] NO  Care Discussed with Consultants/Other Providers [ x] YES  [ ] NO    LABS:                        8.9    10.70 )-----------( 301      ( 2022 16:48 )             28.1         141  |  109<H>  |  56<H>  ----------------------------<  106<H>  5.4<H>   |  19<L>  |  1.87<H>    Ca    9.6      2022 16:48    TPro  6.8  /  Alb  3.8  /  TBili  0.2  /  DBili  x   /  AST  14  /  ALT  12  /  AlkPhos  69        PT/INR - ( 2022 16:48 )   PT: 12.2 sec;   INR: 1.02          PTT - ( 2022 16:48 )  PTT:30.0 sec  CAPILLARY BLOOD GLUCOSE      POCT Blood Glucose.: 250 mg/dL (2022 19:19)      Urinalysis Basic - ( 2022 21:04 )    Color: Yellow / Appearance: SL Cloudy / S.015 / pH: x  Gluc: x / Ketone: NEGATIVE  / Bili: Negative / Urobili: 0.2 E.U./dL   Blood: x / Protein: NEGATIVE mg/dL / Nitrite: NEGATIVE   Leuk Esterase: NEGATIVE / RBC: x / WBC x   Sq Epi: x / Non Sq Epi: x / Bacteria: x      I & O Summary:    22 @ 07:01  -  22 @ 07:00  --------------------------------------------------------  IN: 0 mL / OUT: 190 mL / NET: -190 mL        Microbiology:    GI PCR Panel, Stool (collected 22 @ 10:54)  Source: .Stool None  Final Report (22 @ 13:09):    GI PCR Results: NOT detected    *******Please Note:*******    GI panel PCR evaluates for:    Campylobacter, Plesiomonas shigelloides, Salmonella,    Vibrio, Yersinia enterocolitica, Enteroaggregative    Escherichia coli (EAEC), Enteropathogenic E.coli (EPEC),    Enterotoxigenic E. coli (ETEC) lt/st, Shiga-like    toxin-producing E. coli (STEC) stx1/stx2,    Shigella/ Enteroinvasive E. coli (EIEC), Cryptosporidium,    Cyclospora cayetanensis, Entamoeba histolytica,    Giardia lamblia, Adenovirus F 40/41, Astrovirus,    Norovirus GI/GII, Rotavirus A, Sapovirus    Culture - Urine (collected 22 @ 21:04)  Source: Clean Catch Clean Catch (Midstream)  Preliminary Report (22 @ 14:14):    No growth to date    Culture - Blood (collected 22 @ 17:42)  Source: .Blood Blood-Peripheral  Preliminary Report (22 @ 07:00):    No growth at 12 hours    Culture - Blood (collected 22 @ 17:42)  Source: .Blood Blood-Peripheral  Preliminary Report (22 @ 07:00):    No growth at 12 hours      RADIOLOGY, EKG AND ADDITIONAL TESTS: Reviewed.    INPATIENT MEDICATIONS:  albuterol/ipratropium for Nebulization 3 milliLiter(s) Nebulizer every 6 hours  aspirin enteric coated 81 milliGRAM(s) Oral daily  budesonide 160 MICROgram(s)/formoterol 4.5 MICROgram(s) Inhaler 2 Puff(s) Inhalation two times a day  clopidogrel Tablet 75 milliGRAM(s) Oral daily  heparin   Injectable 5000 Unit(s) SubCutaneous every 8 hours  mirtazapine 30 milliGRAM(s) Oral at bedtime  simvastatin 20 milliGRAM(s) Oral at bedtime  tamsulosin 0.4 milliGRAM(s) Oral at bedtime      HEALTH ISSUES - PROBLEM Dx:  HLD (hyperlipidemia)    CAD (coronary artery disease)    BPH (benign prostatic hyperplasia)    COPD, moderate    Diarrhea    Metabolic acidosis    Prophylactic measure    CKD (chronic kidney disease)    HTN (hypertension)    Need for follow-up by     Anemia

## 2022-07-20 NOTE — PROGRESS NOTE ADULT - PROBLEM SELECTOR PLAN 10
Concern for pt's ability to care for self at home  - f/u PT, SW, case management recs     #Depression  -c/w home mirtazapine 15 daily. Concern for pt's ability to care for self at home  - f/u SW, case management recs   - Per PT, discharge with home PT  - Per dietician, DASH with soft-bite sized diet    #Depression  -c/w home mirtazapine 15 daily.

## 2022-07-20 NOTE — PROGRESS NOTE ADULT - SUBJECTIVE AND OBJECTIVE BOX
CC/ HPI Patient is a  89 year old male with CAD s/p PCI, HTN, CKD, COPD, colonic mass, s/p right hemicolectomy, 2017, s/p hospital stay for sepsis secondary to pneumonia, March 2022, this admission for diarrhea complicated by hypotension, seen this morning the patient denies acute respiratory complaint.      PAST MEDICAL & SURGICAL HISTORY:  HLD (hyperlipidemia)  CAD (coronary artery disease)  BPH (benign prostatic hyperplasia)  COPD, moderate  S/P cataract extraction  H/O right hemicolectomy    SOCHX:  + tobacco,  -  alcohol    FAMILY HISTORY: FA/MO  - contributory   No family history of cardiovascular disease (Father, Mother)      ROS reviewed below with positive findings marked (+) :  GEN:  fever, chills ENT: tracheostomy,   epistaxis,  sinusitis COR: +CAD, CHF,  HTN, dysrhythmia PUL: +COPD, ILD, asthma, +pneumonia GI: PEG, dysphagia, hemorrhage, other ELIZABETH: kidney disease, electrolyte disorder HEM:  anemia, thrombus, coagulopathy, cancer ENDO:  thyroid disease, diabetes mellitus CNS:  dementia, stroke, seizure, PSY:  depression, anxiety, other      MEDICATIONS  (STANDING):  albuterol/ipratropium for Nebulization 3 milliLiter(s) Nebulizer every 6 hours  aspirin enteric coated 81 milliGRAM(s) Oral daily  budesonide 160 MICROgram(s)/formoterol 4.5 MICROgram(s) Inhaler 2 Puff(s) Inhalation two times a day  clopidogrel Tablet 75 milliGRAM(s) Oral daily  heparin   Injectable 5000 Unit(s) SubCutaneous every 8 hours  mirtazapine 30 milliGRAM(s) Oral at bedtime  simvastatin 20 milliGRAM(s) Oral at bedtime  tamsulosin 0.4 milliGRAM(s) Oral at bedtime      Vital Signs Last 24 Hrs  T(C): 36.7 (20 Jul 2022 16:57), Max: 37.1 (20 Jul 2022 11:43)  T(F): 98.1 (20 Jul 2022 16:57), Max: 98.7 (20 Jul 2022 11:43)  HR: 69 (20 Jul 2022 16:57) (66 - 88)  BP: 138/65 (20 Jul 2022 16:57) (132/58 - 157/66)  RR: 19 (20 Jul 2022 16:57) (17 - 20)  SpO2: 98% (20 Jul 2022 16:57) (94% - 99%)    Parameters below as of 20 Jul 2022 16:57  Patient On (Oxygen Delivery Method): nasal cannula  O2 Flow (L/min): 2      GENERAL:         comfortable,  - distress.  HEENT:            - trauma,  - icterus,  - injection,  - nasal discharge.  NECK:              - jugular venous distention, - thyromegaly.  LYMPH:           - lymphadenopathy, - masses.  RESP:              + clear,   - rales,   - rhonchi,   - wheezes.   COR:                S1S2   - gallops,  - rubs.  ABD:                bowel sounds,   soft, - tender, - distended.  EXT/MSC:         - cyanosis,  - clubbing, - edema.    NEURO:           +  alert and oriented                          8.9    10.70 )-----------( 301      ( 19 Jul 2022 16:48 )             28.1     07-19    141  |  109<H>  |  56<H>  ----------------------------<  106<H>  5.4<H>   |  19<L>  |  1.87<H>          X-ray Chest (07.19.22)  Left midlung 1.7 cm nodule. Left lower lobe opacity. Right basilar 1.2 cm nodule/nipple shadow. Cardiomegaly, thoracic aortic calcification. Stable bony structures.    NM PET/CT Onc FDG Skull to Thigh, Inital (03.21.22)  FDG avid left lower lobe pulmonary nodule which may represent malignancy versus infectious/inflammatory etiology. Consider tissue sampling versus repeat short interval repeat CT chest in 2-3 months for further evaluation.  FDG avid right supraclavicular, left hilar and right lower paratracheal lymph nodes which may represent yong metastasis versus infectious/inflammatory etiology. Please note the right supraclavicular lymph node was present on remote CT chest 4/23/2017.  FDG avid left lower lobe airspace opacity which may represent pneumonia in the correct clinical mild FDG avid airspace opacities within the right lower lobe as well as along the major fissure which may represent atelectasis.    ASSESSMENT/PLAN    1)  Diarrhea  2)  CKD  3)  CVD  4)  COPD  5)  Pulmonary nodules    Oxygen as needed for a satisfactory SpO2.  Bronchodilators:  Atrovent/ albuterol q 4 – 6 hours as needed.  Budesonide 160mcg/formoterol Q12H  Cardiac/HTN: hemodynamically stable.    GI: Rx/ prophylaxis c PPI/H2B.    Heme: Rx/VT prophylaxis  Discussed with Dr. Montalvo, the patient and his family have refused biopsy of the pulmonary nodule.

## 2022-07-20 NOTE — DIETITIAN INITIAL EVALUATION ADULT - PERTINENT LABORATORY DATA
07-19    141  |  109<H>  |  56<H>  ----------------------------<  106<H>  5.4<H>   |  19<L>  |  1.87<H>    Ca    9.6      19 Jul 2022 16:48    TPro  6.8  /  Alb  3.8  /  TBili  0.2  /  DBili  x   /  AST  14  /  ALT  12  /  AlkPhos  69  07-19  POCT Blood Glucose.: 250 mg/dL (07-19-22 @ 19:19)  A1C with Estimated Average Glucose Result: 5.4 % (07-20-22 @ 07:12)

## 2022-07-20 NOTE — DIETITIAN INITIAL EVALUATION ADULT - OTHER INFO
89 year old male with PMH HTN, CAD s/p PCI w 3 DAYANA (mRCA, pRCA, mC in 2013 w/ Dr. Varela), CKD (Cr 1.7- 2.4), COPD (not on home O2), colonic cancer 2017 (s/p right hemicolectomy w/ Dr. Headley), Arthritis, aortic aneurysm, BPH, HLD, EF 45-50%, lung nodules, (PET CT March 2022) presenting after seeing his PCP Dr. Montalvo in the office on day of admission. Per reports had low BP and told patient to report to ED. Pt has chronic diarrhea for more than a year and takes diarrhea meds which will stop it.  Repots as once a day loose stools.  Never had workup and per patient did not have colonoscopy since his surgery in 2017.  He reports left lower quadrant abd pain as well. He lives alone and has a nephew who checks in on him. He reports he takes care of all of his ADLs, but given his difficulty in explaining his current issues, there is concern about his ability to truly care for himself.    Pt seen in [facility], now transferred to [facility]. Of note, pt is a poor historian, and unable to get specifics, limited assessment at this time. Unable to provide UBW, per wt hx in EMR, weighed at 134 lbs in Jan 2022, CBW of 117 lbs. Indicates wt change of 17 lbs/12.6% in 7 months. Pt reports good appetite PTA, states drinks 2 protein shakes at home. At current adm, observed minimal PO intake, <50% consumption, reports eating mainly scrambled eggs and potato hash. Endorses difficulty chewing 2/2 lack of dentures/teeth on top row of mouth, unable to chew food but able to eat soft foods. Denies swallowing issues. NKFA. Suspect pt to be meeting <75% EER via PO when at home. NKPE performed, mild-moderate losses noted. Meets criteria for malnutrition. Paged team for diet change + ONS. Denies n/v/c. per flowsheet, +BM 7/20, diarrhea. No pain reported. Encouraged pt to meet needs as able, focus on PO/protein intake for energy demands. Skin: Ernie 20, no PU or edema noted. RD to follow per protocol. Please see below for nutr recs.

## 2022-07-20 NOTE — PATIENT PROFILE ADULT - FALL HARM RISK - HARM RISK INTERVENTIONS

## 2022-07-20 NOTE — PHYSICAL THERAPY INITIAL EVALUATION ADULT - PHYSICAL ASSIST/NONPHYSICAL ASSIST: SIT/STAND, REHAB EVAL
fairly steady, no LOB/knee buckling noted although increased time required to complete task/verbal cues

## 2022-07-20 NOTE — PHYSICAL THERAPY INITIAL EVALUATION ADULT - GENERAL OBSERVATIONS, REHAB EVAL
PT IE completed. Chart reviewed. Patient received semi-supine, NAD, +3LO2NC, +IV hep lock, DARRELL Land cleared patient for treatment session. PT IE completed. Chart reviewed. Patient without complaints of pain at rest, agreeable to PT. Patient received semi-supine, NAD, +3LO2NC, +IV hep DARRELL francois cleared patient for treatment session.

## 2022-07-20 NOTE — PHYSICAL THERAPY INITIAL EVALUATION ADULT - THERAPY FREQUENCY, PT EVAL
Patient educated on frequency of inpatient physical therapy at [facility], patient verbalized understanding./2-3x/week

## 2022-07-20 NOTE — DIETITIAN INITIAL EVALUATION ADULT - OTHER CALCULATIONS
lbs. %IBW 75%. CBW used per clinical judgment. Adjusted needs based on wt and adv age, malnutrition,

## 2022-07-20 NOTE — PHYSICAL THERAPY INITIAL EVALUATION ADULT - GAIT DISTANCE, PT EVAL
~100 feet x 1 (ambulated on 3LO2NC; attempted to obtain SpO2 although unsuccessful reading although attempted initial gait bout on room air for ~1 minute and patient with increased work of breathing/dyspnea noted)

## 2022-07-20 NOTE — PHYSICAL THERAPY INITIAL EVALUATION ADULT - ADL SKILLS, REHAB EVAL
patient reports "he doesn't shower because he's afraid of falling" and primarily sponge bathes/independent

## 2022-07-20 NOTE — DIETITIAN INITIAL EVALUATION ADULT - PERTINENT MEDS FT
MEDICATIONS  (STANDING):  albuterol/ipratropium for Nebulization 3 milliLiter(s) Nebulizer every 6 hours  aspirin enteric coated 81 milliGRAM(s) Oral daily  budesonide 160 MICROgram(s)/formoterol 4.5 MICROgram(s) Inhaler 2 Puff(s) Inhalation two times a day  clopidogrel Tablet 75 milliGRAM(s) Oral daily  heparin   Injectable 5000 Unit(s) SubCutaneous every 8 hours  mirtazapine 30 milliGRAM(s) Oral at bedtime  simvastatin 20 milliGRAM(s) Oral at bedtime  tamsulosin 0.4 milliGRAM(s) Oral at bedtime    MEDICATIONS  (PRN):

## 2022-07-20 NOTE — PHYSICAL THERAPY INITIAL EVALUATION ADULT - GAIT DEVIATIONS NOTED, PT EVAL
fairly steady gait, no LOB/knee buckling noted, increased time required with turning; good negotiation through hallway obstacles without gait disturbances noted/decreased keyshawn/decreased step length/decreased weight-shifting ability

## 2022-07-21 ENCOUNTER — TRANSCRIPTION ENCOUNTER (OUTPATIENT)
Age: 87
End: 2022-07-21

## 2022-07-21 DIAGNOSIS — Z71.89 OTHER SPECIFIED COUNSELING: ICD-10-CM

## 2022-07-21 DIAGNOSIS — Z51.5 ENCOUNTER FOR PALLIATIVE CARE: ICD-10-CM

## 2022-07-21 DIAGNOSIS — N17.9 ACUTE KIDNEY FAILURE, UNSPECIFIED: ICD-10-CM

## 2022-07-21 DIAGNOSIS — R53.81 OTHER MALAISE: ICD-10-CM

## 2022-07-21 LAB
ALBUMIN SERPL ELPH-MCNC: 3.2 G/DL — LOW (ref 3.3–5)
ALP SERPL-CCNC: 59 U/L — SIGNIFICANT CHANGE UP (ref 40–120)
ALT FLD-CCNC: 9 U/L — LOW (ref 10–45)
ANION GAP SERPL CALC-SCNC: 10 MMOL/L — SIGNIFICANT CHANGE UP (ref 5–17)
AST SERPL-CCNC: 13 U/L — SIGNIFICANT CHANGE UP (ref 10–40)
BILIRUB SERPL-MCNC: 0.2 MG/DL — SIGNIFICANT CHANGE UP (ref 0.2–1.2)
BUN SERPL-MCNC: 31 MG/DL — HIGH (ref 7–23)
CALCIUM SERPL-MCNC: 8.8 MG/DL — SIGNIFICANT CHANGE UP (ref 8.4–10.5)
CHLORIDE SERPL-SCNC: 111 MMOL/L — HIGH (ref 96–108)
CO2 SERPL-SCNC: 19 MMOL/L — LOW (ref 22–31)
CREAT SERPL-MCNC: 1.48 MG/DL — HIGH (ref 0.5–1.3)
CULTURE RESULTS: SIGNIFICANT CHANGE UP
EGFR: 44 ML/MIN/1.73M2 — LOW
GLUCOSE SERPL-MCNC: 79 MG/DL — SIGNIFICANT CHANGE UP (ref 70–99)
HCT VFR BLD CALC: 26.8 % — LOW (ref 39–50)
HGB BLD-MCNC: 8.4 G/DL — LOW (ref 13–17)
MCHC RBC-ENTMCNC: 31.1 PG — SIGNIFICANT CHANGE UP (ref 27–34)
MCHC RBC-ENTMCNC: 31.3 GM/DL — LOW (ref 32–36)
MCV RBC AUTO: 99.3 FL — SIGNIFICANT CHANGE UP (ref 80–100)
NRBC # BLD: 0 /100 WBCS — SIGNIFICANT CHANGE UP (ref 0–0)
PLATELET # BLD AUTO: 253 K/UL — SIGNIFICANT CHANGE UP (ref 150–400)
POTASSIUM SERPL-MCNC: 5 MMOL/L — SIGNIFICANT CHANGE UP (ref 3.5–5.3)
POTASSIUM SERPL-SCNC: 5 MMOL/L — SIGNIFICANT CHANGE UP (ref 3.5–5.3)
PROT SERPL-MCNC: 5.9 G/DL — LOW (ref 6–8.3)
RBC # BLD: 2.7 M/UL — LOW (ref 4.2–5.8)
RBC # FLD: 13.5 % — SIGNIFICANT CHANGE UP (ref 10.3–14.5)
SODIUM SERPL-SCNC: 140 MMOL/L — SIGNIFICANT CHANGE UP (ref 135–145)
SPECIMEN SOURCE: SIGNIFICANT CHANGE UP
WBC # BLD: 7.11 K/UL — SIGNIFICANT CHANGE UP (ref 3.8–10.5)
WBC # FLD AUTO: 7.11 K/UL — SIGNIFICANT CHANGE UP (ref 3.8–10.5)

## 2022-07-21 PROCEDURE — 99358 PROLONG SERVICE W/O CONTACT: CPT | Mod: NC

## 2022-07-21 PROCEDURE — 99223 1ST HOSP IP/OBS HIGH 75: CPT

## 2022-07-21 PROCEDURE — 99497 ADVNCD CARE PLAN 30 MIN: CPT | Mod: 25

## 2022-07-21 RX ORDER — VALSARTAN 80 MG/1
1 TABLET ORAL
Qty: 0 | Refills: 0 | DISCHARGE

## 2022-07-21 RX ORDER — METOPROLOL TARTRATE 50 MG
25 TABLET ORAL DAILY
Refills: 0 | Status: DISCONTINUED | OUTPATIENT
Start: 2022-07-22 | End: 2022-07-23

## 2022-07-21 RX ORDER — ISOSORBIDE MONONITRATE 60 MG/1
30 TABLET, EXTENDED RELEASE ORAL DAILY
Refills: 0 | Status: DISCONTINUED | OUTPATIENT
Start: 2022-07-22 | End: 2022-07-23

## 2022-07-21 RX ORDER — METOPROLOL TARTRATE 50 MG
1 TABLET ORAL
Qty: 30 | Refills: 0
Start: 2022-07-21 | End: 2022-08-19

## 2022-07-21 RX ORDER — VALSARTAN 80 MG/1
1 TABLET ORAL
Qty: 30 | Refills: 0
Start: 2022-07-21 | End: 2022-08-19

## 2022-07-21 RX ORDER — VALSARTAN 80 MG/1
40 TABLET ORAL DAILY
Refills: 0 | Status: DISCONTINUED | OUTPATIENT
Start: 2022-07-22 | End: 2022-07-23

## 2022-07-21 RX ADMIN — HEPARIN SODIUM 5000 UNIT(S): 5000 INJECTION INTRAVENOUS; SUBCUTANEOUS at 07:18

## 2022-07-21 RX ADMIN — Medication 3 MILLILITER(S): at 19:08

## 2022-07-21 RX ADMIN — MIRTAZAPINE 30 MILLIGRAM(S): 45 TABLET, ORALLY DISINTEGRATING ORAL at 22:51

## 2022-07-21 RX ADMIN — CLOPIDOGREL BISULFATE 75 MILLIGRAM(S): 75 TABLET, FILM COATED ORAL at 13:05

## 2022-07-21 RX ADMIN — Medication 3 MILLILITER(S): at 13:05

## 2022-07-21 RX ADMIN — BUDESONIDE AND FORMOTEROL FUMARATE DIHYDRATE 2 PUFF(S): 160; 4.5 AEROSOL RESPIRATORY (INHALATION) at 13:05

## 2022-07-21 RX ADMIN — TAMSULOSIN HYDROCHLORIDE 0.4 MILLIGRAM(S): 0.4 CAPSULE ORAL at 22:51

## 2022-07-21 RX ADMIN — HEPARIN SODIUM 5000 UNIT(S): 5000 INJECTION INTRAVENOUS; SUBCUTANEOUS at 13:05

## 2022-07-21 RX ADMIN — BUDESONIDE AND FORMOTEROL FUMARATE DIHYDRATE 2 PUFF(S): 160; 4.5 AEROSOL RESPIRATORY (INHALATION) at 22:52

## 2022-07-21 RX ADMIN — HEPARIN SODIUM 5000 UNIT(S): 5000 INJECTION INTRAVENOUS; SUBCUTANEOUS at 22:51

## 2022-07-21 RX ADMIN — SIMVASTATIN 20 MILLIGRAM(S): 20 TABLET, FILM COATED ORAL at 22:51

## 2022-07-21 RX ADMIN — Medication 3 MILLILITER(S): at 22:51

## 2022-07-21 RX ADMIN — PANTOPRAZOLE SODIUM 40 MILLIGRAM(S): 20 TABLET, DELAYED RELEASE ORAL at 07:18

## 2022-07-21 RX ADMIN — MIRTAZAPINE 30 MILLIGRAM(S): 45 TABLET, ORALLY DISINTEGRATING ORAL at 01:14

## 2022-07-21 RX ADMIN — Medication 81 MILLIGRAM(S): at 13:04

## 2022-07-21 RX ADMIN — Medication 3 MILLILITER(S): at 07:18

## 2022-07-21 NOTE — CONSULT NOTE ADULT - ASSESSMENT
per Internal Medicine    89 M w/ PMH HTN, CAD s/p PCI w/ 3 DAYANA, CKD (Cr 1.7-2.4), COPD (not on home O2), colonic mass (s/p R hemicolectomy 4/2017), arthritis, aortic aneurysm, BPH, HLD, EF 45-50%, and lung nodules presenting with worsening chronic diarrhea and need for social work eval given concern for ability to care for self. Pt is stable and admitted to [facility].     Problem/Plan - 1:  ·  Problem: Diarrhea.   ·  Plan: Pt admitted for diarrhea (non-bloody) and hypotension (found in PCP office, not on admission). Pt has chronic diarrhea since R hemicolectomy (4/2017) performed for colonic mass. Pt reports diarrhea has been worsening recently. Pt takes Lomotil at home. No concern for C diff given no severe leukocytosis, no fever, and no recent abx. CT showed bladder herniating as L inguinal hernia and sigmoid diverticulosis. S/p almost 1 L NS in ED. Pt likely has progressive worsening of chronic diarrhea 2/2 hemicolectomy and diverticulosis. Possible hypotension prior to admission was likely 2/2 low PO intake and dehydration. Low concern for infection given no fever, WBC 10.7. GI PCR negative.  - F/u blood cultures  - Continue to monitor for BMs, continue gentle hydration (caution in HF), encourage PO intake  - Consider restarting lomotil vs loperamide  - Per GI recs, consider colonoscopy to r/o collagenous colitis  - Repeat CBC; FOBT if Hgb still below baseline.    Problem/Plan - 2:  ·  Problem: Metabolic acidosis.   ·  Plan: Pt admitted with bicarb of 19 (about at baseline per chart review) and tachypneic (at baseline per pt)  pH on VBG was 7.3  Anion gap normal   Pt is likely metabolic acidosis 2/2 chronic diarrhea and CKD  - Treat diarrhea and rehydrate as above  - Continue to monitor  - Consider bicarb if worsening    #Hyperkalemia: S/p hyperkalemia cocktail in ED. No EKG changes. Likely 2/2 metabolic acidosis   - Continue to monitor.    Problem/Plan - 3:  ·  Problem: COPD, moderate.   ·  Plan: Pt with hx of COPD, not on home O2. Takes advair and albuterol. Pt is tachypnic but he says this is his baseline. No wheeze, no hypoxia. CXR shows LL opacity, similar to previous study in March when he was admitted for COPD exacerbation. He has a chronic cough which he reports is his baseline.  -c/w duonebs and symbicort while inpatient and discharge on home advair and albuterol   -Pulmonary consult    #3 to 4 cm LLL nodule lesion: Unchanged on CT done 7/19  -f/u outpatient -> was meant to get IR guided biopsy done after discharge in March. Unclear if this happened.    Problem/Plan - 4:  ·  Problem: HTN (hypertension).   ·  Plan: Home med regimen: Valsartan 320mg daily, Metoprolol tartrate 25mg q8, amlodipine 5mg daily, isosorbide mononitrate 30mg daily.  -hold meds above given soft BP and restart when appropriate.    Problem/Plan - 5:  ·  Problem: CKD (chronic kidney disease).   ·  Plan: Admitted with Cr 1.87 (baseline 1.7 - 2.4)  -Avoid nephrotoxins  -Renally dose meds.    Problem/Plan - 6:  ·  Problem: Anemia.   ·  Plan: Hb 8.9, MCV 97. Baseline 10-11. Likely 2/2 chronic disease and malnutrition  - F/u iron studies, B12, folate.    Problem/Plan - 7:  ·  Problem: CAD (coronary artery disease).   ·  Plan: #CAD s/p PCI w 3 DAYANA (mRCA, pRCA, mC in 2013 w/ Dr. Varela)  -c/w home ASA and statin    #CHF  Last EF 45-50% in Nov 2020    #AAA seen on CT 4.2 cm in diameter  Stable on CT done 7/19    #Prolonged WI interval  -EKG at admission showed regularly prolonged WI but was poor quality. Previous note reports 1st degree HB as well.  -f/u repeat EKG.    Problem/Plan - 8:  ·  Problem: BPH (benign prostatic hyperplasia).   ·  Plan: BPH (benign prostatic hyperplasia).   -c/w home tamsulosin 0.4mg.    Problem/Plan - 9:  ·  Problem: HLD (hyperlipidemia).   ·  Plan: -c/w home simvastatin 20mg at bedtime.    Problem/Plan - 10:  ·  Problem: Need for follow-up by .   ·  Plan; Concern for pt's ability to care for self at home  - f/u SW, case management recs   - Per PT, discharge with home PT  - Per dietician, DASH with soft-bite sized diet    #Depression  -c/w home mirtazapine 15 daily.    Problem/Plan - 11:  ·  Problem: Prophylactic measure.   ·  Plan: F: None   E: Replete with caution in CKD  N: Dash diet  DVT: Heparin 5u q8   Code: FULL.  
diarrhea once a day  anemia noted  Ct negative  consider colonoscopy to rule out collagenous colitis but diarrhea not so severe to warrant  also with suspicious CT findings would wait for pulmonary eval  Hgb 8.9? usually 11-13 ?  repeat and if real do stool hemoccult test
89 year old man with debility, CAD (coronary artery disease), COPD, Advance care planning and encounter for palliative care.

## 2022-07-21 NOTE — CONSULT NOTE ADULT - SUBJECTIVE AND OBJECTIVE BOX
TIARA GOMEZ          MRN-[id number]            (1930)    HPI:  89 year old male with PMH HTN, CAD s/p PCI w 3 DAYANA (mRCA, pRCA, mC in  w/ Dr. Varela), CKD (Cr 1.7- 2.4), COPD (not on home O2), colonic mass (s/p right hemicolectomy 2017 w/ Dr. Headley), Arthritis, aortic aneurysm, BPH, HLD, EF 45-50%, lung nodules, presenting after seeing his PCP Dr. Montalvo in the office on day of admission. Dr. Montalvo recorded a low BP and told patient to report to ED. Pt has chronic diarrhea, but reports it has been worsening for the past week. He is a poor historian and specifics of history were difficult to obtain. He reports left lower quadrant abd pain as well. He lives alone and has a nephew who checks in on him. He reports he takes care of all of his ADLs, but given his difficulty in explaining his current issues, there is concern about his ability to truly care for himself.    	Denies fevers, chills, black stool, bloody stool, dysuria, hematuria, urinary frequency, focal weakness/numbness, lightheadedness, back pain, testicular/penile pain, rashes, joint pains, recent travel, recent antibiotic use, sick contacts, CP, rhinorrhea, nasal congestion, sore throat, cough (pt has a chronic cough which has not changed).    ED COURSE:  Vitals: T 97.4, HR 61, /57, RR 20, SpO2 95% on 4 L NC -> 99% on room air  Note that 20 was recorded as respiratory rate, but when writer evaluated patient, RR was 40-50 - patient reports this is his baseline. Not hypoxic.    Orthostatics negative in ED at 4pm    Significant Labs: WBC 10.7, Hb 8.9, Potassium 5.4, Cl 109, CO2 19, BUN 56, Cr 1.87  VBG: pH 7.3, Co2 20.9, pCO2 40, pO2 48, HCO3 20, O2 sat 77  Covid negative  UA negative    Imaging:   CT A/P:   -Bladder herniating as a left inguinal hernia  -Sigmoid diverticulosis  -Suprarenal aortic aneurysm and extensive arterial calcification - stable   -3 to 4 cm LLL nodule lesion unchanged    CXR  -Left lower opacity similar to previous in March    EKG - 1st degree HB. No ST or T wave abnormalities.    Interventions: Albuterol x1, Insulin regular 5u, Dextrose 50%, 500ml NS plus NS started at 125 ml/hr (2022 22:34)      PAST MEDICAL & SURGICAL HISTORY:  HLD (hyperlipidemia)      CAD (coronary artery disease)      BPH (benign prostatic hyperplasia)      COPD, moderate      S/P cataract extraction      H/O right hemicolectomy    FAMILY HISTORY:  No family history of cardiovascular disease (Father, Mother)     Reviewed and found non contributory in mother or father    SOCIAL HISTORY: Former smoker, occasionally wine at dinner, no other drugs. Lives alone. Nephew checks in.    PSYCHOSOCIAL ASSESSMENT:  Samaritan/Spiritual practice: Non  Role of organized Latter day [ ] important [ ] some [ ] unable to assess dt pt mentation  [x] non  Coping: [x ] well [ ] with difficulty [ ] poor coping [ ] unable to assess dt pt mentation  Support system: [ ] strong [x ] adequate [ ] inadequate    ROS:    Unable to attain due to:   n/a     Dyspnea (Terri 0-10):   0                     N/V (Y/N):            N                 Secretions (Y/N) :         N       Agitation(Y/N): N  Pain (Y/N):     N  -Provocation/Palliation: n/a   -Quality/Quantity: n/a   -Radiating: n/a   -Severity: n/a   -Timing/Frequency: n/a   -Impact on ADLs: n/a     General:  + weakness   HEENT:    Denied  Neck:  Denied  CVS:  Denied  Resp:  Denied  GI:  Denied  :  Denied  Musc:  Denied  Neuro:  Denied  Psych:  Denied  Skin:  Denied  Lymph:  Denied    Allergies    No Known Allergies    Intolerances      Opiate Naive (Y/N):  Y  -iStop reviewed (Y/N): Y  (Ref#:      [id number] )  2022	diphenoxylate-atropine 2.5-0.025 mg tablet	60	20	Elias Montalvo MD	[id number]	Medicare	[facility] #[id number]  2022	diphenoxylate-atropine 2.5-0.025 mg tablet	30	10	Elias Montalvo MD	[id number]	Medicare	[facility] #[id number]  2021	diphenoxylate-atropine 2.5-0.025 mg tablet	60	20	Elias Montalvo MD	[id number]	Medicare	[facility] #[id number]      Medications:      MEDICATIONS  (STANDING):  albuterol/ipratropium for Nebulization 3 milliLiter(s) Nebulizer every 6 hours  aspirin enteric coated 81 milliGRAM(s) Oral daily  budesonide 160 MICROgram(s)/formoterol 4.5 MICROgram(s) Inhaler 2 Puff(s) Inhalation two times a day  clopidogrel Tablet 75 milliGRAM(s) Oral daily  heparin   Injectable 5000 Unit(s) SubCutaneous every 8 hours  mirtazapine 30 milliGRAM(s) Oral at bedtime  pantoprazole    Tablet 40 milliGRAM(s) Oral before breakfast  simvastatin 20 milliGRAM(s) Oral at bedtime  tamsulosin 0.4 milliGRAM(s) Oral at bedtime    MEDICATIONS  (PRN):      Labs:    CBC:                        8.4    7.11  )-----------( 253      ( 2022 09:17 )             26.8     CMP:        140  |  111<H>  |  31<H>  ----------------------------<  79  5.0   |  19<L>  |  1.48<H>    Ca    8.8      2022 09:17    TPro  5.9<L>  /  Alb  3.2<L>  /  TBili  0.2  /  DBili  x   /  AST  13  /  ALT  9<L>  /  AlkPhos  59  07-21    PT/INR - ( 2022 16:48 )   PT: 12.2 sec;   INR: 1.02          PTT - ( 2022 16:48 )  PTT:30.0 sec  Urinalysis Basic - ( 2022 21:04 )    Color: Yellow / Appearance: SL Cloudy / S.015 / pH: x  Gluc: x / Ketone: NEGATIVE  / Bili: Negative / Urobili: 0.2 E.U./dL   Blood: x / Protein: NEGATIVE mg/dL / Nitrite: NEGATIVE   Leuk Esterase: NEGATIVE / RBC: x / WBC x   Sq Epi: x / Non Sq Epi: x / Bacteria: x    Imaging:    < from: Xray Chest 1 View- PORTABLE-Routine (Xray Chest 1 View- PORTABLE-Routine .) (22 @ 17:01) >  ACC: [id number] EXAM:  XR CHEST PORTABLE ROUTINE 1V                          PROCEDURE DATE:  2022  Findings/  impression: Left midlung 1.7 cm nodule. Left lower lobe opacity. Right   basilar 1.2 cm nodule/nipple shadow. Cardiomegaly, thoracic aortic   calcification. Stable bony structures.    < end of copied text >    < from: CT Abdomen and Pelvis w/ Oral Cont (22 @ 18:51) >  ACC: [id number] EXAM:  CT ABDOMEN AND PELVIS OC                          PROCEDURE DATE:  2022  IMPRESSION:  No acute findings.    < end of copied text >    PEx:  T(C): 36.6 (22 @ 09:48), Max: 37.1 (22 @ 11:43)  HR: 64 (22 @ 09:48) (59 - 73)  BP: 127/64 (22 @ 09:48) (127/64 - 157/66)  RR: 18 (22 @ 09:48) (18 - 20)  SpO2: 100% (22 @ 09:48) (95% - 100%)  Wt(kg): 53.1kG  Daily     Daily   CAPILLARY BLOOD GLUCOSE    POCT Blood Glucose.: 250 mg/dL (2022 19:19)    I&O's Summary    GENERAL:  [x ]Alert  [ x]Oriented x  3 [ ]Lethargic  [ ]Cachexia  [ ]Unarousable  [ ]Verbal  [ ]Non-Verbal  Behavioral:   [ ] Anxiety  [ ] Delirium [ ] Agitation [ x] Other - calm   HEENT:  [x ]Normal   [ ]Dry mouth   [ ]ET Tube/Trach  [ ]Oral lesions  PULMONARY:   [x ]Clear  [ ]Tachypnea  [ ]Audible excessive secretions   [ ]Rhonchi        [ ]Right [ ]Left [ ]Bilateral  [ ]Crackles        [ ]Right [ ]Left [ ]Bilateral  [ ]Wheezing     [ ]Right [ ]Left [ ]Bilateral  CARDIOVASCULAR:    [x ]Regular [ ]Irregular [ ]Tachy  [ ]Bud [ ]Murmur [ ]Other  GASTROINTESTINAL:  [x ]Soft  [ ]Distended   [ ]+BS  [ x]Non tender [ ]Tender  [ ]PEG [ ]OGT/ NGT  Last BM:   GENITOURINARY:  [ x]Normal [ ] Incontinent   [ ]Oliguria/Anuria   [ ]Espinoza  MUSCULOSKELETAL:   [ ]Normal   [x ]Weakness  [ ]Bed/Wheelchair bound [ ]Edema  NEUROLOGIC:   [ x]No focal deficits  [ ] Cognitive impairment  [ ] Dysphagia [ ]Dysarthria [ ] Paresis [ ]Other   SKIN:   [ x]Normal   [ ]Pressure ulcer(s)  [ ]Rash      Preadmit Karnofsky:  70%           Current Karnofsky:  60   %  Cachexia (Y/N): Y  BMI: 17.8kg/m2    Advanced Directives:     DNR/DNI     MOL    DECISION MAKER: Patient is able to make decisions for himself  LEGAL SURROGATE: Tanner Yan [phone]    GOALS OF CARE DISCUSSION       Palliative care info/counseling provided	           Family meeting       Advanced Directives addressed please see Advance Care Planning Note	           See previous Palliative Medicine Note       Documentation of GOC: DNR/DNI          REFERRALS	        Unit SW/Case Mgmt       PT/OT
randell historian, history partially from chart and partially from patient    89 year old male with PMH HTN, CAD s/p PCI w 3 DAYANA (mRCA, pRCA, mC in 2013 w/ Dr. Varela), CKD (Cr 1.7- 2.4), COPD (not on home O2), colonic cancer 2017 (s/p right hemicolectomy w/ Dr. Headley), Arthritis, aortic aneurysm, BPH, HLD, EF 45-50%, lung nodules, (PET CT March 2022) presenting after seeing his PCP Dr. Montalvo in the office on day of admission. Per reports had low BP and told patient to report to ED. Pt has chronic diarrhea for more than a year and takes diarrhea meds which will stop it.  Repots as once a day loose stools.  Never had workup and per patient did not have colonoscopy since his surgery in 2017.  He reports left lower quadrant abd pain as well. He lives alone and has a nephew who checks in on him. He reports he takes care of all of his ADLs, but given his difficulty in explaining his current issues, there is concern about his ability to truly care for himself.      Imaging:   CT A/P:   -Bladder herniating as a left inguinal hernia  -Sigmoid diverticulosis  -Suprarenal aortic aneurysm and extensive arterial calcification - stable   -3 to 4 cm LLL nodule lesion unchanged    CXR  -Left lower opacity similar to previous in March    EKG - 1st degree HB. No ST or T wave abnormalities.          REVIEW OF SYSTEMS:  Constitutional: No fever,   ENMT:  No difficulty hearing, tinnitus, vertigo; No sinus or throat pain  Respiratory: No cough, wheezing, chills or hemoptysis  Cardiovascular: No chest pain, palpitations, dizziness or leg swelling  Gastrointestinal: No abdominal or epigastric pain currently. No nausea, vomiting + diarrhea  No melena or hematochezia.  Skin: No itching, burning, rashes or lesions   Musculoskeletal: No joint pain or swelling; No muscle, back or extremity pain    PAST MEDICAL & SURGICAL HISTORY:  HLD (hyperlipidemia)      CAD (coronary artery disease)      BPH (benign prostatic hyperplasia)      COPD, moderate      S/P cataract extraction      H/O right hemicolectomy          FAMILY HISTORY:  No family history of cardiovascular disease (Father, Mother)        SOCIAL HISTORY:  Smoking Status: [ ] Current, [ ] Former, [ ] Never  Pack Years:    MEDICATIONS:  MEDICATIONS  (STANDING):  albuterol/ipratropium for Nebulization 3 milliLiter(s) Nebulizer every 6 hours  aspirin enteric coated 81 milliGRAM(s) Oral daily  budesonide 160 MICROgram(s)/formoterol 4.5 MICROgram(s) Inhaler 2 Puff(s) Inhalation two times a day  clopidogrel Tablet 75 milliGRAM(s) Oral daily  heparin   Injectable 5000 Unit(s) SubCutaneous every 8 hours  mirtazapine 30 milliGRAM(s) Oral at bedtime  simvastatin 20 milliGRAM(s) Oral at bedtime  tamsulosin 0.4 milliGRAM(s) Oral at bedtime    MEDICATIONS  (PRN):      Allergies    No Known Allergies    Intolerances        Vital Signs Last 24 Hrs  T(C): 36.8 (20 Jul 2022 08:43), Max: 36.8 (19 Jul 2022 15:20)  T(F): 98.2 (20 Jul 2022 08:43), Max: 98.3 (19 Jul 2022 15:20)  HR: 70 (20 Jul 2022 08:43) (57 - 88)  BP: 133/63 (20 Jul 2022 08:43) (110/57 - 147/61)  BP(mean): --  RR: 17 (20 Jul 2022 08:43) (17 - 20)  SpO2: 97% (20 Jul 2022 08:43) (94% - 99%)    Parameters below as of 20 Jul 2022 08:43  Patient On (Oxygen Delivery Method): nasal cannula  O2 Flow (L/min): 2      07-19 @ 07:01  -  07-20 @ 07:00  --------------------------------------------------------  IN: 0 mL / OUT: 190 mL / NET: -190 mL          PHYSICAL EXAM:    General: poorly nourished; in no acute distress  HEENT: MMM, conjunctiva and sclera clear  Gastrointestinal: Soft, non-tender non-distended; Normal bowel sounds; No rebound or guarding  Extremities: Normal range of motion, No clubbing, cyanosis or edema  Neurological: Alert and oriented x3  Skin: Warm and dry. No obvious rash      LABS:                        8.9    10.70 )-----------( 301      ( 19 Jul 2022 16:48 )             28.1     07-19    141  |  109<H>  |  56<H>  ----------------------------<  106<H>  5.4<H>   |  19<L>  |  1.87<H>    Ca    9.6      19 Jul 2022 16:48    TPro  6.8  /  Alb  3.8  /  TBili  0.2  /  DBili  x   /  AST  14  /  ALT  12  /  AlkPhos  69  07-19      Culture Results:   No growth at 12 hours (07-19 @ 17:42)  Culture Results:   No growth at 12 hours (07-19 @ 17:42)      RADIOLOGY & ADDITIONAL STUDIES:   
    Patient is a 89y old  Male who presents with a chief complaint of diarrhea (2022 12:15)        HPI:  89 year old male with PMH HTN, CAD s/p PCI w 3 DAYANA (mRCA, pRCA, mC in  w/ Dr. Varela), CKD (Cr 1.7- 2.4), COPD (not on home O2), colonic mass (s/p right hemicolectomy 2017 w/ Dr. Headley), Arthritis, aortic aneurysm, BPH, HLD, EF 45-50%, lung nodules, presenting after seeing his PCP Dr. Montalvo in the office on day of admission. Dr. Montalvo recorded a low BP and told patient to report to ED. Pt has chronic diarrhea, but reports it has been worsening for the past week. He is a poor historian and specifics of history were difficult to obtain. He reports left lower quadrant abd pain as well. He lives alone and has a nephew who checks in on him. He reports he takes care of all of his ADLs, but given his difficulty in explaining his current issues, there is concern about his ability to truly care for himself.    	Denies fevers, chills, black stool, bloody stool, dysuria, hematuria, urinary frequency, focal weakness/numbness, lightheadedness, back pain, testicular/penile pain, rashes, joint pains, recent travel, recent antibiotic use, sick contacts, CP, rhinorrhea, nasal congestion, sore throat, cough (pt has a chronic cough which has not changed).    ED COURSE:  Vitals: T 97.4, HR 61, /57, RR 20, SpO2 95% on 4 L NC -> 99% on room air  Note that 20 was recorded as respiratory rate, but when writer evaluated patient, RR was 40-50 - patient reports this is his baseline. Not hypoxic.    Orthostatics negative in ED at 4pm    Significant Labs: WBC 10.7, Hb 8.9, Potassium 5.4, Cl 109, CO2 19, BUN 56, Cr 1.87  VBG: pH 7.3, Co2 20.9, pCO2 40, pO2 48, HCO3 20, O2 sat 77  Covid negative  UA negative    Imaging:   CT A/P:   -Bladder herniating as a left inguinal hernia  -Sigmoid diverticulosis  -Suprarenal aortic aneurysm and extensive arterial calcification - stable   -3 to 4 cm LLL nodule lesion unchanged    CXR  -Left lower opacity similar to previous in March    EKG - 1st degree HB. No ST or T wave abnormalities.    Interventions: Albuterol x1, Insulin regular 5u, Dextrose 50%, 500ml NS plus NS started at 125 ml/hr (2022 22:34)      PAST MEDICAL & SURGICAL HISTORY:  HLD (hyperlipidemia)      CAD (coronary artery disease)      BPH (benign prostatic hyperplasia)      COPD, moderate      S/P cataract extraction      H/O right hemicolectomy          MEDICATIONS  (STANDING):  albuterol/ipratropium for Nebulization 3 milliLiter(s) Nebulizer every 6 hours  aspirin enteric coated 81 milliGRAM(s) Oral daily  budesonide 160 MICROgram(s)/formoterol 4.5 MICROgram(s) Inhaler 2 Puff(s) Inhalation two times a day  clopidogrel Tablet 75 milliGRAM(s) Oral daily  heparin   Injectable 5000 Unit(s) SubCutaneous every 8 hours  mirtazapine 30 milliGRAM(s) Oral at bedtime  pantoprazole    Tablet 40 milliGRAM(s) Oral before breakfast  simvastatin 20 milliGRAM(s) Oral at bedtime  tamsulosin 0.4 milliGRAM(s) Oral at bedtime    MEDICATIONS  (PRN):        Social History:                   -  Home Living Status :  lives alone in an elevator accessible apartment building         -  Prior Home Care Services :   none       Baseline Functional Level Prior to Admission :             - ADL's/ IADL's :  independent         - ambulatory status PTA :  walks with a quad cane/rollator        FAMILY HISTORY:  No family history of cardiovascular disease (Father, Mother)      CBC Full  -  ( 2022 09:17 )  WBC Count : 7.11 K/uL  RBC Count : 2.70 M/uL  Hemoglobin : 8.4 g/dL  Hematocrit : 26.8 %  Platelet Count - Automated : 253 K/uL  Mean Cell Volume : 99.3 fl  Mean Cell Hemoglobin : 31.1 pg  Mean Cell Hemoglobin Concentration : 31.3 gm/dL  Auto Neutrophil # : x  Auto Lymphocyte # : x  Auto Monocyte # : x  Auto Eosinophil # : x  Auto Basophil # : x  Auto Neutrophil % : x  Auto Lymphocyte % : x  Auto Monocyte % : x  Auto Eosinophil % : x  Auto Basophil % : x      07-21    140  |  111<H>  |  31<H>  ----------------------------<  79  5.0   |  19<L>  |  1.48<H>    Ca    8.8      2022 09:17    TPro  5.9<L>  /  Alb  3.2<L>  /  TBili  0.2  /  DBili  x   /  AST  13  /  ALT  9<L>  /  AlkPhos  59  -      Urinalysis Basic - ( 2022 21:04 )    Color: Yellow / Appearance: SL Cloudy / S.015 / pH: x  Gluc: x / Ketone: NEGATIVE  / Bili: Negative / Urobili: 0.2 E.U./dL   Blood: x / Protein: NEGATIVE mg/dL / Nitrite: NEGATIVE   Leuk Esterase: NEGATIVE / RBC: x / WBC x   Sq Epi: x / Non Sq Epi: x / Bacteria: x          Radiology :    < from: CT Abdomen and Pelvis w/ Oral Cont (22 @ 18:51) >  ACC: [id number] EXAM:  CT ABDOMEN AND PELVIS OC                          PROCEDURE DATE:  2022          INTERPRETATION:  CLINICAL INFORMATION: Diarrhea abdominal pain    COMPARISON: PET/CT 3/21    PROCEDURE:  CT of the Abdomen and Pelvis was performed without intravenous contrast.  Intravenous contrast: None.  Oral contrast: positive contrast was administered.  Sagittal and coronal reformats were performed.    FINDINGS:  LOWER CHEST: 3 to 4 cm left lower lobe nodule lesion unchanged.    LIVER: Within normal limits.  BILE DUCTS: Normal caliber.  GALLBLADDER: Within normal limits.  SPLEEN: Within normal limits.  PANCREAS: Within normal limits.  ADRENALS: Within normal limits.  KIDNEYS/URETERS: Within normal limits.    BLADDER: Left dome herniated through inguinal hernia.  REPRODUCTIVE ORGANS: No pelvic masses.    BOWEL: No bowel obstruction. Sigmoid diverticulosis. No bowel wall   thickening.  PERITONEUM: No ascites.  VESSELS: Suprarenal aortic aneurysm and extensive arterial calcification   stable.  RETROPERITONEUM/LYMPH NODES: No lymphadenopathy.  ABDOMINAL WALL: Bladder containing left inguinal hernia.  BONES: Within normal limits.    IMPRESSION:  No acute findings.                Vital Signs Last 24 Hrs  T(C): 36.6 (2022 09:48), Max: 36.7 (2022 16:57)  T(F): 97.9 (2022 09:48), Max: 98.1 (2022 16:57)  HR: 64 (2022 09:48) (59 - 69)  BP: 127/64 (2022 09:48) (127/64 - 143/69)  BP(mean): --  RR: 18 (2022 09:48) (18 - 19)  SpO2: 100% (2022 09:48) (95% - 100%)    Parameters below as of 2022 09:48  Patient On (Oxygen Delivery Method): nasal cannula  O2 Flow (L/min): 7          REVIEW OF SYSTEMS:      CONSTITUTIONAL: No fever, weight loss, or fatigue  EYES: No eye pain, visual disturbances, or discharge  ENMT:  No difficulty hearing, tinnitus, vertigo; No sinus or throat pain  NECK: No pain or stiffness  BREASTS: No pain, masses, or nipple discharge  RESPIRATORY: No cough, wheezing, chills or hemoptysis; No shortness of breath  CARDIOVASCULAR: No chest pain, palpitations, dizziness, or leg swelling  GASTROINTESTINAL: per hpi  GENITOURINARY: No dysuria, frequency, hematuria, or incontinence  NEUROLOGICAL: No headaches, memory loss, loss of strength, numbness, or tremors  SKIN: No itching, burning, rashes, or lesions   LYMPH NODES: No enlarged glands  ENDOCRINE: No heat or cold intolerance; No hair loss  MUSCULOSKELETAL: No joint pain or swelling; No muscle, back, or extremity pain  PSYCHIATRIC: No depression, anxiety, mood swings, or difficulty sleeping  HEME/LYMPH: No easy bruising, or bleeding gums  ALLERGY AND IMMUNOLOGIC: No hives or eczema  VASCULAR: no swelling , erythema          Physical Exam:   frail 89 y o  man lying in semi Arriaga's position , awake , alert , no acute complaints    Neurologic Exam:    Alert and oriented x 4      Motor Exam:    Right UE:               no focal weakness ,  > 3+/5 throughout                                 Left UE:                 no focal weakness,  > 3+/5 throughout          Right LE:                no focal weakness,  > 3+/5 throughout      Left LE:                  no focal weakness,  > 3+/5 throughout            Sensation:         intact to light touch x 4 extremities                         DTR :                     biceps/brachioradialis : equal                                              patella/ankle : equal     Gait :  not tested              PM&R Impression :     1) deconditioned    2) no focal weakness      Recommendations / Plan :     1) Physical / Occupational therapy focusing on therapeutic exercises , bed mobility/transfer out of bed evaluation , progressive ambulation with assistive       devices prn .    2) Anticipated Disposition Plan / Recs  :  pending functional progress

## 2022-07-21 NOTE — DISCHARGE NOTE PROVIDER - CARE PROVIDER_API CALL
Elias Montalvo)  Internal Medicine  [address] [address]  Phone: [phone]  Fax: [phone]  Scheduled Appointment: 08/03/2022 02:00 PM

## 2022-07-21 NOTE — CONSULT NOTE ADULT - PROBLEM SELECTOR RECOMMENDATION 5
Patient made himself a DNR/DNI. Please see GOC note for details. Plan for patient to go home with [facility].  As discussed during the palliative IDT meeting, the patients PSSA screening did not identify any current psychosocial need or spiritual support deficits.  - Bahai/Spiritual practice: non  - Coping: [x ] well [ ] with difficulty [ ] poor coping   - Support system: [ ] strong [x ] adequate [ ] inadequate  - All questions answered, emotional support provided  -  primary team   - Please contact Palliative Medicine 24/7 at [phone] for any acute symptoms or further questions  - Will continue to follow with you

## 2022-07-21 NOTE — DISCHARGE NOTE PROVIDER - NSDCCPCAREPLAN_GEN_ALL_CORE_FT
PRINCIPAL DISCHARGE DIAGNOSIS  Diagnosis: Diarrhea  Assessment and Plan of Treatment: It is extremely important to stay well hydrated when you have diarrhea. Drink enough fluids to keep your urine pale yellow. Please contact a health care provider if you have a fever, if you cannot drink fluids without vomiting, if you feel light-headed or dizzy, if you have a headache, if you have muscle cramps, or if you have severe pain in the rectum. Seek immediate medical attention if you have vomiting that does not go away, if you have chest pain, if you feel very weak or you faint, if you have bloody or black stools, if you have severe cramping, pain, or bloating in your abdomen, if you have trouble breathing, if your heart is beating very quickly, if your skin feels cold and clammy, or if you feel confused. Please follow-up with your primary care provider about potentially setting up an appointment for a colonoscopy.      SECONDARY DISCHARGE DIAGNOSES  Diagnosis: COPD, moderate  Assessment and Plan of Treatment: Take duo nebs (ipratroprium/albuterol) every 4-6 hours if you are feeling short of breath.   Take symbicort (budesonide/formoterol) 2 puffs twice a day, every single day, even if you are not feeling short of breath.    Diagnosis: CAD (coronary artery disease)  Assessment and Plan of Treatment: Please continue taking your aspirin 81 mg and simvastatin 20 mg as prescribed    Diagnosis: BPH (benign prostatic hyperplasia)  Assessment and Plan of Treatment: Please continue taking  your Flomax (tamsulosin) 0.4 mg as prescribed    Diagnosis: HLD (hyperlipidemia)  Assessment and Plan of Treatment: Please continue taking your simvastatin 20 mg as prescribed     PRINCIPAL DISCHARGE DIAGNOSIS  Diagnosis: Diarrhea  Assessment and Plan of Treatment: It is extremely important to stay well hydrated when you have diarrhea. Drink enough fluids to keep your urine pale yellow. Please contact a health care provider if you have a fever, if you cannot drink fluids without vomiting, if you feel light-headed or dizzy, if you have a headache, if you have muscle cramps, or if you have severe pain in the rectum. Seek immediate medical attention if you have vomiting that does not go away, if you have chest pain, if you feel very weak or you faint, if you have bloody or black stools, if you have severe cramping, pain, or bloating in your abdomen, if you have trouble breathing, if your heart is beating very quickly, if your skin feels cold and clammy, or if you feel confused. Please follow-up with your primary care provider about potentially setting up an appointment for a colonoscopy.      SECONDARY DISCHARGE DIAGNOSES  Diagnosis: HTN (hypertension)  Assessment and Plan of Treatment: We have decreased the doses of your medications.  DO NOT take amlodipine anymore  TAKE Metoprolol Succinate 25mg ONCE daily  TAKE Valsartan 80mg ONCE daily  TAKE Isosorbide 30 mg ONCE daily    Diagnosis: COPD, moderate  Assessment and Plan of Treatment: Take duo nebs (ipratroprium/albuterol) every 4-6 hours if you are feeling short of breath.   Take symbicort (budesonide/formoterol) 2 puffs twice a day, every single day, even if you are not feeling short of breath.    Diagnosis: CAD (coronary artery disease)  Assessment and Plan of Treatment: Please continue taking your aspirin 81 mg and simvastatin 20 mg as prescribed    Diagnosis: HLD (hyperlipidemia)  Assessment and Plan of Treatment: Please continue taking your simvastatin 20 mg as prescribed    Diagnosis: BPH (benign prostatic hyperplasia)  Assessment and Plan of Treatment: Please continue taking  your Flomax (tamsulosin) 0.4 mg as prescribed

## 2022-07-21 NOTE — PROGRESS NOTE ADULT - SUBJECTIVE AND OBJECTIVE BOX
Patient is a 89 M who presents with a chief complaint of diarrhea (21 Jul 2022 14:24)      INTERVAL HPI/OVERNIGHT EVENTS:  Patient seen and examined at bedside. No acute events overnight    REVIEW OF SYSTEMS:  CONSTITUTIONAL: No fever, weight loss, or fatigue  EYES: No eye pain, visual disturbances, or discharge  ENMT:  No difficulty hearing, tinnitus, vertigo; No sinus or throat pain  NECK: No pain or stiffness  BREASTS: No pain, masses, or nipple discharge  RESPIRATORY: No cough, wheezing, chills or hemoptysis; No shortness of breath  CARDIOVASCULAR: No chest pain, palpitations, dizziness, or leg swelling  GASTROINTESTINAL: No abdominal or epigastric pain. No nausea, vomiting, or hematemesis; No diarrhea or constipation. No melena or hematochezia.  GENITOURINARY: No dysuria, frequency, hematuria, or incontinence  NEUROLOGICAL: No headaches, memory loss, loss of strength, numbness, or tremors  SKIN: No itching, burning, rashes, or lesions   LYMPH NODES: No enlarged glands  ENDOCRINE: No heat or cold intolerance; No hair loss  MUSCULOSKELETAL: No joint pain or swelling; No muscle, back, or extremity pain  PSYCHIATRIC: No depression, anxiety, mood swings, or difficulty sleeping  HEME/LYMPH: No easy bruising, or bleeding gums  ALLERY AND IMMUNOLOGIC: No hives or eczema  FAMILY HISTORY:  No family history of cardiovascular disease (Father, Mother)      T(C): 36.9 (07-21-22 @ 15:05), Max: 36.9 (07-21-22 @ 15:05)  HR: 69 (07-21-22 @ 15:05) (59 - 69)  BP: 149/66 (07-21-22 @ 15:05) (127/64 - 149/66)  RR: 18 (07-21-22 @ 15:05) (18 - 19)  SpO2: 95% (07-21-22 @ 15:05) (95% - 100%)  Wt(kg): --Vital Signs Last 24 Hrs  T(C): 36.9 (21 Jul 2022 15:05), Max: 36.9 (21 Jul 2022 15:05)  T(F): 98.4 (21 Jul 2022 15:05), Max: 98.4 (21 Jul 2022 15:05)  HR: 69 (21 Jul 2022 15:05) (59 - 69)  BP: 149/66 (21 Jul 2022 15:05) (127/64 - 149/66)  BP(mean): --  RR: 18 (21 Jul 2022 15:05) (18 - 19)  SpO2: 95% (21 Jul 2022 15:05) (95% - 100%)    Parameters below as of 21 Jul 2022 15:05  Patient On (Oxygen Delivery Method): room air      No Known Allergies      PHYSICAL EXAM:  GENERAL: NAD, well-groomed, well-developed  HEAD:  Atraumatic, Normocephalic  EYES: EOMI, PERRLA, conjunctiva and sclera clear  ENMT: No tonsillar erythema, exudates, or enlargement; Moist mucous membranes, Good dentition, No lesions  NECK: Supple, No JVD, Normal thyroid  NERVOUS SYSTEM:  Alert & Oriented X3, Good concentration; Motor Strength 5/5 B/L upper and lower extremities; DTRs 2+ intact and symmetric  CHEST/LUNG: Clear to percussion bilaterally; No rales, rhonchi, wheezing, or rubs  HEART: Regular rate and rhythm; No murmurs, rubs, or gallops  ABDOMEN: Soft, Nontender, Nondistended; Bowel sounds present  EXTREMITIES:  2+ Peripheral Pulses, No clubbing, cyanosis, or edema  LYMPH: No lymphadenopathy noted  SKIN: No rashes or lesions    Consultant(s) Notes Reviewed:  [x ] YES  [ ] NO  Care Discussed with Consultants/Other Providers [ x] YES  [ ] NO    LABS:      RADIOLOGY & ADDITIONAL TESTS:    Imaging Personally Reviewed:  [ ] YES  [ ] NO  albuterol/ipratropium for Nebulization 3 milliLiter(s) Nebulizer every 6 hours  aspirin enteric coated 81 milliGRAM(s) Oral daily  budesonide 160 MICROgram(s)/formoterol 4.5 MICROgram(s) Inhaler 2 Puff(s) Inhalation two times a day  clopidogrel Tablet 75 milliGRAM(s) Oral daily  heparin   Injectable 5000 Unit(s) SubCutaneous every 8 hours  mirtazapine 30 milliGRAM(s) Oral at bedtime  pantoprazole    Tablet 40 milliGRAM(s) Oral before breakfast  simvastatin 20 milliGRAM(s) Oral at bedtime  tamsulosin 0.4 milliGRAM(s) Oral at bedtime      HEALTH ISSUES - PROBLEM Dx:  DEBBIE (acute kidney injury)    Metabolic acidosis    HTN (hypertension)    CKD (chronic kidney disease)    Anemia    Need for follow-up by     Prophylactic measure    Debility    Advance care planning    Encounter for palliative care    COPD, moderate    Diarrhea    CAD (coronary artery disease)    HLD (hyperlipidemia)    BPH (benign prostatic hyperplasia)           Patient is a 89 M who presents with a chief complaint of diarrhea (2022 14:24)      INTERVAL HPI/OVERNIGHT EVENTS:  Patient seen and examined at bedside. No acute events overnight, pt in no acute distress. Pt had one loose bowel movement last night. Denies fever, chills, chest pain, shortness of breath, sore throat, congestion, nausea, vomiting, constipation, dysuria, hematuria, urinary retention, urinary frequency, focal weakness/numbness, headache, lightheadedness, dizziness, and joint pain.    FAMILY HISTORY:  No family history of cardiovascular disease (Father, Mother)      T(C): 36.9 (22 @ 15:05), Max: 36.9 (22 @ 15:05)  HR: 69 (22 @ 15:05) (59 - 69)  BP: 149/66 (22 @ 15:05) (127/64 - 149/66)  RR: 18 (22 @ 15:05) (18 - 19)  SpO2: 95% (22 @ 15:05) (95% - 100%)    Parameters below as of 2022 15:05  Patient On (Oxygen Delivery Method): room air      No Known Allergies      PHYSICAL EXAM:  GENERAL: NAD, lying comfortably in bed, A&Ox3  HEENT: Atraumatic, normocephalic, EOMI, PERRLA, conjunctiva & sclera clear, moist mucous membranes, neck supple, no JVD   NERVOUS SYSTEM: No focal deficits. Some memory deficits and difficulty with communication. Full strength throughout.  CHEST/LUNG: +Mildly labored, tachypneic (at baseline per pt). Clear to auscultation bilaterally; No rales, rhonchi, wheezing, or rubs.  HEART: Regular rate and rhythm; No murmurs, rubs, or gallops  ABDOMEN: Soft, Nondistended; Bowel sounds present. Mildly tender to palpation in LLQ with reducible inguinal hernia present.  EXTREMITIES:  2+ Peripheral Pulses, No clubbing, cyanosis, or edema  SKIN: No rashes or lesions  Consultant(s) Notes Reviewed:  [x ] YES  [ ] NO  Care Discussed with Consultants/Other Providers [ x] YES  [ ] NO    LABS:  LABS:                        8.4    7.11  )-----------( 253      ( 2022 09:17 )             26.8     07-21    140  |  111<H>  |  31<H>  ----------------------------<  79  5.0   |  19<L>  |  1.48<H>    Ca    8.8      2022 09:17    TPro  5.9<L>  /  Alb  3.2<L>  /  TBili  0.2  /  DBili  x   /  AST  13  /  ALT  9<L>  /  AlkPhos  59  07-21        CAPILLARY BLOOD GLUCOSE      POCT Blood Glucose.: 250 mg/dL (2022 19:19)              Urinalysis Basic - ( 2022 21:04 )    Color: Yellow / Appearance: SL Cloudy / S.015 / pH: x  Gluc: x / Ketone: NEGATIVE  / Bili: Negative / Urobili: 0.2 E.U./dL   Blood: x / Protein: NEGATIVE mg/dL / Nitrite: NEGATIVE   Leuk Esterase: NEGATIVE / RBC: x / WBC x   Sq Epi: x / Non Sq Epi: x / Bacteria: x                I & O Summary:      Microbiology:      GI PCR Panel, Stool (collected 2022 10:54)  Source: .Stool None  Final Report (2022 13:09):    GI PCR Results: NOT detected    *******Please Note:*******    GI panel PCR evaluates for:    Campylobacter, Plesiomonas shigelloides, Salmonella,    Vibrio, Yersinia enterocolitica, Enteroaggregative    Escherichia coli (EAEC), Enteropathogenic E.coli (EPEC),    Enterotoxigenic E. coli (ETEC) lt/st, Shiga-like    toxin-producing E. coli (STEC) stx1/stx2,    Shigella/ Enteroinvasive E. coli (EIEC), Cryptosporidium,    Cyclospora cayetanensis, Entamoeba histolytica,    Giardia lamblia, Adenovirus F 40/41, Astrovirus,    Norovirus GI/GII, Rotavirus A, Sapovirus    Culture - Urine (collected 2022 21:04)  Source: Clean Catch Clean Catch (Midstream)  Final Report (2022 09:07):    Less than 10,000 cols/cc; Insignificant amount of growth.    Culture - Blood (collected 2022 17:42)  Source: .Blood Blood-Peripheral  Preliminary Report (2022 19:00):    No growth at 1 day.    Culture - Blood (collected 2022 17:42)  Source: .Blood Blood-Peripheral  Preliminary Report (2022 19:00):    No growth at 1 day.        RADIOLOGY, EKG AND ADDITIONAL TESTS: Reviewed.    INPATIENT MEDICATIONS:  albuterol/ipratropium for Nebulization 3 milliLiter(s) Nebulizer every 6 hours  aspirin enteric coated 81 milliGRAM(s) Oral daily  budesonide 160 MICROgram(s)/formoterol 4.5 MICROgram(s) Inhaler 2 Puff(s) Inhalation two times a day  clopidogrel Tablet 75 milliGRAM(s) Oral daily  heparin   Injectable 5000 Unit(s) SubCutaneous every 8 hours  mirtazapine 30 milliGRAM(s) Oral at bedtime  pantoprazole    Tablet 40 milliGRAM(s) Oral before breakfast  simvastatin 20 milliGRAM(s) Oral at bedtime  tamsulosin 0.4 milliGRAM(s) Oral at bedtime      HEALTH ISSUES - PROBLEM Dx:  DEBBIE (acute kidney injury)    Metabolic acidosis    HTN (hypertension)    CKD (chronic kidney disease)    Anemia    Need for follow-up by     Prophylactic measure    Debility    Advance care planning    Encounter for palliative care    COPD, moderate    Diarrhea    CAD (coronary artery disease)    HLD (hyperlipidemia)    BPH (benign prostatic hyperplasia)

## 2022-07-21 NOTE — CONSULT NOTE ADULT - CONVERSATION DETAILS
In addition to the EM visit, an advance care planning meeting was performed  Start time: 1040pm  End time: 1100am  Total time: 20 minutes   A face to face meeting to discuss advance care planning was held today regarding: TIARA GOMEZ  Primary decision maker: Tiara Gomez  Alternate/surrogate: Tanner Garcia  Discussed advance directives including, but not limited to, healthcare proxy and code status.  Decision regarding code status: DNR/DNI  Documentation completed today: MOLST    Discussion had with patient face to face regarding advance care planning. He stated that he remembers filling out a MOLST and does not mind doing it again.  A MOLST was reviewed with him and he stated that he wants to be a DNR/DNI/ Limited medical interventions / send to hosptial as needed/ No feeding tube / Trial of IV fluids and a use antibiotics if an infection occurs, if medically indicated. MOLST signed and place in chart. Support was provided.

## 2022-07-21 NOTE — CONSULT NOTE ADULT - PROBLEM SELECTOR RECOMMENDATION 3
Patient has a history of COPD, does nto require Home oxygen. Continue Duoneb and Symbicort. Patient has known Lung nodules that were never worked up. Patient does not qualify for hospice based on COPD diagnosis.

## 2022-07-21 NOTE — DISCHARGE NOTE NURSING/CASE MANAGEMENT/SOCIAL WORK - PATIENT PORTAL LINK FT
You can access the FollowMyHealth Patient Portal offered by [facility] by registering at the following website: [email or web address]. By joining [employer]’s FollowMyHealth portal, you will also be able to view your health information using other applications (apps) compatible with our system.

## 2022-07-21 NOTE — DISCHARGE NOTE PROVIDER - DETAILS OF MALNUTRITION DIAGNOSIS/DIAGNOSES
This patient has been assessed with a concern for Malnutrition and was treated during this hospitalization for the following Nutrition diagnosis/diagnoses:     -  07/20/2022: Severe protein-calorie malnutrition   -  07/20/2022: Underweight (BMI < 19)

## 2022-07-21 NOTE — DISCHARGE NOTE PROVIDER - NSDCMRMEDTOKEN_GEN_ALL_CORE_FT
Advair HFA 45 mcg-21 mcg/inh inhalation aerosol: 2 puff(s) inhaled 2 times a day  Albuterol (Eqv-ProAir HFA) 90 mcg/inh inhalation aerosol: 2 puff(s) inhaled every 6 hours, As Needed -for shortness of breath and/or wheezing   amLODIPine 5 mg oral tablet: 1 tab(s) orally once a day  Aspirin Enteric Coated 81 mg oral delayed release tablet: 1 tab(s) orally once a day  clopidogrel 75 mg oral tablet: 1 tab(s) orally once a day  Flomax 0.4 mg oral capsule: 1 cap(s) orally once a day  isosorbide mononitrate 30 mg oral tablet, extended release: 1 tab(s) orally once a day (in the morning)  Lomotil 2.5 mg-0.025 mg oral tablet: 2 tab(s) orally 4 times a day  Metoprolol Tartrate 25 mg oral tablet: 1 tab(s) orally every 8 hours   mirtazapine 15 mg oral tablet: 2 tab(s) orally once a day (at bedtime)  simvastatin 20 mg oral tablet: 1 tab(s) orally once a day (at bedtime)  valsartan 320 mg oral tablet: 1 tab(s) orally once a day   Advair HFA 45 mcg-21 mcg/inh inhalation aerosol: 2 puff(s) inhaled 2 times a day  Albuterol (Eqv-ProAir HFA) 90 mcg/inh inhalation aerosol: 2 puff(s) inhaled every 6 hours, As Needed -for shortness of breath and/or wheezing   Aspirin Enteric Coated 81 mg oral delayed release tablet: 1 tab(s) orally once a day  clopidogrel 75 mg oral tablet: 1 tab(s) orally once a day  Flomax 0.4 mg oral capsule: 1 cap(s) orally once a day  isosorbide mononitrate 30 mg oral tablet, extended release: 1 tab(s) orally once a day (in the morning)  Lomotil 2.5 mg-0.025 mg oral tablet: 2 tab(s) orally 4 times a day  mirtazapine 15 mg oral tablet: 2 tab(s) orally once a day (at bedtime)  simvastatin 20 mg oral tablet: 1 tab(s) orally once a day (at bedtime)   Advair HFA 45 mcg-21 mcg/inh inhalation aerosol: 2 puff(s) inhaled 2 times a day  Albuterol (Eqv-ProAir HFA) 90 mcg/inh inhalation aerosol: 2 puff(s) inhaled every 6 hours, As Needed -for shortness of breath and/or wheezing   Aspirin Enteric Coated 81 mg oral delayed release tablet: 1 tab(s) orally once a day  clopidogrel 75 mg oral tablet: 1 tab(s) orally once a day  Flomax 0.4 mg oral capsule: 1 cap(s) orally once a day  isosorbide mononitrate 30 mg oral tablet, extended release: 1 tab(s) orally once a day (in the morning)  Lomotil 2.5 mg-0.025 mg oral tablet: 2 tab(s) orally 4 times a day  metoprolol succinate 25 mg oral tablet, extended release: 1 tab(s) orally once a day (in the morning)   mirtazapine 15 mg oral tablet: 2 tab(s) orally once a day (at bedtime)  simvastatin 20 mg oral tablet: 1 tab(s) orally once a day (at bedtime)  valsartan 80 mg oral tablet: 1 tab(s) orally once a day at 2pm

## 2022-07-21 NOTE — DISCHARGE NOTE NURSING/CASE MANAGEMENT/SOCIAL WORK - NSDCPEFALRISK_GEN_ALL_CORE
For information on Fall & Injury Prevention, visit: [email or web address] OR  [email or web address] OR  https://www.cdc.gov/steadi/patient.html

## 2022-07-21 NOTE — PROGRESS NOTE ADULT - SUBJECTIVE AND OBJECTIVE BOX
Pt seen and examined   denies dairrhea today  appetite good   eating better    REVIEW OF SYSTEMS:  Constitutional: No fever, weight loss or fatigue  Cardiovascular: No chest pain, palpitations, dizziness or leg swelling  Gastrointestinal: No abdominal or epigastric pain. No nausea, vomiting or hematemesis; No diarrhea or constipation. No melena or hematochezia.  Skin: No itching, burning, rashes or lesions       MEDICATIONS:  MEDICATIONS  (STANDING):  albuterol/ipratropium for Nebulization 3 milliLiter(s) Nebulizer every 6 hours  aspirin enteric coated 81 milliGRAM(s) Oral daily  budesonide 160 MICROgram(s)/formoterol 4.5 MICROgram(s) Inhaler 2 Puff(s) Inhalation two times a day  clopidogrel Tablet 75 milliGRAM(s) Oral daily  heparin   Injectable 5000 Unit(s) SubCutaneous every 8 hours  mirtazapine 30 milliGRAM(s) Oral at bedtime  pantoprazole    Tablet 40 milliGRAM(s) Oral before breakfast  simvastatin 20 milliGRAM(s) Oral at bedtime  tamsulosin 0.4 milliGRAM(s) Oral at bedtime    MEDICATIONS  (PRN):      Allergies    No Known Allergies    Intolerances        Vital Signs Last 24 Hrs  T(C): 36.6 (2022 09:48), Max: 36.7 (2022 16:57)  T(F): 97.9 (2022 09:48), Max: 98.1 (2022 16:57)  HR: 64 (2022 09:48) (59 - 69)  BP: 127/64 (2022 09:48) (127/64 - 143/69)  BP(mean): --  RR: 18 (2022 09:48) (18 - 19)  SpO2: 100% (2022 09:48) (95% - 100%)    Parameters below as of 2022 09:48  Patient On (Oxygen Delivery Method): nasal cannula  O2 Flow (L/min): 7        PHYSICAL EXAM:    General: thin and poorly nourished; in no acute distress  HEENT: MMM, conjunctiva and sclera clear  Gastrointestinal: Soft non-tender non-distended; Normal bowel sounds;  Skin: Warm and dry. No obvious rash    LABS:      CBC Full  -  ( 2022 09:17 )  WBC Count : 7.11 K/uL  RBC Count : 2.70 M/uL  Hemoglobin : 8.4 g/dL  Hematocrit : 26.8 %  Platelet Count - Automated : 253 K/uL  Mean Cell Volume : 99.3 fl  Mean Cell Hemoglobin : 31.1 pg  Mean Cell Hemoglobin Concentration : 31.3 gm/dL  Auto Neutrophil # : x  Auto Lymphocyte # : x  Auto Monocyte # : x  Auto Eosinophil # : x  Auto Basophil # : x  Auto Neutrophil % : x  Auto Lymphocyte % : x  Auto Monocyte % : x  Auto Eosinophil % : x  Auto Basophil % : x    -    140  |  111<H>  |  31<H>  ----------------------------<  79  5.0   |  19<L>  |  1.48<H>    Ca    8.8      2022 09:17    TPro  5.9<L>  /  Alb  3.2<L>  /  TBili  0.2  /  DBili  x   /  AST  13  /  ALT  9<L>  /  AlkPhos  59  07-21    PT/INR - ( 2022 16:48 )   PT: 12.2 sec;   INR: 1.02          PTT - ( 2022 16:48 )  PTT:30.0 sec      Urinalysis Basic - ( 2022 21:04 )    Color: Yellow / Appearance: SL Cloudy / S.015 / pH: x  Gluc: x / Ketone: NEGATIVE  / Bili: Negative / Urobili: 0.2 E.U./dL   Blood: x / Protein: NEGATIVE mg/dL / Nitrite: NEGATIVE   Leuk Esterase: NEGATIVE / RBC: x / WBC x   Sq Epi: x / Non Sq Epi: x / Bacteria: x                RADIOLOGY & ADDITIONAL STUDIES (The following images were personally reviewed):

## 2022-07-21 NOTE — PROGRESS NOTE ADULT - PROBLEM SELECTOR PLAN 10
Need for follow-up by .   ·  Plan; Concern for pt's ability to care for self at home  - f/u SW, case management recs   - Per PT, discharge with home PT  - Per dietician, DASH with soft-bite sized diet    #Depression  -c/w home mirtazapine 15 daily.

## 2022-07-21 NOTE — DISCHARGE NOTE PROVIDER - HOSPITAL COURSE
#Discharge: do not delete    Patient is 89 M w/ PMH HTN, CAD s/p PCI w/ 3 DAYANA (mRCA, pRCA, mC in 2013 w/ Dr. Varela), CKD (Cr 1.7-2.4), COPD (not on home O2), colonic mass (s/p R hemicolectomy 4/2017 w/ Dr. Headley), arthritis, aortic aneurysm, BPH, HLD, EF 45-50%, lung nodules, presenting after seeing his PCP Dr. Montalvo in office on the day of admission. Dr. Montalvo recorded a low BP and told patient to report to ED. Pt has chronic diarrhea, but reports it has been worsening for the past week. Pt was admitted for worsening chronic diarrhea and social work eval given concern for ability to care for self.       Problem List/Main Diagnoses (system-based):  #Diarrhea: Hypotension prior to admission likely 2/2 low PO intake and dehydration. Low concern for infection given no fever and normal WBC count. Diarrhea resolved while admitted, with pt having one loose stool per day. GI recommended possible colonoscopy to r/o collagenous colitis but diarrhea not so severe to warrant. FOBT sent due to anemia present on labs. GI pathogen panel negative.    #COPD: Pt was mildly SOB while admitted but reports he is at his baseline. He was placed on 2LNC and weaned to RA prior to discharge. Pt received atrovent/albuterol q4-6 hrs PRN and budesonide 160mcg/formoterol q12h while admitted.     #Metabolic acidosis: Pt was admitted with a HCO3 of 19 (baseline per chart review) and tachypnea (at baseline per pt). Acidosis likely 2/2 chronic diarrhea and CKD. Diarrhea resolved and pt received fluids while admitted.     #HTN: Pt's BP meds were held while admitted due to low BPs. Meds restarted on discharge.    #CKD: Pt's Cr remained stable and at baseline while admitted.     #Anemia: Likely 2/2 chronic disease and malnutrition. Iron studies, B12, and   folate were sent.    #CAD: Pt received home aspirin & statin while admitted.    #HLD: Pt received home  statin while admitted     #BPH: Pt received home tamsulosin while admitted    #Social work evaluation: Social work met with patient to complete psychosocial assessment. MOLST was reviewed with patient and he stated he wants to be DNR/DNI. PT saw pt and recommended discharge to home with home PT.     Inpatient treatment course: Aspirin 81 mg PO daily, clopidogrel 75 mg PO daily, duo nebs q6h, symbicort 2 puffs BID, mirtazapine 30 mg PO daily, simvastatin 20 mg PO daily, tamsulosin 0.4 mg PO daily        Patient was discharged to: (home/[facility]/acute rehab/hospice, etc, and with what services – home health PT/RN? Home O2?)         New medications:    Changes to old medications: None    Medications that were stopped: None         Items to follow up as outpatient: CBC, iron studies          Physical exam at the time of discharge:  GENERAL: NAD, lying comfortably in bed, A&Ox3  HEENT: Atraumatic, normocephalic, EOMI, PERRLA, conjunctiva & sclera clear, moist mucous membranes, neck supple, no JVD   NERVOUS SYSTEM: No focal deficits. Some memory deficits and difficulty with communication. Full strength throughout.  CHEST/LUNG: +Mildly labored, tachypneic (at baseline per pt). Clear to auscultation bilaterally; No rales, rhonchi, wheezing, or rubs.  HEART: Regular rate and rhythm; No murmurs, rubs, or gallops  ABDOMEN: Soft, Nondistended; Bowel sounds present. Mildly tender to palpation in LLQ with reducible inguinal hernia present.  EXTREMITIES:  2+ Peripheral Pulses, No clubbing, cyanosis, or edema  SKIN: No rashes or lesions #Discharge: do not delete    Patient is 89 M w/ PMH HTN, CAD s/p PCI w/ 3 DAYANA (mRCA, pRCA, mC in 2013 w/ Dr. Varela), CKD (Cr 1.7-2.4), COPD (not on home O2), colonic mass (s/p R hemicolectomy 4/2017 w/ Dr. Headley), arthritis, aortic aneurysm, BPH, HLD, EF 45-50%, lung nodules, presenting after seeing his PCP Dr. Montalvo in office on the day of admission. Dr. Montalvo recorded a low BP and told patient to report to ED. Pt has chronic diarrhea, but reports it has been worsening for the past week. Pt was admitted for worsening chronic diarrhea and social work eval given concern for ability to care for self.       Problem List/Main Diagnoses (system-based):  #Diarrhea: Hypotension prior to admission likely 2/2 low PO intake and dehydration. Low concern for infection given no fever and normal WBC count. Diarrhea resolved while admitted, with pt having one loose stool per day. GI recommended possible colonoscopy to r/o collagenous colitis but diarrhea not so severe to warrant. FOBT sent due to anemia present on labs. GI pathogen panel negative.    #COPD: Pt was mildly SOB while admitted but reports he is at his baseline. He was placed on 2LNC and weaned to RA prior to discharge. Pt received atrovent/albuterol q4-6 hrs PRN and budesonide 160mcg/formoterol q12h while admitted.     #Metabolic acidosis: Pt was admitted with a HCO3 of 19 (baseline per chart review) and tachypnea (at baseline per pt). Acidosis likely 2/2 chronic diarrhea and CKD. Diarrhea resolved and pt received fluids while admitted.     #HTN: Pt's BP meds (valsartan 320 mg PO daily, metoprolol tartrate 25 mg PO q8, amlodipine 5 mg PO daily, isosorbide mononitrate 30 mg PO daily) were held while admitted due to low BP. On discharge, meds restarted at lower dosages: metoprolol succinate 25 mg PO daily, isosorbide mononitrate 30 gm PO daily, valsartan 40 mg PO daily. Amlodipine 5 mg held.    #CKD: Pt's Cr remained stable and at baseline while admitted.     #Anemia: Likely 2/2 chronic disease and malnutrition. Iron studies, B12, and   folate were sent.    #CAD: Pt received home aspirin & statin while admitted.    #HLD: Pt received home  statin while admitted     #BPH: Pt received home tamsulosin while admitted    #Social work evaluation: Social work met with patient to complete psychosocial assessment. MOLST was reviewed with patient and he stated he wants to be DNR/DNI. PT saw pt and recommended discharge to home with home PT.     Inpatient treatment course: Aspirin 81 mg PO daily, clopidogrel 75 mg PO daily, duo nebs q6h, symbicort 2 puffs BID, mirtazapine 30 mg PO daily, simvastatin 20 mg PO daily, tamsulosin 0.4 mg PO daily        Patient was discharged to: (home/[facility]/acute rehab/hospice, etc, and with what services – home health PT/RN? Home O2?)         New medications:    Changes to old medications: None    Medications that were stopped: None         Items to follow up as outpatient: CBC, iron studies          Physical exam at the time of discharge:  GENERAL: NAD, lying comfortably in bed, A&Ox3  HEENT: Atraumatic, normocephalic, EOMI, PERRLA, conjunctiva & sclera clear, moist mucous membranes, neck supple, no JVD   NERVOUS SYSTEM: No focal deficits. Some memory deficits and difficulty with communication. Full strength throughout.  CHEST/LUNG: +Mildly labored, tachypneic (at baseline per pt). Clear to auscultation bilaterally; No rales, rhonchi, wheezing, or rubs.  HEART: Regular rate and rhythm; No murmurs, rubs, or gallops  ABDOMEN: Soft, Nondistended; Bowel sounds present. Mildly tender to palpation in LLQ with reducible inguinal hernia present.  EXTREMITIES:  2+ Peripheral Pulses, No clubbing, cyanosis, or edema  SKIN: No rashes or lesions #Discharge: do not delete    Patient is 89 M w/ PMH HTN, CAD s/p PCI w/ 3 DAYANA (mRCA, pRCA, mC in 2013 w/ Dr. Varela), CKD (Cr 1.7-2.4), COPD (not on home O2), colonic mass (s/p R hemicolectomy 4/2017 w/ Dr. Headley), arthritis, aortic aneurysm, BPH, HLD, EF 45-50%, lung nodules, presenting after seeing his PCP Dr. Montalvo in office on the day of admission. Dr. Montalvo recorded a low BP and told patient to report to ED. Pt has chronic diarrhea, but reports it has been worsening for the past week. Pt was admitted for worsening chronic diarrhea and social work eval given concern for ability to care for self.     Problem List/Main Diagnoses (system-based):  #Diarrhea: Hypotension prior to admission likely 2/2 low PO intake and dehydration. Low concern for infection given no fever and normal WBC count. Diarrhea resolved while admitted, with pt having one loose stool per day. GI recommended possible colonoscopy to r/o collagenous colitis but diarrhea not so severe to warrant. FOBT sent due to anemia present on labs. GI pathogen panel negative.    #COPD: Pt was mildly SOB while admitted but reports he is at his baseline. He was placed on 2LNC and weaned to RA prior to discharge. Pt received atrovent/albuterol q4-6 hrs PRN and budesonide 160mcg/formoterol q12h while admitted.     #Metabolic acidosis: Pt was admitted with a HCO3 of 19 (baseline per chart review) and tachypnea (at baseline per pt). Acidosis likely 2/2 chronic diarrhea and CKD. Diarrhea resolved and pt received fluids while admitted.     #HTN: Pt's BP meds (valsartan 320 mg PO daily, metoprolol tartrate 25 mg PO q8, amlodipine 5 mg PO daily, isosorbide mononitrate 30 mg PO daily) were held while admitted due to low BP. On discharge, meds restarted at lower dosages: metoprolol succinate 25 mg PO daily, isosorbide mononitrate 30 gm PO daily, valsartan 40 mg PO daily. Amlodipine 5 mg held.    #CKD: Pt's Cr remained stable and at baseline while admitted.     #Anemia: Likely 2/2 chronic disease and malnutrition. Iron studies, B12, and   folate were sent.    #CAD: Pt received home aspirin & statin while admitted.    #HLD: Pt received home  statin while admitted     #BPH: Pt received home tamsulosin while admitted    #Social work evaluation: Social work met with patient to complete psychosocial assessment. MOLST was reviewed with patient and he stated he wants to be DNR/DNI. PT saw pt and recommended discharge to home with home PT.     Inpatient treatment course: Aspirin 81 mg PO daily, clopidogrel 75 mg PO daily, duo nebs q6h, symbicort 2 puffs BID, mirtazapine 30 mg PO daily, simvastatin 20 mg PO daily, tamsulosin 0.4 mg PO daily        Patient was discharged to: (home/[facility]/acute rehab/hospice, etc, and with what services – home health PT/RN? Home O2?)         New medications:    Changes to old medications: None    Medications that were stopped: None         Items to follow up as outpatient: CBC, iron studies          Physical exam at the time of discharge:  GENERAL: NAD, lying comfortably in bed, A&Ox3  HEENT: Atraumatic, normocephalic, EOMI, PERRLA, conjunctiva & sclera clear, moist mucous membranes, neck supple, no JVD   NERVOUS SYSTEM: No focal deficits. Some memory deficits and difficulty with communication. Full strength throughout.  CHEST/LUNG: +Mildly labored, tachypneic (at baseline per pt). Clear to auscultation bilaterally; No rales, rhonchi, wheezing, or rubs.  HEART: Regular rate and rhythm; No murmurs, rubs, or gallops  ABDOMEN: Soft, Nondistended; Bowel sounds present. Mildly tender to palpation in LLQ with reducible inguinal hernia present.  EXTREMITIES:  2+ Peripheral Pulses, No clubbing, cyanosis, or edema  SKIN: No rashes or lesions

## 2022-07-21 NOTE — PROGRESS NOTE ADULT - SUBJECTIVE AND OBJECTIVE BOX
Patient is a 89 M who presents with a chief complaint of diarrhea (2022 14:24)      INTERVAL HPI/OVERNIGHT EVENTS:  Cheryl pt in no acute distress. Pt had one loose bowel movement last night. Denies fever, chills, chest pain, shortness of breath, sore throat, congestion, nausea, vomiting, constipation, dysuria, hematuria, urinary retention, urinary frequency, focal weakness/numbness, headache, lightheadedness, dizziness, and joint pain.    FAMILY HISTORY:  No family history of cardiovascular disease (Father, Mother)      T(C): 36.9 (22 @ 15:05), Max: 36.9 (22 @ 15:05)  HR: 69 (22 @ 15:05) (59 - 69)  BP: 149/66 (22 @ 15:05) (127/64 - 149/66)  RR: 18 (22 @ 15:05) (18 - 19)  SpO2: 95% (22 @ 15:05) (95% - 100%)    Parameters below as of 2022 15:05  Patient On (Oxygen Delivery Method): room air      No Known Allergies      PHYSICAL EXAM:  GENERAL: NAD, lying comfortably in bed, A&Ox3  HEENT: Atraumatic, normocephalic, EOMI, PERRLA, conjunctiva & sclera clear, moist mucous membranes, neck supple, no JVD   NERVOUS SYSTEM: No focal deficits. Some memory deficits and difficulty with communication. Full strength throughout.  CHEST/LUNG: +Mildly labored, tachypneic (at baseline per pt). Clear to auscultation bilaterally; No rales, rhonchi, wheezing, or rubs.  HEART: Regular rate and rhythm; No murmurs, rubs, or gallops  ABDOMEN: Soft, Nondistended; Bowel sounds present. Mildly tender to palpation in LLQ with reducible inguinal hernia present.  EXTREMITIES:  2+ Peripheral Pulses, No clubbing, cyanosis, or edema  SKIN: No rashes or lesions  Consultant(s) Notes Reviewed:  [x ] YES  [ ] NO  Care Discussed with Consultants/Other Providers [ x] YES  [ ] NO    LABS:  LABS:                        8.4    7.11  )-----------( 253      ( 2022 09:17 )             26.8     07-21    140  |  111<H>  |  31<H>  ----------------------------<  79  5.0   |  19<L>  |  1.48<H>    Ca    8.8      2022 09:17    TPro  5.9<L>  /  Alb  3.2<L>  /  TBili  0.2  /  DBili  x   /  AST  13  /  ALT  9<L>  /  AlkPhos  59  07-21        CAPILLARY BLOOD GLUCOSE      POCT Blood Glucose.: 250 mg/dL (2022 19:19)              Urinalysis Basic - ( 2022 21:04 )    Color: Yellow / Appearance: SL Cloudy / S.015 / pH: x  Gluc: x / Ketone: NEGATIVE  / Bili: Negative / Urobili: 0.2 E.U./dL   Blood: x / Protein: NEGATIVE mg/dL / Nitrite: NEGATIVE   Leuk Esterase: NEGATIVE / RBC: x / WBC x   Sq Epi: x / Non Sq Epi: x / Bacteria: x                I & O Summary:      Microbiology:      GI PCR Panel, Stool (collected 2022 10:54)  Source: .Stool None  Final Report (2022 13:09):    GI PCR Results: NOT detected    *******Please Note:*******    GI panel PCR evaluates for:    Campylobacter, Plesiomonas shigelloides, Salmonella,    Vibrio, Yersinia enterocolitica, Enteroaggregative    Escherichia coli (EAEC), Enteropathogenic E.coli (EPEC),    Enterotoxigenic E. coli (ETEC) lt/st, Shiga-like    toxin-producing E. coli (STEC) stx1/stx2,    Shigella/ Enteroinvasive E. coli (EIEC), Cryptosporidium,    Cyclospora cayetanensis, Entamoeba histolytica,    Giardia lamblia, Adenovirus F 40/41, Astrovirus,    Norovirus GI/GII, Rotavirus A, Sapovirus    Culture - Urine (collected 2022 21:04)  Source: Clean Catch Clean Catch (Midstream)  Final Report (2022 09:07):    Less than 10,000 cols/cc; Insignificant amount of growth.    Culture - Blood (collected 2022 17:42)  Source: .Blood Blood-Peripheral  Preliminary Report (2022 19:00):    No growth at 1 day.    Culture - Blood (collected 2022 17:42)  Source: .Blood Blood-Peripheral  Preliminary Report (2022 19:00):    No growth at 1 day.        RADIOLOGY, EKG AND ADDITIONAL TESTS: Reviewed.    INPATIENT MEDICATIONS:  albuterol/ipratropium for Nebulization 3 milliLiter(s) Nebulizer every 6 hours  aspirin enteric coated 81 milliGRAM(s) Oral daily  budesonide 160 MICROgram(s)/formoterol 4.5 MICROgram(s) Inhaler 2 Puff(s) Inhalation two times a day  clopidogrel Tablet 75 milliGRAM(s) Oral daily  heparin   Injectable 5000 Unit(s) SubCutaneous every 8 hours  mirtazapine 30 milliGRAM(s) Oral at bedtime  pantoprazole    Tablet 40 milliGRAM(s) Oral before breakfast  simvastatin 20 milliGRAM(s) Oral at bedtime  tamsulosin 0.4 milliGRAM(s) Oral at bedtime      HEALTH ISSUES - PROBLEM Dx:  DEBBIE (acute kidney injury)    Metabolic acidosis    HTN (hypertension)    CKD (chronic kidney disease)    Anemia    Need for follow-up by     Prophylactic measure    Debility    Advance care planning    Encounter for palliative care    COPD, moderate    Diarrhea    CAD (coronary artery disease)    HLD (hyperlipidemia)    BPH (benign prostatic hyperplasia)    `< from: CT Abdomen and Pelvis w/ Oral Cont (22 @ 18:51) >      < end of copied text >  < from: CT Abdomen and Pelvis w/ Oral Cont (22 @ 18:51) >      INTERPRETATION:  CLINICAL INFORMATION: Diarrhea abdominal pain    COMPARISON: PET/CT 3/21    PROCEDURE:  CT of the Abdomen and Pelvis was performed without intravenous contrast.  Intravenous contrast: None.  Oral contrast: positive contrast was administered.  Sagittal and coronal reformats were performed.    FINDINGS:  LOWER CHEST: 3 to 4 cm left lower lobe nodule lesion unchanged.    LIVER: Within normal limits.  BILE DUCTS: Normal caliber.  GALLBLADDER: Within normal limits.  SPLEEN: Within normal limits.  PANCREAS: Within normal limits.  ADRENALS: Within normal limits.  KIDNEYS/URETERS: Within normal limits.    BLADDER: Left dome herniated through inguinal hernia.  REPRODUCTIVE ORGANS: No pelvic masses.    BOWEL: No bowel obstruction. Sigmoid diverticulosis. No bowel wall   thickening.  PERITONEUM: No ascites.  VESSELS: Suprarenal aortic aneurysm and extensive arterial calcification   stable.  RETROPERITONEUM/LYMPH NODES: No lymphadenopathy.  ABDOMINAL WALL: Bladder containing left inguinal hernia.  BONES: Within normal limits.    IMPRESSION:  No acute findings.        --- End of Report ---            SANDY DAVIS MD; Attending Radiologist  This document has been electronically signed. 2022  7:02PM    < end of copied text >

## 2022-07-21 NOTE — PROGRESS NOTE ADULT - SUBJECTIVE AND OBJECTIVE BOX
CC/ HPI Patient is a  89 year old male with CAD s/p PCI, HTN, CKD, COPD, colonic mass, s/p right hemicolectomy, 2017, s/p hospital stay for sepsis secondary to pneumonia, March 2022, this admission for diarrhea complicated by hypotension, seen again this morning the patient denies respiratory complaint.      PAST MEDICAL & SURGICAL HISTORY:  HLD (hyperlipidemia)  CAD (coronary artery disease)  BPH (benign prostatic hyperplasia)  COPD, moderate  S/P cataract extraction  H/O right hemicolectomy    SOCHX:  + tobacco,  -  alcohol    FAMILY HISTORY: FA/MO  - contributory   No family history of cardiovascular disease (Father, Mother)      ROS reviewed below with positive findings marked (+) :  GEN:  fever, chills ENT: tracheostomy,   epistaxis,  sinusitis COR: +CAD, CHF,  HTN, dysrhythmia PUL: +COPD, ILD, asthma, +pneumonia GI: PEG, dysphagia, hemorrhage, other ELIZABTEH: kidney disease, electrolyte disorder HEM:  anemia, thrombus, coagulopathy, cancer ENDO:  thyroid disease, diabetes mellitus CNS:  dementia, stroke, seizure, PSY:  depression, anxiety, other      MEDICATIONS  (STANDING):  albuterol/ipratropium for Nebulization 3 milliLiter(s) Nebulizer every 6 hours  aspirin enteric coated 81 milliGRAM(s) Oral daily  budesonide 160 MICROgram(s)/formoterol 4.5 MICROgram(s) Inhaler 2 Puff(s) Inhalation two times a day  clopidogrel Tablet 75 milliGRAM(s) Oral daily  heparin   Injectable 5000 Unit(s) SubCutaneous every 8 hours  mirtazapine 30 milliGRAM(s) Oral at bedtime  pantoprazole    Tablet 40 milliGRAM(s) Oral before breakfast  simvastatin 20 milliGRAM(s) Oral at bedtime  tamsulosin 0.4 milliGRAM(s) Oral at bedtime    MEDICATIONS  (PRN):      Vital Signs Last 24 Hrs  T(C): 36.9 (21 Jul 2022 15:05), Max: 36.9 (21 Jul 2022 15:05)  T(F): 98.4 (21 Jul 2022 15:05), Max: 98.4 (21 Jul 2022 15:05)  HR: 69 (21 Jul 2022 15:05) (59 - 69)  BP: 149/66 (21 Jul 2022 15:05) (127/64 - 149/66)  RR: 18 (21 Jul 2022 15:05) (18 - 18)  SpO2: 95% (21 Jul 2022 15:05) (95% - 100%)    Parameters below as of 21 Jul 2022 15:05  Patient On (Oxygen Delivery Method): room air      GENERAL:         comfortable,  - distress.  HEENT:            - trauma,  - icterus,  - injection,  - nasal discharge.  NECK:              - jugular venous distention, - thyromegaly.  LYMPH:           - lymphadenopathy, - masses.  RESP:              + clear,   - rales,   - rhonchi,   - wheezes.   COR:                S1S2   - gallops,  - rubs.  ABD:                bowel sounds,   soft, - tender, - distended.  EXT/MSC:         - cyanosis,  - clubbing,  -edema.    NEURO:           + alert and oriented                             8.4    7.11  )-----------( 253      ( 21 Jul 2022 09:17 )             26.8     07-21    140  |  111<H>  |  31<H>  ----------------------------<  79  5.0   |  19<L>  |  1.48<H>      X-ray Chest (07.19.22)  Left midlung 1.7 cm nodule. Left lower lobe opacity. Right basilar 1.2 cm nodule/nipple shadow. Cardiomegaly, thoracic aortic calcification. Stable bony structures.    NM PET/CT Onc FDG Skull to Thigh, Inital (03.21.22)  FDG avid left lower lobe pulmonary nodule which may represent malignancy versus infectious/inflammatory etiology. Consider tissue sampling versus repeat short interval repeat CT chest in 2-3 months for further evaluation.  FDG avid right supraclavicular, left hilar and right lower paratracheal lymph nodes which may represent yong metastasis versus infectious/inflammatory etiology. Please note the right supraclavicular lymph node was present on remote CT chest 4/23/2017.  FDG avid left lower lobe airspace opacity which may represent pneumonia in the correct clinical mild FDG avid airspace opacities within the right lower lobe as well as along the major fissure which may represent atelectasis.    ASSESSMENT/PLAN    1)  Diarrhea  2)  CKD  3)  CVD  4)  COPD  5)  Pulmonary nodules    Oxygen as needed for a satisfactory SpO2.  Bronchodilators:  Atrovent/ albuterol q 4 – 6 hours as needed.    Budesonide 160mcg/formoterol Q12H  Cardiac/HTN: hemodynamically stable.    GI: Rx/ prophylaxis c PPI/H2B.    Heme: Rx/VT prophylaxis.  Discussed with Dr. Montalvo.

## 2022-07-21 NOTE — CONSULT NOTE ADULT - PROBLEM SELECTOR RECOMMENDATION 2
Patient has a history have being s/p PCI w 3 DAYANA (mRCA, pRCA, mC in 2013 w/ Dr. Varela). Continue home meds of Aspirin and Statin.

## 2022-07-21 NOTE — CHART NOTE - NSCHARTNOTEFT_GEN_A_CORE
PALLIATIVE MEDICINE COORDINATION OF CARE NOTE FOR ITARA GOMEZ  [  ] ED Trigger   [  ] MICU Trigger     [  ] Consult    [  X] AI Comanagement    Never seen by palliative in the past.    ____30__ Minutes; Start: _0930am____  End: __1000am__, of non-face-to-face prolonged service provided that relates to (face-to-face) care that has or will occur and ongoing patient management, including one or more of the following:   - Reviewed records from other physicians or other health care professional services, including one or more of the following: other medical records and diagnostic / radiology study results     HPI:  89 year old male with PMH HTN, CAD s/p PCI w 3 DAYANA (mRCA, pRCA, mC in 2013 w/ Dr. Varela), CKD (Cr 1.7- 2.4), COPD (not on home O2), colonic mass (s/p right hemicolectomy 4/2017 w/ Dr. Headley), Arthritis, aortic aneurysm, BPH, HLD, EF 45-50%, lung nodules, presenting after seeing his PCP Dr. Montalvo in the office on day of admission. Dr. Montalvo recorded a low BP and told patient to report to ED. Pt has chronic diarrhea, but reports it has been worsening for the past week. He is a poor historian and specifics of history were difficult to obtain. He reports left lower quadrant abd pain as well. He lives alone and has a nephew who checks in on him. He reports he takes care of all of his ADLs, but given his difficulty in explaining his current issues, there is concern about his ability to truly care for himself.    	Denies fevers, chills, black stool, bloody stool, dysuria, hematuria, urinary frequency, focal weakness/numbness, lightheadedness, back pain, testicular/penile pain, rashes, joint pains, recent travel, recent antibiotic use, sick contacts, CP, rhinorrhea, nasal congestion, sore throat, cough (pt has a chronic cough which has not changed).    ED COURSE:  Vitals: T 97.4, HR 61, /57, RR 20, SpO2 95% on 4 L NC -> 99% on room air  Note that 20 was recorded as respiratory rate, but when writer evaluated patient, RR was 40-50 - patient reports this is his baseline. Not hypoxic.    Orthostatics negative in ED at 4pm    Significant Labs: WBC 10.7, Hb 8.9, Potassium 5.4, Cl 109, CO2 19, BUN 56, Cr 1.87  VBG: pH 7.3, Co2 20.9, pCO2 40, pO2 48, HCO3 20, O2 sat 77  Covid negative  UA negative    Imaging:   CT A/P:   -Bladder herniating as a left inguinal hernia  -Sigmoid diverticulosis  -Suprarenal aortic aneurysm and extensive arterial calcification - stable   -3 to 4 cm LLL nodule lesion unchanged    CXR  -Left lower opacity similar to previous in March    EKG - 1st degree HB. No ST or T wave abnormalities.    Interventions: Albuterol x1, Insulin regular 5u, Dextrose 50%, 500ml NS plus NS started at 125 ml/hr (19 Jul 2022 22:34)      - Other: i[employer] reviewed.    Rx found on iStop review. Ref #: [id number]  05/02/2022	05/11/2022	diphenoxylate-atropine 2.5-0.025 mg tablet	60	20	Elias Montalvo MD	[id number]	Medicare	[facility] #[id number]  04/06/2022	04/13/2022	diphenoxylate-atropine 2.5-0.025 mg tablet	30	10	Elias Montalvo MD	[id number]	Medicare	[facility] #[id number]  06/21/2021	07/28/2021	diphenoxylate-atropine 2.5-0.025 mg tablet	60	20	Elias Montalvo MD	[id number]	Medicare	[facility] #[id number]    - Other: Medication reviewed.    The patient HAS NOT used PRN's in the last 24h.    MEDICATIONS  (STANDING):  albuterol/ipratropium for Nebulization 3 milliLiter(s) Nebulizer every 6 hours  aspirin enteric coated 81 milliGRAM(s) Oral daily  budesonide 160 MICROgram(s)/formoterol 4.5 MICROgram(s) Inhaler 2 Puff(s) Inhalation two times a day  clopidogrel Tablet 75 milliGRAM(s) Oral daily  heparin   Injectable 5000 Unit(s) SubCutaneous every 8 hours  mirtazapine 30 milliGRAM(s) Oral at bedtime  pantoprazole    Tablet 40 milliGRAM(s) Oral before breakfast  simvastatin 20 milliGRAM(s) Oral at bedtime  tamsulosin 0.4 milliGRAM(s) Oral at bedtime    MEDICATIONS  (PRN):      - Other: Advanced directives     Full Code     MOLST form found on patient window on 3/19/2022 admission under admit legacy  page 6/7      HCP form found on patient window 1/13/201 admsiomm page 15 listing Tanner [facility]     No Living will / POA / Advance directives found on [place] / patient window     No documented GOC notes on [facility]    - Other: Coordination/Plan of care     __2__ admissions in 1 year     Current admission LOS: _2_ days     LACE score: __11__ ADVANCE ILLNESS PATIENT.     Patient NOT previously seen by palliative medicine consult service.      Consult request for: Advanced Illness Comanagement     Full consult to follow within 24h.

## 2022-07-22 RX ORDER — VALSARTAN 80 MG/1
1 TABLET ORAL
Qty: 30 | Refills: 0
Start: 2022-07-22 | End: 2022-08-20

## 2022-07-22 RX ORDER — METOPROLOL TARTRATE 50 MG
1 TABLET ORAL
Qty: 30 | Refills: 0
Start: 2022-07-22 | End: 2022-08-20

## 2022-07-22 RX ADMIN — ISOSORBIDE MONONITRATE 30 MILLIGRAM(S): 60 TABLET, EXTENDED RELEASE ORAL at 17:02

## 2022-07-22 RX ADMIN — HEPARIN SODIUM 5000 UNIT(S): 5000 INJECTION INTRAVENOUS; SUBCUTANEOUS at 15:05

## 2022-07-22 RX ADMIN — CLOPIDOGREL BISULFATE 75 MILLIGRAM(S): 75 TABLET, FILM COATED ORAL at 13:07

## 2022-07-22 RX ADMIN — SIMVASTATIN 20 MILLIGRAM(S): 20 TABLET, FILM COATED ORAL at 23:22

## 2022-07-22 RX ADMIN — BUDESONIDE AND FORMOTEROL FUMARATE DIHYDRATE 2 PUFF(S): 160; 4.5 AEROSOL RESPIRATORY (INHALATION) at 17:03

## 2022-07-22 RX ADMIN — Medication 3 MILLILITER(S): at 18:02

## 2022-07-22 RX ADMIN — PANTOPRAZOLE SODIUM 40 MILLIGRAM(S): 20 TABLET, DELAYED RELEASE ORAL at 07:02

## 2022-07-22 RX ADMIN — MIRTAZAPINE 30 MILLIGRAM(S): 45 TABLET, ORALLY DISINTEGRATING ORAL at 23:22

## 2022-07-22 RX ADMIN — Medication 3 MILLILITER(S): at 15:27

## 2022-07-22 RX ADMIN — HEPARIN SODIUM 5000 UNIT(S): 5000 INJECTION INTRAVENOUS; SUBCUTANEOUS at 23:21

## 2022-07-22 RX ADMIN — TAMSULOSIN HYDROCHLORIDE 0.4 MILLIGRAM(S): 0.4 CAPSULE ORAL at 23:21

## 2022-07-22 RX ADMIN — Medication 3 MILLILITER(S): at 23:21

## 2022-07-22 RX ADMIN — Medication 81 MILLIGRAM(S): at 13:07

## 2022-07-22 RX ADMIN — HEPARIN SODIUM 5000 UNIT(S): 5000 INJECTION INTRAVENOUS; SUBCUTANEOUS at 07:01

## 2022-07-22 RX ADMIN — Medication 3 MILLILITER(S): at 07:01

## 2022-07-22 NOTE — PROGRESS NOTE ADULT - SUBJECTIVE AND OBJECTIVE BOX
Patient is a 89 M who presents with a chief complaint of diarrhea (22 Jul 2022 10:18)      INTERVAL HPI/OVERNIGHT EVENTS:  . No acute events overnight, pt in no acute distress. Pt was receiving nebulizer treatment at time of encounter. Denies fever, chills, chest pain, SOB, sore throat, congestion, nausea, vomiting, constipation, diarrhea, hematuria, urinary retention, urinary frequency, focal weakness/numbness, headache, lightheadedness, dizziness, and joint pain. For DC today home with HHA.    FAMILY HISTORY:  No family history of cardiovascular disease (Father, Mother)      T(C): 36.4 (07-22-22 @ 10:21), Max: 36.9 (07-21-22 @ 15:05)  HR: 57 (07-22-22 @ 06:15) (57 - 71)  BP: 135/60 (07-22-22 @ 06:15) (135/60 - 149/66)  RR: 18 (07-22-22 @ 06:15) (18 - 18)  SpO2: 95% (07-22-22 @ 06:15) (95% - 97%)      Parameters below as of 22 Jul 2022 06:15  Patient On (Oxygen Delivery Method): room air      No Known Allergies      PHYSICAL EXAM:  GENERAL: NAD, lying comfortably in bed, A&Ox3  HEENT: Atraumatic, normocephalic, EOMI, PERRLA, conjunctiva & sclera clear, moist mucous membranes, neck supple, no JVD   NERVOUS SYSTEM: No focal deficits. Some memory deficits and difficulty with communication. Full strength throughout.  CHEST/LUNG: +Mildly labored, tachypneic (at baseline per pt). Clear to auscultation bilaterally; No rales, rhonchi, wheezing, or rubs.  HEART: Regular rate and rhythm; No murmurs, rubs, or gallops  ABDOMEN: Soft, Nondistended; Bowel sounds present. Mildly tender to palpation in LLQ with reducible inguinal hernia present.  EXTREMITIES:  2+ Peripheral Pulses, No clubbing, cyanosis, or edema  SKIN: No rashes or lesions    Consultant(s) Notes Reviewed:  [x ] YES  [ ] NO  Care Discussed with Consultants/Other Providers [ x] YES  [ ] NO    LABS:  LABS:                        8.4    7.11  )-----------( 253      ( 21 Jul 2022 09:17 )             26.8     07-21    140  |  111<H>  |  31<H>  ----------------------------<  79  5.0   |  19<L>  |  1.48<H>    Ca    8.8      21 Jul 2022 09:17    TPro  5.9<L>  /  Alb  3.2<L>  /  TBili  0.2  /  DBili  x   /  AST  13  /  ALT  9<L>  /  AlkPhos  59  07-21        CAPILLARY BLOOD GLUCOSE                              I & O Summary:      Microbiology:      GI PCR Panel, Stool (collected 20 Jul 2022 10:54)  Source: .Stool None  Final Report (20 Jul 2022 13:09):    GI PCR Results: NOT detected    *******Please Note:*******    GI panel PCR evaluates for:    Campylobacter, Plesiomonas shigelloides, Salmonella,    Vibrio, Yersinia enterocolitica, Enteroaggregative    Escherichia coli (EAEC), Enteropathogenic E.coli (EPEC),    Enterotoxigenic E. coli (ETEC) lt/st, Shiga-like    toxin-producing E. coli (STEC) stx1/stx2,    Shigella/ Enteroinvasive E. coli (EIEC), Cryptosporidium,    Cyclospora cayetanensis, Entamoeba histolytica,    Giardia lamblia, Adenovirus F 40/41, Astrovirus,    Norovirus GI/GII, Rotavirus A, Sapovirus    Culture - Urine (collected 19 Jul 2022 21:04)  Source: Clean Catch Clean Catch (Midstream)  Final Report (21 Jul 2022 09:07):    Less than 10,000 cols/cc; Insignificant amount of growth.    Culture - Blood (collected 19 Jul 2022 17:42)  Source: .Blood Blood-Peripheral  Preliminary Report (21 Jul 2022 19:00):    No growth at 2 days.    Culture - Blood (collected 19 Jul 2022 17:42)  Source: .Blood Blood-Peripheral  Preliminary Report (21 Jul 2022 19:00):    No growth at 2 days.        RADIOLOGY, EKG AND ADDITIONAL TESTS: Reviewed.    INPATIENT MEDICATIONS  albuterol/ipratropium for Nebulization 3 milliLiter(s) Nebulizer every 6 hours  aspirin enteric coated 81 milliGRAM(s) Oral daily  budesonide 160 MICROgram(s)/formoterol 4.5 MICROgram(s) Inhaler 2 Puff(s) Inhalation two times a day  clopidogrel Tablet 75 milliGRAM(s) Oral daily  heparin   Injectable 5000 Unit(s) SubCutaneous every 8 hours  isosorbide   mononitrate ER Tablet (IMDUR) 30 milliGRAM(s) Oral daily  metoprolol succinate ER 25 milliGRAM(s) Oral daily  mirtazapine 30 milliGRAM(s) Oral at bedtime  pantoprazole    Tablet 40 milliGRAM(s) Oral before breakfast  simvastatin 20 milliGRAM(s) Oral at bedtime  tamsulosin 0.4 milliGRAM(s) Oral at bedtime  valsartan 40 milliGRAM(s) Oral daily      HEALTH ISSUES - PROBLEM Dx:  Diarrhea    Metabolic acidosis    COPD, moderate    HTN (hypertension)    CKD (chronic kidney disease)    Anemia    CAD (coronary artery disease)    BPH (benign prostatic hyperplasia)    HLD (hyperlipidemia)    Need for follow-up by     Prophylactic measure    Debility    Advance care planning    Encounter for palliative care    DEBBIE (acute kidney injury)

## 2022-07-22 NOTE — PROGRESS NOTE ADULT - PROBLEM SELECTOR PLAN 10
- Per SW, arrange for discharge home with HHA   - Per PT, discharge with home PT  - Per dietician, DASH with soft-bite sized diet

## 2022-07-22 NOTE — PROGRESS NOTE ADULT - SUBJECTIVE AND OBJECTIVE BOX
CC/ HPI Patient is a  89 year old male with CAD s/p PCI, HTN, CKD, COPD, colonic mass, s/p right hemicolectomy, 2017, s/p hospital stay for sepsis secondary to pneumonia, March 2022, this admission for diarrhea complicated by hypotension, seen in the morning the patient denies respiratory complaint.      PAST MEDICAL & SURGICAL HISTORY:  HLD (hyperlipidemia)  CAD (coronary artery disease)  BPH (benign prostatic hyperplasia)  COPD, moderate  S/P cataract extraction  H/O right hemicolectomy    SOCHX:  + tobacco,  -  alcohol    FAMILY HISTORY: FA/MO  - contributory   No family history of cardiovascular disease (Father, Mother)      ROS reviewed below with positive findings marked (+) :  GEN:  fever, chills ENT: tracheostomy,   epistaxis,  sinusitis COR: +CAD, CHF,  HTN, dysrhythmia PUL: +COPD, ILD, asthma, +pneumonia GI: PEG, dysphagia, hemorrhage, other ELIZABETH: kidney disease, electrolyte disorder HEM:  anemia, thrombus, coagulopathy, cancer ENDO:  thyroid disease, diabetes mellitus CNS:  dementia, stroke, seizure, PSY:  depression, anxiety, other        MEDICATIONS  (STANDING):  albuterol/ipratropium for Nebulization 3 milliLiter(s) Nebulizer every 6 hours  aspirin enteric coated 81 milliGRAM(s) Oral daily  budesonide 160 MICROgram(s)/formoterol 4.5 MICROgram(s) Inhaler 2 Puff(s) Inhalation two times a day  clopidogrel Tablet 75 milliGRAM(s) Oral daily  heparin   Injectable 5000 Unit(s) SubCutaneous every 8 hours  isosorbide   mononitrate ER Tablet (IMDUR) 30 milliGRAM(s) Oral daily  metoprolol succinate ER 25 milliGRAM(s) Oral daily  mirtazapine 30 milliGRAM(s) Oral at bedtime  pantoprazole    Tablet 40 milliGRAM(s) Oral before breakfast  simvastatin 20 milliGRAM(s) Oral at bedtime  tamsulosin 0.4 milliGRAM(s) Oral at bedtime  valsartan 40 milliGRAM(s) Oral daily    MEDICATIONS  (PRN):      Vital Signs Last 24 Hrs  T(C): 36.6 (22 Jul 2022 11:04), Max: 36.9 (21 Jul 2022 15:05)  T(F): 97.9 (22 Jul 2022 11:04), Max: 98.4 (21 Jul 2022 15:05)  HR: 59 (22 Jul 2022 11:04) (57 - 71)  BP: 117/70 (22 Jul 2022 11:04) (117/70 - 149/66)  RR: 17 (22 Jul 2022 11:04) (17 - 18)  SpO2: 96% (22 Jul 2022 11:04) (95% - 97%)    Parameters below as of 22 Jul 2022 11:04  Patient On (Oxygen Delivery Method): room air        GENERAL:         comfortable,  - distress.  HEENT:            - trauma,  - icterus,  - injection,  - nasal discharge.  NECK:              - jugular venous distention, - thyromegaly.  LYMPH:           - lymphadenopathy, - masses.  RESP:            + clear,   - rales,   - rhonchi,   - wheezes.   COR:                S1S2   - gallops,  - rubs.  ABD:                bowel sounds,   soft, - tender, - distended.  EXT/MSC:         - cyanosis,  - clubbing, - edema.    NEURO:           + alert and oriented                             8.4    7.11  )-----------( 253      ( 21 Jul 2022 09:17 )             26.8     07-21    140  |  111<H>  |  31<H>  ----------------------------<  79  5.0   |  19<L>  |  1.48<H>    Ca    8.8      21 Jul 2022 09:17      X-ray Chest (07.19.22)  Left midlung 1.7 cm nodule. Left lower lobe opacity. Right basilar 1.2 cm nodule/nipple shadow. Cardiomegaly, thoracic aortic calcification. Stable bony structures.    NM PET/CT Onc FDG Skull to Thigh, Inital (03.21.22)  FDG avid left lower lobe pulmonary nodule which may represent malignancy versus infectious/inflammatory etiology. Consider tissue sampling versus repeat short interval repeat CT chest in 2-3 months for further evaluation.  FDG avid right supraclavicular, left hilar and right lower paratracheal lymph nodes which may represent yong metastasis versus infectious/inflammatory etiology. Please note the right supraclavicular lymph node was present on remote CT chest 4/23/2017.  FDG avid left lower lobe airspace opacity which may represent pneumonia in the correct clinical mild FDG avid airspace opacities within the right lower lobe as well as along the major fissure which may represent atelectasis.    ASSESSMENT/PLAN    1)  s/p diarrhea  2)  CKD  3)  CVD  4)  COPD  5)  Pulmonary nodules    Oxygen as needed for a satisfactory SpO2.  Bronchodilators:  Atrovent/ albuterol q 4 – 6 hours as needed.    Budesonide 160mcg/formoterol Q12H  Cardiac/HTN: hemodynamically stable.    GI: Rx/ prophylaxis c PPI/H2B.    Heme: Rx/VT prophylaxis.  Refused biopsy, please advise if GOC change.  Discussed with Dr. Goodman who will cover July 23-24.

## 2022-07-22 NOTE — PROGRESS NOTE ADULT - SUBJECTIVE AND OBJECTIVE BOX
Patient is a 89 M who presents with a chief complaint of diarrhea (22 Jul 2022 10:18)      INTERVAL HPI/OVERNIGHT EVENTS:  Patient seen and examined at bedside. No acute events overnight, pt in no acute distress. Pt was receiving nebulizer treatment at time of encounter. Denies fever, chills, chest pain, SOB, sore throat, congestion, nausea, vomiting, constipation, diarrhea, hematuria, urinary retention, urinary frequency, focal weakness/numbness, headache, lightheadedness, dizziness, and joint pain. For DC today home with HHA.    FAMILY HISTORY:  No family history of cardiovascular disease (Father, Mother)      T(C): 36.4 (07-22-22 @ 10:21), Max: 36.9 (07-21-22 @ 15:05)  HR: 57 (07-22-22 @ 06:15) (57 - 71)  BP: 135/60 (07-22-22 @ 06:15) (135/60 - 149/66)  RR: 18 (07-22-22 @ 06:15) (18 - 18)  SpO2: 95% (07-22-22 @ 06:15) (95% - 97%)      Parameters below as of 22 Jul 2022 06:15  Patient On (Oxygen Delivery Method): room air      No Known Allergies      PHYSICAL EXAM:  GENERAL: NAD, lying comfortably in bed, A&Ox3  HEENT: Atraumatic, normocephalic, EOMI, PERRLA, conjunctiva & sclera clear, moist mucous membranes, neck supple, no JVD   NERVOUS SYSTEM: No focal deficits. Some memory deficits and difficulty with communication. Full strength throughout.  CHEST/LUNG: +Mildly labored, tachypneic (at baseline per pt). Clear to auscultation bilaterally; No rales, rhonchi, wheezing, or rubs.  HEART: Regular rate and rhythm; No murmurs, rubs, or gallops  ABDOMEN: Soft, Nondistended; Bowel sounds present. Mildly tender to palpation in LLQ with reducible inguinal hernia present.  EXTREMITIES:  2+ Peripheral Pulses, No clubbing, cyanosis, or edema  SKIN: No rashes or lesions    Consultant(s) Notes Reviewed:  [x ] YES  [ ] NO  Care Discussed with Consultants/Other Providers [ x] YES  [ ] NO    LABS:  LABS:                        8.4    7.11  )-----------( 253      ( 21 Jul 2022 09:17 )             26.8     07-21    140  |  111<H>  |  31<H>  ----------------------------<  79  5.0   |  19<L>  |  1.48<H>    Ca    8.8      21 Jul 2022 09:17    TPro  5.9<L>  /  Alb  3.2<L>  /  TBili  0.2  /  DBili  x   /  AST  13  /  ALT  9<L>  /  AlkPhos  59  07-21        CAPILLARY BLOOD GLUCOSE                              I & O Summary:      Microbiology:      GI PCR Panel, Stool (collected 20 Jul 2022 10:54)  Source: .Stool None  Final Report (20 Jul 2022 13:09):    GI PCR Results: NOT detected    *******Please Note:*******    GI panel PCR evaluates for:    Campylobacter, Plesiomonas shigelloides, Salmonella,    Vibrio, Yersinia enterocolitica, Enteroaggregative    Escherichia coli (EAEC), Enteropathogenic E.coli (EPEC),    Enterotoxigenic E. coli (ETEC) lt/st, Shiga-like    toxin-producing E. coli (STEC) stx1/stx2,    Shigella/ Enteroinvasive E. coli (EIEC), Cryptosporidium,    Cyclospora cayetanensis, Entamoeba histolytica,    Giardia lamblia, Adenovirus F 40/41, Astrovirus,    Norovirus GI/GII, Rotavirus A, Sapovirus    Culture - Urine (collected 19 Jul 2022 21:04)  Source: Clean Catch Clean Catch (Midstream)  Final Report (21 Jul 2022 09:07):    Less than 10,000 cols/cc; Insignificant amount of growth.    Culture - Blood (collected 19 Jul 2022 17:42)  Source: .Blood Blood-Peripheral  Preliminary Report (21 Jul 2022 19:00):    No growth at 2 days.    Culture - Blood (collected 19 Jul 2022 17:42)  Source: .Blood Blood-Peripheral  Preliminary Report (21 Jul 2022 19:00):    No growth at 2 days.        RADIOLOGY, EKG AND ADDITIONAL TESTS: Reviewed.    INPATIENT MEDICATIONS  albuterol/ipratropium for Nebulization 3 milliLiter(s) Nebulizer every 6 hours  aspirin enteric coated 81 milliGRAM(s) Oral daily  budesonide 160 MICROgram(s)/formoterol 4.5 MICROgram(s) Inhaler 2 Puff(s) Inhalation two times a day  clopidogrel Tablet 75 milliGRAM(s) Oral daily  heparin   Injectable 5000 Unit(s) SubCutaneous every 8 hours  isosorbide   mononitrate ER Tablet (IMDUR) 30 milliGRAM(s) Oral daily  metoprolol succinate ER 25 milliGRAM(s) Oral daily  mirtazapine 30 milliGRAM(s) Oral at bedtime  pantoprazole    Tablet 40 milliGRAM(s) Oral before breakfast  simvastatin 20 milliGRAM(s) Oral at bedtime  tamsulosin 0.4 milliGRAM(s) Oral at bedtime  valsartan 40 milliGRAM(s) Oral daily      HEALTH ISSUES - PROBLEM Dx:  Diarrhea    Metabolic acidosis    COPD, moderate    HTN (hypertension)    CKD (chronic kidney disease)    Anemia    CAD (coronary artery disease)    BPH (benign prostatic hyperplasia)    HLD (hyperlipidemia)    Need for follow-up by     Prophylactic measure    Debility    Advance care planning    Encounter for palliative care    DEBBIE (acute kidney injury)

## 2022-07-22 NOTE — PROGRESS NOTE ADULT - SUBJECTIVE AND OBJECTIVE BOX
Pt seen and examined   no complaints    REVIEW OF SYSTEMS:  Constitutional: No fever,   Cardiovascular: No chest pain, palpitations, dizziness   Gastrointestinal: No abdominal or epigastric pain. No nausea, vomiting  No diarrhea   Skin: No itching, burning, rashes or lesions       MEDICATIONS:  MEDICATIONS  (STANDING):  albuterol/ipratropium for Nebulization 3 milliLiter(s) Nebulizer every 6 hours  aspirin enteric coated 81 milliGRAM(s) Oral daily  budesonide 160 MICROgram(s)/formoterol 4.5 MICROgram(s) Inhaler 2 Puff(s) Inhalation two times a day  clopidogrel Tablet 75 milliGRAM(s) Oral daily  heparin   Injectable 5000 Unit(s) SubCutaneous every 8 hours  isosorbide   mononitrate ER Tablet (IMDUR) 30 milliGRAM(s) Oral daily  metoprolol succinate ER 25 milliGRAM(s) Oral daily  mirtazapine 30 milliGRAM(s) Oral at bedtime  pantoprazole    Tablet 40 milliGRAM(s) Oral before breakfast  simvastatin 20 milliGRAM(s) Oral at bedtime  tamsulosin 0.4 milliGRAM(s) Oral at bedtime  valsartan 40 milliGRAM(s) Oral daily    MEDICATIONS  (PRN):      Allergies    No Known Allergies    Intolerances        Vital Signs Last 24 Hrs  T(C): 36.8 (22 Jul 2022 06:15), Max: 36.9 (21 Jul 2022 15:05)  T(F): 98.3 (22 Jul 2022 06:15), Max: 98.4 (21 Jul 2022 15:05)  HR: 57 (22 Jul 2022 06:15) (57 - 71)  BP: 135/60 (22 Jul 2022 06:15) (135/60 - 149/66)  BP(mean): --  RR: 18 (22 Jul 2022 06:15) (18 - 18)  SpO2: 95% (22 Jul 2022 06:15) (95% - 97%)    Parameters below as of 22 Jul 2022 06:15  Patient On (Oxygen Delivery Method): room air          PHYSICAL EXAM:    General: poorly nourished; in no acute distress  HEENT: MMM, conjunctiva and sclera clear  Gastrointestinal: Soft non-tender non-distended; Normal bowel sounds;  Skin: Warm and dry. No obvious rash    LABS:      CBC Full  -  ( 21 Jul 2022 09:17 )  WBC Count : 7.11 K/uL  RBC Count : 2.70 M/uL  Hemoglobin : 8.4 g/dL  Hematocrit : 26.8 %  Platelet Count - Automated : 253 K/uL  Mean Cell Volume : 99.3 fl  Mean Cell Hemoglobin : 31.1 pg  Mean Cell Hemoglobin Concentration : 31.3 gm/dL  Auto Neutrophil # : x  Auto Lymphocyte # : x  Auto Monocyte # : x  Auto Eosinophil # : x  Auto Basophil # : x  Auto Neutrophil % : x  Auto Lymphocyte % : x  Auto Monocyte % : x  Auto Eosinophil % : x  Auto Basophil % : x    07-21    140  |  111<H>  |  31<H>  ----------------------------<  79  5.0   |  19<L>  |  1.48<H>    Ca    8.8      21 Jul 2022 09:17    TPro  5.9<L>  /  Alb  3.2<L>  /  TBili  0.2  /  DBili  x   /  AST  13  /  ALT  9<L>  /  AlkPhos  59  07-21                      RADIOLOGY & ADDITIONAL STUDIES (The following images were personally reviewed):

## 2022-07-23 VITALS
TEMPERATURE: 99 F | RESPIRATION RATE: 19 BRPM | DIASTOLIC BLOOD PRESSURE: 57 MMHG | OXYGEN SATURATION: 94 % | SYSTOLIC BLOOD PRESSURE: 121 MMHG | HEART RATE: 77 BPM

## 2022-07-23 PROCEDURE — 83036 HEMOGLOBIN GLYCOSYLATED A1C: CPT

## 2022-07-23 PROCEDURE — 87449 NOS EACH ORGANISM AG IA: CPT

## 2022-07-23 PROCEDURE — 87086 URINE CULTURE/COLONY COUNT: CPT

## 2022-07-23 PROCEDURE — 94640 AIRWAY INHALATION TREATMENT: CPT

## 2022-07-23 PROCEDURE — 86850 RBC ANTIBODY SCREEN: CPT

## 2022-07-23 PROCEDURE — 36415 COLL VENOUS BLD VENIPUNCTURE: CPT

## 2022-07-23 PROCEDURE — 82607 VITAMIN B-12: CPT

## 2022-07-23 PROCEDURE — 84295 ASSAY OF SERUM SODIUM: CPT

## 2022-07-23 PROCEDURE — 80061 LIPID PANEL: CPT

## 2022-07-23 PROCEDURE — 85730 THROMBOPLASTIN TIME PARTIAL: CPT

## 2022-07-23 PROCEDURE — 80053 COMPREHEN METABOLIC PANEL: CPT

## 2022-07-23 PROCEDURE — 85610 PROTHROMBIN TIME: CPT

## 2022-07-23 PROCEDURE — 96374 THER/PROPH/DIAG INJ IV PUSH: CPT

## 2022-07-23 PROCEDURE — 87635 SARS-COV-2 COVID-19 AMP PRB: CPT

## 2022-07-23 PROCEDURE — 0225U NFCT DS DNA&RNA 21 SARSCOV2: CPT

## 2022-07-23 PROCEDURE — 87040 BLOOD CULTURE FOR BACTERIA: CPT

## 2022-07-23 PROCEDURE — 71045 X-RAY EXAM CHEST 1 VIEW: CPT

## 2022-07-23 PROCEDURE — 97161 PT EVAL LOW COMPLEX 20 MIN: CPT

## 2022-07-23 PROCEDURE — 82962 GLUCOSE BLOOD TEST: CPT

## 2022-07-23 PROCEDURE — 84132 ASSAY OF SERUM POTASSIUM: CPT

## 2022-07-23 PROCEDURE — 85025 COMPLETE CBC W/AUTO DIFF WBC: CPT

## 2022-07-23 PROCEDURE — 83605 ASSAY OF LACTIC ACID: CPT

## 2022-07-23 PROCEDURE — 82330 ASSAY OF CALCIUM: CPT

## 2022-07-23 PROCEDURE — 74176 CT ABD & PELVIS W/O CONTRAST: CPT | Mod: MA

## 2022-07-23 PROCEDURE — 99285 EMERGENCY DEPT VISIT HI MDM: CPT | Mod: 25

## 2022-07-23 PROCEDURE — 86900 BLOOD TYPING SEROLOGIC ABO: CPT

## 2022-07-23 PROCEDURE — 97116 GAIT TRAINING THERAPY: CPT

## 2022-07-23 PROCEDURE — 82728 ASSAY OF FERRITIN: CPT

## 2022-07-23 PROCEDURE — 86901 BLOOD TYPING SEROLOGIC RH(D): CPT

## 2022-07-23 PROCEDURE — 82803 BLOOD GASES ANY COMBINATION: CPT

## 2022-07-23 PROCEDURE — 83550 IRON BINDING TEST: CPT

## 2022-07-23 PROCEDURE — 82746 ASSAY OF FOLIC ACID SERUM: CPT

## 2022-07-23 PROCEDURE — 85027 COMPLETE CBC AUTOMATED: CPT

## 2022-07-23 PROCEDURE — 83540 ASSAY OF IRON: CPT

## 2022-07-23 PROCEDURE — 87507 IADNA-DNA/RNA PROBE TQ 12-25: CPT

## 2022-07-23 PROCEDURE — 83690 ASSAY OF LIPASE: CPT

## 2022-07-23 RX ADMIN — Medication 25 MILLIGRAM(S): at 07:22

## 2022-07-23 RX ADMIN — BUDESONIDE AND FORMOTEROL FUMARATE DIHYDRATE 2 PUFF(S): 160; 4.5 AEROSOL RESPIRATORY (INHALATION) at 07:23

## 2022-07-23 RX ADMIN — PANTOPRAZOLE SODIUM 40 MILLIGRAM(S): 20 TABLET, DELAYED RELEASE ORAL at 07:22

## 2022-07-23 RX ADMIN — HEPARIN SODIUM 5000 UNIT(S): 5000 INJECTION INTRAVENOUS; SUBCUTANEOUS at 06:20

## 2022-07-23 RX ADMIN — VALSARTAN 40 MILLIGRAM(S): 80 TABLET ORAL at 07:22

## 2022-07-23 RX ADMIN — Medication 3 MILLILITER(S): at 07:22

## 2022-07-23 NOTE — PROGRESS NOTE ADULT - PROBLEM SELECTOR PLAN 7
#CAD s/p PCI w 3 DAYANA (mRCA, pRCA, mC in 2013 w/ Dr. Varela)  -c/w home ASA and statin    #CHF  Last EF 45-50% in Nov 2020    #AAA seen on CT 4.2 cm in diameter  Stable on CT done 7/19    #Prolonged SC interval  -EKG at admission showed regularly prolonged SC but was poor quality. Previous note reports 1st degree HB as well.  -f/u repeat EKG.
#CAD s/p PCI w 3 DAYANA (mRCA, pRCA, mC in 2013 w/ Dr. Varela)  -c/w home ASA and statin    #CHF  Last EF 45-50% in Nov 2020    #AAA seen on CT 4.2 cm in diameter  Stable on CT done 7/19    #Prolonged NM interval  -EKG at admission showed regularly prolonged NM but was poor quality. Previous note reports 1st degree HB as well.  -f/u repeat EKG.
#CAD s/p PCI w 3 DAYANA (mRCA, pRCA, mC in 2013 w/ Dr. Varela)  -c/w home ASA and statin    #CHF  Last EF 45-50% in Nov 2020    #AAA seen on CT 4.2 cm in diameter  Stable on CT done 7/19    #Prolonged FL interval  -EKG at admission showed regularly prolonged FL but was poor quality. Previous note reports 1st degree HB as well.  -f/u repeat EKG.
#CAD s/p PCI w 3 DAYANA (mRCA, pRCA, mC in 2013 w/ Dr. Varela)  -c/w home ASA and statin    #CHF  Last EF 45-50% in Nov 2020    #AAA seen on CT 4.2 cm in diameter  Stable on CT done 7/19    #Prolonged SD interval  -EKG at admission showed regularly prolonged SD but was poor quality. Previous note reports 1st degree HB as well.  -f/u repeat EKG.
#CAD s/p PCI w 3 DAYANA (mRCA, pRCA, mC in 2013 w/ Dr. Varela)  -c/w home ASA and statin    #CHF  Last EF 45-50% in Nov 2020    #AAA seen on CT 4.2 cm in diameter  Stable on CT done 7/19    #Prolonged WA interval  -EKG at admission showed regularly prolonged WA but was poor quality. Previous note reports 1st degree HB as well.  -f/u repeat EKG.
#CAD s/p PCI w 3 DAYANA (mRCA, pRCA, mC in 2013 w/ Dr. Varela)  -c/w home ASA and statin    #CHF  Last EF 45-50% in Nov 2020    #AAA seen on CT 4.2 cm in diameter  Stable on CT done 7/19    #Prolonged AZ interval  -EKG at admission showed regularly prolonged AZ but was poor quality. Previous note reports 1st degree HB as well.  -f/u repeat EKG.
#CAD s/p PCI w 3 DAYANA (mRCA, pRCA, mC in 2013 w/ Dr. Varela)  -c/w home ASA and statin    #CHF  Last EF 45-50% in Nov 2020    #AAA seen on CT 4.2 cm in diameter  Stable on CT done 7/19    #Prolonged MO interval  -EKG at admission showed regularly prolonged MO but was poor quality. Previous note reports 1st degree HB as well.  -f/u repeat EKG.

## 2022-07-23 NOTE — PROGRESS NOTE ADULT - PROBLEM SELECTOR PLAN 8
BPH (benign prostatic hyperplasia).   -c/w home tamsulosin 0.4mg.

## 2022-07-23 NOTE — PROGRESS NOTE ADULT - ASSESSMENT
diarrhea resolved but no explanation for anemia  suggest stool hemoccult and if positive to consider workup
no diarrhea  for d/c today  Hgb stable 8.4
Vol depletion  fluids  diet  CV stable  OOB  PT  h/o AAA   SW eval possible ELISHA/HOME
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 Pt has been evaluated by GI, Pulmonary, and PT and is stable and pending discharge today with HHA. 
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 Pt is stable and admitted to [facility]. 
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 Pt has been evaluated by GI, Pulmonary, and PT and is stable and pending discharge to home with A.
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 Pt has been evaluated by GI, Pulmonary, and PT and is stable and pending discharge today with HHA. 
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 Pt has been evaluated by GI, Pulmonary, and PT and is stable and pending discharge to home with HHA.  SW intervention re HHA  OOB  PT  HTN:  medication to be adjusted.  CT report reviewed: no further rx
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 Pt has been evaluated by GI, Pulmonary, and PT and is stable and pending discharge today with HHA.   CV stable  OOB  PT  d/c plan with HHA in AM  CV meds will adjust dose as BP is lower
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 Pt has been evaluated by GI, Pulmonary, and PT and is stable and pending discharge today with HHA.   CV stable  OOB  PT  CV meds will adjust dose as BP is lower  d/c home

## 2022-07-23 NOTE — PROGRESS NOTE ADULT - PROBLEM SELECTOR PLAN 9
-c/w home simvastatin 20mg at bedtime.
HLD (hyperlipidemia).   ·  Plan: -c/w home simvastatin 20mg at bedtime.
HLD (hyperlipidemia).   ·  Plan: -c/w home simvastatin 20mg at bedtime.
-c/w home simvastatin 20mg at bedtime
-c/w home simvastatin 20mg at bedtime.

## 2022-07-23 NOTE — PROGRESS NOTE ADULT - PROBLEM SELECTOR PLAN 3
Pt with hx of COPD, not on home O2. Takes advair and albuterol. Pt is tachypnic but he says this is his baseline. No wheeze, no hypoxia. CXR shows LL opacity, similar to previous study in March when he was admitted for COPD exacerbation. He has a chronic cough which he reports is his baseline.  -c/w duonebs and symbicort while inpatient and discharge on home advair and albuterol   -Pulmonary consult    #3 to 4 cm LLL nodule lesion: Unchanged on CT done 7/19  -f/u outpatient -> was meant to get IR guided biopsy done after discharge in March. Unclear if this happened.
Pt with hx of COPD, not on home O2. Takes advair and albuterol. Pt is tachypnic but he says this is his baseline. No wheeze, no hypoxia. CXR shows LL opacity, similar to previous study in March when he was admitted for COPD exacerbation. He has a chronic cough which he reports is his baseline.  -c/w duonebs and symbicort while inpatient and discharge on home advair and albuterol   -f/u pulmonary recs     #3 to 4 cm LLL nodule lesion: Unchanged on CT done 7/19  -f/u outpatient -> was meant to get IR guided biopsy done after discharge in March. Unclear if this happened.

## 2022-07-23 NOTE — PROGRESS NOTE ADULT - PROBLEM SELECTOR PLAN 11
F: None   E: Replete with caution in CKD  N: Dash diet  DVT: Heparin 5u q8   Code: FULL.

## 2022-07-23 NOTE — PROGRESS NOTE ADULT - SUBJECTIVE AND OBJECTIVE BOX
Patient is a 89 M who presents with a chief complaint of diarrhea (22 Jul 2022 10:18)      INTERVAL HPI/OVERNIGHT EVENTS:  . No acute events overnight, pt in no acute distress. Pt was receiving nebulizer treatment at time of encounter. Denies fever, chills, chest pain, SOB, sore throat, congestion, nausea, vomiting, constipation, diarrhea, hematuria, urinary retention, urinary frequency, focal weakness/numbness, headache, lightheadedness, dizziness, and joint pain. For DC today home with HHA.    FAMILY HISTORY:  No family history of cardiovascular disease (Father, Mother)      Vital Signs Last 24 Hrs  T(C): 36.6 (23 Jul 2022 05:27), Max: 36.8 (22 Jul 2022 22:23)  T(F): 97.9 (23 Jul 2022 05:27), Max: 98.2 (22 Jul 2022 22:23)  HR: 66 (23 Jul 2022 05:27) (59 - 78)  BP: 127/61 (23 Jul 2022 05:27) (117/70 - 155/57)  BP(mean): --  RR: 19 (23 Jul 2022 05:27) (17 - 19)  SpO2: 93% (23 Jul 2022 05:27) (93% - 98%)    Parameters below as of 23 Jul 2022 05:27  Patient On (Oxygen Delivery Method): room air          Parameters below as of 22 Jul 2022 06:15  Patient On (Oxygen Delivery Method): room air      No Known Allergies      PHYSICAL EXAM:  GENERAL: NAD, lying comfortably in bed, A&Ox3  HEENT: Atraumatic, normocephalic, EOMI, PERRLA, conjunctiva & sclera clear, moist mucous membranes, neck supple, no JVD   NERVOUS SYSTEM: No focal deficits. Some memory deficits and difficulty with communication. Full strength throughout.  CHEST/LUNG: +Mildly labored, tachypneic (at baseline per pt). Clear to auscultation bilaterally; No rales, rhonchi, wheezing, or rubs.  HEART: Regular rate and rhythm; No murmurs, rubs, or gallops  ABDOMEN: Soft, Nondistended; Bowel sounds present. Mildly tender to palpation in LLQ with reducible inguinal hernia present.  EXTREMITIES:  2+ Peripheral Pulses, No clubbing, cyanosis, or edema  SKIN: No rashes or lesions    Consultant(s) Notes Reviewed:  [x ] YES  [ ] NO  Care Discussed with Consultants/Other Providers [ x] YES  [ ] NO    LABS:  LABS:                        8.4    7.11  )-----------( 253      ( 21 Jul 2022 09:17 )             26.8     07-21    140  |  111<H>  |  31<H>  ----------------------------<  79  5.0   |  19<L>  |  1.48<H>    Ca    8.8      21 Jul 2022 09:17    TPro  5.9<L>  /  Alb  3.2<L>  /  TBili  0.2  /  DBili  x   /  AST  13  /  ALT  9<L>  /  AlkPhos  59  07-21        CAPILLARY BLOOD GLUCOSE                              I & O Summary:      Microbiology:      GI PCR Panel, Stool (collected 20 Jul 2022 10:54)  Source: .Stool None  Final Report (20 Jul 2022 13:09):    GI PCR Results: NOT detected    *******Please Note:*******    GI panel PCR evaluates for:    Campylobacter, Plesiomonas shigelloides, Salmonella,    Vibrio, Yersinia enterocolitica, Enteroaggregative    Escherichia coli (EAEC), Enteropathogenic E.coli (EPEC),    Enterotoxigenic E. coli (ETEC) lt/st, Shiga-like    toxin-producing E. coli (STEC) stx1/stx2,    Shigella/ Enteroinvasive E. coli (EIEC), Cryptosporidium,    Cyclospora cayetanensis, Entamoeba histolytica,    Giardia lamblia, Adenovirus F 40/41, Astrovirus,    Norovirus GI/GII, Rotavirus A, Sapovirus    Culture - Urine (collected 19 Jul 2022 21:04)  Source: Clean Catch Clean Catch (Midstream)  Final Report (21 Jul 2022 09:07):    Less than 10,000 cols/cc; Insignificant amount of growth.    Culture - Blood (collected 19 Jul 2022 17:42)  Source: .Blood Blood-Peripheral  Preliminary Report (21 Jul 2022 19:00):    No growth at 2 days.    Culture - Blood (collected 19 Jul 2022 17:42)  Source: .Blood Blood-Peripheral  Preliminary Report (21 Jul 2022 19:00):    No growth at 2 days.        RADIOLOGY, EKG AND ADDITIONAL TESTS: Reviewed.    INPATIENT MEDICATIONS  albuterol/ipratropium for Nebulization 3 milliLiter(s) Nebulizer every 6 hours  aspirin enteric coated 81 milliGRAM(s) Oral daily  budesonide 160 MICROgram(s)/formoterol 4.5 MICROgram(s) Inhaler 2 Puff(s) Inhalation two times a day  clopidogrel Tablet 75 milliGRAM(s) Oral daily  heparin   Injectable 5000 Unit(s) SubCutaneous every 8 hours  isosorbide   mononitrate ER Tablet (IMDUR) 30 milliGRAM(s) Oral daily  metoprolol succinate ER 25 milliGRAM(s) Oral daily  mirtazapine 30 milliGRAM(s) Oral at bedtime  pantoprazole    Tablet 40 milliGRAM(s) Oral before breakfast  simvastatin 20 milliGRAM(s) Oral at bedtime  tamsulosin 0.4 milliGRAM(s) Oral at bedtime  valsartan 40 milliGRAM(s) Oral daily      HEALTH ISSUES - PROBLEM Dx:  Diarrhea    Metabolic acidosis    COPD, moderate    HTN (hypertension)    CKD (chronic kidney disease)    Anemia    CAD (coronary artery disease)    BPH (benign prostatic hyperplasia)    HLD (hyperlipidemia)    Need for follow-up by     Prophylactic measure    Debility    Advance care planning    Encounter for palliative care    DEBBIE (acute kidney injury)

## 2022-07-23 NOTE — PROGRESS NOTE ADULT - REASON FOR ADMISSION
Diarrhea
chronic diarrhea, low blood pressure
diarrhea
diarrhea
vol depletion
Chronic diarrhea, low BP
Dehydraton
Low blood pressure
vol depletion  diarrhea
vol depletion

## 2022-07-23 NOTE — PROGRESS NOTE ADULT - PROBLEM/PLAN-1
Bed:  [place] [address]  Expected date:   Expected time:   Means of arrival:   Comments:  Santiago Santana RN  01/28/21 4020
DISPLAY PLAN FREE TEXT

## 2022-07-23 NOTE — PROGRESS NOTE ADULT - PROBLEM SELECTOR PLAN 4
Home med regimen: Valsartan 320mg daily, Metoprolol tartrate 25mg q8, amlodipine 5mg daily, isosorbide mononitrate 30mg daily.  -hold meds above given soft BP and restart when appropriate.
Home med regimen: Valsartan 320mg daily, Metoprolol tartrate 25mg q8, amlodipine 5mg daily, isosorbide mononitrate 30mg daily.  - Home meds restarted at lower dosages: Metoprolol succinate 25 mg PO daily, isosorbide mononitrate 30 mg PO daily, valsartan 40 mg PO daily  - Amlodipine 5 mg held
Home med regimen: Valsartan 320mg daily, Metoprolol tartrate 25mg q8, amlodipine 5mg daily, isosorbide mononitrate 30mg daily.  -hold meds above given soft BP and restart when appropriate.
Home med regimen: Valsartan 320mg daily, Metoprolol tartrate 25mg q8, amlodipine 5mg daily, isosorbide mononitrate 30mg daily.  -hold meds above given soft BP and restart when appropriate.
Home med regimen: Valsartan 320mg daily, Metoprolol tartrate 25mg q8, amlodipine 5mg daily, isosorbide mononitrate 30mg daily.  - Home meds restarted at lower dosages: Metoprolol succinate 25 mg PO daily, isosorbide mononitrate 30 mg PO daily, valsartan 40 mg PO daily  - Amlodipine 5 mg held

## 2022-07-23 NOTE — PROGRESS NOTE ADULT - PROBLEM SELECTOR PLAN 5
Admitted with Cr 1.87 (baseline 1.7 - 2.4)  -Avoid nephrotoxins  -Renally dose meds
Admitted with Cr 1.87 (baseline 1.7 - 2.4)  -Avoid nephrotoxins  -Renally dose meds.
Admitted with Cr 1.87 (baseline 1.7 - 2.4)  -Avoid nephrotoxins  -Renally dose meds
Admitted with Cr 1.87 (baseline 1.7 - 2.4)  -Avoid nephrotoxins  -Renally dose meds.
Admitted with Cr 1.87 (baseline 1.7 - 2.4)  -Avoid nephrotoxins  -Renally dose meds.

## 2022-07-23 NOTE — PROGRESS NOTE ADULT - PROBLEM SELECTOR PLAN 2
Pt admitted with bicarb of 19 (about at baseline per chart review) and tachypneic (at baseline per pt)  pH on VBG was 7.3  Anion gap normal   Pt is likely metabolic acidosis 2/2 chronic diarrhea and CKD  - Treat diarrhea and rehydrate as above  - Continue to monitor  - Consider bicarb if worsening    #Hyperkalemia: S/p hyperkalemia cocktail in ED. No EKG changes. Likely 2/2 metabolic acidosis   - Continue to monitor.
Pt admitted with bicarb of 19 (about at baseline per chart review) and tachypneic (at baseline per pt)  pH on VBG was 7.3  Anion gap normal   Pt is likely metabolic acidosis 2/2 chronic diarrhea and CKD  - Treat diarrhea and rehydrate as above  - Continue to monitor  - Consider bicarb if worsening    #Hyperkalemia: S/p hyperkalemia cocktail in ED. No EKG changes. Likely 2/2 metabolic acidosis   - Continue to monitor.
Pt admitted with bicarb of 19 (about at baseline per chart review) and tachypneic (at baseline per pt)  pH on VBG was 7.3  Anion gap normal   Pt is likely metabolic acidosis 2/2 chronic diarrhea and CKD  - Treat diarrhea and rehydrate as above  - Continue to monitor  - Consider bicarb if worsening    #Hyperkalemia: S/p hyperkalemia cocktail in ED. No EKG changes. Likely 2/2 metabolic acidosis   - Continue to monitor
Pt admitted with bicarb of 19 (about at baseline per chart review) and tachypneic (at baseline per pt)  pH on VBG was 7.3  Anion gap normal   Pt is likely metabolic acidosis 2/2 chronic diarrhea and CKD  - Treat diarrhea and rehydrate as above  - Continue to monitor  - Consider bicarb if worsening    #Hyperkalemia: S/p hyperkalemia cocktail in ED. No EKG changes. Likely 2/2 metabolic acidosis   - Continue to monitor.

## 2022-07-23 NOTE — PROGRESS NOTE ADULT - SUBJECTIVE AND OBJECTIVE BOX
OVERNIGHT EVENTS: no acute events.    SUBJECTIVE / INTERVAL HPI: Patient seen and examined at bedside. Patient has no new complaints. He no longer endorses diarrhea and feels well. Tolerating diet and urinating without issues.    Patient denies headaches, fevers, chills, chest pain, shortness of breath, nausea, vomiting, swelling of legs, or any other symptoms at this time.    VITAL SIGNS:  Vital Signs Last 24 Hrs  T(C): 37 (23 Jul 2022 09:14), Max: 37 (23 Jul 2022 09:14)  T(F): 98.6 (23 Jul 2022 09:14), Max: 98.6 (23 Jul 2022 09:14)  HR: 77 (23 Jul 2022 09:14) (66 - 78)  BP: 121/57 (23 Jul 2022 09:14) (121/57 - 155/57)  BP(mean): --  RR: 19 (23 Jul 2022 09:14) (17 - 19)  SpO2: 94% (23 Jul 2022 09:14) (93% - 98%)    Parameters below as of 23 Jul 2022 09:14  Patient On (Oxygen Delivery Method): room air    PHYSICAL EXAM:    GENERAL: NAD, lying comfortably in bed, A&Ox3  HEENT: Atraumatic, normocephalic, EOMI, PERRLA, conjunctiva & sclera clear, moist mucous membranes, neck supple, no JVD   NERVOUS SYSTEM: No focal deficits. Some memory deficits and difficulty with communication. Full strength throughout.  CHEST/LUNG: +Mildly labored, tachypneic (at baseline per pt). Clear to auscultation bilaterally; No rales, rhonchi, wheezing, or rubs.  HEART: Regular rate and rhythm; 3/6 systolic murmur appreciated at 2nd right sternal border.  ABDOMEN: Soft, Nondistended; Bowel sounds present. Mildly tender to palpation in LLQ with reducible inguinal hernia present.  EXTREMITIES:  2+ Peripheral Pulses, No clubbing, cyanosis, or edema  SKIN: No rashes or lesions    MEDICATIONS:  MEDICATIONS  (STANDING):  albuterol/ipratropium for Nebulization 3 milliLiter(s) Nebulizer every 6 hours  aspirin enteric coated 81 milliGRAM(s) Oral daily  budesonide 160 MICROgram(s)/formoterol 4.5 MICROgram(s) Inhaler 2 Puff(s) Inhalation two times a day  clopidogrel Tablet 75 milliGRAM(s) Oral daily  heparin   Injectable 5000 Unit(s) SubCutaneous every 8 hours  isosorbide   mononitrate ER Tablet (IMDUR) 30 milliGRAM(s) Oral daily  metoprolol succinate ER 25 milliGRAM(s) Oral daily  mirtazapine 30 milliGRAM(s) Oral at bedtime  pantoprazole    Tablet 40 milliGRAM(s) Oral before breakfast  simvastatin 20 milliGRAM(s) Oral at bedtime  tamsulosin 0.4 milliGRAM(s) Oral at bedtime  valsartan 40 milliGRAM(s) Oral daily    MEDICATIONS  (PRN):      ALLERGIES:  Allergies    No Known Allergies    Intolerances        LABS:              CAPILLARY BLOOD GLUCOSE          RADIOLOGY & ADDITIONAL TESTS: Reviewed.

## 2022-07-23 NOTE — PROGRESS NOTE ADULT - PROVIDER SPECIALTY LIST ADULT
Pulmonology
Internal Medicine
Gastroenterology
Gastroenterology
Internal Medicine

## 2022-07-23 NOTE — PROGRESS NOTE ADULT - PROBLEM SELECTOR PLAN 1
Pt admitted for diarrhea (non-bloody) and hypotension (found in PCP office, not on admission). Pt has chronic diarrhea since R hemicolectomy (4/2017) performed for colonic mass. Pt reports diarrhea has been worsening recently. Pt takes Lomotil at home. No concern for C diff given no severe leukocytosis, no fever, and no recent abx. CT showed bladder herniating as L inguinal hernia and sigmoid diverticulosis. S/p almost 1 L NS in ED. Pt likely has progressive worsening of chronic diarrhea 2/2 hemicolectomy and diverticulosis. Possible hypotension prior to admission was likely 2/2 low PO intake and dehydration. Low concern for infection given no fever, WBC 10.7. GI PCR negative.  - F/u blood cultures  - Continue to monitor for BMs, continue gentle hydration (caution in HF), encourage PO intake  - Consider restarting lomotil vs loperamide  - Per GI recs, consider colonoscopy to r/o collagenous colitis  - Repeat CBC; FOBT if Hgb still below baseline
Pt admitted for diarrhea (non-bloody) and hypotension (found in PCP office, not on admission). Pt has chronic diarrhea since R hemicolectomy (4/2017) performed for colonic mass. Pt reports diarrhea has been worsening recently. Pt takes Lomotil at home. No concern for C diff given no severe leukocytosis, no fever, and no recent abx. CT showed bladder herniating as L inguinal hernia and sigmoid diverticulosis. Pt likely has progressive worsening of chronic diarrhea 2/2 hemicolectomy and diverticulosis. Possible hypotension prior to admission was likely 2/2 low PO intake and dehydration. Low concern for infection given no fever, WBC 7.11. GI pathogen panel negative.  - F/u blood cultures  - Continue to monitor for BMs, continue gentle hydration (caution in HF), encourage PO intake  - Consider restarting lomotil vs loperamide  - Per GI recs, consider outpatient colonoscopy to r/o collagenous colitis  - F/u FOBT.
Pt admitted for diarrhea (non-bloody) and hypotension (found in PCP office, not on admission). Pt has chronic diarrhea since R hemicolectomy (4/2017) performed for colonic mass. Pt reports diarrhea has been worsening recently. Pt takes Lomotil at home. No concern for C diff given no severe leukocytosis, no fever, and no recent abx. CT showed bladder herniating as L inguinal hernia and sigmoid diverticulosis. Pt likely has progressive worsening of chronic diarrhea 2/2 hemicolectomy and diverticulosis. Possible hypotension prior to admission was likely 2/2 low PO intake and dehydration. Low concern for infection given no fever, WBC 7.11. GI pathogen panel negative.  - F/u blood cultures  - Continue to monitor for BMs, continue gentle hydration (caution in HF), encourage PO intake  - Consider restarting lomotil vs loperamide  - Per GI recs, consider outpatient colonoscopy to r/o collagenous colitis  - F/u FOBT.
Pt admitted for diarrhea (non-bloody) and hypotension (found in PCP office, not on admission). Pt has chronic diarrhea since R hemicolectomy (4/2017) performed for colonic mass. Pt reports diarrhea has been worsening recently. Pt takes Lomotil at home. No concern for C diff given no severe leukocytosis, no fever, and no recent abx. CT showed bladder herniating as L inguinal hernia and sigmoid diverticulosis. S/p almost 1 L NS in ED. Pt likely has progressive worsening of chronic diarrhea 2/2 hemicolectomy and diverticulosis. Possible hypotension prior to admission was likely 2/2 low PO intake and dehydration. Low concern for infection given no fever, WBC 7.11. GI pathogen panel negative.  - F/u blood cultures  - Continue to monitor for BMs, continue gentle hydration (caution in HF), encourage PO intake  - Consider restarting lomotil vs loperamide  - Per GI recs, consider colonoscopy to r/o collagenous colitis  - F/u FOBT
Pt admitted for diarrhea (non-bloody) and hypotension (found in PCP office, not on admission). Pt has chronic diarrhea since R hemicolectomy (4/2017) performed for colonic mass. Pt reports diarrhea has been worsening recently. Pt takes Lomotil at home. No concern for C diff given no severe leukocytosis, no fever, and no recent abx. CT showed bladder herniating as L inguinal hernia and sigmoid diverticulosis. Pt likely has progressive worsening of chronic diarrhea 2/2 hemicolectomy and diverticulosis. Possible hypotension prior to admission was likely 2/2 low PO intake and dehydration. Low concern for infection given no fever, WBC 7.11. GI pathogen panel negative.  - F/u blood cultures  - Continue to monitor for BMs, continue gentle hydration (caution in HF), encourage PO intake  - Consider restarting lomotil vs loperamide  - Per GI recs, consider outpatient colonoscopy to r/o collagenous colitis  - F/u FOBT.
Pt admitted for diarrhea (non-bloody) and hypotension (found in PCP office, not on admission). Pt has chronic diarrhea since R hemicolectomy (4/2017) performed for colonic mass. Pt reports diarrhea has been worsening recently. Pt takes Lomotil at home. No concern for C diff given no severe leukocytosis, no fever, and no recent abx. CT showed bladder herniating as L inguinal hernia and sigmoid diverticulosis. Pt likely has progressive worsening of chronic diarrhea 2/2 hemicolectomy and diverticulosis. Possible hypotension prior to admission was likely 2/2 low PO intake and dehydration. Low concern for infection given no fever, WBC 7.11. GI pathogen panel negative.  - F/u blood cultures  - Continue to monitor for BMs, continue gentle hydration (caution in HF), encourage PO intake  - Consider restarting lomotil vs loperamide  - Per GI recs, consider outpatient colonoscopy to r/o collagenous colitis  - F/u FOBT.
Pt admitted for diarrhea (non-bloody) and hypotension (found in PCP office, not on admission). Pt has chronic diarrhea since R hemicolectomy (4/2017) performed for colonic mass. Pt reports diarrhea has been worsening recently. Pt takes Lomotil at home. No concern for C diff given no severe leukocytosis, no fever, and no recent abx. CT showed bladder herniating as L inguinal hernia and sigmoid diverticulosis. S/p almost 1 L NS in ED. Pt likely has progressive worsening of chronic diarrhea 2/2 hemicolectomy and diverticulosis. Possible hypotension prior to admission was likely 2/2 low PO intake and dehydration. Low concern for infection given no fever, WBC 7.11. GI pathogen panel negative.  - F/u blood cultures  - Continue to monitor for BMs, continue gentle hydration (caution in HF), encourage PO intake  - Consider restarting lomotil vs loperamide  - Per GI recs, consider colonoscopy to r/o collagenous colitis  - F/u FOBT

## 2022-07-23 NOTE — PROGRESS NOTE ADULT - PROBLEM SELECTOR PLAN 6
Hb 8.4, MCV 99. Baseline 10-11. Likely 2/2 chronic disease and malnutrition  - F/u iron studies, B12, folate.
Hb 8.9, MCV 97. Baseline 10-11. Likely 2/2 chronic disease and malnutrition  - F/u iron studies, B12, folate.
Hb 8.9, MCV 97. Baseline 10-11. Likely 2/2 chronic disease and malnutrition  - F/u iron studies, B12, folate
Hb 8.4, MCV 99. Baseline 10-11. Likely 2/2 chronic disease and malnutrition  - F/u iron studies, B12, folate.
Hb 8.4, MCV 99. Baseline 10-11. Likely 2/2 chronic disease and malnutrition  - F/u iron studies, B12, folate.
Hb 8.9, MCV 97. Baseline 10-11. Likely 2/2 chronic disease and malnutrition  - F/u iron studies, B12, folate.
Hb 8.4, MCV 99. Baseline 10-11. Likely 2/2 chronic disease and malnutrition  - F/u iron studies, B12, folate.

## 2022-07-23 NOTE — PROGRESS NOTE ADULT - PROBLEM SELECTOR PROBLEM 10
Need for follow-up by 

## 2022-07-23 NOTE — PROGRESS NOTE ADULT - NUTRITIONAL ASSESSMENT
This patient has been assessed with a concern for Malnutrition and has been determined to have a diagnosis/diagnoses of Severe protein-calorie malnutrition and Underweight (BMI < 19).    This patient is being managed with:   Diet DASH/TLC-  Sodium & Cholesterol Restricted  Entered: Jul 19 2022 10:36PM    
This patient has been assessed with a concern for Malnutrition and has been determined to have a diagnosis/diagnoses of Severe protein-calorie malnutrition and Underweight (BMI < 19).    This patient is being managed with:   Diet DASH/TLC-  Sodium & Cholesterol Restricted  Soft and Bite Sized (SOFTBTSZ)  Entered: Jul 20 2022  6:44PM    

## 2022-07-28 DIAGNOSIS — Z20.822 CONTACT WITH AND (SUSPECTED) EXPOSURE TO COVID-19: ICD-10-CM

## 2022-07-28 DIAGNOSIS — Z87.891 PERSONAL HISTORY OF NICOTINE DEPENDENCE: ICD-10-CM

## 2022-07-28 DIAGNOSIS — Z95.5 PRESENCE OF CORONARY ANGIOPLASTY IMPLANT AND GRAFT: ICD-10-CM

## 2022-07-28 DIAGNOSIS — K91.89 OTHER POSTPROCEDURAL COMPLICATIONS AND DISORDERS OF DIGESTIVE SYSTEM: ICD-10-CM

## 2022-07-28 DIAGNOSIS — F32.A DEPRESSION, UNSPECIFIED: ICD-10-CM

## 2022-07-28 DIAGNOSIS — R53.81 OTHER MALAISE: ICD-10-CM

## 2022-07-28 DIAGNOSIS — N17.9 ACUTE KIDNEY FAILURE, UNSPECIFIED: ICD-10-CM
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
## 2022-07-28 DIAGNOSIS — N40.0 BENIGN PROSTATIC HYPERPLASIA WITHOUT LOWER URINARY TRACT SYMPTOMS: ICD-10-CM

## 2022-07-28 DIAGNOSIS — E87.5 HYPERKALEMIA: ICD-10-CM

## 2022-07-28 DIAGNOSIS — Z98.49 CATARACT EXTRACTION STATUS, UNSPECIFIED EYE: ICD-10-CM

## 2022-07-28 DIAGNOSIS — R91.1 SOLITARY PULMONARY NODULE: ICD-10-CM

## 2022-07-28 DIAGNOSIS — Z79.82 LONG TERM (CURRENT) USE OF ASPIRIN: ICD-10-CM

## 2022-07-28 DIAGNOSIS — R19.7 DIARRHEA, UNSPECIFIED: ICD-10-CM

## 2022-07-28 DIAGNOSIS — Z66 DO NOT RESUSCITATE: ICD-10-CM

## 2022-07-28 DIAGNOSIS — K57.30 DIVERTICULOSIS OF LARGE INTESTINE WITHOUT PERFORATION OR ABSCESS WITHOUT BLEEDING: ICD-10-CM

## 2022-07-28 DIAGNOSIS — Z79.02 LONG TERM (CURRENT) USE OF ANTITHROMBOTICS/ANTIPLATELETS: ICD-10-CM

## 2022-07-28 DIAGNOSIS — M19.90 UNSPECIFIED OSTEOARTHRITIS, UNSPECIFIED SITE: ICD-10-CM

## 2022-07-28 DIAGNOSIS — E86.0 DEHYDRATION: ICD-10-CM

## 2022-07-28 DIAGNOSIS — Z79.899 OTHER LONG TERM (CURRENT) DRUG THERAPY: ICD-10-CM

## 2022-07-28 DIAGNOSIS — D63.1 ANEMIA IN CHRONIC KIDNEY DISEASE: ICD-10-CM

## 2022-07-28 DIAGNOSIS — I25.10 ATHEROSCLEROTIC HEART DISEASE OF NATIVE CORONARY ARTERY WITHOUT ANGINA PECTORIS: ICD-10-CM

## 2022-07-28 DIAGNOSIS — I50.9 HEART FAILURE, UNSPECIFIED: ICD-10-CM

## 2022-07-28 DIAGNOSIS — I71.4 ABDOMINAL AORTIC ANEURYSM, WITHOUT RUPTURE: ICD-10-CM

## 2022-07-28 DIAGNOSIS — E87.2 ACIDOSIS: ICD-10-CM

## 2022-07-28 DIAGNOSIS — Z90.49 ACQUIRED ABSENCE OF OTHER SPECIFIED PARTS OF DIGESTIVE TRACT: ICD-10-CM

## 2022-07-28 DIAGNOSIS — E43 UNSPECIFIED SEVERE PROTEIN-CALORIE MALNUTRITION: ICD-10-CM

## 2022-07-28 DIAGNOSIS — R05.3 CHRONIC COUGH: ICD-10-CM

## 2022-07-28 DIAGNOSIS — E78.5 HYPERLIPIDEMIA, UNSPECIFIED: ICD-10-CM

## 2022-07-28 DIAGNOSIS — I13.0 HYPERTENSIVE HEART AND CHRONIC KIDNEY DISEASE WITH HEART FAILURE AND STAGE 1 THROUGH STAGE 4 CHRONIC KIDNEY DISEASE, OR UNSPECIFIED CHRONIC KIDNEY DISEASE: ICD-10-CM

## 2022-07-28 DIAGNOSIS — I44.0 ATRIOVENTRICULAR BLOCK, FIRST DEGREE: ICD-10-CM

## 2022-07-28 DIAGNOSIS — J44.9 CHRONIC OBSTRUCTIVE PULMONARY DISEASE, UNSPECIFIED: ICD-10-CM

## 2022-07-28 DIAGNOSIS — N18.9 CHRONIC KIDNEY DISEASE, UNSPECIFIED: ICD-10-CM

## 2022-07-28 DIAGNOSIS — I95.89 OTHER HYPOTENSION: ICD-10-CM

## 2022-08-03 NOTE — PHYSICAL THERAPY INITIAL EVALUATION ADULT - GAIT PATTERN USED, PT EVAL
Pt amb 35ft x 2 with IV pole and CG Taltz Counseling: I discussed with the patient the risks of ixekizumab including but not limited to immunosuppression, serious infections, worsening of inflammatory bowel disease and drug reactions.  The patient understands that monitoring is required including a PPD at baseline and must alert us or the primary physician if symptoms of infection or other concerning signs are noted.

## 2022-08-24 ENCOUNTER — INPATIENT (INPATIENT)
Facility: HOSPITAL | Age: 87
LOS: 11 days | Discharge: NURSING, REHAB OR LONG-TERM CARE | DRG: 871 | End: 2022-09-05
Attending: INTERNAL MEDICINE | Admitting: INTERNAL MEDICINE
Payer: MEDICARE

## 2022-08-24 VITALS
OXYGEN SATURATION: 98 % | TEMPERATURE: 99 F | DIASTOLIC BLOOD PRESSURE: 59 MMHG | RESPIRATION RATE: 26 BRPM | HEIGHT: 68 IN | HEART RATE: 78 BPM | SYSTOLIC BLOOD PRESSURE: 97 MMHG

## 2022-08-24 DIAGNOSIS — J44.9 CHRONIC OBSTRUCTIVE PULMONARY DISEASE, UNSPECIFIED: ICD-10-CM

## 2022-08-24 DIAGNOSIS — I25.10 ATHEROSCLEROTIC HEART DISEASE OF NATIVE CORONARY ARTERY WITHOUT ANGINA PECTORIS: ICD-10-CM

## 2022-08-24 DIAGNOSIS — I10 ESSENTIAL (PRIMARY) HYPERTENSION: ICD-10-CM

## 2022-08-24 DIAGNOSIS — R41.82 ALTERED MENTAL STATUS, UNSPECIFIED: ICD-10-CM

## 2022-08-24 DIAGNOSIS — F41.9 ANXIETY DISORDER, UNSPECIFIED: ICD-10-CM

## 2022-08-24 DIAGNOSIS — J95.811 POSTPROCEDURAL PNEUMOTHORAX: ICD-10-CM

## 2022-08-24 DIAGNOSIS — R65.20 SEVERE SEPSIS WITHOUT SEPTIC SHOCK: ICD-10-CM

## 2022-08-24 DIAGNOSIS — R06.82 TACHYPNEA, NOT ELSEWHERE CLASSIFIED: ICD-10-CM

## 2022-08-24 DIAGNOSIS — N40.0 BENIGN PROSTATIC HYPERPLASIA WITHOUT LOWER URINARY TRACT SYMPTOMS: ICD-10-CM

## 2022-08-24 DIAGNOSIS — R19.7 DIARRHEA, UNSPECIFIED: ICD-10-CM

## 2022-08-24 DIAGNOSIS — J90 PLEURAL EFFUSION, NOT ELSEWHERE CLASSIFIED: ICD-10-CM

## 2022-08-24 DIAGNOSIS — Z78.1 PHYSICAL RESTRAINT STATUS: ICD-10-CM

## 2022-08-24 DIAGNOSIS — I12.9 HYPERTENSIVE CHRONIC KIDNEY DISEASE WITH STAGE 1 THROUGH STAGE 4 CHRONIC KIDNEY DISEASE, OR UNSPECIFIED CHRONIC KIDNEY DISEASE: ICD-10-CM

## 2022-08-24 DIAGNOSIS — E87.2 ACIDOSIS: ICD-10-CM

## 2022-08-24 DIAGNOSIS — Z29.9 ENCOUNTER FOR PROPHYLACTIC MEASURES, UNSPECIFIED: ICD-10-CM

## 2022-08-24 DIAGNOSIS — R91.1 SOLITARY PULMONARY NODULE: ICD-10-CM

## 2022-08-24 DIAGNOSIS — Z90.49 ACQUIRED ABSENCE OF OTHER SPECIFIED PARTS OF DIGESTIVE TRACT: ICD-10-CM

## 2022-08-24 DIAGNOSIS — G93.41 METABOLIC ENCEPHALOPATHY: ICD-10-CM

## 2022-08-24 DIAGNOSIS — Z66 DO NOT RESUSCITATE: ICD-10-CM

## 2022-08-24 DIAGNOSIS — E78.5 HYPERLIPIDEMIA, UNSPECIFIED: ICD-10-CM

## 2022-08-24 DIAGNOSIS — I71.9 AORTIC ANEURYSM OF UNSPECIFIED SITE, WITHOUT RUPTURE: ICD-10-CM

## 2022-08-24 DIAGNOSIS — A41.9 SEPSIS, UNSPECIFIED ORGANISM: ICD-10-CM

## 2022-08-24 DIAGNOSIS — E43 UNSPECIFIED SEVERE PROTEIN-CALORIE MALNUTRITION: ICD-10-CM

## 2022-08-24 DIAGNOSIS — U07.1 COVID-19: ICD-10-CM

## 2022-08-24 DIAGNOSIS — N17.9 ACUTE KIDNEY FAILURE, UNSPECIFIED: ICD-10-CM

## 2022-08-24 DIAGNOSIS — Z90.49 ACQUIRED ABSENCE OF OTHER SPECIFIED PARTS OF DIGESTIVE TRACT: Chronic | ICD-10-CM

## 2022-08-24 DIAGNOSIS — J12.82 PNEUMONIA DUE TO CORONAVIRUS DISEASE 2019: ICD-10-CM

## 2022-08-24 DIAGNOSIS — N18.30 CHRONIC KIDNEY DISEASE, STAGE 3 UNSPECIFIED: ICD-10-CM
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
## 2022-08-24 DIAGNOSIS — Z95.5 PRESENCE OF CORONARY ANGIOPLASTY IMPLANT AND GRAFT: ICD-10-CM

## 2022-08-24 DIAGNOSIS — E86.0 DEHYDRATION: ICD-10-CM

## 2022-08-24 DIAGNOSIS — Z98.49 CATARACT EXTRACTION STATUS, UNSPECIFIED EYE: ICD-10-CM

## 2022-08-24 DIAGNOSIS — Z98.49 CATARACT EXTRACTION STATUS, UNSPECIFIED EYE: Chronic | ICD-10-CM
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
## 2022-08-24 DIAGNOSIS — A04.72 ENTEROCOLITIS DUE TO CLOSTRIDIUM DIFFICILE, NOT SPECIFIED AS RECURRENT: ICD-10-CM

## 2022-08-24 DIAGNOSIS — Z79.82 LONG TERM (CURRENT) USE OF ASPIRIN: ICD-10-CM

## 2022-08-24 LAB
ALBUMIN SERPL ELPH-MCNC: 3.1 G/DL — LOW (ref 3.3–5)
ALBUMIN SERPL ELPH-MCNC: 4.3 G/DL — SIGNIFICANT CHANGE UP (ref 3.3–5)
ALP SERPL-CCNC: 70 U/L — SIGNIFICANT CHANGE UP (ref 40–120)
ALP SERPL-CCNC: 96 U/L — SIGNIFICANT CHANGE UP (ref 40–120)
ALT FLD-CCNC: 5 U/L — LOW (ref 10–45)
ALT FLD-CCNC: 7 U/L — LOW (ref 10–45)
AMMONIA BLD-MCNC: 36 UMOL/L — SIGNIFICANT CHANGE UP (ref 11–55)
ANION GAP SERPL CALC-SCNC: 10 MMOL/L — SIGNIFICANT CHANGE UP (ref 5–17)
ANION GAP SERPL CALC-SCNC: 13 MMOL/L — SIGNIFICANT CHANGE UP (ref 5–17)
ANISOCYTOSIS BLD QL: SLIGHT — SIGNIFICANT CHANGE UP
APPEARANCE UR: CLEAR — SIGNIFICANT CHANGE UP
APTT BLD: 40.9 SEC — HIGH (ref 27.5–35.5)
AST SERPL-CCNC: 10 U/L — SIGNIFICANT CHANGE UP (ref 10–40)
AST SERPL-CCNC: 13 U/L — SIGNIFICANT CHANGE UP (ref 10–40)
BASE EXCESS BLDV CALC-SCNC: -12.3 MMOL/L — LOW (ref -2–3)
BASE EXCESS BLDV CALC-SCNC: -13.3 MMOL/L — LOW (ref -2–3)
BASE EXCESS BLDV CALC-SCNC: -7.7 MMOL/L — LOW (ref -2–3)
BASOPHILS # BLD AUTO: 0 K/UL — SIGNIFICANT CHANGE UP (ref 0–0.2)
BASOPHILS NFR BLD AUTO: 0 % — SIGNIFICANT CHANGE UP (ref 0–2)
BILIRUB SERPL-MCNC: 0.2 MG/DL — SIGNIFICANT CHANGE UP (ref 0.2–1.2)
BILIRUB SERPL-MCNC: 0.3 MG/DL — SIGNIFICANT CHANGE UP (ref 0.2–1.2)
BILIRUB UR-MCNC: NEGATIVE — SIGNIFICANT CHANGE UP
BUN SERPL-MCNC: 47 MG/DL — HIGH (ref 7–23)
BUN SERPL-MCNC: 59 MG/DL — HIGH (ref 7–23)
BURR CELLS BLD QL SMEAR: SLIGHT — SIGNIFICANT CHANGE UP
CA-I SERPL-SCNC: 1.36 MMOL/L — HIGH (ref 1.15–1.33)
CALCIUM SERPL-MCNC: 10.5 MG/DL — SIGNIFICANT CHANGE UP (ref 8.4–10.5)
CALCIUM SERPL-MCNC: 8.2 MG/DL — LOW (ref 8.4–10.5)
CHLORIDE SERPL-SCNC: 108 MMOL/L — SIGNIFICANT CHANGE UP (ref 96–108)
CHLORIDE SERPL-SCNC: 112 MMOL/L — HIGH (ref 96–108)
CO2 BLDV-SCNC: 15.8 MMOL/L — LOW (ref 22–26)
CO2 BLDV-SCNC: 15.9 MMOL/L — LOW (ref 22–26)
CO2 BLDV-SCNC: 20.1 MMOL/L — LOW (ref 22–26)
CO2 SERPL-SCNC: 15 MMOL/L — LOW (ref 22–31)
CO2 SERPL-SCNC: 18 MMOL/L — LOW (ref 22–31)
COLOR SPEC: YELLOW — SIGNIFICANT CHANGE UP
CREAT SERPL-MCNC: 1.85 MG/DL — HIGH (ref 0.5–1.3)
CREAT SERPL-MCNC: 1.97 MG/DL — HIGH (ref 0.5–1.3)
DACRYOCYTES BLD QL SMEAR: SLIGHT — SIGNIFICANT CHANGE UP
DIFF PNL FLD: NEGATIVE — SIGNIFICANT CHANGE UP
EGFR: 31 ML/MIN/1.73M2 — LOW
EGFR: 34 ML/MIN/1.73M2 — LOW
ELLIPTOCYTES BLD QL SMEAR: SLIGHT — SIGNIFICANT CHANGE UP
EOSINOPHIL # BLD AUTO: 0 K/UL — SIGNIFICANT CHANGE UP (ref 0–0.5)
EOSINOPHIL NFR BLD AUTO: 0 % — SIGNIFICANT CHANGE UP (ref 0–6)
GAS PNL BLDV: 137 MMOL/L — SIGNIFICANT CHANGE UP (ref 136–145)
GAS PNL BLDV: SIGNIFICANT CHANGE UP
GLUCOSE BLDC GLUCOMTR-MCNC: 107 MG/DL — HIGH (ref 70–99)
GLUCOSE SERPL-MCNC: 103 MG/DL — HIGH (ref 70–99)
GLUCOSE SERPL-MCNC: 136 MG/DL — HIGH (ref 70–99)
GLUCOSE UR QL: NEGATIVE — SIGNIFICANT CHANGE UP
HCO3 BLDV-SCNC: 15 MMOL/L — LOW (ref 22–29)
HCO3 BLDV-SCNC: 15 MMOL/L — LOW (ref 22–29)
HCO3 BLDV-SCNC: 19 MMOL/L — LOW (ref 22–29)
HCT VFR BLD CALC: 29 % — LOW (ref 39–50)
HCT VFR BLD CALC: 36.3 % — LOW (ref 39–50)
HGB BLD-MCNC: 11.5 G/DL — LOW (ref 13–17)
HGB BLD-MCNC: 9.1 G/DL — LOW (ref 13–17)
INR BLD: 1.07 — SIGNIFICANT CHANGE UP (ref 0.88–1.16)
KETONES UR-MCNC: NEGATIVE — SIGNIFICANT CHANGE UP
LACTATE SERPL-SCNC: 1.9 MMOL/L — SIGNIFICANT CHANGE UP (ref 0.5–2)
LACTATE SERPL-SCNC: 2.3 MMOL/L — HIGH (ref 0.5–2)
LACTATE SERPL-SCNC: 3.2 MMOL/L — HIGH (ref 0.5–2)
LEUKOCYTE ESTERASE UR-ACNC: NEGATIVE — SIGNIFICANT CHANGE UP
LYMPHOCYTES # BLD AUTO: 1.33 K/UL — SIGNIFICANT CHANGE UP (ref 1–3.3)
LYMPHOCYTES # BLD AUTO: 4.4 % — LOW (ref 13–44)
MANUAL SMEAR VERIFICATION: SIGNIFICANT CHANGE UP
MCHC RBC-ENTMCNC: 30.7 PG — SIGNIFICANT CHANGE UP (ref 27–34)
MCHC RBC-ENTMCNC: 31 PG — SIGNIFICANT CHANGE UP (ref 27–34)
MCHC RBC-ENTMCNC: 31.4 GM/DL — LOW (ref 32–36)
MCHC RBC-ENTMCNC: 31.7 GM/DL — LOW (ref 32–36)
MCV RBC AUTO: 96.8 FL — SIGNIFICANT CHANGE UP (ref 80–100)
MCV RBC AUTO: 98.6 FL — SIGNIFICANT CHANGE UP (ref 80–100)
MONOCYTES # BLD AUTO: 1.3 K/UL — HIGH (ref 0–0.9)
MONOCYTES NFR BLD AUTO: 4.3 % — SIGNIFICANT CHANGE UP (ref 2–14)
NEUTROPHILS # BLD AUTO: 27.5 K/UL — HIGH (ref 1.8–7.4)
NEUTROPHILS NFR BLD AUTO: 81.7 % — HIGH (ref 43–77)
NEUTS BAND # BLD: 9.6 % — HIGH (ref 0–8)
NITRITE UR-MCNC: NEGATIVE — SIGNIFICANT CHANGE UP
NRBC # BLD: 0 /100 WBCS — SIGNIFICANT CHANGE UP (ref 0–0)
OB PNL STL: NEGATIVE — SIGNIFICANT CHANGE UP
OVALOCYTES BLD QL SMEAR: SLIGHT — SIGNIFICANT CHANGE UP
PCO2 BLDV: 37 MMHG — LOW (ref 42–55)
PCO2 BLDV: 40 MMHG — LOW (ref 42–55)
PCO2 BLDV: 41 MMHG — LOW (ref 42–55)
PH BLDV: 7.17 — LOW (ref 7.32–7.43)
PH BLDV: 7.21 — LOW (ref 7.32–7.43)
PH BLDV: 7.27 — LOW (ref 7.32–7.43)
PH UR: 5 — SIGNIFICANT CHANGE UP (ref 5–8)
PLAT MORPH BLD: ABNORMAL
PLATELET # BLD AUTO: 266 K/UL — SIGNIFICANT CHANGE UP (ref 150–400)
PLATELET # BLD AUTO: 353 K/UL — SIGNIFICANT CHANGE UP (ref 150–400)
PO2 BLDV: <33 MMHG — LOW (ref 25–45)
PO2 BLDV: <33 MMHG — SIGNIFICANT CHANGE UP (ref 25–45)
PO2 BLDV: <33 MMHG — SIGNIFICANT CHANGE UP (ref 25–45)
POIKILOCYTOSIS BLD QL AUTO: SLIGHT — SIGNIFICANT CHANGE UP
POLYCHROMASIA BLD QL SMEAR: SIGNIFICANT CHANGE UP
POTASSIUM BLDV-SCNC: 5.1 MMOL/L — SIGNIFICANT CHANGE UP (ref 3.5–5.1)
POTASSIUM SERPL-MCNC: 4.5 MMOL/L — SIGNIFICANT CHANGE UP (ref 3.5–5.3)
POTASSIUM SERPL-MCNC: 5.5 MMOL/L — HIGH (ref 3.5–5.3)
POTASSIUM SERPL-SCNC: 4.5 MMOL/L — SIGNIFICANT CHANGE UP (ref 3.5–5.3)
POTASSIUM SERPL-SCNC: 5.5 MMOL/L — HIGH (ref 3.5–5.3)
PROT SERPL-MCNC: 5.8 G/DL — LOW (ref 6–8.3)
PROT SERPL-MCNC: 7.8 G/DL — SIGNIFICANT CHANGE UP (ref 6–8.3)
PROT UR-MCNC: NEGATIVE MG/DL — SIGNIFICANT CHANGE UP
PROTHROM AB SERPL-ACNC: 12.8 SEC — SIGNIFICANT CHANGE UP (ref 10.5–13.4)
RAPID RVP RESULT: SIGNIFICANT CHANGE UP
RBC # BLD: 2.94 M/UL — LOW (ref 4.2–5.8)
RBC # BLD: 3.75 M/UL — LOW (ref 4.2–5.8)
RBC # FLD: 13.8 % — SIGNIFICANT CHANGE UP (ref 10.3–14.5)
RBC # FLD: 13.9 % — SIGNIFICANT CHANGE UP (ref 10.3–14.5)
RBC BLD AUTO: ABNORMAL
SAO2 % BLDV: 39.9 % — LOW (ref 67–88)
SAO2 % BLDV: 41.5 % — LOW (ref 67–88)
SAO2 % BLDV: SIGNIFICANT CHANGE UP % (ref 67–88)
SARS-COV-2 RNA SPEC QL NAA+PROBE: SIGNIFICANT CHANGE UP
SODIUM SERPL-SCNC: 137 MMOL/L — SIGNIFICANT CHANGE UP (ref 135–145)
SODIUM SERPL-SCNC: 139 MMOL/L — SIGNIFICANT CHANGE UP (ref 135–145)
SP GR SPEC: <=1.005 — SIGNIFICANT CHANGE UP (ref 1–1.03)
TROPONIN T SERPL-MCNC: 0.02 NG/ML — HIGH (ref 0–0.01)
TROPONIN T SERPL-MCNC: 0.03 NG/ML — HIGH (ref 0–0.01)
UROBILINOGEN FLD QL: 0.2 E.U./DL — SIGNIFICANT CHANGE UP
WBC # BLD: 30.12 K/UL — HIGH (ref 3.8–10.5)
WBC # BLD: 33.58 K/UL — HIGH (ref 3.8–10.5)
WBC # FLD AUTO: 30.12 K/UL — HIGH (ref 3.8–10.5)
WBC # FLD AUTO: 33.58 K/UL — HIGH (ref 3.8–10.5)

## 2022-08-24 PROCEDURE — 71260 CT THORAX DX C+: CPT | Mod: 26,MA

## 2022-08-24 PROCEDURE — 99221 1ST HOSP IP/OBS SF/LOW 40: CPT | Mod: GC

## 2022-08-24 PROCEDURE — 70450 CT HEAD/BRAIN W/O DYE: CPT | Mod: 26,MA

## 2022-08-24 PROCEDURE — 93010 ELECTROCARDIOGRAM REPORT: CPT

## 2022-08-24 PROCEDURE — 99291 CRITICAL CARE FIRST HOUR: CPT | Mod: CS

## 2022-08-24 PROCEDURE — 76705 ECHO EXAM OF ABDOMEN: CPT | Mod: 26

## 2022-08-24 PROCEDURE — 74177 CT ABD & PELVIS W/CONTRAST: CPT | Mod: 26,MA

## 2022-08-24 PROCEDURE — 71045 X-RAY EXAM CHEST 1 VIEW: CPT | Mod: 26

## 2022-08-24 RX ORDER — CEFTRIAXONE 500 MG/1
2000 INJECTION, POWDER, FOR SOLUTION INTRAMUSCULAR; INTRAVENOUS EVERY 24 HOURS
Refills: 0 | Status: DISCONTINUED | OUTPATIENT
Start: 2022-08-24 | End: 2022-08-25

## 2022-08-24 RX ORDER — METRONIDAZOLE 500 MG
500 TABLET ORAL EVERY 8 HOURS
Refills: 0 | Status: DISCONTINUED | OUTPATIENT
Start: 2022-08-25 | End: 2022-08-25

## 2022-08-24 RX ORDER — SODIUM CHLORIDE 9 MG/ML
1000 INJECTION, SOLUTION INTRAVENOUS
Refills: 0 | Status: DISCONTINUED | OUTPATIENT
Start: 2022-08-24 | End: 2022-09-05

## 2022-08-24 RX ORDER — ACETAMINOPHEN 500 MG
1000 TABLET ORAL ONCE
Refills: 0 | Status: COMPLETED | OUTPATIENT
Start: 2022-08-24 | End: 2022-08-24

## 2022-08-24 RX ORDER — CEFTRIAXONE 500 MG/1
2000 INJECTION, POWDER, FOR SOLUTION INTRAMUSCULAR; INTRAVENOUS ONCE
Refills: 0 | Status: DISCONTINUED | OUTPATIENT
Start: 2022-08-24 | End: 2022-08-24

## 2022-08-24 RX ORDER — INSULIN LISPRO 100/ML
VIAL (ML) SUBCUTANEOUS AT BEDTIME
Refills: 0 | Status: DISCONTINUED | OUTPATIENT
Start: 2022-08-25 | End: 2022-08-26

## 2022-08-24 RX ORDER — DEXTROSE 50 % IN WATER 50 %
25 SYRINGE (ML) INTRAVENOUS ONCE
Refills: 0 | Status: DISCONTINUED | OUTPATIENT
Start: 2022-08-24 | End: 2022-09-05

## 2022-08-24 RX ORDER — PIPERACILLIN AND TAZOBACTAM 4; .5 G/20ML; G/20ML
3.38 INJECTION, POWDER, LYOPHILIZED, FOR SOLUTION INTRAVENOUS ONCE
Refills: 0 | Status: DISCONTINUED | OUTPATIENT
Start: 2022-08-24 | End: 2022-08-24

## 2022-08-24 RX ORDER — SODIUM CHLORIDE 9 MG/ML
1000 INJECTION INTRAMUSCULAR; INTRAVENOUS; SUBCUTANEOUS ONCE
Refills: 0 | Status: COMPLETED | OUTPATIENT
Start: 2022-08-24 | End: 2022-08-24

## 2022-08-24 RX ORDER — SODIUM CHLORIDE 9 MG/ML
2200 INJECTION INTRAMUSCULAR; INTRAVENOUS; SUBCUTANEOUS ONCE
Refills: 0 | Status: COMPLETED | OUTPATIENT
Start: 2022-08-24 | End: 2022-08-24

## 2022-08-24 RX ORDER — DEXTROSE 50 % IN WATER 50 %
15 SYRINGE (ML) INTRAVENOUS ONCE
Refills: 0 | Status: DISCONTINUED | OUTPATIENT
Start: 2022-08-24 | End: 2022-09-05

## 2022-08-24 RX ORDER — DEXTROSE 50 % IN WATER 50 %
12.5 SYRINGE (ML) INTRAVENOUS ONCE
Refills: 0 | Status: DISCONTINUED | OUTPATIENT
Start: 2022-08-24 | End: 2022-09-05

## 2022-08-24 RX ORDER — PIPERACILLIN AND TAZOBACTAM 4; .5 G/20ML; G/20ML
3.38 INJECTION, POWDER, LYOPHILIZED, FOR SOLUTION INTRAVENOUS ONCE
Refills: 0 | Status: COMPLETED | OUTPATIENT
Start: 2022-08-24 | End: 2022-08-24

## 2022-08-24 RX ORDER — GLUCAGON INJECTION, SOLUTION 0.5 MG/.1ML
1 INJECTION, SOLUTION SUBCUTANEOUS ONCE
Refills: 0 | Status: DISCONTINUED | OUTPATIENT
Start: 2022-08-24 | End: 2022-09-05

## 2022-08-24 RX ORDER — METRONIDAZOLE 500 MG
500 TABLET ORAL ONCE
Refills: 0 | Status: COMPLETED | OUTPATIENT
Start: 2022-08-24 | End: 2022-08-24

## 2022-08-24 RX ORDER — ONDANSETRON 8 MG/1
4 TABLET, FILM COATED ORAL ONCE
Refills: 0 | Status: COMPLETED | OUTPATIENT
Start: 2022-08-24 | End: 2022-08-24

## 2022-08-24 RX ORDER — INSULIN LISPRO 100/ML
VIAL (ML) SUBCUTANEOUS
Refills: 0 | Status: DISCONTINUED | OUTPATIENT
Start: 2022-08-24 | End: 2022-08-26

## 2022-08-24 RX ADMIN — SODIUM CHLORIDE 1000 MILLILITER(S): 9 INJECTION INTRAMUSCULAR; INTRAVENOUS; SUBCUTANEOUS at 20:26

## 2022-08-24 RX ADMIN — PIPERACILLIN AND TAZOBACTAM 200 GRAM(S): 4; .5 INJECTION, POWDER, LYOPHILIZED, FOR SOLUTION INTRAVENOUS at 16:02

## 2022-08-24 RX ADMIN — ONDANSETRON 4 MILLIGRAM(S): 8 TABLET, FILM COATED ORAL at 15:39

## 2022-08-24 RX ADMIN — Medication 100 MILLIGRAM(S): at 21:15

## 2022-08-24 RX ADMIN — SODIUM CHLORIDE 2200 MILLILITER(S): 9 INJECTION INTRAMUSCULAR; INTRAVENOUS; SUBCUTANEOUS at 16:02

## 2022-08-24 RX ADMIN — Medication 400 MILLIGRAM(S): at 16:02

## 2022-08-24 RX ADMIN — CEFTRIAXONE 100 MILLIGRAM(S): 500 INJECTION, POWDER, FOR SOLUTION INTRAMUSCULAR; INTRAVENOUS at 23:39

## 2022-08-24 NOTE — CONSULT NOTE ADULT - ATTENDING COMMENTS
This is a 89 y/o male with AMS and no detectable source of infection other than colitis, with hypoxemia and elevated WBC - place on broad spectrum antibiotics and f/u the infectious w/u.  Admit to [facility].  Please see the above note for the rest of the details as d/w the residents.

## 2022-08-24 NOTE — H&P ADULT - ATTENDING COMMENTS
This patient was evaluated as an ICU consult, please refer to that not for the details, the billing was done on that note.

## 2022-08-24 NOTE — ED ADULT NURSE REASSESSMENT NOTE - NS ED NURSE REASSESS COMMENT FT1
Pt. BP improved, pt. now opening eyes to voice, able to deny pain at this time, denies cp, sob, nausea, or abdominal pain on exam. requesting blankets, speech more coherent than on arrival.

## 2022-08-24 NOTE — ED PROVIDER NOTE - PHYSICAL EXAMINATION
VITAL SIGNS: I have reviewed nursing notes and confirm.  CONSTITUTIONAL: Well-developed; cachectic, ill appearing, vomiting  SKIN:  warm and dry, no acute rash. + hot to touch  HEAD:  normocephalic, atraumatic.  EYES: PERRL, EOM intact; conjunctiva and sclera clear.  ENT: No nasal discharge; airway clear. mm v dry  NECK: Supple; non tender.  CARD: S1, S2 normal; no murmurs, gallops, or rubs. Regular rate and rhythm.   RESP:  Clear to auscultation b/l, no wheezes, rales or rhonchi. decreased bilat bases  ABD: Normal bowel sounds; soft; non-distended; inconsistent facial grimace/yes or no answer to questioning about ttp - unclear if pt has ttp; no guarding/ rebound.  MSK: Normal ROM. No clubbing, cyanosis or edema. no ttp bilat le  NEURO: somonolent, opens eyes to voice, able to state name, inconsistent yes/no answers to questions,  oriented, grossly unremarkable  PSYCH: Cooperative, mood and affect appropriate.

## 2022-08-24 NOTE — ED PROVIDER NOTE - CLINICAL SUMMARY MEDICAL DECISION MAKING FREE TEXT BOX
Pt biba for cp/sob but unable to give history, noted to be febrile, altered w diarrhea.  Inconsistent abd exam. Suspect ams 2/2 sepsis and dehydration but will get ct head.  Sepsis protocol initiated w empiric broad spectrum abx, ivf, labs/cx's.  CT chest/abd/pelvis ordered.  Pt will need admit.

## 2022-08-24 NOTE — H&P ADULT - PROBLEM SELECTOR PLAN 9
Fluids: 3.2L, consider maintenance for hypotension  Electrolytes: replete K>3.8, Mg>1.8, Phos>2.5  Diet: NPO  DVT Prophylaxis: heparin given CKD  GI Prophylaxis: not indicated  Code Status: DNR/DNI as of 7/21/22 pending further collateral  Dispo: [facility] - pt. on home simvastatin 20mg

## 2022-08-24 NOTE — ED ADULT TRIAGE NOTE - CHIEF COMPLAINT QUOTE
Pt brought in by EMS from home for shortness of breath and chest pain x2 days. Pt also reports diarrhea and lethargy today. On arrival, pt lethargic and having difficulty answering questions. Unable to obtain history.

## 2022-08-24 NOTE — H&P ADULT - PROBLEM SELECTOR PLAN 2
Likely secondary to sepsis (as above). Exam limited but does not appear focal and CTH negative. Likely secondary to sepsis (as above). Exam limited but does not appear focal and CTH negative.    - f/u blood cultures  - f/u GI PCR and C.diff

## 2022-08-24 NOTE — ED PROVIDER NOTE - NSICDXPASTMEDICALHX_GEN_ALL_CORE_FT
PAST MEDICAL HISTORY:  BPH (benign prostatic hyperplasia)     CAD (coronary artery disease)     Chronic diarrhea     COPD, moderate     HLD (hyperlipidemia)

## 2022-08-24 NOTE — H&P ADULT - HISTORY OF PRESENT ILLNESS
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 Patient is altered on exam, but per ED report, he has been having chronic diarrhea, tachypnea, and chest pain that worsened, prompting patient to call EMS to bring him from home into [facility]. Of note, patient was recently admitted 7/19-7/23 to [facility] for diarrhea after being sent in from his PCP office for hypotension and chronic diarrhea. During that time his diarrhea resolved and he was told to f/u with outpatient GI with Dr. Hammer. Notably he was fully conversant with mild memory impairment on admission and throughout hospital stay.    ED vitals: T 39.3   HR 78  RR 36 (taken manually)  BP 97/59  SpO2 98% on 2L  Labs significant: WBC 30, Hgb 11.5, lactate 2.3 -> 3.2 after 2L NS, UA wnl  Imaging/EKG: CT H/C/A/P known lung nodule, colitis  Interventions: zosyn, zofran, 3.2L NS, flagyl, acetaminophen     89y/o M w/ PMH HTN, CAD s/p PCI w/ 3 DAYANA, CKD (Cr 1.7-2.4), COPD (no home O2), 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 Patient is altered on exam, but per ED report, he has been having chronicwho rp that worsened, prompting patient to call EMS to bring him from home into [facility]. Of note, patient was recently admitted 7/19-7/23 to [facility] for diarrhea after being sent in from his PCP office for hypotension and chronic diarrhea. During that time his diarrhea resolved and he was told to f/u with outpatient GI with Dr. Hammer. Notably he was fully conversant with mild memory impairment on admission and throughout hospital stay.    ED vitals: T 39.3   HR 78  RR 36 (taken manually)  BP 97/59  SpO2 98% on 2L  Labs significant: WBC 30, Hgb 11.5, lactate 2.3 -> 3.2 after 2L NS, UA wnl  Imaging/EKG: CT H/C/A/P known lung nodule, colitis  Interventions: zosyn, zofran, 3.2L NS, flagyl, acetaminophen     89y/o M w/ PMH HTN, CAD s/p PCI w/ 3 DAYANA, CKD (Cr 1.7-2.4), COPD (no home O2), 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 Patient is altered on exam, but per ED report, he has been having chronic diarrhea, tachypnea, and chest pain that was worsening, prompting patient to call EMS to bring him from home into [facility]. Of note, patient was recently admitted 7/19-7/23 to [facility] for diarrhea after being sent in from his PCP office for hypotension and chronic diarrhea. During that time his diarrhea resolved and he was told to f/u with outpatient GI with Dr. Hammer. Notably he was fully conversant with mild memory impairment on admission and throughout hospital stay.    ED vitals: T 39.3   HR 78  RR 36 (taken manually)  BP 97/59  SpO2 98% on 2L  Labs significant: WBC 30, Hgb 11.5, lactate 2.3 -> 3.2 after 2L NS, UA wnl  Imaging/EKG: CT H/C/A/P known lung nodule, colitis  Interventions: zosyn, zofran, 3.2L NS, flagyl, acetaminophen

## 2022-08-24 NOTE — H&P ADULT - NSHPPHYSICALEXAM_GEN_ALL_CORE
PHYSICAL EXAM:  General: Mild distress, AAOx2  HEENT: atraumatic, normocephalic  Pulmonary: Tachypneic, increased work of breathing on 4L NC. Clear to auscultation bilaterally; No wheeze  Cardiovascular: Regular rate and rhythm; no murmurs, rubs or gallops. Normal S1S2  Gastrointestinal: Soft, tender to light palpation diffusely but increased in RUQ and epigastrium, nondistended; bowel sounds present  Musculoskeletal: 2+ peripheral pulses, no clubbing, cyanosis or edema  Neurology:  fluent speech, able to move all extremities  Skin: no rashes or lesions

## 2022-08-24 NOTE — H&P ADULT - PROBLEM SELECTOR PLAN 10
- pt. on home flomax 0.4mg  - will hold in the setting of hypotension  - monitor urine output and observe for signs of retention while off flomax.

## 2022-08-24 NOTE — H&P ADULT - PROBLEM SELECTOR PLAN 7
Fluids: 3.2L, consider maintenance for hypotension  Electrolytes: replete K>3.8, Mg>1.8, Phos>2.5  Diet: NPO  DVT Prophylaxis: heparin given CKD  GI Prophylaxis: not indicated  Code Status: DNR/DNI as of 7/21/22 pending further collateral  Dispo: [facility] Pt with hx of COPD, not on home O2. Takes advair and albuterol. Pt is tachypnic but likely at baseline. CTchest shows LL opacity, similar to previous studies  - lungs clear, does not appear to be in exacerbation  - 3 to 4 cm LLL nodule  -> was meant to get IR guided biopsy done after discharge in March. Unclear if this happened.

## 2022-08-24 NOTE — H&P ADULT - PROBLEM SELECTOR PLAN 1
On admission pt. meeting 3/4 SIRS criteria (T102.4, WBC 30.12, RR 36) with lactate 2.3 -> 3.2. likely secondary to colitis. Euvolemic on exam after 2.2L, getting additional 1L for increasing lactate    - can transition to ceftriaxone and flagyl  - f/u blood cultures taken in ED  - GI PCR and C diff  - repeat BMP and lactate after further fluids  - elevate HOB to mitigate aspiration risk On admission pt. meeting 3/4 SIRS criteria (T102.4, WBC 30.12, RR 36) with lactate 2.3 -> 3.2 -> 1.9. Likely secondary to colitis although known history of chronic diarrhea, which confounds evaluation of acute source of sepsis. Given lack of other source, if pt is not bacteremic then colitis is most likely source. Euvolemic on exam after 3.2L.    - can transition to ceftriaxone and flagyl  - f/u blood cultures taken in ED  - f/u GI PCR and C diff  - elevate HOB to mitigate aspiration risk

## 2022-08-24 NOTE — H&P ADULT - PROBLEM SELECTOR PLAN 3
-baseline -baseline Cr.1.7-2.4  - pt. at baseline Known history of chronic diarrhea, which confounds evaluation of acute source of sepsis. Given lack of other source, if pt is not bacteremic then colitis is most likely source.  - abx as above  - consultation with outpt GI doctor Dr. Hammer in AM Known history of chronic diarrhea after a R. hemicolectomy in 2017. Chronic diarrhea confounds evaluation of acute source of sepsis. Given lack of other source, if pt is not bacteremic then colitis is most likely source.  - abx as above  - consultation with outpt GI doctor Dr. Hammer in AM    - Per GI recs, consider outpatient colonoscopy to r/o collagenous colitis Known history of chronic diarrhea after a R. hemicolectomy in 2017. Chronic diarrhea confounds evaluation of acute source of sepsis. Given lack of other source, if pt is not bacteremic then colitis is most likely source.  - abx as above  - consultation with outpt GI doctor Dr. Hammer in AM

## 2022-08-24 NOTE — H&P ADULT - PROBLEM SELECTOR PLAN 8
Pt. on metoprolol succinate 25, valsartan 80 at home.   - hold home meds in the setting of hypotension and sepsis.

## 2022-08-24 NOTE — H&P ADULT - NSHPLABSRESULTS_GEN_ALL_CORE
LABS:                         11.5   30.12 )-----------( 353      ( 24 Aug 2022 16:15 )             36.3     08-24    139  |  108  |  59<H>  ----------------------------<  136<H>  5.5<H>   |  18<L>  |  1.97<H>    Ca    10.5      24 Aug 2022 16:15    TPro  7.8  /  Alb  4.3  /  TBili  0.3  /  DBili  x   /  AST  13  /  ALT  7<L>  /  AlkPhos  96  08-24    PT/INR - ( 24 Aug 2022 16:15 )   PT: 12.8 sec;   INR: 1.07          PTT - ( 24 Aug 2022 16:15 )  PTT:40.9 sec  Urinalysis Basic - ( 24 Aug 2022 18:55 )    Color: Yellow / Appearance: Clear / SG: <=1.005 / pH: x  Gluc: x / Ketone: NEGATIVE  / Bili: Negative / Urobili: 0.2 E.U./dL   Blood: x / Protein: NEGATIVE mg/dL / Nitrite: NEGATIVE   Leuk Esterase: NEGATIVE / RBC: x / WBC x   Sq Epi: x / Non Sq Epi: x / Bacteria: x      CARDIAC MARKERS ( 24 Aug 2022 16:15 )  x     / 0.03 ng/mL / x     / x     / x            Lactate, Blood: 3.2 mmol/L (08-24 @ 18:44)  Lactate, Blood: 2.3 mmol/L (08-24 @ 16:15)      RADIOLOGY, EKG & ADDITIONAL TESTS:

## 2022-08-24 NOTE — H&P ADULT - PROBLEM SELECTOR PLAN 6
On home advair  - lungs clear, does not appear to be in exacerbation - pt. on home aspirin and plavix. Unclear why patient is still on anti-platelet.  - will hold for now

## 2022-08-24 NOTE — H&P ADULT - PROBLEM SELECTOR PLAN 4
- pt. on home plavix. Unclear why patient is still having   - will hole for now - pt. on home plavix. Unclear why patient is still on anti-platelet.  - will hold for now -baseline Cr.1.7-2.4  - pt. at baseline Pt admitted with bicarb of 18 (about at baseline per chart review) and tachypneic which is chronica as per chart review. pH on VBG was 7.27 -> 7.21  Anion gap normal   Pt is likely metabolic acidosis 2/2 chronic diarrhea and CKD which is worsening in the setting of acute severe sepsis.  - Treat diarrhea and rehydrate as above  - Continue to monitor  - Consider bicarb if worsening

## 2022-08-24 NOTE — H&P ADULT - PROBLEM SELECTOR PLAN 5
Known history of chronic diarrhea, which confounds evaluation of acute source of sepsis. Given lack of other source, if pt is not bacteremic then colitis is most likely source.  - abx as above  - consultation with outpt GI doctor Dr. Hammer in AM - pt. on home aspirin and plavix. Unclear why patient is still on anti-platelet.  - will hold for now -baseline Cr.1.7-2.4  - pt. at baseline, no evidence of DEBBIE.

## 2022-08-24 NOTE — ED ADULT NURSE NOTE - OBJECTIVE STATEMENT
Pt. a&ox3 @ baseline from home, reportedly as per EMS called 911 for himself c/o SOB @ home. Pt. on arrival was lethargic / drowsy, no longer able to communicate clearly his pain, complaints or needs, and was not responsive to verbal stimuli. pt. arrives hypotensive, tachypneic, and altered, hot to the touch and flushed appearance. Pt. made UG to MD Director. Pt. has hx of CAD, HTN, colon ca. Pt. unable to provide background to own pmhx on arrival, began vomiting and aspirating while laying down, suction performed and emergency verbally ordered zofran administered. Pt. airway patent, pt. recovered from vomiting episode, breathing became less labored, and normalized with [id number] respirations, sao2 >95% on 2l NC. Pt. 2 karge bore Is placed pt. placed on ccm, rectal temp of 102.3F, and sepsis protocol followed with fluid rescuscitation and abx as per orders.

## 2022-08-24 NOTE — H&P ADULT - ASSESSMENT
89 y/o M w/ PMH CAD, CKD3, COPD, colonic mass, lung nodules, chronic diarrhea and tachypnea admitted for sepsis with end organ dysfunction.           PULMONARY  #COPD  #Tachypnea  Known acute on chronic metabolic acidosis, which is likely driving his tachypnea with pain exacerbating.  - close monitoring, can use HFNC for work of breathing but would avoid BiPAP due to encephalopathy  - lungs clear, does not appear to be in exacerbation            #CAD      GASTROINTESTINAL  #Diarrhea  Known history of chronic diarrhea, which confounds evaluation of acute source of sepsis. Given lack of other source, if pt is not bacteremic then colitis is most likely source.  - abx as above  - consultation with outpt GI doctor Dr. Hammer in AM    RENAL  #DEBBIE on CKD  Likely direct renal injury from sepsis with possible concurrent prerenal DEBBIE from hypotension on admission  - routine urine lytes    INFECTIOUS  #Sepsis  As above  - f/u blood cultures, GI PCR, C Diff    ENDOCRINE  - q6 SSI while NPO    MISC  Fluids: 3.2L, consider maintenance for hypotension  Electrolytes: replete K>3.8, Mg>1.8, Phos>2.5  Diet: NPO  DVT Prophylaxis: heparin given DEBBIE on CKD  GI Prophylaxis: not indicated  Lines: PIV  Code Status: DNR/DNI as of 7/21/22 pending further collateral  Dispo: [facility]   89 y/o M w/ PMH CAD, CKD3, COPD, colonic mass, lung nodules, chronic diarrhea and tachypnea who presented for diarrhea, tachypnea, and chest pain who was admitted for AMS secondary to severe sepsis.                                   ENDOCRINE  - q6 SSI while NPO     89 y/o M w/ PMH CAD, CKD3, COPD, colonic mass, lung nodules, chronic diarrhea and tachypnea who presented for diarrhea, tachypnea, and chest pain who was admitted for AMS secondary to severe sepsis.

## 2022-08-24 NOTE — H&P ADULT - NSHPPOAPULMEMBOLUS_GEN_A_CORE
no Bilateral Helical Rim Advancement Flap Text: The defect edges were debeveled with a #15c blade scalpel.  Given the location of the defect and the proximity to free margins (helical rim) a bilateral helical rim advancement flap was deemed most appropriate.  Using a sterile surgical marker, the appropriate advancement flaps were drawn incorporating the defect and placing the expected incisions between the helical rim and antihelix where possible.  The area thus outlined was incised through and through with a #15 scalpel blade.  With a skin hook and iris scissors, the flaps were gently and sharply undermined and freed up.

## 2022-08-24 NOTE — ED PROVIDER NOTE - PROGRESS NOTE DETAILS
Pt w slightly improved alertness.  No complaints but not giving many answers.  Denies abd pain.  Labs w elevated wbc - ? cdiff since + diarrhea - c diff, stool studies added to labs, guaiac neg; pt pending ct's, cxr.  Labs also w + acidosis similar to prior.  Chem, lactate pending.  IVF/abx running. CT's ordered w IV contrast based on prior cr 1.48, gfr 44 on 7/21/22 but changed from IV to noncontrast after elevated cr noted (gfr still > 30).  Contrast ct's canceled.  However, I was just informed by ct tech that the iv studies were performed based on initial orders despite change in orders prior to pt receiving the studies. Pt receiving ivf and will be admitted; cr will be monitored. Rpt labs w worsening lactate, pH.  MS slightly improved in terms of alertness but pt unable to give history or have consistent abd exam.  Ct head neg, ct chest/abd/pelvis w enlarged gb and colitis.  Flagyl added to abx.  Pt has not had stool for stool studies as yet.  Surg consulted for poss acalculous cholecystitis - low concern based on no wall thickening and nl lft's; will follow.  RUQ u/s ordered; tech at bedside now.  ICU consulted; pt accepted to stepdown.  Dr Montalvo contacted and updated about pt.

## 2022-08-24 NOTE — H&P ADULT - PROBLEM SELECTOR PLAN 11
Fluids: 3.2L, consider maintenance for hypotension  Electrolytes: replete K>3.8, Mg>1.8, Phos>2.5  Diet: NPO  DVT Prophylaxis: heparin given CKD  GI Prophylaxis: not indicated  Code Status: DNR/DNI as of 7/21/22 pending further collateral  Dispo: [facility]

## 2022-08-24 NOTE — ED PROVIDER NOTE - OBJECTIVE STATEMENT
89 yo M h/o HTN, CAD s/p PCI w/ 3 DAYANA (mRCA, pRCA, mC in 2013 w/ Dr. Varela), CKD (Cr 1.7-2.4), COPD (not on home O2), colonic mass (s/p R hemicolectomy 4/2017 w/ Dr. Headley), arthritis, aortic aneurysm, BPH, HLD, EF 45-50%, lung nodules, chronic diarrhea [facility] for c/o cp, sob per EMS.  Pt unable to give history due to ams.  Pt recently admitted in July for diarrhea, acidosis, dehydration.  GI stool studies were neg.  Pt noted to have chronic acidosis (HCO3 baseline 19, pH 7.3 7/19/22) and tachypnea per notes.

## 2022-08-24 NOTE — ED ADULT NURSE NOTE - NSIMPLEMENTINTERV_GEN_ALL_ED
Implemented All Fall with Harm Risk Interventions:  [place] to call system. Call bell, personal items and telephone within reach. Instruct patient to call for assistance. Room bathroom lighting operational. Non-slip footwear when patient is off stretcher. Physically safe environment: no spills, clutter or unnecessary equipment. Stretcher in lowest position, wheels locked, appropriate side rails in place. Provide visual cue, wrist band, yellow gown, etc. Monitor gait and stability. Monitor for mental status changes and reorient to person, place, and time. Review medications for side effects contributing to fall risk. Reinforce activity limits and safety measures with patient and family. Provide visual clues: red socks.

## 2022-08-24 NOTE — ED ADULT TRIAGE NOTE - GLASGOW COMA SCALE: EYE OPENING, MLM
12/24/2020 Patient: Kari Olivas YOB: 1953 Date of Visit: 12/23/2020 Kendal Fields MD 
[address] [facility] [place] [phone] [id number] [address] [employer] Dear Kendal Fields MD, Thank you for referring Ms. Kari Olivas to [facility] for evaluation. My notes for this consultation are attached. If you have questions, please do not hesitate to call me. I look forward to following your patient along with you.  
 
 
Sincerely, 
 
Jessica Colón MD 
 

(E4) spontaneous

## 2022-08-24 NOTE — H&P ADULT - NSICDXPASTMEDICALHX_GEN_ALL_CORE_FT
PAST MEDICAL HISTORY:  BPH (benign prostatic hyperplasia)     CAD (coronary artery disease)     Chronic diarrhea     COPD, moderate     HLD (hyperlipidemia)     Stage 3 chronic kidney disease

## 2022-08-25 DIAGNOSIS — A04.72 ENTEROCOLITIS DUE TO CLOSTRIDIUM DIFFICILE, NOT SPECIFIED AS RECURRENT: ICD-10-CM

## 2022-08-25 LAB
ALBUMIN SERPL ELPH-MCNC: 3 G/DL — LOW (ref 3.3–5)
ALP SERPL-CCNC: 69 U/L — SIGNIFICANT CHANGE UP (ref 40–120)
ALT FLD-CCNC: 6 U/L — LOW (ref 10–45)
ANION GAP SERPL CALC-SCNC: 10 MMOL/L — SIGNIFICANT CHANGE UP (ref 5–17)
ANION GAP SERPL CALC-SCNC: 13 MMOL/L — SIGNIFICANT CHANGE UP (ref 5–17)
AST SERPL-CCNC: 12 U/L — SIGNIFICANT CHANGE UP (ref 10–40)
BASE EXCESS BLDA CALC-SCNC: -10.3 MMOL/L — LOW (ref -2–3)
BASE EXCESS BLDA CALC-SCNC: -11.1 MMOL/L — LOW (ref -2–3)
BILIRUB SERPL-MCNC: 0.2 MG/DL — SIGNIFICANT CHANGE UP (ref 0.2–1.2)
BUN SERPL-MCNC: 42 MG/DL — HIGH (ref 7–23)
BUN SERPL-MCNC: 44 MG/DL — HIGH (ref 7–23)
C DIFF GDH STL QL: SIGNIFICANT CHANGE UP
C DIFF GDH STL QL: SIGNIFICANT CHANGE UP
CALCIUM SERPL-MCNC: 8.4 MG/DL — SIGNIFICANT CHANGE UP (ref 8.4–10.5)
CALCIUM SERPL-MCNC: 8.4 MG/DL — SIGNIFICANT CHANGE UP (ref 8.4–10.5)
CHLORIDE SERPL-SCNC: 113 MMOL/L — HIGH (ref 96–108)
CHLORIDE SERPL-SCNC: 116 MMOL/L — HIGH (ref 96–108)
CO2 BLDA-SCNC: 15 MMOL/L — LOW (ref 19–24)
CO2 BLDA-SCNC: 16 MMOL/L — LOW (ref 19–24)
CO2 SERPL-SCNC: 16 MMOL/L — LOW (ref 22–31)
CO2 SERPL-SCNC: 18 MMOL/L — LOW (ref 22–31)
CREAT SERPL-MCNC: 1.71 MG/DL — HIGH (ref 0.5–1.3)
CREAT SERPL-MCNC: 1.8 MG/DL — HIGH (ref 0.5–1.3)
EGFR: 35 ML/MIN/1.73M2 — LOW
EGFR: 37 ML/MIN/1.73M2 — LOW
GAS PNL BLDA: SIGNIFICANT CHANGE UP
GAS PNL BLDA: SIGNIFICANT CHANGE UP
GI PCR PANEL: SIGNIFICANT CHANGE UP
GLUCOSE BLDC GLUCOMTR-MCNC: 115 MG/DL — HIGH (ref 70–99)
GLUCOSE BLDC GLUCOMTR-MCNC: 117 MG/DL — HIGH (ref 70–99)
GLUCOSE BLDC GLUCOMTR-MCNC: 146 MG/DL — HIGH (ref 70–99)
GLUCOSE BLDC GLUCOMTR-MCNC: 161 MG/DL — HIGH (ref 70–99)
GLUCOSE SERPL-MCNC: 111 MG/DL — HIGH (ref 70–99)
GLUCOSE SERPL-MCNC: 120 MG/DL — HIGH (ref 70–99)
HCO3 BLDA-SCNC: 14 MMOL/L — LOW (ref 21–28)
HCO3 BLDA-SCNC: 15 MMOL/L — LOW (ref 21–28)
HCT VFR BLD CALC: 27.1 % — LOW (ref 39–50)
HGB BLD-MCNC: 8.8 G/DL — LOW (ref 13–17)
MAGNESIUM SERPL-MCNC: 1.7 MG/DL — SIGNIFICANT CHANGE UP (ref 1.6–2.6)
MCHC RBC-ENTMCNC: 31.2 PG — SIGNIFICANT CHANGE UP (ref 27–34)
MCHC RBC-ENTMCNC: 32.5 GM/DL — SIGNIFICANT CHANGE UP (ref 32–36)
MCV RBC AUTO: 96.1 FL — SIGNIFICANT CHANGE UP (ref 80–100)
NRBC # BLD: 0 /100 WBCS — SIGNIFICANT CHANGE UP (ref 0–0)
PCO2 BLDA: 28 MMHG — LOW (ref 35–48)
PCO2 BLDA: 30 MMHG — LOW (ref 35–48)
PH BLDA: 7.3 — LOW (ref 7.35–7.45)
PH BLDA: 7.3 — LOW (ref 7.35–7.45)
PHOSPHATE SERPL-MCNC: 3.6 MG/DL — SIGNIFICANT CHANGE UP (ref 2.5–4.5)
PLATELET # BLD AUTO: 260 K/UL — SIGNIFICANT CHANGE UP (ref 150–400)
PO2 BLDA: 114 MMHG — HIGH (ref 83–108)
PO2 BLDA: 141 MMHG — HIGH (ref 83–108)
POTASSIUM SERPL-MCNC: 4.2 MMOL/L — SIGNIFICANT CHANGE UP (ref 3.5–5.3)
POTASSIUM SERPL-MCNC: 4.4 MMOL/L — SIGNIFICANT CHANGE UP (ref 3.5–5.3)
POTASSIUM SERPL-SCNC: 4.2 MMOL/L — SIGNIFICANT CHANGE UP (ref 3.5–5.3)
POTASSIUM SERPL-SCNC: 4.4 MMOL/L — SIGNIFICANT CHANGE UP (ref 3.5–5.3)
PROT SERPL-MCNC: 5.8 G/DL — LOW (ref 6–8.3)
RBC # BLD: 2.82 M/UL — LOW (ref 4.2–5.8)
RBC # FLD: 14 % — SIGNIFICANT CHANGE UP (ref 10.3–14.5)
SAO2 % BLDA: 100 % — HIGH (ref 94–98)
SAO2 % BLDA: 100 % — HIGH (ref 94–98)
SODIUM SERPL-SCNC: 142 MMOL/L — SIGNIFICANT CHANGE UP (ref 135–145)
SODIUM SERPL-SCNC: 144 MMOL/L — SIGNIFICANT CHANGE UP (ref 135–145)
WBC # BLD: 42.85 K/UL — CRITICAL HIGH (ref 3.8–10.5)
WBC # FLD AUTO: 42.85 K/UL — CRITICAL HIGH (ref 3.8–10.5)

## 2022-08-25 PROCEDURE — 99233 SBSQ HOSP IP/OBS HIGH 50: CPT | Mod: GC

## 2022-08-25 PROCEDURE — 71045 X-RAY EXAM CHEST 1 VIEW: CPT | Mod: 26

## 2022-08-25 PROCEDURE — 74018 RADEX ABDOMEN 1 VIEW: CPT | Mod: 26

## 2022-08-25 RX ORDER — VANCOMYCIN HCL 1 G
125 VIAL (EA) INTRAVENOUS EVERY 6 HOURS
Refills: 0 | Status: DISCONTINUED | OUTPATIENT
Start: 2022-08-25 | End: 2022-08-25

## 2022-08-25 RX ORDER — SODIUM CHLORIDE 9 MG/ML
1000 INJECTION, SOLUTION INTRAVENOUS
Refills: 0 | Status: DISCONTINUED | OUTPATIENT
Start: 2022-08-25 | End: 2022-08-25

## 2022-08-25 RX ORDER — SIMVASTATIN 20 MG/1
20 TABLET, FILM COATED ORAL AT BEDTIME
Refills: 0 | Status: DISCONTINUED | OUTPATIENT
Start: 2022-08-25 | End: 2022-08-25

## 2022-08-25 RX ORDER — PHENYLEPHRINE-SHARK LIVER OIL-MINERAL OIL-PETROLATUM RECTAL OINTMENT
1 OINTMENT (GRAM) RECTAL ONCE
Refills: 0 | Status: DISCONTINUED | OUTPATIENT
Start: 2022-08-25 | End: 2022-08-25

## 2022-08-25 RX ORDER — MAGNESIUM SULFATE 500 MG/ML
1 VIAL (ML) INJECTION ONCE
Refills: 0 | Status: COMPLETED | OUTPATIENT
Start: 2022-08-25 | End: 2022-08-25

## 2022-08-25 RX ORDER — CLOPIDOGREL BISULFATE 75 MG/1
75 TABLET, FILM COATED ORAL DAILY
Refills: 0 | Status: DISCONTINUED | OUTPATIENT
Start: 2022-08-25 | End: 2022-08-25

## 2022-08-25 RX ORDER — HEPARIN SODIUM 5000 [USP'U]/ML
5000 INJECTION INTRAVENOUS; SUBCUTANEOUS EVERY 8 HOURS
Refills: 0 | Status: DISCONTINUED | OUTPATIENT
Start: 2022-08-25 | End: 2022-08-31

## 2022-08-25 RX ORDER — VANCOMYCIN HCL 1 G
125 VIAL (EA) INTRAVENOUS EVERY 6 HOURS
Refills: 0 | Status: DISCONTINUED | OUTPATIENT
Start: 2022-08-25 | End: 2022-09-05

## 2022-08-25 RX ORDER — SODIUM CHLORIDE 9 MG/ML
1000 INJECTION, SOLUTION INTRAVENOUS
Refills: 0 | Status: DISCONTINUED | OUTPATIENT
Start: 2022-08-25 | End: 2022-08-26

## 2022-08-25 RX ORDER — SODIUM CHLORIDE 9 MG/ML
250 INJECTION, SOLUTION INTRAVENOUS
Refills: 0 | Status: COMPLETED | OUTPATIENT
Start: 2022-08-25 | End: 2022-08-25

## 2022-08-25 RX ORDER — ASPIRIN/CALCIUM CARB/MAGNESIUM 324 MG
81 TABLET ORAL DAILY
Refills: 0 | Status: DISCONTINUED | OUTPATIENT
Start: 2022-08-25 | End: 2022-08-25

## 2022-08-25 RX ORDER — TAMSULOSIN HYDROCHLORIDE 0.4 MG/1
0.4 CAPSULE ORAL AT BEDTIME
Refills: 0 | Status: DISCONTINUED | OUTPATIENT
Start: 2022-08-25 | End: 2022-08-25

## 2022-08-25 RX ORDER — SODIUM CHLORIDE 9 MG/ML
500 INJECTION, SOLUTION INTRAVENOUS
Refills: 0 | Status: COMPLETED | OUTPATIENT
Start: 2022-08-25 | End: 2022-08-25

## 2022-08-25 RX ORDER — ALBUTEROL 90 UG/1
2 AEROSOL, METERED ORAL EVERY 6 HOURS
Refills: 0 | Status: DISCONTINUED | OUTPATIENT
Start: 2022-08-25 | End: 2022-09-05

## 2022-08-25 RX ORDER — BUDESONIDE AND FORMOTEROL FUMARATE DIHYDRATE 160; 4.5 UG/1; UG/1
2 AEROSOL RESPIRATORY (INHALATION)
Refills: 0 | Status: DISCONTINUED | OUTPATIENT
Start: 2022-08-25 | End: 2022-09-05

## 2022-08-25 RX ORDER — ACETAMINOPHEN 500 MG
1000 TABLET ORAL ONCE
Refills: 0 | Status: COMPLETED | OUTPATIENT
Start: 2022-08-25 | End: 2022-08-25

## 2022-08-25 RX ORDER — LIDOCAINE 4 G/100G
1 CREAM TOPICAL ONCE
Refills: 0 | Status: COMPLETED | OUTPATIENT
Start: 2022-08-25 | End: 2022-08-25

## 2022-08-25 RX ADMIN — HEPARIN SODIUM 5000 UNIT(S): 5000 INJECTION INTRAVENOUS; SUBCUTANEOUS at 05:50

## 2022-08-25 RX ADMIN — SODIUM CHLORIDE 125 MILLILITER(S): 9 INJECTION, SOLUTION INTRAVENOUS at 21:06

## 2022-08-25 RX ADMIN — SODIUM CHLORIDE 75 MILLILITER(S): 9 INJECTION, SOLUTION INTRAVENOUS at 06:04

## 2022-08-25 RX ADMIN — Medication 100 GRAM(S): at 13:03

## 2022-08-25 RX ADMIN — Medication 100 MILLIGRAM(S): at 04:40

## 2022-08-25 RX ADMIN — Medication 400 MILLIGRAM(S): at 13:49

## 2022-08-25 RX ADMIN — SODIUM CHLORIDE 75 MILLILITER(S): 9 INJECTION, SOLUTION INTRAVENOUS at 11:44

## 2022-08-25 RX ADMIN — SODIUM CHLORIDE 250 MILLILITER(S): 9 INJECTION, SOLUTION INTRAVENOUS at 10:45

## 2022-08-25 RX ADMIN — Medication 1000 MILLIGRAM(S): at 14:32

## 2022-08-25 RX ADMIN — Medication 125 MILLIGRAM(S): at 10:44

## 2022-08-25 RX ADMIN — HEPARIN SODIUM 5000 UNIT(S): 5000 INJECTION INTRAVENOUS; SUBCUTANEOUS at 22:03

## 2022-08-25 RX ADMIN — Medication 125 MILLIGRAM(S): at 18:42

## 2022-08-25 RX ADMIN — SODIUM CHLORIDE 500 MILLILITER(S): 9 INJECTION, SOLUTION INTRAVENOUS at 16:35

## 2022-08-25 RX ADMIN — SODIUM CHLORIDE 125 MILLILITER(S): 9 INJECTION, SOLUTION INTRAVENOUS at 17:24

## 2022-08-25 RX ADMIN — BUDESONIDE AND FORMOTEROL FUMARATE DIHYDRATE 2 PUFF(S): 160; 4.5 AEROSOL RESPIRATORY (INHALATION) at 19:30

## 2022-08-25 RX ADMIN — HEPARIN SODIUM 5000 UNIT(S): 5000 INJECTION INTRAVENOUS; SUBCUTANEOUS at 13:02

## 2022-08-25 RX ADMIN — LIDOCAINE 1 APPLICATION(S): 4 CREAM TOPICAL at 16:31

## 2022-08-25 NOTE — PROGRESS NOTE ADULT - SUBJECTIVE AND OBJECTIVE BOX
INTERVAL EVENTS: started tx for cdiff. normal bicarb solution started    SUBJECTIVE / INTERVAL HPI: Patient seen and examined at bedside. Knows he's in the hospital, reports he called 911 from home because he "is dying." Inconsistent historian.    ROS: negative unless otherwise stated above.    VITAL SIGNS:  Vital Signs Last 24 Hrs  T(C): 37.2 (25 Aug 2022 09:24), Max: 39.3 (24 Aug 2022 15:41)  T(F): 98.9 (25 Aug 2022 09:24), Max: 102.7 (24 Aug 2022 15:41)  HR: 68 (25 Aug 2022 09:15) (62 - 80)  BP: 139/65 (25 Aug 2022 08:40) (97/59 - 159/61)  BP(mean): 93 (25 Aug 2022 08:40) (93 - 93)  RR: 17 (25 Aug 2022 08:40) (17 - 26)  SpO2: 99% (25 Aug 2022 09:15) (97% - 100%)    Parameters below as of 25 Aug 2022 09:15  Patient On (Oxygen Delivery Method): nasal cannula w/ humidification  O2 Flow (L/min): 6        08-24-22 @ 07:01  -  08-25-22 @ 07:00  --------------------------------------------------------  IN: 0 mL / OUT: 300 mL / NET: -300 mL        PHYSICAL EXAM:    General: NAD, laying flat  HEENT: dry MM, glasses, no scleral icterus  Neck: supple, no JVD  Cardiovascular: +S1/S2; RRR  Respiratory: CTA B/L anteriorly; no W/R/R; mild accessory muscle use  Gastrointestinal: soft, ND, NT  Extremities: WWP; no edema, clubbing or cyanosis  Vascular: 2+ radial, DP pulses B/L  Neurological: AAOx2 (person, year); inconsistent and limited historian, follows some commands    MEDICATIONS:  MEDICATIONS  (STANDING):  aspirin  chewable 81 milliGRAM(s) Oral daily  budesonide  80 MICROgram(s)/formoterol 4.5 MICROgram(s) Inhaler 2 Puff(s) Inhalation two times a day  clopidogrel Tablet 75 milliGRAM(s) Oral daily  dextrose 5% 1000 milliLiter(s) (75 mL/Hr) IV Continuous <Continuous>  dextrose 5%. 1000 milliLiter(s) (50 mL/Hr) IV Continuous <Continuous>  dextrose 5%. 1000 milliLiter(s) (100 mL/Hr) IV Continuous <Continuous>  dextrose 50% Injectable 25 Gram(s) IV Push once  dextrose 50% Injectable 12.5 Gram(s) IV Push once  dextrose 50% Injectable 25 Gram(s) IV Push once  glucagon  Injectable 1 milliGRAM(s) IntraMuscular once  heparin   Injectable 5000 Unit(s) SubCutaneous every 8 hours  insulin lispro (ADMELOG) corrective regimen sliding scale   SubCutaneous three times a day before meals  insulin lispro (ADMELOG) corrective regimen sliding scale   SubCutaneous at bedtime  magnesium sulfate  IVPB 1 Gram(s) IV Intermittent once  simvastatin 20 milliGRAM(s) Oral at bedtime  tamsulosin 0.4 milliGRAM(s) Oral at bedtime  vancomycin    Solution 125 milliGRAM(s) Oral every 6 hours    MEDICATIONS  (PRN):  ALBUTerol    90 MICROgram(s) HFA Inhaler 2 Puff(s) Inhalation every 6 hours PRN Shortness of Breath and/or Wheezing  dextrose Oral Gel 15 Gram(s) Oral once PRN Blood Glucose LESS THAN 70 milliGRAM(s)/deciliter      ALLERGIES:  Allergies    No Known Allergies    Intolerances        LABS:                        8.8    42.85 )-----------( 260      ( 25 Aug 2022 06:38 )             27.1     08-25    142  |  116<H>  |  44<H>  ----------------------------<  120<H>  4.4   |  16<L>  |  1.80<H>    Ca    8.4      25 Aug 2022 06:38  Phos  3.6     08-25  Mg     1.7     08-25    TPro  5.8<L>  /  Alb  3.0<L>  /  TBili  0.2  /  DBili  x   /  AST  12  /  ALT  6<L>  /  AlkPhos  69  08-25    PT/INR - ( 24 Aug 2022 16:15 )   PT: 12.8 sec;   INR: 1.07          PTT - ( 24 Aug 2022 16:15 )  PTT:40.9 sec  Urinalysis Basic - ( 24 Aug 2022 18:55 )    Color: Yellow / Appearance: Clear / SG: <=1.005 / pH: x  Gluc: x / Ketone: NEGATIVE  / Bili: Negative / Urobili: 0.2 E.U./dL   Blood: x / Protein: NEGATIVE mg/dL / Nitrite: NEGATIVE   Leuk Esterase: NEGATIVE / RBC: x / WBC x   Sq Epi: x / Non Sq Epi: x / Bacteria: x      CAPILLARY BLOOD GLUCOSE      POCT Blood Glucose.: 115 mg/dL (25 Aug 2022 06:49)      RADIOLOGY & ADDITIONAL TESTS: Reviewed.

## 2022-08-25 NOTE — PROGRESS NOTE ADULT - ASSESSMENT
89 year old male (poor historian) with PMH HTN, CAD s/p PCI w 3 DAYANA, CKD (Cr 1.7- 2.4), COPD (not on home O2), arthritis, aortic aneurysm, BPH, HLD, EF 45-50%, lung nodules, and chronic constipation and Psx of ex lap and right radical extended hemicolectomy with ileocolonic anastomosis (Dr. Headley 4/2017) and presumed b/i inguinal hernia repairs, presents to the ED on 8/24 with c/o one week of chest pain and difficulty breathing. On admission, pt febrile to 102.4 rectal, nontachycardic, hypertensive (159/61) with WBC 30.12 and lactate of 2.3. CTAP with IV contrast demonstrates a distended acalculous gallbladder with no acute inflammation. Surgery consulted for r/o acalculous cholecystitis. Pt lethargic and unable to provide reliable subjective history, but abdomen soft, nondistended, mild diffusely TTP without rebound or guarding. LFTs wnl. Low suspicion for cholecystitis at this time given lack of inflammation on scan. Distension may be due to poor PO intake. Hb increased from previous admission suggests dehydration and imaging suggests a component of ischemic colitis. Pt now s/p 3.2L NS with normalization of lactate. Pt now C Diff positive w/ liquidy BMs. As such, concomitant biliary pathology is unlikely however RUQ US was abandoned due to pt agitation. Repeat US is currently pending.    Fluid resuscitation as needed  Serial abdominal exams  Will f/u RUQ US for better gallbladder characterization  Surgery Team 4C will continue to follow. Please page Team 4 with questions/clinical changes. [phone]

## 2022-08-25 NOTE — SWALLOW BEDSIDE ASSESSMENT ADULT - CONSISTENCIES ADMINISTERED
Thin liquids via tsp x2, Thin liquids via cup sips x3, puree via tsp x1. Additional trials not administered due to c/f aspiration.

## 2022-08-25 NOTE — PROGRESS NOTE ADULT - ASSESSMENT
89 y/o M w/ PMH CAD, CKD3, COPD, chronic diarrhea who presented for diarrhea, tachypnea. Admitted to medicine telemetry for sepsis 2/2 C difficile infection.

## 2022-08-25 NOTE — SWALLOW BEDSIDE ASSESSMENT ADULT - SLP GENERAL OBSERVATIONS
Pt. was received asleep, brief periods of arousal and open eyes with verbal and tactile stimuli. Minimally conversant. Does not follow commands.

## 2022-08-25 NOTE — PROGRESS NOTE ADULT - ASSESSMENT
ASSESSMENT/PLAN89 y/o M w/ PMH CAD, CKD3, COPD, colonic mass, lung nodules, chronic diarrhea and tachypnea who presented for diarrhea, tachypnea, and chest pain who was admitted for AMS secondary to severe sepsis. C Difficile colitis, L Lung nodules     1. O2 2LNC at this time  2. Bronchodilators:  Atrovent/ albuterol q 4 – 6 hours as needed  3. Corticosteroids: off   4. ID/Antibiotics: now on Vancomycin   5. Cardiac/HTN: optimize BP   6. GI: Rx/ prophylaxis c PPI/H2B  7. Heme: Rx/VT prophylaxis c SQH/SCD/AC  8. Aspiration precautions  Discussed with managing team

## 2022-08-25 NOTE — PROGRESS NOTE ADULT - PROBLEM SELECTOR PLAN 4
Likely secondary to sepsis (as above). Exam limited but does not appear focal and CTH negative.  AAOx2 (person, year). Limited historian.    - treatment as above

## 2022-08-25 NOTE — SWALLOW BEDSIDE ASSESSMENT ADULT - SLP PERTINENT HISTORY OF CURRENT PROBLEM
89 y/o M w/ PMH CAD, CKD3, COPD, chronic diarrhea who presented for diarrhea, tachypnea. Admitted to medicine telemetry for sepsis 2/2 C difficile infection.

## 2022-08-25 NOTE — PROGRESS NOTE ADULT - PROBLEM SELECTOR PLAN 1
Met 3/4 SIRS criteria (T102.4, WBC 30.12, RR 36) with lactate 2.3 -> 3.2 -> 1.9. Likely secondary to C difficile.  C diff positive  - isolation  - c/w oral vancomycin 125mg q6h  - f/u blood cultures  - f/u GI PCR   - monitor stools, will consider rectal tube if with many BM's

## 2022-08-25 NOTE — PROGRESS NOTE ADULT - SUBJECTIVE AND OBJECTIVE BOX
Interventional, Pulmonary, Critical, Chest Special Procedures. Initial for [email or web address]    Pt was seen and fully examined by myself.     Time spent with patient in minutes:137    Patient is a 89y old  Male who presents with a chief complaint of altered mental status secondary to sepsis (25 Aug 2022 09:, Events leading to this admission reviewed. 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 Patient is altered on exam, but per ED report, he has been having chronic diarrhea, tachypnea, and chest pain that was worsening, prompting patient to call EMS to bring him from home into [facility]. Of note, patient was recently admitted 7/19-7/23 to [facility] for diarrhea after being sent in from his PCP office for hypotension and chronic diarrhea. During that time his diarrhea resolved and he was told to f/u with outpatient GI with Dr. Hammer. Notably he was fully conversant with mild memory impairment on admission and throughout hospital stay.    ED vitals: T 39.3   HR 78  RR 36 (taken manually)  BP 97/59  SpO2 98% on 2L  Labs significant: WBC 30, Hgb 11.5, lactate 2.3 -> 3.2 after 2L NS, UA wnl  Imaging/EKG: CT H/C/A/P known lung nodule, colitis  Interventions: zosyn, zofran, 3.2L NS, flagyl, acetaminophen     (24 Aug 2022 21:48)      REVIEW OF SYSTEMS:  Constitutional: No fever, weight loss, chills + fatigue  Eyes: No eye pain, visual disturbances, or discharge  ENMT:  + difficulty hearing, tinnitus, vertigo; No sinus or throat pain. No epistaxis, +dysphagia, dysphonia, hoarseness no odynophagia  Neck: No pain, stiffness or neck swelling.  No masses or deformities  Respiratory: + cough, wheezing, hemoptysis  + COPD  - ILD   - PE   - ASTHMA     - PNEUMONIA  Cardiovascular: No chest pain, dysrhythmia, palpitations, dizziness or edema + CAD   - CHF   - HTN  Gastrointestinal: No abdominal or epigastric pain. No nausea, vomiting or hematemesis; No diarrhea or constipation. No melena or hematochezia, Icterus.          Genitourinary: No dysuria, frequency, hematuria or incontinence   - CKD/DEBBIE      - ESRD  Neurological: No headaches, +memory loss, loss of strength, numbness or tremors      +DEMENTIA     - STROKE    - SEIZURE  Skin: No itching, burning, rashes or lesions   Lymph Nodes: No enlarged glands  Endocrine: No heat or cold intolerance; No hair loss       + DM     - THYROID DISORDER  Musculoskeletal: No joint pain or swelling; No muscle, back or extremity pain, No edema  Psychiatric: No depression, anxiety, mood swings or difficulty sleeping  Heme/Lymph: No easy bruising or bleeding gums         - ANEMIA      - CANCER   -COAGULOPATHY  Allergy and Immunologic: No hives or eczema    PAST MEDICAL & SURGICAL HISTORY:  HLD (hyperlipidemia)      CAD (coronary artery disease)      BPH (benign prostatic hyperplasia)      COPD, moderate      Chronic diarrhea      Stage 3 chronic kidney disease      S/P cataract extraction      H/O right hemicolectomy        FAMILY HISTORY:  No family history of cardiovascular disease (Father, Mother)      SOCIAL HISTORY:      - Tobacco     - ETOH    Allergies    No Known Allergies    Intolerances      Vital Signs Last 24 Hrs  T(C): 37.2 (25 Aug 2022 09:24), Max: 39.3 (24 Aug 2022 15:41)  T(F): 98.9 (25 Aug 2022 09:24), Max: 102.7 (24 Aug 2022 15:41)  HR: 68 (25 Aug 2022 09:15) (62 - 80)  BP: 139/65 (25 Aug 2022 08:40) (97/59 - 159/61)  BP(mean): 93 (25 Aug 2022 08:40) (93 - 93)  RR: 17 (25 Aug 2022 08:40) (17 - 26)  SpO2: 99% (25 Aug 2022 09:15) (97% - 100%)    Parameters below as of 25 Aug 2022 09:15  Patient On (Oxygen Delivery Method): nasal cannula w/ humidification  O2 Flow (L/min): 6 08-24 @ 07:01  -  08-25 @ 07:00  --------------------------------------------------------  IN: 0 mL / OUT: 300 mL / NET: -300 mL          MEDICATIONS:  MEDICATIONS  (STANDING):  aspirin  chewable 81 milliGRAM(s) Oral daily  budesonide  80 MICROgram(s)/formoterol 4.5 MICROgram(s) Inhaler 2 Puff(s) Inhalation two times a day  clopidogrel Tablet 75 milliGRAM(s) Oral daily  dextrose 5% 1000 milliLiter(s) (75 mL/Hr) IV Continuous <Continuous>  dextrose 5%. 1000 milliLiter(s) (50 mL/Hr) IV Continuous <Continuous>  dextrose 5%. 1000 milliLiter(s) (100 mL/Hr) IV Continuous <Continuous>  dextrose 50% Injectable 25 Gram(s) IV Push once  dextrose 50% Injectable 12.5 Gram(s) IV Push once  dextrose 50% Injectable 25 Gram(s) IV Push once  glucagon  Injectable 1 milliGRAM(s) IntraMuscular once  heparin   Injectable 5000 Unit(s) SubCutaneous every 8 hours  insulin lispro (ADMELOG) corrective regimen sliding scale   SubCutaneous three times a day before meals  insulin lispro (ADMELOG) corrective regimen sliding scale   SubCutaneous at bedtime  magnesium sulfate  IVPB 1 Gram(s) IV Intermittent once  simvastatin 20 milliGRAM(s) Oral at bedtime  tamsulosin 0.4 milliGRAM(s) Oral at bedtime  vancomycin    Solution 125 milliGRAM(s) Oral every 6 hours    MEDICATIONS  (PRN):  ALBUTerol    90 MICROgram(s) HFA Inhaler 2 Puff(s) Inhalation every 6 hours PRN Shortness of Breath and/or Wheezing  dextrose Oral Gel 15 Gram(s) Oral once PRN Blood Glucose LESS THAN 70 milliGRAM(s)/deciliter      PHYSICAL EXAM:  Un Comfortable, no immediate distress  Eyes: PERRL, EOM intact; conjunctiva and sclera clear  Head: Normocephalic;  No Trauma  ENMT: No nasal discharge, hoarseness, +cough no  hemoptysis  Neck: Supple; non tender; no masses or deformities.    No JVD  Respiratory: CTA,  - WHEEZING   - RHONCHI  - RALES  + CRACKLES.  Diminished breath sounds  BILATERAL  RIGHT  LEFT bases   Cardiovascular: Regular rate and rhythm. S1 and S2 Normal; No murmurs, gallops or rubs     - PPM/AICD  Gastrointestinal: Soft mildly tender, non-distended; Normal bowel sounds; No hepatosplenomegaly.     -PEG    -  GT   + RUBIO  Genitourinary: No costovertebral angle tenderness. No dysuria  Extremities: AROM, No clubbing, cyanosis or edema    Vascular: Peripheral pulses palpable 2+ bilaterally  Neurological: Awake, moving extremities   Skin: Warm and dry. No obvious rash  Lymph Nodes: No acute cervical or supraclavicular adenopathy  Psychiatric: Not engaging     DEVICES:  - DENTURES   +IV R / L     - ETUBE   -TRACH   -CTUBE  R / L    LABS:  ABG - ( 25 Aug 2022 06:58 )  pH, Arterial: 7.30  pH, Blood: x     /  pCO2: 30    /  pO2: 141   / HCO3: 15    / Base Excess: -10.3 /  SaO2: 100.0                                   8.8    42.85 )-----------( 260      ( 25 Aug 2022 06:38 )             27.1     08-25    142  |  116<H>  |  44<H>  ----------------------------<  120<H>  4.4   |  16<L>  |  1.80<H>    Ca    8.4      25 Aug 2022 06:38  Phos  3.6     08-25  Mg     1.7     08-25    TPro  5.8<L>  /  Alb  3.0<L>  /  TBili  0.2  /  DBili  x   /  AST  12  /  ALT  6<L>  /  AlkPhos  69  08-25    PT/INR - ( 24 Aug 2022 16:15 )   PT: 12.8 sec;   INR: 1.07          PTT - ( 24 Aug 2022 16:15 )  PTT:40.9 sec  RADIOLOGY & ADDITIONAL STUDIES (The following images were personally reviewed):

## 2022-08-25 NOTE — PATIENT PROFILE ADULT - FALL HARM RISK - HARM RISK INTERVENTIONS
Assistance with ambulation/Assistance OOB with selected safe patient handling equipment/Communicate Risk of Fall with Harm to all staff/Discuss with provider need for PT consult/Monitor for mental status changes/Monitor gait and stability/Move patient closer to nurses' station/Provide patient with walking aids - walker, cane, crutches/Reinforce activity limits and safety measures with patient and family/Reorient to person, place and time as needed/Tailored Fall Risk Interventions/Toileting schedule using arm’s reach rule for commode and bathroom/Use of alarms - bed, chair and/or voice tab/Visual Cue: Yellow wristband and red socks/Bed in lowest position, wheels locked, appropriate side rails in place/Call bell, personal items and telephone in reach/Instruct patient to call for assistance before getting out of bed or chair/Non-slip footwear when patient is out of bed/[place] to call system/Physically safe environment - no spills, clutter or unnecessary equipment/Purposeful Proactive Rounding/Room/bathroom lighting operational, light cord in reach

## 2022-08-25 NOTE — PROGRESS NOTE ADULT - SUBJECTIVE AND OBJECTIVE BOX
SUBJECTIVE: C Diff +. Afebrile w/ VS wnl. Complaining of diffuse abd pain. Denies n/v. Endorses f/watery BMs.      MEDICATIONS  (STANDING):  dextrose 5% 1000 milliLiter(s) (75 mL/Hr) IV Continuous <Continuous>  dextrose 5%. 1000 milliLiter(s) (50 mL/Hr) IV Continuous <Continuous>  dextrose 5%. 1000 milliLiter(s) (100 mL/Hr) IV Continuous <Continuous>  dextrose 50% Injectable 25 Gram(s) IV Push once  dextrose 50% Injectable 12.5 Gram(s) IV Push once  dextrose 50% Injectable 25 Gram(s) IV Push once  glucagon  Injectable 1 milliGRAM(s) IntraMuscular once  heparin   Injectable 5000 Unit(s) SubCutaneous every 8 hours  insulin lispro (ADMELOG) corrective regimen sliding scale   SubCutaneous three times a day before meals  insulin lispro (ADMELOG) corrective regimen sliding scale   SubCutaneous at bedtime  vancomycin    Solution 125 milliGRAM(s) Oral every 6 hours    MEDICATIONS  (PRN):  dextrose Oral Gel 15 Gram(s) Oral once PRN Blood Glucose LESS THAN 70 milliGRAM(s)/deciliter      Vital Signs Last 24 Hrs  T(C): 36.8 (25 Aug 2022 07:41), Max: 39.3 (24 Aug 2022 15:41)  T(F): 98.2 (25 Aug 2022 07:41), Max: 102.7 (24 Aug 2022 15:41)  HR: 70 (25 Aug 2022 07:41) (62 - 80)  BP: 125/58 (25 Aug 2022 07:41) (97/59 - 159/61)  BP(mean): --  RR: 18 (25 Aug 2022 07:41) (18 - 26)  SpO2: 100% (25 Aug 2022 07:41) (97% - 100%)    Parameters below as of 25 Aug 2022 07:41  Patient On (Oxygen Delivery Method): high flow  O2 Flow (L/min): 40      Physical Exam:  General: NAD, resting comfortably in bed  Pulmonary: Nonlabored breathing, no respiratory distress  Cardiovascular: NSR  Abdominal: soft, ND, diffuse TTP  Extremities: WWP, normal strength  Neuro: A/O x 3, CNs II-XII grossly intact, no focal deficits    I&O's Summary    24 Aug 2022 07:01  -  25 Aug 2022 07:00  --------------------------------------------------------  IN: 0 mL / OUT: 300 mL / NET: -300 mL        LABS:                        8.8    42.85 )-----------( 260      ( 25 Aug 2022 06:38 )             27.1     08-25    142  |  116<H>  |  44<H>  ----------------------------<  120<H>  4.4   |  16<L>  |  1.80<H>    Ca    8.4      25 Aug 2022 06:38  Phos  3.6     08-25  Mg     1.7     08-25    TPro  5.8<L>  /  Alb  3.0<L>  /  TBili  0.2  /  DBili  x   /  AST  12  /  ALT  6<L>  /  AlkPhos  69  08-25    PT/INR - ( 24 Aug 2022 16:15 )   PT: 12.8 sec;   INR: 1.07          PTT - ( 24 Aug 2022 16:15 )  PTT:40.9 sec  Urinalysis Basic - ( 24 Aug 2022 18:55 )    Color: Yellow / Appearance: Clear / SG: <=1.005 / pH: x  Gluc: x / Ketone: NEGATIVE  / Bili: Negative / Urobili: 0.2 E.U./dL   Blood: x / Protein: NEGATIVE mg/dL / Nitrite: NEGATIVE   Leuk Esterase: NEGATIVE / RBC: x / WBC x   Sq Epi: x / Non Sq Epi: x / Bacteria: x      CAPILLARY BLOOD GLUCOSE      POCT Blood Glucose.: 115 mg/dL (25 Aug 2022 06:49)  POCT Blood Glucose.: 107 mg/dL (24 Aug 2022 23:41)  POCT Blood Glucose.: 142 mg/dL (24 Aug 2022 15:28)    LIVER FUNCTIONS - ( 25 Aug 2022 06:38 )  Alb: 3.0 g/dL / Pro: 5.8 g/dL / ALK PHOS: 69 U/L / ALT: 6 U/L / AST: 12 U/L / GGT: x             RADIOLOGY & ADDITIONAL STUDIES:

## 2022-08-25 NOTE — PROGRESS NOTE ADULT - PROBLEM SELECTOR PLAN 3
Pt admitted with bicarb of 18 (about at baseline per chart review) and tachypneic which is chronica as per chart review. pH on ABG 7.3  Anion gap normal   Pt is likely metabolic acidosis 2/2 diarrhea and CKD    - Treat diarrhea and rehydrate as above  - Continue to monitor  - repeat BMP to monitor bicarb, goal of 20  - c/w normal bicarb fluids for 48hrs and may need to be on bicarb at home as well

## 2022-08-25 NOTE — PROGRESS NOTE ADULT - PROBLEM SELECTOR PLAN 11
Fluids: 250cc LR x1  Electrolytes: replenish as appropriate  Diet: speech and swallow eval  DVT Prophylaxis: heparin subq 5k TID given CKD  GI Prophylaxis: not indicated  Code Status: DNR/DNI as per recent [facility] last admission  Dispo: [facility]lach

## 2022-08-25 NOTE — PROGRESS NOTE ADULT - PROBLEM SELECTOR PLAN 5
On home aspirin, plavix, simvistatin, also imdur 30. Stents reportedly placed in 2013. Unclear why patient is still on anti-platelet.  - hold imdur 30mg qd  - start home aspirin 81mg qd  - start home plavix 75mg qd  - start home simvistatin 20mg qd    #HLD  - simvistatin 20mg qd

## 2022-08-25 NOTE — SWALLOW BEDSIDE ASSESSMENT ADULT - SWALLOW EVAL: DIAGNOSIS
Pt. was seen for a swallow evaluation. Pt. presents with clinical signs of oropharyngeal dysphagia characterized by anterior escape, reduced bolus preparation and transport, and clinical signs of airway protection deficits in setting of AMS. PO is premature. Poor mental status appears to be the primary barrier to candidacy for PO diet as poor mental status increases aspiration risk. SLP will follow up to reassess readiness for PO diet within 24 hours.

## 2022-08-25 NOTE — PROGRESS NOTE ADULT - PROBLEM SELECTOR PLAN 7
Known history of chronic diarrhea after a R. hemicolectomy in 2017. Follows with Dr Hammer outpatient    - abx as above  - Dr Hammer to see patient

## 2022-08-26 LAB
ALBUMIN SERPL ELPH-MCNC: 2.7 G/DL — LOW (ref 3.3–5)
ALP SERPL-CCNC: 74 U/L — SIGNIFICANT CHANGE UP (ref 40–120)
ALT FLD-CCNC: 7 U/L — LOW (ref 10–45)
ANION GAP SERPL CALC-SCNC: 7 MMOL/L — SIGNIFICANT CHANGE UP (ref 5–17)
ANION GAP SERPL CALC-SCNC: 9 MMOL/L — SIGNIFICANT CHANGE UP (ref 5–17)
ANISOCYTOSIS BLD QL: SIGNIFICANT CHANGE UP
AST SERPL-CCNC: 15 U/L — SIGNIFICANT CHANGE UP (ref 10–40)
BASOPHILS # BLD AUTO: 0 K/UL — SIGNIFICANT CHANGE UP (ref 0–0.2)
BASOPHILS NFR BLD AUTO: 0 % — SIGNIFICANT CHANGE UP (ref 0–2)
BILIRUB SERPL-MCNC: 0.2 MG/DL — SIGNIFICANT CHANGE UP (ref 0.2–1.2)
BLD GP AB SCN SERPL QL: NEGATIVE — SIGNIFICANT CHANGE UP
BUN SERPL-MCNC: 25 MG/DL — HIGH (ref 7–23)
BUN SERPL-MCNC: 34 MG/DL — HIGH (ref 7–23)
BUN SERPL-MCNC: SIGNIFICANT CHANGE UP MG/DL (ref 7–23)
BURR CELLS BLD QL SMEAR: PRESENT — SIGNIFICANT CHANGE UP
CALCIUM SERPL-MCNC: 7.8 MG/DL — LOW (ref 8.4–10.5)
CALCIUM SERPL-MCNC: 8.3 MG/DL — LOW (ref 8.4–10.5)
CALCIUM SERPL-MCNC: SIGNIFICANT CHANGE UP MG/DL (ref 8.4–10.5)
CHLORIDE SERPL-SCNC: 108 MMOL/L — SIGNIFICANT CHANGE UP (ref 96–108)
CHLORIDE SERPL-SCNC: 112 MMOL/L — HIGH (ref 96–108)
CHLORIDE SERPL-SCNC: SIGNIFICANT CHANGE UP MMOL/L (ref 96–108)
CO2 SERPL-SCNC: 23 MMOL/L — SIGNIFICANT CHANGE UP (ref 22–31)
CO2 SERPL-SCNC: 23 MMOL/L — SIGNIFICANT CHANGE UP (ref 22–31)
CO2 SERPL-SCNC: 27 MMOL/L — SIGNIFICANT CHANGE UP (ref 22–31)
CREAT SERPL-MCNC: 1.15 MG/DL — SIGNIFICANT CHANGE UP (ref 0.5–1.3)
CREAT SERPL-MCNC: 1.23 MG/DL — SIGNIFICANT CHANGE UP (ref 0.5–1.3)
CREAT SERPL-MCNC: 1.43 MG/DL — HIGH (ref 0.5–1.3)
CULTURE RESULTS: SIGNIFICANT CHANGE UP
DACRYOCYTES BLD QL SMEAR: SLIGHT — SIGNIFICANT CHANGE UP
EGFR: 46 ML/MIN/1.73M2 — LOW
EGFR: 55 ML/MIN/1.73M2 — LOW
EGFR: 60 ML/MIN/1.73M2 — SIGNIFICANT CHANGE UP
EOSINOPHIL # BLD AUTO: 0.38 K/UL — SIGNIFICANT CHANGE UP (ref 0–0.5)
EOSINOPHIL NFR BLD AUTO: 0.9 % — SIGNIFICANT CHANGE UP (ref 0–6)
GIANT PLATELETS BLD QL SMEAR: PRESENT — SIGNIFICANT CHANGE UP
GLUCOSE BLDC GLUCOMTR-MCNC: 115 MG/DL — HIGH (ref 70–99)
GLUCOSE BLDC GLUCOMTR-MCNC: 131 MG/DL — HIGH (ref 70–99)
GLUCOSE BLDC GLUCOMTR-MCNC: 132 MG/DL — HIGH (ref 70–99)
GLUCOSE BLDC GLUCOMTR-MCNC: 135 MG/DL — HIGH (ref 70–99)
GLUCOSE BLDC GLUCOMTR-MCNC: 99 MG/DL — SIGNIFICANT CHANGE UP (ref 70–99)
GLUCOSE SERPL-MCNC: 114 MG/DL — HIGH (ref 70–99)
GLUCOSE SERPL-MCNC: 147 MG/DL — HIGH (ref 70–99)
GLUCOSE SERPL-MCNC: SIGNIFICANT CHANGE UP MG/DL (ref 70–99)
HCT VFR BLD CALC: 27.1 % — LOW (ref 39–50)
HGB BLD-MCNC: 8.9 G/DL — LOW (ref 13–17)
LYMPHOCYTES # BLD AUTO: 1.09 K/UL — SIGNIFICANT CHANGE UP (ref 1–3.3)
LYMPHOCYTES # BLD AUTO: 2.6 % — LOW (ref 13–44)
MACROCYTES BLD QL: SIGNIFICANT CHANGE UP
MAGNESIUM SERPL-MCNC: 2.1 MG/DL — SIGNIFICANT CHANGE UP (ref 1.6–2.6)
MANUAL SMEAR VERIFICATION: SIGNIFICANT CHANGE UP
MCHC RBC-ENTMCNC: 30.8 PG — SIGNIFICANT CHANGE UP (ref 27–34)
MCHC RBC-ENTMCNC: 32.8 GM/DL — SIGNIFICANT CHANGE UP (ref 32–36)
MCV RBC AUTO: 93.8 FL — SIGNIFICANT CHANGE UP (ref 80–100)
METAMYELOCYTES # FLD: 0.9 % — HIGH (ref 0–0)
MONOCYTES # BLD AUTO: 1.81 K/UL — HIGH (ref 0–0.9)
MONOCYTES NFR BLD AUTO: 4.3 % — SIGNIFICANT CHANGE UP (ref 2–14)
NEUTROPHILS # BLD AUTO: 38.41 K/UL — HIGH (ref 1.8–7.4)
NEUTROPHILS NFR BLD AUTO: 86.1 % — HIGH (ref 43–77)
NEUTS BAND # BLD: 5.2 % — SIGNIFICANT CHANGE UP (ref 0–8)
OVALOCYTES BLD QL SMEAR: SLIGHT — SIGNIFICANT CHANGE UP
PHOSPHATE SERPL-MCNC: 1.8 MG/DL — LOW (ref 2.5–4.5)
PHOSPHATE SERPL-MCNC: 2.8 MG/DL — SIGNIFICANT CHANGE UP (ref 2.5–4.5)
PHOSPHATE SERPL-MCNC: 3.4 MG/DL — SIGNIFICANT CHANGE UP (ref 2.5–4.5)
PLAT MORPH BLD: ABNORMAL
PLATELET # BLD AUTO: 255 K/UL — SIGNIFICANT CHANGE UP (ref 150–400)
POIKILOCYTOSIS BLD QL AUTO: SLIGHT — SIGNIFICANT CHANGE UP
POLYCHROMASIA BLD QL SMEAR: SLIGHT — SIGNIFICANT CHANGE UP
POTASSIUM SERPL-MCNC: 3.6 MMOL/L — SIGNIFICANT CHANGE UP (ref 3.5–5.3)
POTASSIUM SERPL-MCNC: 3.6 MMOL/L — SIGNIFICANT CHANGE UP (ref 3.5–5.3)
POTASSIUM SERPL-MCNC: 3.8 MMOL/L — SIGNIFICANT CHANGE UP (ref 3.5–5.3)
POTASSIUM SERPL-SCNC: 3.6 MMOL/L — SIGNIFICANT CHANGE UP (ref 3.5–5.3)
POTASSIUM SERPL-SCNC: 3.6 MMOL/L — SIGNIFICANT CHANGE UP (ref 3.5–5.3)
POTASSIUM SERPL-SCNC: 3.8 MMOL/L — SIGNIFICANT CHANGE UP (ref 3.5–5.3)
PROT SERPL-MCNC: 5.4 G/DL — LOW (ref 6–8.3)
RBC # BLD: 2.89 M/UL — LOW (ref 4.2–5.8)
RBC # FLD: 13.8 % — SIGNIFICANT CHANGE UP (ref 10.3–14.5)
RBC BLD AUTO: ABNORMAL
RH IG SCN BLD-IMP: POSITIVE — SIGNIFICANT CHANGE UP
SODIUM SERPL-SCNC: 142 MMOL/L — SIGNIFICANT CHANGE UP (ref 135–145)
SODIUM SERPL-SCNC: 142 MMOL/L — SIGNIFICANT CHANGE UP (ref 135–145)
SODIUM SERPL-SCNC: 144 MMOL/L — SIGNIFICANT CHANGE UP (ref 135–145)
SPECIMEN SOURCE: SIGNIFICANT CHANGE UP
WBC # BLD: 42.07 K/UL — CRITICAL HIGH (ref 3.8–10.5)
WBC # FLD AUTO: 42.07 K/UL — CRITICAL HIGH (ref 3.8–10.5)

## 2022-08-26 PROCEDURE — 99233 SBSQ HOSP IP/OBS HIGH 50: CPT | Mod: GC

## 2022-08-26 PROCEDURE — 74018 RADEX ABDOMEN 1 VIEW: CPT | Mod: 26

## 2022-08-26 RX ORDER — SODIUM CHLORIDE 9 MG/ML
250 INJECTION, SOLUTION INTRAVENOUS ONCE
Refills: 0 | Status: COMPLETED | OUTPATIENT
Start: 2022-08-26 | End: 2022-08-26

## 2022-08-26 RX ORDER — POTASSIUM PHOSPHATE, MONOBASIC POTASSIUM PHOSPHATE, DIBASIC 236; 224 MG/ML; MG/ML
30 INJECTION, SOLUTION INTRAVENOUS ONCE
Refills: 0 | Status: COMPLETED | OUTPATIENT
Start: 2022-08-26 | End: 2022-08-26

## 2022-08-26 RX ORDER — POTASSIUM CHLORIDE 20 MEQ
10 PACKET (EA) ORAL
Refills: 0 | Status: DISCONTINUED | OUTPATIENT
Start: 2022-08-26 | End: 2022-08-26

## 2022-08-26 RX ORDER — SODIUM CHLORIDE 9 MG/ML
1000 INJECTION, SOLUTION INTRAVENOUS
Refills: 0 | Status: DISCONTINUED | OUTPATIENT
Start: 2022-08-26 | End: 2022-08-26

## 2022-08-26 RX ORDER — POTASSIUM PHOSPHATE, MONOBASIC POTASSIUM PHOSPHATE, DIBASIC 236; 224 MG/ML; MG/ML
30 INJECTION, SOLUTION INTRAVENOUS ONCE
Refills: 0 | Status: COMPLETED | OUTPATIENT
Start: 2022-08-26 | End: 2022-08-27

## 2022-08-26 RX ORDER — SODIUM CHLORIDE 9 MG/ML
1000 INJECTION, SOLUTION INTRAVENOUS
Refills: 0 | Status: DISCONTINUED | OUTPATIENT
Start: 2022-08-26 | End: 2022-08-27

## 2022-08-26 RX ORDER — POTASSIUM CHLORIDE 20 MEQ
20 PACKET (EA) ORAL
Refills: 0 | Status: DISCONTINUED | OUTPATIENT
Start: 2022-08-26 | End: 2022-08-26

## 2022-08-26 RX ORDER — INSULIN LISPRO 100/ML
VIAL (ML) SUBCUTANEOUS EVERY 6 HOURS
Refills: 0 | Status: DISCONTINUED | OUTPATIENT
Start: 2022-08-26 | End: 2022-09-05

## 2022-08-26 RX ADMIN — BUDESONIDE AND FORMOTEROL FUMARATE DIHYDRATE 2 PUFF(S): 160; 4.5 AEROSOL RESPIRATORY (INHALATION) at 06:37

## 2022-08-26 RX ADMIN — HEPARIN SODIUM 5000 UNIT(S): 5000 INJECTION INTRAVENOUS; SUBCUTANEOUS at 16:18

## 2022-08-26 RX ADMIN — POTASSIUM PHOSPHATE, MONOBASIC POTASSIUM PHOSPHATE, DIBASIC 83.33 MILLIMOLE(S): 236; 224 INJECTION, SOLUTION INTRAVENOUS at 10:26

## 2022-08-26 RX ADMIN — Medication 125 MILLIGRAM(S): at 06:18

## 2022-08-26 RX ADMIN — Medication 100 MILLIEQUIVALENT(S): at 08:20

## 2022-08-26 RX ADMIN — SODIUM CHLORIDE 250 MILLILITER(S): 9 INJECTION, SOLUTION INTRAVENOUS at 10:25

## 2022-08-26 RX ADMIN — Medication 125 MILLIGRAM(S): at 20:21

## 2022-08-26 RX ADMIN — Medication 125 MILLIGRAM(S): at 13:12

## 2022-08-26 RX ADMIN — Medication 125 MILLIGRAM(S): at 00:22

## 2022-08-26 RX ADMIN — BUDESONIDE AND FORMOTEROL FUMARATE DIHYDRATE 2 PUFF(S): 160; 4.5 AEROSOL RESPIRATORY (INHALATION) at 20:22

## 2022-08-26 RX ADMIN — HEPARIN SODIUM 5000 UNIT(S): 5000 INJECTION INTRAVENOUS; SUBCUTANEOUS at 22:32

## 2022-08-26 RX ADMIN — HEPARIN SODIUM 5000 UNIT(S): 5000 INJECTION INTRAVENOUS; SUBCUTANEOUS at 06:18

## 2022-08-26 RX ADMIN — Medication 100 MILLIEQUIVALENT(S): at 06:55

## 2022-08-26 RX ADMIN — SODIUM CHLORIDE 60 MILLILITER(S): 9 INJECTION, SOLUTION INTRAVENOUS at 20:39

## 2022-08-26 NOTE — DIETITIAN INITIAL EVALUATION ADULT - NSFNSGIIOFT_GEN_A_CORE
08-25-22 @ 07:01  -  08-26-22 @ 07:00  --------------------------------------------------------  OUT:    Rectal Tube (mL): 110 mL  Total OUT: 110 mL    Total NET: -110 mL

## 2022-08-26 NOTE — PROGRESS NOTE ADULT - SUBJECTIVE AND OBJECTIVE BOX
SUBJECTIVE: Patient seen and examined at bedside.  Patient is minimally responsive; however, denies when physical examination performed.    heparin   Injectable 5000 Unit(s) SubCutaneous every 8 hours  vancomycin    Solution 125 milliGRAM(s) Oral every 6 hours    MEDICATIONS  (PRN):  ALBUTerol    90 MICROgram(s) HFA Inhaler 2 Puff(s) Inhalation every 6 hours PRN Shortness of Breath and/or Wheezing  dextrose Oral Gel 15 Gram(s) Oral once PRN Blood Glucose LESS THAN 70 milliGRAM(s)/deciliter      I&O's Detail    25 Aug 2022 07:01  -  26 Aug 2022 07:00  --------------------------------------------------------  IN:    dextrose 5% w/ Additives: 375 mL    dextrose 5% w/ Additives: 300 mL    dextrose 5% w/ Additives: 1850 mL    Oral Fluid: 5 mL  Total IN: 2530 mL    OUT:    Incontinent per Condom Catheter (mL): 280 mL    Rectal Tube (mL): 110 mL    Voided (mL): 675 mL  Total OUT: 1065 mL    Total NET: 1465 mL      26 Aug 2022 07:01  -  26 Aug 2022 17:44  --------------------------------------------------------  IN:    dextrose 5% + lactated ringers: 500 mL    Lactated Ringers Bolus: 250 mL  Total IN: 750 mL    OUT:    Incontinent per Condom Catheter (mL): 300 mL  Total OUT: 300 mL    Total NET: 450 mL          T(C): 36.6 (08-26-22 @ 17:15), Max: 37.1 (08-26-22 @ 01:15)  HR: 64 (08-26-22 @ 15:47) (58 - 94)  BP: 129/65 (08-26-22 @ 15:47) (127/71 - 159/65)  RR: 18 (08-26-22 @ 17:40) (16 - 18)  SpO2: 97% (08-26-22 @ 17:40) (85% - 100%)    GENERAL: NAD, awake, cooperative, minimally communicative with yes/no questions, thin, doesn't open eyes  HEENT: NCAT, MMM  RESP: Nonlabored breathing on room air, No respiratory distress  CARD: Normal rate  GI: Soft, ND, NT, No guarding, No rebound tenderness  EXTREM: WWP, No edema, No gross deformity of extremities      LABS:                        8.9    42.07 )-----------( 255      ( 26 Aug 2022 05:36 )             27.1     08-26    142  |  108  |  34<H>  ----------------------------<  147<H>  3.6   |  27  |  1.43<H>    Ca    8.3<L>      26 Aug 2022 05:36  Phos  1.8     08-26  Mg     2.1     08-26    TPro  5.4<L>  /  Alb  2.7<L>  /  TBili  0.2  /  DBili  x   /  AST  15  /  ALT  7<L>  /  AlkPhos  74  08-26      Urinalysis Basic - ( 24 Aug 2022 18:55 )    Color: Yellow / Appearance: Clear / SG: <=1.005 / pH: x  Gluc: x / Ketone: NEGATIVE  / Bili: Negative / Urobili: 0.2 E.U./dL   Blood: x / Protein: NEGATIVE mg/dL / Nitrite: NEGATIVE   Leuk Esterase: NEGATIVE / RBC: x / WBC x   Sq Epi: x / Non Sq Epi: x / Bacteria: x        RADIOLOGY & ADDITIONAL STUDIES:  CT Abdomen and Pelvis w/ IV Cont:   ACC: [id number] EXAM:  CT CHEST IC                        ACC: [id number] EXAM:  CT ABDOMEN AND PELVIS IC                          PROCEDURE DATE:  08/24/2022          INTERPRETATION:  CLINICAL INFORMATION: Sepsis and diarrhea.    COMPARISON: CT abdomen pelvis July 19, 2022, CT chest March 19, 2022 and   FDG PET/CT March 21, 2022, CT chest April 23, 2017.    CONTRAST/COMPLICATIONS:  IV Contrast: Isovue 370  90 cc administered  Oral Contrast: NONE  Complications: None reported at time of study completion    PROCEDURE:  CT of the Chest, Abdomen and Pelvis was performed.  Sagittal and coronal reformats were performed.    FINDINGS:  CHEST:  LUNGS AND LARGE AIRWAYS: Slightly decreased left lower lobe nodules, 3.2   x 1.7 cm, previously 3.5 x 1.7 cm.No new suspicious nodule and no   pulmonary consolidation.  PLEURA: No pleural effusion.  HEART: Coronary stents and mitral valve annular calcifications. No   pericardial effusion.  LYMPH NODES: Slightly decreased adenopathy, for example:  *  Right supraclavicular, 1.6 x 1.3 cm, previously 1.9 x 1.4 cm  *  Paratracheal, 2.4 x 1.7 cm, previously 2.9 x 1.9 cm      ABDOMEN AND PELVIS:  HEPATOBILIARY: No suspicious liver lesion. Unchanged central biliary   prominence. Distended acalculus gallbladder,no acute inflammation.  SPLEEN: Within normal limits.  PANCREAS: Unchanged main duct prominence, probably due to parenchymal   atrophy.  ADRENALS: Within normal limits.  KIDNEYS/URETERS: Few right renal cysts. No hydronephrosis.    BLADDER: Within normal limits.  REPRODUCTIVE ORGANS: Prostatomegaly.    BOWEL: No bowel obstruction. Long segment left colonic mural thickening,   probable mild colitis. No abscess. Within limitations of technique, no   mesenteric large vessel occlusion.  PERITONEUM: No ascites.  VESSELS: Unchanged abdominal aortic aneurysm.  RETROPERITONEUM/LYMPH NODES: No adenopathy.  ABDOMINAL WALL: Unchanged bladder containing left inguinal hernia.  BONES: Within normal limits.    IMPRESSION:    1. Since July 19, 2022, new longsegment of left colonic mural thickening   suspicious for colitis, possibly infectious/inflammatory. No large vessel   mesenteric occlusion accounting for technique. No abscess.  2. Slightly decreased right supraclavicular, thoracic adenopathy and   probably slightly decreased left lower lobe nodules, the latter slowly   increasing since CT April 23, 2017, possibly inflammatory.    --- End of Report ---          JUSTINA GILL MD; Attending Radiologist  This document has been electronically signed.  JUSTINA GILL MD; Attending Radiologist  This document has been electronically signed. Aug 24 2022  7:15PM (08-24-22 @ 18:17)      Culture - Urine (collected 08-24-22 @ 18:55)  Source: Clean Catch Clean Catch (Midstream)  Final Report (08-26-22 @ 07:59):    Contamination apparent after additional incubation. Please disregard    previous report.    Lab requests new specimen if clinically indicated.    Culture - Blood (collected 08-24-22 @ 15:30)  Source: .Blood Blood-Peripheral  Preliminary Report (08-25-22 @ 21:00):    No growth at 1 day.    Culture - Blood (collected 08-24-22 @ 15:20)  Source: .Blood Blood-Peripheral  Preliminary Report (08-25-22 @ 21:00):    No growth at 1 day.

## 2022-08-26 NOTE — DIETITIAN INITIAL EVALUATION ADULT - ADD RECOMMEND
-Continue NPO per SLP recs    *When medically able, initiate EN support: recommend Jevity 1.2 @65 ml/hr to provide 1560 ml TF (156%RDI), 1872 kcal, 87 gProt., and 1259 ml FW.  -Monitor GI fxn; consider banatrol TID if diarrhea persists/worsens   -Monitor chemistry and skin integrity

## 2022-08-26 NOTE — PROGRESS NOTE ADULT - PROBLEM SELECTOR PLAN 6
Pt with hx of COPD, not on home O2. Takes advair and albuterol. Pt is tachypnic but likely at baseline as per documentation.   Pt has tachypnea which is likely due to compensation for metabolic acidosis.     - take off HFNC as pt looks more comfortable, wean off NC  - lungs clear, does not appear to be in exacerbation  - 3 to 4 cm LLL nodule  -> was meant to get IR guided biopsy done after discharge in March. Unclear if this happened. On home aspirin, plavix, simvistatin, also imdur 30. Stents reportedly placed in 2013. Unclear why patient is still on anti-platelet.  - hold imdur 30mg qd, can restart as needed  - hold home aspirin 81mg qd until patient better able to take PO  - hold home plavix 75mg qd until patient better able to take PO  - hold home simvistatin 20mg qd until patient better able to take PO    #HLD  - hold home simvistatin 20mg qd until patient better able to take PO

## 2022-08-26 NOTE — PROGRESS NOTE ADULT - PROBLEM SELECTOR PLAN 11
Fluids: 250cc LR x1  Electrolytes: replenish as appropriate  Diet: speech and swallow eval  DVT Prophylaxis: heparin subq 5k TID given CKD  GI Prophylaxis: not indicated  Code Status: DNR/DNI as per recent [facility] last admission  Dispo: [facility] Fluids: 250cc LR x1, D5LR maintenance  Electrolytes: replenish electrolytes aggressively given diarrhea  Diet: speech and swallow eval  DVT Prophylaxis: heparin subq 5k TID given CKD  GI Prophylaxis: not indicated  Code Status: DNR/DNI as per recent [facility] last admission  Dispo: [facility]

## 2022-08-26 NOTE — PROGRESS NOTE ADULT - PROBLEM SELECTOR PROBLEM 4
PHYSICIAN NEXT STEPS:  Review Only    CHIEF COMPLAINT:  Chief Complaint/Protocol Used: PCP Call - No Triage  Onset: N/A      ASSESSMENT:  ? Onset: N/A  -------------------------------------------------------    DISPOSITION:  Disposition Recommendation: See PCP When Office is Open (within 3 days)  Questions that led to disposition:  ? Caller requesting an appointment, triage offered and declined  (Timing: use nursing judgment to determine urgency of PCP contact)  Patient Directed To: Unspecified  Patient Intended Action: Seek care in the doctor's office       CALL NOTES:  04/24/2019 at 10:27 AM by Rosa Uribe  ? Patient's mother declines triage.  States called 2 days ago and patient still having the same symptoms. No available appointment after 5 pm today as requested. Connected to Erasmo at [facility] for assistance with appointment.     DISPOSITION OVERRIDE/PROVIDER CONSULT:  Disposition Override: N/A  Override Source: Unspecified  Consulted with PCP: No  Consulted with On-Call Physician: No    CALLER CONTACT INFO:  Name: Kisha Boyle (Mother)  Phone 1: [phone] (Mobile) - Preferred      ENCOUNTER STARTED:  04/24/19 10:16:39 AM  ENCOUNTER ASSIGNED TO/CLOSED BY:  Rosa Uribe @ 04/24/19 10:30:09 AM      -------------------------------------------------------    UNDERSTANDS CARE ADVICE: Yes    AGREES WITH CARE ADVICE: Yes    WILL FOLLOW CARE ADVICE: Yes    -------------------------------------------------------   Metabolic encephalopathy Diarrhea

## 2022-08-26 NOTE — DIETITIAN INITIAL EVALUATION ADULT - PERTINENT MEDS FT
MEDICATIONS  (STANDING):  budesonide  80 MICROgram(s)/formoterol 4.5 MICROgram(s) Inhaler 2 Puff(s) Inhalation two times a day  dextrose 5% + lactated ringers. 1000 milliLiter(s) (100 mL/Hr) IV Continuous <Continuous>  dextrose 5%. 1000 milliLiter(s) (50 mL/Hr) IV Continuous <Continuous>  dextrose 5%. 1000 milliLiter(s) (100 mL/Hr) IV Continuous <Continuous>  dextrose 50% Injectable 25 Gram(s) IV Push once  dextrose 50% Injectable 12.5 Gram(s) IV Push once  dextrose 50% Injectable 25 Gram(s) IV Push once  glucagon  Injectable 1 milliGRAM(s) IntraMuscular once  heparin   Injectable 5000 Unit(s) SubCutaneous every 8 hours  insulin lispro (ADMELOG) corrective regimen sliding scale   SubCutaneous three times a day before meals  insulin lispro (ADMELOG) corrective regimen sliding scale   SubCutaneous at bedtime  vancomycin    Solution 125 milliGRAM(s) Oral every 6 hours    MEDICATIONS  (PRN):  ALBUTerol    90 MICROgram(s) HFA Inhaler 2 Puff(s) Inhalation every 6 hours PRN Shortness of Breath and/or Wheezing  dextrose Oral Gel 15 Gram(s) Oral once PRN Blood Glucose LESS THAN 70 milliGRAM(s)/deciliter

## 2022-08-26 NOTE — PROGRESS NOTE ADULT - PROBLEM SELECTOR PLAN 3
RESOLVED  Pt admitted with bicarb of 18 (about at baseline per chart review) and tachypneic which is chronica as per chart review. pH on ABG 7.3  Anion gap normal   Pt is likely metabolic acidosis 2/2 diarrhea and CKD    - Treat diarrhea and rehydrate as above  - Continue to monitor  - dc bicarb fluids  - bicarb now normal Likely secondary to sepsis (as above). Exam limited but does not appear focal and CTH negative.  AAOx2 (person, year). Limited historian.    - trying to contact Nephew  - treatment as above

## 2022-08-26 NOTE — PROGRESS NOTE ADULT - SUBJECTIVE AND OBJECTIVE BOX
Interventional, Pulmonary, Critical, Chest Special Procedures.    Pt was seen and fully examined by myself.     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 Patient is altered on exam, but per ED report, he has been having chronic diarrhea, tachypnea, and chest pain that was worsening, prompting patient to call EMS to bring him from home into [facility]. Of note, patient was recently admitted 7/19-7/23 to [facility] for diarrhea after being sent in from his PCP office for hypotension and chronic diarrhea. During that time his diarrhea resolved and he was told to f/u with outpatient GI with Dr. Hammer. Notably he was fully conversant with mild memory impairment on admission and throughout hospital stay.    ED vitals: T 39.3   HR 78  RR 36 (taken manually)  BP 97/59  SpO2 98% on 2L  Labs significant: WBC 30, Hgb 11.5, lactate 2.3 -> 3.2 after 2L NS, UA wnl  Imaging/EKG: CT H/C/A/P known lung nodule, colitis  Interventions: zosyn, zofran, 3.2L NS, flagyl, acetaminophen     (24 Aug 2022 21:48)      REVIEW OF SYSTEMS:  Constitutional: No fever, weight loss, chills + fatigue  Eyes: No eye pain, visual disturbances, or discharge  ENMT:  + difficulty hearing, tinnitus, vertigo; No sinus or throat pain. No epistaxis, +dysphagia, dysphonia, hoarseness no odynophagia  Neck: No pain, stiffness or neck swelling.  No masses or deformities  Respiratory: + cough, wheezing, hemoptysis  + COPD  - ILD   - PE   - ASTHMA     - PNEUMONIA  Cardiovascular: No chest pain, dysrhythmia, palpitations, dizziness or edema + CAD   - CHF   - HTN  Gastrointestinal: No abdominal or epigastric pain. No nausea, vomiting or hematemesis; No diarrhea or constipation. No melena or hematochezia, Icterus.          Genitourinary: No dysuria, frequency, hematuria or incontinence   - CKD/DEBBIE      - ESRD  Neurological: No headaches, +memory loss, loss of strength, numbness or tremors      +DEMENTIA     - STROKE    - SEIZURE  Skin: No itching, burning, rashes or lesions   Lymph Nodes: No enlarged glands  Endocrine: No heat or cold intolerance; No hair loss       + DM     - THYROID DISORDER  Musculoskeletal: No joint pain or swelling; No muscle, back or extremity pain, No edema  Psychiatric: No depression, anxiety, mood swings or difficulty sleeping  Heme/Lymph: No easy bruising or bleeding gums         - ANEMIA      - CANCER   -COAGULOPATHY  Allergy and Immunologic: No hives or eczema  PAST MEDICAL & SURGICAL HISTORY:  HLD (hyperlipidemia)      CAD (coronary artery disease)      BPH (benign prostatic hyperplasia)      COPD, moderate      Chronic diarrhea      Stage 3 chronic kidney disease      S/P cataract extraction      H/O right hemicolectomy        FAMILY HISTORY:  No family history of cardiovascular disease (Father, Mother)      SOCIAL HISTORY:      - Tobacco     - ETOH    Allergies    No Known Allergies    Intolerances      Vital Signs Last 24 Hrs  T(C): 36.7 (26 Aug 2022 09:05), Max: 37.1 (26 Aug 2022 01:15)  T(F): 98 (26 Aug 2022 09:05), Max: 98.8 (26 Aug 2022 01:15)  HR: 88 (26 Aug 2022 08:35) (56 - 94)  BP: 159/65 (26 Aug 2022 08:35) (123/86 - 159/65)  BP(mean): 93 (26 Aug 2022 08:35) (89 - 100)  RR: 18 (26 Aug 2022 08:35) (16 - 18)  SpO2: 94% (26 Aug 2022 08:35) (85% - 100%)    Parameters below as of 26 Aug 2022 08:35  Patient On (Oxygen Delivery Method): nasal cannula  O2 Flow (L/min): 2      08-25 @ 07:01  -  08-26 @ 07:00  --------------------------------------------------------  IN: 2530 mL / OUT: 1065 mL / NET: 1465 mL    08-26 @ 07:01 - 08-26 @ 12:33  --------------------------------------------------------  IN: 450 mL / OUT: 200 mL / NET: 250 mL          MEDICATIONS:  MEDICATIONS  (STANDING):  budesonide  80 MICROgram(s)/formoterol 4.5 MICROgram(s) Inhaler 2 Puff(s) Inhalation two times a day  dextrose 5% + lactated ringers. 1000 milliLiter(s) (100 mL/Hr) IV Continuous <Continuous>  dextrose 5%. 1000 milliLiter(s) (50 mL/Hr) IV Continuous <Continuous>  dextrose 5%. 1000 milliLiter(s) (100 mL/Hr) IV Continuous <Continuous>  dextrose 50% Injectable 25 Gram(s) IV Push once  dextrose 50% Injectable 12.5 Gram(s) IV Push once  dextrose 50% Injectable 25 Gram(s) IV Push once  glucagon  Injectable 1 milliGRAM(s) IntraMuscular once  heparin   Injectable 5000 Unit(s) SubCutaneous every 8 hours  insulin lispro (ADMELOG) corrective regimen sliding scale   SubCutaneous three times a day before meals  insulin lispro (ADMELOG) corrective regimen sliding scale   SubCutaneous at bedtime  vancomycin    Solution 125 milliGRAM(s) Oral every 6 hours    MEDICATIONS  (PRN):  ALBUTerol    90 MICROgram(s) HFA Inhaler 2 Puff(s) Inhalation every 6 hours PRN Shortness of Breath and/or Wheezing  dextrose Oral Gel 15 Gram(s) Oral once PRN Blood Glucose LESS THAN 70 milliGRAM(s)/deciliter      PHYSICAL EXAM:  Un Comfortable, no immediate distress  Eyes: PERRL, EOM intact; conjunctiva and sclera clear  Head: Normocephalic;  No Trauma  ENMT: No nasal discharge, hoarseness, +cough no  hemoptysis  Neck: Supple; non tender; no masses or deformities.    No JVD  Respiratory: CTA,  - WHEEZING   - RHONCHI  - RALES  + CRACKLES.  Diminished breath sounds  BILATERAL  RIGHT  LEFT bases   Cardiovascular: Regular rate and rhythm. S1 and S2 Normal; No murmurs, gallops or rubs     - PPM/AICD  Gastrointestinal: Soft mildly tender, non-distended; Normal bowel sounds; No hepatosplenomegaly.     -PEG    -  GT   + RUBIO  Genitourinary: No costovertebral angle tenderness. No dysuria  Extremities: AROM, No clubbing, cyanosis or edema    Vascular: Peripheral pulses palpable 2+ bilaterally  Neurological: Awake, moving extremities   Skin: Warm and dry. No obvious rash  Lymph Nodes: No acute cervical or supraclavicular adenopathy  Psychiatric: Not engaging     DEVICES:  - DENTURES   +IV R / L     - ETUBE   -TRACH   -CTUBE  R / L    LABS:  ABG - ( 25 Aug 2022 06:58 )  pH, Arterial: 7.30  pH, Blood: x     /  pCO2: 30    /  pO2: 141   / HCO3: 15    / Base Excess: -10.3 /  SaO2: 100.0                                   8.9    42.07 )-----------( 255      ( 26 Aug 2022 05:36 )             27.1     08-26    142  |  108  |  34<H>  ----------------------------<  147<H>  3.6   |  27  |  1.43<H>    Ca    8.3<L>      26 Aug 2022 05:36  Phos  1.8     08-26  Mg     2.1     08-26    TPro  5.4<L>  /  Alb  2.7<L>  /  TBili  0.2  /  DBili  x   /  AST  15  /  ALT  7<L>  /  AlkPhos  74  08-26    PT/INR - ( 24 Aug 2022 16:15 )   PT: 12.8 sec;   INR: 1.07          PTT - ( 24 Aug 2022 16:15 )  PTT:40.9 sec  RADIOLOGY & ADDITIONAL STUDIES (The following images were personally reviewed):

## 2022-08-26 NOTE — PROGRESS NOTE ADULT - PROBLEM SELECTOR PLAN 9
Pt. on metoprolol succinate 25, valsartan 80 at home.   - hold home meds in the setting of hypotension and sepsis. Pt. on metoprolol succinate 25, valsartan 80 at home.   - hold home meds in the setting of hypotension and sepsis.  - antihypertenisves as above

## 2022-08-26 NOTE — PROGRESS NOTE ADULT - PROBLEM SELECTOR PLAN 4
Likely secondary to sepsis (as above). Exam limited but does not appear focal and CTH negative.  AAOx2 (person, year). Limited historian.    - trying to contact Nephew  - treatment as above Known history of chronic diarrhea after a R. hemicolectomy in 2017. Follows with Dr Hammer outpatient    - abx and hydration as above  - Dr Hammer to follow patient

## 2022-08-26 NOTE — PROGRESS NOTE ADULT - SUBJECTIVE AND OBJECTIVE BOX
INTERVAL EVENTS: RONN    SUBJECTIVE / INTERVAL HPI: Patient seen and examined at bedside. Denies abdominal pain. Does not rectal tube.     ROS: negative unless otherwise stated above.    VITAL SIGNS:  Vital Signs Last 24 Hrs  T(C): 36.5 (26 Aug 2022 13:00), Max: 37.1 (26 Aug 2022 01:15)  T(F): 97.7 (26 Aug 2022 13:00), Max: 98.8 (26 Aug 2022 01:15)  HR: 58 (26 Aug 2022 12:18) (56 - 94)  BP: 139/66 (26 Aug 2022 12:18) (123/86 - 159/65)  BP(mean): 93 (26 Aug 2022 12:18) (89 - 100)  RR: 18 (26 Aug 2022 12:18) (16 - 18)  SpO2: 100% (26 Aug 2022 12:18) (85% - 100%)    Parameters below as of 26 Aug 2022 12:18  Patient On (Oxygen Delivery Method): nasal cannula  O2 Flow (L/min): 2        08-25-22 @ 07:01  -  08-26-22 @ 07:00  --------------------------------------------------------  IN: 2530 mL / OUT: 1065 mL / NET: 1465 mL    08-26-22 @ 07:01  -  08-26-22 @ 15:21  --------------------------------------------------------  IN: 650 mL / OUT: 300 mL / NET: 350 mL        PHYSICAL EXAM:    General: NAD, laying flat in bed  HEENT: dry MM. glasses, otherwise normal oral cavity  Neck: supple  Cardiovascular: +S1/S2; RRR, no murmur  Respiratory: CTA B/L; no W/R/R; no accessory muscle use  Gastrointestinal: soft, NT/ND, hyperactive bowel sounds  Extremities: cool to touch; no edema, clubbing; some signs of cyanosis on soles of feet  Vascular: 2+ radial, DP pulses B/L  Neurological: AAOx2 (person, year)  Psych: mostly calm, pulls at lines when being examined    MEDICATIONS:  MEDICATIONS  (STANDING):  budesonide  80 MICROgram(s)/formoterol 4.5 MICROgram(s) Inhaler 2 Puff(s) Inhalation two times a day  dextrose 5% + lactated ringers. 1000 milliLiter(s) (100 mL/Hr) IV Continuous <Continuous>  dextrose 5%. 1000 milliLiter(s) (50 mL/Hr) IV Continuous <Continuous>  dextrose 5%. 1000 milliLiter(s) (100 mL/Hr) IV Continuous <Continuous>  dextrose 50% Injectable 25 Gram(s) IV Push once  dextrose 50% Injectable 12.5 Gram(s) IV Push once  dextrose 50% Injectable 25 Gram(s) IV Push once  glucagon  Injectable 1 milliGRAM(s) IntraMuscular once  heparin   Injectable 5000 Unit(s) SubCutaneous every 8 hours  insulin lispro (ADMELOG) corrective regimen sliding scale   SubCutaneous three times a day before meals  insulin lispro (ADMELOG) corrective regimen sliding scale   SubCutaneous at bedtime  vancomycin    Solution 125 milliGRAM(s) Oral every 6 hours    MEDICATIONS  (PRN):  ALBUTerol    90 MICROgram(s) HFA Inhaler 2 Puff(s) Inhalation every 6 hours PRN Shortness of Breath and/or Wheezing  dextrose Oral Gel 15 Gram(s) Oral once PRN Blood Glucose LESS THAN 70 milliGRAM(s)/deciliter      ALLERGIES:  Allergies    No Known Allergies    Intolerances        LABS:                        8.9    42.07 )-----------( 255      ( 26 Aug 2022 05:36 )             27.1     08-26    142  |  108  |  34<H>  ----------------------------<  147<H>  3.6   |  27  |  1.43<H>    Ca    8.3<L>      26 Aug 2022 05:36  Phos  1.8     08-26  Mg     2.1     08-26    TPro  5.4<L>  /  Alb  2.7<L>  /  TBili  0.2  /  DBili  x   /  AST  15  /  ALT  7<L>  /  AlkPhos  74  08-26    PT/INR - ( 24 Aug 2022 16:15 )   PT: 12.8 sec;   INR: 1.07          PTT - ( 24 Aug 2022 16:15 )  PTT:40.9 sec  Urinalysis Basic - ( 24 Aug 2022 18:55 )    Color: Yellow / Appearance: Clear / SG: <=1.005 / pH: x  Gluc: x / Ketone: NEGATIVE  / Bili: Negative / Urobili: 0.2 E.U./dL   Blood: x / Protein: NEGATIVE mg/dL / Nitrite: NEGATIVE   Leuk Esterase: NEGATIVE / RBC: x / WBC x   Sq Epi: x / Non Sq Epi: x / Bacteria: x      CAPILLARY BLOOD GLUCOSE      POCT Blood Glucose.: 115 mg/dL (26 Aug 2022 11:30)      RADIOLOGY & ADDITIONAL TESTS: Reviewed.

## 2022-08-26 NOTE — DIETITIAN INITIAL EVALUATION ADULT - OTHER INFO
89 y/o M w/ PMH CAD, CKD3, COPD, colonic mass, lung nodules, chronic diarrhea and tachypnea who presented for diarrhea, tachypnea, and chest pain who was admitted for AMS secondary to severe sepsis.    Pt care discussed with MD team. Rx and labs reviewed. Pt on isolation and inappropriate for nutrition interview with current mental status. Pt visually assessed through threshold and observed severe wasting of the orbital and temporal regions. SLP eval 8/25 recommends pt to remain NPO with alternative means of nutrition in short term; recs made below. Pt with chronic diarrhea; GI consulted, monitor for eval and interventions. Consider banatrol TID to promote fecal bulk if diarrhea exacerbated by nutrition. RDN will continue to monitor per protocol and PRN.     Pain: 0 per chart review   GI: Abdomen ND/NT, +BS x4, LBM 8/26  Skin: WDI, no edema noted

## 2022-08-26 NOTE — DIETITIAN INITIAL EVALUATION ADULT - NUTRITIONGOAL OUTCOME1
Initiate nutrition and pt will meet 75% or more of protein/energy needs via most appropriate route for nutrition and reduce/resolve s/sx malnutrition

## 2022-08-26 NOTE — DIETITIAN INITIAL EVALUATION ADULT - ENTERAL
Jevity 1.2 @20 ml/hr, advancing by 20 ml/hr q6hr to goal of 65 ml/hr to provide 1560 ml TF (156%RDI), 1872 kcal, 87 gProt., and 1259 ml FW.

## 2022-08-26 NOTE — PROGRESS NOTE ADULT - PROBLEM SELECTOR PLAN 7
Known history of chronic diarrhea after a R. hemicolectomy in 2017. Follows with Dr Hammer outpatient    - abx as above  - Dr Hammer to see patient Pt with hx of COPD, not on home O2. Takes advair and albuterol. Pt is tachypnic but likely at baseline as per documentation.     - take off HFNC as pt looks more comfortable, wean off NC  - lungs clear, does not appear to be in exacerbation  - 3 to 4 cm LLL nodule  -> was meant to get IR guided biopsy done after discharge in March. Unclear if this happened.

## 2022-08-26 NOTE — PROGRESS NOTE ADULT - PROBLEM SELECTOR PLAN 1
Met 3/4 SIRS criteria (T102.4, WBC 30.12, RR 36) with lactate 2.3 -> 3.2 -> 1.9. Likely secondary to C difficile.  C diff positive. GI PCR neg.  - isolation  - c/w oral vancomycin 125mg q6h  - f/u blood cultures  - monitor stools, pt keeps pulling out rectal tube  - replenish hydration w/ D5LR and LR boluses as needed Met 3/4 SIRS criteria (T102.4, WBC 30.12, RR 36) with lactate 2.3 -> 3.2 -> 1.9. Likely secondary to C difficile.  C diff positive. GI PCR neg. Surgery also following as they were consulted for r/o acalculous sarah, however low suspicion  - f/u repeat RUQ US to r/o acalculous sarah  - isolation  - c/w oral vancomycin 125mg q6h  - f/u blood cultures  - monitor stools, pt keeps pulling out rectal tube  - replenish hydration w/ D5LR and LR boluses as needed

## 2022-08-26 NOTE — DIETITIAN INITIAL EVALUATION ADULT - OTHER CALCULATIONS
Actual body weight used to calculate energy needs due to pt's current body weight within % ideal body weight adjusted for age, malnutrition/repletion

## 2022-08-26 NOTE — PROGRESS NOTE ADULT - SUBJECTIVE AND OBJECTIVE BOX
89yo M with C.diff colitis treated with po vanco. WBC is still climbing. Abdomen is soft, ND, but tender. If doesn't respond to po vanco, consider different alternatives. Discussed with house staff.

## 2022-08-26 NOTE — PROGRESS NOTE ADULT - ASSESSMENT
89 y/o M w/ PMH CAD, CKD3, COPD, chronic diarrhea who presented for diarrhea, tachypnea. Admitted to medicine telemetry for sepsis 2/2 C difficile infection. C/w oral vancomycin.

## 2022-08-26 NOTE — PROGRESS NOTE ADULT - PROBLEM SELECTOR PLAN 5
On home aspirin, plavix, simvistatin, also imdur 30. Stents reportedly placed in 2013. Unclear why patient is still on anti-platelet.  - hold imdur 30mg qd  - start home aspirin 81mg qd  - start home plavix 75mg qd  - start home simvistatin 20mg qd    #HLD  - simvistatin 20mg qd RESOLVED  Pt admitted with bicarb of 18 (about at baseline per chart review) and tachypneic which is chronica as per chart review. pH on ABG 7.3  Anion gap normal   Pt is likely metabolic acidosis 2/2 diarrhea and CKD    - Treat diarrhea and rehydrate as above  - Continue to monitor  - dc bicarb fluids  - bicarb now normal

## 2022-08-26 NOTE — PROGRESS NOTE ADULT - SUBJECTIVE AND OBJECTIVE BOX
Pt seen and examined   looks better  able to answer more questions  denies diarrhea    REVIEW OF SYSTEMS:  deferred still unable to fully answer      MEDICATIONS:  MEDICATIONS  (STANDING):  budesonide  80 MICROgram(s)/formoterol 4.5 MICROgram(s) Inhaler 2 Puff(s) Inhalation two times a day  dextrose 5% + lactated ringers. 1000 milliLiter(s) (100 mL/Hr) IV Continuous <Continuous>  dextrose 5%. 1000 milliLiter(s) (50 mL/Hr) IV Continuous <Continuous>  dextrose 5%. 1000 milliLiter(s) (100 mL/Hr) IV Continuous <Continuous>  dextrose 50% Injectable 25 Gram(s) IV Push once  dextrose 50% Injectable 12.5 Gram(s) IV Push once  dextrose 50% Injectable 25 Gram(s) IV Push once  glucagon  Injectable 1 milliGRAM(s) IntraMuscular once  heparin   Injectable 5000 Unit(s) SubCutaneous every 8 hours  insulin lispro (ADMELOG) corrective regimen sliding scale   SubCutaneous three times a day before meals  insulin lispro (ADMELOG) corrective regimen sliding scale   SubCutaneous at bedtime  vancomycin    Solution 125 milliGRAM(s) Oral every 6 hours    MEDICATIONS  (PRN):  ALBUTerol    90 MICROgram(s) HFA Inhaler 2 Puff(s) Inhalation every 6 hours PRN Shortness of Breath and/or Wheezing  dextrose Oral Gel 15 Gram(s) Oral once PRN Blood Glucose LESS THAN 70 milliGRAM(s)/deciliter      Allergies    No Known Allergies    Intolerances        Vital Signs Last 24 Hrs  T(C): 36.7 (26 Aug 2022 09:05), Max: 37.1 (26 Aug 2022 01:15)  T(F): 98 (26 Aug 2022 09:05), Max: 98.8 (26 Aug 2022 01:15)  HR: 88 (26 Aug 2022 08:35) (56 - 94)  BP: 159/65 (26 Aug 2022 08:35) (123/86 - 159/65)  BP(mean): 93 (26 Aug 2022 08:35) (89 - 100)  RR: 18 (26 Aug 2022 08:35) (16 - 18)  SpO2: 94% (26 Aug 2022 08:35) (85% - 100%)    Parameters below as of 26 Aug 2022 08:35  Patient On (Oxygen Delivery Method): nasal cannula  O2 Flow (L/min): 2      08-25 @ 07:01 - 08-26 @ 07:00  --------------------------------------------------------  IN: 2530 mL / OUT: 1065 mL / NET: 1465 mL    08-26 @ 07:01 - 08-26 @ 12:27  --------------------------------------------------------  IN: 450 mL / OUT: 200 mL / NET: 250 mL        PHYSICAL EXAM:    General: poorly nourished; in no acute distress  HEENT: MMM, conjunctiva and sclera clear  Gastrointestinal: Soft non-tender non-distended; Scaphoid, Normal bowel sounds;   Skin: Warm and dry. No obvious rash    LABS:  ABG - ( 25 Aug 2022 06:58 )  pH, Arterial: 7.30  pH, Blood: x     /  pCO2: 30    /  pO2: 141   / HCO3: 15    / Base Excess: -10.3 /  SaO2: 100.0               CBC Full  -  ( 26 Aug 2022 05:36 )  WBC Count : 42.07 K/uL  RBC Count : 2.89 M/uL  Hemoglobin : 8.9 g/dL  Hematocrit : 27.1 %  Platelet Count - Automated : 255 K/uL  Mean Cell Volume : 93.8 fl  Mean Cell Hemoglobin : 30.8 pg  Mean Cell Hemoglobin Concentration : 32.8 gm/dL  Auto Neutrophil # : 38.41 K/uL  Auto Lymphocyte # : 1.09 K/uL  Auto Monocyte # : 1.81 K/uL  Auto Eosinophil # : 0.38 K/uL  Auto Basophil # : 0.00 K/uL  Auto Neutrophil % : 86.1 %  Auto Lymphocyte % : 2.6 %  Auto Monocyte % : 4.3 %  Auto Eosinophil % : 0.9 %  Auto Basophil % : 0.0 %    08-26    142  |  108  |  34<H>  ----------------------------<  147<H>  3.6   |  27  |  1.43<H>    Ca    8.3<L>      26 Aug 2022 05:36  Phos  1.8     08-26  Mg     2.1     08-26    TPro  5.4<L>  /  Alb  2.7<L>  /  TBili  0.2  /  DBili  x   /  AST  15  /  ALT  7<L>  /  AlkPhos  74  08-26    PT/INR - ( 24 Aug 2022 16:15 )   PT: 12.8 sec;   INR: 1.07          PTT - ( 24 Aug 2022 16:15 )  PTT:40.9 sec      Urinalysis Basic - ( 24 Aug 2022 18:55 )    Color: Yellow / Appearance: Clear / SG: <=1.005 / pH: x  Gluc: x / Ketone: NEGATIVE  / Bili: Negative / Urobili: 0.2 E.U./dL   Blood: x / Protein: NEGATIVE mg/dL / Nitrite: NEGATIVE   Leuk Esterase: NEGATIVE / RBC: x / WBC x   Sq Epi: x / Non Sq Epi: x / Bacteria: x                RADIOLOGY & ADDITIONAL STUDIES (The following images were personally reviewed):

## 2022-08-26 NOTE — PROGRESS NOTE ADULT - ASSESSMENT
89-year-old male with PMH BPH, HLD, HTN, CAD s/p PCI w/ 3 DAYANA (mRCA, pRCA, mC in 2013 w/ Dr. Varela), EF 45-50%, CKD (Cr 1.7-2.4), COPD (no home O2), CKD Stage III, colonic mass s/p R hemicolectomy 04/2017, chronic diarrhea, arthritis, aortic aneurysm and lung nodules was BIBEMS to ED with AMS after experiencing chronic diarrhea, tachypnea and worsening chest pain.  He has a PSHx of b/l inguinal hernia repair, exploratory laparotomy and R radical extended hemicolectomy with ileocolonic anastomosis (Dr. 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     Of note, patient was recently admitted 07/19/2022 - 07/23/2022 to [facility] for diarrhea after being sent in from his PCP office for hypotension and chronic diarrhea. During that admission his diarrhea resolved and he was told to f/u with outpatient GI with Dr. Hammer. Notably he was fully conversant with mild memory impairment on admission and throughout the entire hospital stay.     In the ED, the patient was febrile (Tmax 102.4 F, rectal), hypertensive (/61mmHg) without tachycardia, and pulse oximetry reading of 99% on 2L NC. Labs were significant for leukocytosis (WBC 30.12) with neutrophil predominant left shift, anemia (Hgb 11.5, Hct 36.3L), lactic acidosis (2.3H>3.2H), Cr. 1.97, PTT 40.9H.  Troponins (0.03)(Stt) LFTs and TBili were wnl.      CT Abdomen/Pelvis with IV contrast demonstrated unchanged central biliary prominence, distended acalculous gallbladder, no acute inflammation. Since July 19, 2022, new long segment of L colonic mural thickening suspicious for colitis, possibly infectious/inflammatory. No large vessel mesenteric occlusion accounting for technique. No abscess. RUQ U/S attempted, however pt became combative during exam, which was subsequently aborted.     Surgery consulted for r/o acalculous cholecystitis in setting of sepsis.  Bedside examination is notable for soft, nondistended abdomen and patient, although lethargic and a poor historian, denies pain during examination.  Overnight the patient had 2 green loose bowel movements.  Serum labs are notable for persistent leukocytosis (WBC 42.07H), anemia (Hgb 8.9L, Hct 27.1L), improving renal function (BUN 34H, Cr 1.43H), and normal bilirubin and LFTs.  There is low suspicion for acute cholecystitis with leukocytosis likely 2/2 C. difficile infection.      PLAN  - IVF resuscitation PRN  - Serial abdominal exams  - Follow-up on repeat RUQ ultrasound  - Surgery Team 4C will continue to follow  - Remainder of inpatient care per primary team  - Please page Team 4 at [phone] with questions/clinical changes

## 2022-08-26 NOTE — DIETITIAN INITIAL EVALUATION ADULT - PERTINENT LABORATORY DATA
08-26    142  |  108  |  34<H>  ----------------------------<  147<H>  3.6   |  27  |  1.43<H>    Ca    8.3<L>      26 Aug 2022 05:36  Phos  1.8     08-26  Mg     2.1     08-26    TPro  5.4<L>  /  Alb  2.7<L>  /  TBili  0.2  /  DBili  x   /  AST  15  /  ALT  7<L>  /  AlkPhos  74  08-26  POCT Blood Glucose.: 115 mg/dL (08-26-22 @ 11:30)  A1C with Estimated Average Glucose Result: 5.4 % (07-20-22 @ 07:12)

## 2022-08-27 LAB
ALBUMIN SERPL ELPH-MCNC: 2.7 G/DL — LOW (ref 3.3–5)
ALP SERPL-CCNC: 95 U/L — SIGNIFICANT CHANGE UP (ref 40–120)
ALT FLD-CCNC: 7 U/L — LOW (ref 10–45)
ANION GAP SERPL CALC-SCNC: 8 MMOL/L — SIGNIFICANT CHANGE UP (ref 5–17)
ANISOCYTOSIS BLD QL: SLIGHT — SIGNIFICANT CHANGE UP
AST SERPL-CCNC: 14 U/L — SIGNIFICANT CHANGE UP (ref 10–40)
BASOPHILS # BLD AUTO: 0 K/UL — SIGNIFICANT CHANGE UP (ref 0–0.2)
BASOPHILS NFR BLD AUTO: 0 % — SIGNIFICANT CHANGE UP (ref 0–2)
BILIRUB SERPL-MCNC: 0.2 MG/DL — SIGNIFICANT CHANGE UP (ref 0.2–1.2)
BUN SERPL-MCNC: 21 MG/DL — SIGNIFICANT CHANGE UP (ref 7–23)
BURR CELLS BLD QL SMEAR: PRESENT — SIGNIFICANT CHANGE UP
CALCIUM SERPL-MCNC: 7.9 MG/DL — LOW (ref 8.4–10.5)
CHLORIDE SERPL-SCNC: 113 MMOL/L — HIGH (ref 96–108)
CO2 SERPL-SCNC: 24 MMOL/L — SIGNIFICANT CHANGE UP (ref 22–31)
CREAT SERPL-MCNC: 1.1 MG/DL — SIGNIFICANT CHANGE UP (ref 0.5–1.3)
DACRYOCYTES BLD QL SMEAR: SLIGHT — SIGNIFICANT CHANGE UP
EGFR: 63 ML/MIN/1.73M2 — SIGNIFICANT CHANGE UP
EOSINOPHIL # BLD AUTO: 0.45 K/UL — SIGNIFICANT CHANGE UP (ref 0–0.5)
EOSINOPHIL NFR BLD AUTO: 1.8 % — SIGNIFICANT CHANGE UP (ref 0–6)
GLUCOSE BLDC GLUCOMTR-MCNC: 109 MG/DL — HIGH (ref 70–99)
GLUCOSE BLDC GLUCOMTR-MCNC: 111 MG/DL — HIGH (ref 70–99)
GLUCOSE BLDC GLUCOMTR-MCNC: 120 MG/DL — HIGH (ref 70–99)
GLUCOSE BLDC GLUCOMTR-MCNC: 236 MG/DL — HIGH (ref 70–99)
GLUCOSE BLDC GLUCOMTR-MCNC: 86 MG/DL — SIGNIFICANT CHANGE UP (ref 70–99)
GLUCOSE SERPL-MCNC: 185 MG/DL — HIGH (ref 70–99)
HCT VFR BLD CALC: 26.1 % — LOW (ref 39–50)
HGB BLD-MCNC: 8.3 G/DL — LOW (ref 13–17)
LYMPHOCYTES # BLD AUTO: 0.65 K/UL — LOW (ref 1–3.3)
LYMPHOCYTES # BLD AUTO: 2.6 % — LOW (ref 13–44)
MACROCYTES BLD QL: SLIGHT — SIGNIFICANT CHANGE UP
MAGNESIUM SERPL-MCNC: 1.8 MG/DL — SIGNIFICANT CHANGE UP (ref 1.6–2.6)
MANUAL SMEAR VERIFICATION: SIGNIFICANT CHANGE UP
MCHC RBC-ENTMCNC: 30.5 PG — SIGNIFICANT CHANGE UP (ref 27–34)
MCHC RBC-ENTMCNC: 31.8 GM/DL — LOW (ref 32–36)
MCV RBC AUTO: 96 FL — SIGNIFICANT CHANGE UP (ref 80–100)
METAMYELOCYTES # FLD: 0.9 % — HIGH (ref 0–0)
MONOCYTES # BLD AUTO: 0.87 K/UL — SIGNIFICANT CHANGE UP (ref 0–0.9)
MONOCYTES NFR BLD AUTO: 3.5 % — SIGNIFICANT CHANGE UP (ref 2–14)
NEUTROPHILS # BLD AUTO: 22.76 K/UL — HIGH (ref 1.8–7.4)
NEUTROPHILS NFR BLD AUTO: 91.2 % — HIGH (ref 43–77)
OVALOCYTES BLD QL SMEAR: SLIGHT — SIGNIFICANT CHANGE UP
PHOSPHATE SERPL-MCNC: 3 MG/DL — SIGNIFICANT CHANGE UP (ref 2.5–4.5)
PLAT MORPH BLD: NORMAL — SIGNIFICANT CHANGE UP
PLATELET # BLD AUTO: 267 K/UL — SIGNIFICANT CHANGE UP (ref 150–400)
POIKILOCYTOSIS BLD QL AUTO: SLIGHT — SIGNIFICANT CHANGE UP
POLYCHROMASIA BLD QL SMEAR: SLIGHT — SIGNIFICANT CHANGE UP
POTASSIUM SERPL-MCNC: 3.9 MMOL/L — SIGNIFICANT CHANGE UP (ref 3.5–5.3)
POTASSIUM SERPL-SCNC: 3.9 MMOL/L — SIGNIFICANT CHANGE UP (ref 3.5–5.3)
PROT SERPL-MCNC: 5.1 G/DL — LOW (ref 6–8.3)
RBC # BLD: 2.72 M/UL — LOW (ref 4.2–5.8)
RBC # FLD: 13.9 % — SIGNIFICANT CHANGE UP (ref 10.3–14.5)
RBC BLD AUTO: ABNORMAL
SODIUM SERPL-SCNC: 145 MMOL/L — SIGNIFICANT CHANGE UP (ref 135–145)
SPHEROCYTES BLD QL SMEAR: SLIGHT — SIGNIFICANT CHANGE UP
WBC # BLD: 24.96 K/UL — HIGH (ref 3.8–10.5)
WBC # FLD AUTO: 24.96 K/UL — HIGH (ref 3.8–10.5)

## 2022-08-27 PROCEDURE — 99232 SBSQ HOSP IP/OBS MODERATE 35: CPT | Mod: GC

## 2022-08-27 RX ORDER — POTASSIUM CHLORIDE 20 MEQ
10 PACKET (EA) ORAL ONCE
Refills: 0 | Status: COMPLETED | OUTPATIENT
Start: 2022-08-27 | End: 2022-08-27

## 2022-08-27 RX ORDER — SODIUM CHLORIDE 9 MG/ML
1000 INJECTION, SOLUTION INTRAVENOUS
Refills: 0 | Status: DISCONTINUED | OUTPATIENT
Start: 2022-08-27 | End: 2022-08-27

## 2022-08-27 RX ORDER — ISOSORBIDE MONONITRATE 60 MG/1
30 TABLET, EXTENDED RELEASE ORAL EVERY 24 HOURS
Refills: 0 | Status: DISCONTINUED | OUTPATIENT
Start: 2022-08-27 | End: 2022-09-05

## 2022-08-27 RX ORDER — MAGNESIUM SULFATE 500 MG/ML
1 VIAL (ML) INJECTION ONCE
Refills: 0 | Status: COMPLETED | OUTPATIENT
Start: 2022-08-27 | End: 2022-08-27

## 2022-08-27 RX ORDER — SODIUM CHLORIDE 9 MG/ML
1000 INJECTION, SOLUTION INTRAVENOUS
Refills: 0 | Status: DISCONTINUED | OUTPATIENT
Start: 2022-08-27 | End: 2022-08-28

## 2022-08-27 RX ADMIN — Medication 2: at 17:58

## 2022-08-27 RX ADMIN — ISOSORBIDE MONONITRATE 30 MILLIGRAM(S): 60 TABLET, EXTENDED RELEASE ORAL at 12:21

## 2022-08-27 RX ADMIN — BUDESONIDE AND FORMOTEROL FUMARATE DIHYDRATE 2 PUFF(S): 160; 4.5 AEROSOL RESPIRATORY (INHALATION) at 17:55

## 2022-08-27 RX ADMIN — Medication 125 MILLIGRAM(S): at 00:02

## 2022-08-27 RX ADMIN — Medication 100 MILLIEQUIVALENT(S): at 10:22

## 2022-08-27 RX ADMIN — Medication 125 MILLIGRAM(S): at 06:19

## 2022-08-27 RX ADMIN — HEPARIN SODIUM 5000 UNIT(S): 5000 INJECTION INTRAVENOUS; SUBCUTANEOUS at 14:09

## 2022-08-27 RX ADMIN — SODIUM CHLORIDE 60 MILLILITER(S): 9 INJECTION, SOLUTION INTRAVENOUS at 05:55

## 2022-08-27 RX ADMIN — BUDESONIDE AND FORMOTEROL FUMARATE DIHYDRATE 2 PUFF(S): 160; 4.5 AEROSOL RESPIRATORY (INHALATION) at 06:21

## 2022-08-27 RX ADMIN — HEPARIN SODIUM 5000 UNIT(S): 5000 INJECTION INTRAVENOUS; SUBCUTANEOUS at 22:14

## 2022-08-27 RX ADMIN — HEPARIN SODIUM 5000 UNIT(S): 5000 INJECTION INTRAVENOUS; SUBCUTANEOUS at 06:19

## 2022-08-27 RX ADMIN — Medication 100 GRAM(S): at 09:32

## 2022-08-27 RX ADMIN — Medication 125 MILLIGRAM(S): at 12:23

## 2022-08-27 RX ADMIN — POTASSIUM PHOSPHATE, MONOBASIC POTASSIUM PHOSPHATE, DIBASIC 83.33 MILLIMOLE(S): 236; 224 INJECTION, SOLUTION INTRAVENOUS at 00:02

## 2022-08-27 RX ADMIN — Medication 125 MILLIGRAM(S): at 17:55

## 2022-08-27 NOTE — PROGRESS NOTE ADULT - PROBLEM SELECTOR PLAN 7
Pt with hx of COPD, not on home O2. Takes advair and albuterol. Pt is tachypnic but likely at baseline as per documentation.     - take off HFNC as pt looks more comfortable, wean off NC  - lungs clear, does not appear to be in exacerbation  - 3 to 4 cm LLL nodule  -> was meant to get IR guided biopsy done after discharge in March. Unclear if this happened. Pt with hx of COPD, not on home O2. Takes advair and albuterol. Pt is tachypnic but likely at baseline as per documentation.     Plan:   - Wean off NC as tolerated   - 3 to 4 cm LLL nodule  -> was meant to get IR guided biopsy done after discharge in March. Unclear if this happened.

## 2022-08-27 NOTE — PROGRESS NOTE ADULT - PROBLEM SELECTOR PLAN 6
On home aspirin, plavix, simvistatin, also imdur 30. Stents reportedly placed in 2013. Unclear why patient is still on anti-platelet.  - hold imdur 30mg qd, can restart as needed  - hold home aspirin 81mg qd until patient better able to take PO  - hold home plavix 75mg qd until patient better able to take PO  - hold home simvistatin 20mg qd until patient better able to take PO    #HLD  - hold home simvistatin 20mg qd until patient better able to take PO On home aspirin, plavix, simvistatin, also imdur 30. Stents reportedly placed in 2013. Unclear why patient is still on anti-platelet.  - hold imdur 30mg qd, can restart as needed  - hold home aspirin 81mg qd until patient better able to take PO  - hold home plavix 75mg qd until patient better able to take PO  - Restarted home simvistatin 20mg qd as patient is better able to take PO    #HLD  - Restarted home simvistatin 20mg qd patient is better able to take PO

## 2022-08-27 NOTE — PROGRESS NOTE ADULT - PROBLEM SELECTOR PLAN 5
RESOLVED  Pt admitted with bicarb of 18 (about at baseline per chart review) and tachypneic which is chronica as per chart review. pH on ABG 7.3  Anion gap normal   Pt is likely metabolic acidosis 2/2 diarrhea and CKD    - Treat diarrhea and rehydrate as above  - Continue to monitor  - dc bicarb fluids  - bicarb now normal RESOLVED as of 8/26  Pt admitted with bicarb of 18 (about at baseline per chart review) and tachypneic which is chronica as per chart review. pH on ABG 7.3  Anion gap normal   Pt is likely metabolic acidosis 2/2 diarrhea and CKD    Plan:   - Treat diarrhea and rehydrate as above  - Continue to monitor  - Bicarb now normal, 24 as of 8/27

## 2022-08-27 NOTE — PROGRESS NOTE ADULT - PROBLEM SELECTOR PLAN 4
Known history of chronic diarrhea after a R. hemicolectomy in 2017. Follows with Dr Hammer outpatient    - abx and hydration as above  - Dr Hammer to follow patient Known history of chronic diarrhea after a R. hemicolectomy in 2017. Follows with Dr Hammer outpatient    Plan:   - Abx and hydration as above  - Dr Hammer to follow patient

## 2022-08-27 NOTE — PROGRESS NOTE ADULT - SUBJECTIVE AND OBJECTIVE BOX
INTERVAL EVENTS: RONN    SUBJECTIVE / INTERVAL HPI: Patient seen and examined at bedside. Denies abdominal pain. Does not rectal tube.     ROS: negative unless otherwise stated above.    VITAL SIGNS:  Vital Signs Last 24 Hrs  T(C): 36.5 (26 Aug 2022 13:00), Max: 37.1 (26 Aug 2022 01:15)  T(F): 97.7 (26 Aug 2022 13:00), Max: 98.8 (26 Aug 2022 01:15)  HR: 58 (26 Aug 2022 12:18) (56 - 94)  BP: 139/66 (26 Aug 2022 12:18) (123/86 - 159/65)  BP(mean): 93 (26 Aug 2022 12:18) (89 - 100)  RR: 18 (26 Aug 2022 12:18) (16 - 18)  SpO2: 100% (26 Aug 2022 12:18) (85% - 100%)    Parameters below as of 26 Aug 2022 12:18  Patient On (Oxygen Delivery Method): nasal cannula  O2 Flow (L/min): 2        08-25-22 @ 07:01  -  08-26-22 @ 07:00  --------------------------------------------------------  IN: 2530 mL / OUT: 1065 mL / NET: 1465 mL    08-26-22 @ 07:01  -  08-26-22 @ 15:21  --------------------------------------------------------  IN: 650 mL / OUT: 300 mL / NET: 350 mL        PHYSICAL EXAM:    General: NAD, laying flat in bed  HEENT: dry MM. glasses, otherwise normal oral cavity  Neck: supple  Cardiovascular: +S1/S2; RRR, no murmur  Respiratory: CTA B/L; no W/R/R; no accessory muscle use  Gastrointestinal: soft, NT/ND, hyperactive bowel sounds  Extremities: cool to touch; no edema, clubbing; some signs of cyanosis on soles of feet  Vascular: 2+ radial, DP pulses B/L  Neurological: AAOx2 (person, year)  Psych: mostly calm, pulls at lines when being examined    MEDICATIONS:  MEDICATIONS  (STANDING):  budesonide  80 MICROgram(s)/formoterol 4.5 MICROgram(s) Inhaler 2 Puff(s) Inhalation two times a day  dextrose 5% + lactated ringers. 1000 milliLiter(s) (100 mL/Hr) IV Continuous <Continuous>  dextrose 5%. 1000 milliLiter(s) (50 mL/Hr) IV Continuous <Continuous>  dextrose 5%. 1000 milliLiter(s) (100 mL/Hr) IV Continuous <Continuous>  dextrose 50% Injectable 25 Gram(s) IV Push once  dextrose 50% Injectable 12.5 Gram(s) IV Push once  dextrose 50% Injectable 25 Gram(s) IV Push once  glucagon  Injectable 1 milliGRAM(s) IntraMuscular once  heparin   Injectable 5000 Unit(s) SubCutaneous every 8 hours  insulin lispro (ADMELOG) corrective regimen sliding scale   SubCutaneous three times a day before meals  insulin lispro (ADMELOG) corrective regimen sliding scale   SubCutaneous at bedtime  vancomycin    Solution 125 milliGRAM(s) Oral every 6 hours    MEDICATIONS  (PRN):  ALBUTerol    90 MICROgram(s) HFA Inhaler 2 Puff(s) Inhalation every 6 hours PRN Shortness of Breath and/or Wheezing  dextrose Oral Gel 15 Gram(s) Oral once PRN Blood Glucose LESS THAN 70 milliGRAM(s)/deciliter      ALLERGIES:  Allergies    No Known Allergies    Intolerances        LABS:                        8.9    42.07 )-----------( 255      ( 26 Aug 2022 05:36 )             27.1     08-26    142  |  108  |  34<H>  ----------------------------<  147<H>  3.6   |  27  |  1.43<H>    Ca    8.3<L>      26 Aug 2022 05:36  Phos  1.8     08-26  Mg     2.1     08-26    TPro  5.4<L>  /  Alb  2.7<L>  /  TBili  0.2  /  DBili  x   /  AST  15  /  ALT  7<L>  /  AlkPhos  74  08-26    PT/INR - ( 24 Aug 2022 16:15 )   PT: 12.8 sec;   INR: 1.07          PTT - ( 24 Aug 2022 16:15 )  PTT:40.9 sec  Urinalysis Basic - ( 24 Aug 2022 18:55 )    Color: Yellow / Appearance: Clear / SG: <=1.005 / pH: x  Gluc: x / Ketone: NEGATIVE  / Bili: Negative / Urobili: 0.2 E.U./dL   Blood: x / Protein: NEGATIVE mg/dL / Nitrite: NEGATIVE   Leuk Esterase: NEGATIVE / RBC: x / WBC x   Sq Epi: x / Non Sq Epi: x / Bacteria: x      CAPILLARY BLOOD GLUCOSE      POCT Blood Glucose.: 115 mg/dL (26 Aug 2022 11:30)      RADIOLOGY & ADDITIONAL TESTS: Reviewed. INTERVAL EVENTS: c/w 60 cc LR overnight, no stools. 24hr  cc.     SUBJECTIVE / INTERVAL HPI: Patient seen and examined at bedside. Denies abdominal pain. Does not have rectal tube. Requesting sips of water.     ROS: negative unless otherwise stated above.    VITAL SIGNS:  T(C): 37.2 (27 Aug 2022 14:46), Max: 37.2 (27 Aug 2022 14:46)  T(F): 98.9 (27 Aug 2022 14:46), Max: 98.9 (27 Aug 2022 14:46)  HR: 72 (27 Aug 2022 16:42) (58 - 72)  BP: 154/68 (27 Aug 2022 16:42) (134/60 - 172/74)  BP(mean): 98 (27 Aug 2022 16:42) (86 - 107)  RR: 18 (27 Aug 2022 16:42) (17 - 20)  SpO2: 94% (27 Aug 2022 16:42) (94% - 99%) on [facility]       I&O's Summary    26 Aug 2022 07:01  -  27 Aug 2022 07:00  --------------------------------------------------------  IN: 1968 mL / OUT: 800 mL / NET: 1168 mL    27 Aug 2022 07:01  -  27 Aug 2022 17:03  --------------------------------------------------------  IN: 640 mL / OUT: 550 mL / NET: 90 mL      08-25-22 @ 07:01  -  08-26-22 @ 07:00  --------------------------------------------------------  IN: 2530 mL / OUT: 1065 mL / NET: 1465 mL    08-26-22 @ 07:01 - 08-26-22 @ 15:21  --------------------------------------------------------  IN: 650 mL / OUT: 300 mL / NET: 350 mL        PHYSICAL EXAM:  General: NAD, laying flat in bed  HEENT: dry MM. glasses, otherwise normal oral cavity  Neck: supple  Cardiovascular: +S1/S2; RRR, no murmur  Respiratory: CTA B/L; no W/R/R; no accessory muscle use  Gastrointestinal: soft, NT/ND, hyperactive bowel sounds  Extremities: cool to touch; no edema, clubbing; some signs of cyanosis on soles of feet  Vascular: 2+ radial, DP pulses B/L  Neurological: AAOx2 (person, year)      MEDICATIONS:  MEDICATIONS  (STANDING):  budesonide  80 MICROgram(s)/formoterol 4.5 MICROgram(s) Inhaler 2 Puff(s) Inhalation two times a day  dextrose 5% + lactated ringers. 1000 milliLiter(s) (100 mL/Hr) IV Continuous <Continuous>  dextrose 5%. 1000 milliLiter(s) (50 mL/Hr) IV Continuous <Continuous>  dextrose 5%. 1000 milliLiter(s) (100 mL/Hr) IV Continuous <Continuous>  dextrose 50% Injectable 25 Gram(s) IV Push once  dextrose 50% Injectable 12.5 Gram(s) IV Push once  dextrose 50% Injectable 25 Gram(s) IV Push once  glucagon  Injectable 1 milliGRAM(s) IntraMuscular once  heparin   Injectable 5000 Unit(s) SubCutaneous every 8 hours  insulin lispro (ADMELOG) corrective regimen sliding scale   SubCutaneous three times a day before meals  insulin lispro (ADMELOG) corrective regimen sliding scale   SubCutaneous at bedtime  vancomycin    Solution 125 milliGRAM(s) Oral every 6 hours      MEDICATIONS  (PRN):  ALBUTerol    90 MICROgram(s) HFA Inhaler 2 Puff(s) Inhalation every 6 hours PRN Shortness of Breath and/or Wheezing  dextrose Oral Gel 15 Gram(s) Oral once PRN Blood Glucose LESS THAN 70 milliGRAM(s)/deciliter      ALLERGIES: Allergies    No Known Allergies    Intolerances        LABS:                                   8.3    24.96 )-----------( 267      ( 27 Aug 2022 07:22 )             26.1     08-27    145  |  113<H>  |  21  ----------------------------<  185<H>  3.9   |  24  |  1.10    Ca    7.9<L>      27 Aug 2022 07:22  Phos  3.0     08-27  Mg     1.8     08-27    TPro  5.1<L>  /  Alb  2.7<L>  /  TBili  0.2  /  DBili  x   /  AST  14  /  ALT  7<L>  /  AlkPhos  95  08-27    CAPILLARY BLOOD GLUCOSE      POCT Blood Glucose.: 109 mg/dL (27 Aug 2022 11:57)  POCT Blood Glucose.: 111 mg/dL (27 Aug 2022 06:35)  POCT Blood Glucose.: 132 mg/dL (26 Aug 2022 23:51)  POCT Blood Glucose.: 99 mg/dL (26 Aug 2022 20:15)        RADIOLOGY & ADDITIONAL TESTS: Reviewed.

## 2022-08-27 NOTE — PROGRESS NOTE ADULT - SUBJECTIVE AND OBJECTIVE BOX
Pt seen and examined   says no diarrhea  alert and talking  baseline MS  denies pain says no BM because he is not given any food      REVIEW OF SYSTEMS:  Constitutional: No fever, weight loss  Cardiovascular: No chest pain, palpitations, dizziness or leg swelling  Gastrointestinal: No abdominal or epigastric pain. No nausea, vomiting or hematemesis; No BM  Skin: No itching, burning, rashes or lesions       MEDICATIONS:  MEDICATIONS  (STANDING):  budesonide  80 MICROgram(s)/formoterol 4.5 MICROgram(s) Inhaler 2 Puff(s) Inhalation two times a day  dextrose 5%. 1000 milliLiter(s) (50 mL/Hr) IV Continuous <Continuous>  dextrose 5%. 1000 milliLiter(s) (100 mL/Hr) IV Continuous <Continuous>  dextrose 50% Injectable 25 Gram(s) IV Push once  dextrose 50% Injectable 12.5 Gram(s) IV Push once  dextrose 50% Injectable 25 Gram(s) IV Push once  glucagon  Injectable 1 milliGRAM(s) IntraMuscular once  heparin   Injectable 5000 Unit(s) SubCutaneous every 8 hours  insulin lispro (ADMELOG) corrective regimen sliding scale   SubCutaneous every 6 hours  isosorbide   mononitrate ER Tablet (IMDUR) 30 milliGRAM(s) Oral every 24 hours  lactated ringers. 1000 milliLiter(s) (60 mL/Hr) IV Continuous <Continuous>  vancomycin    Solution 125 milliGRAM(s) Oral every 6 hours    MEDICATIONS  (PRN):  ALBUTerol    90 MICROgram(s) HFA Inhaler 2 Puff(s) Inhalation every 6 hours PRN Shortness of Breath and/or Wheezing  dextrose Oral Gel 15 Gram(s) Oral once PRN Blood Glucose LESS THAN 70 milliGRAM(s)/deciliter      Allergies    No Known Allergies    Intolerances        Vital Signs Last 24 Hrs  T(C): 35.8 (27 Aug 2022 09:22), Max: 37.1 (27 Aug 2022 01:00)  T(F): 96.5 (27 Aug 2022 09:22), Max: 98.7 (27 Aug 2022 01:00)  HR: 62 (27 Aug 2022 08:34) (58 - 68)  BP: 164/72 (27 Aug 2022 08:33) (129/65 - 164/72)  BP(mean): 103 (27 Aug 2022 08:33) (86 - 103)  RR: 18 (27 Aug 2022 08:34) (18 - 20)  SpO2: 98% (27 Aug 2022 08:34) (97% - 99%)    Parameters below as of 27 Aug 2022 08:34  Patient On (Oxygen Delivery Method): nasal cannula  O2 Flow (L/min): 2      08-26 @ 07:01  -  08-27 @ 07:00  --------------------------------------------------------  IN: 1968 mL / OUT: 800 mL / NET: 1168 mL        PHYSICAL EXAM:    General: poorly nourished; in no acute distress  HEENT: MMM, conjunctiva and sclera clear  Gastrointestinal: Soft non-tender non-distended; Normal bowel sounds;   Skin: Warm and dry. No obvious rash    LABS:      CBC Full  -  ( 27 Aug 2022 07:22 )  WBC Count : 24.96 K/uL  RBC Count : 2.72 M/uL  Hemoglobin : 8.3 g/dL  Hematocrit : 26.1 %  Platelet Count - Automated : 267 K/uL  Mean Cell Volume : 96.0 fl  Mean Cell Hemoglobin : 30.5 pg  Mean Cell Hemoglobin Concentration : 31.8 gm/dL  Auto Neutrophil # : 22.76 K/uL  Auto Lymphocyte # : 0.65 K/uL  Auto Monocyte # : 0.87 K/uL  Auto Eosinophil # : 0.45 K/uL  Auto Basophil # : 0.00 K/uL  Auto Neutrophil % : 91.2 %  Auto Lymphocyte % : 2.6 %  Auto Monocyte % : 3.5 %  Auto Eosinophil % : 1.8 %  Auto Basophil % : 0.0 %    08-27    145  |  113<H>  |  21  ----------------------------<  185<H>  3.9   |  24  |  1.10    Ca    7.9<L>      27 Aug 2022 07:22  Phos  3.0     08-27  Mg     1.8     08-27    TPro  5.1<L>  /  Alb  2.7<L>  /  TBili  0.2  /  DBili  x   /  AST  14  /  ALT  7<L>  /  AlkPhos  95  08-27                      RADIOLOGY & ADDITIONAL STUDIES (The following images were personally reviewed):

## 2022-08-27 NOTE — PROGRESS NOTE ADULT - PROBLEM SELECTOR PLAN 11
Fluids: 250cc LR x1, D5LR maintenance  Electrolytes: replenish electrolytes aggressively given diarrhea  Diet: speech and swallow eval  DVT Prophylaxis: heparin subq 5k TID given CKD  GI Prophylaxis: not indicated  Code Status: DNR/DNI as per recent [facility] last admission  Dispo: [facility] Fluids: 250cc LR PRN, LR maintenance @ 60 cc/hr  Electrolytes: replenish electrolytes aggressively given diarrhea  Diet: speech and swallow eval -> clear liquids  DVT Prophylaxis: heparin subq 5k TID given CKD  GI Prophylaxis: not indicated  Code Status: DNR/DNI as per recent [facility] last admission  Dispo: [facility]

## 2022-08-27 NOTE — PROGRESS NOTE ADULT - SUBJECTIVE AND OBJECTIVE BOX
Interventional, Pulmonary, Critical, Chest Special Procedures.    Pt was seen and fully examined by myself.     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 Patient is altered on exam, but per ED report, he has been having chronic diarrhea, tachypnea, and chest pain that was worsening, prompting patient to call EMS to bring him from home into [facility]. Of note, patient was recently admitted 7/19-7/23 to [facility] for diarrhea after being sent in from his PCP office for hypotension and chronic diarrhea. During that time his diarrhea resolved and he was told to f/u with outpatient GI with Dr. Hammer. Notably he was fully conversant with mild memory impairment on admission and throughout hospital stay.    ED vitals: T 39.3   HR 78  RR 36 (taken manually)  BP 97/59  SpO2 98% on 2L  Labs significant: WBC 30, Hgb 11.5, lactate 2.3 -> 3.2 after 2L NS, UA wnl  Imaging/EKG: CT H/C/A/P known lung nodule, colitis  Interventions: zosyn, zofran, 3.2L NS, flagyl, acetaminophen     (24 Aug 2022 21:48)      REVIEW OF SYSTEMS:  Constitutional: No fever, weight loss, chills or fatigue  Eyes: No eye pain, visual disturbances, or discharge  ENMT:  No difficulty hearing, tinnitus, vertigo; No sinus or throat pain. No epistaxis, dysphagia, dysphonia, hoarseness or odynophagia  Neck: No pain, stiffness or neck swelling.  No masses or deformities  Respiratory: No cough, wheezing, hemoptysis  - COPD  - ILD   - PE   - ASTHMA     - PNEUMONIA  Cardiovascular: No chest pain, dysrhythmia, palpitations, dizziness or edema - CAD   - CHF   - HTN  Gastrointestinal: No abdominal or epigastric pain. No nausea, vomiting or hematemesis; No diarrhea or constipation. No melena or hematochezia, Icterus.          Genitourinary: No dysuria, frequency, hematuria or incontinence   - CKD/DEBBIE      - ESRD  Neurological: No headaches, memory loss, loss of strength, numbness or tremors      -DEMENTIA     - STROKE    - SEIZURE  Skin: No itching, burning, rashes or lesions   Lymph Nodes: No enlarged glands  Endocrine: No heat or cold intolerance; No hair loss       - DM     - THYROID DISORDER  Musculoskeletal: No joint pain or swelling; No muscle, back or extremity pain, No edema  Psychiatric: No depression, anxiety, mood swings or difficulty sleeping  Heme/Lymph: No easy bruising or bleeding gums         - ANEMIA      - CANCER   -COAGULOPATHY  Allergy and Immunologic: No hives or eczema    PAST MEDICAL & SURGICAL HISTORY:  HLD (hyperlipidemia)      CAD (coronary artery disease)      BPH (benign prostatic hyperplasia)      COPD, moderate      Chronic diarrhea      Stage 3 chronic kidney disease      S/P cataract extraction      H/O right hemicolectomy        FAMILY HISTORY:  No family history of cardiovascular disease (Father, Mother)      SOCIAL HISTORY:      - Tobacco     - ETOH    Allergies    No Known Allergies    Intolerances      Vital Signs Last 24 Hrs  T(C): 37.2 (27 Aug 2022 14:46), Max: 37.2 (27 Aug 2022 14:46)  T(F): 98.9 (27 Aug 2022 14:46), Max: 98.9 (27 Aug 2022 14:46)  HR: 68 (27 Aug 2022 12:35) (58 - 72)  BP: 172/74 (27 Aug 2022 12:19) (129/65 - 172/74)  BP(mean): 107 (27 Aug 2022 12:19) (86 - 107)  RR: 17 (27 Aug 2022 12:35) (17 - 20)  SpO2: 98% (27 Aug 2022 12:35) (97% - 99%)    Parameters below as of 27 Aug 2022 12:35  Patient On (Oxygen Delivery Method): nasal cannula  O2 Flow (L/min): 2      08-26 @ 07:01  -  08-27 @ 07:00  --------------------------------------------------------  IN: 1968 mL / OUT: 800 mL / NET: 1168 mL    08-27 @ 07:01  -  08-27 @ 14:59  --------------------------------------------------------  IN: 480 mL / OUT: 0 mL / NET: 480 mL          MEDICATIONS:  MEDICATIONS  (STANDING):  budesonide  80 MICROgram(s)/formoterol 4.5 MICROgram(s) Inhaler 2 Puff(s) Inhalation two times a day  dextrose 5%. 1000 milliLiter(s) (50 mL/Hr) IV Continuous <Continuous>  dextrose 5%. 1000 milliLiter(s) (100 mL/Hr) IV Continuous <Continuous>  dextrose 50% Injectable 25 Gram(s) IV Push once  dextrose 50% Injectable 12.5 Gram(s) IV Push once  dextrose 50% Injectable 25 Gram(s) IV Push once  glucagon  Injectable 1 milliGRAM(s) IntraMuscular once  heparin   Injectable 5000 Unit(s) SubCutaneous every 8 hours  insulin lispro (ADMELOG) corrective regimen sliding scale   SubCutaneous every 6 hours  isosorbide   mononitrate ER Tablet (IMDUR) 30 milliGRAM(s) Oral every 24 hours  lactated ringers. 1000 milliLiter(s) (60 mL/Hr) IV Continuous <Continuous>  vancomycin    Solution 125 milliGRAM(s) Oral every 6 hours    MEDICATIONS  (PRN):  ALBUTerol    90 MICROgram(s) HFA Inhaler 2 Puff(s) Inhalation every 6 hours PRN Shortness of Breath and/or Wheezing  dextrose Oral Gel 15 Gram(s) Oral once PRN Blood Glucose LESS THAN 70 milliGRAM(s)/deciliter      PHYSICAL EXAM:  Comfortable, no distress  Eyes: PERRL, EOM intact; conjunctiva and sclera clear  Head: Normocephalic;  No Trauma  ENMT: No nasal discharge, hoarseness, cough or hemoptysis  Neck: Supple; non tender; no masses or deformities.    No JVD  Respiratory: CTA,  - WHEEZING   - RHONCHI  - RALES  - CRACKLES.  Diminished breath sounds  BILATERAL  RIGHT  LEFT  Cardiovascular: Regular rate and rhythm. S1 and S2 Normal; No murmurs, gallops or rubs     - PPM/AICD  Gastrointestinal: Soft non-tender, non-distended; Normal bowel sounds; No hepatosplenomegaly.     -PEG    -  GT   - RUBIO  Genitourinary: No costovertebral angle tenderness. No dysuria  Extremities: AROM, No clubbing, cyanosis or edema    Vascular: Peripheral pulses palpable 2+ bilaterally  Neurological: Alert and responisve to stimuli   Skin: Warm and dry. No obvious rash  Lymph Nodes: No acute cervical or supraclavicular adenopathy  Psychiatric: Cooperative and appropriate mood    DEVICES:  - DENTURES   +IV R / L     - ETUBE   -TRACH   -CTUBE  R / L    LABS:                          8.3    24.96 )-----------( 267      ( 27 Aug 2022 07:22 )             26.1     08-27    145  |  113<H>  |  21  ----------------------------<  185<H>  3.9   |  24  |  1.10    Ca    7.9<L>      27 Aug 2022 07:22  Phos  3.0     08-27  Mg     1.8     08-27    TPro  5.1<L>  /  Alb  2.7<L>  /  TBili  0.2  /  DBili  x   /  AST  14  /  ALT  7<L>  /  AlkPhos  95  08-27      RADIOLOGY & ADDITIONAL STUDIES (The following images were personally reviewed): Interventional, Pulmonary, Critical, Chest Special Procedures.    Pt was seen and fully examined by myself.     Time spent with patient in minutes:67    Patient is a 89y old  Male who presents with a chief complaint of altered mental status secondary to sepsis (27 Aug 2022 14:00) The patient more awake and engaging today, still ill appearing and emaciated.    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 Patient is altered on exam, but per ED report, he has been having chronic diarrhea, tachypnea, and chest pain that was worsening, prompting patient to call EMS to bring him from home into [facility]. Of note, patient was recently admitted 7/19-7/23 to [facility] for diarrhea after being sent in from his PCP office for hypotension and chronic diarrhea. During that time his diarrhea resolved and he was told to f/u with outpatient GI with Dr. Hammer. Notably he was fully conversant with mild memory impairment on admission and throughout hospital stay.    ED vitals: T 39.3   HR 78  RR 36 (taken manually)  BP 97/59  SpO2 98% on 2L  Labs significant: WBC 30, Hgb 11.5, lactate 2.3 -> 3.2 after 2L NS, UA wnl  Imaging/EKG: CT H/C/A/P known lung nodule, colitis  Interventions: zosyn, zofran, 3.2L NS, flagyl, acetaminophen     (24 Aug 2022 21:48)      REVIEW OF SYSTEMS:  Constitutional: No fever, weight loss, chills + fatigue  Eyes: No eye pain, visual disturbances, or discharge  ENMT:  + difficulty hearing, tinnitus, vertigo; No sinus or throat pain. No epistaxis, +dysphagia, dysphonia, hoarseness no odynophagia  Neck: No pain, stiffness or neck swelling.  No masses or deformities  Respiratory: + cough, wheezing, hemoptysis  + COPD  - ILD   - PE   - ASTHMA     - PNEUMONIA  Cardiovascular: No chest pain, dysrhythmia, palpitations, dizziness or edema + CAD   - CHF   - HTN  Gastrointestinal: No abdominal or epigastric pain. No nausea, vomiting or hematemesis; No diarrhea or constipation. No melena or hematochezia, Icterus.          Genitourinary: No dysuria, frequency, hematuria or incontinence   - CKD/DEBBIE      - ESRD  Neurological: No headaches, +memory loss, loss of strength, numbness or tremors      +DEMENTIA     - STROKE    - SEIZURE  Skin: No itching, burning, rashes or lesions   Lymph Nodes: No enlarged glands  Endocrine: No heat or cold intolerance; No hair loss       + DM     - THYROID DISORDER  Musculoskeletal: No joint pain or swelling; No muscle, back or extremity pain, No edema  Psychiatric: No depression, anxiety, mood swings or difficulty sleeping  Heme/Lymph: No easy bruising or bleeding gums         - ANEMIA      - CANCER   -COAGULOPATHY  Allergy and Immunologic: No hives or eczema    PAST MEDICAL & SURGICAL HISTORY:  HLD (hyperlipidemia)      CAD (coronary artery disease)      BPH (benign prostatic hyperplasia)      COPD, moderate      Chronic diarrhea      Stage 3 chronic kidney disease      S/P cataract extraction      H/O right hemicolectomy        FAMILY HISTORY:  No family history of cardiovascular disease (Father, Mother)      SOCIAL HISTORY:      - Tobacco     - ETOH    Allergies    No Known Allergies    Intolerances      Vital Signs Last 24 Hrs  T(C): 37.2 (27 Aug 2022 14:46), Max: 37.2 (27 Aug 2022 14:46)  T(F): 98.9 (27 Aug 2022 14:46), Max: 98.9 (27 Aug 2022 14:46)  HR: 68 (27 Aug 2022 12:35) (58 - 72)  BP: 172/74 (27 Aug 2022 12:19) (129/65 - 172/74)  BP(mean): 107 (27 Aug 2022 12:19) (86 - 107)  RR: 17 (27 Aug 2022 12:35) (17 - 20)  SpO2: 98% (27 Aug 2022 12:35) (97% - 99%)    Parameters below as of 27 Aug 2022 12:35  Patient On (Oxygen Delivery Method): nasal cannula  O2 Flow (L/min): 2      08-26 @ 07:01  -  08-27 @ 07:00  --------------------------------------------------------  IN: 1968 mL / OUT: 800 mL / NET: 1168 mL    08-27 @ 07:01 - 08-27 @ 14:59  --------------------------------------------------------  IN: 480 mL / OUT: 0 mL / NET: 480 mL          MEDICATIONS:  MEDICATIONS  (STANDING):  budesonide  80 MICROgram(s)/formoterol 4.5 MICROgram(s) Inhaler 2 Puff(s) Inhalation two times a day  dextrose 5%. 1000 milliLiter(s) (50 mL/Hr) IV Continuous <Continuous>  dextrose 5%. 1000 milliLiter(s) (100 mL/Hr) IV Continuous <Continuous>  dextrose 50% Injectable 25 Gram(s) IV Push once  dextrose 50% Injectable 12.5 Gram(s) IV Push once  dextrose 50% Injectable 25 Gram(s) IV Push once  glucagon  Injectable 1 milliGRAM(s) IntraMuscular once  heparin   Injectable 5000 Unit(s) SubCutaneous every 8 hours  insulin lispro (ADMELOG) corrective regimen sliding scale   SubCutaneous every 6 hours  isosorbide   mononitrate ER Tablet (IMDUR) 30 milliGRAM(s) Oral every 24 hours  lactated ringers. 1000 milliLiter(s) (60 mL/Hr) IV Continuous <Continuous>  vancomycin    Solution 125 milliGRAM(s) Oral every 6 hours    MEDICATIONS  (PRN):  ALBUTerol    90 MICROgram(s) HFA Inhaler 2 Puff(s) Inhalation every 6 hours PRN Shortness of Breath and/or Wheezing  dextrose Oral Gel 15 Gram(s) Oral once PRN Blood Glucose LESS THAN 70 milliGRAM(s)/deciliter      PHYSICAL EXAM:  Un Comfortable, no immediate distress  Eyes: PERRL, EOM intact; conjunctiva and sclera clear  Head: Normocephalic;  No Trauma  ENMT: No nasal discharge, hoarseness, +cough no  hemoptysis  Neck: Supple; non tender; no masses or deformities.    No JVD  Respiratory: CTA,  - WHEEZING   - RHONCHI  - RALES  + CRACKLES.  Diminished breath sounds  BILATERAL  RIGHT  LEFT bases   Cardiovascular: Regular rate and rhythm. S1 and S2 Normal; No murmurs, gallops or rubs     - PPM/AICD  Gastrointestinal: Soft mildly tender, non-distended; Normal bowel sounds; No hepatosplenomegaly.     -PEG    -  GT   + RUBIO  Genitourinary: No costovertebral angle tenderness. No dysuria  Extremities: AROM, No clubbing, cyanosis or edema    Vascular: Peripheral pulses palpable 2+ bilaterally  Neurological: Awake, moving extremities   Skin: Warm and dry. No obvious rash  Lymph Nodes: No acute cervical or supraclavicular adenopathy  Psychiatric: More  engaging   DEVICES:  - DENTURES   +IV R / L     - ETUBE   -TRACH   -CTUBE  R / L    LABS:                          8.3    24.96 )-----------( 267      ( 27 Aug 2022 07:22 )             26.1     08-27    145  |  113<H>  |  21  ----------------------------<  185<H>  3.9   |  24  |  1.10    Ca    7.9<L>      27 Aug 2022 07:22  Phos  3.0     08-27  Mg     1.8     08-27    TPro  5.1<L>  /  Alb  2.7<L>  /  TBili  0.2  /  DBili  x   /  AST  14  /  ALT  7<L>  /  AlkPhos  95  08-27      RADIOLOGY & ADDITIONAL STUDIES (The following images were personally reviewed):

## 2022-08-27 NOTE — PROGRESS NOTE ADULT - PROBLEM SELECTOR PLAN 8
-baseline Cr.1.7-2.4    - pt. at baseline, no evidence of DEBBIE. -baseline Cr.1.7-2.4    - Pt. at baseline, no evidence of DEBBIE.

## 2022-08-27 NOTE — PROGRESS NOTE ADULT - PROBLEM SELECTOR PLAN 2
C diff sample positive. Septic as above.    - c/w plan as above C diff sample positive. Septic as above.    Plan:  - C/w plan as above

## 2022-08-27 NOTE — PROGRESS NOTE ADULT - PROBLEM SELECTOR PLAN 3
Likely secondary to sepsis (as above). Exam limited but does not appear focal and CTH negative.  AAOx2 (person, year). Limited historian.    - trying to contact Nephew  - treatment as above RESOLVING  Likely secondary to sepsis (as above). Exam limited but does not appear focal and CTH negative.  AAOx2 (person, year). Limited historian.    Plan:   - Nephew at bedside on 8/27, says that patient looks near baseline  - Treatment as above

## 2022-08-27 NOTE — PROGRESS NOTE ADULT - ASSESSMENT
89 year old male (poor historian) with PMH HTN, CAD s/p PCI w 3 DAYANA, CKD (Cr 1.7- 2.4), COPD (not on home O2), arthritis, aortic aneurysm, BPH, HLD, EF 45-50%, lung nodules, and chronic constipation and Psx of ex lap and right radical extended hemicolectomy with ileocolonic anastomosis (Dr. Headley 4/2017) and presumed b/i inguinal hernia repairs, presents to the ED on 8/24 with c/o one week of chest pain and difficulty breathing. On admission, pt febrile to 102.4 rectal, nontachycardic, hypertensive (159/61) with WBC 30.12 and lactate of 2.3. CTAP with IV contrast demonstrates a distended acalculous gallbladder with no acute inflammation. Surgery consulted for r/o acalculous cholecystitis. Pt lethargic and unable to provide reliable subjective history, but abdomen soft, nondistended, mild diffusely TTP without rebound or guarding. LFTs wnl. Low suspicion for cholecystitis at this time given lack of inflammation on scan. Distension may be due to poor PO intake. Hb increased from previous admission suggests dehydration and imaging suggests a component of ischemic colitis. Pt now s/p 3.2L NS with normalization of lactate. Pt now C Diff positive w/ liquidy BMs. As such, concomitant biliary pathology is unlikely however RUQ US was abandoned due to pt agitation. Repeat US is currently pending.    Continue serial abdominal exams  Will continue to follow. Please page Team 4 with questions/clinical changes. [phone]

## 2022-08-27 NOTE — PROGRESS NOTE ADULT - PROBLEM SELECTOR PLAN 9
Pt. on metoprolol succinate 25, valsartan 80 at home.   - hold home meds in the setting of hypotension and sepsis.  - antihypertenisves as above Pt. on metoprolol succinate 25, valsartan 80 at home.     Plan:   - Hold home meds in the setting of hypotension and sepsis.  - Antihypertenisves as above

## 2022-08-27 NOTE — PROGRESS NOTE ADULT - PROBLEM SELECTOR PLAN 1
Met 3/4 SIRS criteria (T102.4, WBC 30.12, RR 36) with lactate 2.3 -> 3.2 -> 1.9. Likely secondary to C difficile.  C diff positive. GI PCR neg. Surgery also following as they were consulted for r/o acalculous sarah, however low suspicion  - f/u repeat RUQ US to r/o acalculous sarah  - isolation  - c/w oral vancomycin 125mg q6h  - f/u blood cultures  - monitor stools, pt keeps pulling out rectal tube  - replenish hydration w/ D5LR and LR boluses as needed Met 3/4 SIRS criteria (T102.4, WBC 30.12, RR 36) with lactate 2.3 -> 3.2 -> 1.9. Likely secondary to C difficile.  C diff positive. GI PCR neg. Surgery also following as they were consulted for r/o acalculous sarah, however low suspicion.    Plan:   - F/u repeat RUQ US to r/o acalculous sarah  - C/w isolation precautions   - C/w oral vancomycin 125mg q6h for 10 days, 8/25  - F/u blood cultures  - Monitor stool burden  - Replenish hydration w/ D5LR or LR boluses as needed

## 2022-08-27 NOTE — PROGRESS NOTE ADULT - SUBJECTIVE AND OBJECTIVE BOX
SUBJECTIVE: Patient seen and examined bedside by chief resident. Patient denies any pain but is rather uncooperative with examination. Per nurse has had 2x episodes of diarrhea.    heparin   Injectable 5000 Unit(s) SubCutaneous every 8 hours  isosorbide   mononitrate ER Tablet (IMDUR) 30 milliGRAM(s) Oral every 24 hours  vancomycin    Solution 125 milliGRAM(s) Oral every 6 hours      Vital Signs Last 24 Hrs  T(C): 35.8 (27 Aug 2022 09:22), Max: 37.1 (27 Aug 2022 01:00)  T(F): 96.5 (27 Aug 2022 09:22), Max: 98.7 (27 Aug 2022 01:00)  HR: 68 (27 Aug 2022 12:35) (58 - 72)  BP: 172/74 (27 Aug 2022 12:19) (129/65 - 172/74)  BP(mean): 107 (27 Aug 2022 12:19) (86 - 107)  RR: 17 (27 Aug 2022 12:35) (17 - 20)  SpO2: 98% (27 Aug 2022 12:35) (97% - 99%)    Parameters below as of 27 Aug 2022 12:35  Patient On (Oxygen Delivery Method): nasal cannula  O2 Flow (L/min): 2    I&O's Detail    26 Aug 2022 07:01  -  27 Aug 2022 07:00  --------------------------------------------------------  IN:    dextrose 5% + lactated ringers: 500 mL    dextrose 5% + lactated ringers: 600 mL    dextrose 5% + lactated ringers: 120 mL    IV PiggyBack: 498 mL    Lactated Ringers Bolus: 250 mL  Total IN: 1968 mL    OUT:    Incontinent per Condom Catheter (mL): 800 mL  Total OUT: 800 mL    Total NET: 1168 mL          General: NAD, resting comfortably in bed  C/V: NSR  Pulm: Nonlabored breathing, no respiratory distress  Abd: soft, moderately TTP LLQ. Mild distention  Extrem: WWP, no edema, SCDs in place        LABS:                        8.3    24.96 )-----------( 267      ( 27 Aug 2022 07:22 )             26.1     08-27    145  |  113<H>  |  21  ----------------------------<  185<H>  3.9   |  24  |  1.10    Ca    7.9<L>      27 Aug 2022 07:22  Phos  3.0     08-27  Mg     1.8     08-27    TPro  5.1<L>  /  Alb  2.7<L>  /  TBili  0.2  /  DBili  x   /  AST  14  /  ALT  7<L>  /  AlkPhos  95  08-27          RADIOLOGY & ADDITIONAL STUDIES:

## 2022-08-27 NOTE — PROGRESS NOTE ADULT - PROBLEM SELECTOR PLAN 10
- hold home flomax 0.4mg qd until patient can better take PO - Holding home flomax 0.4mg qd   - Adequate UOP based on weight

## 2022-08-28 DIAGNOSIS — R06.82 TACHYPNEA, NOT ELSEWHERE CLASSIFIED: ICD-10-CM

## 2022-08-28 LAB
ANION GAP SERPL CALC-SCNC: 7 MMOL/L — SIGNIFICANT CHANGE UP (ref 5–17)
BASOPHILS # BLD AUTO: 0.07 K/UL — SIGNIFICANT CHANGE UP (ref 0–0.2)
BASOPHILS NFR BLD AUTO: 0.5 % — SIGNIFICANT CHANGE UP (ref 0–2)
BUN SERPL-MCNC: 13 MG/DL — SIGNIFICANT CHANGE UP (ref 7–23)
CALCIUM SERPL-MCNC: 8 MG/DL — LOW (ref 8.4–10.5)
CHLORIDE SERPL-SCNC: 110 MMOL/L — HIGH (ref 96–108)
CO2 SERPL-SCNC: 23 MMOL/L — SIGNIFICANT CHANGE UP (ref 22–31)
CREAT SERPL-MCNC: 1.04 MG/DL — SIGNIFICANT CHANGE UP (ref 0.5–1.3)
EGFR: 67 ML/MIN/1.73M2 — SIGNIFICANT CHANGE UP
EOSINOPHIL # BLD AUTO: 0.55 K/UL — HIGH (ref 0–0.5)
EOSINOPHIL NFR BLD AUTO: 3.9 % — SIGNIFICANT CHANGE UP (ref 0–6)
GLUCOSE BLDC GLUCOMTR-MCNC: 100 MG/DL — HIGH (ref 70–99)
GLUCOSE BLDC GLUCOMTR-MCNC: 103 MG/DL — HIGH (ref 70–99)
GLUCOSE BLDC GLUCOMTR-MCNC: 123 MG/DL — HIGH (ref 70–99)
GLUCOSE BLDC GLUCOMTR-MCNC: 130 MG/DL — HIGH (ref 70–99)
GLUCOSE SERPL-MCNC: 115 MG/DL — HIGH (ref 70–99)
HCT VFR BLD CALC: 27.8 % — LOW (ref 39–50)
HGB BLD-MCNC: 8.8 G/DL — LOW (ref 13–17)
IMM GRANULOCYTES NFR BLD AUTO: 1.2 % — SIGNIFICANT CHANGE UP (ref 0–1.5)
LYMPHOCYTES # BLD AUTO: 1.08 K/UL — SIGNIFICANT CHANGE UP (ref 1–3.3)
LYMPHOCYTES # BLD AUTO: 7.6 % — LOW (ref 13–44)
MAGNESIUM SERPL-MCNC: 1.8 MG/DL — SIGNIFICANT CHANGE UP (ref 1.6–2.6)
MCHC RBC-ENTMCNC: 30.3 PG — SIGNIFICANT CHANGE UP (ref 27–34)
MCHC RBC-ENTMCNC: 31.7 GM/DL — LOW (ref 32–36)
MCV RBC AUTO: 95.9 FL — SIGNIFICANT CHANGE UP (ref 80–100)
MONOCYTES # BLD AUTO: 1.17 K/UL — HIGH (ref 0–0.9)
MONOCYTES NFR BLD AUTO: 8.3 % — SIGNIFICANT CHANGE UP (ref 2–14)
NEUTROPHILS # BLD AUTO: 11.13 K/UL — HIGH (ref 1.8–7.4)
NEUTROPHILS NFR BLD AUTO: 78.5 % — HIGH (ref 43–77)
NRBC # BLD: 0 /100 WBCS — SIGNIFICANT CHANGE UP (ref 0–0)
PHOSPHATE SERPL-MCNC: 1.5 MG/DL — LOW (ref 2.5–4.5)
PLATELET # BLD AUTO: 287 K/UL — SIGNIFICANT CHANGE UP (ref 150–400)
POTASSIUM SERPL-MCNC: 4.1 MMOL/L — SIGNIFICANT CHANGE UP (ref 3.5–5.3)
POTASSIUM SERPL-SCNC: 4.1 MMOL/L — SIGNIFICANT CHANGE UP (ref 3.5–5.3)
RBC # BLD: 2.9 M/UL — LOW (ref 4.2–5.8)
RBC # FLD: 13.7 % — SIGNIFICANT CHANGE UP (ref 10.3–14.5)
SODIUM SERPL-SCNC: 140 MMOL/L — SIGNIFICANT CHANGE UP (ref 135–145)
WBC # BLD: 14.17 K/UL — HIGH (ref 3.8–10.5)
WBC # FLD AUTO: 14.17 K/UL — HIGH (ref 3.8–10.5)

## 2022-08-28 PROCEDURE — 99233 SBSQ HOSP IP/OBS HIGH 50: CPT | Mod: GC

## 2022-08-28 PROCEDURE — 71045 X-RAY EXAM CHEST 1 VIEW: CPT | Mod: 26

## 2022-08-28 RX ORDER — SODIUM CHLORIDE 9 MG/ML
1000 INJECTION, SOLUTION INTRAVENOUS
Refills: 0 | Status: DISCONTINUED | OUTPATIENT
Start: 2022-08-28 | End: 2022-08-29

## 2022-08-28 RX ORDER — POTASSIUM PHOSPHATE, MONOBASIC POTASSIUM PHOSPHATE, DIBASIC 236; 224 MG/ML; MG/ML
30 INJECTION, SOLUTION INTRAVENOUS ONCE
Refills: 0 | Status: COMPLETED | OUTPATIENT
Start: 2022-08-28 | End: 2022-08-28

## 2022-08-28 RX ADMIN — HEPARIN SODIUM 5000 UNIT(S): 5000 INJECTION INTRAVENOUS; SUBCUTANEOUS at 05:17

## 2022-08-28 RX ADMIN — Medication 125 MILLIGRAM(S): at 12:22

## 2022-08-28 RX ADMIN — Medication 125 MILLIGRAM(S): at 17:40

## 2022-08-28 RX ADMIN — BUDESONIDE AND FORMOTEROL FUMARATE DIHYDRATE 2 PUFF(S): 160; 4.5 AEROSOL RESPIRATORY (INHALATION) at 05:19

## 2022-08-28 RX ADMIN — HEPARIN SODIUM 5000 UNIT(S): 5000 INJECTION INTRAVENOUS; SUBCUTANEOUS at 14:36

## 2022-08-28 RX ADMIN — Medication 125 MILLIGRAM(S): at 00:09

## 2022-08-28 RX ADMIN — SODIUM CHLORIDE 60 MILLILITER(S): 9 INJECTION, SOLUTION INTRAVENOUS at 03:33

## 2022-08-28 RX ADMIN — BUDESONIDE AND FORMOTEROL FUMARATE DIHYDRATE 2 PUFF(S): 160; 4.5 AEROSOL RESPIRATORY (INHALATION) at 17:40

## 2022-08-28 RX ADMIN — Medication 125 MILLIGRAM(S): at 05:17

## 2022-08-28 RX ADMIN — POTASSIUM PHOSPHATE, MONOBASIC POTASSIUM PHOSPHATE, DIBASIC 85 MILLIMOLE(S): 236; 224 INJECTION, SOLUTION INTRAVENOUS at 12:41

## 2022-08-28 RX ADMIN — ISOSORBIDE MONONITRATE 30 MILLIGRAM(S): 60 TABLET, EXTENDED RELEASE ORAL at 09:09

## 2022-08-28 RX ADMIN — HEPARIN SODIUM 5000 UNIT(S): 5000 INJECTION INTRAVENOUS; SUBCUTANEOUS at 21:10

## 2022-08-28 NOTE — PROGRESS NOTE ADULT - PROBLEM SELECTOR PLAN 7
Pt with hx of COPD, not on home O2. Takes advair and albuterol. Pt is tachypnic but likely at baseline as per documentation.     Plan:   - patient currently on RA  - 3 to 4 cm LLL nodule  -> was meant to get IR guided biopsy done after discharge in March. Unclear if this happened.

## 2022-08-28 NOTE — PROGRESS NOTE ADULT - ASSESSMENT
incomplete 89 year old male (poor historian) with PMH HTN, CAD s/p PCI w 3 DAYANA, CKD (Cr 1.7- 2.4), COPD (not on home O2), arthritis, aortic aneurysm, BPH, HLD, EF 45-50%, lung nodules, and chronic constipation and Psx of ex lap and right radical extended hemicolectomy with ileocolonic anastomosis (Dr. Headley 4/2017) and presumed b/i inguinal hernia repairs, presents to the ED on 8/24 with c/o one week of chest pain and difficulty breathing. On admission, pt febrile to 102.4 rectal, nontachycardic, hypertensive (159/61) with WBC 30.12 and lactate of 2.3. CTAP with IV contrast demonstrates a distended acalculous gallbladder with no acute inflammation. Surgery consulted for r/o acalculous cholecystitis. Pt lethargic and unable to provide reliable subjective history, but abdomen soft, nondistended, mild diffusely TTP without rebound or guarding. LFTs wnl. Low suspicion for cholecystitis at this time given lack of inflammation on scan. Distension may be due to poor PO intake. Hb increased from previous admission suggests dehydration and imaging suggests a component of ischemic colitis. Pt now s/p 3.2L NS with normalization of lactate. Pt now C Diff positive w/ liquidy BMs. As such, concomitant biliary pathology is unlikely however RUQ US was abandoned due to pt agitation. Repeat US is currently pending.    Continue serial abdominal exams  Will continue to follow. Please page Team 4 with questions/clinical changes. [phone]

## 2022-08-28 NOTE — PROGRESS NOTE ADULT - ASSESSMENT
Assessment:    NEURO  No active issues.  #    CARDIO  No active issues.  #    PULM  No active issues.  #    GI  No active issues.  #    RENAL/  No active issues.  #    ID  No active issues.  #    HEME/ONC  No active issues.  #    DISPO  F:  E:  N:  GI PPX:  DVT PPX:  DISPO: 89 y/o M w/ PMH CAD, CKD3, COPD, chronic diarrhea who presented for diarrhea, tachypnea. Admitted to medicine telemetry for sepsis 2/2 C difficile infection. C/w oral vancomycin.

## 2022-08-28 NOTE — PROGRESS NOTE ADULT - PROBLEM SELECTOR PLAN 6
On home aspirin, plavix, simvistatin, also imdur 30. Stents reportedly placed in 2013. Unclear why patient is still on anti-platelet.  - continue imdur 30mg qd   - hold home aspirin 81mg qd until patient better able to take PO  - hold home plavix 75mg qd until patient better able to take PO  - continue home simvistatin 20mg qd as patient is better able to take PO    #HLD  - Restarted home simvistatin 20mg qd patient is better able to take PO

## 2022-08-28 NOTE — PROGRESS NOTE ADULT - PROBLEM SELECTOR PLAN 5
RESOLVED as of 8/26  Pt admitted with bicarb of 18 (about at baseline per chart review) and tachypneic which is chronica as per chart review. pH on ABG 7.3  Anion gap normal   Pt is likely metabolic acidosis 2/2 diarrhea and CKD    Plan:   - Treat diarrhea and rehydrate as above  - Continue to monitor  - Bicarb now normal, 23 as of 8/28

## 2022-08-28 NOTE — PROGRESS NOTE ADULT - PROBLEM SELECTOR PLAN 10
Pt. on metoprolol succinate 25, valsartan 80 at home.     Plan:   - Hold home meds in the setting of hypotension and sepsis.  - Antihypertenisves as above

## 2022-08-28 NOTE — PROGRESS NOTE ADULT - SUBJECTIVE AND OBJECTIVE BOX
OVERNIGHT EVENTS:    SUBJECTIVE / INTERVAL HPI: Patient seen and examined at bedside.     VITAL SIGNS:  Vital Signs Last 24 Hrs  T(C): 36.7 (28 Aug 2022 05:43), Max: 37.2 (27 Aug 2022 14:46)  T(F): 98.1 (28 Aug 2022 05:43), Max: 98.9 (27 Aug 2022 14:46)  HR: 70 (28 Aug 2022 04:09) (60 - 72)  BP: 156/70 (28 Aug 2022 04:09) (134/63 - 172/74)  BP(mean): 101 (28 Aug 2022 04:09) (91 - 107)  RR: 18 (28 Aug 2022 04:09) (17 - 18)  SpO2: 93% (28 Aug 2022 04:09) (93% - 99%)    Parameters below as of 28 Aug 2022 04:09  Patient On (Oxygen Delivery Method): room air      I&O's Summary    27 Aug 2022 07:01  -  28 Aug 2022 07:00  --------------------------------------------------------  IN: 1540 mL / OUT: 1450 mL / NET: 90 mL        PHYSICAL EXAM:    General: NAD  HEENT: NC/AT; PERRL, anicteric sclera; MMM  Neck: supple  Cardiovascular: +S1/S2; RRR  Respiratory: CTA B/L; no W/R/R  Gastrointestinal: soft, NT/ND; +BSx4  Extremities: WWP; no edema, clubbing or cyanosis  Vascular: 2+ radial, DP/PT pulses B/L  Neurological: AAOx3; no focal deficits    MEDICATIONS:  MEDICATIONS  (STANDING):  budesonide  80 MICROgram(s)/formoterol 4.5 MICROgram(s) Inhaler 2 Puff(s) Inhalation two times a day  dextrose 5%. 1000 milliLiter(s) (50 mL/Hr) IV Continuous <Continuous>  dextrose 5%. 1000 milliLiter(s) (100 mL/Hr) IV Continuous <Continuous>  dextrose 50% Injectable 25 Gram(s) IV Push once  dextrose 50% Injectable 12.5 Gram(s) IV Push once  dextrose 50% Injectable 25 Gram(s) IV Push once  glucagon  Injectable 1 milliGRAM(s) IntraMuscular once  heparin   Injectable 5000 Unit(s) SubCutaneous every 8 hours  insulin lispro (ADMELOG) corrective regimen sliding scale   SubCutaneous every 6 hours  isosorbide   mononitrate ER Tablet (IMDUR) 30 milliGRAM(s) Oral every 24 hours  lactated ringers. 1000 milliLiter(s) (60 mL/Hr) IV Continuous <Continuous>  potassium phosphate IVPB 30 milliMole(s) IV Intermittent once  vancomycin    Solution 125 milliGRAM(s) Oral every 6 hours    MEDICATIONS  (PRN):  ALBUTerol    90 MICROgram(s) HFA Inhaler 2 Puff(s) Inhalation every 6 hours PRN Shortness of Breath and/or Wheezing  dextrose Oral Gel 15 Gram(s) Oral once PRN Blood Glucose LESS THAN 70 milliGRAM(s)/deciliter      ALLERGIES:  Allergies    No Known Allergies    Intolerances        LABS:                        8.8    14.17 )-----------( 287      ( 28 Aug 2022 05:30 )             27.8     08-28    140  |  110<H>  |  13  ----------------------------<  115<H>  4.1   |  23  |  1.04    Ca    8.0<L>      28 Aug 2022 05:30  Phos  1.5     08-28  Mg     1.8     08-28    TPro  5.1<L>  /  Alb  2.7<L>  /  TBili  0.2  /  DBili  x   /  AST  14  /  ALT  7<L>  /  AlkPhos  95  08-27        CAPILLARY BLOOD GLUCOSE      POCT Blood Glucose.: 123 mg/dL (28 Aug 2022 06:07)      RADIOLOGY & ADDITIONAL TESTS: Reviewed.   OVERNIGHT EVENTS:    SUBJECTIVE / INTERVAL HPI: Patient seen and examined at bedside.     VITAL SIGNS:  Vital Signs Last 24 Hrs  T(C): 36.7 (28 Aug 2022 05:43), Max: 37.2 (27 Aug 2022 14:46)  T(F): 98.1 (28 Aug 2022 05:43), Max: 98.9 (27 Aug 2022 14:46)  HR: 70 (28 Aug 2022 04:09) (60 - 72)  BP: 156/70 (28 Aug 2022 04:09) (134/63 - 172/74)  BP(mean): 101 (28 Aug 2022 04:09) (91 - 107)  RR: 18 (28 Aug 2022 04:09) (17 - 18)  SpO2: 93% (28 Aug 2022 04:09) (93% - 99%)    Parameters below as of 28 Aug 2022 04:09  Patient On (Oxygen Delivery Method): room air      I&O's Summary    27 Aug 2022 07:01  -  28 Aug 2022 07:00  --------------------------------------------------------  IN: 1540 mL / OUT: 1450 mL / NET: 90 mL        PHYSICAL EXAM:    General: NAD, laying flat in bed  HEENT: mildly dry MM  Neck: supple  Cardiovascular: +S1/S2; RRR, no murmur  Respiratory: CTA B/L; no W/R/R; no accessory muscle use  Gastrointestinal: soft, NT/ND, normoactive bowel sounds  Extremities: warm to touch; no edema, clubbing; some signs of cyanosis on soles of feet  Vascular: 2+ radial, DP pulses B/L  Neurological: AAOx3    MEDICATIONS:  MEDICATIONS  (STANDING):  budesonide  80 MICROgram(s)/formoterol 4.5 MICROgram(s) Inhaler 2 Puff(s) Inhalation two times a day  dextrose 5%. 1000 milliLiter(s) (50 mL/Hr) IV Continuous <Continuous>  dextrose 5%. 1000 milliLiter(s) (100 mL/Hr) IV Continuous <Continuous>  dextrose 50% Injectable 25 Gram(s) IV Push once  dextrose 50% Injectable 12.5 Gram(s) IV Push once  dextrose 50% Injectable 25 Gram(s) IV Push once  glucagon  Injectable 1 milliGRAM(s) IntraMuscular once  heparin   Injectable 5000 Unit(s) SubCutaneous every 8 hours  insulin lispro (ADMELOG) corrective regimen sliding scale   SubCutaneous every 6 hours  isosorbide   mononitrate ER Tablet (IMDUR) 30 milliGRAM(s) Oral every 24 hours  lactated ringers. 1000 milliLiter(s) (60 mL/Hr) IV Continuous <Continuous>  potassium phosphate IVPB 30 milliMole(s) IV Intermittent once  vancomycin    Solution 125 milliGRAM(s) Oral every 6 hours    MEDICATIONS  (PRN):  ALBUTerol    90 MICROgram(s) HFA Inhaler 2 Puff(s) Inhalation every 6 hours PRN Shortness of Breath and/or Wheezing  dextrose Oral Gel 15 Gram(s) Oral once PRN Blood Glucose LESS THAN 70 milliGRAM(s)/deciliter      ALLERGIES:  Allergies    No Known Allergies    Intolerances        LABS:                        8.8    14.17 )-----------( 287      ( 28 Aug 2022 05:30 )             27.8     08-28    140  |  110<H>  |  13  ----------------------------<  115<H>  4.1   |  23  |  1.04    Ca    8.0<L>      28 Aug 2022 05:30  Phos  1.5     08-28  Mg     1.8     08-28    TPro  5.1<L>  /  Alb  2.7<L>  /  TBili  0.2  /  DBili  x   /  AST  14  /  ALT  7<L>  /  AlkPhos  95  08-27        CAPILLARY BLOOD GLUCOSE      POCT Blood Glucose.: 123 mg/dL (28 Aug 2022 06:07)      RADIOLOGY & ADDITIONAL TESTS: Reviewed.   OVERNIGHT EVENTS: AM Phos 1.5, ordered KPhos 30 IVP x1    SUBJECTIVE / INTERVAL HPI: Patient seen and examined at bedside. Patient states he feels better this morning. Patient has not had a bowel movement in 2 days.   No acute complaints at this time. Patient denies any headaches, fevers, chills, nausea, vomiting, SOB, chest pain, and abdominal pain. Remaining ROS are negative.     VITAL SIGNS:  Vital Signs Last 24 Hrs  T(C): 36.7 (28 Aug 2022 05:43), Max: 37.2 (27 Aug 2022 14:46)  T(F): 98.1 (28 Aug 2022 05:43), Max: 98.9 (27 Aug 2022 14:46)  HR: 70 (28 Aug 2022 04:09) (60 - 72)  BP: 156/70 (28 Aug 2022 04:09) (134/63 - 172/74)  BP(mean): 101 (28 Aug 2022 04:09) (91 - 107)  RR: 18 (28 Aug 2022 04:09) (17 - 18)  SpO2: 93% (28 Aug 2022 04:09) (93% - 99%)    Parameters below as of 28 Aug 2022 04:09  Patient On (Oxygen Delivery Method): room air      I&O's Summary    27 Aug 2022 07:01  -  28 Aug 2022 07:00  --------------------------------------------------------  IN: 1540 mL / OUT: 1450 mL / NET: 90 mL        PHYSICAL EXAM:    General: NAD, laying flat in bed  HEENT: mildly dry MM  Neck: supple  Cardiovascular: +S1/S2; RRR, no murmur  Respiratory:  no W/R/R; tachypneic   Gastrointestinal: soft, NT/ND, normoactive bowel sounds  Extremities: warm to touch; no edema or clubbing; some signs of cyanosis on soles of feet  Vascular: 2+ radial, DP pulses B/L  Neurological: AAOx3    MEDICATIONS:  MEDICATIONS  (STANDING):  budesonide  80 MICROgram(s)/formoterol 4.5 MICROgram(s) Inhaler 2 Puff(s) Inhalation two times a day  dextrose 5%. 1000 milliLiter(s) (50 mL/Hr) IV Continuous <Continuous>  dextrose 5%. 1000 milliLiter(s) (100 mL/Hr) IV Continuous <Continuous>  dextrose 50% Injectable 25 Gram(s) IV Push once  dextrose 50% Injectable 12.5 Gram(s) IV Push once  dextrose 50% Injectable 25 Gram(s) IV Push once  glucagon  Injectable 1 milliGRAM(s) IntraMuscular once  heparin   Injectable 5000 Unit(s) SubCutaneous every 8 hours  insulin lispro (ADMELOG) corrective regimen sliding scale   SubCutaneous every 6 hours  isosorbide   mononitrate ER Tablet (IMDUR) 30 milliGRAM(s) Oral every 24 hours  lactated ringers. 1000 milliLiter(s) (60 mL/Hr) IV Continuous <Continuous>  potassium phosphate IVPB 30 milliMole(s) IV Intermittent once  vancomycin    Solution 125 milliGRAM(s) Oral every 6 hours    MEDICATIONS  (PRN):  ALBUTerol    90 MICROgram(s) HFA Inhaler 2 Puff(s) Inhalation every 6 hours PRN Shortness of Breath and/or Wheezing  dextrose Oral Gel 15 Gram(s) Oral once PRN Blood Glucose LESS THAN 70 milliGRAM(s)/deciliter      ALLERGIES:  Allergies    No Known Allergies    Intolerances        LABS:                        8.8    14.17 )-----------( 287      ( 28 Aug 2022 05:30 )             27.8     08-28    140  |  110<H>  |  13  ----------------------------<  115<H>  4.1   |  23  |  1.04    Ca    8.0<L>      28 Aug 2022 05:30  Phos  1.5     08-28  Mg     1.8     08-28    TPro  5.1<L>  /  Alb  2.7<L>  /  TBili  0.2  /  DBili  x   /  AST  14  /  ALT  7<L>  /  AlkPhos  95  08-27        CAPILLARY BLOOD GLUCOSE      POCT Blood Glucose.: 123 mg/dL (28 Aug 2022 06:07)      RADIOLOGY & ADDITIONAL TESTS: Reviewed.

## 2022-08-28 NOTE — PROGRESS NOTE ADULT - SUBJECTIVE AND OBJECTIVE BOX
Pt seen and examined   no talking as much today as yesterday  needs prodding  says no diarrhea today but he says he is not eating    REVIEW OF SYSTEMS:  Constitutional: No fever,  Cardiovascular: No chest pain,  Gastrointestinal: No abdominal or epigastric pain. No nausea, vomiting No diarrhea  Skin: No itching, burning, rashes or lesions       MEDICATIONS:  MEDICATIONS  (STANDING):  budesonide  80 MICROgram(s)/formoterol 4.5 MICROgram(s) Inhaler 2 Puff(s) Inhalation two times a day  dextrose 5%. 1000 milliLiter(s) (100 mL/Hr) IV Continuous <Continuous>  dextrose 5%. 1000 milliLiter(s) (50 mL/Hr) IV Continuous <Continuous>  dextrose 50% Injectable 25 Gram(s) IV Push once  dextrose 50% Injectable 12.5 Gram(s) IV Push once  dextrose 50% Injectable 25 Gram(s) IV Push once  glucagon  Injectable 1 milliGRAM(s) IntraMuscular once  heparin   Injectable 5000 Unit(s) SubCutaneous every 8 hours  insulin lispro (ADMELOG) corrective regimen sliding scale   SubCutaneous every 6 hours  isosorbide   mononitrate ER Tablet (IMDUR) 30 milliGRAM(s) Oral every 24 hours  lactated ringers. 1000 milliLiter(s) (60 mL/Hr) IV Continuous <Continuous>  potassium phosphate IVPB 30 milliMole(s) IV Intermittent once  vancomycin    Solution 125 milliGRAM(s) Oral every 6 hours    MEDICATIONS  (PRN):  ALBUTerol    90 MICROgram(s) HFA Inhaler 2 Puff(s) Inhalation every 6 hours PRN Shortness of Breath and/or Wheezing  dextrose Oral Gel 15 Gram(s) Oral once PRN Blood Glucose LESS THAN 70 milliGRAM(s)/deciliter      Allergies    No Known Allergies    Intolerances        Vital Signs Last 24 Hrs  T(C): 36.5 (28 Aug 2022 10:00), Max: 37.2 (27 Aug 2022 14:46)  T(F): 97.7 (28 Aug 2022 10:00), Max: 98.9 (27 Aug 2022 14:46)  HR: 78 (28 Aug 2022 08:46) (60 - 78)  BP: 177/70 (28 Aug 2022 08:46) (134/63 - 177/70)  BP(mean): 100 (28 Aug 2022 08:46) (91 - 107)  RR: 20 (28 Aug 2022 08:46) (17 - 20)  SpO2: 95% (28 Aug 2022 08:46) (93% - 98%)    Parameters below as of 28 Aug 2022 08:46  Patient On (Oxygen Delivery Method): nasal cannula  O2 Flow (L/min): 2      08-27 @ 07:01  -  08-28 @ 07:00  --------------------------------------------------------  IN: 1540 mL / OUT: 1450 mL / NET: 90 mL        PHYSICAL EXAM:    General: poorly nourished; in no acute distress  HEENT: MMM, conjunctiva and sclera clear  Gastrointestinal: Soft non-tender sl-distended; Normal bowel sounds;   Skin: Warm and dry. No obvious rash    LABS:      CBC Full  -  ( 28 Aug 2022 05:30 )  WBC Count : 14.17 K/uL  RBC Count : 2.90 M/uL  Hemoglobin : 8.8 g/dL  Hematocrit : 27.8 %  Platelet Count - Automated : 287 K/uL  Mean Cell Volume : 95.9 fl  Mean Cell Hemoglobin : 30.3 pg  Mean Cell Hemoglobin Concentration : 31.7 gm/dL  Auto Neutrophil # : 11.13 K/uL  Auto Lymphocyte # : 1.08 K/uL  Auto Monocyte # : 1.17 K/uL  Auto Eosinophil # : 0.55 K/uL  Auto Basophil # : 0.07 K/uL  Auto Neutrophil % : 78.5 %  Auto Lymphocyte % : 7.6 %  Auto Monocyte % : 8.3 %  Auto Eosinophil % : 3.9 %  Auto Basophil % : 0.5 %    08-28    140  |  110<H>  |  13  ----------------------------<  115<H>  4.1   |  23  |  1.04    Ca    8.0<L>      28 Aug 2022 05:30  Phos  1.5     08-28  Mg     1.8     08-28    TPro  5.1<L>  /  Alb  2.7<L>  /  TBili  0.2  /  DBili  x   /  AST  14  /  ALT  7<L>  /  AlkPhos  95  08-27                      RADIOLOGY & ADDITIONAL STUDIES (The following images were personally reviewed):

## 2022-08-28 NOTE — PROGRESS NOTE ADULT - PROBLEM SELECTOR PLAN 8
RESOLVING  Likely secondary to sepsis (as above). Exam limited but does not appear focal and CTH negative.  AAOx3     Plan:   - Nephew at bedside on 8/27, says that patient looks near baseline  - Treatment as above

## 2022-08-28 NOTE — PROGRESS NOTE ADULT - PROBLEM SELECTOR PLAN 12
Fluids: 250cc LR PRN, LR maintenance @ 60 cc/hr  Electrolytes: replenish electrolytes aggressively given diarrhea  Diet: speech and swallow eval -> clear liquids, ensure   DVT Prophylaxis: heparin subq 5k TID given CKD  GI Prophylaxis: not indicated  Code Status: DNR/DNI as per recent [facility] last admission  Dispo: [facility]

## 2022-08-28 NOTE — PROGRESS NOTE ADULT - SUBJECTIVE AND OBJECTIVE BOX
INTERVAL HPI/OVERNIGHT EVENTS:    STATUS POST:      POST OPERATIVE DAY #:     SUBJECTIVE: Pt seen and examined at bedside this am by surgery team. Pt reports no diarrhea today.       Vital Signs Last 24 Hrs  T(C): 36.4 (28 Aug 2022 13:59), Max: 37.2 (27 Aug 2022 14:46)  T(F): 97.5 (28 Aug 2022 13:59), Max: 98.9 (27 Aug 2022 14:46)  HR: 78 (28 Aug 2022 08:46) (60 - 78)  BP: 177/70 (28 Aug 2022 08:46) (134/63 - 177/70)  BP(mean): 100 (28 Aug 2022 08:46) (91 - 101)  RR: 20 (28 Aug 2022 08:46) (18 - 20)  SpO2: 95% (28 Aug 2022 08:46) (93% - 95%)    Parameters below as of 28 Aug 2022 08:46  Patient On (Oxygen Delivery Method): nasal cannula  O2 Flow (L/min): 2      PHYSICAL EXAM:   Gen:  NAD, resting comfortably   CV: NSR  Pulm: no respiratory distress   Abd: soft, mild distension, moderate TTP llq, no rebound or guarding   Ext: WWP, no edema, SCDs in place     I&O's Detail    27 Aug 2022 07:01  -  28 Aug 2022 07:00  --------------------------------------------------------  IN:    IV PiggyBack: 100 mL    Lactated Ringers: 1200 mL    Lactated Ringers: 240 mL  Total IN: 1540 mL    OUT:    Incontinent per Condom Catheter (mL): 1050 mL    Voided (mL): 400 mL  Total OUT: 1450 mL    Total NET: 90 mL          LABS:                        8.8    14.17 )-----------( 287      ( 28 Aug 2022 05:30 )             27.8     08-28    140  |  110<H>  |  13  ----------------------------<  115<H>  4.1   |  23  |  1.04    Ca    8.0<L>      28 Aug 2022 05:30  Phos  1.5     08-28  Mg     1.8     08-28    TPro  5.1<L>  /  Alb  2.7<L>  /  TBili  0.2  /  DBili  x   /  AST  14  /  ALT  7<L>  /  AlkPhos  95  08-27    LIVER FUNCTIONS - ( 27 Aug 2022 07:22 )  Alb: 2.7 g/dL / Pro: 5.1 g/dL / ALK PHOS: 95 U/L / ALT: 7 U/L / AST: 14 U/L / GGT: x             CAPILLARY BLOOD GLUCOSE          Culture - Urine (collected 24 Aug 2022 18:55)  Source: Clean Catch Clean Catch (Midstream)  Final Report (26 Aug 2022 07:59):    Contamination apparent after additional incubation. Please disregard    previous report.    Lab requests new specimen if clinically indicated.    Culture - Blood (collected 24 Aug 2022 15:30)  Source: .Blood Blood-Peripheral  Preliminary Report (27 Aug 2022 21:00):    No growth at 3 days.    Culture - Blood (collected 24 Aug 2022 15:20)  Source: .Blood Blood-Peripheral  Preliminary Report (27 Aug 2022 21:00):    No growth at 3 days.         RADIOLOGY & ADDITIONAL STUDIES:     INTERVAL HPI/OVERNIGHT EVENTS:    STATUS POST:      POST OPERATIVE DAY #:     SUBJECTIVE: Pt seen and examined at bedside this am by surgery team. Pt continues to c/o diarrhea. Denies f/n/v/cp       Vital Signs Last 24 Hrs  T(C): 36.4 (28 Aug 2022 13:59), Max: 37.2 (27 Aug 2022 14:46)  T(F): 97.5 (28 Aug 2022 13:59), Max: 98.9 (27 Aug 2022 14:46)  HR: 78 (28 Aug 2022 08:46) (60 - 78)  BP: 177/70 (28 Aug 2022 08:46) (134/63 - 177/70)  BP(mean): 100 (28 Aug 2022 08:46) (91 - 101)  RR: 20 (28 Aug 2022 08:46) (18 - 20)  SpO2: 95% (28 Aug 2022 08:46) (93% - 95%)    Parameters below as of 28 Aug 2022 08:46  Patient On (Oxygen Delivery Method): nasal cannula  O2 Flow (L/min): 2      PHYSICAL EXAM:   Gen:  NAD, resting comfortably   CV: NSR  Pulm: no respiratory distress on NC   Abd: soft, mild distension, moderate TTP llq, no rebound or guarding   Ext: WWP, no edema, SCDs in place     I&O's Detail    27 Aug 2022 07:01  -  28 Aug 2022 07:00  --------------------------------------------------------  IN:    IV PiggyBack: 100 mL    Lactated Ringers: 1200 mL    Lactated Ringers: 240 mL  Total IN: 1540 mL    OUT:    Incontinent per Condom Catheter (mL): 1050 mL    Voided (mL): 400 mL  Total OUT: 1450 mL    Total NET: 90 mL          LABS:                        8.8    14.17 )-----------( 287      ( 28 Aug 2022 05:30 )             27.8     08-28    140  |  110<H>  |  13  ----------------------------<  115<H>  4.1   |  23  |  1.04    Ca    8.0<L>      28 Aug 2022 05:30  Phos  1.5     08-28  Mg     1.8     08-28    TPro  5.1<L>  /  Alb  2.7<L>  /  TBili  0.2  /  DBili  x   /  AST  14  /  ALT  7<L>  /  AlkPhos  95  08-27    LIVER FUNCTIONS - ( 27 Aug 2022 07:22 )  Alb: 2.7 g/dL / Pro: 5.1 g/dL / ALK PHOS: 95 U/L / ALT: 7 U/L / AST: 14 U/L / GGT: x             CAPILLARY BLOOD GLUCOSE          Culture - Urine (collected 24 Aug 2022 18:55)  Source: Clean Catch Clean Catch (Midstream)  Final Report (26 Aug 2022 07:59):    Contamination apparent after additional incubation. Please disregard    previous report.    Lab requests new specimen if clinically indicated.    Culture - Blood (collected 24 Aug 2022 15:30)  Source: .Blood Blood-Peripheral  Preliminary Report (27 Aug 2022 21:00):    No growth at 3 days.    Culture - Blood (collected 24 Aug 2022 15:20)  Source: .Blood Blood-Peripheral  Preliminary Report (27 Aug 2022 21:00):    No growth at 3 days.         RADIOLOGY & ADDITIONAL STUDIES:

## 2022-08-28 NOTE — PROGRESS NOTE ADULT - PROBLEM SELECTOR PLAN 4
-patient was tachypenic this morning  -patient afebrile orally, f/u rectal temp  -f/u chest x-ray unremarkable  - consider restarting home advair and albuterol

## 2022-08-28 NOTE — PROGRESS NOTE ADULT - SUBJECTIVE AND OBJECTIVE BOX
Interventional, Pulmonary, Critical, Chest Special Procedures.    Pt was seen and fully examined by myself.     Time spent with patient in minutes: 67    Patient is a 89y old  Male who presents with a chief complaint of altered mental status secondary to sepsis (28 Aug 2022 13:38) The patient more lethargic today and not really engaging.     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 Patient is altered on exam, but per ED report, he has been having chronic diarrhea, tachypnea, and chest pain that was worsening, prompting patient to call EMS to bring him from home into [facility]. Of note, patient was recently admitted 7/19-7/23 to [facility] for diarrhea after being sent in from his PCP office for hypotension and chronic diarrhea. During that time his diarrhea resolved and he was told to f/u with outpatient GI with Dr. Hammer. Notably he was fully conversant with mild memory impairment on admission and throughout hospital stay.    ED vitals: T 39.3   HR 78  RR 36 (taken manually)  BP 97/59  SpO2 98% on 2L  Labs significant: WBC 30, Hgb 11.5, lactate 2.3 -> 3.2 after 2L NS, UA wnl  Imaging/EKG: CT H/C/A/P known lung nodule, colitis  Interventions: zosyn, zofran, 3.2L NS, flagyl, acetaminophen     (24 Aug 2022 21:48)      REVIEW OF SYSTEMS:  Constitutional: No fever, weight loss, chills + fatigue  Eyes: No eye pain, visual disturbances, or discharge  ENMT:  + difficulty hearing, tinnitus, vertigo; No sinus or throat pain. No epistaxis, +dysphagia, dysphonia, hoarseness no odynophagia  Neck: No pain, stiffness or neck swelling.  No masses or deformities  Respiratory: + cough, wheezing, hemoptysis  + COPD  - ILD   - PE   - ASTHMA     - PNEUMONIA  Cardiovascular: No chest pain, dysrhythmia, palpitations, dizziness or edema + CAD   - CHF   - HTN  Gastrointestinal: No abdominal or epigastric pain. No nausea, vomiting or hematemesis; No diarrhea or constipation. No melena or hematochezia, Icterus.          Genitourinary: No dysuria, frequency, hematuria or incontinence   - CKD/DEBBIE      - ESRD  Neurological: No headaches, +memory loss, loss of strength, numbness or tremors      +DEMENTIA     - STROKE    - SEIZURE  Skin: No itching, burning, rashes or lesions   Lymph Nodes: No enlarged glands  Endocrine: No heat or cold intolerance; No hair loss       + DM     - THYROID DISORDER  Musculoskeletal: No joint pain or swelling; No muscle, back or extremity pain, No edema  Psychiatric: No depression, anxiety, mood swings or difficulty sleeping  Heme/Lymph: No easy bruising or bleeding gums         - ANEMIA      - CANCER   -COAGULOPATHY  Allergy and Immunologic: No hives or eczema    PAST MEDICAL & SURGICAL HISTORY:  HLD (hyperlipidemia)      CAD (coronary artery disease)      BPH (benign prostatic hyperplasia)      COPD, moderate      Chronic diarrhea      Stage 3 chronic kidney disease      S/P cataract extraction      H/O right hemicolectomy        FAMILY HISTORY:  No family history of cardiovascular disease (Father, Mother)      SOCIAL HISTORY:      - Tobacco     - ETOH    Allergies    No Known Allergies    Intolerances      Vital Signs Last 24 Hrs  T(C): 36.5 (28 Aug 2022 10:00), Max: 37.2 (27 Aug 2022 14:46)  T(F): 97.7 (28 Aug 2022 10:00), Max: 98.9 (27 Aug 2022 14:46)  HR: 78 (28 Aug 2022 08:46) (60 - 78)  BP: 177/70 (28 Aug 2022 08:46) (134/63 - 177/70)  BP(mean): 100 (28 Aug 2022 08:46) (91 - 101)  RR: 20 (28 Aug 2022 08:46) (18 - 20)  SpO2: 95% (28 Aug 2022 08:46) (93% - 95%)    Parameters below as of 28 Aug 2022 08:46  Patient On (Oxygen Delivery Method): nasal cannula  O2 Flow (L/min): 2 08-27 @ 07:01  -  08-28 @ 07:00  --------------------------------------------------------  IN: 1540 mL / OUT: 1450 mL / NET: 90 mL          MEDICATIONS:  MEDICATIONS  (STANDING):  budesonide  80 MICROgram(s)/formoterol 4.5 MICROgram(s) Inhaler 2 Puff(s) Inhalation two times a day  dextrose 5%. 1000 milliLiter(s) (50 mL/Hr) IV Continuous <Continuous>  dextrose 5%. 1000 milliLiter(s) (100 mL/Hr) IV Continuous <Continuous>  dextrose 50% Injectable 25 Gram(s) IV Push once  dextrose 50% Injectable 12.5 Gram(s) IV Push once  dextrose 50% Injectable 25 Gram(s) IV Push once  glucagon  Injectable 1 milliGRAM(s) IntraMuscular once  heparin   Injectable 5000 Unit(s) SubCutaneous every 8 hours  insulin lispro (ADMELOG) corrective regimen sliding scale   SubCutaneous every 6 hours  isosorbide   mononitrate ER Tablet (IMDUR) 30 milliGRAM(s) Oral every 24 hours  lactated ringers. 1000 milliLiter(s) (60 mL/Hr) IV Continuous <Continuous>  vancomycin    Solution 125 milliGRAM(s) Oral every 6 hours    MEDICATIONS  (PRN):  ALBUTerol    90 MICROgram(s) HFA Inhaler 2 Puff(s) Inhalation every 6 hours PRN Shortness of Breath and/or Wheezing  dextrose Oral Gel 15 Gram(s) Oral once PRN Blood Glucose LESS THAN 70 milliGRAM(s)/deciliter      PHYSICAL EXAM:  Un Comfortable, no immediate distress  Eyes: PERRL, EOM intact; conjunctiva and sclera clear  Head: Normocephalic;  No Trauma  ENMT: No nasal discharge, hoarseness, +cough no  hemoptysis  Neck: Supple; non tender; no masses or deformities.    No JVD  Respiratory: CTA,  - WHEEZING   - RHONCHI  - RALES  + CRACKLES.  Diminished breath sounds  BILATERAL  RIGHT  LEFT bases   Cardiovascular: Regular rate and rhythm. S1 and S2 Normal; No murmurs, gallops or rubs     - PPM/AICD  Gastrointestinal: Soft mildly tender, non-distended; Normal bowel sounds; No hepatosplenomegaly.     -PEG    -  GT   + RUBIO  Genitourinary: No costovertebral angle tenderness. No dysuria  Extremities: AROM, No clubbing, cyanosis or edema    Vascular: Peripheral pulses palpable 2+ bilaterally  Neurological: Awake, moving extremities   Skin: Warm and dry. No obvious rash  Lymph Nodes: No acute cervical or supraclavicular adenopathy  Psychiatric: less  engaging     DEVICES:  - DENTURES   +IV R / L     - ETUBE   -TRACH   -CTUBE  R / L    LABS:                          8.8    14.17 )-----------( 287      ( 28 Aug 2022 05:30 )             27.8     08-28    140  |  110<H>  |  13  ----------------------------<  115<H>  4.1   |  23  |  1.04    Ca    8.0<L>      28 Aug 2022 05:30  Phos  1.5     08-28  Mg     1.8     08-28    TPro  5.1<L>  /  Alb  2.7<L>  /  TBili  0.2  /  DBili  x   /  AST  14  /  ALT  7<L>  /  AlkPhos  95  08-27      RADIOLOGY & ADDITIONAL STUDIES (The following images were personally reviewed):

## 2022-08-28 NOTE — PROGRESS NOTE ADULT - PROBLEM SELECTOR PLAN 1
Met 3/4 SIRS criteria (T102.4, WBC 30.12, RR 36) with lactate 2.3 -> 3.2 -> 1.9. Likely secondary to C difficile.  C diff positive. GI PCR neg. Surgery also following as they were consulted for r/o acalculous sarah, however low suspicion.    Plan:   - F/u repeat RUQ US to r/o acalculous sarah  - C/w isolation precautions   - C/w oral vancomycin 125mg q6h for 10 days, 8/25  - F/u blood cultures  - Monitor stool burden  - Replenish hydration w/ D5LR or LR boluses as needed Met 3/4 SIRS criteria (T102.4, WBC 30.12, RR 36) with lactate 2.3 -> 3.2 -> 1.9. Likely secondary to C difficile.  C diff positive. GI PCR neg. Surgery also following as they were consulted for r/o acalculous sarah, however low suspicion.    Plan:   - F/u repeat RUQ US to r/o acalculous sarah  - C/w isolation precautions   - C/w oral vancomycin 125mg q6h for 10 days, 8/25  - F/u blood cultures  - Monitor stool burden  - hydration w/ 250cc LR PRN, LR maintenance @ 60 cc/hr

## 2022-08-28 NOTE — PROGRESS NOTE ADULT - PROBLEM SELECTOR PLAN 3
Known history of chronic diarrhea after a R. hemicolectomy in 2017. Follows with Dr Hammer outpatient    Plan:   - Abx and hydration as above  - Dr Hammer to follow patient  - no episodes of diarrhea the last 2 days

## 2022-08-29 LAB
ALBUMIN SERPL ELPH-MCNC: 2.8 G/DL — LOW (ref 3.3–5)
ALP SERPL-CCNC: 85 U/L — SIGNIFICANT CHANGE UP (ref 40–120)
ALT FLD-CCNC: 10 U/L — SIGNIFICANT CHANGE UP (ref 10–45)
ANION GAP SERPL CALC-SCNC: 10 MMOL/L — SIGNIFICANT CHANGE UP (ref 5–17)
AST SERPL-CCNC: 18 U/L — SIGNIFICANT CHANGE UP (ref 10–40)
BASOPHILS # BLD AUTO: 0.04 K/UL — SIGNIFICANT CHANGE UP (ref 0–0.2)
BASOPHILS NFR BLD AUTO: 0.3 % — SIGNIFICANT CHANGE UP (ref 0–2)
BILIRUB SERPL-MCNC: 0.3 MG/DL — SIGNIFICANT CHANGE UP (ref 0.2–1.2)
BUN SERPL-MCNC: 10 MG/DL — SIGNIFICANT CHANGE UP (ref 7–23)
CALCIUM SERPL-MCNC: 8.2 MG/DL — LOW (ref 8.4–10.5)
CHLORIDE SERPL-SCNC: 109 MMOL/L — HIGH (ref 96–108)
CO2 SERPL-SCNC: 22 MMOL/L — SIGNIFICANT CHANGE UP (ref 22–31)
CREAT SERPL-MCNC: 1.03 MG/DL — SIGNIFICANT CHANGE UP (ref 0.5–1.3)
CULTURE RESULTS: SIGNIFICANT CHANGE UP
CULTURE RESULTS: SIGNIFICANT CHANGE UP
EGFR: 68 ML/MIN/1.73M2 — SIGNIFICANT CHANGE UP
EOSINOPHIL # BLD AUTO: 0.63 K/UL — HIGH (ref 0–0.5)
EOSINOPHIL NFR BLD AUTO: 5.2 % — SIGNIFICANT CHANGE UP (ref 0–6)
GLUCOSE BLDC GLUCOMTR-MCNC: 104 MG/DL — HIGH (ref 70–99)
GLUCOSE BLDC GLUCOMTR-MCNC: 111 MG/DL — HIGH (ref 70–99)
GLUCOSE BLDC GLUCOMTR-MCNC: 116 MG/DL — HIGH (ref 70–99)
GLUCOSE BLDC GLUCOMTR-MCNC: 95 MG/DL — SIGNIFICANT CHANGE UP (ref 70–99)
GLUCOSE SERPL-MCNC: 95 MG/DL — SIGNIFICANT CHANGE UP (ref 70–99)
HCT VFR BLD CALC: 28.2 % — LOW (ref 39–50)
HGB BLD-MCNC: 9.2 G/DL — LOW (ref 13–17)
IMM GRANULOCYTES NFR BLD AUTO: 1 % — SIGNIFICANT CHANGE UP (ref 0–1.5)
LYMPHOCYTES # BLD AUTO: 1.44 K/UL — SIGNIFICANT CHANGE UP (ref 1–3.3)
LYMPHOCYTES # BLD AUTO: 12 % — LOW (ref 13–44)
MAGNESIUM SERPL-MCNC: 2.1 MG/DL — SIGNIFICANT CHANGE UP (ref 1.6–2.6)
MCHC RBC-ENTMCNC: 30.5 PG — SIGNIFICANT CHANGE UP (ref 27–34)
MCHC RBC-ENTMCNC: 32.6 GM/DL — SIGNIFICANT CHANGE UP (ref 32–36)
MCV RBC AUTO: 93.4 FL — SIGNIFICANT CHANGE UP (ref 80–100)
MONOCYTES # BLD AUTO: 1.56 K/UL — HIGH (ref 0–0.9)
MONOCYTES NFR BLD AUTO: 13 % — SIGNIFICANT CHANGE UP (ref 2–14)
NEUTROPHILS # BLD AUTO: 8.22 K/UL — HIGH (ref 1.8–7.4)
NEUTROPHILS NFR BLD AUTO: 68.5 % — SIGNIFICANT CHANGE UP (ref 43–77)
NRBC # BLD: 0 /100 WBCS — SIGNIFICANT CHANGE UP (ref 0–0)
PHOSPHATE SERPL-MCNC: 2 MG/DL — LOW (ref 2.5–4.5)
PLATELET # BLD AUTO: 288 K/UL — SIGNIFICANT CHANGE UP (ref 150–400)
POTASSIUM SERPL-MCNC: 4.3 MMOL/L — SIGNIFICANT CHANGE UP (ref 3.5–5.3)
POTASSIUM SERPL-SCNC: 4.3 MMOL/L — SIGNIFICANT CHANGE UP (ref 3.5–5.3)
PROT SERPL-MCNC: 5.4 G/DL — LOW (ref 6–8.3)
RBC # BLD: 3.02 M/UL — LOW (ref 4.2–5.8)
RBC # FLD: 13.3 % — SIGNIFICANT CHANGE UP (ref 10.3–14.5)
SODIUM SERPL-SCNC: 141 MMOL/L — SIGNIFICANT CHANGE UP (ref 135–145)
SPECIMEN SOURCE: SIGNIFICANT CHANGE UP
SPECIMEN SOURCE: SIGNIFICANT CHANGE UP
WBC # BLD: 12.01 K/UL — HIGH (ref 3.8–10.5)
WBC # FLD AUTO: 12.01 K/UL — HIGH (ref 3.8–10.5)

## 2022-08-29 PROCEDURE — 99232 SBSQ HOSP IP/OBS MODERATE 35: CPT | Mod: GC

## 2022-08-29 RX ORDER — ACETAMINOPHEN 500 MG
650 TABLET ORAL ONCE
Refills: 0 | Status: COMPLETED | OUTPATIENT
Start: 2022-08-29 | End: 2022-08-29

## 2022-08-29 RX ORDER — CLOPIDOGREL BISULFATE 75 MG/1
75 TABLET, FILM COATED ORAL EVERY 24 HOURS
Refills: 0 | Status: DISCONTINUED | OUTPATIENT
Start: 2022-08-29 | End: 2022-09-05

## 2022-08-29 RX ORDER — MIRTAZAPINE 45 MG/1
30 TABLET, ORALLY DISINTEGRATING ORAL EVERY 24 HOURS
Refills: 0 | Status: DISCONTINUED | OUTPATIENT
Start: 2022-08-29 | End: 2022-09-05

## 2022-08-29 RX ORDER — ASPIRIN/CALCIUM CARB/MAGNESIUM 324 MG
81 TABLET ORAL EVERY 24 HOURS
Refills: 0 | Status: DISCONTINUED | OUTPATIENT
Start: 2022-08-29 | End: 2022-09-05

## 2022-08-29 RX ADMIN — Medication 125 MILLIGRAM(S): at 18:15

## 2022-08-29 RX ADMIN — HEPARIN SODIUM 5000 UNIT(S): 5000 INJECTION INTRAVENOUS; SUBCUTANEOUS at 13:09

## 2022-08-29 RX ADMIN — Medication 125 MILLIGRAM(S): at 05:00

## 2022-08-29 RX ADMIN — Medication 125 MILLIGRAM(S): at 13:10

## 2022-08-29 RX ADMIN — HEPARIN SODIUM 5000 UNIT(S): 5000 INJECTION INTRAVENOUS; SUBCUTANEOUS at 05:00

## 2022-08-29 RX ADMIN — Medication 62.5 MILLIMOLE(S): at 09:46

## 2022-08-29 RX ADMIN — BUDESONIDE AND FORMOTEROL FUMARATE DIHYDRATE 2 PUFF(S): 160; 4.5 AEROSOL RESPIRATORY (INHALATION) at 18:15

## 2022-08-29 RX ADMIN — Medication 125 MILLIGRAM(S): at 00:47

## 2022-08-29 RX ADMIN — HEPARIN SODIUM 5000 UNIT(S): 5000 INJECTION INTRAVENOUS; SUBCUTANEOUS at 23:00

## 2022-08-29 RX ADMIN — Medication 650 MILLIGRAM(S): at 09:46

## 2022-08-29 RX ADMIN — Medication 650 MILLIGRAM(S): at 10:40

## 2022-08-29 RX ADMIN — ISOSORBIDE MONONITRATE 30 MILLIGRAM(S): 60 TABLET, EXTENDED RELEASE ORAL at 10:10

## 2022-08-29 RX ADMIN — BUDESONIDE AND FORMOTEROL FUMARATE DIHYDRATE 2 PUFF(S): 160; 4.5 AEROSOL RESPIRATORY (INHALATION) at 06:45

## 2022-08-29 RX ADMIN — CLOPIDOGREL BISULFATE 75 MILLIGRAM(S): 75 TABLET, FILM COATED ORAL at 16:16

## 2022-08-29 RX ADMIN — Medication 81 MILLIGRAM(S): at 16:16

## 2022-08-29 RX ADMIN — ALBUTEROL 2 PUFF(S): 90 AEROSOL, METERED ORAL at 23:49

## 2022-08-29 NOTE — PROGRESS NOTE ADULT - PROBLEM SELECTOR PLAN 10
Pt. on metoprolol succinate 25, valsartan 80 at home.     Plan:   - Hold home meds in the setting of hypotension and sepsis.  - Antihypertenisves as above Pt with hx of COPD, not on home O2. Takes advair and albuterol. Pt is tachypnic but likely at baseline as per documentation.     Plan:   - patient currently on RA  - 3 to 4 cm LLL nodule  -> was meant to get IR guided biopsy done after discharge in March. Unclear if this happened.

## 2022-08-29 NOTE — PROGRESS NOTE ADULT - PROBLEM SELECTOR PLAN 5
RESOLVED as of 8/26  Pt admitted with bicarb of 18 (about at baseline per chart review) and tachypneic which is chronica as per chart review. pH on ABG 7.3  Anion gap normal   Pt is likely metabolic acidosis 2/2 diarrhea and CKD    Plan:   - Treat diarrhea and rehydrate as above  - Continue to monitor  - Bicarb now normal, 23 as of 8/28 On home aspirin, plavix, simvistatin, also imdur 30. Stents reportedly placed in 2013. Unclear why patient is still on anti-platelet.  (Had initially held home meds as pt had incomplete swallow eval so was prioritizing the most important meds and restart others as mental status improved)  - continue imdur 30mg qd   - restart home aspirin 81mg qd as appropriate  - restart home plavix 75mg qd as appropriate   - restart home simvistatin 20mg qd as appropriate    #HLD  - restart simvistatin 20mg qd as appropriate On home aspirin, plavix, simvistatin, also imdur 30. Stents reportedly placed in 2013. Unclear why patient is still on anti-platelet.  (Had initially held home meds as pt had incomplete swallow eval so was prioritizing the most important meds and restart others as mental status improved)  - Continue imdur 30mg qd   - Restart home aspirin 81mg qd as appropriate  - Restart home plavix 75mg qd as appropriate     #HLD  - Restart simvistatin 20mg qd as appropriate

## 2022-08-29 NOTE — PROGRESS NOTE ADULT - SUBJECTIVE AND OBJECTIVE BOX
****transfer acceptance note****  [address] --> [facility]    TIARA GOMEZ  89y, Male    Patient is a 89y old  Male who presents with a chief complaint of altered mental status secondary to sepsis (29 Aug 2022 12:40)    Hospital course: 89 y/o M w/ PMH CAD, CKD3, COPD, chronic diarrhea who presented for diarrhea. Admitted to medicine telemetry for sepsis 2/2 C difficile infection. Abd xray without dilated bowels. Started on oral vancomycin. Had diffuse diarrhea. Leukocytosis and diarrhea gradually improved, diarrhea now resolved. Presented w/ AMS, now improved (aaox3 but confused, likely not yet back at baseline, also c/b sundowning). C/w oral vancomycin and restart home meds as appropriate. Pt was stable for stepdown on 8/29.       INTERVAL HPI:    ROS: otherwise negative      T(C): 37.1 (08-29-22 @ 12:30), Max: 37.3 (08-28-22 @ 18:00)  HR: 70 (08-29-22 @ 12:30) (66 - 76)  BP: 145/78 (08-29-22 @ 12:30) (137/77 - 170/78)  RR: 18 (08-29-22 @ 12:30) (17 - 19)  SpO2: 97% (08-29-22 @ 12:30) (94% - 98%)  Wt(kg): --Vital Signs Last 24 Hrs  T(C): 37.1 (29 Aug 2022 12:30), Max: 37.3 (28 Aug 2022 18:00)  T(F): 98.8 (29 Aug 2022 12:30), Max: 99.2 (28 Aug 2022 18:00)  HR: 70 (29 Aug 2022 12:30) (66 - 76)  BP: 145/78 (29 Aug 2022 12:30) (137/77 - 170/78)  BP(mean): 107 (29 Aug 2022 11:32) (99 - 112)  RR: 18 (29 Aug 2022 12:30) (17 - 19)  SpO2: 97% (29 Aug 2022 12:30) (94% - 98%)    Parameters below as of 29 Aug 2022 12:30  Patient On (Oxygen Delivery Method): room air        PHYSICAL EXAM:  Constitutional: resting comfortably in bed; NAD  Head: NC/AT  Eyes: PERRL, EOMI, anicteric sclera  ENT: no nasal discharge; dry MM  Neck: supple; no JVD or thyromegaly  Respiratory: CTA B/L; no W/R/R, no retractions  Cardiac: +S1/S2; RRR; no M/R/G; PMI non-displaced  Gastrointestinal: soft, NT/ND; no rebound or guarding; +BSx4  Extremities: WWP, no clubbing or cyanosis; no peripheral edema. Capillary refill <2 sec  Vascular: 2+ radial, DP/PT pulses B/L  Dermatologic: skin warm, dry and intact; no rashes, wounds, or scars  Neurologic: AAOx3; CNII-XII grossly intact; no focal deficits  Psychiatric: appears anxious    LABS:                        9.2    12.01 )-----------( 288      ( 29 Aug 2022 05:30 )             28.2     08-29    141  |  109<H>  |  10  ----------------------------<  95  4.3   |  22  |  1.03    Ca    8.2<L>      29 Aug 2022 05:30  Phos  2.0     08-29  Mg     2.1     08-29    TPro  5.4<L>  /  Alb  2.8<L>  /  TBili  0.3  /  DBili  x   /  AST  18  /  ALT  10  /  AlkPhos  85  08-29          CAPILLARY BLOOD GLUCOSE      POCT Blood Glucose.: 111 mg/dL (29 Aug 2022 12:10)  POCT Blood Glucose.: 95 mg/dL (29 Aug 2022 06:40)  POCT Blood Glucose.: 104 mg/dL (29 Aug 2022 00:06)  POCT Blood Glucose.: 103 mg/dL (28 Aug 2022 21:42)  POCT Blood Glucose.: 130 mg/dL (28 Aug 2022 17:45)            MEDICATIONS  (STANDING):  aspirin enteric coated 81 milliGRAM(s) Oral every 24 hours  budesonide  80 MICROgram(s)/formoterol 4.5 MICROgram(s) Inhaler 2 Puff(s) Inhalation two times a day  clopidogrel Tablet 75 milliGRAM(s) Oral every 24 hours  dextrose 5%. 1000 milliLiter(s) (50 mL/Hr) IV Continuous <Continuous>  dextrose 5%. 1000 milliLiter(s) (100 mL/Hr) IV Continuous <Continuous>  dextrose 50% Injectable 25 Gram(s) IV Push once  dextrose 50% Injectable 12.5 Gram(s) IV Push once  dextrose 50% Injectable 25 Gram(s) IV Push once  glucagon  Injectable 1 milliGRAM(s) IntraMuscular once  heparin   Injectable 5000 Unit(s) SubCutaneous every 8 hours  insulin lispro (ADMELOG) corrective regimen sliding scale   SubCutaneous every 6 hours  isosorbide   mononitrate ER Tablet (IMDUR) 30 milliGRAM(s) Oral every 24 hours  mirtazapine 30 milliGRAM(s) Oral every 24 hours  vancomycin    Solution 125 milliGRAM(s) Oral every 6 hours    MEDICATIONS  (PRN):  ALBUTerol    90 MICROgram(s) HFA Inhaler 2 Puff(s) Inhalation every 6 hours PRN Shortness of Breath and/or Wheezing  dextrose Oral Gel 15 Gram(s) Oral once PRN Blood Glucose LESS THAN 70 milliGRAM(s)/deciliter      RADIOLOGY & ADDITIONAL TESTS:    Imaging Personally Reviewed:  [x] YES  [ ] NO   ****transfer acceptance note****  [address] --> [facility]    TIARA GOMEZ  89y, Male    Patient is a 89y old  Male who presents with a chief complaint of altered mental status secondary to sepsis (29 Aug 2022 12:40)    Hospital course: 89 y/o M w/ PMH CAD, CKD3, COPD, chronic diarrhea who presented for diarrhea. Admitted to medicine telemetry for sepsis 2/2 C difficile infection. Abd xray without dilated bowels. Started on oral vancomycin. Had diffuse diarrhea. Leukocytosis and diarrhea gradually improved, diarrhea now resolved. Presented w/ AMS, now improved (aaox3 but confused, likely not yet back at baseline, also c/b sundowning). C/w oral vancomycin and restart home meds as appropriate. Pt was stable for stepdown on 8/29.       INTERVAL HPI: Patient endorses some abdominal pain but denies any other problems at this time.    ROS: otherwise negative      T(C): 37.1 (08-29-22 @ 12:30), Max: 37.3 (08-28-22 @ 18:00)  HR: 70 (08-29-22 @ 12:30) (66 - 76)  BP: 145/78 (08-29-22 @ 12:30) (137/77 - 170/78)  RR: 18 (08-29-22 @ 12:30) (17 - 19)  SpO2: 97% (08-29-22 @ 12:30) (94% - 98%)  Wt(kg): --Vital Signs Last 24 Hrs  T(C): 37.1 (29 Aug 2022 12:30), Max: 37.3 (28 Aug 2022 18:00)  T(F): 98.8 (29 Aug 2022 12:30), Max: 99.2 (28 Aug 2022 18:00)  HR: 70 (29 Aug 2022 12:30) (66 - 76)  BP: 145/78 (29 Aug 2022 12:30) (137/77 - 170/78)  BP(mean): 107 (29 Aug 2022 11:32) (99 - 112)  RR: 18 (29 Aug 2022 12:30) (17 - 19)  SpO2: 97% (29 Aug 2022 12:30) (94% - 98%)    Parameters below as of 29 Aug 2022 12:30  Patient On (Oxygen Delivery Method): room air        PHYSICAL EXAM:  Constitutional: resting comfortably in bed; NAD  Head: NC/AT  Eyes: PERRL, EOMI, anicteric sclera  ENT: no nasal discharge; dry MM  Neck: supple; no JVD or thyromegaly  Respiratory: CTA B/L; no W/R/R, no retractions  Cardiac: +S1/S2; RRR; no M/R/G; PMI non-displaced  Gastrointestinal: soft, nondistended, mild TTP diffusely; no rebound or guarding; +BSx4  Extremities: WWP, no clubbing or cyanosis; no peripheral edema. Capillary refill <2 sec  Vascular: 2+ radial, DP/PT pulses B/L  Dermatologic: skin warm, dry and intact; no rashes, wounds, or scars  Neurologic: AAOx3; CNII-XII grossly intact; no focal deficits  Psychiatric: appears anxious    LABS:                        9.2    12.01 )-----------( 288      ( 29 Aug 2022 05:30 )             28.2     08-29    141  |  109<H>  |  10  ----------------------------<  95  4.3   |  22  |  1.03    Ca    8.2<L>      29 Aug 2022 05:30  Phos  2.0     08-29  Mg     2.1     08-29    TPro  5.4<L>  /  Alb  2.8<L>  /  TBili  0.3  /  DBili  x   /  AST  18  /  ALT  10  /  AlkPhos  85  08-29          CAPILLARY BLOOD GLUCOSE      POCT Blood Glucose.: 111 mg/dL (29 Aug 2022 12:10)  POCT Blood Glucose.: 95 mg/dL (29 Aug 2022 06:40)  POCT Blood Glucose.: 104 mg/dL (29 Aug 2022 00:06)  POCT Blood Glucose.: 103 mg/dL (28 Aug 2022 21:42)  POCT Blood Glucose.: 130 mg/dL (28 Aug 2022 17:45)            MEDICATIONS  (STANDING):  aspirin enteric coated 81 milliGRAM(s) Oral every 24 hours  budesonide  80 MICROgram(s)/formoterol 4.5 MICROgram(s) Inhaler 2 Puff(s) Inhalation two times a day  clopidogrel Tablet 75 milliGRAM(s) Oral every 24 hours  dextrose 5%. 1000 milliLiter(s) (50 mL/Hr) IV Continuous <Continuous>  dextrose 5%. 1000 milliLiter(s) (100 mL/Hr) IV Continuous <Continuous>  dextrose 50% Injectable 25 Gram(s) IV Push once  dextrose 50% Injectable 12.5 Gram(s) IV Push once  dextrose 50% Injectable 25 Gram(s) IV Push once  glucagon  Injectable 1 milliGRAM(s) IntraMuscular once  heparin   Injectable 5000 Unit(s) SubCutaneous every 8 hours  insulin lispro (ADMELOG) corrective regimen sliding scale   SubCutaneous every 6 hours  isosorbide   mononitrate ER Tablet (IMDUR) 30 milliGRAM(s) Oral every 24 hours  mirtazapine 30 milliGRAM(s) Oral every 24 hours  vancomycin    Solution 125 milliGRAM(s) Oral every 6 hours    MEDICATIONS  (PRN):  ALBUTerol    90 MICROgram(s) HFA Inhaler 2 Puff(s) Inhalation every 6 hours PRN Shortness of Breath and/or Wheezing  dextrose Oral Gel 15 Gram(s) Oral once PRN Blood Glucose LESS THAN 70 milliGRAM(s)/deciliter      RADIOLOGY & ADDITIONAL TESTS:    Imaging Personally Reviewed:  [x] YES  [ ] NO

## 2022-08-29 NOTE — PROGRESS NOTE ADULT - ASSESSMENT
89-year-old male with PMH BPH, HLD, HTN, CAD s/p PCI w/ 3 DAYANA (mRCA, pRCA, mC in 2013 w/ Dr. Varela), EF 45-50%, CKD (Cr 1.7-2.4), COPD (no home O2), CKD Stage III, colonic mass s/p R hemicolectomy 04/2017, chronic diarrhea, arthritis, aortic aneurysm and lung nodules was BIBEMS to ED with AMS after experiencing chronic diarrhea, tachypnea and worsening chest pain.  He has a PSHx of b/l inguinal hernia repair, exploratory laparotomy and R radical extended hemicolectomy with ileocolonic anastomosis (Dr. 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     Of note, patient was recently admitted 07/19/2022 - 07/23/2022 to [facility] for diarrhea after being sent in from his PCP office for hypotension and chronic diarrhea. During that admission his diarrhea resolved and he was told to f/u with outpatient GI with Dr. Hammer. Notably he was fully conversant with mild memory impairment on admission and throughout the entire hospital stay.     In the ED, the patient was febrile (Tmax 102.4 F, rectal), hypertensive (/61mmHg) without tachycardia, and pulse oximetry reading of 99% on 2L NC. Labs were significant for leukocytosis (WBC 30.12) with neutrophil predominant left shift, anemia (Hgb 11.5, Hct 36.3L), lactic acidosis (2.3H>3.2H), Cr. 1.97, PTT 40.9H.  Troponins (0.03)(Stt) LFTs and TBili were wnl.      CT Abdomen/Pelvis with IV contrast demonstrated unchanged central biliary prominence, distended acalculous gallbladder, no acute inflammation. Since July 19, 2022, new long segment of L colonic mural thickening suspicious for colitis, possibly infectious/inflammatory. No large vessel mesenteric occlusion accounting for technique. No abscess. RUQ U/S attempted, however pt became combative during exam, which was subsequently aborted.     Surgery consulted for r/o acalculous cholecystitis in setting of sepsis.  Bedside examination is notable for soft, nondistended abdomen and patient, although lethargic and a poor historian, denies pain during examination.  Overnight the patient had 2 green loose bowel movements.  Serum labs are notable for persistent leukocytosis (WBC 42.07H), anemia (Hgb 8.9L, Hct 27.1L), improving renal function (BUN 34H, Cr 1.43H), and normal bilirubin and LFTs.  There is low suspicion for acute cholecystitis with leukocytosis likely 2/2 C. difficile infection.      PLAN  - IVF resuscitation PRN  - Serial abdominal exams  - Follow-up on repeat RUQ ultrasound  - Surgery Team 4C will continue to follow  - Remainder of inpatient care per primary team  - Please page Team 4 at [phone] with questions/clinical changes   89-year-old male with PMH BPH, HLD, HTN, CAD s/p PCI w/ 3 DAYANA (mRCA, pRCA, mC in 2013 w/ Dr. Varela), EF 45-50%, CKD (Cr 1.7-2.4), COPD (no home O2), CKD Stage III, colonic mass s/p R hemicolectomy 04/2017, chronic diarrhea, arthritis, aortic aneurysm and lung nodules was BIBEMS to ED with AMS after experiencing chronic diarrhea, tachypnea and worsening chest pain.  He has a PSHx of b/l inguinal hernia repair, exploratory laparotomy and R radical extended hemicolectomy with ileocolonic anastomosis (Dr. 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     Of note, patient was recently admitted 07/19/2022 - 07/23/2022 to [facility] for diarrhea after being sent in from his PCP office for hypotension and chronic diarrhea. During that admission his diarrhea resolved and he was told to f/u with outpatient GI with Dr. Hammer. Notably he was fully conversant with mild memory impairment on admission and throughout the entire hospital stay.     In the ED, the patient was febrile (Tmax 102.4 F, rectal), hypertensive (/61mmHg) without tachycardia, and pulse oximetry reading of 99% on 2L NC. Labs were significant for leukocytosis (WBC 30.12) with neutrophil predominant left shift, anemia (Hgb 11.5, Hct 36.3L), lactic acidosis (2.3H>3.2H), Cr. 1.97, PTT 40.9H.  Troponins (0.03)(Stt) LFTs and TBili were wnl.      CT Abdomen/Pelvis with IV contrast demonstrated unchanged central biliary prominence, distended acalculous gallbladder, no acute inflammation. Since July 19, 2022, new long segment of L colonic mural thickening suspicious for colitis, possibly infectious/inflammatory. No large vessel mesenteric occlusion accounting for technique. No abscess. RUQ U/S attempted, however pt became combative during exam, which was subsequently aborted.     Surgery consulted for r/o acalculous cholecystitis in setting of sepsis.  Bedside examination is notable for soft, nondistended abdomen and patient.  Patient is a poor historian, and physical examination is positive for mild LUQ tenderness with deep palpation likely 2/2 C. difficile infection.  Over the past 24 hours the patient had 3 loose bowel movements with minimal blood.  Serum labs are notable for improving leukocytosis (WBC 12.01H from 14.17H), mild anemia (Hgb 9.2L, Hct 28.2L), and improved renal function (BUN 10H, Cr 1.03H).  There is low suspicion for acute cholecystitis and no indication for surgical intervention at this point in time with status improvement after receipt of antibiotic therapy for GI infection.      PLAN  - Surgery Team 4C will sign-off at this point in time  - Remainder of inpatient care per primary team

## 2022-08-29 NOTE — PROGRESS NOTE ADULT - PROBLEM SELECTOR PLAN 2
RESOLVING  Likely secondary to sepsis (as above). Exam limited but does not appear focal and CTH negative.  AAOx3 but pt intermittently confused. Was more awake on Saturday when Nephew was visiting. Also has sundowning at night    Plan:   - Treatment as above

## 2022-08-29 NOTE — PROGRESS NOTE ADULT - PROBLEM SELECTOR PLAN 5
On home aspirin, plavix, simvistatin, also imdur 30. Stents reportedly placed in 2013. Unclear why patient is still on anti-platelet.  (Had initially held home meds as pt had incomplete swallow eval so was prioritizing the most important meds and restart others as mental status improved)  - Continue imdur 30mg qd   - Started home aspirin 81mg qd  - Started home plavix 75mg qd    #HLD  - Restart simvistatin 20mg qd as appropriate

## 2022-08-29 NOTE — PROGRESS NOTE ADULT - PROBLEM SELECTOR PLAN 9
-baseline Cr.1.7-2.4    - Pt. at baseline, no evidence of DEBBIE. RESOLVED as of 8/26  Pt admitted with bicarb of 18 (about at baseline per chart review) and tachypneic which is chronica as per chart review. pH on ABG 7.3  Anion gap normal   Pt is likely metabolic acidosis 2/2 diarrhea and CKD    Plan:   - Treat diarrhea and rehydrate as above  - Continue to monitor  - Bicarb now normal, 23 as of 8/28

## 2022-08-29 NOTE — PROGRESS NOTE ADULT - ASSESSMENT
89 y/o M w/ PMH CAD, CKD3, COPD, chronic diarrhea who presented for diarrhea, tachypnea. Admitted to medicine telemetry for sepsis 2/2 C difficile infection. C/w oral vancomycin.

## 2022-08-29 NOTE — PROGRESS NOTE ADULT - PROBLEM SELECTOR PLAN 12
Fluids: none  Electrolytes: replenish as appropriate  Diet: f/u speech and swallow repeat eval; clear liquids, ensure   DVT Prophylaxis: heparin subq 5k TID given CKD  GI Prophylaxis: not indicated  [facility]: [facility]  [facility]

## 2022-08-29 NOTE — PROGRESS NOTE ADULT - PROBLEM SELECTOR PLAN 7
Pt with hx of COPD, not on home O2. Takes advair and albuterol. Pt is tachypnic but likely at baseline as per documentation.     Plan:   - patient currently on RA  - 3 to 4 cm LLL nodule  -> was meant to get IR guided biopsy done after discharge in March. Unclear if this happened. -baseline Cr.1.7-2.4

## 2022-08-29 NOTE — PROGRESS NOTE ADULT - PROBLEM SELECTOR PLAN 6
On home aspirin, plavix, simvistatin, also imdur 30. Stents reportedly placed in 2013. Unclear why patient is still on anti-platelet.  - continue imdur 30mg qd   - hold home aspirin 81mg qd until patient better able to take PO  - hold home plavix 75mg qd until patient better able to take PO  - continue home simvistatin 20mg qd as patient is better able to take PO    #HLD  - Restarted home simvistatin 20mg qd patient is better able to take PO Pt. on metoprolol succinate 25, valsartan 80 at home.     Plan:   - on home imdur 30  - restart home toprol and valsartan as needed Pt on metoprolol succinate 25, valsartan 80 at home.     Plan:   - on home imdur 30  - restart home toprol and valsartan as needed

## 2022-08-29 NOTE — PROGRESS NOTE ADULT - SUBJECTIVE AND OBJECTIVE BOX
****transfer acceptance note****  [address] [facility] --> [facility]    TIARA GOMEZ  89y, Male    Patient is a 89y old  Male who presents with a chief complaint of altered mental status secondary to sepsis (29 Aug 2022 12:40)    Hospital course: 89 y/o M w/ PMH CAD, CKD3, COPD, chronic diarrhea who presented for diarrhea. Admitted to medicine telemetry for sepsis 2/2 C difficile infection. Abd xray without dilated bowels. Started on oral vancomycin. Had diffuse diarrhea. Leukocytosis and diarrhea gradually improved, diarrhea now resolved. Presented w/ AMS, now improved (aaox3 but confused, likely not yet back at baseline, also c/b sundowning). C/w oral vancomycin and restart home meds as appropriate.       INTERVAL HPI: Patient endorses some abdominal pain but denies any other problems at this time.    ROS: otherwise negative      T(C): 37.1 (08-29-22 @ 12:30), Max: 37.3 (08-28-22 @ 18:00)  HR: 70 (08-29-22 @ 12:30) (66 - 76)  BP: 145/78 (08-29-22 @ 12:30) (137/77 - 170/78)  RR: 18 (08-29-22 @ 12:30) (17 - 19)  SpO2: 97% (08-29-22 @ 12:30) (94% - 98%)  Wt(kg): --Vital Signs Last 24 Hrs  T(C): 37.1 (29 Aug 2022 12:30), Max: 37.3 (28 Aug 2022 18:00)  T(F): 98.8 (29 Aug 2022 12:30), Max: 99.2 (28 Aug 2022 18:00)  HR: 70 (29 Aug 2022 12:30) (66 - 76)  BP: 145/78 (29 Aug 2022 12:30) (137/77 - 170/78)  BP(mean): 107 (29 Aug 2022 11:32) (99 - 112)  RR: 18 (29 Aug 2022 12:30) (17 - 19)  SpO2: 97% (29 Aug 2022 12:30) (94% - 98%)    Parameters below as of 29 Aug 2022 12:30  Patient On (Oxygen Delivery Method): room air        PHYSICAL EXAM:  Constitutional: resting comfortably in bed; NAD  Head: NC/AT  Eyes: PERRL, EOMI, anicteric sclera  ENT: no nasal discharge; dry MM  Neck: supple; no JVD or thyromegaly  Respiratory: CTA B/L; no W/R/R, no retractions  Cardiac: +S1/S2; RRR; no M/R/G; PMI non-displaced  Gastrointestinal: soft, nondistended, mild TTP diffusely; no rebound or guarding; +BSx4  Extremities: WWP, no clubbing or cyanosis; no peripheral edema. Capillary refill <2 sec  Vascular: 2+ radial, DP/PT pulses B/L  Dermatologic: skin warm, dry and intact; no rashes, wounds, or scars  Neurologic: AAOx3; CNII-XII grossly intact; no focal deficits  Psychiatric: appears anxious    LABS:                        9.2    12.01 )-----------( 288      ( 29 Aug 2022 05:30 )             28.2     08-29    141  |  109<H>  |  10  ----------------------------<  95  4.3   |  22  |  1.03    Ca    8.2<L>      29 Aug 2022 05:30  Phos  2.0     08-29  Mg     2.1     08-29    TPro  5.4<L>  /  Alb  2.8<L>  /  TBili  0.3  /  DBili  x   /  AST  18  /  ALT  10  /  AlkPhos  85  08-29          CAPILLARY BLOOD GLUCOSE      POCT Blood Glucose.: 111 mg/dL (29 Aug 2022 12:10)  POCT Blood Glucose.: 95 mg/dL (29 Aug 2022 06:40)  POCT Blood Glucose.: 104 mg/dL (29 Aug 2022 00:06)  POCT Blood Glucose.: 103 mg/dL (28 Aug 2022 21:42)  POCT Blood Glucose.: 130 mg/dL (28 Aug 2022 17:45)            MEDICATIONS  (STANDING):  aspirin enteric coated 81 milliGRAM(s) Oral every 24 hours  budesonide  80 MICROgram(s)/formoterol 4.5 MICROgram(s) Inhaler 2 Puff(s) Inhalation two times a day  clopidogrel Tablet 75 milliGRAM(s) Oral every 24 hours  dextrose 5%. 1000 milliLiter(s) (50 mL/Hr) IV Continuous <Continuous>  dextrose 5%. 1000 milliLiter(s) (100 mL/Hr) IV Continuous <Continuous>  dextrose 50% Injectable 25 Gram(s) IV Push once  dextrose 50% Injectable 12.5 Gram(s) IV Push once  dextrose 50% Injectable 25 Gram(s) IV Push once  glucagon  Injectable 1 milliGRAM(s) IntraMuscular once  heparin   Injectable 5000 Unit(s) SubCutaneous every 8 hours  insulin lispro (ADMELOG) corrective regimen sliding scale   SubCutaneous every 6 hours  isosorbide   mononitrate ER Tablet (IMDUR) 30 milliGRAM(s) Oral every 24 hours  mirtazapine 30 milliGRAM(s) Oral every 24 hours  vancomycin    Solution 125 milliGRAM(s) Oral every 6 hours    MEDICATIONS  (PRN):  ALBUTerol    90 MICROgram(s) HFA Inhaler 2 Puff(s) Inhalation every 6 hours PRN Shortness of Breath and/or Wheezing  dextrose Oral Gel 15 Gram(s) Oral once PRN Blood Glucose LESS THAN 70 milliGRAM(s)/deciliter      RADIOLOGY & ADDITIONAL TESTS:    Imaging Personally Reviewed:  [x] YES  [ ] NO

## 2022-08-29 NOTE — PROGRESS NOTE ADULT - PROBLEM SELECTOR PLAN 1
Met 3/4 SIRS criteria (T102.4, WBC 30.12, RR 36) with lactate 2.3 -> 3.2 -> 1.9. Likely secondary to C difficile.  C diff positive. GI PCR neg. Surgery also following as they were consulted for r/o acalculous sarah, however low suspicion.    Plan:   - F/u repeat RUQ US to r/o acalculous sarah  - C/w isolation precautions   - C/w oral vancomycin 125mg q6h for 10 days, 8/25  - F/u blood cultures  - Monitor stool burden  - hydration w/ 250cc LR PRN, LR maintenance @ 60 cc/hr Met 3/4 SIRS criteria (T102.4, WBC 30.12, RR 36) with lactate 2.3 -> 3.2 -> 1.9. Likely secondary to C difficile.  C diff positive. GI PCR neg. Surgery also following as they were consulted for r/o acalculous sarah, however low suspicion.    Plan:   - C/w isolation precautions   - C/w oral vancomycin 125mg q6h for 10 days, 8/25  - F/u blood cultures  - Monitor BM and abd exam

## 2022-08-29 NOTE — PROGRESS NOTE ADULT - PROBLEM SELECTOR PLAN 12
Fluids: none  Electrolytes: replenish as appropriate  Diet: f/u speech and swallow repeat eval; clear liquids, ensure   DVT Prophylaxis: heparin subq 5k TID given CKD  GI Prophylaxis: not indicated  [facility]: [facility]  [facility]

## 2022-08-29 NOTE — PROGRESS NOTE ADULT - PROBLEM SELECTOR PLAN 8
RESOLVING  Likely secondary to sepsis (as above). Exam limited but does not appear focal and CTH negative.  AAOx3     Plan:   - Nephew at bedside on 8/27, says that patient looks near baseline  - Treatment as above Known history of chronic diarrhea after a R. hemicolectomy in 2017. Follows with Dr Hammer outpatient    Plan:   - improved  - Dr Hammer to follow patient  - no episodes of diarrhea the last 2 days Known history of chronic diarrhea after a R. hemicolectomy in 2017. Follows with Dr Hammer outpatient    Plan:   - Stool count x1/day over the weekend.   - Dr Hammer to follow patient  - No episodes of diarrhea the last 2 days

## 2022-08-29 NOTE — PROGRESS NOTE ADULT - ASSESSMENT
89 y/o M w/ PMH CAD, CKD3, COPD, chronic diarrhea who presented for diarrhea, tachypnea. Admitted to medicine telemetry for sepsis 2/2 C difficile infection. C/w oral vancomycin.  C diff  sepsis  continue PO vancomycin  CV stable  COPD--baseline  FTT    will need ELISHA

## 2022-08-29 NOTE — PROGRESS NOTE ADULT - SUBJECTIVE AND OBJECTIVE BOX
Pt seen and examined   mental status better   able to carry conversatiob    REVIEW OF SYSTEMS:  Constitutional: No fever,   Cardiovascular: No chest pain, palpitations,  Gastrointestinal: No abdominal or epigastric pain. No nausea, vomiting No diarrhea  Skin: No itching, burning, rashes or lesions       MEDICATIONS:  MEDICATIONS  (STANDING):  acetaminophen     Tablet .. 650 milliGRAM(s) Oral once  budesonide  80 MICROgram(s)/formoterol 4.5 MICROgram(s) Inhaler 2 Puff(s) Inhalation two times a day  dextrose 5%. 1000 milliLiter(s) (50 mL/Hr) IV Continuous <Continuous>  dextrose 5%. 1000 milliLiter(s) (100 mL/Hr) IV Continuous <Continuous>  dextrose 50% Injectable 25 Gram(s) IV Push once  dextrose 50% Injectable 12.5 Gram(s) IV Push once  dextrose 50% Injectable 25 Gram(s) IV Push once  glucagon  Injectable 1 milliGRAM(s) IntraMuscular once  heparin   Injectable 5000 Unit(s) SubCutaneous every 8 hours  insulin lispro (ADMELOG) corrective regimen sliding scale   SubCutaneous every 6 hours  isosorbide   mononitrate ER Tablet (IMDUR) 30 milliGRAM(s) Oral every 24 hours  lactated ringers. 1000 milliLiter(s) (60 mL/Hr) IV Continuous <Continuous>  sodium phosphate IVPB 15 milliMole(s) IV Intermittent once  vancomycin    Solution 125 milliGRAM(s) Oral every 6 hours    MEDICATIONS  (PRN):  ALBUTerol    90 MICROgram(s) HFA Inhaler 2 Puff(s) Inhalation every 6 hours PRN Shortness of Breath and/or Wheezing  dextrose Oral Gel 15 Gram(s) Oral once PRN Blood Glucose LESS THAN 70 milliGRAM(s)/deciliter      Allergies    No Known Allergies    Intolerances        Vital Signs Last 24 Hrs  T(C): 36.6 (29 Aug 2022 06:33), Max: 37.3 (28 Aug 2022 18:00)  T(F): 97.9 (29 Aug 2022 06:33), Max: 99.2 (28 Aug 2022 18:00)  HR: 76 (29 Aug 2022 09:03) (66 - 78)  BP: 167/77 (29 Aug 2022 09:03) (137/77 - 167/77)  BP(mean): 110 (29 Aug 2022 09:03) (99 - 110)  RR: 17 (29 Aug 2022 09:03) (17 - 24)  SpO2: 98% (29 Aug 2022 09:03) (94% - 98%)    Parameters below as of 29 Aug 2022 09:03  Patient On (Oxygen Delivery Method): nasal cannula  O2 Flow (L/min): 2      08-28 @ 07:01  -  08-29 @ 07:00  --------------------------------------------------------  IN: 693.2 mL / OUT: 1025 mL / NET: -331.8 mL        PHYSICAL EXAM:    General: poorly nourished; in no acute distress  HEENT: MMM, conjunctiva and sclera clear  Gastrointestinal: Soft non-tender sl-distended; Normal bowel sounds;   Skin: Warm and dry. No obvious rash Tattoos    LABS:      CBC Full  -  ( 29 Aug 2022 05:30 )  WBC Count : 12.01 K/uL  RBC Count : 3.02 M/uL  Hemoglobin : 9.2 g/dL  Hematocrit : 28.2 %  Platelet Count - Automated : 288 K/uL  Mean Cell Volume : 93.4 fl  Mean Cell Hemoglobin : 30.5 pg  Mean Cell Hemoglobin Concentration : 32.6 gm/dL  Auto Neutrophil # : 8.22 K/uL  Auto Lymphocyte # : 1.44 K/uL  Auto Monocyte # : 1.56 K/uL  Auto Eosinophil # : 0.63 K/uL  Auto Basophil # : 0.04 K/uL  Auto Neutrophil % : 68.5 %  Auto Lymphocyte % : 12.0 %  Auto Monocyte % : 13.0 %  Auto Eosinophil % : 5.2 %  Auto Basophil % : 0.3 %    08-29    141  |  109<H>  |  10  ----------------------------<  95  4.3   |  22  |  1.03    Ca    8.2<L>      29 Aug 2022 05:30  Phos  2.0     08-29  Mg     2.1     08-29    TPro  5.4<L>  /  Alb  2.8<L>  /  TBili  0.3  /  DBili  x   /  AST  18  /  ALT  10  /  AlkPhos  85  08-29                      RADIOLOGY & ADDITIONAL STUDIES (The following images were personally reviewed):

## 2022-08-29 NOTE — PROGRESS NOTE ADULT - PROBLEM SELECTOR PLAN 6
Pt on metoprolol succinate 25, valsartan 80 at home.     Plan:   - on home imdur 30  - restart home toprol and valsartan as needed

## 2022-08-29 NOTE — PROGRESS NOTE ADULT - PROBLEM SELECTOR PLAN 3
Known history of chronic diarrhea after a R. hemicolectomy in 2017. Follows with Dr Hammer outpatient    Plan:   - Abx and hydration as above  - Dr Hammer to follow patient  - no episodes of diarrhea the last 2 days C diff sample positive. Septic as above.    Plan:  - C/w plan as above

## 2022-08-29 NOTE — PROGRESS NOTE ADULT - PROBLEM SELECTOR PLAN 4
Patient was tachypenic this morning BUT patient afebrile orally and rectal. Most likely 2/2 to pain and anxiety. Chest x-ray w/ possible mild fluid.    Plan:   - Home advair and albuterol

## 2022-08-29 NOTE — PROGRESS NOTE ADULT - SUBJECTIVE AND OBJECTIVE BOX
Interventional, Pulmonary, Critical, Chest Special Procedures.    Pt was seen and fully examined by myself.     Time spent with patient in minutes:67    Patient is a 89y old  Male who presents with a chief complaint of altered mental status secondary to sepsis (29 Aug 2022 09:44)  The patient more lethargic now  and not really engaging. 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 Patient is altered on exam, but per ED report, he has been having chronic diarrhea, tachypnea, and chest pain that was worsening, prompting patient to call EMS to bring him from home into [facility]. Of note, patient was recently admitted 7/19-7/23 to [facility] for diarrhea after being sent in from his PCP office for hypotension and chronic diarrhea. During that time his diarrhea resolved and he was told to f/u with outpatient GI with Dr. Hammer. Notably he was fully conversant with mild memory impairment on admission and throughout hospital stay.    ED vitals: T 39.3   HR 78  RR 36 (taken manually)  BP 97/59  SpO2 98% on 2L  Labs significant: WBC 30, Hgb 11.5, lactate 2.3 -> 3.2 after 2L NS, UA wnl  Imaging/EKG: CT H/C/A/P known lung nodule, colitis  Interventions: zosyn, zofran, 3.2L NS, flagyl, acetaminophen     (24 Aug 2022 21:48)      REVIEW OF SYSTEMS:  Constitutional: No fever, weight loss, chills + fatigue  Eyes: No eye pain, visual disturbances, or discharge  ENMT:  + difficulty hearing, tinnitus, vertigo; No sinus or throat pain. No epistaxis, +dysphagia, dysphonia, hoarseness no odynophagia  Neck: No pain, stiffness or neck swelling.  No masses or deformities  Respiratory: + cough, wheezing, hemoptysis  + COPD  - ILD   - PE   - ASTHMA     - PNEUMONIA  Cardiovascular: No chest pain, dysrhythmia, palpitations, dizziness or edema + CAD   - CHF   - HTN  Gastrointestinal: No abdominal or epigastric pain. No nausea, vomiting or hematemesis; No diarrhea or constipation. No melena or hematochezia, Icterus.          Genitourinary: No dysuria, frequency, hematuria or incontinence   - CKD/DEBBIE      - ESRD  Neurological: No headaches, +memory loss, loss of strength, numbness or tremors      +DEMENTIA     - STROKE    - SEIZURE  Skin: No itching, burning, rashes or lesions   Lymph Nodes: No enlarged glands  Endocrine: No heat or cold intolerance; No hair loss       + DM     - THYROID DISORDER  Musculoskeletal: No joint pain or swelling; No muscle, back or extremity pain, No edema  Psychiatric: No depression, anxiety, mood swings or difficulty sleeping  Heme/Lymph: No easy bruising or bleeding gums         - ANEMIA      - CANCER   -COAGULOPATHY  Allergy and Immunologic: No hives or eczema    PAST MEDICAL & SURGICAL HISTORY:  HLD (hyperlipidemia)      CAD (coronary artery disease)      BPH (benign prostatic hyperplasia)      COPD, moderate      Chronic diarrhea      Stage 3 chronic kidney disease      S/P cataract extraction      H/O right hemicolectomy        FAMILY HISTORY:  No family history of cardiovascular disease (Father, Mother)      SOCIAL HISTORY:      - Tobacco     - ETOH    Allergies    No Known Allergies    Intolerances      Vital Signs Last 24 Hrs  T(C): 37.3 (29 Aug 2022 10:25), Max: 37.3 (28 Aug 2022 18:00)  T(F): 99.2 (29 Aug 2022 10:25), Max: 99.2 (28 Aug 2022 18:00)  HR: 74 (29 Aug 2022 11:32) (66 - 78)  BP: 154/76 (29 Aug 2022 11:32) (137/77 - 170/78)  BP(mean): 107 (29 Aug 2022 11:32) (99 - 112)  RR: 18 (29 Aug 2022 11:32) (17 - 24)  SpO2: 97% (29 Aug 2022 11:32) (94% - 98%)    Parameters below as of 29 Aug 2022 11:32  Patient On (Oxygen Delivery Method): room air        08-28 @ 07:01  -  08-29 @ 07:00  --------------------------------------------------------  IN: 693.2 mL / OUT: 1025 mL / NET: -331.8 mL          MEDICATIONS:  MEDICATIONS  (STANDING):  budesonide  80 MICROgram(s)/formoterol 4.5 MICROgram(s) Inhaler 2 Puff(s) Inhalation two times a day  dextrose 5%. 1000 milliLiter(s) (50 mL/Hr) IV Continuous <Continuous>  dextrose 5%. 1000 milliLiter(s) (100 mL/Hr) IV Continuous <Continuous>  dextrose 50% Injectable 25 Gram(s) IV Push once  dextrose 50% Injectable 12.5 Gram(s) IV Push once  dextrose 50% Injectable 25 Gram(s) IV Push once  glucagon  Injectable 1 milliGRAM(s) IntraMuscular once  heparin   Injectable 5000 Unit(s) SubCutaneous every 8 hours  insulin lispro (ADMELOG) corrective regimen sliding scale   SubCutaneous every 6 hours  isosorbide   mononitrate ER Tablet (IMDUR) 30 milliGRAM(s) Oral every 24 hours  mirtazapine 30 milliGRAM(s) Oral every 24 hours  vancomycin    Solution 125 milliGRAM(s) Oral every 6 hours    MEDICATIONS  (PRN):  ALBUTerol    90 MICROgram(s) HFA Inhaler 2 Puff(s) Inhalation every 6 hours PRN Shortness of Breath and/or Wheezing  dextrose Oral Gel 15 Gram(s) Oral once PRN Blood Glucose LESS THAN 70 milliGRAM(s)/deciliter      PHYSICAL EXAM:  Un Comfortable, no immediate distress  Eyes: PERRL, EOM intact; conjunctiva and sclera clear  Head: Normocephalic;  No Trauma  ENMT: No nasal discharge, hoarseness, +cough no  hemoptysis  Neck: Supple; non tender; no masses or deformities.    No JVD  Respiratory: CTA,  - WHEEZING   - RHONCHI  - RALES  + CRACKLES.  Diminished breath sounds  BILATERAL  RIGHT  LEFT bases   Cardiovascular: Regular rate and rhythm. S1 and S2 Normal; No murmurs, gallops or rubs     - PPM/AICD  Gastrointestinal: Soft mildly tender, non-distended; Normal bowel sounds; No hepatosplenomegaly.     -PEG    -  GT   + RUBIO  Genitourinary: No costovertebral angle tenderness. No dysuria  Extremities: AROM, No clubbing, cyanosis or edema    Vascular: Peripheral pulses palpable 2+ bilaterally  Neurological: Awake, moving extremities   Skin: Warm and dry. No obvious rash  Lymph Nodes: No acute cervical or supraclavicular adenopathy  Psychiatric: less  engaging     DEVICES:  - DENTURES   +IV R / L     - ETUBE   -TRACH   -CTUBE  R / L    LABS:                          9.2    12.01 )-----------( 288      ( 29 Aug 2022 05:30 )             28.2     08-29    141  |  109<H>  |  10  ----------------------------<  95  4.3   |  22  |  1.03    Ca    8.2<L>      29 Aug 2022 05:30  Phos  2.0     08-29  Mg     2.1     08-29    TPro  5.4<L>  /  Alb  2.8<L>  /  TBili  0.3  /  DBili  x   /  AST  18  /  ALT  10  /  AlkPhos  85  08-29      RADIOLOGY & ADDITIONAL STUDIES (The following images were personally reviewed):< from: Xray Chest 1 View- PORTABLE-Urgent (Xray Chest 1 View- PORTABLE-Urgent .) (08.28.22 @ 15:34) >    ******PRELIMINARY REPORT******      ******PRELIMINARY REPORT******       ACC: [id number] EXAM:  XR CHEST PORTABLE URGENT 1V                          PROCEDURE DATE:  08/28/2022    ******PRELIMINARY REPORT******      ******PRELIMINARY REPORT******           INTERPRETATION:  Indication: tachypnea    A single portable view of the chest is submitted. Comparison is made to   the most recent prior study dated on 8/25/2022. Cardiomediastinal   silhouette is similar to prior and within normal limits. Left   costophrenic angle is obscured, no right-sided pleural effusion. No new   infiltrates or opacifications. No pneumothorax. Degenerative changes of   the spine and diffuse aortic calcifications. Left midlung nodule   unchanged from prior. Again seenis abdominal aortic aneurysm, unchanged   from prior.    Impression:    No new infiltrate, opacification, or effusion. Exam unchanged from prior.    < end of copied text >

## 2022-08-29 NOTE — PROGRESS NOTE ADULT - SUBJECTIVE AND OBJECTIVE BOX
***STEPDOWN FROM [facility] to [facility]***  Hospital Course:       INTERVAL EVENTS: sundowning, pulled out IVs    SUBJECTIVE / INTERVAL HPI: Patient seen and examined at bedside. Reports some abd pain.    ROS: negative unless otherwise stated above.    VITAL SIGNS:  Vital Signs Last 24 Hrs  T(C): 37.1 (29 Aug 2022 12:30), Max: 37.3 (28 Aug 2022 18:00)  T(F): 98.8 (29 Aug 2022 12:30), Max: 99.2 (28 Aug 2022 18:00)  HR: 70 (29 Aug 2022 12:30) (66 - 76)  BP: 145/78 (29 Aug 2022 12:30) (137/77 - 170/78)  BP(mean): 107 (29 Aug 2022 11:32) (99 - 112)  RR: 18 (29 Aug 2022 12:30) (17 - 24)  SpO2: 97% (29 Aug 2022 12:30) (94% - 98%)    Parameters below as of 29 Aug 2022 12:30  Patient On (Oxygen Delivery Method): room air          08-28-22 @ 07:01  -  08-29-22 @ 07:00  --------------------------------------------------------  IN: 693.2 mL / OUT: 1025 mL / NET: -331.8 mL    08-29-22 @ 07:01  -  08-29-22 @ 12:40  --------------------------------------------------------  IN: 180 mL / OUT: 350 mL / NET: -170 mL        PHYSICAL EXAM:    General: NAD, laying flat in bed  HEENT: dry MM. glasses, otherwise normal oral cavity  Neck: supple  Cardiovascular: +S1/S2; RRR, no murmur  Respiratory: CTA B/L; no W/R/R; tachypneic  Gastrointestinal: soft, NTND, bowel sounds+  Extremities: warm to touch; no edema, clubbing  Vascular: 2+ radial, DP pulses B/L  Neurological: AAOx3 but not good historian, moving arms, follows some commands  Psych: anxious    MEDICATIONS:  MEDICATIONS  (STANDING):  budesonide  80 MICROgram(s)/formoterol 4.5 MICROgram(s) Inhaler 2 Puff(s) Inhalation two times a day  dextrose 5%. 1000 milliLiter(s) (50 mL/Hr) IV Continuous <Continuous>  dextrose 5%. 1000 milliLiter(s) (100 mL/Hr) IV Continuous <Continuous>  dextrose 50% Injectable 25 Gram(s) IV Push once  dextrose 50% Injectable 12.5 Gram(s) IV Push once  dextrose 50% Injectable 25 Gram(s) IV Push once  glucagon  Injectable 1 milliGRAM(s) IntraMuscular once  heparin   Injectable 5000 Unit(s) SubCutaneous every 8 hours  insulin lispro (ADMELOG) corrective regimen sliding scale   SubCutaneous every 6 hours  isosorbide   mononitrate ER Tablet (IMDUR) 30 milliGRAM(s) Oral every 24 hours  mirtazapine 30 milliGRAM(s) Oral every 24 hours  vancomycin    Solution 125 milliGRAM(s) Oral every 6 hours    MEDICATIONS  (PRN):  ALBUTerol    90 MICROgram(s) HFA Inhaler 2 Puff(s) Inhalation every 6 hours PRN Shortness of Breath and/or Wheezing  dextrose Oral Gel 15 Gram(s) Oral once PRN Blood Glucose LESS THAN 70 milliGRAM(s)/deciliter      ALLERGIES:  Allergies    No Known Allergies    Intolerances        LABS:                        9.2    12.01 )-----------( 288      ( 29 Aug 2022 05:30 )             28.2     08-29    141  |  109<H>  |  10  ----------------------------<  95  4.3   |  22  |  1.03    Ca    8.2<L>      29 Aug 2022 05:30  Phos  2.0     08-29  Mg     2.1     08-29    TPro  5.4<L>  /  Alb  2.8<L>  /  TBili  0.3  /  DBili  x   /  AST  18  /  ALT  10  /  AlkPhos  85  08-29        CAPILLARY BLOOD GLUCOSE      POCT Blood Glucose.: 111 mg/dL (29 Aug 2022 12:10)      RADIOLOGY & ADDITIONAL TESTS: Reviewed. ***STEPDOWN FROM [facility] to [facility]***  Hospital Course:         INTERVAL EVENTS: sundowning, pulled out IVs    SUBJECTIVE / INTERVAL HPI: Patient seen and examined at bedside. Reports some abd pain.    ROS: negative unless otherwise stated above.    VITAL SIGNS:  Vital Signs Last 24 Hrs  T(C): 37.1 (29 Aug 2022 12:30), Max: 37.3 (28 Aug 2022 18:00)  T(F): 98.8 (29 Aug 2022 12:30), Max: 99.2 (28 Aug 2022 18:00)  HR: 70 (29 Aug 2022 12:30) (66 - 76)  BP: 145/78 (29 Aug 2022 12:30) (137/77 - 170/78)  BP(mean): 107 (29 Aug 2022 11:32) (99 - 112)  RR: 18 (29 Aug 2022 12:30) (17 - 24)  SpO2: 97% (29 Aug 2022 12:30) (94% - 98%)    Parameters below as of 29 Aug 2022 12:30  Patient On (Oxygen Delivery Method): room air          08-28-22 @ 07:01  -  08-29-22 @ 07:00  --------------------------------------------------------  IN: 693.2 mL / OUT: 1025 mL / NET: -331.8 mL    08-29-22 @ 07:01  -  08-29-22 @ 12:40  --------------------------------------------------------  IN: 180 mL / OUT: 350 mL / NET: -170 mL        PHYSICAL EXAM:    General: NAD, laying flat in bed  HEENT: dry MM. glasses, otherwise normal oral cavity  Neck: supple  Cardiovascular: +S1/S2; RRR, no murmur  Respiratory: CTA B/L; no W/R/R; tachypneic  Gastrointestinal: soft, NTND, bowel sounds+  Extremities: warm to touch; no edema, clubbing  Vascular: 2+ radial, DP pulses B/L  Neurological: AAOx3 but not good historian, moving arms, follows some commands  Psych: anxious    MEDICATIONS:  MEDICATIONS  (STANDING):  budesonide  80 MICROgram(s)/formoterol 4.5 MICROgram(s) Inhaler 2 Puff(s) Inhalation two times a day  dextrose 5%. 1000 milliLiter(s) (50 mL/Hr) IV Continuous <Continuous>  dextrose 5%. 1000 milliLiter(s) (100 mL/Hr) IV Continuous <Continuous>  dextrose 50% Injectable 25 Gram(s) IV Push once  dextrose 50% Injectable 12.5 Gram(s) IV Push once  dextrose 50% Injectable 25 Gram(s) IV Push once  glucagon  Injectable 1 milliGRAM(s) IntraMuscular once  heparin   Injectable 5000 Unit(s) SubCutaneous every 8 hours  insulin lispro (ADMELOG) corrective regimen sliding scale   SubCutaneous every 6 hours  isosorbide   mononitrate ER Tablet (IMDUR) 30 milliGRAM(s) Oral every 24 hours  mirtazapine 30 milliGRAM(s) Oral every 24 hours  vancomycin    Solution 125 milliGRAM(s) Oral every 6 hours    MEDICATIONS  (PRN):  ALBUTerol    90 MICROgram(s) HFA Inhaler 2 Puff(s) Inhalation every 6 hours PRN Shortness of Breath and/or Wheezing  dextrose Oral Gel 15 Gram(s) Oral once PRN Blood Glucose LESS THAN 70 milliGRAM(s)/deciliter      ALLERGIES:  Allergies    No Known Allergies    Intolerances        LABS:                        9.2    12.01 )-----------( 288      ( 29 Aug 2022 05:30 )             28.2     08-29    141  |  109<H>  |  10  ----------------------------<  95  4.3   |  22  |  1.03    Ca    8.2<L>      29 Aug 2022 05:30  Phos  2.0     08-29  Mg     2.1     08-29    TPro  5.4<L>  /  Alb  2.8<L>  /  TBili  0.3  /  DBili  x   /  AST  18  /  ALT  10  /  AlkPhos  85  08-29        CAPILLARY BLOOD GLUCOSE      POCT Blood Glucose.: 111 mg/dL (29 Aug 2022 12:10)      RADIOLOGY & ADDITIONAL TESTS: Reviewed. ***STEPDOWN FROM MED [facility] to [facility]***  Hospital Course: 89 y/o M w/ PMH CAD, CKD3, COPD, chronic diarrhea who presented for diarrhea. Admitted to medicine telemetry for sepsis 2/2 C difficile infection. Abd xray without dilated bowels. Started on oral vancomycin. Had diffuse diarrhea. Leukocytosis and diarrhea gradually improved, diarrhea now resolved. Presented w/ AMS, now improved (aaox3 but confused, likely not yet back at baseline, also c/b sundowning). C/w oral vancomycin and restart home meds as appropriate.      INTERVAL EVENTS: sundowning, pulled out IVs    SUBJECTIVE / INTERVAL HPI: Patient seen and examined at bedside. Reports some abd pain.    ROS: negative unless otherwise stated above.    VITAL SIGNS:  Vital Signs Last 24 Hrs  T(C): 37.1 (29 Aug 2022 12:30), Max: 37.3 (28 Aug 2022 18:00)  T(F): 98.8 (29 Aug 2022 12:30), Max: 99.2 (28 Aug 2022 18:00)  HR: 70 (29 Aug 2022 12:30) (66 - 76)  BP: 145/78 (29 Aug 2022 12:30) (137/77 - 170/78)  BP(mean): 107 (29 Aug 2022 11:32) (99 - 112)  RR: 18 (29 Aug 2022 12:30) (17 - 24)  SpO2: 97% (29 Aug 2022 12:30) (94% - 98%)    Parameters below as of 29 Aug 2022 12:30  Patient On (Oxygen Delivery Method): room air          08-28-22 @ 07:01  -  08-29-22 @ 07:00  --------------------------------------------------------  IN: 693.2 mL / OUT: 1025 mL / NET: -331.8 mL    08-29-22 @ 07:01  -  08-29-22 @ 12:40  --------------------------------------------------------  IN: 180 mL / OUT: 350 mL / NET: -170 mL        PHYSICAL EXAM:    General: NAD, laying flat in bed  HEENT: dry MM. glasses, otherwise normal oral cavity  Neck: supple  Cardiovascular: +S1/S2; RRR, no murmur  Respiratory: CTA B/L; no W/R/R; tachypneic  Gastrointestinal: soft, NTND, bowel sounds+  Extremities: warm to touch; no edema, clubbing  Vascular: 2+ radial, DP pulses B/L  Neurological: AAOx3 but not good historian, moving arms, follows some commands  Psych: anxious    MEDICATIONS:  MEDICATIONS  (STANDING):  budesonide  80 MICROgram(s)/formoterol 4.5 MICROgram(s) Inhaler 2 Puff(s) Inhalation two times a day  dextrose 5%. 1000 milliLiter(s) (50 mL/Hr) IV Continuous <Continuous>  dextrose 5%. 1000 milliLiter(s) (100 mL/Hr) IV Continuous <Continuous>  dextrose 50% Injectable 25 Gram(s) IV Push once  dextrose 50% Injectable 12.5 Gram(s) IV Push once  dextrose 50% Injectable 25 Gram(s) IV Push once  glucagon  Injectable 1 milliGRAM(s) IntraMuscular once  heparin   Injectable 5000 Unit(s) SubCutaneous every 8 hours  insulin lispro (ADMELOG) corrective regimen sliding scale   SubCutaneous every 6 hours  isosorbide   mononitrate ER Tablet (IMDUR) 30 milliGRAM(s) Oral every 24 hours  mirtazapine 30 milliGRAM(s) Oral every 24 hours  vancomycin    Solution 125 milliGRAM(s) Oral every 6 hours    MEDICATIONS  (PRN):  ALBUTerol    90 MICROgram(s) HFA Inhaler 2 Puff(s) Inhalation every 6 hours PRN Shortness of Breath and/or Wheezing  dextrose Oral Gel 15 Gram(s) Oral once PRN Blood Glucose LESS THAN 70 milliGRAM(s)/deciliter      ALLERGIES:  Allergies    No Known Allergies    Intolerances        LABS:                        9.2    12.01 )-----------( 288      ( 29 Aug 2022 05:30 )             28.2     08-29    141  |  109<H>  |  10  ----------------------------<  95  4.3   |  22  |  1.03    Ca    8.2<L>      29 Aug 2022 05:30  Phos  2.0     08-29  Mg     2.1     08-29    TPro  5.4<L>  /  Alb  2.8<L>  /  TBili  0.3  /  DBili  x   /  AST  18  /  ALT  10  /  AlkPhos  85  08-29        CAPILLARY BLOOD GLUCOSE      POCT Blood Glucose.: 111 mg/dL (29 Aug 2022 12:10)      RADIOLOGY & ADDITIONAL TESTS: Reviewed. ***STEPDOWN FROM MED [facility] to [facility]***  Hospital Course: 89 y/o M w/ PMH CAD, CKD3, COPD, chronic diarrhea who presented for diarrhea. Admitted to medicine telemetry for sepsis 2/2 C difficile infection. Abd xray without dilated bowels. Started on oral vancomycin. Had diffuse diarrhea. Leukocytosis and diarrhea gradually improved, diarrhea now resolved. Presented w/ AMS, now improved (aaox3 but confused, likely not yet back at baseline, also c/b sundowning). C/w oral vancomycin and restart home meds as appropriate. Pt was stable for stepdown on 8/29.       INTERVAL EVENTS: sundowning, pulled out IVs; placed x7 new IVs.     SUBJECTIVE / INTERVAL HPI: Patient seen and examined at bedside. Reports some abd pain.    ROS: negative unless otherwise stated above.    VITAL SIGNS:  Vital Signs Last 24 Hrs  T(C): 37.1 (29 Aug 2022 12:30), Max: 37.3 (28 Aug 2022 18:00)  T(F): 98.8 (29 Aug 2022 12:30), Max: 99.2 (28 Aug 2022 18:00)  HR: 70 (29 Aug 2022 12:30) (66 - 76)  BP: 145/78 (29 Aug 2022 12:30) (137/77 - 170/78)  BP(mean): 107 (29 Aug 2022 11:32) (99 - 112)  RR: 18 (29 Aug 2022 12:30) (17 - 24)  SpO2: 97% (29 Aug 2022 12:30) (94% - 98%)    Parameters below as of 29 Aug 2022 12:30  Patient On (Oxygen Delivery Method): room air          08-28-22 @ 07:01  -  08-29-22 @ 07:00  --------------------------------------------------------  IN: 693.2 mL / OUT: 1025 mL / NET: -331.8 mL    08-29-22 @ 07:01  -  08-29-22 @ 12:40  --------------------------------------------------------  IN: 180 mL / OUT: 350 mL / NET: -170 mL        PHYSICAL EXAM  General: NAD, laying flat in bed  HEENT: dry MM. glasses, otherwise normal oral cavity  Neck: supple  Cardiovascular: +S1/S2; RRR, no murmur  Respiratory: CTA B/L; no W/R/R; tachypneic  Gastrointestinal: soft, NTND, bowel sounds+  Extremities: warm to touch; no edema, clubbing  Vascular: 2+ radial, DP pulses B/L  Neurological: AAOx3 but not good historian, moving arms, follows some commands  Psych: anxious    MEDICATIONS:  MEDICATIONS  (STANDING):  budesonide  80 MICROgram(s)/formoterol 4.5 MICROgram(s) Inhaler 2 Puff(s) Inhalation two times a day  dextrose 5%. 1000 milliLiter(s) (50 mL/Hr) IV Continuous <Continuous>  dextrose 5%. 1000 milliLiter(s) (100 mL/Hr) IV Continuous <Continuous>  dextrose 50% Injectable 25 Gram(s) IV Push once  dextrose 50% Injectable 12.5 Gram(s) IV Push once  dextrose 50% Injectable 25 Gram(s) IV Push once  glucagon  Injectable 1 milliGRAM(s) IntraMuscular once  heparin   Injectable 5000 Unit(s) SubCutaneous every 8 hours  insulin lispro (ADMELOG) corrective regimen sliding scale   SubCutaneous every 6 hours  isosorbide   mononitrate ER Tablet (IMDUR) 30 milliGRAM(s) Oral every 24 hours  mirtazapine 30 milliGRAM(s) Oral every 24 hours  vancomycin    Solution 125 milliGRAM(s) Oral every 6 hours    MEDICATIONS  (PRN):  ALBUTerol    90 MICROgram(s) HFA Inhaler 2 Puff(s) Inhalation every 6 hours PRN Shortness of Breath and/or Wheezing  dextrose Oral Gel 15 Gram(s) Oral once PRN Blood Glucose LESS THAN 70 milliGRAM(s)/deciliter      ALLERGIES:  Allergies    No Known Allergies    Intolerances        LABS:                        9.2    12.01 )-----------( 288      ( 29 Aug 2022 05:30 )             28.2     08-29    141  |  109<H>  |  10  ----------------------------<  95  4.3   |  22  |  1.03    Ca    8.2<L>      29 Aug 2022 05:30  Phos  2.0     08-29  Mg     2.1     08-29    TPro  5.4<L>  /  Alb  2.8<L>  /  TBili  0.3  /  DBili  x   /  AST  18  /  ALT  10  /  AlkPhos  85  08-29        CAPILLARY BLOOD GLUCOSE      POCT Blood Glucose.: 111 mg/dL (29 Aug 2022 12:10)      RADIOLOGY & ADDITIONAL TESTS: Reviewed.    *Pt refused US abd    Xray Abdomen 1 View PORTABLE -Routine (Xray Abdomen 1 View PORTABLE -Routine in AM.) (08.26.22 @ 07:40) >  No evidence of toxic megacolon. Colitis. No evidence of bowel   obstruction. Lumbar spine degenerative changes, dextro scoliosis.   Abdominal and pelvic vascular calcification.. Urinary bladder contrast   material    < end of copied text >

## 2022-08-29 NOTE — PROGRESS NOTE ADULT - PROBLEM SELECTOR PLAN 4
-patient was tachypenic this morning  -patient afebrile orally, f/u rectal temp  -f/u chest x-ray unremarkable  - consider restarting home advair and albuterol -patient was tachypenic this morning  -patient afebrile orally and rectal  -chest x-ray w/ possible mild fluid  -home advair and albuterol Patient was tachypenic this morning BUT patient afebrile orally and rectal. Most likely 2/2 to pain and anxiety. Chest x-ray w/ possible mild fluid.    Plan:   - Home advair and albuterol

## 2022-08-29 NOTE — PROGRESS NOTE ADULT - PROBLEM SELECTOR PLAN 2
C diff sample positive. Septic as above.    Plan:  - C/w plan as above RESOLVING  Likely secondary to sepsis (as above). Exam limited but does not appear focal and CTH negative.  AAOx3 but pt intermittently confused. Was more awake on Saturday when Nephew was visiting. Also has sundowning at night    Plan:   - Nephew at bedside on 8/27, said that patient looked at baseline at that time  - Treatment as above RESOLVING  Likely secondary to sepsis (as above). Exam limited but does not appear focal and CTH negative.  AAOx3 but pt intermittently confused. Was more awake on Saturday when Nephew was visiting. Also has sundowning at night    Plan:   - Treatment as above

## 2022-08-29 NOTE — PROGRESS NOTE ADULT - SUBJECTIVE AND OBJECTIVE BOX
SUBJECTIVE: Patient seen and examined at bedside.    heparin   Injectable 5000 Unit(s) SubCutaneous every 8 hours  isosorbide   mononitrate ER Tablet (IMDUR) 30 milliGRAM(s) Oral every 24 hours  vancomycin    Solution 125 milliGRAM(s) Oral every 6 hours    MEDICATIONS  (PRN):  ALBUTerol    90 MICROgram(s) HFA Inhaler 2 Puff(s) Inhalation every 6 hours PRN Shortness of Breath and/or Wheezing  dextrose Oral Gel 15 Gram(s) Oral once PRN Blood Glucose LESS THAN 70 milliGRAM(s)/deciliter      I&O's Detail    27 Aug 2022 07:01  -  28 Aug 2022 07:00  --------------------------------------------------------  IN:    IV PiggyBack: 100 mL    Lactated Ringers: 1200 mL    Lactated Ringers: 240 mL  Total IN: 1540 mL    OUT:    Incontinent per Condom Catheter (mL): 1050 mL    Voided (mL): 400 mL  Total OUT: 1450 mL    Total NET: 90 mL      28 Aug 2022 07:01  -  29 Aug 2022 06:19  --------------------------------------------------------  IN:    IV PiggyBack: 333.2 mL    Lactated Ringers: 360 mL  Total IN: 693.2 mL    OUT:    Voided (mL): 1025 mL  Total OUT: 1025 mL    Total NET: -331.8 mL          T(C): 36.7 (08-29-22 @ 01:29), Max: 37.3 (08-28-22 @ 18:00)  HR: 70 (08-29-22 @ 04:45) (66 - 78)  BP: 165/75 (08-29-22 @ 04:45) (137/77 - 177/70)  RR: 18 (08-29-22 @ 04:45) (17 - 24)  SpO2: 96% (08-29-22 @ 04:45) (94% - 96%)    GENERAL: NAD, Resting comfortably in bed, awake, opens eyes spontaneously  HEENT: NCAT, MMM, Normal conjunctiva, PERRL  RESP: Nonlabored breathing, No respiratory distress  CARD: Normal rate, Normal peripheral perfusion  GI: Soft, ND, NT, No guarding, No rebound tenderness  EXTREM: WWP, No edema, No gross deformity of extremities  SKIN: No rashes, no lesions  NEURO: AAOx3, No focal motor or sensory deficits  PSYCH: Affect and characteristics of appearance, verbalizations, and behaviors are appropriate    LABS:                        8.8    14.17 )-----------( 287      ( 28 Aug 2022 05:30 )             27.8     08-28    140  |  110<H>  |  13  ----------------------------<  115<H>  4.1   |  23  |  1.04    Ca    8.0<L>      28 Aug 2022 05:30  Phos  1.5     08-28  Mg     1.8     08-28    TPro  5.1<L>  /  Alb  2.7<L>  /  TBili  0.2  /  DBili  x   /  AST  14  /  ALT  7<L>  /  AlkPhos  95  08-27          RADIOLOGY & ADDITIONAL STUDIES:      Culture - Urine (collected 08-24-22 @ 18:55)  Source: Clean Catch Clean Catch (Midstream)  Final Report (08-26-22 @ 07:59):    Contamination apparent after additional incubation. Please disregard    previous report.    Lab requests new specimen if clinically indicated.    Culture - Blood (collected 08-24-22 @ 15:30)  Source: .Blood Blood-Peripheral  Preliminary Report (08-28-22 @ 21:00):    No growth at 4 days.    Culture - Blood (collected 08-24-22 @ 15:20)  Source: .Blood Blood-Peripheral  Preliminary Report (08-28-22 @ 21:00):    No growth at 4 days.     SUBJECTIVE: Patient seen and examined at bedside.  He is at his baseline of mild confusion; although answers yes/no questions.  Patient endorses mild LUQ tenderness and reports frequent diarrhea.  Patient has had 3 loose bowel movements in the past 24 hours with minimal blood per the nurse.    heparin   Injectable 5000 Unit(s) SubCutaneous every 8 hours  isosorbide   mononitrate ER Tablet (IMDUR) 30 milliGRAM(s) Oral every 24 hours  vancomycin    Solution 125 milliGRAM(s) Oral every 6 hours    MEDICATIONS  (PRN):  ALBUTerol    90 MICROgram(s) HFA Inhaler 2 Puff(s) Inhalation every 6 hours PRN Shortness of Breath and/or Wheezing  dextrose Oral Gel 15 Gram(s) Oral once PRN Blood Glucose LESS THAN 70 milliGRAM(s)/deciliter      I&O's Detail    27 Aug 2022 07:01  -  28 Aug 2022 07:00  --------------------------------------------------------  IN:    IV PiggyBack: 100 mL    Lactated Ringers: 1200 mL    Lactated Ringers: 240 mL  Total IN: 1540 mL    OUT:    Incontinent per Condom Catheter (mL): 1050 mL    Voided (mL): 400 mL  Total OUT: 1450 mL    Total NET: 90 mL      28 Aug 2022 07:01  -  29 Aug 2022 06:19  --------------------------------------------------------  IN:    IV PiggyBack: 333.2 mL    Lactated Ringers: 360 mL  Total IN: 693.2 mL    OUT:    Voided (mL): 1025 mL  Total OUT: 1025 mL    Total NET: -331.8 mL          T(C): 36.7 (08-29-22 @ 01:29), Max: 37.3 (08-28-22 @ 18:00)  HR: 70 (08-29-22 @ 04:45) (66 - 78)  BP: 165/75 (08-29-22 @ 04:45) (137/77 - 177/70)  RR: 18 (08-29-22 @ 04:45) (17 - 24)  SpO2: 96% (08-29-22 @ 04:45) (94% - 96%)    GENERAL: NAD, Resting comfortably in bed, awake, opens eyes spontaneously  HEENT: NCAT, MMM, Normal conjunctiva  RESP: Nonlabored breathing on room air, No respiratory distress  CARD: Normal rate, Normal peripheral perfusion  GI: Minimal LUQ tenderness with deep palpation, Soft, ND abdomen; No guarding, No rebound tenderness  EXTREM: WWP, No edema, No gross deformity of extremities  SKIN: No rashes, no lesions  NEURO: Awake, alert and mildly confused, No focal motor or sensory deficits      LABS:                        8.8    14.17 )-----------( 287      ( 28 Aug 2022 05:30 )             27.8     08-28    140  |  110<H>  |  13  ----------------------------<  115<H>  4.1   |  23  |  1.04    Ca    8.0<L>      28 Aug 2022 05:30  Phos  1.5     08-28  Mg     1.8     08-28    TPro  5.1<L>  /  Alb  2.7<L>  /  TBili  0.2  /  DBili  x   /  AST  14  /  ALT  7<L>  /  AlkPhos  95  08-27          RADIOLOGY & ADDITIONAL STUDIES:      Culture - Urine (collected 08-24-22 @ 18:55)  Source: Clean Catch Clean Catch (Midstream)  Final Report (08-26-22 @ 07:59):    Contamination apparent after additional incubation. Please disregard    previous report.    Lab requests new specimen if clinically indicated.    Culture - Blood (collected 08-24-22 @ 15:30)  Source: .Blood Blood-Peripheral  Preliminary Report (08-28-22 @ 21:00):    No growth at 4 days.    Culture - Blood (collected 08-24-22 @ 15:20)  Source: .Blood Blood-Peripheral  Preliminary Report (08-28-22 @ 21:00):    No growth at 4 days.

## 2022-08-29 NOTE — PROGRESS NOTE ADULT - ATTENDING COMMENTS
Agree with continued hydration with increased insensible loss secondary to diarrhea. Change to LR. Continue po Vanco and Speech and swallow f/u noted. Consider resuming beta blocker and ASA.
Pt hypovolemic with nonanion gap acidosis secondary to diarrhea secondary to CDiff. Increase bicarb gtt and strict I&O. Abd soft and nondistended and non tender. No megacolon on abd xray. Continue po Vanco.
Clostridium difficile colitis, CKD, CAD, COPD  physical as above  continue vancomycin  some SOB today but no fever and CXR is clear with good oxygenation, continue to observe  renal function improved  continue imdur,simvastatin  restart COPD meds  decision making of high complexity
COPD, CKD with c difficile colitis  physical as above  continue IVF at 60/hr  clear liquids  continue vancomycin  rest as above
clinically improved with decreased diarrhea. Speech and swallow f/u. continue Vanco po and decrease in WBC noted. D/C IVF.

## 2022-08-29 NOTE — PROGRESS NOTE ADULT - PROBLEM SELECTOR PLAN 1
Met 3/4 SIRS criteria (T102.4, WBC 30.12, RR 36) with lactate 2.3 -> 3.2 -> 1.9. Likely secondary to C difficile.  C diff positive. GI PCR neg. Surgery also following as they were consulted for r/o acalculous sarah, however low suspicion.    Plan:   - C/w isolation precautions   - C/w oral vancomycin 125mg q6h for 10 days, 8/25  - F/u blood cultures  - Monitor BM and abd exam

## 2022-08-29 NOTE — PROGRESS NOTE ADULT - PROBLEM SELECTOR PLAN 8
Known history of chronic diarrhea after a R. hemicolectomy in 2017. Follows with Dr Hammer outpatient    Plan:   - Stool count x1/day over the weekend.   - Dr Hammer to follow patient  - No episodes of diarrhea the last 2 days

## 2022-08-29 NOTE — PROGRESS NOTE ADULT - PROBLEM SELECTOR PLAN 12
Fluids: 250cc LR PRN, LR maintenance @ 60 cc/hr  Electrolytes: replenish electrolytes aggressively given diarrhea  Diet: speech and swallow eval -> clear liquids, ensure   DVT Prophylaxis: heparin subq 5k TID given CKD  GI Prophylaxis: not indicated  Code Status: DNR/DNI as per recent [facility] last admission  Dispo: [facility] Fluids: none  Electrolytes: replenish as appropriate  Diet: f/u speech and swallow repeat eval; clear liquids, ensure   DVT Prophylaxis: heparin subq 5k TID given CKD  GI Prophylaxis: not indicated  Code Status: DNR/DNI as per recent [facility] last admission  Dispo: 7[facility]-->F

## 2022-08-30 LAB
ANION GAP SERPL CALC-SCNC: 17 MMOL/L — SIGNIFICANT CHANGE UP (ref 5–17)
ANISOCYTOSIS BLD QL: SLIGHT — SIGNIFICANT CHANGE UP
BASOPHILS # BLD AUTO: 0 K/UL — SIGNIFICANT CHANGE UP (ref 0–0.2)
BASOPHILS NFR BLD AUTO: 0 % — SIGNIFICANT CHANGE UP (ref 0–2)
BUN SERPL-MCNC: 11 MG/DL — SIGNIFICANT CHANGE UP (ref 7–23)
BURR CELLS BLD QL SMEAR: PRESENT — SIGNIFICANT CHANGE UP
CALCIUM SERPL-MCNC: 8.1 MG/DL — LOW (ref 8.4–10.5)
CHLORIDE SERPL-SCNC: 104 MMOL/L — SIGNIFICANT CHANGE UP (ref 96–108)
CO2 SERPL-SCNC: 15 MMOL/L — LOW (ref 22–31)
CREAT SERPL-MCNC: 1.03 MG/DL — SIGNIFICANT CHANGE UP (ref 0.5–1.3)
EGFR: 68 ML/MIN/1.73M2 — SIGNIFICANT CHANGE UP
EOSINOPHIL # BLD AUTO: 0.16 K/UL — SIGNIFICANT CHANGE UP (ref 0–0.5)
EOSINOPHIL NFR BLD AUTO: 0.9 % — SIGNIFICANT CHANGE UP (ref 0–6)
GLUCOSE BLDC GLUCOMTR-MCNC: 107 MG/DL — HIGH (ref 70–99)
GLUCOSE BLDC GLUCOMTR-MCNC: 165 MG/DL — HIGH (ref 70–99)
GLUCOSE BLDC GLUCOMTR-MCNC: 94 MG/DL — SIGNIFICANT CHANGE UP (ref 70–99)
GLUCOSE SERPL-MCNC: 124 MG/DL — HIGH (ref 70–99)
HCT VFR BLD CALC: 33.3 % — LOW (ref 39–50)
HGB BLD-MCNC: 10.8 G/DL — LOW (ref 13–17)
HYPOCHROMIA BLD QL: SLIGHT — SIGNIFICANT CHANGE UP
LYMPHOCYTES # BLD AUTO: 1.35 K/UL — SIGNIFICANT CHANGE UP (ref 1–3.3)
LYMPHOCYTES # BLD AUTO: 7.8 % — LOW (ref 13–44)
MAGNESIUM SERPL-MCNC: 1.8 MG/DL — SIGNIFICANT CHANGE UP (ref 1.6–2.6)
MANUAL SMEAR VERIFICATION: SIGNIFICANT CHANGE UP
MCHC RBC-ENTMCNC: 30.3 PG — SIGNIFICANT CHANGE UP (ref 27–34)
MCHC RBC-ENTMCNC: 32.4 GM/DL — SIGNIFICANT CHANGE UP (ref 32–36)
MCV RBC AUTO: 93.5 FL — SIGNIFICANT CHANGE UP (ref 80–100)
MONOCYTES # BLD AUTO: 1.95 K/UL — HIGH (ref 0–0.9)
MONOCYTES NFR BLD AUTO: 11.3 % — SIGNIFICANT CHANGE UP (ref 2–14)
MYELOCYTES NFR BLD: 0.9 % — HIGH (ref 0–0)
NEUTROPHILS # BLD AUTO: 13.68 K/UL — HIGH (ref 1.8–7.4)
NEUTROPHILS NFR BLD AUTO: 79.1 % — HIGH (ref 43–77)
OVALOCYTES BLD QL SMEAR: SLIGHT — SIGNIFICANT CHANGE UP
PHOSPHATE SERPL-MCNC: 2.6 MG/DL — SIGNIFICANT CHANGE UP (ref 2.5–4.5)
PLAT MORPH BLD: NORMAL — SIGNIFICANT CHANGE UP
PLATELET # BLD AUTO: 365 K/UL — SIGNIFICANT CHANGE UP (ref 150–400)
POIKILOCYTOSIS BLD QL AUTO: SIGNIFICANT CHANGE UP
POLYCHROMASIA BLD QL SMEAR: SLIGHT — SIGNIFICANT CHANGE UP
POTASSIUM SERPL-MCNC: 4.3 MMOL/L — SIGNIFICANT CHANGE UP (ref 3.5–5.3)
POTASSIUM SERPL-SCNC: 4.3 MMOL/L — SIGNIFICANT CHANGE UP (ref 3.5–5.3)
RBC # BLD: 3.56 M/UL — LOW (ref 4.2–5.8)
RBC # FLD: 13.3 % — SIGNIFICANT CHANGE UP (ref 10.3–14.5)
RBC BLD AUTO: ABNORMAL
SCHISTOCYTES BLD QL AUTO: SLIGHT — SIGNIFICANT CHANGE UP
SODIUM SERPL-SCNC: 136 MMOL/L — SIGNIFICANT CHANGE UP (ref 135–145)
WBC # BLD: 17.29 K/UL — HIGH (ref 3.8–10.5)
WBC # FLD AUTO: 17.29 K/UL — HIGH (ref 3.8–10.5)

## 2022-08-30 PROCEDURE — 71250 CT THORAX DX C-: CPT | Mod: 26

## 2022-08-30 RX ORDER — SODIUM CHLORIDE 9 MG/ML
500 INJECTION, SOLUTION INTRAVENOUS
Refills: 0 | Status: DISCONTINUED | OUTPATIENT
Start: 2022-08-30 | End: 2022-09-05

## 2022-08-30 RX ORDER — MAGNESIUM SULFATE 500 MG/ML
2 VIAL (ML) INJECTION ONCE
Refills: 0 | Status: COMPLETED | OUTPATIENT
Start: 2022-08-30 | End: 2022-08-30

## 2022-08-30 RX ADMIN — Medication 125 MILLIGRAM(S): at 18:24

## 2022-08-30 RX ADMIN — ISOSORBIDE MONONITRATE 30 MILLIGRAM(S): 60 TABLET, EXTENDED RELEASE ORAL at 10:28

## 2022-08-30 RX ADMIN — Medication 125 MILLIGRAM(S): at 12:27

## 2022-08-30 RX ADMIN — ALBUTEROL 2 PUFF(S): 90 AEROSOL, METERED ORAL at 06:46

## 2022-08-30 RX ADMIN — SODIUM CHLORIDE 75 MILLILITER(S): 9 INJECTION, SOLUTION INTRAVENOUS at 12:27

## 2022-08-30 RX ADMIN — Medication 1: at 18:28

## 2022-08-30 RX ADMIN — HEPARIN SODIUM 5000 UNIT(S): 5000 INJECTION INTRAVENOUS; SUBCUTANEOUS at 15:02

## 2022-08-30 RX ADMIN — HEPARIN SODIUM 5000 UNIT(S): 5000 INJECTION INTRAVENOUS; SUBCUTANEOUS at 07:12

## 2022-08-30 RX ADMIN — BUDESONIDE AND FORMOTEROL FUMARATE DIHYDRATE 2 PUFF(S): 160; 4.5 AEROSOL RESPIRATORY (INHALATION) at 18:25

## 2022-08-30 RX ADMIN — Medication 81 MILLIGRAM(S): at 15:02

## 2022-08-30 RX ADMIN — CLOPIDOGREL BISULFATE 75 MILLIGRAM(S): 75 TABLET, FILM COATED ORAL at 15:02

## 2022-08-30 RX ADMIN — Medication 125 MILLIGRAM(S): at 00:51

## 2022-08-30 RX ADMIN — BUDESONIDE AND FORMOTEROL FUMARATE DIHYDRATE 2 PUFF(S): 160; 4.5 AEROSOL RESPIRATORY (INHALATION) at 07:12

## 2022-08-30 RX ADMIN — Medication 25 GRAM(S): at 12:27

## 2022-08-30 RX ADMIN — MIRTAZAPINE 30 MILLIGRAM(S): 45 TABLET, ORALLY DISINTEGRATING ORAL at 22:07

## 2022-08-30 RX ADMIN — Medication 125 MILLIGRAM(S): at 07:12

## 2022-08-30 RX ADMIN — HEPARIN SODIUM 5000 UNIT(S): 5000 INJECTION INTRAVENOUS; SUBCUTANEOUS at 22:07

## 2022-08-30 NOTE — PROGRESS NOTE ADULT - SUBJECTIVE AND OBJECTIVE BOX
Pt seen and examined   denies diarrhea      REVIEW OF SYSTEMS:  Constitutional: No fever,   Cardiovascular: No chest pain, palpitations,   Gastrointestinal: No abdominal or epigastric pain. No nausea, vomiting No diarrhea   Skin: No itching, burning, rashes or lesions       MEDICATIONS:  MEDICATIONS  (STANDING):  aspirin enteric coated 81 milliGRAM(s) Oral every 24 hours  budesonide  80 MICROgram(s)/formoterol 4.5 MICROgram(s) Inhaler 2 Puff(s) Inhalation two times a day  clopidogrel Tablet 75 milliGRAM(s) Oral every 24 hours  dextrose 5%. 1000 milliLiter(s) (50 mL/Hr) IV Continuous <Continuous>  dextrose 5%. 1000 milliLiter(s) (100 mL/Hr) IV Continuous <Continuous>  dextrose 50% Injectable 25 Gram(s) IV Push once  dextrose 50% Injectable 12.5 Gram(s) IV Push once  dextrose 50% Injectable 25 Gram(s) IV Push once  glucagon  Injectable 1 milliGRAM(s) IntraMuscular once  heparin   Injectable 5000 Unit(s) SubCutaneous every 8 hours  insulin lispro (ADMELOG) corrective regimen sliding scale   SubCutaneous every 6 hours  isosorbide   mononitrate ER Tablet (IMDUR) 30 milliGRAM(s) Oral every 24 hours  lactated ringers. 500 milliLiter(s) (75 mL/Hr) IV Continuous <Continuous>  magnesium sulfate  IVPB 2 Gram(s) IV Intermittent once  mirtazapine 30 milliGRAM(s) Oral every 24 hours  vancomycin    Solution 125 milliGRAM(s) Oral every 6 hours    MEDICATIONS  (PRN):  ALBUTerol    90 MICROgram(s) HFA Inhaler 2 Puff(s) Inhalation every 6 hours PRN Shortness of Breath and/or Wheezing  dextrose Oral Gel 15 Gram(s) Oral once PRN Blood Glucose LESS THAN 70 milliGRAM(s)/deciliter      Allergies    No Known Allergies    Intolerances        Vital Signs Last 24 Hrs  T(C): 36.8 (30 Aug 2022 05:52), Max: 37.1 (29 Aug 2022 12:30)  T(F): 98.2 (30 Aug 2022 05:52), Max: 98.8 (29 Aug 2022 12:30)  HR: 87 (30 Aug 2022 05:52) (70 - 87)  BP: 129/75 (30 Aug 2022 05:52) (129/75 - 165/85)  BP(mean): --  RR: 18 (30 Aug 2022 05:52) (17 - 18)  SpO2: 98% (30 Aug 2022 05:52) (95% - 98%)    Parameters below as of 29 Aug 2022 21:21  Patient On (Oxygen Delivery Method): room air        08-29 @ 07:01  -  08-30 @ 07:00  --------------------------------------------------------  IN: 180 mL / OUT: 350 mL / NET: -170 mL        PHYSICAL EXAM:    General: poorly nourished; in no acute distress  HEENT: MMM, conjunctiva and sclera clear  Gastrointestinal: Soft non-tender mildly-distended; Normal bowel sounds;  Skin: Warm and dry. No obvious rash    LABS:      CBC Full  -  ( 30 Aug 2022 07:06 )  WBC Count : 17.29 K/uL  RBC Count : 3.56 M/uL  Hemoglobin : 10.8 g/dL  Hematocrit : 33.3 %  Platelet Count - Automated : 365 K/uL  Mean Cell Volume : 93.5 fl  Mean Cell Hemoglobin : 30.3 pg  Mean Cell Hemoglobin Concentration : 32.4 gm/dL  Auto Neutrophil # : 13.68 K/uL  Auto Lymphocyte # : 1.35 K/uL  Auto Monocyte # : 1.95 K/uL  Auto Eosinophil # : 0.16 K/uL  Auto Basophil # : 0.00 K/uL  Auto Neutrophil % : 79.1 %  Auto Lymphocyte % : 7.8 %  Auto Monocyte % : 11.3 %  Auto Eosinophil % : 0.9 %  Auto Basophil % : 0.0 %    08-30    136  |  104  |  11  ----------------------------<  124<H>  4.3   |  15<L>  |  1.03    Ca    8.1<L>      30 Aug 2022 07:06  Phos  2.6     08-30  Mg     1.8     08-30    TPro  5.4<L>  /  Alb  2.8<L>  /  TBili  0.3  /  DBili  x   /  AST  18  /  ALT  10  /  AlkPhos  85  08-29                      RADIOLOGY & ADDITIONAL STUDIES (The following images were personally reviewed):

## 2022-08-30 NOTE — PROGRESS NOTE ADULT - SUBJECTIVE AND OBJECTIVE BOX
JASON TIARA  89y, Male    Patient is a 89y old  Male who presents with a chief complaint of altered mental status secondary to sepsis (29 Aug 2022 23:27)      INTERVAL HPI/OVERNIGHT EVENTS:    ROS: otherwise negative      T(C): 36.8 (08-30-22 @ 05:52), Max: 37.3 (08-29-22 @ 10:25)  HR: 87 (08-30-22 @ 05:52) (70 - 87)  BP: 129/75 (08-30-22 @ 05:52) (129/75 - 170/78)  RR: 18 (08-30-22 @ 05:52) (17 - 18)  SpO2: 98% (08-30-22 @ 05:52) (95% - 98%)  Wt(kg): --Vital Signs Last 24 Hrs  T(C): 36.8 (30 Aug 2022 05:52), Max: 37.3 (29 Aug 2022 10:25)  T(F): 98.2 (30 Aug 2022 05:52), Max: 99.2 (29 Aug 2022 10:25)  HR: 87 (30 Aug 2022 05:52) (70 - 87)  BP: 129/75 (30 Aug 2022 05:52) (129/75 - 170/78)  BP(mean): 107 (29 Aug 2022 11:32) (107 - 112)  RR: 18 (30 Aug 2022 05:52) (17 - 18)  SpO2: 98% (30 Aug 2022 05:52) (95% - 98%)    Parameters below as of 29 Aug 2022 21:21  Patient On (Oxygen Delivery Method): room air        PHYSICAL EXAM:  Constitutional: resting comfortably in bed; NAD  Head: NC/AT  Eyes: PERRL, EOMI, anicteric sclera  ENT: no nasal discharge; uvula midline, no oropharyngeal erythema or exudates; MMM  Neck: supple; no JVD or thyromegaly  Respiratory: CTA B/L; no W/R/R, no retractions  Cardiac: +S1/S2; RRR; no M/R/G; PMI non-displaced  Gastrointestinal: soft, NT/ND; no rebound or guarding; +BSx4  Genitourinary: normal external genitalia  Back: spine midline, no bony tenderness or step-offs; no CVAT B/L  Extremities: WWP, no clubbing or cyanosis; no peripheral edema. Capillary refill <2 sec  Musculoskeletal: NROM x4; no joint swelling, tenderness or erythema  Vascular: 2+ radial, femoral, DP/PT pulses B/L  Dermatologic: skin warm, dry and intact; no rashes, wounds, or scars  Lymphatic: no submandibular or cervical LAD  Neurologic: AAOx3; CNII-XII grossly intact; no focal deficits  Psychiatric: affect and characteristics of appearance, verbalizations, behaviors are appropriate    LABS:                        10.8   17.29 )-----------( 365      ( 30 Aug 2022 07:06 )             33.3     08-30    136  |  104  |  11  ----------------------------<  124<H>  4.3   |  15<L>  |  1.03    Ca    8.1<L>      30 Aug 2022 07:06  Phos  2.6     08-30  Mg     1.8     08-30    TPro  5.4<L>  /  Alb  2.8<L>  /  TBili  0.3  /  DBili  x   /  AST  18  /  ALT  10  /  AlkPhos  85  08-29          CAPILLARY BLOOD GLUCOSE      POCT Blood Glucose.: 107 mg/dL (30 Aug 2022 00:25)  POCT Blood Glucose.: 116 mg/dL (29 Aug 2022 17:25)  POCT Blood Glucose.: 111 mg/dL (29 Aug 2022 12:10)            MEDICATIONS  (STANDING):  aspirin enteric coated 81 milliGRAM(s) Oral every 24 hours  budesonide  80 MICROgram(s)/formoterol 4.5 MICROgram(s) Inhaler 2 Puff(s) Inhalation two times a day  clopidogrel Tablet 75 milliGRAM(s) Oral every 24 hours  dextrose 5%. 1000 milliLiter(s) (50 mL/Hr) IV Continuous <Continuous>  dextrose 5%. 1000 milliLiter(s) (100 mL/Hr) IV Continuous <Continuous>  dextrose 50% Injectable 25 Gram(s) IV Push once  dextrose 50% Injectable 12.5 Gram(s) IV Push once  dextrose 50% Injectable 25 Gram(s) IV Push once  glucagon  Injectable 1 milliGRAM(s) IntraMuscular once  heparin   Injectable 5000 Unit(s) SubCutaneous every 8 hours  insulin lispro (ADMELOG) corrective regimen sliding scale   SubCutaneous every 6 hours  isosorbide   mononitrate ER Tablet (IMDUR) 30 milliGRAM(s) Oral every 24 hours  mirtazapine 30 milliGRAM(s) Oral every 24 hours  vancomycin    Solution 125 milliGRAM(s) Oral every 6 hours    MEDICATIONS  (PRN):  ALBUTerol    90 MICROgram(s) HFA Inhaler 2 Puff(s) Inhalation every 6 hours PRN Shortness of Breath and/or Wheezing  dextrose Oral Gel 15 Gram(s) Oral once PRN Blood Glucose LESS THAN 70 milliGRAM(s)/deciliter      RADIOLOGY & ADDITIONAL TESTS:    Imaging Personally Reviewed:  [ ] YES  [ ] NO   JASON TIARA  89y, Male    Patient is a 89y old  Male who presents with a chief complaint of altered mental status secondary to sepsis (29 Aug 2022 23:27)      INTERVAL HPI/OVERNIGHT EVENTS: RONN overnight. Pt states he feels like he is choking on his food (apple sauce). Able to talk and cough without regurgitation of food. However, tolerates sip of water afterward. Still having diarrhea. Denies CP, SOB, abdominal pain.    ROS: otherwise negative      T(C): 36.8 (08-30-22 @ 05:52), Max: 37.3 (08-29-22 @ 10:25)  HR: 87 (08-30-22 @ 05:52) (70 - 87)  BP: 129/75 (08-30-22 @ 05:52) (129/75 - 170/78)  RR: 18 (08-30-22 @ 05:52) (17 - 18)  SpO2: 98% (08-30-22 @ 05:52) (95% - 98%)  Wt(kg): --Vital Signs Last 24 Hrs  T(C): 36.8 (30 Aug 2022 05:52), Max: 37.3 (29 Aug 2022 10:25)  T(F): 98.2 (30 Aug 2022 05:52), Max: 99.2 (29 Aug 2022 10:25)  HR: 87 (30 Aug 2022 05:52) (70 - 87)  BP: 129/75 (30 Aug 2022 05:52) (129/75 - 170/78)  BP(mean): 107 (29 Aug 2022 11:32) (107 - 112)  RR: 18 (30 Aug 2022 05:52) (17 - 18)  SpO2: 98% (30 Aug 2022 05:52) (95% - 98%)    Parameters below as of 29 Aug 2022 21:21  Patient On (Oxygen Delivery Method): room air        PHYSICAL EXAM:  Constitutional: resting comfortably in bed; NAD; cachectic  Head: NC/AT  Eyes: PERRL, EOMI, anicteric sclera  ENT: no nasal discharge;  dry MM  Neck: supple; no JVD or thyromegaly  Respiratory: CTA B/L; no W/R/R, no retractions, poor inspiratory effort  Cardiac: +S1/S2; RRR; no M/R/G; PMI non-displaced  Gastrointestinal: soft, nondistended; some TTP diffusely ; no rebound or guarding; +BSx4  Extremities: WWP, no clubbing or cyanosis; no peripheral edema. Capillary refill <2 sec  Vascular: 2+ radial, femoral, DP/PT pulses B/L  Neurologic: AAOx2; CNII-XII grossly intact; no focal deficits  Psychiatric: affect and characteristics of appearance, verbalizations, behaviors are appropriate    LABS:                        10.8   17.29 )-----------( 365      ( 30 Aug 2022 07:06 )             33.3     08-30    136  |  104  |  11  ----------------------------<  124<H>  4.3   |  15<L>  |  1.03    Ca    8.1<L>      30 Aug 2022 07:06  Phos  2.6     08-30  Mg     1.8     08-30    TPro  5.4<L>  /  Alb  2.8<L>  /  TBili  0.3  /  DBili  x   /  AST  18  /  ALT  10  /  AlkPhos  85  08-29          CAPILLARY BLOOD GLUCOSE      POCT Blood Glucose.: 107 mg/dL (30 Aug 2022 00:25)  POCT Blood Glucose.: 116 mg/dL (29 Aug 2022 17:25)  POCT Blood Glucose.: 111 mg/dL (29 Aug 2022 12:10)            MEDICATIONS  (STANDING):  aspirin enteric coated 81 milliGRAM(s) Oral every 24 hours  budesonide  80 MICROgram(s)/formoterol 4.5 MICROgram(s) Inhaler 2 Puff(s) Inhalation two times a day  clopidogrel Tablet 75 milliGRAM(s) Oral every 24 hours  dextrose 5%. 1000 milliLiter(s) (50 mL/Hr) IV Continuous <Continuous>  dextrose 5%. 1000 milliLiter(s) (100 mL/Hr) IV Continuous <Continuous>  dextrose 50% Injectable 25 Gram(s) IV Push once  dextrose 50% Injectable 12.5 Gram(s) IV Push once  dextrose 50% Injectable 25 Gram(s) IV Push once  glucagon  Injectable 1 milliGRAM(s) IntraMuscular once  heparin   Injectable 5000 Unit(s) SubCutaneous every 8 hours  insulin lispro (ADMELOG) corrective regimen sliding scale   SubCutaneous every 6 hours  isosorbide   mononitrate ER Tablet (IMDUR) 30 milliGRAM(s) Oral every 24 hours  mirtazapine 30 milliGRAM(s) Oral every 24 hours  vancomycin    Solution 125 milliGRAM(s) Oral every 6 hours    MEDICATIONS  (PRN):  ALBUTerol    90 MICROgram(s) HFA Inhaler 2 Puff(s) Inhalation every 6 hours PRN Shortness of Breath and/or Wheezing  dextrose Oral Gel 15 Gram(s) Oral once PRN Blood Glucose LESS THAN 70 milliGRAM(s)/deciliter      RADIOLOGY & ADDITIONAL TESTS:    Imaging Personally Reviewed:  [x] YES  [ ] NO

## 2022-08-30 NOTE — PROGRESS NOTE ADULT - SUBJECTIVE AND OBJECTIVE BOX
Interventional, Pulmonary, Critical, Chest Special Procedures.    Pt was seen and fully examined by myself.     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 Patient is altered on exam, but per ED report, he has been having chronic diarrhea, tachypnea, and chest pain that was worsening, prompting patient to call EMS to bring him from home into [facility]. Of note, patient was recently admitted 7/19-7/23 to [facility] for diarrhea after being sent in from his PCP office for hypotension and chronic diarrhea. During that time his diarrhea resolved and he was told to f/u with outpatient GI with Dr. Hammer. Notably he was fully conversant with mild memory impairment on admission and throughout hospital stay.    ED vitals: T 39.3   HR 78  RR 36 (taken manually)  BP 97/59  SpO2 98% on 2L  Labs significant: WBC 30, Hgb 11.5, lactate 2.3 -> 3.2 after 2L NS, UA wnl  Imaging/EKG: CT H/C/A/P known lung nodule, colitis  Interventions: zosyn, zofran, 3.2L NS, flagyl, acetaminophen     (24 Aug 2022 21:48)      REVIEW OF SYSTEMS:  Constitutional: No fever, weight loss, chills + fatigue  Eyes: No eye pain, visual disturbances, or discharge  ENMT:  + difficulty hearing, tinnitus, vertigo; No sinus or throat pain. No epistaxis, +dysphagia, dysphonia, hoarseness no odynophagia  Neck: No pain, stiffness or neck swelling.  No masses or deformities  Respiratory: + cough, wheezing, hemoptysis  + COPD  - ILD   - PE   - ASTHMA     - PNEUMONIA  Cardiovascular: No chest pain, dysrhythmia, palpitations, dizziness or edema + CAD   - CHF   - HTN  Gastrointestinal: No abdominal or epigastric pain. No nausea, vomiting or hematemesis; No diarrhea or constipation. No melena or hematochezia, Icterus.          Genitourinary: No dysuria, frequency, hematuria or incontinence   - CKD/DEBBIE      - ESRD  Neurological: No headaches, +memory loss, loss of strength, numbness or tremors      +DEMENTIA     - STROKE    - SEIZURE  Skin: No itching, burning, rashes or lesions   Lymph Nodes: No enlarged glands  Endocrine: No heat or cold intolerance; No hair loss       + DM     - THYROID DISORDER  Musculoskeletal: No joint pain or swelling; No muscle, back or extremity pain, No edema  Psychiatric: No depression, anxiety, mood swings or difficulty sleeping  Heme/Lymph: No easy bruising or bleeding gums         - ANEMIA      - CANCER   -COAGULOPATHY  Allergy and Immunologic: No hives or eczema    PAST MEDICAL & SURGICAL HISTORY:  HLD (hyperlipidemia)      CAD (coronary artery disease)      BPH (benign prostatic hyperplasia)      COPD, moderate      Chronic diarrhea      Stage 3 chronic kidney disease      S/P cataract extraction      H/O right hemicolectomy        FAMILY HISTORY:  No family history of cardiovascular disease (Father, Mother)      SOCIAL HISTORY:      - Tobacco     - ETOH    Allergies    No Known Allergies    Intolerances      Vital Signs Last 24 Hrs  T(C): 36.8 (30 Aug 2022 05:52), Max: 37.1 (29 Aug 2022 12:30)  T(F): 98.2 (30 Aug 2022 05:52), Max: 98.8 (29 Aug 2022 12:30)  HR: 87 (30 Aug 2022 05:52) (70 - 87)  BP: 129/75 (30 Aug 2022 05:52) (129/75 - 165/85)  BP(mean): 107 (29 Aug 2022 11:32) (107 - 107)  RR: 18 (30 Aug 2022 05:52) (17 - 18)  SpO2: 98% (30 Aug 2022 05:52) (95% - 98%)    Parameters below as of 29 Aug 2022 21:21  Patient On (Oxygen Delivery Method): room air        08-29 @ 07:01  -  08-30 @ 07:00  --------------------------------------------------------  IN: 180 mL / OUT: 350 mL / NET: -170 mL          MEDICATIONS:  MEDICATIONS  (STANDING):  aspirin enteric coated 81 milliGRAM(s) Oral every 24 hours  budesonide  80 MICROgram(s)/formoterol 4.5 MICROgram(s) Inhaler 2 Puff(s) Inhalation two times a day  clopidogrel Tablet 75 milliGRAM(s) Oral every 24 hours  dextrose 5%. 1000 milliLiter(s) (50 mL/Hr) IV Continuous <Continuous>  dextrose 5%. 1000 milliLiter(s) (100 mL/Hr) IV Continuous <Continuous>  dextrose 50% Injectable 25 Gram(s) IV Push once  dextrose 50% Injectable 12.5 Gram(s) IV Push once  dextrose 50% Injectable 25 Gram(s) IV Push once  glucagon  Injectable 1 milliGRAM(s) IntraMuscular once  heparin   Injectable 5000 Unit(s) SubCutaneous every 8 hours  insulin lispro (ADMELOG) corrective regimen sliding scale   SubCutaneous every 6 hours  isosorbide   mononitrate ER Tablet (IMDUR) 30 milliGRAM(s) Oral every 24 hours  mirtazapine 30 milliGRAM(s) Oral every 24 hours  vancomycin    Solution 125 milliGRAM(s) Oral every 6 hours    MEDICATIONS  (PRN):  ALBUTerol    90 MICROgram(s) HFA Inhaler 2 Puff(s) Inhalation every 6 hours PRN Shortness of Breath and/or Wheezing  dextrose Oral Gel 15 Gram(s) Oral once PRN Blood Glucose LESS THAN 70 milliGRAM(s)/deciliter      PHYSICAL EXAM:  Un Comfortable, no immediate distress  Eyes: PERRL, EOM intact; conjunctiva and sclera clear  Head: Normocephalic;  No Trauma  ENMT: No nasal discharge, hoarseness, +cough no  hemoptysis  Neck: Supple; non tender; no masses or deformities.    No JVD  Respiratory:- WHEEZING   - RHONCHI  new RLL RALES  + CRACKLES.  Diminished breath sounds  BILATERAL  RIGHT  LEFT bases   Cardiovascular: Regular rate and rhythm. S1 and S2 Normal; No murmurs, gallops or rubs     - PPM/AICD  Gastrointestinal: Soft mildly tender, non-distended; Normal bowel sounds; No hepatosplenomegaly.     -PEG    -  GT   + RUBIO  Genitourinary: No costovertebral angle tenderness. No dysuria  Extremities: AROM, No clubbing, cyanosis or edema    Vascular: Peripheral pulses palpable 2+ bilaterally  Neurological: Awake, moving extremities   Skin: Warm and dry. No obvious rash  Lymph Nodes: No acute cervical or supraclavicular adenopathy  Psychiatric: less  engaging     DEVICES:  - DENTURES   +IV R / L     - ETUBE   -TRACH   -CTUBE  R / L    LABS:                          10.8   17.29 )-----------( 365      ( 30 Aug 2022 07:06 )             33.3     08-30    136  |  104  |  11  ----------------------------<  124<H>  4.3   |  15<L>  |  1.03    Ca    8.1<L>      30 Aug 2022 07:06  Phos  2.6     08-30  Mg     1.8     08-30    TPro  5.4<L>  /  Alb  2.8<L>  /  TBili  0.3  /  DBili  x   /  AST  18  /  ALT  10  /  AlkPhos  85  08-29      RADIOLOGY & ADDITIONAL STUDIES (The following images were personally reviewed):

## 2022-08-30 NOTE — PROGRESS NOTE ADULT - PROBLEM SELECTOR PLAN 2
RESOLVING  Likely secondary to sepsis (as above). Exam limited but does not appear focal and CTH negative.  AAOx3 but pt intermittently confused. Was more awake on Saturday when Nephew was visiting. Also has sundowning at night    Plan:   - Treatment as above RESOLVING  Likely secondary to sepsis (as above). Exam limited but does not appear focal and CTH negative.  AAOx3 but pt intermittently confused. Was more awake on Saturday when Nephew was visiting. Also has sundowning at night    Plan:   - treatment as above

## 2022-08-30 NOTE — PROGRESS NOTE ADULT - PROBLEM SELECTOR PLAN 3
C diff sample positive. Septic as above.    Plan:  - C/w plan as above C diff sample positive. Septic as above.    Plan:  - c/w plan as above

## 2022-08-30 NOTE — PROGRESS NOTE ADULT - PROBLEM SELECTOR PLAN 1
Met 3/4 SIRS criteria (T102.4, WBC 30.12, RR 36) with lactate 2.3 -> 3.2 -> 1.9. Likely secondary to C difficile.  C diff positive. GI PCR neg. Surgery also following as they were consulted for r/o acalculous sarah, however low suspicion.    Plan:   - C/w isolation precautions   - C/w oral vancomycin 125mg q6h for 10 days, 8/25  - F/u blood cultures  - Monitor BM and abd exam Met 3/4 SIRS criteria (T102.4, WBC 30.12, RR 36) with lactate 2.3 -> 3.2 -> 1.9. Likely secondary to C difficile.  C diff positive. GI PCR neg. Surgery consulted for r/o acalculous sarah, however low suspicion.    Plan:   - c/w isolation precautions   - c/w oral vancomycin 125mg q6h for 10 days, 8/25  - blood cultures show NGTD  - monitor BM and abd exam  - CT non con concern for aspiration PNA  - start LR 75cc/hr for 4 hours as pt appears dry on exam  - surgery has signed off

## 2022-08-30 NOTE — PROGRESS NOTE ADULT - ASSESSMENT
ASSESSMENT/PLAN89 y/o M w/ PMH CAD, CKD3, COPD, colonic mass, lung nodules, chronic diarrhea and tachypnea who presented for diarrhea, tachypnea, and chest pain who was admitted for AMS secondary to severe sepsis. C Difficile colitis, L Lung nodules , concern for new RLL aspiration     1. O2 2LNC at this time  2. Bronchodilators:  Atrovent/ albuterol q 4 – 6 hours as needed  3. Corticosteroids: off   4. ID/Antibiotics: now on Vancomycin   5. Cardiac/HTN: optimize BP   6. GI: Rx/ prophylaxis c PPI/H2B  7. Heme: Rx/VT prophylaxis c SQH/SCD/AC  8. Aspiration precautions, discussed c Speech and Swallow  Discussed with managing team    Address GOC

## 2022-08-30 NOTE — PROGRESS NOTE ADULT - PROBLEM SELECTOR PLAN 5
On home aspirin, plavix, simvistatin, also imdur 30. Stents reportedly placed in 2013. Unclear why patient is still on anti-platelet.  (Had initially held home meds as pt had incomplete swallow eval so was prioritizing the most important meds and restart others as mental status improved)  - Continue imdur 30mg qd   - Started home aspirin 81mg qd  - Started home plavix 75mg qd    #HLD  - Restart simvistatin 20mg qd as appropriate On home aspirin, plavix, simvistatin, also imdur 30. Stents reportedly placed in 2013. Unclear why patient is still on anti-platelet.  (Had initially held home meds as pt had incomplete swallow eval so was prioritizing the most important meds and restart others as mental status improved)  - c/w imdur 30mg qd   - c/w aspirin 81mg qd  - c/w plavix 75mg qd    #HLD  - Restart simvistatin 20mg qd as appropriate

## 2022-08-30 NOTE — PROGRESS NOTE ADULT - PROBLEM SELECTOR PLAN 4
Patient was tachypenic this morning BUT patient afebrile orally and rectal. Most likely 2/2 to pain and anxiety. Chest x-ray w/ possible mild fluid.    Plan:   - Home advair and albuterol Patient was tachypenic this morning BUT patient afebrile orally and rectal. Most likely 2/2 to pain and anxiety. Chest x-ray w/ possible mild fluid.    Plan:   - c/w home advair and albuterol

## 2022-08-30 NOTE — PROGRESS NOTE ADULT - PROBLEM SELECTOR PLAN 12
Fluids: none  Electrolytes: replenish as appropriate  Diet: f/u speech and swallow repeat eval; clear liquids, ensure   DVT Prophylaxis: heparin subq 5k TID given CKD  GI Prophylaxis: not indicated  Code [facility]: [facility]  Dispo: [facility] Fluids: LR 75cc/hr 500ml  Electrolytes: replenish as appropriate  Diet: clear liquids, ensure   DVT Prophylaxis: heparin subq 5k TID given CKD  GI Prophylaxis: not indicated  Code Status: DNR/DNI as per recent [facility] last admission  Dispo: [facility]

## 2022-08-31 DIAGNOSIS — J90 PLEURAL EFFUSION, NOT ELSEWHERE CLASSIFIED: ICD-10-CM

## 2022-08-31 DIAGNOSIS — Z51.5 ENCOUNTER FOR PALLIATIVE CARE: ICD-10-CM

## 2022-08-31 DIAGNOSIS — R53.81 OTHER MALAISE: ICD-10-CM

## 2022-08-31 DIAGNOSIS — C34.90 MALIGNANT NEOPLASM OF UNSPECIFIED PART OF UNSPECIFIED BRONCHUS OR LUNG: ICD-10-CM

## 2022-08-31 LAB
ANION GAP SERPL CALC-SCNC: 9 MMOL/L — SIGNIFICANT CHANGE UP (ref 5–17)
ANISOCYTOSIS BLD QL: SLIGHT — SIGNIFICANT CHANGE UP
BASOPHILS # BLD AUTO: 0 K/UL — SIGNIFICANT CHANGE UP (ref 0–0.2)
BASOPHILS NFR BLD AUTO: 0 % — SIGNIFICANT CHANGE UP (ref 0–2)
BUN SERPL-MCNC: 13 MG/DL — SIGNIFICANT CHANGE UP (ref 7–23)
BURR CELLS BLD QL SMEAR: PRESENT — SIGNIFICANT CHANGE UP
CALCIUM SERPL-MCNC: 8.2 MG/DL — LOW (ref 8.4–10.5)
CHLORIDE SERPL-SCNC: 106 MMOL/L — SIGNIFICANT CHANGE UP (ref 96–108)
CO2 SERPL-SCNC: 21 MMOL/L — LOW (ref 22–31)
CREAT SERPL-MCNC: 1.25 MG/DL — SIGNIFICANT CHANGE UP (ref 0.5–1.3)
EGFR: 54 ML/MIN/1.73M2 — LOW
EOSINOPHIL # BLD AUTO: 0.55 K/UL — HIGH (ref 0–0.5)
EOSINOPHIL NFR BLD AUTO: 5.4 % — SIGNIFICANT CHANGE UP (ref 0–6)
GLUCOSE BLDC GLUCOMTR-MCNC: 123 MG/DL — HIGH (ref 70–99)
GLUCOSE BLDC GLUCOMTR-MCNC: 134 MG/DL — HIGH (ref 70–99)
GLUCOSE BLDC GLUCOMTR-MCNC: 149 MG/DL — HIGH (ref 70–99)
GLUCOSE BLDC GLUCOMTR-MCNC: 87 MG/DL — SIGNIFICANT CHANGE UP (ref 70–99)
GLUCOSE SERPL-MCNC: 95 MG/DL — SIGNIFICANT CHANGE UP (ref 70–99)
HCT VFR BLD CALC: 30 % — LOW (ref 39–50)
HGB BLD-MCNC: 9.4 G/DL — LOW (ref 13–17)
LYMPHOCYTES # BLD AUTO: 1.91 K/UL — SIGNIFICANT CHANGE UP (ref 1–3.3)
LYMPHOCYTES # BLD AUTO: 18.7 % — SIGNIFICANT CHANGE UP (ref 13–44)
MACROCYTES BLD QL: SLIGHT — SIGNIFICANT CHANGE UP
MAGNESIUM SERPL-MCNC: 2.4 MG/DL — SIGNIFICANT CHANGE UP (ref 1.6–2.6)
MANUAL SMEAR VERIFICATION: SIGNIFICANT CHANGE UP
MCHC RBC-ENTMCNC: 29.8 PG — SIGNIFICANT CHANGE UP (ref 27–34)
MCHC RBC-ENTMCNC: 31.3 GM/DL — LOW (ref 32–36)
MCV RBC AUTO: 95.2 FL — SIGNIFICANT CHANGE UP (ref 80–100)
MONOCYTES # BLD AUTO: 1.09 K/UL — HIGH (ref 0–0.9)
MONOCYTES NFR BLD AUTO: 10.7 % — SIGNIFICANT CHANGE UP (ref 2–14)
NEUTROPHILS # BLD AUTO: 6.67 K/UL — SIGNIFICANT CHANGE UP (ref 1.8–7.4)
NEUTROPHILS NFR BLD AUTO: 65.2 % — SIGNIFICANT CHANGE UP (ref 43–77)
OVALOCYTES BLD QL SMEAR: SLIGHT — SIGNIFICANT CHANGE UP
PHOSPHATE SERPL-MCNC: 2.4 MG/DL — LOW (ref 2.5–4.5)
PLAT MORPH BLD: ABNORMAL
PLATELET # BLD AUTO: 278 K/UL — SIGNIFICANT CHANGE UP (ref 150–400)
POIKILOCYTOSIS BLD QL AUTO: SLIGHT — SIGNIFICANT CHANGE UP
POLYCHROMASIA BLD QL SMEAR: SLIGHT — SIGNIFICANT CHANGE UP
POTASSIUM SERPL-MCNC: 4.1 MMOL/L — SIGNIFICANT CHANGE UP (ref 3.5–5.3)
POTASSIUM SERPL-SCNC: 4.1 MMOL/L — SIGNIFICANT CHANGE UP (ref 3.5–5.3)
RBC # BLD: 3.15 M/UL — LOW (ref 4.2–5.8)
RBC # FLD: 13.4 % — SIGNIFICANT CHANGE UP (ref 10.3–14.5)
RBC BLD AUTO: ABNORMAL
SARS-COV-2 RNA SPEC QL NAA+PROBE: POSITIVE
SCHISTOCYTES BLD QL AUTO: SLIGHT — SIGNIFICANT CHANGE UP
SMUDGE CELLS # BLD: PRESENT — SIGNIFICANT CHANGE UP
SODIUM SERPL-SCNC: 136 MMOL/L — SIGNIFICANT CHANGE UP (ref 135–145)
WBC # BLD: 10.23 K/UL — SIGNIFICANT CHANGE UP (ref 3.8–10.5)
WBC # FLD AUTO: 10.23 K/UL — SIGNIFICANT CHANGE UP (ref 3.8–10.5)

## 2022-08-31 PROCEDURE — 99223 1ST HOSP IP/OBS HIGH 75: CPT

## 2022-08-31 PROCEDURE — 99358 PROLONG SERVICE W/O CONTACT: CPT | Mod: NC

## 2022-08-31 RX ORDER — HEPARIN SODIUM 5000 [USP'U]/ML
5000 INJECTION INTRAVENOUS; SUBCUTANEOUS ONCE
Refills: 0 | Status: COMPLETED | OUTPATIENT
Start: 2022-08-31 | End: 2022-08-31

## 2022-08-31 RX ORDER — POTASSIUM PHOSPHATE, MONOBASIC POTASSIUM PHOSPHATE, DIBASIC 236; 224 MG/ML; MG/ML
15 INJECTION, SOLUTION INTRAVENOUS ONCE
Refills: 0 | Status: COMPLETED | OUTPATIENT
Start: 2022-08-31 | End: 2022-08-31

## 2022-08-31 RX ADMIN — MIRTAZAPINE 30 MILLIGRAM(S): 45 TABLET, ORALLY DISINTEGRATING ORAL at 21:42

## 2022-08-31 RX ADMIN — ISOSORBIDE MONONITRATE 30 MILLIGRAM(S): 60 TABLET, EXTENDED RELEASE ORAL at 10:04

## 2022-08-31 RX ADMIN — Medication 125 MILLIGRAM(S): at 18:50

## 2022-08-31 RX ADMIN — Medication 125 MILLIGRAM(S): at 13:23

## 2022-08-31 RX ADMIN — Medication 125 MILLIGRAM(S): at 00:09

## 2022-08-31 RX ADMIN — POTASSIUM PHOSPHATE, MONOBASIC POTASSIUM PHOSPHATE, DIBASIC 62.5 MILLIMOLE(S): 236; 224 INJECTION, SOLUTION INTRAVENOUS at 10:04

## 2022-08-31 RX ADMIN — Medication 125 MILLIGRAM(S): at 06:16

## 2022-08-31 RX ADMIN — HEPARIN SODIUM 5000 UNIT(S): 5000 INJECTION INTRAVENOUS; SUBCUTANEOUS at 21:42

## 2022-08-31 RX ADMIN — HEPARIN SODIUM 5000 UNIT(S): 5000 INJECTION INTRAVENOUS; SUBCUTANEOUS at 06:16

## 2022-08-31 RX ADMIN — BUDESONIDE AND FORMOTEROL FUMARATE DIHYDRATE 2 PUFF(S): 160; 4.5 AEROSOL RESPIRATORY (INHALATION) at 06:30

## 2022-08-31 RX ADMIN — BUDESONIDE AND FORMOTEROL FUMARATE DIHYDRATE 2 PUFF(S): 160; 4.5 AEROSOL RESPIRATORY (INHALATION) at 18:58

## 2022-08-31 RX ADMIN — CLOPIDOGREL BISULFATE 75 MILLIGRAM(S): 75 TABLET, FILM COATED ORAL at 13:31

## 2022-08-31 RX ADMIN — HEPARIN SODIUM 5000 UNIT(S): 5000 INJECTION INTRAVENOUS; SUBCUTANEOUS at 13:31

## 2022-08-31 RX ADMIN — Medication 81 MILLIGRAM(S): at 13:31

## 2022-08-31 NOTE — PROGRESS NOTE ADULT - SUBJECTIVE AND OBJECTIVE BOX
JASON TIARA  89y, Male    Patient is a 89y old  Male who presents with a chief complaint of altered mental status secondary to sepsis (30 Aug 2022 12:00)      INTERVAL HPI/OVERNIGHT EVENTS:    ROS: otherwise negative      T(C): 36.6 (08-31-22 @ 05:48), Max: 36.7 (08-30-22 @ 20:48)  HR: 73 (08-31-22 @ 05:48) (60 - 86)  BP: 145/78 (08-31-22 @ 05:48) (122/72 - 149/70)  RR: 18 (08-31-22 @ 05:48) (17 - 18)  SpO2: 95% (08-31-22 @ 05:48) (94% - 95%)  Wt(kg): --Vital Signs Last 24 Hrs  T(C): 36.6 (31 Aug 2022 05:48), Max: 36.7 (30 Aug 2022 20:48)  T(F): 97.9 (31 Aug 2022 05:48), Max: 98.1 (30 Aug 2022 20:48)  HR: 73 (31 Aug 2022 05:48) (60 - 86)  BP: 145/78 (31 Aug 2022 05:48) (122/72 - 149/70)  BP(mean): --  RR: 18 (31 Aug 2022 05:48) (17 - 18)  SpO2: 95% (31 Aug 2022 05:48) (94% - 95%)    Parameters below as of 30 Aug 2022 20:48  Patient On (Oxygen Delivery Method): room air        PHYSICAL EXAM:  Constitutional: resting comfortably in bed; NAD  Head: NC/AT  Eyes: PERRL, EOMI, anicteric sclera  ENT: no nasal discharge; uvula midline, no oropharyngeal erythema or exudates; MMM  Neck: supple; no JVD or thyromegaly  Respiratory: CTA B/L; no W/R/R, no retractions  Cardiac: +S1/S2; RRR; no M/R/G; PMI non-displaced  Gastrointestinal: soft, NT/ND; no rebound or guarding; +BSx4  Genitourinary: normal external genitalia  Back: spine midline, no bony tenderness or step-offs; no CVAT B/L  Extremities: WWP, no clubbing or cyanosis; no peripheral edema. Capillary refill <2 sec  Musculoskeletal: NROM x4; no joint swelling, tenderness or erythema  Vascular: 2+ radial, femoral, DP/PT pulses B/L  Dermatologic: skin warm, dry and intact; no rashes, wounds, or scars  Lymphatic: no submandibular or cervical LAD  Neurologic: AAOx3; CNII-XII grossly intact; no focal deficits  Psychiatric: affect and characteristics of appearance, verbalizations, behaviors are appropriate    LABS:                        9.4    10.23 )-----------( 278      ( 31 Aug 2022 05:30 )             30.0     08-31    136  |  106  |  13  ----------------------------<  95  4.1   |  21<L>  |  1.25    Ca    8.2<L>      31 Aug 2022 05:30  Phos  2.4     08-31  Mg     2.4     08-31            CAPILLARY BLOOD GLUCOSE      POCT Blood Glucose.: 87 mg/dL (31 Aug 2022 05:56)  POCT Blood Glucose.: 94 mg/dL (30 Aug 2022 23:53)  POCT Blood Glucose.: 165 mg/dL (30 Aug 2022 17:51)            MEDICATIONS  (STANDING):  aspirin enteric coated 81 milliGRAM(s) Oral every 24 hours  budesonide  80 MICROgram(s)/formoterol 4.5 MICROgram(s) Inhaler 2 Puff(s) Inhalation two times a day  clopidogrel Tablet 75 milliGRAM(s) Oral every 24 hours  dextrose 5%. 1000 milliLiter(s) (50 mL/Hr) IV Continuous <Continuous>  dextrose 5%. 1000 milliLiter(s) (100 mL/Hr) IV Continuous <Continuous>  dextrose 50% Injectable 25 Gram(s) IV Push once  dextrose 50% Injectable 12.5 Gram(s) IV Push once  dextrose 50% Injectable 25 Gram(s) IV Push once  glucagon  Injectable 1 milliGRAM(s) IntraMuscular once  heparin   Injectable 5000 Unit(s) SubCutaneous every 8 hours  insulin lispro (ADMELOG) corrective regimen sliding scale   SubCutaneous every 6 hours  isosorbide   mononitrate ER Tablet (IMDUR) 30 milliGRAM(s) Oral every 24 hours  lactated ringers. 500 milliLiter(s) (75 mL/Hr) IV Continuous <Continuous>  mirtazapine 30 milliGRAM(s) Oral every 24 hours  potassium phosphate IVPB 15 milliMole(s) IV Intermittent once  vancomycin    Solution 125 milliGRAM(s) Oral every 6 hours    MEDICATIONS  (PRN):  ALBUTerol    90 MICROgram(s) HFA Inhaler 2 Puff(s) Inhalation every 6 hours PRN Shortness of Breath and/or Wheezing  dextrose Oral Gel 15 Gram(s) Oral once PRN Blood Glucose LESS THAN 70 milliGRAM(s)/deciliter      RADIOLOGY & ADDITIONAL TESTS:    Imaging Personally Reviewed:  [x] YES  [ ] NO   JASON TIARA  89y, Male    Patient is a 89y old  Male who presents with a chief complaint of altered mental status secondary to sepsis (30 Aug 2022 12:00)      INTERVAL HPI/OVERNIGHT EVENTS: RONN overnight. Patient seen and examined at bedside. Denies further episodes of diarrhea, abdominal pain, f/c. Denies SOB or chest pain, but some persisting conversational dyspnea noted.    ROS: otherwise negative      T(C): 36.6 (08-31-22 @ 05:48), Max: 36.7 (08-30-22 @ 20:48)  HR: 73 (08-31-22 @ 05:48) (60 - 86)  BP: 145/78 (08-31-22 @ 05:48) (122/72 - 149/70)  RR: 18 (08-31-22 @ 05:48) (17 - 18)  SpO2: 95% (08-31-22 @ 05:48) (94% - 95%)  Wt(kg): --Vital Signs Last 24 Hrs  T(C): 36.6 (31 Aug 2022 05:48), Max: 36.7 (30 Aug 2022 20:48)  T(F): 97.9 (31 Aug 2022 05:48), Max: 98.1 (30 Aug 2022 20:48)  HR: 73 (31 Aug 2022 05:48) (60 - 86)  BP: 145/78 (31 Aug 2022 05:48) (122/72 - 149/70)  BP(mean): --  RR: 18 (31 Aug 2022 05:48) (17 - 18)  SpO2: 95% (31 Aug 2022 05:48) (94% - 95%)    Parameters below as of 30 Aug 2022 20:48  Patient On (Oxygen Delivery Method): room air        PHYSICAL EXAM:  Constitutional: resting comfortably in bed; NAD; cachectic  Head: NC/AT  Eyes: PERRL, EOMI, anicteric sclera  ENT: no nasal discharge;  dry MM  Neck: supple; no JVD or thyromegaly  Respiratory: no W/R/R, no retractions, poor inspiratory effort, ?crackles b/l  Cardiac: +S1/S2; RRR; no M/R/G; PMI non-displaced  Gastrointestinal: soft, nondistended/nontender; no rebound or guarding; +BSx4  Extremities: WWP, no clubbing or cyanosis; no peripheral edema. Capillary refill <2 sec  Vascular: 2+ radial, DP/PT pulses B/L  Neurologic: AAOx2; CNII-XII grossly intact; no focal deficits  Psychiatric: affect and characteristics of appearance, verbalizations, behaviors are appropriate    LABS:                        9.4    10.23 )-----------( 278      ( 31 Aug 2022 05:30 )             30.0     08-31    136  |  106  |  13  ----------------------------<  95  4.1   |  21<L>  |  1.25    Ca    8.2<L>      31 Aug 2022 05:30  Phos  2.4     08-31  Mg     2.4     08-31            CAPILLARY BLOOD GLUCOSE      POCT Blood Glucose.: 87 mg/dL (31 Aug 2022 05:56)  POCT Blood Glucose.: 94 mg/dL (30 Aug 2022 23:53)  POCT Blood Glucose.: 165 mg/dL (30 Aug 2022 17:51)            MEDICATIONS  (STANDING):  aspirin enteric coated 81 milliGRAM(s) Oral every 24 hours  budesonide  80 MICROgram(s)/formoterol 4.5 MICROgram(s) Inhaler 2 Puff(s) Inhalation two times a day  clopidogrel Tablet 75 milliGRAM(s) Oral every 24 hours  dextrose 5%. 1000 milliLiter(s) (50 mL/Hr) IV Continuous <Continuous>  dextrose 5%. 1000 milliLiter(s) (100 mL/Hr) IV Continuous <Continuous>  dextrose 50% Injectable 25 Gram(s) IV Push once  dextrose 50% Injectable 12.5 Gram(s) IV Push once  dextrose 50% Injectable 25 Gram(s) IV Push once  glucagon  Injectable 1 milliGRAM(s) IntraMuscular once  heparin   Injectable 5000 Unit(s) SubCutaneous every 8 hours  insulin lispro (ADMELOG) corrective regimen sliding scale   SubCutaneous every 6 hours  isosorbide   mononitrate ER Tablet (IMDUR) 30 milliGRAM(s) Oral every 24 hours  lactated ringers. 500 milliLiter(s) (75 mL/Hr) IV Continuous <Continuous>  mirtazapine 30 milliGRAM(s) Oral every 24 hours  potassium phosphate IVPB 15 milliMole(s) IV Intermittent once  vancomycin    Solution 125 milliGRAM(s) Oral every 6 hours    MEDICATIONS  (PRN):  ALBUTerol    90 MICROgram(s) HFA Inhaler 2 Puff(s) Inhalation every 6 hours PRN Shortness of Breath and/or Wheezing  dextrose Oral Gel 15 Gram(s) Oral once PRN Blood Glucose LESS THAN 70 milliGRAM(s)/deciliter      RADIOLOGY & ADDITIONAL TESTS:    Imaging Personally Reviewed:  [x] YES  [ ] NO

## 2022-08-31 NOTE — CONSULT NOTE ADULT - PROBLEM SELECTOR RECOMMENDATION 3
Per CT scan patient found to have pleural effusion. Unclear if infectious or malignant in nature. Plan to perform Pleurocentesis for further diagnostics purposes and for comfort.

## 2022-08-31 NOTE — PROGRESS NOTE ADULT - ASSESSMENT
WBC normal  no diarrhea per patient  finish course of po vancomycin  I will sign off the case for now

## 2022-08-31 NOTE — PROGRESS NOTE ADULT - SUBJECTIVE AND OBJECTIVE BOX
JASON TIARA  89y, Male    Patient is a 89y old  Male who presents with a chief complaint of altered mental status secondary to sepsis (30 Aug 2022 12:00)      INTERVAL HPI/OVERNIGHT EVENTS: RONN . Denies further episodes of diarrhea, abdominal pain, f/c. Denies SOB or chest pain, but some persisting conversational dyspnea noted.  Currently Covid positive.  ROS: otherwise negative      T(C): 36.6 (08-31-22 @ 05:48), Max: 36.7 (08-30-22 @ 20:48)  HR: 73 (08-31-22 @ 05:48) (60 - 86)  BP: 145/78 (08-31-22 @ 05:48) (122/72 - 149/70)  RR: 18 (08-31-22 @ 05:48) (17 - 18)  SpO2: 95% (08-31-22 @ 05:48) (94% - 95%)  Wt(kg): --Vital Signs Last 24 Hrs  T(C): 36.6 (31 Aug 2022 05:48), Max: 36.7 (30 Aug 2022 20:48)  T(F): 97.9 (31 Aug 2022 05:48), Max: 98.1 (30 Aug 2022 20:48)  HR: 73 (31 Aug 2022 05:48) (60 - 86)  BP: 145/78 (31 Aug 2022 05:48) (122/72 - 149/70)  BP(mean): --  RR: 18 (31 Aug 2022 05:48) (17 - 18)  SpO2: 95% (31 Aug 2022 05:48) (94% - 95%)    Parameters below as of 30 Aug 2022 20:48  Patient On (Oxygen Delivery Method): room air        PHYSICAL EXAM:  Constitutional: resting comfortably in bed; NAD; cachectic  Head: NC/AT  Eyes: PERRL, EOMI, anicteric sclera  ENT: no nasal discharge;  dry MM  Neck: supple; no JVD or thyromegaly  Respiratory: no W/R/R, no retractions, poor inspiratory effort, ?crackles b/l  Cardiac: +S1/S2; RRR; no M/R/G; PMI non-displaced  Gastrointestinal: soft, nondistended/nontender; no rebound or guarding; +BSx4  Extremities: WWP, no clubbing or cyanosis; no peripheral edema. Capillary refill <2 sec  Vascular: 2+ radial, DP/PT pulses B/L  Neurologic: AAOx2; CNII-XII grossly intact; no focal deficits  Psychiatric: affect and characteristics of appearance, verbalizations, behaviors are appropriate    LABS:                        9.4    10.23 )-----------( 278      ( 31 Aug 2022 05:30 )             30.0     08-31    136  |  106  |  13  ----------------------------<  95  4.1   |  21<L>  |  1.25    Ca    8.2<L>      31 Aug 2022 05:30  Phos  2.4     08-31  Mg     2.4     08-31            CAPILLARY BLOOD GLUCOSE      POCT Blood Glucose.: 87 mg/dL (31 Aug 2022 05:56)  POCT Blood Glucose.: 94 mg/dL (30 Aug 2022 23:53)  POCT Blood Glucose.: 165 mg/dL (30 Aug 2022 17:51)            MEDICATIONS  (STANDING):  aspirin enteric coated 81 milliGRAM(s) Oral every 24 hours  budesonide  80 MICROgram(s)/formoterol 4.5 MICROgram(s) Inhaler 2 Puff(s) Inhalation two times a day  clopidogrel Tablet 75 milliGRAM(s) Oral every 24 hours  dextrose 5%. 1000 milliLiter(s) (50 mL/Hr) IV Continuous <Continuous>  dextrose 5%. 1000 milliLiter(s) (100 mL/Hr) IV Continuous <Continuous>  dextrose 50% Injectable 25 Gram(s) IV Push once  dextrose 50% Injectable 12.5 Gram(s) IV Push once  dextrose 50% Injectable 25 Gram(s) IV Push once  glucagon  Injectable 1 milliGRAM(s) IntraMuscular once  heparin   Injectable 5000 Unit(s) SubCutaneous every 8 hours  insulin lispro (ADMELOG) corrective regimen sliding scale   SubCutaneous every 6 hours  isosorbide   mononitrate ER Tablet (IMDUR) 30 milliGRAM(s) Oral every 24 hours  lactated ringers. 500 milliLiter(s) (75 mL/Hr) IV Continuous <Continuous>  mirtazapine 30 milliGRAM(s) Oral every 24 hours  potassium phosphate IVPB 15 milliMole(s) IV Intermittent once  vancomycin    Solution 125 milliGRAM(s) Oral every 6 hours    MEDICATIONS  (PRN):  ALBUTerol    90 MICROgram(s) HFA Inhaler 2 Puff(s) Inhalation every 6 hours PRN Shortness of Breath and/or Wheezing  dextrose Oral Gel 15 Gram(s) Oral once PRN Blood Glucose LESS THAN 70 milliGRAM(s)/deciliter      RADIOLOGY & ADDITIONAL TESTS:    Imaging Personally Reviewed:  [x] YES  [ ] NO

## 2022-08-31 NOTE — PROGRESS NOTE ADULT - SUBJECTIVE AND OBJECTIVE BOX
Interventional, Pulmonary, Critical, Chest Special Procedures.    Pt was seen and fully examined by myself.     Time spent with patient in minutes:37    Patient is a 89y old  Male who presents with a chief complaint of altered mental status secondary to sepsis (31 Aug 2022 11:49) The patient awake, one word verbal response, not really engaging and comprehending pleural effusion findings.    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 Patient is altered on exam, but per ED report, he has been having chronic diarrhea, tachypnea, and chest pain that was worsening, prompting patient to call EMS to bring him from home into [facility]. Of note, patient was recently admitted 7/19-7/23 to [facility] for diarrhea after being sent in from his PCP office for hypotension and chronic diarrhea. During that time his diarrhea resolved and he was told to f/u with outpatient GI with Dr. Hammer. Notably he was fully conversant with mild memory impairment on admission and throughout hospital stay.    ED vitals: T 39.3   HR 78  RR 36 (taken manually)  BP 97/59  SpO2 98% on 2L  Labs significant: WBC 30, Hgb 11.5, lactate 2.3 -> 3.2 after 2L NS, UA wnl  Imaging/EKG: CT H/C/A/P known lung nodule, colitis  Interventions: zosyn, zofran, 3.2L NS, flagyl, acetaminophen     (24 Aug 2022 21:48)      REVIEW OF SYSTEMS:  Constitutional: No fever, weight loss, chills + fatigue  Eyes: No eye pain, visual disturbances, or discharge  ENMT:  + difficulty hearing, tinnitus, vertigo; No sinus or throat pain. No epistaxis, +dysphagia, dysphonia, hoarseness no odynophagia  Neck: No pain, stiffness or neck swelling.  No masses or deformities  Respiratory: + cough, wheezing, hemoptysis  + COPD  - ILD   - PE   - ASTHMA     - PNEUMONIA  Cardiovascular: No chest pain, dysrhythmia, palpitations, dizziness or edema + CAD   - CHF   - HTN  Gastrointestinal: No abdominal or epigastric pain. No nausea, vomiting or hematemesis; No diarrhea or constipation. No melena or hematochezia, Icterus.          Genitourinary: No dysuria, frequency, hematuria or incontinence   - CKD/DEBBIE      - ESRD  Neurological: No headaches, +memory loss, loss of strength, numbness or tremors      +DEMENTIA     - STROKE    - SEIZURE  Skin: No itching, burning, rashes or lesions   Lymph Nodes: No enlarged glands  Endocrine: No heat or cold intolerance; No hair loss       + DM     - THYROID DISORDER  Musculoskeletal: No joint pain or swelling; No muscle, back or extremity pain, No edema  Psychiatric: No depression, anxiety, mood swings or difficulty sleeping  Heme/Lymph: No easy bruising or bleeding gums         - ANEMIA      - CANCER   -COAGULOPATHY  Allergy and Immunologic: No hives or eczema    PAST MEDICAL & SURGICAL HISTORY:  HLD (hyperlipidemia)      CAD (coronary artery disease)      BPH (benign prostatic hyperplasia)      COPD, moderate      Chronic diarrhea      Stage 3 chronic kidney disease      S/P cataract extraction      H/O right hemicolectomy        FAMILY HISTORY:  No family history of cardiovascular disease (Father, Mother)      SOCIAL HISTORY:      - Tobacco     - ETOH    Allergies    No Known Allergies    Intolerances      Vital Signs Last 24 Hrs  T(C): 36.3 (31 Aug 2022 13:43), Max: 36.7 (30 Aug 2022 20:48)  T(F): 97.4 (31 Aug 2022 13:43), Max: 98.1 (30 Aug 2022 20:48)  HR: 73 (31 Aug 2022 13:43) (71 - 80)  BP: 124/70 (31 Aug 2022 13:43) (124/70 - 145/78)  BP(mean): --  RR: 18 (31 Aug 2022 13:43) (18 - 18)  SpO2: 95% (31 Aug 2022 13:43) (95% - 95%)    Parameters below as of 31 Aug 2022 13:43  Patient On (Oxygen Delivery Method): room air            MEDICATIONS:  MEDICATIONS  (STANDING):  aspirin enteric coated 81 milliGRAM(s) Oral every 24 hours  budesonide  80 MICROgram(s)/formoterol 4.5 MICROgram(s) Inhaler 2 Puff(s) Inhalation two times a day  clopidogrel Tablet 75 milliGRAM(s) Oral every 24 hours  dextrose 5%. 1000 milliLiter(s) (50 mL/Hr) IV Continuous <Continuous>  dextrose 5%. 1000 milliLiter(s) (100 mL/Hr) IV Continuous <Continuous>  dextrose 50% Injectable 25 Gram(s) IV Push once  dextrose 50% Injectable 12.5 Gram(s) IV Push once  dextrose 50% Injectable 25 Gram(s) IV Push once  glucagon  Injectable 1 milliGRAM(s) IntraMuscular once  heparin   Injectable 5000 Unit(s) SubCutaneous every 8 hours  insulin lispro (ADMELOG) corrective regimen sliding scale   SubCutaneous every 6 hours  isosorbide   mononitrate ER Tablet (IMDUR) 30 milliGRAM(s) Oral every 24 hours  lactated ringers. 500 milliLiter(s) (75 mL/Hr) IV Continuous <Continuous>  mirtazapine 30 milliGRAM(s) Oral every 24 hours  vancomycin    Solution 125 milliGRAM(s) Oral every 6 hours    MEDICATIONS  (PRN):  ALBUTerol    90 MICROgram(s) HFA Inhaler 2 Puff(s) Inhalation every 6 hours PRN Shortness of Breath and/or Wheezing  dextrose Oral Gel 15 Gram(s) Oral once PRN Blood Glucose LESS THAN 70 milliGRAM(s)/deciliter      PHYSICAL EXAM:  Un Comfortable, no immediate distress  Eyes: PERRL, EOM intact; conjunctiva and sclera clear  Head: Normocephalic;  No Trauma  ENMT: No nasal discharge, hoarseness, +cough no  hemoptysis  Neck: Supple; non tender; no masses or deformities.    No JVD  Respiratory:- WHEEZING   - RHONCHI  new RLL RALES  + CRACKLES.  Diminished breath sounds  BILATERAL  RIGHT  LEFT bases   Cardiovascular: Regular rate and rhythm. S1 and S2 Normal; No murmurs, gallops or rubs     - PPM/AICD  Gastrointestinal: Soft mildly tender, non-distended; Normal bowel sounds; No hepatosplenomegaly.     -PEG    -  GT   + RUBIO  Genitourinary: No costovertebral angle tenderness. No dysuria  Extremities: AROM, No clubbing, cyanosis or edema    Vascular: Peripheral pulses palpable 2+ bilaterally  Neurological: Awake, moving extremities   Skin: Warm and dry. No obvious rash  Lymph Nodes: No acute cervical or supraclavicular adenopathy  Psychiatric: less  engaging     DEVICES:  - DENTURES   +IV R / L     - ETUBE   -TRACH   -CTUBE  R / L    LABS:                          9.4    10.23 )-----------( 278      ( 31 Aug 2022 05:30 )             30.0     08-31    136  |  106  |  13  ----------------------------<  95  4.1   |  21<L>  |  1.25    Ca    8.2<L>      31 Aug 2022 05:30  Phos  2.4     08-31  Mg     2.4     08-31        RADIOLOGY & ADDITIONAL STUDIES (The following images were personally reviewed):< from: CT Chest No Cont (08.30.22 @ 17:40) >  ACC: [id number] EXAM:  CT CHEST                          PROCEDURE DATE:  08/30/2022          INTERPRETATION:  CLINICAL INFORMATION: Aspiration    COMPARISON: CT chest dated 8/24/2022    CONTRAST/COMPLICATIONS:  IV Contrast: None  Oral Contrast: None  Complications: None    PROCEDURE:  CT of the Chest was performed.  Sagittal and coronal reformats were performed.    FINDINGS:    LUNGS AND AIRWAYS: Patent central airways.  Subsegmental atelectasis   right lower lobe, new. Compressive atelectasis, new right lower lobe.   Left lower lobe anterobasal segment peripheral spiculated solid nodule    measures 36 x 18 x 18 mm-without change.. Subtle new groundglass   opacities in the posterior segment right upper lobe and to lesser extent   the right middle lobe and left upper lobe anterior segment. A few right   lung micronodules noted.  PLEURA: Since 8/24/2022 there has been interval development of a small   right pleural effusion and a trace left pleural effusion.  MEDIASTINUM AND SUSHANT: No significant change in mediastinal and left hilar   adenopathy. Pretracheal node measures 1.7 cm short axis. No esophageal   dilatation or hiatal hernia.  VESSELS: Atherosclerotic calcifications of the thoracic aorta, coronary   artery stents versus calcifications. Normal caliber of the pulmonary   trunk measuring 2.9 cm. Right pulmonary artery measures 2.6 cm. Left   pulmonary artery measures 2.6 cm.  HEART: Heart size is normal. No pericardial effusion. Aortic valve   calcification.  CHEST WALL AND LOWER NECK: Bilateral gynecomastia.  VISUALIZED UPPER ABDOMEN: Aneurysmal suprarenal and juxtarenal abdominal   aorta measuring 4.3 cm transverse diameter.  BONES: Degenerative changes.    IMPRESSION:     Since 8/24/2022 there has been interval development of a small right   pleural effusion and trace left pleural effusion. No significant change   in mediastinal and left hilar lymphadenopathy.  Spiculated 27 mm solid nodule in the anterior basal segment left lower   lobe is suggestive of primary lung carcinoma i.e. invasive   adenocarcinoma. Recommend tissue sampling.    < end of copied text >

## 2022-08-31 NOTE — CONSULT NOTE ADULT - PROBLEM SELECTOR RECOMMENDATION 6
Patient remains a DNR/DNI. Discussed Hospice with the patient nephew/HCP. He appeared interested in this benefit for his uncle. Emotional support to be provided.  As discussed during the palliative IDT meeting, the patients PSSA screening did not identify any current psychosocial need or spiritual support deficits.  - Jew/Spiritual practice: non  - Coping: [ ] well [ ] with difficulty [ ] poor coping [x] unable to obtain   - Support system: [x ] strong [ ] adequate [ ] inadequate  - All questions answered, emotional support provided  -  primary team   - Please contact Palliative Medicine 24/7 at [phone] for any acute symptoms or further questions  - Will continue to follow with you Patient remains a DNR/DNI. Discussed Hospice with the patient nephew/HCP. He appeared interested in this benefit for his uncle. Emotional support to be provided. But patient would want to go to Rehab.   As discussed during the palliative IDT meeting, the patients PSSA screening did not identify any current psychosocial need or spiritual support deficits.  - Adventist/Spiritual practice: non  - Coping: [ ] well [ ] with difficulty [ ] poor coping [x] unable to obtain   - Support system: [x ] strong [ ] adequate [ ] inadequate  - All questions answered, emotional support provided  -  primary team   - Please contact Palliative Medicine 24/7 at [phone] for any acute symptoms or further questions  - Will continue to follow with you

## 2022-08-31 NOTE — CONSULT NOTE ADULT - PROBLEM SELECTOR RECOMMENDATION 5
Per patient CT scan in appears that patient is shows that patient has a Spiculated 27 mm solid nodule in the anterior basal segment left lower lobe is suggestive of primary lung carcinoma i.e. invasive   adenocarcinoma. They recommend  biopsy, but per discussion with patient HCP, is considering more Symptom directed approach such as hospice.

## 2022-08-31 NOTE — PROGRESS NOTE ADULT - PROBLEM SELECTOR PLAN 1
Met 3/4 SIRS criteria (T102.4, WBC 30.12, RR 36) with lactate 2.3 -> 3.2 -> 1.9. Likely secondary to C difficile.  C diff positive. GI PCR neg. Surgery consulted for r/o acalculous sarah, however low suspicion.    Plan:   - c/w isolation precautions   - c/w oral vancomycin 125mg q6h for 10 days, 8/25  - blood cultures show NGTD  - monitor BM and abd exam  - CT non con concern for aspiration PNA  - start LR 75cc/hr for 4 hours as pt appears dry on exam  - surgery has signed off Met 3/4 SIRS criteria (T102.4, WBC 30.12, RR 36) with lactate 2.3 -> 3.2 -> 1.9. Likely secondary to C difficile.  C diff positive. GI PCR neg. Surgery consulted for r/o acalculous sarah, however low suspicion.    Plan:   - c/w isolation precautions   - c/w oral vancomycin 125mg q6h for 10 days, 8/25  - blood cultures show NGTD  - monitor BM and abd exam  - CT non con showed large R pleural effusion and L small effusion  - plan for IR guided thoracentesis tomorrow Met 3/4 SIRS criteria (T102.4, WBC 30.12, RR 36) with lactate 2.3 -> 3.2 -> 1.9. Likely secondary to C difficile.  C diff positive. GI PCR neg. Surgery consulted for r/o acalculous sarah, however low suspicion.    Plan:   - c/w isolation precautions   - c/w oral vancomycin 125mg q6h for 10 days, 8/25  - blood cultures show NGTD  - monitor BM and abd exam  - CT non con showed large R pleural effusion and L small effusion  - plan for IR guided thoracentesis tomorrow  - palliative team consulted, will discuss GOC and possible hospice placement

## 2022-08-31 NOTE — PROGRESS NOTE ADULT - PROBLEM SELECTOR PLAN 5
On home aspirin, plavix, simvistatin, also imdur 30. Stents reportedly placed in 2013. Unclear why patient is still on anti-platelet.  (Had initially held home meds as pt had incomplete swallow eval so was prioritizing the most important meds and restart others as mental status improved)  - c/w imdur 30mg qd   - c/w aspirin 81mg qd  - c/w plavix 75mg qd    #HLD  - Restart simvistatin 20mg qd as appropriate

## 2022-08-31 NOTE — PROGRESS NOTE ADULT - ASSESSMENT
89 y/o M w/ PMH CAD, CKD3, COPD, chronic diarrhea who presented for diarrhea, tachypnea. Admitted to medicine telemetry for sepsis 2/2 C difficile infection. C/w oral vancomycin.  c diff  WBC  15  down to 10K  --covid positive

## 2022-08-31 NOTE — PROGRESS NOTE ADULT - PROBLEM SELECTOR PLAN 12
Fluids: LR 75cc/hr 500ml  Electrolytes: replenish as appropriate  Diet: clear liquids, ensure   DVT Prophylaxis: heparin subq 5k TID given CKD  GI Prophylaxis: not indicated  Code Status: DNR/DNI as per recent [facility] last admission  Dispo: [facility]

## 2022-08-31 NOTE — PROGRESS NOTE ADULT - ASSESSMENT
ASSESSMENT/PLAN89 y/o M w/ PMH CAD, CKD3, COPD, colonic mass, lung nodules, chronic diarrhea and tachypnea who presented for diarrhea, tachypnea, and chest pain who was admitted for AMS secondary to severe sepsis. C Difficile colitis, L Lung nodules , concern for new RLL aspiration, new R pleural effusion     1. O2 2LNC at this time  2. Bronchodilators:  Atrovent/ albuterol q 4 – 6 hours as needed  3. Corticosteroids: off   4. ID/Antibiotics: now on Vancomycin   5. Cardiac/HTN: optimize BP   6. GI: Rx/ prophylaxis c PPI/H2B  7. Heme: Rx/VT prophylaxis c SQH/SCD/AC  8. Aspiration precautions, discussed c Speech and Swallow  9. R thoracentesis if OK c GOC  Discussed with managing team    Verify GOC if any desire to further manage pulmonary nodules

## 2022-08-31 NOTE — OCCUPATIONAL THERAPY INITIAL EVALUATION ADULT - RANGE OF MOTION EXAMINATION, UPPER EXTREMITY
unable to assess 2/2 pt resisting PROM. Based on observation, pt able to obtain 90 degree b/l shoulder flexion, and AROM WNL of all other joints.

## 2022-08-31 NOTE — PHYSICAL THERAPY INITIAL EVALUATION ADULT - PERTINENT HX OF CURRENT PROBLEM, REHAB EVAL
Patient is a 89y/o M who called EMS due to s/s of chronic diarrhea, tachypnea, SOB, and worsening chest pain. On admission patient also found to be AMS. Of note, patient persistent with chronic diarrhea since last admission (07/19 - 07/23) with hypotension. Diarrhea then resolved, and patient was d/c'ed home with recommendation to f/u with OP GI (Dr. Hammer). 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 CT Chest (08/30): spiculated 27 mm solid nodule in LLL, suggestive of primary lung carcinoma; CT Abd/pelvis: (-) abscess, possible colitis. CT Head: (-) acute ICH or infarct. CXR: abdominal aortic aneurysm, stable; US Abd (08/24): 3.7x3.8x5.3 fusiform dilation of abd aorta. EKG (08/24): 1st degree AV block with PVCs, and inferior infarct (age undetermined).

## 2022-08-31 NOTE — PHYSICAL THERAPY INITIAL EVALUATION ADULT - DIAGNOSIS, PT EVAL
6C: impaired ventilation, respiration/gas exchange and aerobic capacity/endurance associated with airway clearance dysfunction; 5A: Primary prevention/risk reduction for loss of balance and falling; 6B: impaired aerobic capacity/endurance associated with deconditioning

## 2022-08-31 NOTE — PHYSICAL THERAPY INITIAL EVALUATION ADULT - GENERAL OBSERVATIONS, REHAB EVAL
Patient received in isolation room, with L IV running, on RA, in no acute distress, but demonstrating increased work of breathing while resting. Patient able to communicate in 1-3 word sentences due to SOB. Vitals assessed prior to start of session and found to be WNL (O2-satting at 95% on RA). Patient demonstrated overall min-A for bed mobility, and only able to tolerate sitting at EOB for <2 min before requesting to lay down due to SOB. Patient unable/unwilling to attempt standing today. Patient demonstrates reduced functional tolerance/capacity, balance, and BLE/BUE strength. Patient can strongly benefit from skilled PT intervention to improve functional capacity, independence with mobility, and reduce risk of falls.

## 2022-08-31 NOTE — PROGRESS NOTE ADULT - SUBJECTIVE AND OBJECTIVE BOX
Pt seen and examined   no complaints  appetite seems so so  denies diarrhea    REVIEW OF SYSTEMS:  Constitutional: No fever,   Cardiovascular: No chest pain, palpitations, dizziness  Gastrointestinal: No abdominal or epigastric pain. No nausea, vomiting  No diarrhea.  Skin: No itching, burning, rashes or lesions       MEDICATIONS:  MEDICATIONS  (STANDING):  aspirin enteric coated 81 milliGRAM(s) Oral every 24 hours  budesonide  80 MICROgram(s)/formoterol 4.5 MICROgram(s) Inhaler 2 Puff(s) Inhalation two times a day  clopidogrel Tablet 75 milliGRAM(s) Oral every 24 hours  dextrose 5%. 1000 milliLiter(s) (50 mL/Hr) IV Continuous <Continuous>  dextrose 5%. 1000 milliLiter(s) (100 mL/Hr) IV Continuous <Continuous>  dextrose 50% Injectable 25 Gram(s) IV Push once  dextrose 50% Injectable 12.5 Gram(s) IV Push once  dextrose 50% Injectable 25 Gram(s) IV Push once  glucagon  Injectable 1 milliGRAM(s) IntraMuscular once  heparin   Injectable 5000 Unit(s) SubCutaneous every 8 hours  insulin lispro (ADMELOG) corrective regimen sliding scale   SubCutaneous every 6 hours  isosorbide   mononitrate ER Tablet (IMDUR) 30 milliGRAM(s) Oral every 24 hours  lactated ringers. 500 milliLiter(s) (75 mL/Hr) IV Continuous <Continuous>  mirtazapine 30 milliGRAM(s) Oral every 24 hours  vancomycin    Solution 125 milliGRAM(s) Oral every 6 hours    MEDICATIONS  (PRN):  ALBUTerol    90 MICROgram(s) HFA Inhaler 2 Puff(s) Inhalation every 6 hours PRN Shortness of Breath and/or Wheezing  dextrose Oral Gel 15 Gram(s) Oral once PRN Blood Glucose LESS THAN 70 milliGRAM(s)/deciliter      Allergies    No Known Allergies    Intolerances        Vital Signs Last 24 Hrs  T(C): 36.6 (31 Aug 2022 05:48), Max: 36.7 (30 Aug 2022 20:48)  T(F): 97.9 (31 Aug 2022 05:48), Max: 98.1 (30 Aug 2022 20:48)  HR: 80 (31 Aug 2022 10:38) (60 - 80)  BP: 135/72 (31 Aug 2022 10:38) (122/72 - 145/78)  BP(mean): --  RR: 18 (31 Aug 2022 05:48) (18 - 18)  SpO2: 95% (31 Aug 2022 10:38) (95% - 95%)    Parameters below as of 31 Aug 2022 10:38  Patient On (Oxygen Delivery Method): room air          PHYSICAL EXAM:    General: poorly nourished; in no acute distress  HEENT: MMM, conjunctiva and sclera clear  Gastrointestinal: Soft non-tender sl-distended; Normal bowel sounds;   Skin: Warm and dry. No obvious rash    LABS:      CBC Full  -  ( 31 Aug 2022 05:30 )  WBC Count : 10.23 K/uL  RBC Count : 3.15 M/uL  Hemoglobin : 9.4 g/dL  Hematocrit : 30.0 %  Platelet Count - Automated : 278 K/uL  Mean Cell Volume : 95.2 fl  Mean Cell Hemoglobin : 29.8 pg  Mean Cell Hemoglobin Concentration : 31.3 gm/dL  Auto Neutrophil # : 6.67 K/uL  Auto Lymphocyte # : 1.91 K/uL  Auto Monocyte # : 1.09 K/uL  Auto Eosinophil # : 0.55 K/uL  Auto Basophil # : 0.00 K/uL  Auto Neutrophil % : 65.2 %  Auto Lymphocyte % : 18.7 %  Auto Monocyte % : 10.7 %  Auto Eosinophil % : 5.4 %  Auto Basophil % : 0.0 %    08-31    136  |  106  |  13  ----------------------------<  95  4.1   |  21<L>  |  1.25    Ca    8.2<L>      31 Aug 2022 05:30  Phos  2.4     08-31  Mg     2.4     08-31                        RADIOLOGY & ADDITIONAL STUDIES (The following images were personally reviewed):

## 2022-08-31 NOTE — PROGRESS NOTE ADULT - PROBLEM SELECTOR PLAN 1
Met 3/4 SIRS criteria (T102.4, WBC 30.12, RR 36) with lactate 2.3 -> 3.2 -> 1.9. Likely secondary to C difficile.  C diff positive. GI PCR neg. Surgery consulted for r/o acalculous sarah, however low suspicion.    Plan:   - c/w isolation precautions   - c/w oral vancomycin 125mg q6h for 10 days, 8/25  - blood cultures show NGTD  - monitor BM and abd exam  - CT non con showed large R pleural effusion and L small effusion  - plan for IR guided thoracentesis tomorrow  - palliative team consulted, will discuss GOC and possible hospice placement

## 2022-08-31 NOTE — PHYSICAL THERAPY INITIAL EVALUATION ADULT - ADDITIONAL COMMENTS
PLOF provided from pt's HCP (nephew) who was present at end of eval session. Per pt's nephew, patient lives alone in an elevator access bl. Patient was ambulating in the home with a rollator, and intermittently went outdoors with supervision from HHA. Patient has [facility]HA services "a few times a week," but only for 4hrs/day (per SW/CM notes, [facility] 3-4/days per week). Patient was previously independent with bed mobility, transfers, and gait with rollator, but required assistance for some ADLs, and all IADLs. Patient was also receiving Meals on Wheels (2 meals daily).

## 2022-08-31 NOTE — PROGRESS NOTE ADULT - PROBLEM SELECTOR PLAN 4
Patient was tachypenic this morning BUT patient afebrile orally and rectal. Most likely 2/2 to pain and anxiety. Chest x-ray w/ possible mild fluid.    Plan:   - c/w home advair and albuterol Patient was tachypenic this morning BUT patient afebrile orally and rectal. Most likely 2/2 to pain and anxiety. Chest x-ray w/ possible mild fluid.    Plan:   - c/w home advair and albuterol  - CT non con showed large R pleural effusion and L small effusion  - plan for IR guided thoracentesis tomorrow

## 2022-08-31 NOTE — PROGRESS NOTE ADULT - PROBLEM SELECTOR PLAN 4
Patient was tachypenic this morning BUT patient afebrile orally and rectal. Most likely 2/2 to pain and anxiety. Chest x-ray w/ possible mild fluid.    Plan:   - c/w home advair and albuterol  - CT non con showed large R pleural effusion and L small effusion  - plan for IR guided thoracentesis tomorrow

## 2022-09-01 ENCOUNTER — RESULT REVIEW (OUTPATIENT)
Age: 87
End: 2022-09-01

## 2022-09-01 DIAGNOSIS — U07.1 COVID-19: ICD-10-CM

## 2022-09-01 LAB
ALBUMIN FLD-MCNC: 1.3 G/DL — SIGNIFICANT CHANGE UP
ANION GAP SERPL CALC-SCNC: 11 MMOL/L — SIGNIFICANT CHANGE UP (ref 5–17)
APTT BLD: 27.9 SEC — SIGNIFICANT CHANGE UP (ref 27.5–35.5)
BASOPHILS # BLD AUTO: 0.06 K/UL — SIGNIFICANT CHANGE UP (ref 0–0.2)
BASOPHILS NFR BLD AUTO: 0.4 % — SIGNIFICANT CHANGE UP (ref 0–2)
BLD GP AB SCN SERPL QL: NEGATIVE — SIGNIFICANT CHANGE UP
BUN SERPL-MCNC: 16 MG/DL — SIGNIFICANT CHANGE UP (ref 7–23)
CALCIUM SERPL-MCNC: 8.6 MG/DL — SIGNIFICANT CHANGE UP (ref 8.4–10.5)
CHLORIDE SERPL-SCNC: 105 MMOL/L — SIGNIFICANT CHANGE UP (ref 96–108)
CO2 SERPL-SCNC: 22 MMOL/L — SIGNIFICANT CHANGE UP (ref 22–31)
CREAT SERPL-MCNC: 1.29 MG/DL — SIGNIFICANT CHANGE UP (ref 0.5–1.3)
EGFR: 52 ML/MIN/1.73M2 — LOW
EOSINOPHIL # BLD AUTO: 0.74 K/UL — HIGH (ref 0–0.5)
EOSINOPHIL NFR BLD AUTO: 5.5 % — SIGNIFICANT CHANGE UP (ref 0–6)
GLUCOSE BLDC GLUCOMTR-MCNC: 119 MG/DL — HIGH (ref 70–99)
GLUCOSE BLDC GLUCOMTR-MCNC: 89 MG/DL — SIGNIFICANT CHANGE UP (ref 70–99)
GLUCOSE BLDC GLUCOMTR-MCNC: 94 MG/DL — SIGNIFICANT CHANGE UP (ref 70–99)
GLUCOSE BLDC GLUCOMTR-MCNC: 95 MG/DL — SIGNIFICANT CHANGE UP (ref 70–99)
GLUCOSE SERPL-MCNC: 121 MG/DL — HIGH (ref 70–99)
GRAM STN FLD: SIGNIFICANT CHANGE UP
HCT VFR BLD CALC: 30.4 % — LOW (ref 39–50)
HGB BLD-MCNC: 9.9 G/DL — LOW (ref 13–17)
IMM GRANULOCYTES NFR BLD AUTO: 1 % — SIGNIFICANT CHANGE UP (ref 0–1.5)
INR BLD: 0.99 — SIGNIFICANT CHANGE UP (ref 0.88–1.16)
LDH SERPL L TO P-CCNC: 92 U/L — SIGNIFICANT CHANGE UP
LYMPHOCYTES # BLD AUTO: 1.36 K/UL — SIGNIFICANT CHANGE UP (ref 1–3.3)
LYMPHOCYTES # BLD AUTO: 10 % — LOW (ref 13–44)
MAGNESIUM SERPL-MCNC: 1.9 MG/DL — SIGNIFICANT CHANGE UP (ref 1.6–2.6)
MCHC RBC-ENTMCNC: 31.2 PG — SIGNIFICANT CHANGE UP (ref 27–34)
MCHC RBC-ENTMCNC: 32.6 GM/DL — SIGNIFICANT CHANGE UP (ref 32–36)
MCV RBC AUTO: 95.9 FL — SIGNIFICANT CHANGE UP (ref 80–100)
MONOCYTES # BLD AUTO: 1.46 K/UL — HIGH (ref 0–0.9)
MONOCYTES NFR BLD AUTO: 10.8 % — SIGNIFICANT CHANGE UP (ref 2–14)
NEUTROPHILS # BLD AUTO: 9.82 K/UL — HIGH (ref 1.8–7.4)
NEUTROPHILS NFR BLD AUTO: 72.3 % — SIGNIFICANT CHANGE UP (ref 43–77)
NRBC # BLD: 0 /100 WBCS — SIGNIFICANT CHANGE UP (ref 0–0)
PHOSPHATE SERPL-MCNC: 2.6 MG/DL — SIGNIFICANT CHANGE UP (ref 2.5–4.5)
PLATELET # BLD AUTO: 338 K/UL — SIGNIFICANT CHANGE UP (ref 150–400)
POTASSIUM SERPL-MCNC: 4 MMOL/L — SIGNIFICANT CHANGE UP (ref 3.5–5.3)
POTASSIUM SERPL-SCNC: 4 MMOL/L — SIGNIFICANT CHANGE UP (ref 3.5–5.3)
PROTHROM AB SERPL-ACNC: 11.8 SEC — SIGNIFICANT CHANGE UP (ref 10.5–13.4)
RBC # BLD: 3.17 M/UL — LOW (ref 4.2–5.8)
RBC # FLD: 13.7 % — SIGNIFICANT CHANGE UP (ref 10.3–14.5)
RH IG SCN BLD-IMP: POSITIVE — SIGNIFICANT CHANGE UP
SODIUM SERPL-SCNC: 138 MMOL/L — SIGNIFICANT CHANGE UP (ref 135–145)
SPECIMEN SOURCE FLD: SIGNIFICANT CHANGE UP
SPECIMEN SOURCE FLD: SIGNIFICANT CHANGE UP
SPECIMEN SOURCE: SIGNIFICANT CHANGE UP
WBC # BLD: 13.57 K/UL — HIGH (ref 3.8–10.5)
WBC # FLD AUTO: 13.57 K/UL — HIGH (ref 3.8–10.5)

## 2022-09-01 PROCEDURE — 88112 CYTOPATH CELL ENHANCE TECH: CPT | Mod: 26

## 2022-09-01 PROCEDURE — 99447 NTRPROF PH1/NTRNET/EHR 11-20: CPT

## 2022-09-01 PROCEDURE — 88305 TISSUE EXAM BY PATHOLOGIST: CPT | Mod: 26

## 2022-09-01 PROCEDURE — 71045 X-RAY EXAM CHEST 1 VIEW: CPT | Mod: 26

## 2022-09-01 PROCEDURE — 32555 ASPIRATE PLEURA W/ IMAGING: CPT | Mod: RT

## 2022-09-01 RX ORDER — REMDESIVIR 5 MG/ML
100 INJECTION INTRAVENOUS EVERY 24 HOURS
Refills: 0 | Status: DISCONTINUED | OUTPATIENT
Start: 2022-09-02 | End: 2022-09-05

## 2022-09-01 RX ORDER — DEXAMETHASONE 0.5 MG/5ML
6 ELIXIR ORAL EVERY 24 HOURS
Refills: 0 | Status: DISCONTINUED | OUTPATIENT
Start: 2022-09-01 | End: 2022-09-01

## 2022-09-01 RX ORDER — MAGNESIUM SULFATE 500 MG/ML
2 VIAL (ML) INJECTION ONCE
Refills: 0 | Status: COMPLETED | OUTPATIENT
Start: 2022-09-01 | End: 2022-09-01

## 2022-09-01 RX ORDER — REMDESIVIR 5 MG/ML
200 INJECTION INTRAVENOUS EVERY 24 HOURS
Refills: 0 | Status: COMPLETED | OUTPATIENT
Start: 2022-09-01 | End: 2022-09-01

## 2022-09-01 RX ORDER — REMDESIVIR 5 MG/ML
INJECTION INTRAVENOUS
Refills: 0 | Status: DISCONTINUED | OUTPATIENT
Start: 2022-09-01 | End: 2022-09-05

## 2022-09-01 RX ADMIN — Medication 6 MILLIGRAM(S): at 18:54

## 2022-09-01 RX ADMIN — CLOPIDOGREL BISULFATE 75 MILLIGRAM(S): 75 TABLET, FILM COATED ORAL at 13:14

## 2022-09-01 RX ADMIN — Medication 125 MILLIGRAM(S): at 11:27

## 2022-09-01 RX ADMIN — Medication 125 MILLIGRAM(S): at 18:53

## 2022-09-01 RX ADMIN — BUDESONIDE AND FORMOTEROL FUMARATE DIHYDRATE 2 PUFF(S): 160; 4.5 AEROSOL RESPIRATORY (INHALATION) at 19:07

## 2022-09-01 RX ADMIN — MIRTAZAPINE 30 MILLIGRAM(S): 45 TABLET, ORALLY DISINTEGRATING ORAL at 22:50

## 2022-09-01 RX ADMIN — BUDESONIDE AND FORMOTEROL FUMARATE DIHYDRATE 2 PUFF(S): 160; 4.5 AEROSOL RESPIRATORY (INHALATION) at 06:33

## 2022-09-01 RX ADMIN — Medication 125 MILLIGRAM(S): at 06:33

## 2022-09-01 RX ADMIN — Medication 81 MILLIGRAM(S): at 13:14

## 2022-09-01 RX ADMIN — Medication 125 MILLIGRAM(S): at 00:03

## 2022-09-01 RX ADMIN — REMDESIVIR 200 MILLIGRAM(S): 5 INJECTION INTRAVENOUS at 21:21

## 2022-09-01 RX ADMIN — Medication 25 GRAM(S): at 11:27

## 2022-09-01 RX ADMIN — ISOSORBIDE MONONITRATE 30 MILLIGRAM(S): 60 TABLET, EXTENDED RELEASE ORAL at 11:27

## 2022-09-01 NOTE — PROGRESS NOTE ADULT - PROBLEM SELECTOR PLAN 3
C diff sample positive. Septic as above.    Plan:  - c/w plan as above Pt tested positive for COVID 8/31.     Plan:  - start decadron   - consider remdesivir (Cr function trending up)  - monitor oxygen requirements

## 2022-09-01 NOTE — PROGRESS NOTE ADULT - PROBLEM SELECTOR PLAN 1
Met 3/4 SIRS criteria (T102.4, WBC 30.12, RR 36) with lactate 2.3 -> 3.2 -> 1.9. Likely secondary to C difficile.  C diff positive. GI PCR neg. Surgery consulted for r/o acalculous sarah, however low suspicion.    Plan:   - c/w isolation precautions   - c/w oral vancomycin 125mg q6h for 10 days, 8/25  - blood cultures show NGTD  - monitor BM and abd exam  - CT non con showed large R pleural effusion and L small effusion  - plan for IR guided thoracentesis tomorrow  - palliative team consulted, will discuss GOC and possible hospice placement Met 3/4 SIRS criteria (T102.4, WBC 30.12, RR 36) with lactate 2.3 -> 3.2 -> 1.9. Likely secondary to C difficile.  C diff positive. GI PCR neg. Surgery consulted for r/o acalculous sarah, however low suspicion.    Plan:   - c/w isolation precautions   - c/w oral vancomycin 125mg q6h for 10 days, 8/25  - blood cultures show NGTD  - monitor BM and abd exam  - CT non con showed large R pleural effusion and L small effusion  - plan for IR guided thoracentesis today  - palliative team consulted, will discuss GOC and possible hospice placement    #Metabolic encephalopathy  RESOLVING  Likely secondary to sepsis (as above). Exam limited but does not appear focal and CTH negative.  AAOx3 but pt intermittently confused. Was more awake on Saturday when Nephew was visiting. Also has sundowning at night    Plan:   - treatment as above

## 2022-09-01 NOTE — PROGRESS NOTE ADULT - PROBLEM SELECTOR PLAN 12
Fluids: LR 75cc/hr 500ml  Electrolytes: replenish as appropriate  Diet: clear liquids, ensure   DVT Prophylaxis: heparin subq 5k TID given CKD  GI Prophylaxis: not indicated  Code Status: DNR/DNI as per recent [facility] last admission  Dispo: [facility] Fluids: s/p LR 75cc/hr 500ml  Electrolytes: replete prn  Diet: clear liquids, ensure   DVT Prophylaxis: heparin subq 5k TID given CKD  GI Prophylaxis: not indicated  [facility]: [facility]  [facility]

## 2022-09-01 NOTE — PROGRESS NOTE ADULT - ASSESSMENT
ASSESSMENT/PLAN89 y/o M w/ PMH CAD, CKD3, COPD, colonic mass, lung nodules, chronic diarrhea and tachypnea who presented for diarrhea, tachypnea, and chest pain who was admitted for AMS secondary to severe sepsis. C Difficile colitis, L Lung nodules , concern for new RLL aspiration, new R pleural effusion, now COVID-19    1. O2 2LNC at this time  2. Bronchodilators:  Atrovent/ albuterol q 4 – 6 hours as needed  3. Corticosteroids: recommend Decadron COVID-19 protocol  4. ID/Antibiotics: now on Vancomycin , consider Remdesivir  5. Cardiac/HTN: optimize BP   6. GI: Rx/ prophylaxis c PPI/H2B  7. Heme: Rx/VT prophylaxis c SQH/SCD/AC  8. Aspiration precautions, discussed c Speech and Swallow  9. R thoracentesis if OK c GOC  Discussed with managing team,    Verify GOC if any desire to further manage pulmonary nodules

## 2022-09-01 NOTE — CONSULT NOTE ADULT - REASON FOR ADMISSION
altered mental status secondary to sepsis
Severe Sepsis

## 2022-09-01 NOTE — PROGRESS NOTE ADULT - PROBLEM SELECTOR PLAN 4
Patient was tachypenic this morning BUT patient afebrile orally and rectal. Most likely 2/2 to pain and anxiety. Chest x-ray w/ possible mild fluid.    Plan:   - c/w home advair and albuterol  - CT non con showed large R pleural effusion and L small effusion  - plan for IR guided thoracentesis tomorrow Patient was tachypenic BUT patient afebrile orally and rectal. Most likely 2/2 to pain and anxiety. Chest x-ray w/ possible mild fluid.    Plan:   - c/w home advair and albuterol  - CT non con showed large R pleural effusion and L small effusion  - plan for IR guided thoracentesis today  - may also be exacerbated by COVID & COPD

## 2022-09-01 NOTE — CONSULT NOTE ADULT - ASSESSMENT
Assessment: 89y/o M w/ PMH HTN, CAD s/p PCI w/ 3 DAYANA, CKD (Cr 1.7-2.4), COPD (no home O2), 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 Per ED report, he has been having chronic diarrhea, tachypnea, and chest pain that was worsening, prompting patient to call EMS to bring him from home into [facility]. Of note, patient was recently admitted 7/19-7/23 to [facility] for diarrhea after being sent in from his PCP office for hypotension and chronic diarrhea. IR consulted for diagnostic right thoracentesis. Case reviewed with Dr. Waite, plan for procedure with local anesthesia and ultrasound guidance.     Communicated with: primary team    
89 year old male (poor historian) with PMH HTN, CAD s/p PCI w 3 DAYANA, CKD (Cr 1.7- 2.4), COPD (not on home O2), arthritis, aortic aneurysm, BPH, HLD, EF 45-50%, lung nodules, and chronic constipation and Psx of ex lap and right radical extended hemicolectomy with ileocolonic anastomosis (Dr. Headley 4/2017) and presumed b/i inguinal hernia repairs, presents to the ED on 8/24 with c/o one week of chest pain and difficulty breathing. On admission, pt febrile to 102.4 rectal, nontachycardic, hypertensive (159/61) with WBC 30.12 and lactate of 2.3. CTAP with IV contrast demonstrates a distended acalculous gallbladder with no acute inflammation. Surgery consulted for r/o acalculous cholecystitis. Pt lethargic and unable to provide reliable subjective history, but denies abdominal pain, nausea, or vomiting. States he is not currently passing gas but had a BM earlier today. Attempted to reach listed contacts for collateral information without response. Abdomen soft, nondistended, mild TTP in upper quadrants without rebound or guarding. Tbili 0.3, LFTs wnl. Low suspicion for cholecystitis at this time given lack of inflammation on scan. Distension may be due to poor PO intake. Hb increased from previous admission suggests dehydration and imaging suggests a component of ischemic colitis. Pt now s/p 3.2L NS with normalization of lactate. Pt admitted to medicine for further workup.    Recommendations:  Fluid resuscitation as needed  Broad spectrum antibiotic  Serial abdominal exams  May need percutaneous cholecystostomy pending further imaging  Team 4C will continue to follow    Plan discussed with chief resident on call and Dr. Lemos
C diff  continue vancomycin
89 M w/ PMH CAD, CKD3, COPD, colonic mass, lung nodules, chronic diarrhea and tachypnea admitted for sepsis with end organ dysfunction. ICU consulted for possible ICU/telemetry placement due to AMS.      NEUROLOGIC/PSYCHIATRIC  #Metabolic Encephalopathy  Likely secondary to sepsis with severe features given white count, lactate, temperature, DEBBIE. Exam limited but does not appear focal and CTH negative so stroke not ruled out but low suspicion. Source does not appear to be pulmonary or urinary, colitis more likely given CT findings.  - can transition to ceftriaxone and flagyl  - f/u blood cultures taken in ED  - elevate HOB to mitigate aspiration risk    PULMONARY  #COPD  #Tachypnea  Known acute on chronic metabolic acidosis, which is likely driving his tachypnea with pain exacerbating.  - close monitoring, can use HFNC for work of breathing but would avoid BiPAP due to encephalopathy  - lungs clear, does not appear to be in exacerbation    CARDIAC  #Severe Sepsis  Euvolemic on exam after 2.2L, getting additional 1L for increasing lactate  - repeat BMP and lactate after further fluids  - Ceftriaxone and flagyl for GI source  - f/u blood cultures  - GI PCR and C diff    #CAD  - c/w aspirin and statin as able, NPO now    GASTROINTESTINAL  #Diarrhea  Known history of chronic diarrhea, which confounds evaluation of acute source of sepsis. Given lack of other source, if pt is not bacteremic then colitis is most likely source.  - abx as above  - consultation with outpt GI doctor Dr. Hammer in AM    RENAL  #DEBBIE on CKD  Likely direct renal injury from sepsis with possible concurrent prerenal DEBBIE from hypotension on admission  - routine urine lytes    INFECTIOUS  #Sepsis  As above  - f/u blood cultures, GI PCR, C Diff    ENDOCRINE  - q6 SSI while NPO    MISC  Fluids: 3.2L, consider maintenance for hypotension  Electrolytes: replete K>3.8, Mg>1.8, Phos>2.5  Diet: NPO  DVT Prophylaxis: heparin given DEBBIE on CKD  GI Prophylaxis: not indicated  Lines: PIV  Code Status: DNR/DNI as of 7/21/22 pending further collateral  Dispo: [facility]
89 year old man with tachypnea, Debility, Large Pleural effusion, C. Diff, Lung cancer and encounter for palliative care.

## 2022-09-01 NOTE — PROGRESS NOTE ADULT - SUBJECTIVE AND OBJECTIVE BOX
Interventional, Pulmonary, Critical, Chest Special Procedures.    Pt was seen and fully examined by myself. PPI/N95 worn througoht every encounter     Time spent with patient in minutes: 67    Patient is a 89y old  Male who presents with a chief complaint of altered mental status secondary to sepsis (01 Sep 2022 11:18) The patient oblivious to surroundings, eupneic on RA, now c COVID-19. Discussed c managing team. This patient appears at high risk for disease progression.     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 Patient is altered on exam, but per ED report, he has been having chronic diarrhea, tachypnea, and chest pain that was worsening, prompting patient to call EMS to bring him from home into [facility]. Of note, patient was recently admitted 7/19-7/23 to [facility] for diarrhea after being sent in from his PCP office for hypotension and chronic diarrhea. During that time his diarrhea resolved and he was told to f/u with outpatient GI with Dr. Hammer. Notably he was fully conversant with mild memory impairment on admission and throughout hospital stay.    ED vitals: T 39.3   HR 78  RR 36 (taken manually)  BP 97/59  SpO2 98% on 2L  Labs significant: WBC 30, Hgb 11.5, lactate 2.3 -> 3.2 after 2L NS, UA wnl  Imaging/EKG: CT H/C/A/P known lung nodule, colitis  Interventions: zosyn, zofran, 3.2L NS, flagyl, acetaminophen     (24 Aug 2022 21:48)      REVIEW OF SYSTEMS:  Constitutional: No fever, weight loss, chills + fatigue  Eyes: No eye pain, visual disturbances, or discharge  ENMT:  + difficulty hearing, tinnitus, vertigo; No sinus or throat pain. No epistaxis, +dysphagia, dysphonia, hoarseness no odynophagia  Neck: No pain, stiffness or neck swelling.  No masses or deformities  Respiratory: + cough, wheezing, hemoptysis  + COPD  - ILD   - PE   - ASTHMA     - PNEUMONIA  Cardiovascular: No chest pain, dysrhythmia, palpitations, dizziness or edema + CAD   - CHF   - HTN  Gastrointestinal: No abdominal or epigastric pain. No nausea, vomiting or hematemesis; No diarrhea or constipation. No melena or hematochezia, Icterus.          Genitourinary: No dysuria, frequency, hematuria or incontinence   - CKD/DEBBIE      - ESRD  Neurological: No headaches, +memory loss, loss of strength, numbness or tremors      +DEMENTIA     - STROKE    - SEIZURE  Skin: No itching, burning, rashes or lesions   Lymph Nodes: No enlarged glands  Endocrine: No heat or cold intolerance; No hair loss       + DM     - THYROID DISORDER  Musculoskeletal: No joint pain or swelling; No muscle, back or extremity pain, No edema  Psychiatric: No depression, anxiety, mood swings or difficulty sleeping  Heme/Lymph: No easy bruising or bleeding gums         - ANEMIA      - CANCER   -COAGULOPATHY  Allergy and Immunologic: No hives or eczema    PAST MEDICAL & SURGICAL HISTORY:  HLD (hyperlipidemia)      CAD (coronary artery disease)      BPH (benign prostatic hyperplasia)      COPD, moderate      Chronic diarrhea      Stage 3 chronic kidney disease      S/P cataract extraction      H/O right hemicolectomy        FAMILY HISTORY:  No family history of cardiovascular disease (Father, Mother)      SOCIAL HISTORY:      - Tobacco     - ETOH    Allergies    No Known Allergies    Intolerances      Vital Signs Last 24 Hrs  T(C): 37.1 (01 Sep 2022 06:01), Max: 37.1 (01 Sep 2022 06:01)  T(F): 98.8 (01 Sep 2022 06:01), Max: 98.8 (01 Sep 2022 06:01)  HR: 75 (01 Sep 2022 06:01) (73 - 75)  BP: 144/78 (01 Sep 2022 06:01) (124/70 - 144/78)  BP(mean): --  RR: 19 (01 Sep 2022 06:01) (18 - 19)  SpO2: 95% (01 Sep 2022 06:01) (95% - 95%)    Parameters below as of 01 Sep 2022 06:01  Patient On (Oxygen Delivery Method): room air        08-31 @ 07:01  -  09-01 @ 07:00  --------------------------------------------------------  IN: 0 mL / OUT: 275 mL / NET: -275 mL          MEDICATIONS:  MEDICATIONS  (STANDING):  aspirin enteric coated 81 milliGRAM(s) Oral every 24 hours  budesonide  80 MICROgram(s)/formoterol 4.5 MICROgram(s) Inhaler 2 Puff(s) Inhalation two times a day  clopidogrel Tablet 75 milliGRAM(s) Oral every 24 hours  dextrose 5%. 1000 milliLiter(s) (50 mL/Hr) IV Continuous <Continuous>  dextrose 5%. 1000 milliLiter(s) (100 mL/Hr) IV Continuous <Continuous>  dextrose 50% Injectable 25 Gram(s) IV Push once  dextrose 50% Injectable 12.5 Gram(s) IV Push once  dextrose 50% Injectable 25 Gram(s) IV Push once  glucagon  Injectable 1 milliGRAM(s) IntraMuscular once  insulin lispro (ADMELOG) corrective regimen sliding scale   SubCutaneous every 6 hours  isosorbide   mononitrate ER Tablet (IMDUR) 30 milliGRAM(s) Oral every 24 hours  lactated ringers. 500 milliLiter(s) (75 mL/Hr) IV Continuous <Continuous>  mirtazapine 30 milliGRAM(s) Oral every 24 hours  vancomycin    Solution 125 milliGRAM(s) Oral every 6 hours    MEDICATIONS  (PRN):  ALBUTerol    90 MICROgram(s) HFA Inhaler 2 Puff(s) Inhalation every 6 hours PRN Shortness of Breath and/or Wheezing  dextrose Oral Gel 15 Gram(s) Oral once PRN Blood Glucose LESS THAN 70 milliGRAM(s)/deciliter      PHYSICAL EXAM:  Un Comfortable, no immediate distress  Eyes: PERRL, EOM intact; conjunctiva and sclera clear  Head: Normocephalic;  No Trauma  ENMT: No nasal discharge, hoarseness, +cough no  hemoptysis  Neck: Supple; non tender; no masses or deformities.    No JVD  Respiratory:- WHEEZING   - RHONCHI  new RLL RALES  + CRACKLES.  Diminished breath sounds  BILATERAL  RIGHT  LEFT bases   Cardiovascular: Regular rate and rhythm. S1 and S2 Normal; No murmurs, gallops or rubs     - PPM/AICD  Gastrointestinal: Soft mildly tender, non-distended; Normal bowel sounds; No hepatosplenomegaly.     -PEG    -  GT   + RUBIO  Genitourinary: No costovertebral angle tenderness. No dysuria  Extremities: AROM, No clubbing, cyanosis or edema    Vascular: Peripheral pulses palpable 2+ bilaterally  Neurological: Awake, moving extremities   Skin: Warm and dry. No obvious rash  Lymph Nodes: No acute cervical or supraclavicular adenopathy  Psychiatric: less  engaging     DEVICES:  - DENTURES   +IV R / L     - ETUBE   -TRACH   -CTUBE  R / L    LABS:                          9.9    13.57 )-----------( 338      ( 01 Sep 2022 05:30 )             30.4     09-01    138  |  105  |  16  ----------------------------<  121<H>  4.0   |  22  |  1.29    Ca    8.6      01 Sep 2022 05:30  Phos  2.6     09-01  Mg     1.9     09-01      PT/INR - ( 01 Sep 2022 05:30 )   PT: 11.8 sec;   INR: 0.99          PTT - ( 01 Sep 2022 05:30 )  PTT:27.9 sec  RADIOLOGY & ADDITIONAL STUDIES (The following images were personally reviewed):

## 2022-09-01 NOTE — CONSULT NOTE ADULT - SUBJECTIVE AND OBJECTIVE BOX
HPI: 89 year old male (poor historian) with PMH HTN, CAD s/p PCI w 3 DAYANA (mRCA, pRCA, mC in 2013 w/ Dr. Varela), CKD (Cr 1.7- 2.4), COPD (not on home O2), arthritis, aortic aneurysm, BPH, HLD, EF 45-50%, lung nodules, and chronic constipation and Psx of ex lap and right radical extended hemicolectomy with ileocolonic anastomosis (Dr. 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 Pt lethargic and unable to provide reliable subjective history. Does deny abdominal pain, nausea, or vomiting. States he is not currently passing gas but had a recent BM that was normal. Attempted to reach listed contacts for collateral without response.     In the ED, pt febrile to 102.4 (rectal), nontachycardic, hypertensive (159/61), and satting at 99% on 2L NC. Labs significant for WBC 30.12 with neutrophil predominant left shift, Hb 11.5, plt 353, Cr. 1.97, Tbili 0.3, LFTs wnl, PTT 40.9, LA 2.3, repeat 3.2, and Trop of 0.03. CTAP with IV contrast demonstrates unchanged central biliary prominence. Distended acalculous gallbladder, no acute inflammation. Since July 19, 2022, new long segment of left colonic mural thickening suspicious for colitis, possibly infectious/inflammatory. No large vessel mesenteric occlusion accounting for technique. No abscess. RUQ U/S attempted, however pt became combative during exam, which was subsequently aborted.    Surgery consulted for r/o acalculous cholecystitis in setting of sepsis    PMH: PMH HTN, CAD s/p PCI w 3 DAYANA (mRCA, pRCA, mC in 2013), CKD (Cr 1.7- 2.4), COPD (not on home O2), arthritis, aortic aneurysm, BPH, HLD, EF 45-50%, lung nodules, and chronic constipation   Psx: ex lap and extended right hemicolectomy, b/l open inguinal hernia repairs  Social Hx: Former smoker, occasionally wine at dinner, no other drugs. Lives alone.       MEDICATIONS  (STANDING):  cefTRIAXone   IVPB 2000 milliGRAM(s) IV Intermittent Once  dextrose 5%. 1000 milliLiter(s) (50 mL/Hr) IV Continuous <Continuous>  dextrose 5%. 1000 milliLiter(s) (100 mL/Hr) IV Continuous <Continuous>  dextrose 50% Injectable 25 Gram(s) IV Push once  dextrose 50% Injectable 12.5 Gram(s) IV Push once  dextrose 50% Injectable 25 Gram(s) IV Push once  glucagon  Injectable 1 milliGRAM(s) IntraMuscular once  insulin lispro (ADMELOG) corrective regimen sliding scale   SubCutaneous three times a day before meals    MEDICATIONS  (PRN):  dextrose Oral Gel 15 Gram(s) Oral once PRN Blood Glucose LESS THAN 70 milliGRAM(s)/deciliter      Allergies  No Known Allergies    Intolerances    Vital Signs Last 24 Hrs  T(C): 36.9 (24 Aug 2022 20:24), Max: 39.3 (24 Aug 2022 15:41)  T(F): 98.5 (24 Aug 2022 20:24), Max: 102.7 (24 Aug 2022 15:41)  HR: 75 (24 Aug 2022 20:24) (75 - 80)  BP: 133/60 (24 Aug 2022 20:24) (97/59 - 159/61)  BP(mean): --  RR: 19 (24 Aug 2022 20:24) (19 - 26)  SpO2: 100% (24 Aug 2022 20:24) (98% - 100%)    Parameters below as of 24 Aug 2022 20:24  Patient On (Oxygen Delivery Method): nasal cannula  O2 Flow (L/min): 2      PHYSICAL EXAM  General: lethargic but aware of current location and situation, frail  Cardio: S1,S2, No MRG, RRR  Pulm: tachypnic, decreased breath sounds bilaterally  Abdomen: soft, nondistended, TTP in upper quadrants, well healed midline and b/l inguinal scars, palpable masses in the b/l inguinal regions (suspect prior mesh), no rebound, no guarding  Extremities: WWP, peripheral pulses appreciated      LABS:                        9.1    33.58 )-----------( 266      ( 24 Aug 2022 22:27 )             29.0     08-24    139  |  108  |  59<H>  ----------------------------<  136<H>  5.5<H>   |  18<L>  |  1.97<H>    Ca    10.5      24 Aug 2022 16:15    TPro  7.8  /  Alb  4.3  /  TBili  0.3  /  DBili  x   /  AST  13  /  ALT  7<L>  /  AlkPhos  96  08-24    PT/INR - ( 24 Aug 2022 16:15 )   PT: 12.8 sec;   INR: 1.07          PTT - ( 24 Aug 2022 16:15 )  PTT:40.9 sec  Urinalysis Basic - ( 24 Aug 2022 18:55 )    Color: Yellow / Appearance: Clear / SG: <=1.005 / pH: x  Gluc: x / Ketone: NEGATIVE  / Bili: Negative / Urobili: 0.2 E.U./dL   Blood: x / Protein: NEGATIVE mg/dL / Nitrite: NEGATIVE   Leuk Esterase: NEGATIVE / RBC: x / WBC x   Sq Epi: x / Non Sq Epi: x / Bacteria: x        RADIOLOGY & ADDITIONAL STUDIES:  ACC: [id number] EXAM: CT ABDOMEN AND PELVIS IC    PROCEDURE DATE: 08/24/2022        INTERPRETATION: CLINICAL INFORMATION: Sepsis and diarrhea.    COMPARISON: CT abdomen pelvis July 19, 2022, CT chest March 19, 2022 and FDG PET/CT March 21, 2022, CT chest April 23, 2017.    CONTRAST/COMPLICATIONS:  IV Contrast: Isovue 370 90 cc administered  Oral Contrast: NONE  Complications: None reported at time of study completion    PROCEDURE:  CT of the Chest, Abdomen and Pelvis was performed.  Sagittal and coronal reformats were performed.    FINDINGS:  CHEST:  LUNGS AND LARGE AIRWAYS: Slightly decreased left lower lobe nodules, 3.2 x 1.7 cm, previously 3.5 x 1.7 cm. No new suspicious nodule and no pulmonary consolidation.  PLEURA: No pleural effusion.  HEART: Coronary stents and mitral valve annular calcifications. No pericardial effusion.  LYMPH NODES: Slightly decreased adenopathy, for example:  * Right supraclavicular, 1.6 x 1.3 cm, previously 1.9 x 1.4 cm  * Paratracheal, 2.4 x 1.7 cm, previously 2.9 x 1.9 cm      ABDOMEN AND PELVIS:  HEPATOBILIARY: No suspicious liver lesion. Unchanged central biliary prominence. Distended acalculus gallbladder, no acute inflammation.  SPLEEN: Within normal limits.  PANCREAS: Unchanged main duct prominence, probably due to parenchymal atrophy.  ADRENALS: Within normal limits.  KIDNEYS/URETERS: Few right renal cysts. No hydronephrosis.    BLADDER: Within normal limits.  REPRODUCTIVE ORGANS: Prostatomegaly.    BOWEL: No bowel obstruction. Long segment left colonic mural thickening, probable mild colitis. No abscess. Within limitations of technique, no mesenteric large vessel occlusion.  PERITONEUM: No ascites.  VESSELS: Unchanged abdominal aortic aneurysm.  RETROPERITONEUM/LYMPH NODES: No adenopathy.  ABDOMINAL WALL: Unchanged bladder containing left inguinal hernia.  BONES: Within normal limits.    IMPRESSION:    1. Since July 19, 2022, new long segment of left colonic mural thickening suspicious for colitis, possibly infectious/inflammatory. No large vessel mesenteric occlusion accounting for technique. No abscess.  2. Slightly decreased right supraclavicular, thoracic adenopathy and probably slightly decreased left lower lobe nodules, the latter slowly increasing since CT April 23, 2017, possibly inflammatory.  
info from chart  patient unable to answer much just looks   I met and saw patient in July at [facility] and he was able to communicate    89y/o M w/ PMH HTN, CAD s/p PCI w/ 3 DAYANA, CKD (Cr 1.7-2.4), COPD (no home O2), 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 Patient is altered on exam, but per note, he has been having chronic diarrhea, tachypnea, and chest pain that was worsening, prompting patient to call EMS to bring him from home into [facility]. Patient was recently admitted 7/19-7/23 to [facility] for diarrhea after being sent in from his PCP office for hypotension and chronic diarrhea. During that time his diarrhea resolved and he was told to f/u  outpatient Current workup C diff positive    ED vitals: T 39.3   HR 78  RR 36 (taken manually)  BP 97/59  SpO2 98% on 2L  Labs significant: WBC 30, Hgb 11.5, lactate 2.3 -> 3.2 after 2L NS, UA wnl  Imaging/EKG: CT H/C/A/P known lung nodule, colitis  Interventions: zosyn, zofran, 3.2L NS, flagyl, acetaminophen     (24 Aug 2022 21:48)      REVIEW OF SYSTEMS:  unable    PAST MEDICAL & SURGICAL HISTORY:  HLD (hyperlipidemia)      CAD (coronary artery disease)      BPH (benign prostatic hyperplasia)      COPD, moderate      Chronic diarrhea      Stage 3 chronic kidney disease      S/P cataract extraction      H/O right hemicolectomy          FAMILY HISTORY:  No family history of cardiovascular disease (Father, Mother)        SOCIAL HISTORY:  Smoking Status: [ ] Current, [ ] Former, [ ] Never  Pack Years:    MEDICATIONS:  MEDICATIONS  (STANDING):  aspirin  chewable 81 milliGRAM(s) Oral daily  budesonide  80 MICROgram(s)/formoterol 4.5 MICROgram(s) Inhaler 2 Puff(s) Inhalation two times a day  clopidogrel Tablet 75 milliGRAM(s) Oral daily  dextrose 5% 1000 milliLiter(s) (75 mL/Hr) IV Continuous <Continuous>  dextrose 5%. 1000 milliLiter(s) (50 mL/Hr) IV Continuous <Continuous>  dextrose 5%. 1000 milliLiter(s) (100 mL/Hr) IV Continuous <Continuous>  dextrose 50% Injectable 25 Gram(s) IV Push once  dextrose 50% Injectable 12.5 Gram(s) IV Push once  dextrose 50% Injectable 25 Gram(s) IV Push once  glucagon  Injectable 1 milliGRAM(s) IntraMuscular once  heparin   Injectable 5000 Unit(s) SubCutaneous every 8 hours  insulin lispro (ADMELOG) corrective regimen sliding scale   SubCutaneous three times a day before meals  insulin lispro (ADMELOG) corrective regimen sliding scale   SubCutaneous at bedtime  simvastatin 20 milliGRAM(s) Oral at bedtime  tamsulosin 0.4 milliGRAM(s) Oral at bedtime  vancomycin    Solution 125 milliGRAM(s) Oral every 6 hours    MEDICATIONS  (PRN):  ALBUTerol    90 MICROgram(s) HFA Inhaler 2 Puff(s) Inhalation every 6 hours PRN Shortness of Breath and/or Wheezing  dextrose Oral Gel 15 Gram(s) Oral once PRN Blood Glucose LESS THAN 70 milliGRAM(s)/deciliter      Allergies    No Known Allergies    Intolerances        Vital Signs Last 24 Hrs  T(C): 37 (25 Aug 2022 13:59), Max: 39.3 (24 Aug 2022 15:41)  T(F): 98.6 (25 Aug 2022 13:59), Max: 102.7 (24 Aug 2022 15:41)  HR: 70 (25 Aug 2022 10:30) (62 - 80)  BP: 139/65 (25 Aug 2022 08:40) (97/59 - 159/61)  BP(mean): 93 (25 Aug 2022 08:40) (93 - 93)  RR: 18 (25 Aug 2022 10:30) (17 - 26)  SpO2: 94% (25 Aug 2022 10:30) (94% - 100%)    Parameters below as of 25 Aug 2022 10:30  Patient On (Oxygen Delivery Method): nasal cannula w/ humidification  O2 Flow (L/min): 6      08-24 @ 07:01  -  08-25 @ 07:00  --------------------------------------------------------  IN: 0 mL / OUT: 300 mL / NET: -300 mL    08-25 @ 07:01  -  08-25 @ 14:56  --------------------------------------------------------  IN: 0 mL / OUT: 100 mL / NET: -100 mL          PHYSICAL EXAM:    General: poorly nourished; in no acute distress  HEENT: MMM, conjunctiva and sclera clear  Gastrointestinal: Soft, non-tender non-distended; Normal bowel sounds;   Extremities: Normal range of motion, No clubbing, cyanosis or edema  Neurological: non verbal  Skin: Warm and dry. No obvious rash      LABS:                        8.8    42.85 )-----------( 260      ( 25 Aug 2022 06:38 )             27.1     08-25    144  |  113<H>  |  42<H>  ----------------------------<  111<H>  4.2   |  18<L>  |  1.71<H>    Ca    8.4      25 Aug 2022 12:00  Phos  3.6     08-25  Mg     1.7     08-25    TPro  5.8<L>  /  Alb  3.0<L>  /  TBili  0.2  /  DBili  x   /  AST  12  /  ALT  6<L>  /  AlkPhos  69  08-25      Culture Results:   Culture in progress (08-24 @ 18:55)  Culture Results:   No growth at 12 hours (08-24 @ 15:30)  Culture Results:   No growth at 12 hours (08-24 @ 15:20)      RADIOLOGY & ADDITIONAL STUDIES:   
Patient is a 89y old  Male who presents with a chief complaint of     Consult reason: AMS in setting of sepsis  ED vitals: T 39.3   HR 78  RR 36 (taken manually)  BP 97/59  SpO2 98% 2L  Labs significant: WBC 30, Hgb 11.5, lactate 2.3 -> 3.2 after 2L NS, UA wnl  Imaging/EKG: CT H/C/A/P known lung nodule, colitis  Interventions: zosyn, zofran, 3.2L NS, flagyl, acetaminophen    HPI:   Patient is 89 M w/ PMH HTN, CAD s/p PCI w/ 3 DAYANA, CKD (Cr 1.7-2.4), COPD (no home O2), 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 Per ED report, he has chronic diarrhea, acidosis, and tachypnea that worsened, prompting patient to call EMS to bring him from home into [facility]. There, he reported worsening diarrhea and lethargy and 2 days of dyspnea and chest pain, but was unable to provide further history.    Recently admitted 7/19-7/23 [facility] for diarrhea after being sent in from his PCP office for hypotension and chronic diarrhea. During that time his diarrhea resolved and he was told to f/u with outpatient GI with Dr. Hammer. Notably he was fully conversant with mild memory impairment on admission and throughout hospital stay.      Allergies    No Known Allergies    Intolerances      Home Medications:  Advair HFA 45 mcg-21 mcg/inh inhalation aerosol: 2 puff(s) inhaled 2 times a day (19 Jul 2022 22:21)  Aspirin Enteric Coated 81 mg oral delayed release tablet: 1 tab(s) orally once a day (19 Jul 2022 22:21)  clopidogrel 75 mg oral tablet: 1 tab(s) orally once a day (19 Jul 2022 22:21)  isosorbide mononitrate 30 mg oral tablet, extended release: 1 tab(s) orally once a day (in the morning) (19 Jul 2022 22:21)  Lomotil 2.5 mg-0.025 mg oral tablet: 2 tab(s) orally 4 times a day (19 Jul 2022 22:21)  mirtazapine 15 mg oral tablet: 2 tab(s) orally once a day (at bedtime) (19 Jul 2022 22:21)  simvastatin 20 mg oral tablet: 1 tab(s) orally once a day (at bedtime) (19 Jul 2022 22:21)      SOCIAL HX:     Smoking          ETOH/Illicit drugs          Occupation    PAST MEDICAL & SURGICAL HISTORY:  HLD (hyperlipidemia)      CAD (coronary artery disease)      BPH (benign prostatic hyperplasia)      COPD, moderate      Chronic diarrhea      S/P cataract extraction      H/O right hemicolectomy          FAMILY HISTORY:  No family history of cardiovascular disease (Father, Mother)    :    No known cardiovascular or pulmonary family history     ROS:  See HPI     PHYSICAL EXAM    ICU Vital Signs Last 24 Hrs  T(C): 36.9 (24 Aug 2022 20:24), Max: 39.3 (24 Aug 2022 15:41)  T(F): 98.5 (24 Aug 2022 20:24), Max: 102.7 (24 Aug 2022 15:41)  HR: 75 (24 Aug 2022 20:24) (75 - 80)  BP: 133/60 (24 Aug 2022 20:24) (97/59 - 159/61)  BP(mean): --  ABP: --  ABP(mean): --  RR: 19 (24 Aug 2022 20:24) (19 - 26)  SpO2: 100% (24 Aug 2022 20:24) (98% - 100%)    O2 Parameters below as of 24 Aug 2022 20:24  Patient On (Oxygen Delivery Method): nasal cannula  O2 Flow (L/min): 2          General: lethargic thin elderly man resting in bed with arms crossed over chest  HEENT:  PER, mucus membranes moist           Lymphatic system: No LN  Lungs: Bilateral BS  Cardiovascular: Regular  Gastrointestinal: soft, diffuse grimacing with palpation in all abdominal regions  Extremities: no edema or clubbing appreciated, palpable radial/PT pulses  Skin: Warm.  Intact  Neurological: opens eyes, mouth to command, b/l UE strength 3/5, withdrawing with b/l LE but not moving to command; nonverbal         LABS:                          11.5   30.12 )-----------( 353      ( 24 Aug 2022 16:15 )             36.3                                               08-24    139  |  108  |  59<H>  ----------------------------<  136<H>  5.5<H>   |  18<L>  |  1.97<H>    Ca    10.5      24 Aug 2022 16:15    TPro  7.8  /  Alb  4.3  /  TBili  0.3  /  DBili  x   /  AST  13  /  ALT  7<L>  /  AlkPhos  96  08-24      PT/INR - ( 24 Aug 2022 16:15 )   PT: 12.8 sec;   INR: 1.07          PTT - ( 24 Aug 2022 16:15 )  PTT:40.9 sec                                       Urinalysis Basic - ( 24 Aug 2022 18:55 )    Color: Yellow / Appearance: Clear / SG: <=1.005 / pH: x  Gluc: x / Ketone: NEGATIVE  / Bili: Negative / Urobili: 0.2 E.U./dL   Blood: x / Protein: NEGATIVE mg/dL / Nitrite: NEGATIVE   Leuk Esterase: NEGATIVE / RBC: x / WBC x   Sq Epi: x / Non Sq Epi: x / Bacteria: x        CARDIAC MARKERS ( 24 Aug 2022 16:15 )  x     / 0.03 ng/mL / x     / x     / x                                                LIVER FUNCTIONS - ( 24 Aug 2022 16:15 )  Alb: 4.3 g/dL / Pro: 7.8 g/dL / ALK PHOS: 96 U/L / ALT: 7 U/L / AST: 13 U/L / GGT: x                                                                                                                                           CXR:    ECHO:    MEDICATIONS  (STANDING):    MEDICATIONS  (PRN):        
89y/o M w/ PMH HTN, CAD s/p PCI w/ 3 DAYANA, CKD (Cr 1.7-2.4), COPD (no home O2), 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 Per ED report, he has been having chronic diarrhea, tachypnea, and chest pain that was worsening, prompting patient to call EMS to bring him from home into [facility]. Of note, patient was recently admitted 7/19-7/23 to [facility] for diarrhea after being sent in from his PCP office for hypotension and chronic diarrhea. IR consulted for diagnostic right thoracentesis. Of note pt was found to be covid positive 8/31.    Clinical History: SEPSIS; COLITIS    Yes    No pertinent family history in first degree relatives    No family history of cardiovascular disease (Father, Mother)    Handoff    MEWS Score    HLD (hyperlipidemia)    CAD (coronary artery disease)    BPH (benign prostatic hyperplasia)    COPD, moderate    Chronic diarrhea    Stage 3 chronic kidney disease    Sepsis    Sepsis with metabolic encephalopathy    Stage 3 chronic kidney disease    Diarrhea    COPD (chronic obstructive pulmonary disease)    Prophylactic measure    Metabolic encephalopathy    Severe sepsis    CAD (coronary artery disease)    Metabolic acidosis    HTN (hypertension)    BPH (benign prostatic hyperplasia)    HLD (hyperlipidemia)    C. difficile colitis    Tachypnea    Debility    Pleural effusion, right    Lung cancer    Encounter for palliative care    S/P cataract extraction    H/O right hemicolectomy    SOB    32    Room Service Assist    Colitis    [email or web address]        Meds:ALBUTerol    90 MICROgram(s) HFA Inhaler 2 Puff(s) Inhalation every 6 hours PRN  aspirin enteric coated 81 milliGRAM(s) Oral every 24 hours  budesonide  80 MICROgram(s)/formoterol 4.5 MICROgram(s) Inhaler 2 Puff(s) Inhalation two times a day  clopidogrel Tablet 75 milliGRAM(s) Oral every 24 hours  dextrose 5%. 1000 milliLiter(s) IV Continuous <Continuous>  dextrose 5%. 1000 milliLiter(s) IV Continuous <Continuous>  dextrose 50% Injectable 25 Gram(s) IV Push once  dextrose 50% Injectable 12.5 Gram(s) IV Push once  dextrose 50% Injectable 25 Gram(s) IV Push once  dextrose Oral Gel 15 Gram(s) Oral once PRN  glucagon  Injectable 1 milliGRAM(s) IntraMuscular once  insulin lispro (ADMELOG) corrective regimen sliding scale   SubCutaneous every 6 hours  isosorbide   mononitrate ER Tablet (IMDUR) 30 milliGRAM(s) Oral every 24 hours  lactated ringers. 500 milliLiter(s) IV Continuous <Continuous>  mirtazapine 30 milliGRAM(s) Oral every 24 hours  vancomycin    Solution 125 milliGRAM(s) Oral every 6 hours      Allergies:No Known Allergies        Labs:                           9.9    13.57 )-----------( 338      ( 01 Sep 2022 05:30 )             30.4     PT/INR - ( 01 Sep 2022 05:30 )   PT: 11.8 sec;   INR: 0.99          PTT - ( 01 Sep 2022 05:30 )  PTT:27.9 sec  09-01    138  |  105  |  16  ----------------------------<  121<H>  4.0   |  22  |  1.29    Ca    8.6      01 Sep 2022 05:30  Phos  2.6     09-01  Mg     1.9     09-01            Imaging Findings: Since 8/24/2022 there has been interval development of a small right pleural effusion and trace left pleural effusion. No significant change in mediastinal and left hilar lymphadenopathy. Spiculated 27 mm solid nodule in the anterior basal segment left lower lobe is suggestive of primary lung carcinoma i.e. invasive adenocarcinoma.    
TIARA GOMEZ          MRN-[id number]            (1930)    HPI:  89y/o M w/ PMH HTN, CAD s/p PCI w/ 3 DAYANA, CKD (Cr 1.7-2.4), COPD (no home O2), colonic mass (s/p R hemicolectomy 2017), arthritis, aortic aneurysm, BPH, HLD, EF 45-50%, lung nodules presents to ED with AMS. Patient is altered on exam, but per ED report, he has been having chronic diarrhea, tachypnea, and chest pain that was worsening, prompting patient to call EMS to bring him from home into [facility]. Of note, patient was recently admitted - to [facility] for diarrhea after being sent in from his PCP office for hypotension and chronic diarrhea. During that time his diarrhea resolved and he was told to f/u with outpatient GI with Dr. Hammer. Notably he was fully conversant with mild memory impairment on admission and throughout hospital stay.    ED vitals: T 39.3   HR 78  RR 36 (taken manually)  BP 97/59  SpO2 98% on 2L  Labs significant: WBC 30, Hgb 11.5, lactate 2.3 -> 3.2 after 2L NS, UA wnl  Imaging/EKG: CT H/C/A/P known lung nodule, colitis  Interventions: zosyn, zofran, 3.2L NS, flagyl, acetaminophen     (24 Aug 2022 21:48)      PAST MEDICAL & SURGICAL HISTORY:  HLD (hyperlipidemia)    CAD (coronary artery disease)    BPH (benign prostatic hyperplasia)    COPD, moderate    Chronic diarrhea    Stage 3 chronic kidney disease      S/P cataract extraction    H/O right hemicolectomy    FAMILY HISTORY:  No family history of cardiovascular disease (Father, Mother)     Reviewed and found non contributory in mother or father    SOCIAL HISTORY: Former smoker, occasionally wine at dinner, no other drugs. Lives alone. Nephew checks in.    PSYCHOSOCIAL ASSESSMENT:  Faith/Spiritual practice: Non  Role of organized Church [ ] important [ ] some [x ] unable to assess dt pt mentation    Coping: [x ] well [ ] with difficulty [ ] poor coping [ ] unable to assess dt pt mentation  Support system: [ ] strong [x ] adequate [ ] inadequate    ROS:    Unable to attain due to:  AMS    Dyspnea (Terri 0-10):   0                     N/V (Y/N):            N                 Secretions (Y/N) :         N       Agitation(Y/N): N  Pain (Y/N):     N  -Provocation/Palliation: n/a   -Quality/Quantity: n/a   -Radiating: n/a   -Severity: n/a   -Timing/Frequency: n/a   -Impact on ADLs: n/a     General:  Unable to obtain   HEENT:   Unable to obtain   Neck:  Unable to obtain   CVS:  Unable to obtain   Resp:  Unable to obtain   GI: Unable to obtain   : Unable to obtain   Musc:  Unable to obtain   Neuro:  Unable to obtain   Psych:  Unable to obtain   Skin:  Unable to obtain   Lymph:  Unable to obtain     Allergies    No Known Allergies    Intolerances      Opiate Naive (Y/N): Y  -iStop reviewed (Y/N):  Y (Ref#:      [id number])  2022	diphenoxylate-atropine 2.5-0.025 mg tablet	60	20	Elias Montalvo MD	[id number]	Medicare	[facility] #[id number]  2022	diphenoxylate-atropine 2.5-0.025 mg tablet	30	10	Elias Montalvo MD	[id number]	Medicare	[facility] #[id number]    Medications:      MEDICATIONS  (STANDING):  aspirin enteric coated 81 milliGRAM(s) Oral every 24 hours  budesonide  80 MICROgram(s)/formoterol 4.5 MICROgram(s) Inhaler 2 Puff(s) Inhalation two times a day  clopidogrel Tablet 75 milliGRAM(s) Oral every 24 hours  dextrose 5%. 1000 milliLiter(s) (50 mL/Hr) IV Continuous <Continuous>  dextrose 5%. 1000 milliLiter(s) (100 mL/Hr) IV Continuous <Continuous>  dextrose 50% Injectable 25 Gram(s) IV Push once  dextrose 50% Injectable 12.5 Gram(s) IV Push once  dextrose 50% Injectable 25 Gram(s) IV Push once  glucagon  Injectable 1 milliGRAM(s) IntraMuscular once  heparin   Injectable 5000 Unit(s) SubCutaneous every 8 hours  insulin lispro (ADMELOG) corrective regimen sliding scale   SubCutaneous every 6 hours  isosorbide   mononitrate ER Tablet (IMDUR) 30 milliGRAM(s) Oral every 24 hours  lactated ringers. 500 milliLiter(s) (75 mL/Hr) IV Continuous <Continuous>  mirtazapine 30 milliGRAM(s) Oral every 24 hours  vancomycin    Solution 125 milliGRAM(s) Oral every 6 hours    MEDICATIONS  (PRN):  ALBUTerol    90 MICROgram(s) HFA Inhaler 2 Puff(s) Inhalation every 6 hours PRN Shortness of Breath and/or Wheezing  dextrose Oral Gel 15 Gram(s) Oral once PRN Blood Glucose LESS THAN 70 milliGRAM(s)/deciliter    Labs:    CBC:                        9.4    10.23 )-----------( 278      ( 31 Aug 2022 05:30 )             30.0     CMP:        136  |  106  |  13  ----------------------------<  95  4.1   |  21<L>  |  1.25    Ca    8.2<L>      31 Aug 2022 05:30  Phos  2.4       Mg     2.4         Imaging:   < from: CT Chest No Cont (22 @ 17:40) >  ACC: [id number] EXAM:  CT CHEST                          PROCEDURE DATE:  2022  IMPRESSION:     Since 2022 there has been interval development of a small right   pleural effusion and trace left pleural effusion. No significant change   in mediastinal and left hilar lymphadenopathy.  Spiculated 27 mm solid nodule in the anterior basal segment left lower   lobe is suggestive of primary lung carcinoma i.e. invasive   adenocarcinoma. Recommend tissue sampling.      < end of copied text >    PEx:  T(C): 36.6 (22 @ 05:48), Max: 36.7 (22 @ 20:48)  HR: 80 (22 @ 10:38) (60 - 80)  BP: 135/72 (22 @ 10:38) (122/72 - 145/78)  RR: 18 (22 @ 05:48) (18 - 18)  SpO2: 95% (22 @ 10:38) (95% - 95%)  Wt(kg): 54.9kg  Daily     Daily   CAPILLARY BLOOD GLUCOSE      POCT Blood Glucose.: 87 mg/dL (31 Aug 2022 05:56)    I&O's Summary    GENERAL:  [x ]Alert  [ x]Oriented x  1 [ ]Lethargic  [ ]Cachexia  [ ]Unarousable  [ x]Verbal  [ ]Non-Verbal  Behavioral:   [ ] Anxiety  [ ] Delirium [ ] Agitation [x ] Other -calm   HEENT:  [ ]Normal   [x ]Dry mouth   [ ]ET Tube/Trach  [ ]Oral lesions  PULMONARY:   [x ]Clear  [ ]Tachypnea  [ ]Audible excessive secretions   [ ]Rhonchi        [ ]Right [ ]Left [ ]Bilateral  [ ]Crackles        [ ]Right [ ]Left [ ]Bilateral  [ ]Wheezing     [ ]Right [ ]Left [ ]Bilateral  CARDIOVASCULAR:    [x]Regular [ ]Irregular [ ]Tachy  [ ]Bud [ ]Murmur [ ]Other  GASTROINTESTINAL:  [x ]Soft  [ ]Distended   [ ]+BS  [x ]Non tender [ ]Tender  [ ]PEG [ ]OGT/ NGT  Last BM:   GENITOURINARY:  [ ]Normal [ x] Incontinent   [ ]Oliguria/Anuria   [ ]Espinoza  MUSCULOSKELETAL:   [ ]Normal   [x ]Weakness  [ ]Bed/Wheelchair bound [ ]Edema  NEUROLOGIC:   [ ]No focal deficits  [x ] Cognitive impairment  [ ] Dysphagia [ ]Dysarthria [ ] Paresis [ ]Other   SKIN:   [x ]Normal   [ ]Pressure ulcer(s)  [ ]Rash      Preadmit Karnofsky: 70 %           Current Karnofsky:     60%  Cachexia (Y/N):  Y  BMI: 18.4kg    Advanced Directives:     DNR/DNI     [facility]     HCP    DECISION MAKER: Patient is unable to make decisions for himself  LEGAL SURROGATE:  Tanner Yan [phone]    GOALS OF CARE DISCUSSION       Palliative care info/counseling provided	           Family meeting       Advanced Directives addressed please see Advance Care Planning Note	           See previous Palliative Medicine Note       Documentation of GOC: 	DNR/DNI          REFERRALS	        Palliative Med        Unit SW/Case Mgmt       Hospice

## 2022-09-01 NOTE — PROGRESS NOTE ADULT - PROBLEM SELECTOR PLAN 2
RESOLVING  Likely secondary to sepsis (as above). Exam limited but does not appear focal and CTH negative.  AAOx3 but pt intermittently confused. Was more awake on Saturday when Nephew was visiting. Also has sundowning at night    Plan:   - treatment as above C diff sample positive. Septic as above.    Plan:  - c/w plan as above

## 2022-09-01 NOTE — PROGRESS NOTE ADULT - SUBJECTIVE AND OBJECTIVE BOX
JASON TIARA  89y, Male    Patient is a 89y old  Male who presents with a chief complaint of altered mental status secondary to sepsis (01 Sep 2022 10:15)      INTERVAL HPI/OVERNIGHT EVENTS:    ROS: otherwise negative      T(C): 37.1 (09-01-22 @ 06:01), Max: 37.1 (09-01-22 @ 06:01)  HR: 75 (09-01-22 @ 06:01) (73 - 75)  BP: 144/78 (09-01-22 @ 06:01) (124/70 - 144/78)  RR: 19 (09-01-22 @ 06:01) (18 - 19)  SpO2: 95% (09-01-22 @ 06:01) (95% - 95%)  Wt(kg): --Vital Signs Last 24 Hrs  T(C): 37.1 (01 Sep 2022 06:01), Max: 37.1 (01 Sep 2022 06:01)  T(F): 98.8 (01 Sep 2022 06:01), Max: 98.8 (01 Sep 2022 06:01)  HR: 75 (01 Sep 2022 06:01) (73 - 75)  BP: 144/78 (01 Sep 2022 06:01) (124/70 - 144/78)  BP(mean): --  RR: 19 (01 Sep 2022 06:01) (18 - 19)  SpO2: 95% (01 Sep 2022 06:01) (95% - 95%)    Parameters below as of 01 Sep 2022 06:01  Patient On (Oxygen Delivery Method): room air        PHYSICAL EXAM:  Constitutional: resting comfortably in bed; NAD  Head: NC/AT  Eyes: PERRL, EOMI, anicteric sclera  ENT: no nasal discharge; uvula midline, no oropharyngeal erythema or exudates; MMM  Neck: supple; no JVD or thyromegaly  Respiratory: CTA B/L; no W/R/R, no retractions  Cardiac: +S1/S2; RRR; no M/R/G; PMI non-displaced  Gastrointestinal: soft, NT/ND; no rebound or guarding; +BSx4  Genitourinary: normal external genitalia  Back: spine midline, no bony tenderness or step-offs; no CVAT B/L  Extremities: WWP, no clubbing or cyanosis; no peripheral edema. Capillary refill <2 sec  Musculoskeletal: NROM x4; no joint swelling, tenderness or erythema  Vascular: 2+ radial, femoral, DP/PT pulses B/L  Dermatologic: skin warm, dry and intact; no rashes, wounds, or scars  Lymphatic: no submandibular or cervical LAD  Neurologic: AAOx3; CNII-XII grossly intact; no focal deficits  Psychiatric: affect and characteristics of appearance, verbalizations, behaviors are appropriate    LABS:                        9.9    13.57 )-----------( 338      ( 01 Sep 2022 05:30 )             30.4     09-01    138  |  105  |  16  ----------------------------<  121<H>  4.0   |  22  |  1.29    Ca    8.6      01 Sep 2022 05:30  Phos  2.6     09-01  Mg     1.9     09-01        PT/INR - ( 01 Sep 2022 05:30 )   PT: 11.8 sec;   INR: 0.99          PTT - ( 01 Sep 2022 05:30 )  PTT:27.9 sec    CAPILLARY BLOOD GLUCOSE      POCT Blood Glucose.: 95 mg/dL (01 Sep 2022 08:14)  POCT Blood Glucose.: 89 mg/dL (01 Sep 2022 06:29)  POCT Blood Glucose.: 134 mg/dL (31 Aug 2022 23:58)  POCT Blood Glucose.: 123 mg/dL (31 Aug 2022 17:20)  POCT Blood Glucose.: 149 mg/dL (31 Aug 2022 12:25)            MEDICATIONS  (STANDING):  aspirin enteric coated 81 milliGRAM(s) Oral every 24 hours  budesonide  80 MICROgram(s)/formoterol 4.5 MICROgram(s) Inhaler 2 Puff(s) Inhalation two times a day  clopidogrel Tablet 75 milliGRAM(s) Oral every 24 hours  dextrose 5%. 1000 milliLiter(s) (50 mL/Hr) IV Continuous <Continuous>  dextrose 5%. 1000 milliLiter(s) (100 mL/Hr) IV Continuous <Continuous>  dextrose 50% Injectable 25 Gram(s) IV Push once  dextrose 50% Injectable 12.5 Gram(s) IV Push once  dextrose 50% Injectable 25 Gram(s) IV Push once  glucagon  Injectable 1 milliGRAM(s) IntraMuscular once  insulin lispro (ADMELOG) corrective regimen sliding scale   SubCutaneous every 6 hours  isosorbide   mononitrate ER Tablet (IMDUR) 30 milliGRAM(s) Oral every 24 hours  lactated ringers. 500 milliLiter(s) (75 mL/Hr) IV Continuous <Continuous>  magnesium sulfate  IVPB 2 Gram(s) IV Intermittent once  mirtazapine 30 milliGRAM(s) Oral every 24 hours  vancomycin    Solution 125 milliGRAM(s) Oral every 6 hours    MEDICATIONS  (PRN):  ALBUTerol    90 MICROgram(s) HFA Inhaler 2 Puff(s) Inhalation every 6 hours PRN Shortness of Breath and/or Wheezing  dextrose Oral Gel 15 Gram(s) Oral once PRN Blood Glucose LESS THAN 70 milliGRAM(s)/deciliter      RADIOLOGY & ADDITIONAL TESTS:    Imaging Personally Reviewed:  [ ] YES  [ ] NO   JASON TIARA  89y, Male    Patient is a 89y old  Male who presents with a chief complaint of altered mental status secondary to sepsis (01 Sep 2022 10:15)      INTERVAL HPI/OVERNIGHT EVENTS: RONN overnight. Patient seen and examined at bedside. Reports 1 episode of diarrhea this morning. Also complaining of mild cough and shortness of breath. Denies f/c, n/v, headache, CP.     ROS: otherwise negative      T(C): 37.1 (09-01-22 @ 06:01), Max: 37.1 (09-01-22 @ 06:01)  HR: 75 (09-01-22 @ 06:01) (73 - 75)  BP: 144/78 (09-01-22 @ 06:01) (124/70 - 144/78)  RR: 19 (09-01-22 @ 06:01) (18 - 19)  SpO2: 95% (09-01-22 @ 06:01) (95% - 95%)  Wt(kg): --Vital Signs Last 24 Hrs  T(C): 37.1 (01 Sep 2022 06:01), Max: 37.1 (01 Sep 2022 06:01)  T(F): 98.8 (01 Sep 2022 06:01), Max: 98.8 (01 Sep 2022 06:01)  HR: 75 (01 Sep 2022 06:01) (73 - 75)  BP: 144/78 (01 Sep 2022 06:01) (124/70 - 144/78)  BP(mean): --  RR: 19 (01 Sep 2022 06:01) (18 - 19)  SpO2: 95% (01 Sep 2022 06:01) (95% - 95%)    Parameters below as of 01 Sep 2022 06:01  Patient On (Oxygen Delivery Method): room air        PHYSICAL EXAM:  Constitutional: resting comfortably in bed; NAD; cachectic  Head: NC/AT  Eyes: PERRL, EOMI, anicteric sclera  ENT: no nasal discharge;  dry MM  Neck: supple; no JVD or thyromegaly  Respiratory: no W/R/R, no retractions, poor inspiratory effort, ?crackles b/l  Cardiac: +S1/S2; RRR; no M/R/G; PMI non-displaced  Gastrointestinal: soft, nondistended/nontender; no rebound or guarding; +BSx4  Extremities: WWP, no clubbing or cyanosis; no peripheral edema. Capillary refill <2 sec  Vascular: 2+ radial, DP/PT pulses B/L  Neurologic: AAOx2; CNII-XII grossly intact; no focal deficits  Psychiatric: affect and characteristics of appearance, verbalizations, behaviors are appropriate    LABS:                        9.9    13.57 )-----------( 338      ( 01 Sep 2022 05:30 )             30.4     09-01    138  |  105  |  16  ----------------------------<  121<H>  4.0   |  22  |  1.29    Ca    8.6      01 Sep 2022 05:30  Phos  2.6     09-01  Mg     1.9     09-01        PT/INR - ( 01 Sep 2022 05:30 )   PT: 11.8 sec;   INR: 0.99          PTT - ( 01 Sep 2022 05:30 )  PTT:27.9 sec    CAPILLARY BLOOD GLUCOSE      POCT Blood Glucose.: 95 mg/dL (01 Sep 2022 08:14)  POCT Blood Glucose.: 89 mg/dL (01 Sep 2022 06:29)  POCT Blood Glucose.: 134 mg/dL (31 Aug 2022 23:58)  POCT Blood Glucose.: 123 mg/dL (31 Aug 2022 17:20)  POCT Blood Glucose.: 149 mg/dL (31 Aug 2022 12:25)            MEDICATIONS  (STANDING):  aspirin enteric coated 81 milliGRAM(s) Oral every 24 hours  budesonide  80 MICROgram(s)/formoterol 4.5 MICROgram(s) Inhaler 2 Puff(s) Inhalation two times a day  clopidogrel Tablet 75 milliGRAM(s) Oral every 24 hours  dextrose 5%. 1000 milliLiter(s) (50 mL/Hr) IV Continuous <Continuous>  dextrose 5%. 1000 milliLiter(s) (100 mL/Hr) IV Continuous <Continuous>  dextrose 50% Injectable 25 Gram(s) IV Push once  dextrose 50% Injectable 12.5 Gram(s) IV Push once  dextrose 50% Injectable 25 Gram(s) IV Push once  glucagon  Injectable 1 milliGRAM(s) IntraMuscular once  insulin lispro (ADMELOG) corrective regimen sliding scale   SubCutaneous every 6 hours  isosorbide   mononitrate ER Tablet (IMDUR) 30 milliGRAM(s) Oral every 24 hours  lactated ringers. 500 milliLiter(s) (75 mL/Hr) IV Continuous <Continuous>  magnesium sulfate  IVPB 2 Gram(s) IV Intermittent once  mirtazapine 30 milliGRAM(s) Oral every 24 hours  vancomycin    Solution 125 milliGRAM(s) Oral every 6 hours    MEDICATIONS  (PRN):  ALBUTerol    90 MICROgram(s) HFA Inhaler 2 Puff(s) Inhalation every 6 hours PRN Shortness of Breath and/or Wheezing  dextrose Oral Gel 15 Gram(s) Oral once PRN Blood Glucose LESS THAN 70 milliGRAM(s)/deciliter      RADIOLOGY & ADDITIONAL TESTS:    Imaging Personally Reviewed:  [x] YES  [ ] NO

## 2022-09-01 NOTE — CONSULT NOTE ADULT - CONSULT REASON
Severe Sepsis
c diff
r/o acalculous cholecystitis
request for right thoracentesis
Complex decision making related to goals of care

## 2022-09-02 ENCOUNTER — TRANSCRIPTION ENCOUNTER (OUTPATIENT)
Age: 87
End: 2022-09-02

## 2022-09-02 LAB
AGGLUTINATION: PRESENT — SIGNIFICANT CHANGE UP
ANION GAP SERPL CALC-SCNC: 9 MMOL/L — SIGNIFICANT CHANGE UP (ref 5–17)
ANISOCYTOSIS BLD QL: SLIGHT — SIGNIFICANT CHANGE UP
BASOPHILS # BLD AUTO: 0 K/UL — SIGNIFICANT CHANGE UP (ref 0–0.2)
BASOPHILS NFR BLD AUTO: 0 % — SIGNIFICANT CHANGE UP (ref 0–2)
BUN SERPL-MCNC: 18 MG/DL — SIGNIFICANT CHANGE UP (ref 7–23)
CALCIUM SERPL-MCNC: 8.5 MG/DL — SIGNIFICANT CHANGE UP (ref 8.4–10.5)
CHLORIDE SERPL-SCNC: 104 MMOL/L — SIGNIFICANT CHANGE UP (ref 96–108)
CO2 SERPL-SCNC: 21 MMOL/L — LOW (ref 22–31)
CREAT SERPL-MCNC: 1.21 MG/DL — SIGNIFICANT CHANGE UP (ref 0.5–1.3)
DACRYOCYTES BLD QL SMEAR: SLIGHT — SIGNIFICANT CHANGE UP
EGFR: 56 ML/MIN/1.73M2 — LOW
EOSINOPHIL # BLD AUTO: 0 K/UL — SIGNIFICANT CHANGE UP (ref 0–0.5)
EOSINOPHIL NFR BLD AUTO: 0 % — SIGNIFICANT CHANGE UP (ref 0–6)
GLUCOSE BLDC GLUCOMTR-MCNC: 115 MG/DL — HIGH (ref 70–99)
GLUCOSE BLDC GLUCOMTR-MCNC: 134 MG/DL — HIGH (ref 70–99)
GLUCOSE BLDC GLUCOMTR-MCNC: 137 MG/DL — HIGH (ref 70–99)
GLUCOSE BLDC GLUCOMTR-MCNC: 153 MG/DL — HIGH (ref 70–99)
GLUCOSE BLDC GLUCOMTR-MCNC: 181 MG/DL — HIGH (ref 70–99)
GLUCOSE SERPL-MCNC: 169 MG/DL — HIGH (ref 70–99)
HCT VFR BLD CALC: 27.2 % — LOW (ref 39–50)
HGB BLD-MCNC: 8.7 G/DL — LOW (ref 13–17)
HYPOCHROMIA BLD QL: SLIGHT — SIGNIFICANT CHANGE UP
LYMPHOCYTES # BLD AUTO: 0.62 K/UL — LOW (ref 1–3.3)
LYMPHOCYTES # BLD AUTO: 6.1 % — LOW (ref 13–44)
MAGNESIUM SERPL-MCNC: 2.3 MG/DL — SIGNIFICANT CHANGE UP (ref 1.6–2.6)
MANUAL SMEAR VERIFICATION: SIGNIFICANT CHANGE UP
MCHC RBC-ENTMCNC: 30.5 PG — SIGNIFICANT CHANGE UP (ref 27–34)
MCHC RBC-ENTMCNC: 32 GM/DL — SIGNIFICANT CHANGE UP (ref 32–36)
MCV RBC AUTO: 95.4 FL — SIGNIFICANT CHANGE UP (ref 80–100)
MICROCYTES BLD QL: SLIGHT — SIGNIFICANT CHANGE UP
MONOCYTES # BLD AUTO: 0.36 K/UL — SIGNIFICANT CHANGE UP (ref 0–0.9)
MONOCYTES NFR BLD AUTO: 3.5 % — SIGNIFICANT CHANGE UP (ref 2–14)
NEUTROPHILS # BLD AUTO: 9.23 K/UL — HIGH (ref 1.8–7.4)
NEUTROPHILS NFR BLD AUTO: 89.5 % — HIGH (ref 43–77)
NEUTS BAND # BLD: 0.9 % — SIGNIFICANT CHANGE UP (ref 0–8)
OVALOCYTES BLD QL SMEAR: SLIGHT — SIGNIFICANT CHANGE UP
PHOSPHATE SERPL-MCNC: 3.4 MG/DL — SIGNIFICANT CHANGE UP (ref 2.5–4.5)
PLAT MORPH BLD: NORMAL — SIGNIFICANT CHANGE UP
PLATELET # BLD AUTO: 302 K/UL — SIGNIFICANT CHANGE UP (ref 150–400)
POIKILOCYTOSIS BLD QL AUTO: SLIGHT — SIGNIFICANT CHANGE UP
POLYCHROMASIA BLD QL SMEAR: SLIGHT — SIGNIFICANT CHANGE UP
POTASSIUM SERPL-MCNC: 4.6 MMOL/L — SIGNIFICANT CHANGE UP (ref 3.5–5.3)
POTASSIUM SERPL-SCNC: 4.6 MMOL/L — SIGNIFICANT CHANGE UP (ref 3.5–5.3)
RBC # BLD: 2.85 M/UL — LOW (ref 4.2–5.8)
RBC # FLD: 13.7 % — SIGNIFICANT CHANGE UP (ref 10.3–14.5)
RBC BLD AUTO: ABNORMAL
SCHISTOCYTES BLD QL AUTO: SLIGHT — SIGNIFICANT CHANGE UP
SODIUM SERPL-SCNC: 134 MMOL/L — LOW (ref 135–145)
WBC # BLD: 10.21 K/UL — SIGNIFICANT CHANGE UP (ref 3.8–10.5)
WBC # FLD AUTO: 10.21 K/UL — SIGNIFICANT CHANGE UP (ref 3.8–10.5)

## 2022-09-02 PROCEDURE — 99233 SBSQ HOSP IP/OBS HIGH 50: CPT

## 2022-09-02 RX ORDER — HEPARIN SODIUM 5000 [USP'U]/ML
5000 INJECTION INTRAVENOUS; SUBCUTANEOUS EVERY 8 HOURS
Refills: 0 | Status: DISCONTINUED | OUTPATIENT
Start: 2022-09-02 | End: 2022-09-05

## 2022-09-02 RX ORDER — DEXAMETHASONE 0.5 MG/5ML
6 ELIXIR ORAL EVERY 24 HOURS
Refills: 0 | Status: DISCONTINUED | OUTPATIENT
Start: 2022-09-02 | End: 2022-09-05

## 2022-09-02 RX ADMIN — Medication 81 MILLIGRAM(S): at 13:01

## 2022-09-02 RX ADMIN — HEPARIN SODIUM 5000 UNIT(S): 5000 INJECTION INTRAVENOUS; SUBCUTANEOUS at 21:21

## 2022-09-02 RX ADMIN — Medication 125 MILLIGRAM(S): at 00:46

## 2022-09-02 RX ADMIN — Medication 1: at 00:40

## 2022-09-02 RX ADMIN — HEPARIN SODIUM 5000 UNIT(S): 5000 INJECTION INTRAVENOUS; SUBCUTANEOUS at 16:54

## 2022-09-02 RX ADMIN — CLOPIDOGREL BISULFATE 75 MILLIGRAM(S): 75 TABLET, FILM COATED ORAL at 13:01

## 2022-09-02 RX ADMIN — MIRTAZAPINE 30 MILLIGRAM(S): 45 TABLET, ORALLY DISINTEGRATING ORAL at 21:21

## 2022-09-02 RX ADMIN — Medication 125 MILLIGRAM(S): at 17:07

## 2022-09-02 RX ADMIN — Medication 6 MILLIGRAM(S): at 12:43

## 2022-09-02 RX ADMIN — Medication 125 MILLIGRAM(S): at 06:39

## 2022-09-02 RX ADMIN — BUDESONIDE AND FORMOTEROL FUMARATE DIHYDRATE 2 PUFF(S): 160; 4.5 AEROSOL RESPIRATORY (INHALATION) at 06:39

## 2022-09-02 RX ADMIN — REMDESIVIR 500 MILLIGRAM(S): 5 INJECTION INTRAVENOUS at 16:55

## 2022-09-02 RX ADMIN — ISOSORBIDE MONONITRATE 30 MILLIGRAM(S): 60 TABLET, EXTENDED RELEASE ORAL at 11:21

## 2022-09-02 RX ADMIN — Medication 125 MILLIGRAM(S): at 11:21

## 2022-09-02 RX ADMIN — Medication 1: at 06:38

## 2022-09-02 RX ADMIN — BUDESONIDE AND FORMOTEROL FUMARATE DIHYDRATE 2 PUFF(S): 160; 4.5 AEROSOL RESPIRATORY (INHALATION) at 17:20

## 2022-09-02 NOTE — DISCHARGE NOTE PROVIDER - DISCHARGE DIET
PAST SURGICAL HISTORY:  CAD (Coronary Artery Disease) 1 stents 4/29/09    History of Arthroscopic Knee Surgery right knee    Pilonidal Cyst with Abscess 3 surgeries, last: 1987    S/P Appendectomy 56y/o    S/P lumbar laminectomy 2009     Regular Diet - No restrictions Pureed Diet/Other Diet Instructions/Mildly Thick Liquids

## 2022-09-02 NOTE — PROGRESS NOTE ADULT - PROBLEM SELECTOR PLAN 4
Patient was tachypenic BUT patient afebrile orally and rectal. Most likely 2/2 to pain and anxiety. Chest x-ray w/ possible mild fluid.    Plan:   - c/w home advair and albuterol  - CT non con showed large R pleural effusion and L small effusion  - s/p IR guided thoracentesis 9/1  - now with trace pneumothorax; will monitor for now  - f/u pleural fluid studies

## 2022-09-02 NOTE — PROGRESS NOTE ADULT - PROBLEM SELECTOR PLAN 3
Per CT scan patient found to have pleural effusion. Unclear if infectious or malignant in nature. Plan to perform Pleurocentesis for further diagnostics purposes and for comfort. Continue care as per primary team.

## 2022-09-02 NOTE — DISCHARGE NOTE PROVIDER - NSDCCPCAREPLAN_GEN_ALL_CORE_FT
PRINCIPAL DISCHARGE DIAGNOSIS  Diagnosis: Colitis  Assessment and Plan of Treatment: You were found to have C. Difficile colitis. This is inflammation of the colon caused by the bacteria Clostridium difficile (C. diff) and causes diarrhea. Clostridium difficile colitis results from disruption of normal healthy bacteria in the colon, often from antibiotics. C. diff can also be transmitted from person to person by spores which may be present on people's hands. Spores are killed by washing hands with soap and water. C. diff can cause severe damage to the colon and even be fatal. Symptoms of infection include diarrhea, belly pain, and fever. Treatment includes antibiotics. Even when treated with antibiotics, the infection may come back. If you notice continued and/or worsening diarrhea, please go to an emergency room. Please follow up with your primary care provider once you have finished your course of antibiotics.        SECONDARY DISCHARGE DIAGNOSES  Diagnosis: 2019 novel coronavirus disease (COVID-19)  Assessment and Plan of Treatment: During this admission, you were found to have COVID-19. This was diagnosed by a nasal and oral (mouth) swab that was done earlier in your admission to [facility]. Your symptoms improved dramatically during your hospital stay. The treatment is remdesivir and decadron. Please continue to take the decadron once a day until 9/10/22. Please take two more doses of remdesivir (9/5 and 9/6). You may take tylenol if you experience any fevers and Robitussin for cough. Continue to wash your hands frequently. If you start experiencing extreme shortness of breath, difficulty breathing resulting in the inability to even walk, please visit an urgent care.

## 2022-09-02 NOTE — DISCHARGE NOTE PROVIDER - NSDCMRMEDTOKEN_GEN_ALL_CORE_FT
Advair HFA 45 mcg-21 mcg/inh inhalation aerosol: 2 puff(s) inhaled 2 times a day  Albuterol (Eqv-ProAir HFA) 90 mcg/inh inhalation aerosol: 2 puff(s) inhaled every 6 hours, As Needed -for shortness of breath and/or wheezing   Aspirin Enteric Coated 81 mg oral delayed release tablet: 1 tab(s) orally once a day  clopidogrel 75 mg oral tablet: 1 tab(s) orally once a day  Flomax 0.4 mg oral capsule: 1 cap(s) orally once a day  isosorbide mononitrate 30 mg oral tablet, extended release: 1 tab(s) orally once a day (in the morning)  Lomotil 2.5 mg-0.025 mg oral tablet: 2 tab(s) orally 4 times a day  metoprolol succinate 25 mg oral tablet, extended release: 1 tab(s) orally once a day (in the morning)   mirtazapine 15 mg oral tablet: 2 tab(s) orally once a day (at bedtime)  simvastatin 20 mg oral tablet: 1 tab(s) orally once a day (at bedtime)  valsartan 80 mg oral tablet: 1 tab(s) orally once a day at 2pm    Advair HFA 45 mcg-21 mcg/inh inhalation aerosol: 2 puff(s) inhaled 2 times a day  Albuterol (Eqv-ProAir HFA) 90 mcg/inh inhalation aerosol: 2 puff(s) inhaled every 6 hours, As Needed -for shortness of breath and/or wheezing   Aspirin Enteric Coated 81 mg oral delayed release tablet: 1 tab(s) orally once a day  clopidogrel 75 mg oral tablet: 1 tab(s) orally once a day  dexamethasone 6 mg oral tablet: 1 tab(s) orally every 24 hours  Diovan 40 mg oral tablet: 1 tab(s) orally once a day  Flomax 0.4 mg oral capsule: 1 cap(s) orally once a day  isosorbide mononitrate 30 mg oral tablet, extended release: 1 tab(s) orally once a day (in the morning)  Lomotil 2.5 mg-0.025 mg oral tablet: 2 tab(s) orally 4 times a day  mirtazapine 15 mg oral tablet: 2 tab(s) orally once a day (at bedtime)  remdesivir 5 mg/mL intravenous solution: 100 milligram(s) intravenous once a day  simvastatin 20 mg oral tablet: 1 tab(s) orally once a day (at bedtime)  vancomycin 25 mg/mL oral liquid: 5 milliliter(s) orally 4 times a day

## 2022-09-02 NOTE — PROGRESS NOTE ADULT - PROBLEM SELECTOR PLAN 6
Patient remains DNR/DNI. Patient does not want Home at this time and prefers [facility] and will discuss further options after [facility].   - Restorationism/Spiritual practice: non  - Coping: [x ] well [ ] with difficulty [ ] poor coping   - Support system: [x ] strong [ ] adequate [ ] inadequate  - All questions answered, emotional support provided  -  primary team   - Please contact Palliative Medicine 24/7 at [phone] for any acute symptoms or further questions  - Will continue to follow with you.

## 2022-09-02 NOTE — PROGRESS NOTE ADULT - SUBJECTIVE AND OBJECTIVE BOX
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 Patient is altered on exam, but per ED report, he has been having chronic diarrhea, tachypnea, and chest pain that was worsening, prompting patient to call EMS to bring him from home into [facility]. Of note, patient was recently admitted 7/19-7/23 to [facility] for diarrhea after being sent in from his PCP office for hypotension and chronic diarrhea. During that time his diarrhea resolved and he was told to f/u with outpatient GI with Dr. Hammer. Notably he was fully conversant with mild memory impairment on admission and throughout hospital stay.    ED vitals: T 39.3   HR 78  RR 36 (taken manually)  BP 97/59  SpO2 98% on 2L  Labs significant: WBC 30, Hgb 11.5, lactate 2.3 -> 3.2 after 2L NS, UA wnl  Imaging/EKG: CT H/C/A/P known lung nodule, colitis  Interventions: zosyn, zofran, 3.2L NS, flagyl, acetaminophen     (24 Aug 2022 21:48)    SUBJECTIVE: Patient resting in bed in no distress. Is more alert and oriented today and appears more comfortable.     ROS:  DYSPNEA (Terri): 0	  NAUS/VOM: N	  SECRETIONS: N	  AGITATION: N  Pain (Y/N):     N  -Provocation/Palliation: n/a   -Quality/Quantity: n/a  -Radiating: n/a  -Severity: n/a  -Timing/Frequency: n/a  -Impact on ADLs: n/a    OTHER REVIEW OF SYSTEMS: + weakness   UNABLE TO OBTAIN  due to: n/a     PEx:  T(C): 36.6 (09-02-22 @ 06:10), Max: 37.1 (09-01-22 @ 15:28)  HR: 71 (09-02-22 @ 10:16) (71 - 84)  BP: 124/66 (09-02-22 @ 10:16) (124/66 - 148/69)  RR: 19 (09-02-22 @ 06:10) (18 - 19)  SpO2: 95% (09-02-22 @ 10:16) (95% - 100%)  Wt(kg): 54.9kg    GENERAL:  [x ]Alert  [x ]Oriented x  2  [ ]Lethargic due to sedation  [ ]Cachexia [ x]Verbal  [ ]Non-Verbal  Behavioral:   [ ] Anxiety  [ ] Delirium [ ] Agitation [ x] Other -calm  HEENT:  [ ]Normal   [ x]Dry mouth   [ ]ET Tube  [ ]Oral lesions  PULMONARY:   [x ]Clear  [ ]Tachypnea  [ ]Audible excessive secretions   [ ]Rhonchi     [ ]Right [ ]Left [ ]Bilateral  [ ]Crackles        [ ]Right [ ]Left [ ]Bilateral  [ ]Wheezing     [ ]Right [ ]Left [ ]Bilateral  CARDIOVASCULAR:    [x ]Regular [ ]Irregular [ ]Tachy  [ ]Bud [ ]Murmur [ ]Other  GASTROINTESTINAL:  [x ]Soft  [ ]Distended   [ ]+BS  [ x]Non tender [ ]Tender  [ ]PEG [ ]OGT/NGT  [] flexiseal with output  Last BM:   GENITOURINARY:  [ ]Normal [x ] Incontinent   [ ]Oliguria/Anuria   [ ]Espinoza  MUSCULOSKELETAL:   [ ]Normal   [x ]Weakness  [ ]Bed/Wheelchair bound [ ]Edema  NEUROLOGIC:   [x ]No focal deficits  [ ] Cognitive impairment  [ ] Dysphagia [ ]Dysarthria [ ] Paresis [  ]Other   SKIN:   [x ]Normal   [ ]Pressure ulcer(s)  [ ]Rash     ALLERGIES: No Known Allergies    OPIATE NAÏVE (Y/N): Y    MEDICATIONS: REVIEWED  MEDICATIONS  (STANDING):  aspirin enteric coated 81 milliGRAM(s) Oral every 24 hours  budesonide  80 MICROgram(s)/formoterol 4.5 MICROgram(s) Inhaler 2 Puff(s) Inhalation two times a day  clopidogrel Tablet 75 milliGRAM(s) Oral every 24 hours  dexAMETHasone     Tablet 6 milliGRAM(s) Oral every 24 hours  dextrose 5%. 1000 milliLiter(s) (50 mL/Hr) IV Continuous <Continuous>  dextrose 5%. 1000 milliLiter(s) (100 mL/Hr) IV Continuous <Continuous>  dextrose 50% Injectable 25 Gram(s) IV Push once  dextrose 50% Injectable 12.5 Gram(s) IV Push once  dextrose 50% Injectable 25 Gram(s) IV Push once  glucagon  Injectable 1 milliGRAM(s) IntraMuscular once  insulin lispro (ADMELOG) corrective regimen sliding scale   SubCutaneous every 6 hours  isosorbide   mononitrate ER Tablet (IMDUR) 30 milliGRAM(s) Oral every 24 hours  lactated ringers. 500 milliLiter(s) (75 mL/Hr) IV Continuous <Continuous>  mirtazapine 30 milliGRAM(s) Oral every 24 hours  remdesivir  IVPB   IV Intermittent   remdesivir  IVPB 100 milliGRAM(s) IV Intermittent every 24 hours  vancomycin    Solution 125 milliGRAM(s) Oral every 6 hours    MEDICATIONS  (PRN):  ALBUTerol    90 MICROgram(s) HFA Inhaler 2 Puff(s) Inhalation every 6 hours PRN Shortness of Breath and/or Wheezing  dextrose Oral Gel 15 Gram(s) Oral once PRN Blood Glucose LESS THAN 70 milliGRAM(s)/deciliter    LABS: REVIEWED  CBC:                        8.7    10.21 )-----------( 302      ( 02 Sep 2022 08:08 )             27.2     CMP:    09-02    134<L>  |  104  |  18  ----------------------------<  169<H>  4.6   |  21<L>  |  1.21    Ca    8.5      02 Sep 2022 08:08  Phos  3.4     09-02  Mg     2.3     09-02    IMAGING: REVIEWED    ADVANCED DIRECTIVES:            DNR DNI            [facility]       MOL    DECISION MAKER: Patient is able to make decisions for himself  LEGAL SURROGATE:  Tanner Yan [phone]    PSYCHOSOCIAL-SPIRITUAL ASSESSMENT:       Reviewed       Care plan unchanged    GOALS OF CARE DISCUSSION       Palliative care info/counseling provided	           Family meeting       Advanced Directives addressed please see Advance Care Planning Note	           See previous Palliative Medicine Note       Documentation of GOC: DNR/DNI  	      AGENCY CHOICE DISCUSSED:           Hospice        [facility]    REFERRALS	        Palliative Med        Unit SW/Case Mgmt       Hospice       PT/OT

## 2022-09-02 NOTE — PROGRESS NOTE ADULT - ASSESSMENT
89 year old man with tachypnea, Debility, Large Pleural effusion, C. Diff, Lung cancer and encounter for palliative care.

## 2022-09-02 NOTE — DISCHARGE NOTE PROVIDER - HOSPITAL COURSE
#Discharge: do not delete    Patient is __ yo M/F with past medical history of _____ presented with _____, found to have _____ (one liner)    Hospital course (by problem):     Patient was discharged to: (home/[facility]/acute rehab/hospice, etc, and with what services – home health PT/RN? Home O2?)    New medications:   Changes to old medications:  Medications that were stopped:    Items to follow up as outpatient:    Physical exam at the time of discharge:       Patient is 89 yo M/F with past medical history of HTN, CAD s/p PCI w 3 DAYANA, CKD (Cr 1.7- 2.4), COPD (not on home O2), arthritis, aortic aneurysm, BPH, HLD, EF 45-50%, lung nodules, and chronic diarrhea presented with 1 week of chest pain and difficulty breathing, found to have severe sepsis secondary to c diff colitis, COVID19 pneumonia, and bilateral pleural effusions R>L.     Patient was initially admitted to medicine stepdown/temetry unit for close monitoring and was started on PO Vancomycin. He improved clinically with leukocytosis and diarrhea improving. His mental status also improved, with intermittent confusion but overall AAOx3. However per hcp/nephew, patient not at baseline and more tired. He was also found to be positive for COVID19 pneumonia, and with large right pleural effusion and small left pleural effusion. Started on Remdesivir for on 9/1 and Dexamethasone on 9/2 for COVID19 penumonia treatment. On 9/1 he underwent thoracentesis with Interventional Radiology to drain and sample the pleural fluid; procedure was complicated by small possible right apical pneumothorax. Repeat chest xray showed ______    Hospital course (by problem):     # Severe Sepsis   Presented with 3/4 sirs criteria positive (T 102.4, WBC 30, RR 36)  Likely secondary to c diff colitis. C diff positive with profuse diarrhea. GI PCR negative.   - placed on isolation precautions in hospital.   - PO Vancomycin 125 mg (10/25 - _____)    # COVID 19 Pneumonia  Positive for COVID19 on 8/31, with some dyspnea on exam and requiring 2-3 L NC on 9/2.   -  Remdesivir 200 mg and then 100 mg 9/2 - 9/5  -  Dexamethasone 6 mg daily 9/2-9/11  - supplemental O2 as needed  - isolation precautrions    # Pleural effusions, R>L  # c/w pneumothorax  CT non con showed large R pleural effusion and L small effusion.  s/p IR guided thoracentesis, complicated by small right apical pneumothorax.  - will follow up Pleural studies  - repeat CXR for pneumothorax showed ______    # CAD (coronary artery disease).   On home aspirin, plavix, simvistatin, also imdur 30. Stents reportedly placed in 2013. Unclear why patient is still on anti-platelet.  (Had initially held home meds as pt had incomplete swallow eval so was prioritizing the most important meds and restart others as mental status improved)  - c/w imdur 30mg qd   - c/w aspirin 81mg qd  - c/w plavix 75mg qd    #HLD  - Restart simvistatin 20mg qd as appropriate.    # HTN  ON home Imdur 30, Metoprolol succinate 25, Valsartan 80  - c/w Imdur  - restarting toprol and valsartan as appropriate    # COPD  Pt with h/o COPD not on home O2. Takes advair and albuterol. Tachypneic on presentation. Likely some component of baseline tachypnea, but also with COVID and pleural effusions contributing.   - c/w supplemental O2 as needed.    # BPH  - c/w Flomax 0.4 mg qd    # Metabolic Acidosis  RESOLVED as of 8/26  Pt admitted with bicarb of 18 (about at baseline per chart review) and tachypneic which is chronica as per chart review. pH on ABG 7.3  Anion gap normal   Pt is likely metabolic acidosis 2/2 diarrhea and CKD  - Treat diarrhea and rehydrate as above  - Continue to monitor  - Bicarb now normal, 23 as of 8/28.    #Metabolic encephalopathy  RESOLVING  Likely secondary to sepsis (as above). Exam limited but does not appear focal and CTH negative.  AAOx3 but pt intermittently confused. Was more awake on Saturday when Nephew was visiting. Also has sundowning at night  - treat underlying sepsis and infections as above, likely contributing to this presentation      Patient was discharged to: [facility]    New medications: ______  Changes to old medications: ______  Medications that were stopped:    Items to follow up as outpatient: CMP, CBC, CXR, follow up pleural fluid analysis    Physical exam at the time of discharge:       Patient is 89 yo M/F with past medical history of HTN, CAD s/p PCI w 3 DAYANA, CKD (Cr 1.7- 2.4), COPD (not on home O2), arthritis, aortic aneurysm, BPH, HLD, EF 45-50%, lung nodules, and chronic diarrhea presented with 1 week of chest pain and difficulty breathing, found to have severe sepsis secondary to c diff colitis, COVID19 pneumonia, and bilateral pleural effusions R>L.     Patient was initially admitted to medicine stepdown/temetry unit for close monitoring and was started on PO Vancomycin. He improved clinically with leukocytosis and diarrhea improving. His mental status also improved, with intermittent confusion but overall AAOx3. However per hcp/nephew, patient not at baseline and more tired. He was also found to be positive for COVID19 pneumonia, and with large right pleural effusion and small left pleural effusion. Started on Remdesivir for on 9/1 and Dexamethasone on 9/2 for COVID19 penumonia treatment. On 9/1 he underwent thoracentesis with Interventional Radiology to drain and sample the pleural fluid; procedure was complicated by small possible right apical pneumothorax. Repeat chest xray showed resolution of the pneumothorax. Patient states he is clinically feeling much improved.    Hospital course (by problem):     # Severe Sepsis   Presented with 3/4 sirs criteria positive (T 102.4, WBC 30, RR 36) likely secondary to c diff colitis. C diff positive with profuse diarrhea. GI PCR negative.   - continue PO Vancomycin 125 mg q6h (8/25 - 9/7)    # COVID 19 Pneumonia  Positive for COVID19 on 8/31, with some dyspnea on exam and requiring 2-3 L NC on 9/2. Patient is being treated with remdesivir and decadron, showing clinical improvement.  -  Remdesivir 200 mg and then 100 mg 9/2 - 9/5. However, pt only received 2/3 doses as IV access was lost on 9/4. Requires one more dose to finish treatment.  -  Dexamethasone 6 mg daily 9/2-9/11    # Pleural effusions, R>L  # c/w pneumothorax  CT non con showed large R pleural effusion and L small effusion. s/p IR guided thoracentesis, complicated by small right apical pneumothorax.  - will follow up Pleural studies  - repeat CXR for pneumothorax showed resolution of pneumothorax    # CAD (coronary artery disease).   On home aspirin, plavix, simvistatin, also imdur 30. Stents reportedly placed in 2013. Unclear why patient is still on anti-platelet.  - c/w imdur 30mg qd   - c/w aspirin 81mg qd  - c/w plavix 75mg qd    #HLD  - c/w simvistatin 20mg qd.    # HTN  ON home Imdur 30, Metoprolol succinate 25, Valsartan 80  - c/w Imdur  - start patient on valsartan 40; reassess outpatient to begin toprol    # COPD  Pt with h/o COPD not on home O2. Takes advair and albuterol. Tachypneic on presentation. Likely some component of baseline tachypnea, but also with COVID and pleural effusions contributing.   - c/w supplemental O2 as needed.    # BPH  - c/w Flomax 0.4 mg qd    #Metabolic encephalopathy RESOLVING  Likely secondary to sepsis (as above). Exam limited but does not appear focal and CTH negative. AAOx3 but pt intermittently confused. Was more awake on Saturday when Nephew was visiting. Also has sundowning at night. Symptoms improved with treating underlying sepsis and infections as above, which likely contributed to this presentation.      Patient was discharged to: [facility]    New medications: remdesivir 100mg q24h x 4 (9/1-9/5; however pt missed dose on 9/3 due to lost IV access, so course will end 9/6), decadron 6mg qd x 10 days (9/1-9/10)  Changes to old medications: decrease valsartan to 40mg qd  Medications that were stopped: toprol 25 (held during admission, please restart as appropriate)    Items to follow up as outpatient: CMP, CBC, CXR, follow up pleural fluid analysis    Physical exam at the time of discharge:    Constitutional: resting comfortably in bed; NAD; cachectic  Head: NC/AT  Eyes: PERRL, EOMI, anicteric sclera  ENT: no nasal discharge;  MMM  Neck: supple; no JVD or thyromegaly  Respiratory: no W/R/R, no retractions, CTA B/L  Cardiac: +S1/S2; RRR; no M/R/G; PMI non-displaced  Gastrointestinal: soft, nondistended/nontender; no rebound or guarding; +BSx4  Extremities: WWP, no clubbing or cyanosis; no peripheral edema. Capillary refill <2 sec  Vascular: 2+ radial, DP/PT pulses B/L  Neurologic: AAOx2; CNII-XII grossly intact; no focal deficits  Psychiatric: affect and characteristics of appearance, verbalizations, behaviors are appropriate

## 2022-09-02 NOTE — PROGRESS NOTE ADULT - SUBJECTIVE AND OBJECTIVE BOX
TIARA GOMEZ  89y, Male    Patient is a 89y old  Male who presents with a chief complaint of altered mental status secondary to sepsis (02 Sep 2022 10:51)      INTERVAL HPI/OVERNIGHT EVENTS: RONN overnight. Patient reports feeling better today with reduced shortness of breath compared with yesterday. No complaints of diarrhea at this time. Does report cough and mild sore throat symptoms. Denies F/C, N/V.    ROS: otherwise negative      T(C): 36.6 (09-02-22 @ 06:10), Max: 37.1 (09-01-22 @ 15:28)  HR: 71 (09-02-22 @ 10:16) (71 - 84)  BP: 124/66 (09-02-22 @ 10:16) (124/66 - 148/69)  RR: 19 (09-02-22 @ 06:10) (18 - 19)  SpO2: 95% (09-02-22 @ 10:16) (95% - 100%)  Wt(kg): --Vital Signs Last 24 Hrs  T(C): 36.6 (02 Sep 2022 06:10), Max: 37.1 (01 Sep 2022 15:28)  T(F): 97.9 (02 Sep 2022 06:10), Max: 98.7 (01 Sep 2022 15:28)  HR: 71 (02 Sep 2022 10:16) (71 - 84)  BP: 124/66 (02 Sep 2022 10:16) (124/66 - 148/69)  BP(mean): --  RR: 19 (02 Sep 2022 06:10) (18 - 19)  SpO2: 95% (02 Sep 2022 10:16) (95% - 100%)    Parameters below as of 02 Sep 2022 10:16  Patient On (Oxygen Delivery Method): nasal cannula  O2 Flow (L/min): 2      PHYSICAL EXAM:  Constitutional: resting comfortably in bed; NAD; cachectic  Head: NC/AT  Eyes: PERRL, EOMI, anicteric sclera  ENT: no nasal discharge;  dry MM  Neck: supple; no JVD or thyromegaly  Respiratory: no W/R/R, no retractions, CTA B/L  Cardiac: +S1/S2; RRR; no M/R/G; PMI non-displaced  Gastrointestinal: soft, nondistended/nontender; no rebound or guarding; +BSx4  Extremities: WWP, no clubbing or cyanosis; no peripheral edema. Capillary refill <2 sec  Vascular: 2+ radial, DP/PT pulses B/L  Neurologic: AAOx2; CNII-XII grossly intact; no focal deficits  Psychiatric: affect and characteristics of appearance, verbalizations, behaviors are appropriate    LABS:                        8.7    10.21 )-----------( 302      ( 02 Sep 2022 08:08 )             27.2     09-02    134<L>  |  104  |  18  ----------------------------<  169<H>  4.6   |  21<L>  |  1.21    Ca    8.5      02 Sep 2022 08:08  Phos  3.4     09-02  Mg     2.3     09-02        PT/INR - ( 01 Sep 2022 05:30 )   PT: 11.8 sec;   INR: 0.99          PTT - ( 01 Sep 2022 05:30 )  PTT:27.9 sec    CAPILLARY BLOOD GLUCOSE      POCT Blood Glucose.: 137 mg/dL (02 Sep 2022 11:58)  POCT Blood Glucose.: 115 mg/dL (02 Sep 2022 08:27)  POCT Blood Glucose.: 153 mg/dL (02 Sep 2022 06:31)  POCT Blood Glucose.: 181 mg/dL (02 Sep 2022 00:14)  POCT Blood Glucose.: 94 mg/dL (01 Sep 2022 17:53)            MEDICATIONS  (STANDING):  aspirin enteric coated 81 milliGRAM(s) Oral every 24 hours  budesonide  80 MICROgram(s)/formoterol 4.5 MICROgram(s) Inhaler 2 Puff(s) Inhalation two times a day  clopidogrel Tablet 75 milliGRAM(s) Oral every 24 hours  dexAMETHasone     Tablet 6 milliGRAM(s) Oral every 24 hours  dextrose 5%. 1000 milliLiter(s) (50 mL/Hr) IV Continuous <Continuous>  dextrose 5%. 1000 milliLiter(s) (100 mL/Hr) IV Continuous <Continuous>  dextrose 50% Injectable 25 Gram(s) IV Push once  dextrose 50% Injectable 12.5 Gram(s) IV Push once  dextrose 50% Injectable 25 Gram(s) IV Push once  glucagon  Injectable 1 milliGRAM(s) IntraMuscular once  insulin lispro (ADMELOG) corrective regimen sliding scale   SubCutaneous every 6 hours  isosorbide   mononitrate ER Tablet (IMDUR) 30 milliGRAM(s) Oral every 24 hours  lactated ringers. 500 milliLiter(s) (75 mL/Hr) IV Continuous <Continuous>  mirtazapine 30 milliGRAM(s) Oral every 24 hours  remdesivir  IVPB   IV Intermittent   remdesivir  IVPB 100 milliGRAM(s) IV Intermittent every 24 hours  vancomycin    Solution 125 milliGRAM(s) Oral every 6 hours    MEDICATIONS  (PRN):  ALBUTerol    90 MICROgram(s) HFA Inhaler 2 Puff(s) Inhalation every 6 hours PRN Shortness of Breath and/or Wheezing  dextrose Oral Gel 15 Gram(s) Oral once PRN Blood Glucose LESS THAN 70 milliGRAM(s)/deciliter      RADIOLOGY & ADDITIONAL TESTS:    Imaging Personally Reviewed:  [x] YES  [ ] NO

## 2022-09-02 NOTE — PROGRESS NOTE ADULT - PROBLEM SELECTOR PLAN 1
Met 3/4 SIRS criteria (T102.4, WBC 30.12, RR 36) with lactate 2.3 -> 3.2 -> 1.9. Likely secondary to C difficile.  C diff positive. GI PCR neg. Surgery consulted for r/o acalculous sarah, however low suspicion.    Plan:   - c/w isolation precautions   - c/w oral vancomycin 125mg q6h for 10 days, 8/25  - blood cultures show NGTD  - monitor BM and abd exam  - CT non con showed large R pleural effusion and L small effusion  - s/p IR guided thoracentesis  - palliative team consulted, will discuss GOC and possible hospice placement    #Metabolic encephalopathy  RESOLVING  Likely secondary to sepsis (as above). Exam limited but does not appear focal and CTH negative.  AAOx3 but pt intermittently confused. Was more awake on Saturday when Nephew was visiting. Also has sundowning at night    Plan:   - treatment as above

## 2022-09-02 NOTE — DISCHARGE NOTE PROVIDER - CARE PROVIDER_API CALL
Elias Montalvo)  Internal Medicine  [address] [address]  Phone: [phone]  Fax: [phone]  Established Patient  Follow Up Time: 2 weeks

## 2022-09-02 NOTE — PROGRESS NOTE ADULT - ASSESSMENT
89 y/o M w/ PMH CAD, CKD3, COPD, chronic diarrhea who presented for diarrhea, tachypnea. Admitted to medicine telemetry for sepsis 2/2 C difficile infection. C/w oral vancomycin.  C diff sepsis  Covid pos--remdesivir/decadron  s/p Tap pl eff     cyto p  small PTX post tap-- will f/u  no acute change  dispo: [facility]  UES>  not stable for d/c

## 2022-09-02 NOTE — PROGRESS NOTE ADULT - PROBLEM SELECTOR PLAN 3
Pt tested positive for COVID 8/31.     Plan:  - start decadron   - c/w remdesivir  - monitor renal function  - monitor oxygen requirements

## 2022-09-02 NOTE — PROGRESS NOTE ADULT - PROBLEM SELECTOR PLAN 12
Fluids: none  Electrolytes: replete prn  Diet: clear liquids, ensure   DVT Prophylaxis: heparin subq 5k TID given CKD  GI Prophylaxis: not indicated  Code Status: DNR/DNI as per recent [facility] last admission  Dispo: F

## 2022-09-02 NOTE — PROGRESS NOTE ADULT - SUBJECTIVE AND OBJECTIVE BOX
TIARA GOMEZ  89y, Male    Patient is a 89y old  Male who presents with a chief complaint of altered mental status secondary to sepsis (02 Sep 2022 10:51)      INTERVAL HPI/OVERNIGHT EVENTS: RONN overnight. Patient reports feeling better today with reduced shortness of breath compared with yesterday. No complaints of diarrhea at this time. Does report cough and mild sore throat symptoms. Denies F/C, N/V.    ROS: otherwise negative      T(C): 36.6 (09-02-22 @ 06:10), Max: 37.1 (09-01-22 @ 15:28)  HR: 71 (09-02-22 @ 10:16) (71 - 84)  BP: 124/66 (09-02-22 @ 10:16) (124/66 - 148/69)  RR: 19 (09-02-22 @ 06:10) (18 - 19)  SpO2: 95% (09-02-22 @ 10:16) (95% - 100%)  Wt(kg): --Vital Signs Last 24 Hrs  T(C): 36.6 (02 Sep 2022 06:10), Max: 37.1 (01 Sep 2022 15:28)  T(F): 97.9 (02 Sep 2022 06:10), Max: 98.7 (01 Sep 2022 15:28)  HR: 71 (02 Sep 2022 10:16) (71 - 84)  BP: 124/66 (02 Sep 2022 10:16) (124/66 - 148/69)  BP(mean): --  RR: 19 (02 Sep 2022 06:10) (18 - 19)  SpO2: 95% (02 Sep 2022 10:16) (95% - 100%)    Parameters below as of 02 Sep 2022 10:16  Patient On (Oxygen Delivery Method): nasal cannula  O2 Flow (L/min): 2      PHYSICAL EXAM:  Constitutional: resting comfortably in bed; NAD; cachectic  Head: NC/AT  Eyes: PERRL, EOMI, anicteric sclera  ENT: no nasal discharge;  dry MM  Neck: supple; no JVD or thyromegaly  Respiratory: no W/R/R, no retractions, CTA B/L  Cardiac: +S1/S2; RRR; no M/R/G; PMI non-displaced  Gastrointestinal: soft, nondistended/nontender; no rebound or guarding; +BSx4  Extremities: WWP, no clubbing or cyanosis; no peripheral edema. Capillary refill <2 sec  Vascular: 2+ radial, DP/PT pulses B/L  Neurologic: AAOx2; CNII-XII grossly intact; no focal deficits  Psychiatric: affect and characteristics of appearance, verbalizations, behaviors are appropriate    LABS:                        8.7    10.21 )-----------( 302      ( 02 Sep 2022 08:08 )             27.2     09-02    134<L>  |  104  |  18  ----------------------------<  169<H>  4.6   |  21<L>  |  1.21    Ca    8.5      02 Sep 2022 08:08  Phos  3.4     09-02  Mg     2.3     09-02        PT/INR - ( 01 Sep 2022 05:30 )   PT: 11.8 sec;   INR: 0.99          PTT - ( 01 Sep 2022 05:30 )  PTT:27.9 sec    CAPILLARY BLOOD GLUCOSE      POCT Blood Glucose.: 137 mg/dL (02 Sep 2022 11:58)  POCT Blood Glucose.: 115 mg/dL (02 Sep 2022 08:27)  POCT Blood Glucose.: 153 mg/dL (02 Sep 2022 06:31)  POCT Blood Glucose.: 181 mg/dL (02 Sep 2022 00:14)  POCT Blood Glucose.: 94 mg/dL (01 Sep 2022 17:53)            MEDICATIONS  (STANDING):  aspirin enteric coated 81 milliGRAM(s) Oral every 24 hours  budesonide  80 MICROgram(s)/formoterol 4.5 MICROgram(s) Inhaler 2 Puff(s) Inhalation two times a day  clopidogrel Tablet 75 milliGRAM(s) Oral every 24 hours  dexAMETHasone     Tablet 6 milliGRAM(s) Oral every 24 hours  dextrose 5%. 1000 milliLiter(s) (50 mL/Hr) IV Continuous <Continuous>  dextrose 5%. 1000 milliLiter(s) (100 mL/Hr) IV Continuous <Continuous>  dextrose 50% Injectable 25 Gram(s) IV Push once  dextrose 50% Injectable 12.5 Gram(s) IV Push once  dextrose 50% Injectable 25 Gram(s) IV Push once  glucagon  Injectable 1 milliGRAM(s) IntraMuscular once  insulin lispro (ADMELOG) corrective regimen sliding scale   SubCutaneous every 6 hours  isosorbide   mononitrate ER Tablet (IMDUR) 30 milliGRAM(s) Oral every 24 hours  lactated ringers. 500 milliLiter(s) (75 mL/Hr) IV Continuous <Continuous>  mirtazapine 30 milliGRAM(s) Oral every 24 hours  remdesivir  IVPB   IV Intermittent   remdesivir  IVPB 100 milliGRAM(s) IV Intermittent every 24 hours  vancomycin    Solution 125 milliGRAM(s) Oral every 6 hours    MEDICATIONS  (PRN):  ALBUTerol    90 MICROgram(s) HFA Inhaler 2 Puff(s) Inhalation every 6 hours PRN Shortness of Breath and/or Wheezing  dextrose Oral Gel 15 Gram(s) Oral once PRN Blood Glucose LESS THAN 70 milliGRAM(s)/deciliter    < from: Xray Chest 1 View- PORTABLE-Urgent (Xray Chest 1 View- PORTABLE-Urgent .) (09.01.22 @ 17:43) >      INTERPRETATION:  Indication: Status post thoracentesis    A single portable view of the chest is submitted. Comparison is made to   the most recent prior study dated 8/28/2022. When accounting for change   in patient positioning, left lower lobe opacification/pleural effusion is   similar to prior. Previously identified left midlung nodule obscured on   this exam. Visible cardiomediastinal silhouette is similar to prior and   within normal limits. There is trace right apical pneumothorax versus   skinfold. No new infiltrate or effusion. Degenerative changes of the   spine and diffuse aortic calcifications.    Impression:    Trace right apical pneumothorax versus skinfold.    --- End of Report ---    < end of copied text >    RADIOLOGY & ADDITIONAL TESTS:    Imaging Personally Reviewed:  [x] YES  [ ] NO

## 2022-09-02 NOTE — PROGRESS NOTE ADULT - SUBJECTIVE AND OBJECTIVE BOX
Interventional, Pulmonary, Critical, Chest Special Procedures.    Pt was seen and fully examined by myself.     Time spent with patient in minutes:67  Pt was seen and fully examined by myself. 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 Patient is altered on exam, but per ED report, he has been having chronic diarrhea, tachypnea, and chest pain that was worsening, prompting patient to call EMS to bring him from home into [facility]. Of note, patient was recently admitted 7/19-7/23 to [facility] for diarrhea after being sent in from his PCP office for hypotension and chronic diarrhea. During that time his diarrhea resolved and he was told to f/u with outpatient GI with Dr. Hammer. Notably he was fully conversant with mild memory impairment on admission and throughout hospital stay.    ED vitals: T 39.3   HR 78  RR 36 (taken manually)  BP 97/59  SpO2 98% on 2L  Labs significant: WBC 30, Hgb 11.5, lactate 2.3 -> 3.2 after 2L NS, UA wnl  Imaging/EKG: CT H/C/A/P known lung nodule, colitis  Interventions: zosyn, zofran, 3.2L NS, flagyl, acetaminophen     (24 Aug 2022 21:48)      REVIEW OF SYSTEMS:  Constitutional: No fever, weight loss, chills + fatigue  Eyes: No eye pain, visual disturbances, or discharge  ENMT:  + difficulty hearing, tinnitus, vertigo; No sinus or throat pain. No epistaxis, +dysphagia, dysphonia, hoarseness no odynophagia  Neck: No pain, stiffness or neck swelling.  No masses or deformities  Respiratory: + cough, wheezing, hemoptysis  + COPD  - ILD   - PE   - ASTHMA     - PNEUMONIA  Cardiovascular: No chest pain, dysrhythmia, palpitations, dizziness or edema + CAD   - CHF   - HTN  Gastrointestinal: No abdominal or epigastric pain. No nausea, vomiting or hematemesis; No diarrhea or constipation. No melena or hematochezia, Icterus.          Genitourinary: No dysuria, frequency, hematuria or incontinence   - CKD/DEBBIE      - ESRD  Neurological: No headaches, +memory loss, loss of strength, numbness or tremors      +DEMENTIA     - STROKE    - SEIZURE  Skin: No itching, burning, rashes or lesions   Lymph Nodes: No enlarged glands  Endocrine: No heat or cold intolerance; No hair loss       + DM     - THYROID DISORDER  Musculoskeletal: No joint pain or swelling; No muscle, back or extremity pain, No edema  Psychiatric: No depression, anxiety, mood swings or difficulty sleeping  Heme/Lymph: No easy bruising or bleeding gums         - ANEMIA      - CANCER   -COAGULOPATHY  Allergy and Immunologic: No hives or eczema    PAST MEDICAL & SURGICAL HISTORY:  HLD (hyperlipidemia)      CAD (coronary artery disease)      BPH (benign prostatic hyperplasia)      COPD, moderate      Chronic diarrhea      Stage 3 chronic kidney disease      S/P cataract extraction      H/O right hemicolectomy        FAMILY HISTORY:  No family history of cardiovascular disease (Father, Mother)      SOCIAL HISTORY:      - Tobacco     - ETOH    Allergies    No Known Allergies    Intolerances      Vital Signs Last 24 Hrs  T(C): 36.3 (02 Sep 2022 13:07), Max: 37.1 (01 Sep 2022 15:28)  T(F): 97.4 (02 Sep 2022 13:07), Max: 98.7 (01 Sep 2022 15:28)  HR: 65 (02 Sep 2022 13:07) (65 - 84)  BP: 125/60 (02 Sep 2022 13:07) (124/66 - 148/69)  BP(mean): --  RR: 19 (02 Sep 2022 06:10) (18 - 19)  SpO2: 98% (02 Sep 2022 13:07) (95% - 100%)    Parameters below as of 02 Sep 2022 13:07  Patient On (Oxygen Delivery Method): nasal cannula  O2 Flow (L/min): 2          MEDICATIONS:  MEDICATIONS  (STANDING):  aspirin enteric coated 81 milliGRAM(s) Oral every 24 hours  budesonide  80 MICROgram(s)/formoterol 4.5 MICROgram(s) Inhaler 2 Puff(s) Inhalation two times a day  clopidogrel Tablet 75 milliGRAM(s) Oral every 24 hours  dexAMETHasone     Tablet 6 milliGRAM(s) Oral every 24 hours  dextrose 5%. 1000 milliLiter(s) (50 mL/Hr) IV Continuous <Continuous>  dextrose 5%. 1000 milliLiter(s) (100 mL/Hr) IV Continuous <Continuous>  dextrose 50% Injectable 25 Gram(s) IV Push once  dextrose 50% Injectable 12.5 Gram(s) IV Push once  dextrose 50% Injectable 25 Gram(s) IV Push once  glucagon  Injectable 1 milliGRAM(s) IntraMuscular once  heparin   Injectable 5000 Unit(s) SubCutaneous every 8 hours  insulin lispro (ADMELOG) corrective regimen sliding scale   SubCutaneous every 6 hours  isosorbide   mononitrate ER Tablet (IMDUR) 30 milliGRAM(s) Oral every 24 hours  lactated ringers. 500 milliLiter(s) (75 mL/Hr) IV Continuous <Continuous>  mirtazapine 30 milliGRAM(s) Oral every 24 hours  remdesivir  IVPB   IV Intermittent   remdesivir  IVPB 100 milliGRAM(s) IV Intermittent every 24 hours  vancomycin    Solution 125 milliGRAM(s) Oral every 6 hours    MEDICATIONS  (PRN):  ALBUTerol    90 MICROgram(s) HFA Inhaler 2 Puff(s) Inhalation every 6 hours PRN Shortness of Breath and/or Wheezing  dextrose Oral Gel 15 Gram(s) Oral once PRN Blood Glucose LESS THAN 70 milliGRAM(s)/deciliter      PHYSICAL EXAM:  Un Comfortable, no immediate distress  Eyes: PERRL, EOM intact; conjunctiva and sclera clear  Head: Normocephalic;  No Trauma  ENMT: No nasal discharge, hoarseness, +cough no  hemoptysis  Neck: Supple; non tender; no masses or deformities.    No JVD  Respiratory:- WHEEZING   - RHONCHI  + RLL RALES  + CRACKLES.  Diminished breath sounds R base   Cardiovascular: Regular rate and rhythm. S1 and S2 Normal; No murmurs, gallops or rubs     - PPM/AICD  Gastrointestinal: Soft mildly tender, non-distended; Normal bowel sounds; No hepatosplenomegaly.     -PEG    -  GT   + RUBIO  Genitourinary: No costovertebral angle tenderness. No dysuria  Extremities: AROM, No clubbing, cyanosis or edema    Vascular: Peripheral pulses palpable 2+ bilaterally  Neurological: Awake, moving extremities   Skin: Warm and dry. No obvious rash  Lymph Nodes: No acute cervical or supraclavicular adenopathy  Psychiatric: less  engaging     DEVICES:  - DENTURES   +IV R / L     - ETUBE   -TRACH   -CTUBE  R / L    LABS:                          8.7    10.21 )-----------( 302      ( 02 Sep 2022 08:08 )             27.2     09-02    134<L>  |  104  |  18  ----------------------------<  169<H>  4.6   |  21<L>  |  1.21    Ca    8.5      02 Sep 2022 08:08  Phos  3.4     09-02  Mg     2.3     09-02      PT/INR - ( 01 Sep 2022 05:30 )   PT: 11.8 sec;   INR: 0.99          PTT - ( 01 Sep 2022 05:30 )  PTT:27.9 sec  RADIOLOGY & ADDITIONAL STUDIES (The following images were personally reviewed):< from: Xray Chest 1 View- PORTABLE-Urgent (Xray Chest 1 View- PORTABLE-Urgent .) (09.01.22 @ 17:43) >  ACC: [id number] EXAM:  XR CHEST PORTABLE URGENT 1V                          PROCEDURE DATE:  09/01/2022          INTERPRETATION:  Indication: Status post thoracentesis    A single portable view of the chest is submitted. Comparison is made to   the most recent prior study dated 8/28/2022. When accounting for change   in patient positioning, left lower lobe opacification/pleural effusion is   similar to prior. Previously identified left midlung nodule obscured on   this exam. Visible cardiomediastinal silhouette is similar to prior and   within normal limits. There is trace right apical pneumothorax versus   skinfold. No new infiltrate or effusion. Degenerative changes of the   spine and diffuse aortic calcifications.    Impression:    Trace right apical pneumothorax versus skinfold.    < end of copied text >

## 2022-09-02 NOTE — DISCHARGE NOTE PROVIDER - DETAILS OF MALNUTRITION DIAGNOSIS/DIAGNOSES
This patient has been assessed with a concern for Malnutrition and was treated during this hospitalization for the following Nutrition diagnosis/diagnoses:     -  08/26/2022: Severe protein-calorie malnutrition   -  08/26/2022: Underweight (BMI < 19)

## 2022-09-02 NOTE — PROGRESS NOTE ADULT - ASSESSMENT
ASSESSMENT/PLAN89 y/o M w/ PMH CAD, CKD3, COPD, colonic mass, lung nodules, chronic diarrhea and tachypnea who presented for diarrhea, tachypnea, and chest pain who was admitted for AMS secondary to severe sepsis. C Difficile colitis, L Lung nodules , concern for new RLL aspiration, new R pleural effusion, now COVID-19, R PTX    1. O2 2LNC at this time  2. Bronchodilators:  Atrovent/ albuterol q 4 – 6 hours as needed  3. Corticosteroids:  Decadron COVID-19 protocol  4. ID/Antibiotics: now on Vancomycin ,Remdesivir  5. Cardiac/HTN: optimize BP   6. GI: Rx/ prophylaxis c PPI/H2B  7. Heme: Rx/VT prophylaxis c SQH/SCD/AC  8. Aspiration precautions, discussed c Speech and Swallow  9. Repeat CXR in am, if sob consider tension PTX unless proven otherwise   Discussed with managing team,    Verify [facility] if any desire to further manage pulmonary nodules

## 2022-09-03 LAB
ANION GAP SERPL CALC-SCNC: 10 MMOL/L — SIGNIFICANT CHANGE UP (ref 5–17)
ANISOCYTOSIS BLD QL: SLIGHT — SIGNIFICANT CHANGE UP
BASOPHILS # BLD AUTO: 0 K/UL — SIGNIFICANT CHANGE UP (ref 0–0.2)
BASOPHILS NFR BLD AUTO: 0 % — SIGNIFICANT CHANGE UP (ref 0–2)
BUN SERPL-MCNC: 30 MG/DL — HIGH (ref 7–23)
BURR CELLS BLD QL SMEAR: PRESENT — SIGNIFICANT CHANGE UP
CALCIUM SERPL-MCNC: 8.4 MG/DL — SIGNIFICANT CHANGE UP (ref 8.4–10.5)
CHLORIDE SERPL-SCNC: 106 MMOL/L — SIGNIFICANT CHANGE UP (ref 96–108)
CO2 SERPL-SCNC: 21 MMOL/L — LOW (ref 22–31)
CREAT SERPL-MCNC: 1.42 MG/DL — HIGH (ref 0.5–1.3)
DACRYOCYTES BLD QL SMEAR: SLIGHT — SIGNIFICANT CHANGE UP
EGFR: 46 ML/MIN/1.73M2 — LOW
EOSINOPHIL # BLD AUTO: 0 K/UL — SIGNIFICANT CHANGE UP (ref 0–0.5)
EOSINOPHIL NFR BLD AUTO: 0 % — SIGNIFICANT CHANGE UP (ref 0–6)
GIANT PLATELETS BLD QL SMEAR: PRESENT — SIGNIFICANT CHANGE UP
GLUCOSE BLDC GLUCOMTR-MCNC: 114 MG/DL — HIGH (ref 70–99)
GLUCOSE BLDC GLUCOMTR-MCNC: 115 MG/DL — HIGH (ref 70–99)
GLUCOSE BLDC GLUCOMTR-MCNC: 116 MG/DL — HIGH (ref 70–99)
GLUCOSE BLDC GLUCOMTR-MCNC: 123 MG/DL — HIGH (ref 70–99)
GLUCOSE BLDC GLUCOMTR-MCNC: 195 MG/DL — HIGH (ref 70–99)
GLUCOSE BLDC GLUCOMTR-MCNC: 327 MG/DL — HIGH (ref 70–99)
GLUCOSE SERPL-MCNC: 98 MG/DL — SIGNIFICANT CHANGE UP (ref 70–99)
HCT VFR BLD CALC: 26.6 % — LOW (ref 39–50)
HGB BLD-MCNC: 8.5 G/DL — LOW (ref 13–17)
LYMPHOCYTES # BLD AUTO: 0.97 K/UL — LOW (ref 1–3.3)
LYMPHOCYTES # BLD AUTO: 7.7 % — LOW (ref 13–44)
MACROCYTES BLD QL: SLIGHT — SIGNIFICANT CHANGE UP
MAGNESIUM SERPL-MCNC: 2.5 MG/DL — SIGNIFICANT CHANGE UP (ref 1.6–2.6)
MANUAL SMEAR VERIFICATION: SIGNIFICANT CHANGE UP
MCHC RBC-ENTMCNC: 30.8 PG — SIGNIFICANT CHANGE UP (ref 27–34)
MCHC RBC-ENTMCNC: 32 GM/DL — SIGNIFICANT CHANGE UP (ref 32–36)
MCV RBC AUTO: 96.4 FL — SIGNIFICANT CHANGE UP (ref 80–100)
METAMYELOCYTES # FLD: 0.9 % — HIGH (ref 0–0)
MONOCYTES # BLD AUTO: 0.64 K/UL — SIGNIFICANT CHANGE UP (ref 0–0.9)
MONOCYTES NFR BLD AUTO: 5.1 % — SIGNIFICANT CHANGE UP (ref 2–14)
NEUTROPHILS # BLD AUTO: 10.88 K/UL — HIGH (ref 1.8–7.4)
NEUTROPHILS NFR BLD AUTO: 86.3 % — HIGH (ref 43–77)
OVALOCYTES BLD QL SMEAR: SLIGHT — SIGNIFICANT CHANGE UP
PHOSPHATE SERPL-MCNC: 3.3 MG/DL — SIGNIFICANT CHANGE UP (ref 2.5–4.5)
PLAT MORPH BLD: ABNORMAL
PLATELET # BLD AUTO: 337 K/UL — SIGNIFICANT CHANGE UP (ref 150–400)
POIKILOCYTOSIS BLD QL AUTO: SLIGHT — SIGNIFICANT CHANGE UP
POLYCHROMASIA BLD QL SMEAR: SLIGHT — SIGNIFICANT CHANGE UP
POTASSIUM SERPL-MCNC: 4.8 MMOL/L — SIGNIFICANT CHANGE UP (ref 3.5–5.3)
POTASSIUM SERPL-SCNC: 4.8 MMOL/L — SIGNIFICANT CHANGE UP (ref 3.5–5.3)
RBC # BLD: 2.76 M/UL — LOW (ref 4.2–5.8)
RBC # FLD: 14.2 % — SIGNIFICANT CHANGE UP (ref 10.3–14.5)
RBC BLD AUTO: ABNORMAL
SODIUM SERPL-SCNC: 137 MMOL/L — SIGNIFICANT CHANGE UP (ref 135–145)
WBC # BLD: 12.61 K/UL — HIGH (ref 3.8–10.5)
WBC # FLD AUTO: 12.61 K/UL — HIGH (ref 3.8–10.5)

## 2022-09-03 PROCEDURE — 71045 X-RAY EXAM CHEST 1 VIEW: CPT | Mod: 26

## 2022-09-03 RX ADMIN — MIRTAZAPINE 30 MILLIGRAM(S): 45 TABLET, ORALLY DISINTEGRATING ORAL at 22:21

## 2022-09-03 RX ADMIN — CLOPIDOGREL BISULFATE 75 MILLIGRAM(S): 75 TABLET, FILM COATED ORAL at 13:54

## 2022-09-03 RX ADMIN — Medication 81 MILLIGRAM(S): at 13:54

## 2022-09-03 RX ADMIN — Medication 125 MILLIGRAM(S): at 19:17

## 2022-09-03 RX ADMIN — Medication 6 MILLIGRAM(S): at 13:54

## 2022-09-03 RX ADMIN — BUDESONIDE AND FORMOTEROL FUMARATE DIHYDRATE 2 PUFF(S): 160; 4.5 AEROSOL RESPIRATORY (INHALATION) at 19:18

## 2022-09-03 RX ADMIN — Medication 1: at 00:48

## 2022-09-03 RX ADMIN — HEPARIN SODIUM 5000 UNIT(S): 5000 INJECTION INTRAVENOUS; SUBCUTANEOUS at 22:21

## 2022-09-03 RX ADMIN — Medication 125 MILLIGRAM(S): at 00:49

## 2022-09-03 RX ADMIN — BUDESONIDE AND FORMOTEROL FUMARATE DIHYDRATE 2 PUFF(S): 160; 4.5 AEROSOL RESPIRATORY (INHALATION) at 06:47

## 2022-09-03 RX ADMIN — Medication 125 MILLIGRAM(S): at 13:54

## 2022-09-03 RX ADMIN — ISOSORBIDE MONONITRATE 30 MILLIGRAM(S): 60 TABLET, EXTENDED RELEASE ORAL at 10:34

## 2022-09-03 RX ADMIN — HEPARIN SODIUM 5000 UNIT(S): 5000 INJECTION INTRAVENOUS; SUBCUTANEOUS at 06:47

## 2022-09-03 RX ADMIN — Medication 125 MILLIGRAM(S): at 06:48

## 2022-09-03 NOTE — PROGRESS NOTE ADULT - PROBLEM SELECTOR PLAN 4
Patient was tachypenic BUT patient afebrile orally and rectal. Most likely 2/2 to pain and anxiety. Chest x-ray w/ possible mild fluid.    Plan:   - c/w home advair and albuterol  - CT non con showed large R pleural effusion and L small effusion  - s/p IR guided thoracentesis 9/1  - now with trace pneumothorax; will monitor for now  - f/u pleural fluid studies Patient was tachypenic BUT patient afebrile orally and rectal. Most likely 2/2 to pain and anxiety. Chest x-ray w/ possible mild fluid.    Plan:   - c/w home advair and albuterol  - CT non con showed large R pleural effusion and L small effusion  - s/p IR guided thoracentesis 9/1  - now with trace pneumothorax; will monitor for now --> unchanged on repeat CXR 9/3  - f/u pleural fluid studies

## 2022-09-03 NOTE — PROGRESS NOTE ADULT - PROBLEM SELECTOR PROBLEM 11
BPH (benign prostatic hyperplasia)
BPH (benign prostatic hyperplasia)
Prophylactic measure
BPH (benign prostatic hyperplasia)
Prophylactic measure
BPH (benign prostatic hyperplasia)
Prophylactic measure
BPH (benign prostatic hyperplasia)

## 2022-09-03 NOTE — PROGRESS NOTE ADULT - SUBJECTIVE AND OBJECTIVE BOX
Interventional, Pulmonary, Critical, Chest Special Procedures.    Pt was seen and fully examined by myself.     Time spent with patient in minutes:    Patient is a 89y old  Male who presents with a chief complaint of altered mental status secondary to sepsis (03 Sep 2022 11:29) The patient c episodes of tachypnea, ill appearing, tolerating NC. Bilateral air entry is preserved     HPI:  89y/o M w/ PMH HTN, CAD s/p PCI w/ 3 DAYANA, CKD (Cr 1.7-2.4), COPD (no home O2), 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 Patient is altered on exam, but per ED report, he has been having chronic diarrhea, tachypnea, and chest pain that was worsening, prompting patient to call EMS to bring him from home into [facility]. Of note, patient was recently admitted 7/19-7/23 to [facility] for diarrhea after being sent in from his PCP office for hypotension and chronic diarrhea. During that time his diarrhea resolved and he was told to f/u with outpatient GI with Dr. Hammer. Notably he was fully conversant with mild memory impairment on admission and throughout hospital stay.    ED vitals: T 39.3   HR 78  RR 36 (taken manually)  BP 97/59  SpO2 98% on 2L  Labs significant: WBC 30, Hgb 11.5, lactate 2.3 -> 3.2 after 2L NS, UA wnl  Imaging/EKG: CT H/C/A/P known lung nodule, colitis  Interventions: zosyn, zofran, 3.2L NS, flagyl, acetaminophen     (24 Aug 2022 21:48)      REVIEW OF SYSTEMS:  Constitutional: No fever, weight loss, chills + fatigue  Eyes: No eye pain, visual disturbances, or discharge  ENMT:  + difficulty hearing, tinnitus, vertigo; No sinus or throat pain. No epistaxis, +dysphagia, dysphonia, hoarseness no odynophagia  Neck: No pain, stiffness or neck swelling.  No masses or deformities  Respiratory: + cough, wheezing, hemoptysis  + COPD  - ILD   - PE   - ASTHMA     - PNEUMONIA  Cardiovascular: No chest pain, dysrhythmia, palpitations, dizziness or edema + CAD   - CHF   - HTN  Gastrointestinal: No abdominal or epigastric pain. No nausea, vomiting or hematemesis; No diarrhea or constipation. No melena or hematochezia, Icterus.          Genitourinary: No dysuria, frequency, hematuria or incontinence   - CKD/DEBBIE      - ESRD  Neurological: No headaches, +memory loss, loss of strength, numbness or tremors      +DEMENTIA     - STROKE    - SEIZURE  Skin: No itching, burning, rashes or lesions   Lymph Nodes: No enlarged glands  Endocrine: No heat or cold intolerance; No hair loss       + DM     - THYROID DISORDER  Musculoskeletal: No joint pain or swelling; No muscle, back or extremity pain, No edema  Psychiatric: No depression, anxiety, mood swings or difficulty sleeping  Heme/Lymph: No easy bruising or bleeding gums         - ANEMIA      - CANCER   -COAGULOPATHY  Allergy and Immunologic: No hives or eczema    PAST MEDICAL & SURGICAL HISTORY:  HLD (hyperlipidemia)      CAD (coronary artery disease)      BPH (benign prostatic hyperplasia)      COPD, moderate      Chronic diarrhea      Stage 3 chronic kidney disease      S/P cataract extraction      H/O right hemicolectomy        FAMILY HISTORY:  No family history of cardiovascular disease (Father, Mother)      SOCIAL HISTORY:      - Tobacco     - ETOH    Allergies    No Known Allergies    Intolerances      Vital Signs Last 24 Hrs  T(C): 36.6 (03 Sep 2022 05:37), Max: 37 (02 Sep 2022 21:15)  T(F): 97.9 (03 Sep 2022 05:37), Max: 98.6 (02 Sep 2022 21:15)  HR: 73 (03 Sep 2022 05:37) (69 - 73)  BP: 146/58 (03 Sep 2022 05:37) (114/59 - 146/58)  BP(mean): --  RR: 19 (03 Sep 2022 05:37) (17 - 19)  SpO2: 93% (03 Sep 2022 05:37) (93% - 95%)    Parameters below as of 02 Sep 2022 21:15  Patient On (Oxygen Delivery Method): room air            MEDICATIONS:  MEDICATIONS  (STANDING):  aspirin enteric coated 81 milliGRAM(s) Oral every 24 hours  budesonide  80 MICROgram(s)/formoterol 4.5 MICROgram(s) Inhaler 2 Puff(s) Inhalation two times a day  clopidogrel Tablet 75 milliGRAM(s) Oral every 24 hours  dexAMETHasone     Tablet 6 milliGRAM(s) Oral every 24 hours  dextrose 5%. 1000 milliLiter(s) (50 mL/Hr) IV Continuous <Continuous>  dextrose 5%. 1000 milliLiter(s) (100 mL/Hr) IV Continuous <Continuous>  dextrose 50% Injectable 25 Gram(s) IV Push once  dextrose 50% Injectable 12.5 Gram(s) IV Push once  dextrose 50% Injectable 25 Gram(s) IV Push once  glucagon  Injectable 1 milliGRAM(s) IntraMuscular once  heparin   Injectable 5000 Unit(s) SubCutaneous every 8 hours  insulin lispro (ADMELOG) corrective regimen sliding scale   SubCutaneous every 6 hours  isosorbide   mononitrate ER Tablet (IMDUR) 30 milliGRAM(s) Oral every 24 hours  lactated ringers. 500 milliLiter(s) (75 mL/Hr) IV Continuous <Continuous>  mirtazapine 30 milliGRAM(s) Oral every 24 hours  remdesivir  IVPB   IV Intermittent   remdesivir  IVPB 100 milliGRAM(s) IV Intermittent every 24 hours  vancomycin    Solution 125 milliGRAM(s) Oral every 6 hours    MEDICATIONS  (PRN):  ALBUTerol    90 MICROgram(s) HFA Inhaler 2 Puff(s) Inhalation every 6 hours PRN Shortness of Breath and/or Wheezing  dextrose Oral Gel 15 Gram(s) Oral once PRN Blood Glucose LESS THAN 70 milliGRAM(s)/deciliter      PHYSICAL EXAM:  Un Comfortable, no immediate distress  Eyes: PERRL, EOM intact; conjunctiva and sclera clear  Head: Normocephalic;  No Trauma  ENMT: No nasal discharge, hoarseness, +cough no  hemoptysis  Neck: Supple; non tender; no masses or deformities.    No JVD  Respiratory:- WHEEZING   - RHONCHI  + RLL RALES  + CRACKLES.  Diminished breath sounds R base   Cardiovascular: Regular rate and rhythm. S1 and S2 Normal; No murmurs, gallops or rubs     - PPM/AICD  Gastrointestinal: Soft mildly tender, non-distended; Normal bowel sounds; No hepatosplenomegaly.     -PEG    -  GT   + RUBIO  Genitourinary: No costovertebral angle tenderness. No dysuria  Extremities: AROM, No clubbing, cyanosis or edema    Vascular: Peripheral pulses palpable 2+ bilaterally  Neurological: Awake, moving extremities   Skin: Warm and dry. No obvious rash  Lymph Nodes: No acute cervical or supraclavicular adenopathy  Psychiatric: less  engaging   DEVICES:  - DENTURES   +IV R / L     - ETUBE   -TRACH   -CTUBE  R / L    LABS:                          8.5    12.61 )-----------( 337      ( 03 Sep 2022 05:30 )             26.6     09-03    137  |  106  |  30<H>  ----------------------------<  98  4.8   |  21<L>  |  1.42<H>    Ca    8.4      03 Sep 2022 05:30  Phos  3.3     09-03  Mg     2.5     09-03        RADIOLOGY & ADDITIONAL STUDIES (The following images were personally reviewed):< from: Xray Chest 1 View- PORTABLE-Routine (Xray Chest 1 View- PORTABLE-Routine in AM.) (09.03.22 @ 07:37) >    ACC: [id number] EXAM:  XR CHEST PORTABLE ROUTINE 1V                          PROCEDURE DATE:  09/03/2022          INTERPRETATION:  Clinical History: Pneumothorax    . Frontal examination of the chest demonstrates no interval change lung   pathology in comparison to prior examination of the chest 9/1/2022. Right   apical pneumothorax. Left effusion.      Impression: No interval change lung pathology    < end of copied text >

## 2022-09-03 NOTE — PROGRESS NOTE ADULT - PROBLEM SELECTOR PLAN 3
Pt tested positive for COVID 8/31.     Plan:  - c/w decadron   - c/w remdesivir  - monitor renal function  - monitor oxygen requirements

## 2022-09-03 NOTE — PROGRESS NOTE ADULT - ASSESSMENT
ASSESSMENT/PLAN89 y/o M w/ PMH CAD, CKD3, COPD, colonic mass, lung nodules, chronic diarrhea and tachypnea who presented for diarrhea, tachypnea, and chest pain who was admitted for AMS secondary to severe sepsis. C Difficile colitis, L Lung nodules , concern for new RLL aspiration, new R pleural effusion, now COVID-19, R PTX    1. O2 2LNC at this time  2. Bronchodilators:  Atrovent/ albuterol q 4 – 6 hours as needed  3. Corticosteroids:  Decadron COVID-19 protocol  4. ID/Antibiotics: now on Vancomycin ,Remdesivir  5. Cardiac/HTN: optimize BP   6. GI: Rx/ prophylaxis c PPI/H2B  7. Heme: Rx/VT prophylaxis c SQH/SCD/AC  8. Aspiration precautions, discussed c Speech and Swallow  9. Repeat CXR tomorrow   Discussed with managing team,   Verify [facility] if any desire to further manage pulmonary nodules

## 2022-09-03 NOTE — PROGRESS NOTE ADULT - SUBJECTIVE AND OBJECTIVE BOX
GOMEZTIARA ROMERO  89y, Male    Patient is a 89y old  Male who presents with a chief complaint of altered mental status secondary to sepsis (02 Sep 2022 23:45)      INTERVAL HPI/OVERNIGHT EVENTS:    ROS: otherwise negative      T(C): 36.6 (09-03-22 @ 05:37), Max: 37 (09-02-22 @ 21:15)  HR: 73 (09-03-22 @ 05:37) (65 - 73)  BP: 146/58 (09-03-22 @ 05:37) (114/59 - 146/58)  RR: 19 (09-03-22 @ 05:37) (17 - 19)  SpO2: 93% (09-03-22 @ 05:37) (93% - 98%)  Wt(kg): --Vital Signs Last 24 Hrs  T(C): 36.6 (03 Sep 2022 05:37), Max: 37 (02 Sep 2022 21:15)  T(F): 97.9 (03 Sep 2022 05:37), Max: 98.6 (02 Sep 2022 21:15)  HR: 73 (03 Sep 2022 05:37) (65 - 73)  BP: 146/58 (03 Sep 2022 05:37) (114/59 - 146/58)  BP(mean): --  RR: 19 (03 Sep 2022 05:37) (17 - 19)  SpO2: 93% (03 Sep 2022 05:37) (93% - 98%)    Parameters below as of 02 Sep 2022 21:15  Patient On (Oxygen Delivery Method): room air        PHYSICAL EXAM:  Constitutional: resting comfortably in bed; NAD  Head: NC/AT  Eyes: PERRL, EOMI, anicteric sclera  ENT: no nasal discharge; uvula midline, no oropharyngeal erythema or exudates; MMM  Neck: supple; no JVD or thyromegaly  Respiratory: CTA B/L; no W/R/R, no retractions  Cardiac: +S1/S2; RRR; no M/R/G; PMI non-displaced  Gastrointestinal: soft, NT/ND; no rebound or guarding; +BSx4  Genitourinary: normal external genitalia  Back: spine midline, no bony tenderness or step-offs; no CVAT B/L  Extremities: WWP, no clubbing or cyanosis; no peripheral edema. Capillary refill <2 sec  Musculoskeletal: NROM x4; no joint swelling, tenderness or erythema  Vascular: 2+ radial, femoral, DP/PT pulses B/L  Dermatologic: skin warm, dry and intact; no rashes, wounds, or scars  Lymphatic: no submandibular or cervical LAD  Neurologic: AAOx3; CNII-XII grossly intact; no focal deficits  Psychiatric: affect and characteristics of appearance, verbalizations, behaviors are appropriate    LABS:                        8.5    12.61 )-----------( 337      ( 03 Sep 2022 05:30 )             26.6     09-03    137  |  106  |  30<H>  ----------------------------<  98  4.8   |  21<L>  |  1.42<H>    Ca    8.4      03 Sep 2022 05:30  Phos  3.3     09-03  Mg     2.5     09-03            CAPILLARY BLOOD GLUCOSE      POCT Blood Glucose.: 116 mg/dL (03 Sep 2022 08:57)  POCT Blood Glucose.: 115 mg/dL (03 Sep 2022 06:39)  POCT Blood Glucose.: 195 mg/dL (03 Sep 2022 00:21)  POCT Blood Glucose.: 134 mg/dL (02 Sep 2022 17:16)  POCT Blood Glucose.: 137 mg/dL (02 Sep 2022 11:58)            MEDICATIONS  (STANDING):  aspirin enteric coated 81 milliGRAM(s) Oral every 24 hours  budesonide  80 MICROgram(s)/formoterol 4.5 MICROgram(s) Inhaler 2 Puff(s) Inhalation two times a day  clopidogrel Tablet 75 milliGRAM(s) Oral every 24 hours  dexAMETHasone     Tablet 6 milliGRAM(s) Oral every 24 hours  dextrose 5%. 1000 milliLiter(s) (50 mL/Hr) IV Continuous <Continuous>  dextrose 5%. 1000 milliLiter(s) (100 mL/Hr) IV Continuous <Continuous>  dextrose 50% Injectable 25 Gram(s) IV Push once  dextrose 50% Injectable 12.5 Gram(s) IV Push once  dextrose 50% Injectable 25 Gram(s) IV Push once  glucagon  Injectable 1 milliGRAM(s) IntraMuscular once  heparin   Injectable 5000 Unit(s) SubCutaneous every 8 hours  insulin lispro (ADMELOG) corrective regimen sliding scale   SubCutaneous every 6 hours  isosorbide   mononitrate ER Tablet (IMDUR) 30 milliGRAM(s) Oral every 24 hours  lactated ringers. 500 milliLiter(s) (75 mL/Hr) IV Continuous <Continuous>  mirtazapine 30 milliGRAM(s) Oral every 24 hours  remdesivir  IVPB 100 milliGRAM(s) IV Intermittent every 24 hours  remdesivir  IVPB   IV Intermittent   vancomycin    Solution 125 milliGRAM(s) Oral every 6 hours    MEDICATIONS  (PRN):  ALBUTerol    90 MICROgram(s) HFA Inhaler 2 Puff(s) Inhalation every 6 hours PRN Shortness of Breath and/or Wheezing  dextrose Oral Gel 15 Gram(s) Oral once PRN Blood Glucose LESS THAN 70 milliGRAM(s)/deciliter      RADIOLOGY & ADDITIONAL TESTS:    Imaging Personally Reviewed:  [ ] YES  [ ] NO   JASON TIARA  89y, Male    Patient is a 89y old  Male who presents with a chief complaint of altered mental status secondary to sepsis (02 Sep 2022 23:45)      INTERVAL HPI/OVERNIGHT EVENTS: RONN overnight. Patient seen and examined at bedside. C/o occasional coughing and 1 episode of loose stools. Denies abdominal pain, SOB, F/C, N/V. Able to tolerate PO intake and finished his breakfast this morning. Off NC on room air this morning.    ROS: otherwise negative      T(C): 36.6 (09-03-22 @ 05:37), Max: 37 (09-02-22 @ 21:15)  HR: 73 (09-03-22 @ 05:37) (65 - 73)  BP: 146/58 (09-03-22 @ 05:37) (114/59 - 146/58)  RR: 19 (09-03-22 @ 05:37) (17 - 19)  SpO2: 93% (09-03-22 @ 05:37) (93% - 98%)  Wt(kg): --Vital Signs Last 24 Hrs  T(C): 36.6 (03 Sep 2022 05:37), Max: 37 (02 Sep 2022 21:15)  T(F): 97.9 (03 Sep 2022 05:37), Max: 98.6 (02 Sep 2022 21:15)  HR: 73 (03 Sep 2022 05:37) (65 - 73)  BP: 146/58 (03 Sep 2022 05:37) (114/59 - 146/58)  BP(mean): --  RR: 19 (03 Sep 2022 05:37) (17 - 19)  SpO2: 93% (03 Sep 2022 05:37) (93% - 98%)    Parameters below as of 02 Sep 2022 21:15  Patient On (Oxygen Delivery Method): room air        PHYSICAL EXAM:  Constitutional: resting comfortably in bed; NAD; cachectic  Head: NC/AT  Eyes: PERRL, EOMI, anicteric sclera  ENT: no nasal discharge;  dry MM  Neck: supple; no JVD or thyromegaly  Respiratory: no W/R/R, no retractions, CTA B/L  Cardiac: +S1/S2; RRR; no M/R/G; PMI non-displaced  Gastrointestinal: soft, nondistended/nontender; no rebound or guarding; +BSx4  Extremities: WWP, no clubbing or cyanosis; no peripheral edema. Capillary refill <2 sec  Vascular: 2+ radial, DP/PT pulses B/L  Neurologic: AAOx2; CNII-XII grossly intact; no focal deficits  Psychiatric: affect and characteristics of appearance, verbalizations, behaviors are appropriate    LABS:                        8.5    12.61 )-----------( 337      ( 03 Sep 2022 05:30 )             26.6     09-03    137  |  106  |  30<H>  ----------------------------<  98  4.8   |  21<L>  |  1.42<H>    Ca    8.4      03 Sep 2022 05:30  Phos  3.3     09-03  Mg     2.5     09-03            CAPILLARY BLOOD GLUCOSE      POCT Blood Glucose.: 116 mg/dL (03 Sep 2022 08:57)  POCT Blood Glucose.: 115 mg/dL (03 Sep 2022 06:39)  POCT Blood Glucose.: 195 mg/dL (03 Sep 2022 00:21)  POCT Blood Glucose.: 134 mg/dL (02 Sep 2022 17:16)  POCT Blood Glucose.: 137 mg/dL (02 Sep 2022 11:58)            MEDICATIONS  (STANDING):  aspirin enteric coated 81 milliGRAM(s) Oral every 24 hours  budesonide  80 MICROgram(s)/formoterol 4.5 MICROgram(s) Inhaler 2 Puff(s) Inhalation two times a day  clopidogrel Tablet 75 milliGRAM(s) Oral every 24 hours  dexAMETHasone     Tablet 6 milliGRAM(s) Oral every 24 hours  dextrose 5%. 1000 milliLiter(s) (50 mL/Hr) IV Continuous <Continuous>  dextrose 5%. 1000 milliLiter(s) (100 mL/Hr) IV Continuous <Continuous>  dextrose 50% Injectable 25 Gram(s) IV Push once  dextrose 50% Injectable 12.5 Gram(s) IV Push once  dextrose 50% Injectable 25 Gram(s) IV Push once  glucagon  Injectable 1 milliGRAM(s) IntraMuscular once  heparin   Injectable 5000 Unit(s) SubCutaneous every 8 hours  insulin lispro (ADMELOG) corrective regimen sliding scale   SubCutaneous every 6 hours  isosorbide   mononitrate ER Tablet (IMDUR) 30 milliGRAM(s) Oral every 24 hours  lactated ringers. 500 milliLiter(s) (75 mL/Hr) IV Continuous <Continuous>  mirtazapine 30 milliGRAM(s) Oral every 24 hours  remdesivir  IVPB 100 milliGRAM(s) IV Intermittent every 24 hours  remdesivir  IVPB   IV Intermittent   vancomycin    Solution 125 milliGRAM(s) Oral every 6 hours    MEDICATIONS  (PRN):  ALBUTerol    90 MICROgram(s) HFA Inhaler 2 Puff(s) Inhalation every 6 hours PRN Shortness of Breath and/or Wheezing  dextrose Oral Gel 15 Gram(s) Oral once PRN Blood Glucose LESS THAN 70 milliGRAM(s)/deciliter      RADIOLOGY & ADDITIONAL TESTS:    Imaging Personally Reviewed:  [x] YES  [ ] NO    CXR 9/3: no interval change lung pathology in comparison to prior examination of the chest 9/1/2022. Right apical pneumothorax. Left effusion.

## 2022-09-03 NOTE — PROGRESS NOTE ADULT - ASSESSMENT
89 y/o M w/ PMH CAD, CKD3, COPD, chronic diarrhea who presented for diarrhea, tachypnea. Admitted to medicine telemetry for sepsis 2/2 C difficile infection. C/w oral vancomycin.  C diff improved  Covid 19--remdesivir  PTX s/p TAP-- nor resp distress   f/u CXR  OOB  Diet as tolerated.  Pain denies  NO SOB/

## 2022-09-03 NOTE — PROGRESS NOTE ADULT - PROBLEM SELECTOR PLAN 3
Pt tested positive for COVID 8/31.     Plan:  - start decadron   - c/w remdesivir  - monitor renal function  - monitor oxygen requirements Pt tested positive for COVID 8/31.     Plan:  - c/w decadron   - c/w remdesivir  - monitor renal function  - monitor oxygen requirements

## 2022-09-03 NOTE — PROGRESS NOTE ADULT - PROBLEM SELECTOR PLAN 8
Known history of chronic diarrhea after a R. hemicolectomy in 2017. Follows with Dr Hammer outpatient    Plan:   - Stool count x1/day over the weekend.   - Dr Hammer to follow patient  - No episodes of diarrhea the last 2 days Known history of chronic diarrhea after a R. hemicolectomy in 2017. Follows with Dr Hammer outpatient    Plan:   - Stool count x1/day over the weekend.   - Dr Hammer to follow patient  - Fewer episodes of diarrhea; now occasional loose stool

## 2022-09-03 NOTE — PROGRESS NOTE ADULT - PROBLEM SELECTOR PLAN 4
Patient was tachypenic BUT patient afebrile orally and rectal. Most likely 2/2 to pain and anxiety. Chest x-ray w/ possible mild fluid.    Plan:   - c/w home advair and albuterol  - CT non con showed large R pleural effusion and L small effusion  - s/p IR guided thoracentesis 9/1  - now with trace pneumothorax; will monitor for now --> unchanged on repeat CXR 9/3  - f/u pleural fluid studies

## 2022-09-03 NOTE — PROGRESS NOTE ADULT - NUTRITIONAL ASSESSMENT
This patient has been assessed with a concern for Malnutrition and has been determined to have a diagnosis/diagnoses of Severe protein-calorie malnutrition and Underweight (BMI < 19).    This patient is being managed with:   Diet Clear Liquid-  Supplement Feeding Modality:  Oral  Ensure Enlive Cans or Servings Per Day:  1       Frequency:  Two Times a day  Entered: Aug 28 2022  3:09PM    
This patient has been assessed with a concern for Malnutrition and has been determined to have a diagnosis/diagnoses of Severe protein-calorie malnutrition and Underweight (BMI < 19).    This patient is being managed with:   Diet NPO-  Except Medications  Entered: Aug 24 2022 10:39PM    
This patient has been assessed with a concern for Malnutrition and has been determined to have a diagnosis/diagnoses of Severe protein-calorie malnutrition and Underweight (BMI < 19).    This patient is being managed with:   Diet Clear Liquid-  Supplement Feeding Modality:  Oral  Ensure Enlive Cans or Servings Per Day:  1       Frequency:  Two Times a day  Entered: Aug 28 2022  3:09PM    
This patient has been assessed with a concern for Malnutrition and has been determined to have a diagnosis/diagnoses of Severe protein-calorie malnutrition and Underweight (BMI < 19).    This patient is being managed with:   Diet NPO-  Except Medications  Entered: Aug 24 2022 10:39PM    
This patient has been assessed with a concern for Malnutrition and has been determined to have a diagnosis/diagnoses of Severe protein-calorie malnutrition and Underweight (BMI < 19).    This patient is being managed with:   Diet Clear Liquid-  Entered: Aug 27 2022  2:18PM    
This patient has been assessed with a concern for Malnutrition and has been determined to have a diagnosis/diagnoses of Severe protein-calorie malnutrition and Underweight (BMI < 19).    This patient is being managed with:   Diet NPO-  Except Medications  Entered: Aug 24 2022 10:39PM    
This patient has been assessed with a concern for Malnutrition and has been determined to have a diagnosis/diagnoses of Severe protein-calorie malnutrition and Underweight (BMI < 19).    This patient is being managed with:   Diet Clear Liquid-  Entered: Aug 27 2022  2:18PM    
This patient has been assessed with a concern for Malnutrition and has been determined to have a diagnosis/diagnoses of Severe protein-calorie malnutrition and Underweight (BMI < 19).    This patient is being managed with:   Diet Clear Liquid-  Supplement Feeding Modality:  Oral  Ensure Enlive Cans or Servings Per Day:  1       Frequency:  Two Times a day  Entered: Aug 28 2022  3:09PM    
This patient has been assessed with a concern for Malnutrition and has been determined to have a diagnosis/diagnoses of Severe protein-calorie malnutrition and Underweight (BMI < 19).    This patient is being managed with:   Diet NPO-  Except Medications  Entered: Aug 24 2022 10:39PM    
This patient has been assessed with a concern for Malnutrition and has been determined to have a diagnosis/diagnoses of Severe protein-calorie malnutrition and Underweight (BMI < 19).    This patient is being managed with:   Diet Pureed-  Mildly Thick Liquids (MILDTHICKLIQS)  Supplement Feeding Modality:  Oral  Ensure Enlive Cans or Servings Per Day:  1       Frequency:  Two Times a day  Entered: Aug 30 2022 11:18AM    
This patient has been assessed with a concern for Malnutrition and has been determined to have a diagnosis/diagnoses of Severe protein-calorie malnutrition and Underweight (BMI < 19).    This patient is being managed with:   Diet Clear Liquid-  Supplement Feeding Modality:  Oral  Ensure Enlive Cans or Servings Per Day:  1       Frequency:  Two Times a day  Entered: Aug 28 2022  3:09PM    
This patient has been assessed with a concern for Malnutrition and has been determined to have a diagnosis/diagnoses of Severe protein-calorie malnutrition and Underweight (BMI < 19).    This patient is being managed with:   Diet Pureed-  Mildly Thick Liquids (MILDTHICKLIQS)  Supplement Feeding Modality:  Oral  Ensure Enlive Cans or Servings Per Day:  1       Frequency:  Two Times a day  Entered: Aug 30 2022 11:18AM    
This patient has been assessed with a concern for Malnutrition and has been determined to have a diagnosis/diagnoses of Severe protein-calorie malnutrition and Underweight (BMI < 19).    This patient is being managed with:   Diet Clear Liquid-  Supplement Feeding Modality:  Oral  Ensure Enlive Cans or Servings Per Day:  1       Frequency:  Two Times a day  Entered: Aug 28 2022  3:09PM    
This patient has been assessed with a concern for Malnutrition and has been determined to have a diagnosis/diagnoses of Severe protein-calorie malnutrition and Underweight (BMI < 19).    This patient is being managed with:   Diet Pureed-  Mildly Thick Liquids (MILDTHICKLIQS)  Supplement Feeding Modality:  Oral  Ensure Enlive Cans or Servings Per Day:  1       Frequency:  Two Times a day  Entered: Aug 30 2022 11:18AM    

## 2022-09-03 NOTE — PROGRESS NOTE ADULT - PROBLEM SELECTOR PROBLEM 1
Severe sepsis
Tachypnea
Severe sepsis

## 2022-09-03 NOTE — PROGRESS NOTE ADULT - SUBJECTIVE AND OBJECTIVE BOX
JASON TIARA  89y, Male    Patient is a 89y old  Male who presents with a chief complaint of altered mental status secondary to sepsis (02 Sep 2022 23:45)      INTERVAL HPI/OVERNIGHT EVENTS: C/o occasional coughing and 1 episode of loose stools. Denies abdominal pain, SOB, F/C, N/V. Able to tolerate PO intake and finished his breakfast this morning. Off NC on room air this morning.    ROS: otherwise negative      T(C): 36.6 (09-03-22 @ 05:37), Max: 37 (09-02-22 @ 21:15)  HR: 73 (09-03-22 @ 05:37) (65 - 73)  BP: 146/58 (09-03-22 @ 05:37) (114/59 - 146/58)  RR: 19 (09-03-22 @ 05:37) (17 - 19)  SpO2: 93% (09-03-22 @ 05:37) (93% - 98%)  Wt(kg): --Vital Signs Last 24 Hrs  T(C): 36.6 (03 Sep 2022 05:37), Max: 37 (02 Sep 2022 21:15)  T(F): 97.9 (03 Sep 2022 05:37), Max: 98.6 (02 Sep 2022 21:15)  HR: 73 (03 Sep 2022 05:37) (65 - 73)  BP: 146/58 (03 Sep 2022 05:37) (114/59 - 146/58)  BP(mean): --  RR: 19 (03 Sep 2022 05:37) (17 - 19)  SpO2: 93% (03 Sep 2022 05:37) (93% - 98%)    Parameters below as of 02 Sep 2022 21:15  Patient On (Oxygen Delivery Method): room air        PHYSICAL EXAM:  Constitutional: resting comfortably in bed; NAD; cachectic  Head: NC/AT  Eyes: PERRL, EOMI, anicteric sclera  ENT: no nasal discharge;  dry MM  Neck: supple; no JVD or thyromegaly  Respiratory: no W/R/R, no retractions, CTA B/L  Cardiac: +S1/S2; RRR; no M/R/G; PMI non-displaced  Gastrointestinal: soft, nondistended/nontender; no rebound or guarding; +BSx4  Extremities: WWP, no clubbing or cyanosis; no peripheral edema. Capillary refill <2 sec  Vascular: 2+ radial, DP/PT pulses B/L  Neurologic: AAOx2; CNII-XII grossly intact; no focal deficits  Psychiatric: affect and characteristics of appearance, verbalizations, behaviors are appropriate    LABS:                        8.5    12.61 )-----------( 337      ( 03 Sep 2022 05:30 )             26.6     09-03    137  |  106  |  30<H>  ----------------------------<  98  4.8   |  21<L>  |  1.42<H>    Ca    8.4      03 Sep 2022 05:30  Phos  3.3     09-03  Mg     2.5     09-03            CAPILLARY BLOOD GLUCOSE      POCT Blood Glucose.: 116 mg/dL (03 Sep 2022 08:57)  POCT Blood Glucose.: 115 mg/dL (03 Sep 2022 06:39)  POCT Blood Glucose.: 195 mg/dL (03 Sep 2022 00:21)  POCT Blood Glucose.: 134 mg/dL (02 Sep 2022 17:16)  POCT Blood Glucose.: 137 mg/dL (02 Sep 2022 11:58)            MEDICATIONS  (STANDING):  aspirin enteric coated 81 milliGRAM(s) Oral every 24 hours  budesonide  80 MICROgram(s)/formoterol 4.5 MICROgram(s) Inhaler 2 Puff(s) Inhalation two times a day  clopidogrel Tablet 75 milliGRAM(s) Oral every 24 hours  dexAMETHasone     Tablet 6 milliGRAM(s) Oral every 24 hours  dextrose 5%. 1000 milliLiter(s) (50 mL/Hr) IV Continuous <Continuous>  dextrose 5%. 1000 milliLiter(s) (100 mL/Hr) IV Continuous <Continuous>  dextrose 50% Injectable 25 Gram(s) IV Push once  dextrose 50% Injectable 12.5 Gram(s) IV Push once  dextrose 50% Injectable 25 Gram(s) IV Push once  glucagon  Injectable 1 milliGRAM(s) IntraMuscular once  heparin   Injectable 5000 Unit(s) SubCutaneous every 8 hours  insulin lispro (ADMELOG) corrective regimen sliding scale   SubCutaneous every 6 hours  isosorbide   mononitrate ER Tablet (IMDUR) 30 milliGRAM(s) Oral every 24 hours  lactated ringers. 500 milliLiter(s) (75 mL/Hr) IV Continuous <Continuous>  mirtazapine 30 milliGRAM(s) Oral every 24 hours  remdesivir  IVPB 100 milliGRAM(s) IV Intermittent every 24 hours  remdesivir  IVPB   IV Intermittent   vancomycin    Solution 125 milliGRAM(s) Oral every 6 hours    MEDICATIONS  (PRN):  ALBUTerol    90 MICROgram(s) HFA Inhaler 2 Puff(s) Inhalation every 6 hours PRN Shortness of Breath and/or Wheezing  dextrose Oral Gel 15 Gram(s) Oral once PRN Blood Glucose LESS THAN 70 milliGRAM(s)/deciliter      RADIOLOGY & ADDITIONAL TESTS:    Imaging Personally Reviewed:  [x] YES  [ ] NO    CXR 9/3: no interval change lung pathology in comparison to prior examination of the chest 9/1/2022. Right apical pneumothorax. Left effusion.

## 2022-09-04 LAB
ANION GAP SERPL CALC-SCNC: 10 MMOL/L — SIGNIFICANT CHANGE UP (ref 5–17)
BASOPHILS # BLD AUTO: 0.01 K/UL — SIGNIFICANT CHANGE UP (ref 0–0.2)
BASOPHILS NFR BLD AUTO: 0.1 % — SIGNIFICANT CHANGE UP (ref 0–2)
BUN SERPL-MCNC: 31 MG/DL — HIGH (ref 7–23)
CALCIUM SERPL-MCNC: 8.6 MG/DL — SIGNIFICANT CHANGE UP (ref 8.4–10.5)
CHLORIDE SERPL-SCNC: 105 MMOL/L — SIGNIFICANT CHANGE UP (ref 96–108)
CO2 SERPL-SCNC: 21 MMOL/L — LOW (ref 22–31)
CREAT SERPL-MCNC: 1.33 MG/DL — HIGH (ref 0.5–1.3)
EGFR: 50 ML/MIN/1.73M2 — LOW
EOSINOPHIL # BLD AUTO: 0.01 K/UL — SIGNIFICANT CHANGE UP (ref 0–0.5)
EOSINOPHIL NFR BLD AUTO: 0.1 % — SIGNIFICANT CHANGE UP (ref 0–6)
GLUCOSE BLDC GLUCOMTR-MCNC: 148 MG/DL — HIGH (ref 70–99)
GLUCOSE BLDC GLUCOMTR-MCNC: 161 MG/DL — HIGH (ref 70–99)
GLUCOSE BLDC GLUCOMTR-MCNC: 191 MG/DL — HIGH (ref 70–99)
GLUCOSE BLDC GLUCOMTR-MCNC: 89 MG/DL — SIGNIFICANT CHANGE UP (ref 70–99)
GLUCOSE SERPL-MCNC: 84 MG/DL — SIGNIFICANT CHANGE UP (ref 70–99)
HCT VFR BLD CALC: 26.8 % — LOW (ref 39–50)
HGB BLD-MCNC: 8.3 G/DL — LOW (ref 13–17)
IMM GRANULOCYTES NFR BLD AUTO: 0.6 % — SIGNIFICANT CHANGE UP (ref 0–1.5)
LYMPHOCYTES # BLD AUTO: 0.77 K/UL — LOW (ref 1–3.3)
LYMPHOCYTES # BLD AUTO: 7 % — LOW (ref 13–44)
MAGNESIUM SERPL-MCNC: 2 MG/DL — SIGNIFICANT CHANGE UP (ref 1.6–2.6)
MCHC RBC-ENTMCNC: 30.2 PG — SIGNIFICANT CHANGE UP (ref 27–34)
MCHC RBC-ENTMCNC: 31 GM/DL — LOW (ref 32–36)
MCV RBC AUTO: 97.5 FL — SIGNIFICANT CHANGE UP (ref 80–100)
MONOCYTES # BLD AUTO: 1.14 K/UL — HIGH (ref 0–0.9)
MONOCYTES NFR BLD AUTO: 10.4 % — SIGNIFICANT CHANGE UP (ref 2–14)
NEUTROPHILS # BLD AUTO: 8.99 K/UL — HIGH (ref 1.8–7.4)
NEUTROPHILS NFR BLD AUTO: 81.8 % — HIGH (ref 43–77)
NRBC # BLD: 0 /100 WBCS — SIGNIFICANT CHANGE UP (ref 0–0)
PHOSPHATE SERPL-MCNC: 3.2 MG/DL — SIGNIFICANT CHANGE UP (ref 2.5–4.5)
PLATELET # BLD AUTO: 349 K/UL — SIGNIFICANT CHANGE UP (ref 150–400)
POTASSIUM SERPL-MCNC: 5 MMOL/L — SIGNIFICANT CHANGE UP (ref 3.5–5.3)
POTASSIUM SERPL-SCNC: 5 MMOL/L — SIGNIFICANT CHANGE UP (ref 3.5–5.3)
RBC # BLD: 2.75 M/UL — LOW (ref 4.2–5.8)
RBC # FLD: 14.1 % — SIGNIFICANT CHANGE UP (ref 10.3–14.5)
SODIUM SERPL-SCNC: 136 MMOL/L — SIGNIFICANT CHANGE UP (ref 135–145)
WBC # BLD: 10.99 K/UL — HIGH (ref 3.8–10.5)
WBC # FLD AUTO: 10.99 K/UL — HIGH (ref 3.8–10.5)

## 2022-09-04 PROCEDURE — 36000 PLACE NEEDLE IN VEIN: CPT

## 2022-09-04 PROCEDURE — 71045 X-RAY EXAM CHEST 1 VIEW: CPT | Mod: 26

## 2022-09-04 PROCEDURE — 76937 US GUIDE VASCULAR ACCESS: CPT | Mod: 26

## 2022-09-04 RX ORDER — REMDESIVIR 5 MG/ML
100 INJECTION INTRAVENOUS EVERY 24 HOURS
Refills: 0 | Status: COMPLETED | OUTPATIENT
Start: 2022-09-04 | End: 2022-09-04

## 2022-09-04 RX ADMIN — HEPARIN SODIUM 5000 UNIT(S): 5000 INJECTION INTRAVENOUS; SUBCUTANEOUS at 21:26

## 2022-09-04 RX ADMIN — Medication 81 MILLIGRAM(S): at 13:20

## 2022-09-04 RX ADMIN — Medication 6 MILLIGRAM(S): at 10:47

## 2022-09-04 RX ADMIN — BUDESONIDE AND FORMOTEROL FUMARATE DIHYDRATE 2 PUFF(S): 160; 4.5 AEROSOL RESPIRATORY (INHALATION) at 18:41

## 2022-09-04 RX ADMIN — Medication 125 MILLIGRAM(S): at 00:13

## 2022-09-04 RX ADMIN — MIRTAZAPINE 30 MILLIGRAM(S): 45 TABLET, ORALLY DISINTEGRATING ORAL at 21:26

## 2022-09-04 RX ADMIN — REMDESIVIR 500 MILLIGRAM(S): 5 INJECTION INTRAVENOUS at 21:06

## 2022-09-04 RX ADMIN — BUDESONIDE AND FORMOTEROL FUMARATE DIHYDRATE 2 PUFF(S): 160; 4.5 AEROSOL RESPIRATORY (INHALATION) at 06:32

## 2022-09-04 RX ADMIN — HEPARIN SODIUM 5000 UNIT(S): 5000 INJECTION INTRAVENOUS; SUBCUTANEOUS at 13:20

## 2022-09-04 RX ADMIN — Medication 1: at 00:14

## 2022-09-04 RX ADMIN — Medication 125 MILLIGRAM(S): at 18:05

## 2022-09-04 RX ADMIN — HEPARIN SODIUM 5000 UNIT(S): 5000 INJECTION INTRAVENOUS; SUBCUTANEOUS at 06:32

## 2022-09-04 RX ADMIN — Medication 125 MILLIGRAM(S): at 13:20

## 2022-09-04 RX ADMIN — Medication 125 MILLIGRAM(S): at 06:32

## 2022-09-04 RX ADMIN — ISOSORBIDE MONONITRATE 30 MILLIGRAM(S): 60 TABLET, EXTENDED RELEASE ORAL at 10:47

## 2022-09-04 RX ADMIN — Medication 1: at 18:04

## 2022-09-04 RX ADMIN — CLOPIDOGREL BISULFATE 75 MILLIGRAM(S): 75 TABLET, FILM COATED ORAL at 13:20

## 2022-09-04 NOTE — PROCEDURE NOTE - NSICDXPROCEDURE_GEN_ALL_CORE_FT
PROCEDURES:  Insertion of intravenous catheter with ultrasound guidance 04-Sep-2022 20:01:43  Kandis Porter

## 2022-09-04 NOTE — CHART NOTE - NSCHARTNOTEFT_GEN_A_CORE
Admitting Diagnosis:   Patient is a 89y old  Male who presents with a chief complaint of altered mental status secondary to sepsis (30 Aug 2022 10:50)      PAST MEDICAL & SURGICAL HISTORY:  HLD (hyperlipidemia)      CAD (coronary artery disease)      BPH (benign prostatic hyperplasia)      COPD, moderate      Chronic diarrhea      Stage 3 chronic kidney disease      S/P cataract extraction      H/O right hemicolectomy          Current Nutrition Order:   Diet, Pureed:   Mildly Thick Liquids (MILDTHICKLIQS)  Supplement Feeding Modality:  Oral  Ensure Enlive Cans or Servings Per Day:  1       Frequency:  Two Times a day (08-30-22 @ 11:18)      PO Intake: Good (%) [   ]  Fair (50-75%) [   ] Poor (<25%) [ x  ]Patient must be fed and has refused. Seen by SLP with noted refusal of food.Silent aspiration with Further recommendations should be based on overall goals of care. If goals of care are to provide comfort feeds, recommend puree diet with mildly thick liquids. Pt may benefit from palliative care input. Case d/c RD, Pt not meeting nutritional needs.    GI Issues: 8/28 Fecal incontinence    Pain :not reported    Skin Integrity :skin tear    Labs:   08-30    136  |  104  |  11  ----------------------------<  124<H>  4.3   |  15<L>  |  1.03    Ca    8.1<L>      30 Aug 2022 07:06  Phos  2.6     08-30  Mg     1.8     08-30    TPro  5.4<L>  /  Alb  2.8<L>  /  TBili  0.3  /  DBili  x   /  AST  18  /  ALT  10  /  AlkPhos  85  08-29    CAPILLARY BLOOD GLUCOSE      POCT Blood Glucose.: 107 mg/dL (30 Aug 2022 00:25)  POCT Blood Glucose.: 116 mg/dL (29 Aug 2022 17:25)  POCT Blood Glucose.: 111 mg/dL (29 Aug 2022 12:10)      Medications:  MEDICATIONS  (STANDING):  aspirin enteric coated 81 milliGRAM(s) Oral every 24 hours  budesonide  80 MICROgram(s)/formoterol 4.5 MICROgram(s) Inhaler 2 Puff(s) Inhalation two times a day  clopidogrel Tablet 75 milliGRAM(s) Oral every 24 hours  dextrose 5%. 1000 milliLiter(s) (50 mL/Hr) IV Continuous <Continuous>  dextrose 5%. 1000 milliLiter(s) (100 mL/Hr) IV Continuous <Continuous>  dextrose 50% Injectable 25 Gram(s) IV Push once  dextrose 50% Injectable 12.5 Gram(s) IV Push once  dextrose 50% Injectable 25 Gram(s) IV Push once  glucagon  Injectable 1 milliGRAM(s) IntraMuscular once  heparin   Injectable 5000 Unit(s) SubCutaneous every 8 hours  insulin lispro (ADMELOG) corrective regimen sliding scale   SubCutaneous every 6 hours  isosorbide   mononitrate ER Tablet (IMDUR) 30 milliGRAM(s) Oral every 24 hours  lactated ringers. 500 milliLiter(s) (75 mL/Hr) IV Continuous <Continuous>  magnesium sulfate  IVPB 2 Gram(s) IV Intermittent once  mirtazapine 30 milliGRAM(s) Oral every 24 hours  vancomycin    Solution 125 milliGRAM(s) Oral every 6 hours    MEDICATIONS  (PRN):  ALBUTerol    90 MICROgram(s) HFA Inhaler 2 Puff(s) Inhalation every 6 hours PRN Shortness of Breath and/or Wheezing  dextrose Oral Gel 15 Gram(s) Oral once PRN Blood Glucose LESS THAN 70 milliGRAM(s)/deciliter      Weight:54.9kg  Daily     Daily     Weight Change: no updated weights.IBW:78%,BMI:18.4    Estimated energy needs: Based on ABW due to below 80% of IBW.IBW:70kg [id number]:1750-2100kcal and1-1.2gmprotein:70-84gmprotein and fluids per team    Subjective: 89 y/o M w/ PMH CAD, CKD3, COPD, chronic diarrhea who presented for diarrhea. Admitted to medicine telemetry for sepsis 2/2 C difficile infection. Abd xray without dilated bowels. Started on oral vancomycin. Had diffuse diarrhea. Leukocytosis and diarrhea gradually improved, diarrhea now resolved. Presented w/ AMS, now improved (aaox2 but confused, likely not yet back at baseline, also c/b sundowning). C/w oral vancomycin and restart home meds as appropriate. 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 Pulmonary requested CT of chest today. Patient requires total feed. Patient discussed in IDR's and request for Palliative input made.     Previous Nutrition Diagnosis: Severe PCM r/t patient condition AEB: significant wasting    Active [ x  ]  Resolved [   ]    If resolved, new PES: inadequate energy intake r/t suspected mental status/weakness and advanced age AEB: need to be fed     Goal :Align nutrition with goals of care at all time     Recommendations:1.Determine Goals of care 2.COntinue with high aspiration risk precautions when feeding of patient 3.Encourage and assist with feeding       Education: not a candidate at this time    Risk Level: High [  x ] Moderate [   ] Low [   ]
PALLIATIVE MEDICINE COORDINATION OF CARE NOTE FOR TIARA GOMEZ  [  ] ED Trigger   [  ] MICU Trigger     [  ] Consult    [ X ] AI Comanagement    Patient last assessed: __7/21/2022___  to manage: GOC/AD, Symptoms, and Support was recommended: ____Support__    ___30___ Minutes; Start: ____0930am_  End: __1000am__, of non-face-to-face prolonged service provided that relates to (face-to-face) care that has or will occur and ongoing patient management, including one or more of the following:   - Reviewed records from other physicians or other health care professional services, including one or more of the following: other medical records and diagnostic / radiology study results     HPI:  89y/o M w/ PMH HTN, CAD s/p PCI w/ 3 DAYANA, CKD (Cr 1.7-2.4), COPD (no home O2), 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 Patient is altered on exam, but per ED report, he has been having chronic diarrhea, tachypnea, and chest pain that was worsening, prompting patient to call EMS to bring him from home into [facility]. Of note, patient was recently admitted 7/19-7/23 to [facility] for diarrhea after being sent in from his PCP office for hypotension and chronic diarrhea. During that time his diarrhea resolved and he was told to f/u with outpatient GI with Dr. Hammer. Notably he was fully conversant with mild memory impairment on admission and throughout hospital stay.    ED vitals: T 39.3   HR 78  RR 36 (taken manually)  BP 97/59  SpO2 98% on 2L  Labs significant: WBC 30, Hgb 11.5, lactate 2.3 -> 3.2 after 2L NS, UA wnl  Imaging/EKG: CT H/C/A/P known lung nodule, colitis  Interventions: zosyn, zofran, 3.2L NS, flagyl, acetaminophen     (24 Aug 2022 21:48)      - Other: iStop reviewed.    Rx found on iStop review. Ref #: [id number]  05/02/2022	05/11/2022	diphenoxylate-atropine 2.5-0.025 mg tablet	60	20	Elias Montalvo MD	[id number]	Medicare	[facility] #[id number]  04/06/2022	04/13/2022	diphenoxylate-atropine 2.5-0.025 mg tablet	30	10	Elias Montalvo MD	[id number]	Medicare	[facility] #[id number]    - Other: Medication reviewed.    The patient HAS NOT used PRN's in the last 24h.    MEDICATIONS  (STANDING):  aspirin enteric coated 81 milliGRAM(s) Oral every 24 hours  budesonide  80 MICROgram(s)/formoterol 4.5 MICROgram(s) Inhaler 2 Puff(s) Inhalation two times a day  clopidogrel Tablet 75 milliGRAM(s) Oral every 24 hours  dextrose 5%. 1000 milliLiter(s) (50 mL/Hr) IV Continuous <Continuous>  dextrose 5%. 1000 milliLiter(s) (100 mL/Hr) IV Continuous <Continuous>  dextrose 50% Injectable 25 Gram(s) IV Push once  dextrose 50% Injectable 12.5 Gram(s) IV Push once  dextrose 50% Injectable 25 Gram(s) IV Push once  glucagon  Injectable 1 milliGRAM(s) IntraMuscular once  heparin   Injectable 5000 Unit(s) SubCutaneous every 8 hours  insulin lispro (ADMELOG) corrective regimen sliding scale   SubCutaneous every 6 hours  isosorbide   mononitrate ER Tablet (IMDUR) 30 milliGRAM(s) Oral every 24 hours  lactated ringers. 500 milliLiter(s) (75 mL/Hr) IV Continuous <Continuous>  mirtazapine 30 milliGRAM(s) Oral every 24 hours  vancomycin    Solution 125 milliGRAM(s) Oral every 6 hours    MEDICATIONS  (PRN):  ALBUTerol    90 MICROgram(s) HFA Inhaler 2 Puff(s) Inhalation every 6 hours PRN Shortness of Breath and/or Wheezing  dextrose Oral Gel 15 Gram(s) Oral once PRN Blood Glucose LESS THAN 70 milliGRAM(s)/deciliter      - Other: Advanced directives      DNR DNI     MOLST form found on patient window under admit under MOLST form.       HCP form found on patient window 1/13/201 admsiomm page 15 listing Tanner Yan     No Living will / POA / Advance directives found on [place] / patient window      [facility] notes on [facility]  7/21/2022    - Other: Coordination/Plan of care     ___3_ admissions in 1 year     Current admission LOS: __7_ days     LACE score: __15__ ADVANCE ILLNESS PATIENT.     Patient  previously seen by palliative medicine consult service.        Consult request for: Advanced Illness Comanagement       Full consult to follow within 24h.
Admitting Diagnosis:   Patient is a 89y old  Male who presents with a chief complaint of altered mental status secondary to sepsis (01 Sep 2022 12:25)      PAST MEDICAL & SURGICAL HISTORY:  HLD (hyperlipidemia)      CAD (coronary artery disease)      BPH (benign prostatic hyperplasia)      COPD, moderate      Chronic diarrhea      Stage 3 chronic kidney disease      S/P cataract extraction      H/O right hemicolectomy          Current Nutrition Order:Mildly thick pureed with 2 ensure enlive       PO Intake: Good (%) [   ]  Fair (50-75%) [ x  ] Poor (<25%) [   ]this am, feeding self with noted improvement with po    GI Issues: C. Diff    Pain :not reported    Skin Integrity: skin tear    Labs:   09-02    134<L>  |  104  |  18  ----------------------------<  169<H>  4.6   |  21<L>  |  1.21    Ca    8.5      02 Sep 2022 08:08  Phos  3.4     09-02  Mg     2.3     09-02      CAPILLARY BLOOD GLUCOSE      POCT Blood Glucose.: 115 mg/dL (02 Sep 2022 08:27)  POCT Blood Glucose.: 153 mg/dL (02 Sep 2022 06:31)  POCT Blood Glucose.: 181 mg/dL (02 Sep 2022 00:14)  POCT Blood Glucose.: 94 mg/dL (01 Sep 2022 17:53)  POCT Blood Glucose.: 119 mg/dL (01 Sep 2022 12:00)      Medications:  MEDICATIONS  (STANDING):  aspirin enteric coated 81 milliGRAM(s) Oral every 24 hours  budesonide  80 MICROgram(s)/formoterol 4.5 MICROgram(s) Inhaler 2 Puff(s) Inhalation two times a day  clopidogrel Tablet 75 milliGRAM(s) Oral every 24 hours  dextrose 5%. 1000 milliLiter(s) (50 mL/Hr) IV Continuous <Continuous>  dextrose 5%. 1000 milliLiter(s) (100 mL/Hr) IV Continuous <Continuous>  dextrose 50% Injectable 25 Gram(s) IV Push once  dextrose 50% Injectable 12.5 Gram(s) IV Push once  dextrose 50% Injectable 25 Gram(s) IV Push once  glucagon  Injectable 1 milliGRAM(s) IntraMuscular once  insulin lispro (ADMELOG) corrective regimen sliding scale   SubCutaneous every 6 hours  isosorbide   mononitrate ER Tablet (IMDUR) 30 milliGRAM(s) Oral every 24 hours  lactated ringers. 500 milliLiter(s) (75 mL/Hr) IV Continuous <Continuous>  mirtazapine 30 milliGRAM(s) Oral every 24 hours  remdesivir  IVPB   IV Intermittent   remdesivir  IVPB 100 milliGRAM(s) IV Intermittent every 24 hours  vancomycin    Solution 125 milliGRAM(s) Oral every 6 hours    MEDICATIONS  (PRN):  ALBUTerol    90 MICROgram(s) HFA Inhaler 2 Puff(s) Inhalation every 6 hours PRN Shortness of Breath and/or Wheezing  dextrose Oral Gel 15 Gram(s) Oral once PRN Blood Glucose LESS THAN 70 milliGRAM(s)/deciliter      Weight:admitted weight:54.9kg  Daily     Daily no updated weights.IBW:139-154lbs,patient,87% of IBW(at low end of IBW)    Weight Change: suspected some additional weight loss due to decreased po.RD requested updated weights    Estimated energy needs: Based on ABW due to between % of IBW(@low end of IBW):63.2kg x 30-35kcal(COVID-19+/C.Diff losses/Sepsis)1896-2212kcal and 1-1.2gmprotein:63.2-76gmprotein and fluids per team due to SLP evaluation from 8/30    Subjective: 89y/o M w/ PMH HTN, CAD s/p PCI w/ 3 DAYANA, CKD (Cr 1.7-2.4), COPD (no home O2), 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 Patient is altered on exam, but per ED report, he has been having chronic diarrhea, tachypnea, and chest pain that was worsening, prompting patient to call EMS to bring him from home into [facility]. Admitted to medicine telemetry for sepsis 2/2 C difficile infection/Now COVID +L Lung nodules , concern for new RLL aspiration, new R pleural effusion.Seen by SLP with noted recommendations for GOC due to silent aspiration.Palliative following for GOC.Patient identified with severe PCM.  This am, patient seen eating breakfast tray by himself with noted >50% intake.Remains on mildly thick diet.Patient might benefit from follow up SLP evaluation for diet advancement.    Previous Nutrition Diagnosis: Severe PCM r/t patients condition AEB: significant wasting     Active [ x  ]  Resolved [   ]    If resolved, new PES:     Goal:Align nutrition with goals of care     Recommendations:1.Please update weights 2.SLP re-evaluation for potential diet advancement 3.Encourage po and offer supplements inbetween meals 4.Consider addition of Banatrol BID due to C.Diff diarrhea(will discuss in IDR's)  Education: not warranted    Risk Level: High [  x ] Moderate [   ] Low [   ]
Patient without IV access since 9/3/22. Attempted by multiple RNs and by resident via an ultrasound guided approach with no success. Patient refusing additional attempts at this time and is aware of the risks of not completing his remdesivir treatment. Patient states he does not want to be hurt anymore with repeated attempts and is willing to take the risks of not completing his treatment. Unable to reach patient's healthcare proxy. Will attempt IV access later this evening with [facility] assistance if patient is amenable.

## 2022-09-04 NOTE — PROGRESS NOTE ADULT - ASSESSMENT
ASSESSMENT/PLAN89 y/o M w/ PMH CAD, CKD3, COPD, colonic mass, lung nodules, chronic diarrhea and tachypnea who presented for diarrhea, tachypnea, and chest pain who was admitted for AMS secondary to severe sepsis. C Difficile colitis, L Lung nodules , concern for new RLL aspiration, new R pleural effusion, now COVID-19, R PTX, LLL infiltrate     1. O2 2LNC at this time  2. Bronchodilators:  Atrovent/ albuterol q 4 – 6 hours as needed  3. Corticosteroids:  Decadron COVID-19 protocol  4. ID/Antibiotics: now on Vancomycin ,Remdesivir  5. Cardiac/HTN: optimize BP   6. GI: Rx/ prophylaxis c PPI/H2B  7. Heme: Rx/VT prophylaxis c SQH/SCD/AC  8. Aspiration precautions, discussed c Speech and Swallow  Discussed with managing team,   Verify GOC if any desire to further manage pulmonary nodules

## 2022-09-04 NOTE — PROGRESS NOTE ADULT - SUBJECTIVE AND OBJECTIVE BOX
Interventional, Pulmonary, Critical, Chest Special Procedures.    Pt was seen and fully examined by myself.     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 Patient is altered on exam, but per ED report, he has been having chronic diarrhea, tachypnea, and chest pain that was worsening, prompting patient to call EMS to bring him from home into [facility]. Of note, patient was recently admitted 7/19-7/23 to [facility] for diarrhea after being sent in from his PCP office for hypotension and chronic diarrhea. During that time his diarrhea resolved and he was told to f/u with outpatient GI with Dr. Hammer. Notably he was fully conversant with mild memory impairment on admission and throughout hospital stay.    ED vitals: T 39.3   HR 78  RR 36 (taken manually)  BP 97/59  SpO2 98% on 2L  Labs significant: WBC 30, Hgb 11.5, lactate 2.3 -> 3.2 after 2L NS, UA wnl  Imaging/EKG: CT H/C/A/P known lung nodule, colitis  Interventions: zosyn, zofran, 3.2L NS, flagyl, acetaminophen     (24 Aug 2022 21:48)      REVIEW OF SYSTEMS:  Constitutional: No fever, weight loss, chills + fatigue  Eyes: No eye pain, visual disturbances, or discharge  ENMT:  + difficulty hearing, tinnitus, vertigo; No sinus or throat pain. No epistaxis, +dysphagia, dysphonia, hoarseness no odynophagia  Neck: No pain, stiffness or neck swelling.  No masses or deformities  Respiratory: + cough, wheezing, hemoptysis  + COPD  - ILD   - PE   - ASTHMA     - PNEUMONIA  Cardiovascular: No chest pain, dysrhythmia, palpitations, dizziness or edema + CAD   - CHF   - HTN  Gastrointestinal: No abdominal or epigastric pain. No nausea, vomiting or hematemesis; No diarrhea or constipation. No melena or hematochezia, Icterus.          Genitourinary: No dysuria, frequency, hematuria or incontinence   - CKD/DEBBIE      - ESRD  Neurological: No headaches, +memory loss, loss of strength, numbness or tremors      +DEMENTIA     - STROKE    - SEIZURE  Skin: No itching, burning, rashes or lesions   Lymph Nodes: No enlarged glands  Endocrine: No heat or cold intolerance; No hair loss       + DM     - THYROID DISORDER  Musculoskeletal: No joint pain or swelling; No muscle, back or extremity pain, No edema  Psychiatric: No depression, anxiety, mood swings or difficulty sleeping  Heme/Lymph: No easy bruising or bleeding gums         - ANEMIA      - CANCER   -COAGULOPATHY  Allergy and Immunologic: No hives or eczema    PAST MEDICAL & SURGICAL HISTORY:  HLD (hyperlipidemia)      CAD (coronary artery disease)      BPH (benign prostatic hyperplasia)      COPD, moderate      Chronic diarrhea      Stage 3 chronic kidney disease      S/P cataract extraction      H/O right hemicolectomy        FAMILY HISTORY:  No family history of cardiovascular disease (Father, Mother)      SOCIAL HISTORY:      - Tobacco     - ETOH    Allergies    No Known Allergies    Intolerances      Vital Signs Last 24 Hrs  T(C): 36.7 (04 Sep 2022 05:15), Max: 36.8 (03 Sep 2022 20:25)  T(F): 98.1 (04 Sep 2022 05:15), Max: 98.3 (03 Sep 2022 20:25)  HR: 71 (04 Sep 2022 10:47) (62 - 71)  BP: 145/61 (04 Sep 2022 10:47) (119/70 - 147/74)  BP(mean): --  RR: 18 (03 Sep 2022 20:25) (18 - 18)  SpO2: 98% (04 Sep 2022 10:47) (94% - 100%)    Parameters below as of 04 Sep 2022 10:47  Patient On (Oxygen Delivery Method): room air            MEDICATIONS:  MEDICATIONS  (STANDING):  aspirin enteric coated 81 milliGRAM(s) Oral every 24 hours  budesonide  80 MICROgram(s)/formoterol 4.5 MICROgram(s) Inhaler 2 Puff(s) Inhalation two times a day  clopidogrel Tablet 75 milliGRAM(s) Oral every 24 hours  dexAMETHasone     Tablet 6 milliGRAM(s) Oral every 24 hours  dextrose 5%. 1000 milliLiter(s) (50 mL/Hr) IV Continuous <Continuous>  dextrose 5%. 1000 milliLiter(s) (100 mL/Hr) IV Continuous <Continuous>  dextrose 50% Injectable 25 Gram(s) IV Push once  dextrose 50% Injectable 12.5 Gram(s) IV Push once  dextrose 50% Injectable 25 Gram(s) IV Push once  glucagon  Injectable 1 milliGRAM(s) IntraMuscular once  heparin   Injectable 5000 Unit(s) SubCutaneous every 8 hours  insulin lispro (ADMELOG) corrective regimen sliding scale   SubCutaneous every 6 hours  isosorbide   mononitrate ER Tablet (IMDUR) 30 milliGRAM(s) Oral every 24 hours  lactated ringers. 500 milliLiter(s) (75 mL/Hr) IV Continuous <Continuous>  mirtazapine 30 milliGRAM(s) Oral every 24 hours  remdesivir  IVPB   IV Intermittent   remdesivir  IVPB 100 milliGRAM(s) IV Intermittent every 24 hours  vancomycin    Solution 125 milliGRAM(s) Oral every 6 hours    MEDICATIONS  (PRN):  ALBUTerol    90 MICROgram(s) HFA Inhaler 2 Puff(s) Inhalation every 6 hours PRN Shortness of Breath and/or Wheezing  dextrose Oral Gel 15 Gram(s) Oral once PRN Blood Glucose LESS THAN 70 milliGRAM(s)/deciliter      PHYSICAL EXAM:  Un Comfortable, no immediate distress  Eyes: PERRL, EOM intact; conjunctiva and sclera clear  Head: Normocephalic;  No Trauma  ENMT: No nasal discharge, hoarseness, +cough no  hemoptysis  Neck: Supple; non tender; no masses or deformities.    No JVD  Respiratory:- WHEEZING   - RHONCHI  + RLL RALES  + CRACKLES.  Diminished breath sounds R base   Cardiovascular: Regular rate and rhythm. S1 and S2 Normal; No murmurs, gallops or rubs     - PPM/AICD  Gastrointestinal: Soft mildly tender, non-distended; Normal bowel sounds; No hepatosplenomegaly.     -PEG    -  GT   + RUBIO  Genitourinary: No costovertebral angle tenderness. No dysuria  Extremities: AROM, No clubbing, cyanosis or edema    Vascular: Peripheral pulses palpable 2+ bilaterally  Neurological: Awake, moving extremities   Skin: Warm and dry. No obvious rash  Lymph Nodes: No acute cervical or supraclavicular adenopathy  Psychiatric: more   engaging     DEVICES:  - DENTURES   +IV R / L     - ETUBE   -TRACH   -CTUBE  R / L    LABS:                          8.3    10.99 )-----------( 349      ( 04 Sep 2022 05:30 )             26.8     09-04    136  |  105  |  31<H>  ----------------------------<  84  5.0   |  21<L>  |  1.33<H>    Ca    8.6      04 Sep 2022 05:30  Phos  3.2     09-04  Mg     2.0     09-04        RADIOLOGY & ADDITIONAL STUDIES (The following images were personally reviewed):< from: Xray Chest 1 View- PORTABLE-Routine (Xray Chest 1 View- PORTABLE-Routine in AM.) (09.04.22 @ 07:20) >  ACC: [id number] EXAM:  XR CHEST PORTABLE ROUTINE 1V                          PROCEDURE DATE:  09/04/2022          INTERPRETATION:  Clinical History: Pneumothorax    Frontal examination of the chest demonstrates the heart to be within   normal limits in transverse diameter. Left effusion. Discoid change   and/or infiltrate left lung base. No evidence of pneumothorax.   Levoscoliosis thoracic spine with degenerative changes. Calcification   thoracic aorta.    IMPRESSION: Left effusion. Discoid change and/or infiltrates left lung   base. No evidence of pneumothorax    < end of copied text >

## 2022-09-05 ENCOUNTER — TRANSCRIPTION ENCOUNTER (OUTPATIENT)
Age: 87
End: 2022-09-05

## 2022-09-05 VITALS
RESPIRATION RATE: 18 BRPM | SYSTOLIC BLOOD PRESSURE: 138 MMHG | TEMPERATURE: 98 F | HEART RATE: 63 BPM | DIASTOLIC BLOOD PRESSURE: 63 MMHG | OXYGEN SATURATION: 99 %

## 2022-09-05 LAB
ALBUMIN SERPL ELPH-MCNC: 3 G/DL — LOW (ref 3.3–5)
ALP SERPL-CCNC: 70 U/L — SIGNIFICANT CHANGE UP (ref 40–120)
ALT FLD-CCNC: 28 U/L — SIGNIFICANT CHANGE UP (ref 10–45)
ANION GAP SERPL CALC-SCNC: 10 MMOL/L — SIGNIFICANT CHANGE UP (ref 5–17)
AST SERPL-CCNC: 25 U/L — SIGNIFICANT CHANGE UP (ref 10–40)
BILIRUB SERPL-MCNC: 0.3 MG/DL — SIGNIFICANT CHANGE UP (ref 0.2–1.2)
BUN SERPL-MCNC: 28 MG/DL — HIGH (ref 7–23)
CALCIUM SERPL-MCNC: 8.6 MG/DL — SIGNIFICANT CHANGE UP (ref 8.4–10.5)
CHLORIDE SERPL-SCNC: 107 MMOL/L — SIGNIFICANT CHANGE UP (ref 96–108)
CO2 SERPL-SCNC: 21 MMOL/L — LOW (ref 22–31)
CREAT SERPL-MCNC: 1.27 MG/DL — SIGNIFICANT CHANGE UP (ref 0.5–1.3)
EGFR: 53 ML/MIN/1.73M2 — LOW
GLUCOSE BLDC GLUCOMTR-MCNC: 102 MG/DL — HIGH (ref 70–99)
GLUCOSE BLDC GLUCOMTR-MCNC: 127 MG/DL — HIGH (ref 70–99)
GLUCOSE BLDC GLUCOMTR-MCNC: 90 MG/DL — SIGNIFICANT CHANGE UP (ref 70–99)
GLUCOSE SERPL-MCNC: 97 MG/DL — SIGNIFICANT CHANGE UP (ref 70–99)
HCT VFR BLD CALC: 26.9 % — LOW (ref 39–50)
HGB BLD-MCNC: 8.5 G/DL — LOW (ref 13–17)
MAGNESIUM SERPL-MCNC: 1.8 MG/DL — SIGNIFICANT CHANGE UP (ref 1.6–2.6)
MCHC RBC-ENTMCNC: 30.1 PG — SIGNIFICANT CHANGE UP (ref 27–34)
MCHC RBC-ENTMCNC: 31.6 GM/DL — LOW (ref 32–36)
MCV RBC AUTO: 95.4 FL — SIGNIFICANT CHANGE UP (ref 80–100)
NRBC # BLD: 0 /100 WBCS — SIGNIFICANT CHANGE UP (ref 0–0)
PHOSPHATE SERPL-MCNC: 2.9 MG/DL — SIGNIFICANT CHANGE UP (ref 2.5–4.5)
PLATELET # BLD AUTO: 378 K/UL — SIGNIFICANT CHANGE UP (ref 150–400)
POTASSIUM SERPL-MCNC: 4.5 MMOL/L — SIGNIFICANT CHANGE UP (ref 3.5–5.3)
POTASSIUM SERPL-SCNC: 4.5 MMOL/L — SIGNIFICANT CHANGE UP (ref 3.5–5.3)
PROT SERPL-MCNC: 5.4 G/DL — LOW (ref 6–8.3)
RBC # BLD: 2.82 M/UL — LOW (ref 4.2–5.8)
RBC # FLD: 14 % — SIGNIFICANT CHANGE UP (ref 10.3–14.5)
SODIUM SERPL-SCNC: 138 MMOL/L — SIGNIFICANT CHANGE UP (ref 135–145)
WBC # BLD: 16.07 K/UL — HIGH (ref 3.8–10.5)
WBC # FLD AUTO: 16.07 K/UL — HIGH (ref 3.8–10.5)

## 2022-09-05 PROCEDURE — 84295 ASSAY OF SERUM SODIUM: CPT

## 2022-09-05 PROCEDURE — 86900 BLOOD TYPING SEROLOGIC ABO: CPT

## 2022-09-05 PROCEDURE — C1769: CPT

## 2022-09-05 PROCEDURE — 71045 X-RAY EXAM CHEST 1 VIEW: CPT

## 2022-09-05 PROCEDURE — 87086 URINE CULTURE/COLONY COUNT: CPT

## 2022-09-05 PROCEDURE — 70450 CT HEAD/BRAIN W/O DYE: CPT | Mod: MA

## 2022-09-05 PROCEDURE — 86850 RBC ANTIBODY SCREEN: CPT

## 2022-09-05 PROCEDURE — 82042 OTHER SOURCE ALBUMIN QUAN EA: CPT

## 2022-09-05 PROCEDURE — 97530 THERAPEUTIC ACTIVITIES: CPT

## 2022-09-05 PROCEDURE — 82962 GLUCOSE BLOOD TEST: CPT

## 2022-09-05 PROCEDURE — 36415 COLL VENOUS BLD VENIPUNCTURE: CPT

## 2022-09-05 PROCEDURE — 71250 CT THORAX DX C-: CPT

## 2022-09-05 PROCEDURE — 86901 BLOOD TYPING SEROLOGIC RH(D): CPT

## 2022-09-05 PROCEDURE — 84100 ASSAY OF PHOSPHORUS: CPT

## 2022-09-05 PROCEDURE — 71260 CT THORAX DX C+: CPT | Mod: MA

## 2022-09-05 PROCEDURE — 82803 BLOOD GASES ANY COMBINATION: CPT

## 2022-09-05 PROCEDURE — 97163 PT EVAL HIGH COMPLEX 45 MIN: CPT

## 2022-09-05 PROCEDURE — 83735 ASSAY OF MAGNESIUM: CPT

## 2022-09-05 PROCEDURE — 74177 CT ABD & PELVIS W/CONTRAST: CPT | Mod: MA

## 2022-09-05 PROCEDURE — 88112 CYTOPATH CELL ENHANCE TECH: CPT

## 2022-09-05 PROCEDURE — 84132 ASSAY OF SERUM POTASSIUM: CPT

## 2022-09-05 PROCEDURE — 97161 PT EVAL LOW COMPLEX 20 MIN: CPT

## 2022-09-05 PROCEDURE — 87324 CLOSTRIDIUM AG IA: CPT

## 2022-09-05 PROCEDURE — 82140 ASSAY OF AMMONIA: CPT

## 2022-09-05 PROCEDURE — 85025 COMPLETE CBC W/AUTO DIFF WBC: CPT

## 2022-09-05 PROCEDURE — 99291 CRITICAL CARE FIRST HOUR: CPT | Mod: 25

## 2022-09-05 PROCEDURE — 84484 ASSAY OF TROPONIN QUANT: CPT

## 2022-09-05 PROCEDURE — 83605 ASSAY OF LACTIC ACID: CPT

## 2022-09-05 PROCEDURE — 87070 CULTURE OTHR SPECIMN AEROBIC: CPT

## 2022-09-05 PROCEDURE — 87205 SMEAR GRAM STAIN: CPT

## 2022-09-05 PROCEDURE — 87075 CULTR BACTERIA EXCEPT BLOOD: CPT

## 2022-09-05 PROCEDURE — 85730 THROMBOPLASTIN TIME PARTIAL: CPT

## 2022-09-05 PROCEDURE — 82330 ASSAY OF CALCIUM: CPT

## 2022-09-05 PROCEDURE — 97116 GAIT TRAINING THERAPY: CPT

## 2022-09-05 PROCEDURE — 87635 SARS-COV-2 COVID-19 AMP PRB: CPT

## 2022-09-05 PROCEDURE — 87040 BLOOD CULTURE FOR BACTERIA: CPT

## 2022-09-05 PROCEDURE — 88305 TISSUE EXAM BY PATHOLOGIST: CPT

## 2022-09-05 PROCEDURE — 87449 NOS EACH ORGANISM AG IA: CPT

## 2022-09-05 PROCEDURE — 83615 LACTATE (LD) (LDH) ENZYME: CPT

## 2022-09-05 PROCEDURE — 80053 COMPREHEN METABOLIC PANEL: CPT

## 2022-09-05 PROCEDURE — 92526 ORAL FUNCTION THERAPY: CPT

## 2022-09-05 PROCEDURE — 76705 ECHO EXAM OF ABDOMEN: CPT

## 2022-09-05 PROCEDURE — 96374 THER/PROPH/DIAG INJ IV PUSH: CPT

## 2022-09-05 PROCEDURE — 96375 TX/PRO/DX INJ NEW DRUG ADDON: CPT

## 2022-09-05 PROCEDURE — 74018 RADEX ABDOMEN 1 VIEW: CPT

## 2022-09-05 PROCEDURE — 81003 URINALYSIS AUTO W/O SCOPE: CPT

## 2022-09-05 PROCEDURE — 85027 COMPLETE CBC AUTOMATED: CPT

## 2022-09-05 PROCEDURE — 94640 AIRWAY INHALATION TREATMENT: CPT

## 2022-09-05 PROCEDURE — 0225U NFCT DS DNA&RNA 21 SARSCOV2: CPT

## 2022-09-05 PROCEDURE — 87507 IADNA-DNA/RNA PROBE TQ 12-25: CPT

## 2022-09-05 PROCEDURE — 80048 BASIC METABOLIC PNL TOTAL CA: CPT

## 2022-09-05 PROCEDURE — 32555 ASPIRATE PLEURA W/ IMAGING: CPT

## 2022-09-05 PROCEDURE — 85610 PROTHROMBIN TIME: CPT

## 2022-09-05 PROCEDURE — 82272 OCCULT BLD FECES 1-3 TESTS: CPT

## 2022-09-05 RX ORDER — MAGNESIUM SULFATE 500 MG/ML
2 VIAL (ML) INJECTION ONCE
Refills: 0 | Status: COMPLETED | OUTPATIENT
Start: 2022-09-05 | End: 2022-09-05

## 2022-09-05 RX ORDER — DEXAMETHASONE 0.5 MG/5ML
1 ELIXIR ORAL
Qty: 0 | Refills: 0 | DISCHARGE
Start: 2022-09-05

## 2022-09-05 RX ORDER — VALSARTAN 80 MG/1
1 TABLET ORAL
Qty: 0 | Refills: 0 | DISCHARGE
Start: 2022-09-05

## 2022-09-05 RX ORDER — REMDESIVIR 5 MG/ML
100 INJECTION INTRAVENOUS
Qty: 0 | Refills: 0 | DISCHARGE
Start: 2022-09-05

## 2022-09-05 RX ORDER — VANCOMYCIN HCL 1 G
5 VIAL (EA) INTRAVENOUS
Qty: 0 | Refills: 0 | DISCHARGE
Start: 2022-09-05

## 2022-09-05 RX ADMIN — Medication 125 MILLIGRAM(S): at 06:56

## 2022-09-05 RX ADMIN — HEPARIN SODIUM 5000 UNIT(S): 5000 INJECTION INTRAVENOUS; SUBCUTANEOUS at 06:56

## 2022-09-05 RX ADMIN — CLOPIDOGREL BISULFATE 75 MILLIGRAM(S): 75 TABLET, FILM COATED ORAL at 13:28

## 2022-09-05 RX ADMIN — ISOSORBIDE MONONITRATE 30 MILLIGRAM(S): 60 TABLET, EXTENDED RELEASE ORAL at 13:28

## 2022-09-05 RX ADMIN — Medication 25 GRAM(S): at 08:57

## 2022-09-05 RX ADMIN — Medication 81 MILLIGRAM(S): at 13:28

## 2022-09-05 RX ADMIN — Medication 125 MILLIGRAM(S): at 11:48

## 2022-09-05 RX ADMIN — HEPARIN SODIUM 5000 UNIT(S): 5000 INJECTION INTRAVENOUS; SUBCUTANEOUS at 13:28

## 2022-09-05 RX ADMIN — Medication 125 MILLIGRAM(S): at 00:31

## 2022-09-05 RX ADMIN — BUDESONIDE AND FORMOTEROL FUMARATE DIHYDRATE 2 PUFF(S): 160; 4.5 AEROSOL RESPIRATORY (INHALATION) at 06:56

## 2022-09-05 RX ADMIN — Medication 6 MILLIGRAM(S): at 11:48

## 2022-09-05 NOTE — PROGRESS NOTE ADULT - ASSESSMENT
ASSESSMENT/PLAN89 y/o M w/ PMH CAD, CKD3, COPD, colonic mass, lung nodules, chronic diarrhea and tachypnea who presented for diarrhea, tachypnea, and chest pain who was admitted for AMS secondary to severe sepsis. C Difficile colitis, L Lung nodules , concern for new RLL aspiration, new R pleural effusion, now COVID-19, R PTX, LLL infiltrate     1. O2 2LNC at this time  2. Bronchodilators:  Atrovent/ albuterol q 4 – 6 hours as needed  3. Corticosteroids:  Decadron COVID-19 protocol  4. ID/Antibiotics: now on Vancomycin ,Remdesivir  5. Cardiac/HTN: optimize BP   6. GI: Rx/ prophylaxis c PPI/H2B  7. Heme: Rx/VT prophylaxis c SQH/SCD/AC  8. Aspiration precautions, discussed c Speech and Swallow  9. repeat CXR   Discussed with managing team,   Verify GOC if any desire to further manage pulmonary nodules

## 2022-09-05 NOTE — PROGRESS NOTE ADULT - PROVIDER SPECIALTY LIST ADULT
Gastroenterology
Pulmonology
Pulmonology
Gastroenterology
Internal Medicine
Pulmonology
Surgery
Gastroenterology
Internal Medicine
Pulmonology
Surgery
Surgery
Gastroenterology
Internal Medicine
Internal Medicine
Surgery
Internal Medicine
Palliative Care
Internal Medicine

## 2022-09-05 NOTE — PROGRESS NOTE ADULT - SUBJECTIVE AND OBJECTIVE BOX
Interventional, Pulmonary, Critical, Chest Special Procedures.    Pt was seen and fully examined by myself.     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 Patient is altered on exam, but per ED report, he has been having chronic diarrhea, tachypnea, and chest pain that was worsening, prompting patient to call EMS to bring him from home into [facility]. Of note, patient was recently admitted 7/19-7/23 to [facility] for diarrhea after being sent in from his PCP office for hypotension and chronic diarrhea. During that time his diarrhea resolved and he was told to f/u with outpatient GI with Dr. Hammer. Notably he was fully conversant with mild memory impairment on admission and throughout hospital stay.    ED vitals: T 39.3   HR 78  RR 36 (taken manually)  BP 97/59  SpO2 98% on 2L  Labs significant: WBC 30, Hgb 11.5, lactate 2.3 -> 3.2 after 2L NS, UA wnl  Imaging/EKG: CT H/C/A/P known lung nodule, colitis  Interventions: zosyn, zofran, 3.2L NS, flagyl, acetaminophen     (24 Aug 2022 21:48)      REVIEW OF SYSTEMS:  Constitutional: No fever, weight loss, chills + fatigue  Eyes: No eye pain, visual disturbances, or discharge  ENMT:  + difficulty hearing, tinnitus, vertigo; No sinus or throat pain. No epistaxis, +dysphagia, dysphonia, hoarseness no odynophagia  Neck: No pain, stiffness or neck swelling.  No masses or deformities  Respiratory: + cough, wheezing, hemoptysis  + COPD  - ILD   - PE   - ASTHMA     - PNEUMONIA  Cardiovascular: No chest pain, dysrhythmia, palpitations, dizziness or edema + CAD   - CHF   - HTN  Gastrointestinal: No abdominal or epigastric pain. No nausea, vomiting or hematemesis; No diarrhea or constipation. No melena or hematochezia, Icterus.          Genitourinary: No dysuria, frequency, hematuria or incontinence   - CKD/DEBBIE      - ESRD  Neurological: No headaches, +memory loss, loss of strength, numbness or tremors      +DEMENTIA     - STROKE    - SEIZURE  Skin: No itching, burning, rashes or lesions   Lymph Nodes: No enlarged glands  Endocrine: No heat or cold intolerance; No hair loss       + DM     - THYROID DISORDER  Musculoskeletal: No joint pain or swelling; No muscle, back or extremity pain, No edema  Psychiatric: No depression, anxiety, mood swings or difficulty sleeping  Heme/Lymph: No easy bruising or bleeding gums         - ANEMIA      - CANCER   -COAGULOPATHY  Allergy and Immunologic: No hives or eczema    PAST MEDICAL & SURGICAL HISTORY:  HLD (hyperlipidemia)      CAD (coronary artery disease)      BPH (benign prostatic hyperplasia)      COPD, moderate      Chronic diarrhea      Stage 3 chronic kidney disease      S/P cataract extraction      H/O right hemicolectomy        FAMILY HISTORY:  No family history of cardiovascular disease (Father, Mother)      SOCIAL HISTORY:      - Tobacco     - ETOH    Allergies    No Known Allergies    Intolerances      Vital Signs Last 24 Hrs  T(C): 36.7 (05 Sep 2022 05:07), Max: 36.9 (04 Sep 2022 20:20)  T(F): 98.1 (05 Sep 2022 05:07), Max: 98.4 (04 Sep 2022 20:20)  HR: 55 (05 Sep 2022 10:25) (55 - 86)  BP: 140/69 (05 Sep 2022 10:25) (135/63 - 167/74)  BP(mean): --  RR: 19 (05 Sep 2022 05:07) (18 - 20)  SpO2: 99% (05 Sep 2022 05:07) (97% - 99%)    Parameters below as of 04 Sep 2022 20:20  Patient On (Oxygen Delivery Method): room air            MEDICATIONS:  MEDICATIONS  (STANDING):  aspirin enteric coated 81 milliGRAM(s) Oral every 24 hours  budesonide  80 MICROgram(s)/formoterol 4.5 MICROgram(s) Inhaler 2 Puff(s) Inhalation two times a day  clopidogrel Tablet 75 milliGRAM(s) Oral every 24 hours  dexAMETHasone     Tablet 6 milliGRAM(s) Oral every 24 hours  dextrose 5%. 1000 milliLiter(s) (50 mL/Hr) IV Continuous <Continuous>  dextrose 5%. 1000 milliLiter(s) (100 mL/Hr) IV Continuous <Continuous>  dextrose 50% Injectable 25 Gram(s) IV Push once  dextrose 50% Injectable 12.5 Gram(s) IV Push once  dextrose 50% Injectable 25 Gram(s) IV Push once  glucagon  Injectable 1 milliGRAM(s) IntraMuscular once  heparin   Injectable 5000 Unit(s) SubCutaneous every 8 hours  insulin lispro (ADMELOG) corrective regimen sliding scale   SubCutaneous every 6 hours  isosorbide   mononitrate ER Tablet (IMDUR) 30 milliGRAM(s) Oral every 24 hours  mirtazapine 30 milliGRAM(s) Oral every 24 hours  remdesivir  IVPB   IV Intermittent   remdesivir  IVPB 100 milliGRAM(s) IV Intermittent every 24 hours  vancomycin    Solution 125 milliGRAM(s) Oral every 6 hours    MEDICATIONS  (PRN):  ALBUTerol    90 MICROgram(s) HFA Inhaler 2 Puff(s) Inhalation every 6 hours PRN Shortness of Breath and/or Wheezing  dextrose Oral Gel 15 Gram(s) Oral once PRN Blood Glucose LESS THAN 70 milliGRAM(s)/deciliter      PHYSICAL EXAM:  Un Comfortable, no immediate distress  Eyes: PERRL, EOM intact; conjunctiva and sclera clear  Head: Normocephalic;  No Trauma  ENMT: No nasal discharge, hoarseness, +cough no  hemoptysis  Neck: Supple; non tender; no masses or deformities.    No JVD  Respiratory:- WHEEZING   - RHONCHI  + RLL RALES  + CRACKLES.  Diminished breath sounds R base   Cardiovascular: Regular rate and rhythm. S1 and S2 Normal; No murmurs, gallops or rubs     - PPM/AICD  Gastrointestinal: Soft mildly tender, non-distended; Normal bowel sounds; No hepatosplenomegaly.     -PEG    -  GT   + RUBIO  Genitourinary: No costovertebral angle tenderness. No dysuria  Extremities: AROM, No clubbing, cyanosis or edema    Vascular: Peripheral pulses palpable 2+ bilaterally  Neurological: Awake, moving extremities   Skin: Warm and dry. No obvious rash  Lymph Nodes: No acute cervical or supraclavicular adenopathy  Psychiatric: less engaging     DEVICES:  - DENTURES   +IV R / L     - ETUBE   -TRACH   -CTUBE  R / L    LABS:                          8.5    16.07 )-----------( 378      ( 05 Sep 2022 05:30 )             26.9     09-05    138  |  107  |  28<H>  ----------------------------<  97  4.5   |  21<L>  |  1.27    Ca    8.6      05 Sep 2022 05:30  Phos  2.9     09-05  Mg     1.8     09-05    TPro  5.4<L>  /  Alb  3.0<L>  /  TBili  0.3  /  DBili  x   /  AST  25  /  ALT  28  /  AlkPhos  70  09-05      RADIOLOGY & ADDITIONAL STUDIES (The following images were personally reviewed):< from: Xray Chest 1 View- PORTABLE-Routine (Xray Chest 1 View- PORTABLE-Routine in AM.) (09.04.22 @ 07:20) >  ACC: [id number] EXAM:  XR CHEST PORTABLE ROUTINE 1V                          PROCEDURE DATE:  09/04/2022          INTERPRETATION:  Clinical History: Pneumothorax    Frontal examination of the chest demonstrates the heart to be within   normal limits in transverse diameter. Left effusion. Discoid change   and/or infiltrate left lung base. No evidence of pneumothorax.   Levoscoliosis thoracic spine with degenerative changes. Calcification   thoracic aorta.    IMPRESSION: Left effusion. Discoid change and/or infiltrates left lung   base. No evidence of pneumothorax    < end of copied text >

## 2022-09-05 NOTE — DISCHARGE NOTE NURSING/CASE MANAGEMENT/SOCIAL WORK - PATIENT PORTAL LINK FT
You can access the FollowMyHealth Patient Portal offered by [facility] by registering at the following website: [email or web address] By joining [facility]’s FollowMyHealth portal, you will also be able to view your health information using other applications (apps) compatible with our system.

## 2022-09-05 NOTE — DISCHARGE NOTE NURSING/CASE MANAGEMENT/SOCIAL WORK - NSDCPEFALRISK_GEN_ALL_CORE
For information on Fall & Injury Prevention, visit: [email or web address] OR  [email or web address] OR  https://www.cdc.gov/steadi/patient.html

## 2022-09-06 LAB
CULTURE RESULTS: NO GROWTH — SIGNIFICANT CHANGE UP
NON-GYNECOLOGICAL CYTOLOGY STUDY: SIGNIFICANT CHANGE UP
SPECIMEN SOURCE: SIGNIFICANT CHANGE UP

## 2022-09-17 VITALS
OXYGEN SATURATION: 98 % | WEIGHT: 119.93 LBS | HEART RATE: 63 BPM | DIASTOLIC BLOOD PRESSURE: 51 MMHG | HEIGHT: 68 IN | RESPIRATION RATE: 24 BRPM | TEMPERATURE: 98 F | SYSTOLIC BLOOD PRESSURE: 124 MMHG

## 2022-09-17 LAB
ALBUMIN SERPL ELPH-MCNC: 3.3 G/DL — SIGNIFICANT CHANGE UP (ref 3.3–5)
ALP SERPL-CCNC: 80 U/L — SIGNIFICANT CHANGE UP (ref 40–120)
ALT FLD-CCNC: 8 U/L — LOW (ref 10–45)
ANION GAP SERPL CALC-SCNC: 6 MMOL/L — SIGNIFICANT CHANGE UP (ref 5–17)
APTT BLD: 30.7 SEC — SIGNIFICANT CHANGE UP (ref 27.5–35.5)
AST SERPL-CCNC: 11 U/L — SIGNIFICANT CHANGE UP (ref 10–40)
BASE EXCESS BLDV CALC-SCNC: 2.1 MMOL/L — SIGNIFICANT CHANGE UP (ref -2–3)
BASOPHILS # BLD AUTO: 0 K/UL — SIGNIFICANT CHANGE UP (ref 0–0.2)
BASOPHILS NFR BLD AUTO: 0 % — SIGNIFICANT CHANGE UP (ref 0–2)
BILIRUB SERPL-MCNC: 0.3 MG/DL — SIGNIFICANT CHANGE UP (ref 0.2–1.2)
BUN SERPL-MCNC: 21 MG/DL — SIGNIFICANT CHANGE UP (ref 7–23)
CA-I SERPL-SCNC: 1.24 MMOL/L — SIGNIFICANT CHANGE UP (ref 1.15–1.33)
CALCIUM SERPL-MCNC: 8.9 MG/DL — SIGNIFICANT CHANGE UP (ref 8.4–10.5)
CHLORIDE SERPL-SCNC: 101 MMOL/L — SIGNIFICANT CHANGE UP (ref 96–108)
CK MB CFR SERPL CALC: 1.8 NG/ML — SIGNIFICANT CHANGE UP (ref 0–6.7)
CK SERPL-CCNC: 14 U/L — LOW (ref 30–200)
CO2 BLDV-SCNC: 30.3 MMOL/L — HIGH (ref 22–26)
CO2 SERPL-SCNC: 29 MMOL/L — SIGNIFICANT CHANGE UP (ref 22–31)
CREAT SERPL-MCNC: 1.38 MG/DL — HIGH (ref 0.5–1.3)
DACRYOCYTES BLD QL SMEAR: SLIGHT — SIGNIFICANT CHANGE UP
EGFR: 48 ML/MIN/1.73M2 — LOW
EOSINOPHIL # BLD AUTO: 0.15 K/UL — SIGNIFICANT CHANGE UP (ref 0–0.5)
EOSINOPHIL NFR BLD AUTO: 1.7 % — SIGNIFICANT CHANGE UP (ref 0–6)
GAS PNL BLDV: 133 MMOL/L — LOW (ref 136–145)
GAS PNL BLDV: SIGNIFICANT CHANGE UP
GAS PNL BLDV: SIGNIFICANT CHANGE UP
GLUCOSE SERPL-MCNC: 165 MG/DL — HIGH (ref 70–99)
HCO3 BLDV-SCNC: 29 MMOL/L — SIGNIFICANT CHANGE UP (ref 22–29)
HCT VFR BLD CALC: 26.9 % — LOW (ref 39–50)
HGB BLD-MCNC: 8.5 G/DL — LOW (ref 13–17)
HYPOCHROMIA BLD QL: SLIGHT — SIGNIFICANT CHANGE UP
INR BLD: 1.02 — SIGNIFICANT CHANGE UP (ref 0.88–1.16)
LYMPHOCYTES # BLD AUTO: 0.23 K/UL — LOW (ref 1–3.3)
LYMPHOCYTES # BLD AUTO: 2.6 % — LOW (ref 13–44)
MANUAL SMEAR VERIFICATION: SIGNIFICANT CHANGE UP
MCHC RBC-ENTMCNC: 30.4 PG — SIGNIFICANT CHANGE UP (ref 27–34)
MCHC RBC-ENTMCNC: 31.6 GM/DL — LOW (ref 32–36)
MCV RBC AUTO: 96.1 FL — SIGNIFICANT CHANGE UP (ref 80–100)
MONOCYTES # BLD AUTO: 0.23 K/UL — SIGNIFICANT CHANGE UP (ref 0–0.9)
MONOCYTES NFR BLD AUTO: 2.6 % — SIGNIFICANT CHANGE UP (ref 2–14)
NEUTROPHILS # BLD AUTO: 8.13 K/UL — HIGH (ref 1.8–7.4)
NEUTROPHILS NFR BLD AUTO: 92.2 % — HIGH (ref 43–77)
NEUTS BAND # BLD: 0.9 % — SIGNIFICANT CHANGE UP (ref 0–8)
NT-PROBNP SERPL-SCNC: 1886 PG/ML — HIGH (ref 0–300)
PCO2 BLDV: 52 MMHG — SIGNIFICANT CHANGE UP (ref 42–55)
PH BLDV: 7.35 — SIGNIFICANT CHANGE UP (ref 7.32–7.43)
PLAT MORPH BLD: ABNORMAL
PLATELET # BLD AUTO: 197 K/UL — SIGNIFICANT CHANGE UP (ref 150–400)
PO2 BLDV: 47 MMHG — HIGH (ref 25–45)
POIKILOCYTOSIS BLD QL AUTO: SLIGHT — SIGNIFICANT CHANGE UP
POTASSIUM BLDV-SCNC: 4.8 MMOL/L — SIGNIFICANT CHANGE UP (ref 3.5–5.1)
POTASSIUM SERPL-MCNC: 4.8 MMOL/L — SIGNIFICANT CHANGE UP (ref 3.5–5.3)
POTASSIUM SERPL-SCNC: 4.8 MMOL/L — SIGNIFICANT CHANGE UP (ref 3.5–5.3)
PROT SERPL-MCNC: 5.9 G/DL — LOW (ref 6–8.3)
PROTHROM AB SERPL-ACNC: 12.2 SEC — SIGNIFICANT CHANGE UP (ref 10.5–13.4)
RBC # BLD: 2.8 M/UL — LOW (ref 4.2–5.8)
RBC # FLD: 13.8 % — SIGNIFICANT CHANGE UP (ref 10.3–14.5)
RBC BLD AUTO: ABNORMAL
SAO2 % BLDV: 79.4 % — SIGNIFICANT CHANGE UP (ref 67–88)
SARS-COV-2 RNA SPEC QL NAA+PROBE: NEGATIVE — SIGNIFICANT CHANGE UP
SODIUM SERPL-SCNC: 136 MMOL/L — SIGNIFICANT CHANGE UP (ref 135–145)
TROPONIN T SERPL-MCNC: 0.03 NG/ML — HIGH (ref 0–0.01)
WBC # BLD: 8.73 K/UL — SIGNIFICANT CHANGE UP (ref 3.8–10.5)
WBC # FLD AUTO: 8.73 K/UL — SIGNIFICANT CHANGE UP (ref 3.8–10.5)

## 2022-09-17 PROCEDURE — 71275 CT ANGIOGRAPHY CHEST: CPT | Mod: 26,MA

## 2022-09-17 PROCEDURE — 71045 X-RAY EXAM CHEST 1 VIEW: CPT | Mod: 26

## 2022-09-17 PROCEDURE — 93010 ELECTROCARDIOGRAM REPORT: CPT

## 2022-09-17 PROCEDURE — 99285 EMERGENCY DEPT VISIT HI MDM: CPT | Mod: CS,25

## 2022-09-17 NOTE — ED PROVIDER NOTE - PHYSICAL EXAMINATION
CONSTITUTIONAL: elderly male, frail appearing  HEAD: Normocephalic; atraumatic.   EYES:  conjunctiva and sclera clear  ENT: normal nose; no rhinorrhea; normal pharynx with no erythema or lesions.   NECK: Supple; non-tender;   CARDIOVASCULAR: Normal S1, S2; no murmurs, rubs, or gallops. Regular rate and rhythm.   RESPIRATORY: Increased rate and effort, poor inspiration but breath sounds without any obvious wheezing rales or crackles  GI: Soft; non-distended; non-tender; no palpable organomegaly.   EXT: No cyanosis or edema; N/V intact  SKIN: Normal for age and race; warm; dry; good turgor; no apparent lesions or rash.   NEURO: A & O x 3; face symmetric; grossly unremarkable.   PSYCHOLOGICAL: The patient’s mood and manner are appropriate.

## 2022-09-17 NOTE — ED PROVIDER NOTE - CLINICAL SUMMARY MEDICAL DECISION MAKING FREE TEXT BOX
[facility] Tanner Yan : [phone], cell phone HCP Tanner Yan : [phone], cell phone  Here w/ worsening SOB  hypoxia appears baseline since last admission, no increase in nasal cannula use  will check EKG, CXR, labs, and reassess  given recent covid and pt essentially bedbound, will r/o PE

## 2022-09-17 NOTE — ED PROVIDER NOTE - OBJECTIVE STATEMENT
89 yo M/F with past medical history of HTN, CAD s/p PCI w 3 DAYANA, CKD (Cr 1.7- 2.4), COPD (not usually on home O2 but is now since last admission), arthritis, aortic aneurysm, BPH, HLD, EF 45-50%, lung nodules, and chronic diarrhea, recent admission earlier this month for severe sepsis secondary to c diff colitis, COVID19 pneumonia, and bilateral pleural effusions R>L s/p drainage complicated by small PTX, returns today for SOB x 3-4 days, worst today. Not improved after albuterol and lasix at the NH.Has been on nasal cannula  oxygen since discharge, still requiring it. Nephew who is HCP at bedside, states pt not confused but more tired than usual and seems to be breathing heavily. Pt has no other complaints. Attributes his SOB to the nasal cannula. Neither of them have noticed any pedal edema. No fevers/chills, no nausea vomiting. diarrhea has improved significantly, nephew states pt no longer on covid-19 or c diff isolation. Pt unsure if sob similar to prior copd exacerbations.

## 2022-09-17 NOTE — ED ADULT NURSE NOTE - CHIEF COMPLAINT QUOTE
Pt complains of SOB/difficulty breathing since several days ago, however worst today. Pt was given albuterol at the [facility] with no relief. Hx COPD O2 dependent at 3L.

## 2022-09-17 NOTE — ED ADULT TRIAGE NOTE - CHIEF COMPLAINT QUOTE
Pt complains of SOB/difficulty breathing since several days ago, however worst today. Pt was given albuterol at the [facility] with no relief. Hx COPD O2 dependent at 3L.

## 2022-09-17 NOTE — ED ADULT NURSE NOTE - OBJECTIVE STATEMENT
89 y/o male w/ hx of HTN, HLD, COPD, and CAD presents to the ED from [facility] for evaluation of SOB that began 2 days ago, worsening today. Pt recently admitted to [facility] for cdiff. Denies any other sx.

## 2022-09-18 ENCOUNTER — INPATIENT (INPATIENT)
Facility: HOSPITAL | Age: 87
LOS: 0 days | Discharge: NURSING, REHAB OR LONG-TERM CARE | DRG: 309 | End: 2022-09-19
Attending: INTERNAL MEDICINE | Admitting: INTERNAL MEDICINE
Payer: COMMERCIAL

## 2022-09-18 DIAGNOSIS — J44.9 CHRONIC OBSTRUCTIVE PULMONARY DISEASE, UNSPECIFIED: ICD-10-CM

## 2022-09-18 DIAGNOSIS — R06.02 SHORTNESS OF BREATH: ICD-10-CM

## 2022-09-18 DIAGNOSIS — N18.30 CHRONIC KIDNEY DISEASE, STAGE 3 UNSPECIFIED: ICD-10-CM

## 2022-09-18 DIAGNOSIS — I10 ESSENTIAL (PRIMARY) HYPERTENSION: ICD-10-CM

## 2022-09-18 DIAGNOSIS — Z90.49 ACQUIRED ABSENCE OF OTHER SPECIFIED PARTS OF DIGESTIVE TRACT: Chronic | ICD-10-CM

## 2022-09-18 DIAGNOSIS — Z98.49 CATARACT EXTRACTION STATUS, UNSPECIFIED EYE: Chronic | ICD-10-CM

## 2022-09-18 DIAGNOSIS — D64.9 ANEMIA, UNSPECIFIED: ICD-10-CM

## 2022-09-18 PROBLEM — K52.9 NONINFECTIVE GASTROENTERITIS AND COLITIS, UNSPECIFIED: Chronic | Status: ACTIVE | Noted: 2022-08-24

## 2022-09-18 LAB
A1C WITH ESTIMATED AVERAGE GLUCOSE RESULT: 5.4 % — SIGNIFICANT CHANGE UP (ref 4–5.6)
ANION GAP SERPL CALC-SCNC: 12 MMOL/L — SIGNIFICANT CHANGE UP (ref 5–17)
BASOPHILS # BLD AUTO: 0 K/UL — SIGNIFICANT CHANGE UP (ref 0–0.2)
BASOPHILS NFR BLD AUTO: 0 % — SIGNIFICANT CHANGE UP (ref 0–2)
BLD GP AB SCN SERPL QL: NEGATIVE — SIGNIFICANT CHANGE UP
BUN SERPL-MCNC: 21 MG/DL — SIGNIFICANT CHANGE UP (ref 7–23)
CALCIUM SERPL-MCNC: 9.5 MG/DL — SIGNIFICANT CHANGE UP (ref 8.4–10.5)
CHLORIDE SERPL-SCNC: 99 MMOL/L — SIGNIFICANT CHANGE UP (ref 96–108)
CHOLEST SERPL-MCNC: 158 MG/DL — SIGNIFICANT CHANGE UP
CO2 SERPL-SCNC: 24 MMOL/L — SIGNIFICANT CHANGE UP (ref 22–31)
CREAT SERPL-MCNC: 1.34 MG/DL — HIGH (ref 0.5–1.3)
EGFR: 50 ML/MIN/1.73M2 — LOW
EOSINOPHIL # BLD AUTO: 0 K/UL — SIGNIFICANT CHANGE UP (ref 0–0.5)
EOSINOPHIL NFR BLD AUTO: 0 % — SIGNIFICANT CHANGE UP (ref 0–6)
ESTIMATED AVERAGE GLUCOSE: 108 MG/DL — SIGNIFICANT CHANGE UP (ref 68–114)
FERRITIN SERPL-MCNC: 146 NG/ML — SIGNIFICANT CHANGE UP (ref 30–400)
FOLATE SERPL-MCNC: 10.5 NG/ML — SIGNIFICANT CHANGE UP
GLUCOSE SERPL-MCNC: 126 MG/DL — HIGH (ref 70–99)
HCT VFR BLD CALC: 30.3 % — LOW (ref 39–50)
HDLC SERPL-MCNC: 53 MG/DL — SIGNIFICANT CHANGE UP
HGB BLD-MCNC: 9.5 G/DL — LOW (ref 13–17)
IMM GRANULOCYTES NFR BLD AUTO: 0.4 % — SIGNIFICANT CHANGE UP (ref 0–0.9)
IRON SATN MFR SERPL: 31 % — SIGNIFICANT CHANGE UP (ref 16–55)
IRON SATN MFR SERPL: 74 UG/DL — SIGNIFICANT CHANGE UP (ref 45–165)
LIPID PNL WITH DIRECT LDL SERPL: 87 MG/DL — SIGNIFICANT CHANGE UP
LYMPHOCYTES # BLD AUTO: 0.4 K/UL — LOW (ref 1–3.3)
LYMPHOCYTES # BLD AUTO: 5.8 % — LOW (ref 13–44)
MAGNESIUM SERPL-MCNC: 2 MG/DL — SIGNIFICANT CHANGE UP (ref 1.6–2.6)
MCHC RBC-ENTMCNC: 30.5 PG — SIGNIFICANT CHANGE UP (ref 27–34)
MCHC RBC-ENTMCNC: 31.4 GM/DL — LOW (ref 32–36)
MCV RBC AUTO: 97.4 FL — SIGNIFICANT CHANGE UP (ref 80–100)
MONOCYTES # BLD AUTO: 0.45 K/UL — SIGNIFICANT CHANGE UP (ref 0–0.9)
MONOCYTES NFR BLD AUTO: 6.5 % — SIGNIFICANT CHANGE UP (ref 2–14)
NEUTROPHILS # BLD AUTO: 6.01 K/UL — SIGNIFICANT CHANGE UP (ref 1.8–7.4)
NEUTROPHILS NFR BLD AUTO: 87.3 % — HIGH (ref 43–77)
NON HDL CHOLESTEROL: 105 MG/DL — SIGNIFICANT CHANGE UP
NRBC # BLD: 0 /100 WBCS — SIGNIFICANT CHANGE UP (ref 0–0)
PLATELET # BLD AUTO: 190 K/UL — SIGNIFICANT CHANGE UP (ref 150–400)
POTASSIUM SERPL-MCNC: 4.9 MMOL/L — SIGNIFICANT CHANGE UP (ref 3.5–5.3)
POTASSIUM SERPL-SCNC: 4.9 MMOL/L — SIGNIFICANT CHANGE UP (ref 3.5–5.3)
RBC # BLD: 3.11 M/UL — LOW (ref 4.2–5.8)
RBC # FLD: 13.8 % — SIGNIFICANT CHANGE UP (ref 10.3–14.5)
RH IG SCN BLD-IMP: POSITIVE — SIGNIFICANT CHANGE UP
SODIUM SERPL-SCNC: 135 MMOL/L — SIGNIFICANT CHANGE UP (ref 135–145)
TIBC SERPL-MCNC: 237 UG/DL — SIGNIFICANT CHANGE UP (ref 220–430)
TRIGL SERPL-MCNC: 88 MG/DL — SIGNIFICANT CHANGE UP
TROPONIN T SERPL-MCNC: 0.02 NG/ML — HIGH (ref 0–0.01)
TSH SERPL-MCNC: 0.42 UIU/ML — SIGNIFICANT CHANGE UP (ref 0.27–4.2)
UIBC SERPL-MCNC: 163 UG/DL — SIGNIFICANT CHANGE UP (ref 110–370)
VIT B12 SERPL-MCNC: 703 PG/ML — SIGNIFICANT CHANGE UP (ref 232–1245)
WBC # BLD: 6.89 K/UL — SIGNIFICANT CHANGE UP (ref 3.8–10.5)
WBC # FLD AUTO: 6.89 K/UL — SIGNIFICANT CHANGE UP (ref 3.8–10.5)

## 2022-09-18 PROCEDURE — 99223 1ST HOSP IP/OBS HIGH 75: CPT

## 2022-09-18 RX ORDER — METOPROLOL TARTRATE 50 MG
25 TABLET ORAL EVERY 6 HOURS
Refills: 0 | Status: DISCONTINUED | OUTPATIENT
Start: 2022-09-18 | End: 2022-09-19

## 2022-09-18 RX ORDER — TAMSULOSIN HYDROCHLORIDE 0.4 MG/1
0.4 CAPSULE ORAL AT BEDTIME
Refills: 0 | Status: DISCONTINUED | OUTPATIENT
Start: 2022-09-18 | End: 2022-09-19

## 2022-09-18 RX ORDER — CLOPIDOGREL BISULFATE 75 MG/1
75 TABLET, FILM COATED ORAL DAILY
Refills: 0 | Status: DISCONTINUED | OUTPATIENT
Start: 2022-09-18 | End: 2022-09-18

## 2022-09-18 RX ORDER — METOPROLOL TARTRATE 50 MG
5 TABLET ORAL ONCE
Refills: 0 | Status: COMPLETED | OUTPATIENT
Start: 2022-09-18 | End: 2022-09-18

## 2022-09-18 RX ORDER — FLUTICASONE PROPIONATE AND SALMETEROL 50; 250 UG/1; UG/1
2 POWDER ORAL; RESPIRATORY (INHALATION)
Qty: 0 | Refills: 0 | DISCHARGE

## 2022-09-18 RX ORDER — ISOSORBIDE MONONITRATE 60 MG/1
30 TABLET, EXTENDED RELEASE ORAL DAILY
Refills: 0 | Status: DISCONTINUED | OUTPATIENT
Start: 2022-09-18 | End: 2022-09-19

## 2022-09-18 RX ORDER — PANTOPRAZOLE SODIUM 20 MG/1
1 TABLET, DELAYED RELEASE ORAL
Qty: 0 | Refills: 0 | DISCHARGE

## 2022-09-18 RX ORDER — VALSARTAN 80 MG/1
40 TABLET ORAL DAILY
Refills: 0 | Status: DISCONTINUED | OUTPATIENT
Start: 2022-09-18 | End: 2022-09-19

## 2022-09-18 RX ORDER — AMLODIPINE BESYLATE 2.5 MG/1
1 TABLET ORAL
Qty: 0 | Refills: 0 | DISCHARGE

## 2022-09-18 RX ORDER — DILTIAZEM HCL 120 MG
5 CAPSULE, EXT RELEASE 24 HR ORAL ONCE
Refills: 0 | Status: COMPLETED | OUTPATIENT
Start: 2022-09-18 | End: 2022-09-18

## 2022-09-18 RX ORDER — ASPIRIN/CALCIUM CARB/MAGNESIUM 324 MG
81 TABLET ORAL DAILY
Refills: 0 | Status: DISCONTINUED | OUTPATIENT
Start: 2022-09-18 | End: 2022-09-19

## 2022-09-18 RX ORDER — METOPROLOL TARTRATE 50 MG
25 TABLET ORAL DAILY
Refills: 0 | Status: DISCONTINUED | OUTPATIENT
Start: 2022-09-18 | End: 2022-09-18

## 2022-09-18 RX ORDER — APIXABAN 2.5 MG/1
2.5 TABLET, FILM COATED ORAL EVERY 12 HOURS
Refills: 0 | Status: DISCONTINUED | OUTPATIENT
Start: 2022-09-18 | End: 2022-09-19

## 2022-09-18 RX ORDER — DIPHENOXYLATE HCL/ATROPINE 2.5-.025MG
2 TABLET ORAL
Qty: 0 | Refills: 0 | DISCHARGE

## 2022-09-18 RX ORDER — MIRTAZAPINE 45 MG/1
30 TABLET, ORALLY DISINTEGRATING ORAL AT BEDTIME
Refills: 0 | Status: DISCONTINUED | OUTPATIENT
Start: 2022-09-18 | End: 2022-09-19

## 2022-09-18 RX ORDER — ALBUTEROL 90 UG/1
2 AEROSOL, METERED ORAL EVERY 6 HOURS
Refills: 0 | Status: DISCONTINUED | OUTPATIENT
Start: 2022-09-18 | End: 2022-09-19

## 2022-09-18 RX ORDER — DIPHENOXYLATE HCL/ATROPINE 2.5-.025MG
1 TABLET ORAL
Refills: 0 | Status: DISCONTINUED | OUTPATIENT
Start: 2022-09-18 | End: 2022-09-19

## 2022-09-18 RX ORDER — BUDESONIDE AND FORMOTEROL FUMARATE DIHYDRATE 160; 4.5 UG/1; UG/1
2 AEROSOL RESPIRATORY (INHALATION)
Refills: 0 | Status: DISCONTINUED | OUTPATIENT
Start: 2022-09-18 | End: 2022-09-19

## 2022-09-18 RX ORDER — SIMVASTATIN 20 MG/1
20 TABLET, FILM COATED ORAL AT BEDTIME
Refills: 0 | Status: DISCONTINUED | OUTPATIENT
Start: 2022-09-18 | End: 2022-09-18

## 2022-09-18 RX ORDER — FLUTICASONE PROPIONATE AND SALMETEROL 50; 250 UG/1; UG/1
1 POWDER ORAL; RESPIRATORY (INHALATION)
Qty: 0 | Refills: 0 | DISCHARGE

## 2022-09-18 RX ORDER — ATORVASTATIN CALCIUM 80 MG/1
40 TABLET, FILM COATED ORAL AT BEDTIME
Refills: 0 | Status: DISCONTINUED | OUTPATIENT
Start: 2022-09-18 | End: 2022-09-19

## 2022-09-18 RX ORDER — FUROSEMIDE 40 MG
20 TABLET ORAL DAILY
Refills: 0 | Status: DISCONTINUED | OUTPATIENT
Start: 2022-09-18 | End: 2022-09-19

## 2022-09-18 RX ORDER — MIRTAZAPINE 45 MG/1
2 TABLET, ORALLY DISINTEGRATING ORAL
Qty: 0 | Refills: 0 | DISCHARGE

## 2022-09-18 RX ORDER — ISOSORBIDE MONONITRATE 60 MG/1
1 TABLET, EXTENDED RELEASE ORAL
Qty: 0 | Refills: 0 | DISCHARGE

## 2022-09-18 RX ADMIN — Medication 5 MILLIGRAM(S): at 04:56

## 2022-09-18 RX ADMIN — Medication 81 MILLIGRAM(S): at 13:23

## 2022-09-18 RX ADMIN — CLOPIDOGREL BISULFATE 75 MILLIGRAM(S): 75 TABLET, FILM COATED ORAL at 13:23

## 2022-09-18 RX ADMIN — ATORVASTATIN CALCIUM 40 MILLIGRAM(S): 80 TABLET, FILM COATED ORAL at 22:53

## 2022-09-18 RX ADMIN — Medication 25 MILLIGRAM(S): at 19:21

## 2022-09-18 RX ADMIN — APIXABAN 2.5 MILLIGRAM(S): 2.5 TABLET, FILM COATED ORAL at 19:21

## 2022-09-18 RX ADMIN — Medication 5 MILLIGRAM(S): at 05:05

## 2022-09-18 RX ADMIN — ISOSORBIDE MONONITRATE 30 MILLIGRAM(S): 60 TABLET, EXTENDED RELEASE ORAL at 13:23

## 2022-09-18 RX ADMIN — Medication 5 MILLIGRAM(S): at 05:22

## 2022-09-18 RX ADMIN — Medication 20 MILLIGRAM(S): at 19:21

## 2022-09-18 RX ADMIN — Medication 25 MILLIGRAM(S): at 06:36

## 2022-09-18 RX ADMIN — BUDESONIDE AND FORMOTEROL FUMARATE DIHYDRATE 2 PUFF(S): 160; 4.5 AEROSOL RESPIRATORY (INHALATION) at 13:18

## 2022-09-18 RX ADMIN — BUDESONIDE AND FORMOTEROL FUMARATE DIHYDRATE 2 PUFF(S): 160; 4.5 AEROSOL RESPIRATORY (INHALATION) at 21:00

## 2022-09-18 RX ADMIN — VALSARTAN 40 MILLIGRAM(S): 80 TABLET ORAL at 06:38

## 2022-09-18 RX ADMIN — TAMSULOSIN HYDROCHLORIDE 0.4 MILLIGRAM(S): 0.4 CAPSULE ORAL at 22:54

## 2022-09-18 RX ADMIN — MIRTAZAPINE 30 MILLIGRAM(S): 45 TABLET, ORALLY DISINTEGRATING ORAL at 22:53

## 2022-09-18 NOTE — CONSULT NOTE ADULT - ASSESSMENT
ASSESSMENT/PLAN:   89yo M, DNR/DNI (MOLST in chart), [facility] resident w/ PMHx of HTN, CAD (multiple PCI, most recent DAYANA x3 @ [facility] 11/2021), CKD III (baseline Cr 1.4-1.8, COPD (on home O2 3L NC, s/p COVID infxn), BPH, HLD, known lung nodules, recent C. diff, Anemia (baseline Hgb 8) who presented from [facility] w/ SOB x3-4 days. Pt admitted to cardiology for work up, and was found to have conversion to AFib w/ RVR, now rate controlled.     ## ATRIAL FIBRILLATION ##  --Found to be in new onset AFib w/ RVR (HR in 140s, now rate controlled).   --CONT: Eliquis 2.5mg PO BID and Lopressor 50mg PO q6hrs.   --Initiate Lasix 20mg PO QD to maintain euvolemic while in arrhythmia.   --PLAN: EP consult in AM for further rhythm management.     ## SHORTNESS OF BREATH ##  --on Home O2 3L NC, satting 98%. Appears clinically euvolemic.   --TTE (11/2020): LVEF 45-50%, no WMA.   --Pulmonology consulted - f/u recs.   --TTE for structural eval.     ## COPD ##  --Continue home inhalers and home O2 supplementation.     ## HTN ##  ---140s.   --CONT: Imdur 30mg PO QD, Valsartan 40mg PO QD.     ## CKD III ##  --Baseline Cr 1.4-1.8.   --Cr ~1.3; continue to monitor.     ## ANEMIA ##  --Hgb 8.5 on admit (at baseline).   --Eliquis 2.5mg PO BID initiated - monitor response.   --Maintain active T&S.       DVT ppx: Eliquis  Dispo: pending EP consult/recs  PT ordered - f/u eval results.   Nutrition consult ordered.

## 2022-09-18 NOTE — H&P ADULT - HISTORY OF PRESENT ILLNESS
Patient is a 89 y/o male DNR/DNI nursing home resident FHx of heart diseae with PMHx of HTN, known CAD (s/p PCI w/ DAYANA x3 @ [facility] 2013), CKD stage III (baseline Cr 1.4- 1.8), COPD (on home O2 3L NC, s/p COVID infection), BPH, HLD, known lung nodules, and chronic diarrhea, anemia (baseline 8) with recent admission earlier this month for severe sepsis secondary to c diff colitis and COVID19 pneumonia (during admission received R>L s/p drainage complicated by small PTX) who returned to [facility] ED 09/18 from nursing home for SOB x 3-4 days. Patient reports progressively worsening SOB, worst today which he endorsed to nursing home staff who treated him with Lasix and Albuterol with no improvement in symptoms which prompted ED evaluation. He states for the last few days he feels more fatigue and notices that his breathing is heavier than usual which he attributes to his nasal cannula. He denies any CP, palpitations, dizziness, syncope, diaphoresis, fatigue, LE edema, orthopnea, PND, N/V/D, abd pain, cough, congestion, fever, chills or recent sick contact. Of note patient is no longer on isolation for c-diff or COVID (remdesivir), and has completed treatment. In ED VS /51, HR 63, RR 24, Temp 98.4F, O2 98% on 3L NC, EKG revealed SR @ 59bpm, TWI in inferolateral leads (new), CXR unremarkable, Labs Trop x1 0.03, BNP 1886, Hgb 8.5 (baseline), Cr 1.3, COVID negative, CT PE protocol negative for PE.    Patient admitted for cardiac workup to r/o ACS.  Patient is a 89 y/o male DNR/DNI nursing home resident FHx of heart diseae with PMHx of HTN, known CAD [facility] CKD stage III (baseline Cr 1.4- 1.8), COPD (on home O2 3L NC, s/p COVID infection), BPH, HLD, known lung nodules, and chronic diarrhea, anemia (baseline 8) with recent admission earlier this month for severe sepsis secondary to c diff colitis and COVID19 pneumonia (during admission received R>L s/p drainage complicated by small PTX) who returned to [facility] ED 09/18 from nursing home for SOB x 3-4 days. Patient reports progressively worsening SOB, worst today which he endorsed to nursing home staff who treated him with Lasix and Albuterol with no improvement in symptoms which prompted ED evaluation. He states for the last few days he feels more fatigue and notices that his breathing is heavier than usual which he attributes to his nasal cannula. He denies any CP, palpitations, dizziness, syncope, diaphoresis, fatigue, LE edema, orthopnea, PND, N/V/D, abd pain, cough, congestion, fever, chills or recent sick contact. Of note patient is no longer on isolation for c-diff or COVID (remdesivir), and has completed treatment. In ED VS /51, HR 63, RR 24, Temp 98.4F, O2 98% on 3L NC, EKG revealed SR @ 59bpm, TWI in inferolateral leads (new), CXR unremarkable, Labs Trop x1 0.03, BNP 1886, Hgb 8.5 (baseline), Cr 1.3, COVID negative, CT PE protocol negative for PE.    Patient admitted for cardiac workup to r/o ACS.

## 2022-09-18 NOTE — PHYSICAL THERAPY INITIAL EVALUATION ADULT - PERTINENT HX OF CURRENT PROBLEM, REHAB EVAL
89M  who returned to [facility] ED 09/18 from nursing home for SOB x 3-4 days. Patient reports progressively worsening SOB, worst today which he endorsed to nursing home staff who treated him with Lasix and Albuterol with no improvement in symptoms which prompted ED evaluation

## 2022-09-18 NOTE — CONSULT NOTE ADULT - SUBJECTIVE AND OBJECTIVE BOX
TIARA GOMEZ      Patient is a 89y old  Male who presents with a chief complaint of fatigue and SOB (18 Sep 2022 14:22)      HPI:  Patient is a 89 y/o male DNR/DNI nursing home resident FHx of heart diseae with PMHx of HTN, known CAD (s/p PCI w/ DAYANA x3 @ [facility] 11/2021), CKD stage III (baseline Cr 1.4- 1.8), COPD (on home O2 3L NC, s/p COVID infection), BPH, HLD, known lung nodules, and chronic diarrhea, anemia (baseline 8) with recent admission earlier this month for severe sepsis secondary to c diff colitis and COVID19 pneumonia (during admission received R>L s/p drainage complicated by small PTX) who returned to [facility] ED 09/18 from nursing home for SOB x 3-4 days. Patient reports progressively worsening SOB, worst today which he endorsed to nursing home staff who treated him with Lasix and Albuterol with no improvement in symptoms which prompted ED evaluation. He states for the last few days he feels more fatigue and notices that his breathing is heavier than usual which he attributes to his nasal cannula. He denies any CP, palpitations, dizziness, syncope, diaphoresis, fatigue, LE edema, orthopnea, PND, N/V/D, abd pain, cough, congestion, fever, chills or recent sick contact. Of note patient is no longer on isolation for c-diff or COVID (remdesivir), and has completed treatment. In ED VS /51, HR 63, RR 24, Temp 98.4F, O2 98% on 3L NC, EKG revealed SR @ 59bpm, TWI in inferolateral leads (new), CXR unremarkable, Labs Trop x1 0.03, BNP 1886, Hgb 8.5 (baseline), Cr 1.3, COVID negative, CT PE protocol negative for PE.    Patient admitted for cardiac workup to r/o ACS.  (18 Sep 2022 00:35)      Addl  Medical issues:       HEALTH ISSUES - PROBLEM Dx:  Shortness of breath    COPD (chronic obstructive pulmonary disease)    Stage 3 chronic kidney disease    Anemia    Hypertension              MEDICATIONS  (STANDING):  apixaban 2.5 milliGRAM(s) Oral every 12 hours  aspirin enteric coated 81 milliGRAM(s) Oral daily  atorvastatin 40 milliGRAM(s) Oral at bedtime  budesonide  80 MICROgram(s)/formoterol 4.5 MICROgram(s) Inhaler 2 Puff(s) Inhalation two times a day  furosemide    Tablet 20 milliGRAM(s) Oral daily  isosorbide   mononitrate ER Tablet (IMDUR) 30 milliGRAM(s) Oral daily  metoprolol tartrate 25 milliGRAM(s) Oral every 6 hours  mirtazapine 30 milliGRAM(s) Oral at bedtime  tamsulosin 0.4 milliGRAM(s) Oral at bedtime  valsartan 40 milliGRAM(s) Oral daily    MEDICATIONS  (PRN):  ALBUTerol    90 MICROgram(s) HFA Inhaler 2 Puff(s) Inhalation every 6 hours PRN Shortness of Breath and/or Wheezing  diphenoxylate/atropine 1 Tablet(s) Oral four times a day PRN Diarrhea          PAST MEDICAL & SURGICAL HISTORY:  HLD (hyperlipidemia)      CAD (coronary artery disease)      BPH (benign prostatic hyperplasia)      COPD, moderate      Chronic diarrhea      Stage 3 chronic kidney disease      S/P cataract extraction      H/O right hemicolectomy          REVIEW OF SYSTEMS:  [x] As per HPI          Reviewed   no change                            Changes noted  CONSTITUTIONAL: No fever, weight loss, or fatigue  RESPIRATORY: No cough, wheezing, chills or hemoptysis; No Shortness of Breath  CARDIOVASCULAR: No chest pain, palpitations, dizziness, or leg swelling  GASTROINTESTINAL: No abdominal or epigastric pain. No nausea, vomiting, or hematemesis; No diarrhea or constipation. No melena or hematochezia.  MUSCULOSKELETAL: No joint pain or swelling; No muscle, back, or extremity pain  Neuro:   Grossly  Negative  Psych        Awake  alert  [x] All others negative	  [ ] Unable to obtain      Vital Signs Last 24 Hrs  T(C): 36.4 (18 Sep 2022 16:00), Max: 37 (18 Sep 2022 09:23)  T(F): 97.6 (18 Sep 2022 16:00), Max: 98.6 (18 Sep 2022 09:23)  HR: 99 (18 Sep 2022 16:30) (60 - 137)  BP: 142/65 (18 Sep 2022 16:30) (124/51 - 156/75)  BP(mean): 88 (18 Sep 2022 16:00) (75 - 93)  RR: 16 (18 Sep 2022 16:00) (16 - 24)  SpO2: 99% (18 Sep 2022 16:30) (95% - 100%)    Parameters below as of 18 Sep 2022 16:30  Patient On (Oxygen Delivery Method): nasal cannula  O2 Flow (L/min): 3      PHYSICAL EXAM:      Constitutional:    PHYSICAL EXAM:      Constitutional: frail  HEENT:  Neck: No LAD, No JVD  Back: Normal spine flexure, No CVA tenderness  Respiratory:   Cor: S1 and S2,mur  Gastrointestinal: BS+, soft, NT/ND  Extremities: No peripheral edema  Vascular: 2+ peripheral pulses  Neurological: A/O x 3, no focal deficits  Psychiatric: Normal mood, normal affect  Musculoskeletal: general weakness  Skin: No rashes                                  9.5    6.89  )-----------( 190      ( 18 Sep 2022 05:47 )             30.3     09-18    135  |  99  |  21  ----------------------------<  126<H>  4.9   |  24  |  1.34<H>    Ca    9.5      18 Sep 2022 05:47  Mg     2.0     09-18    TPro  5.9<L>  /  Alb  3.3  /  TBili  0.3  /  DBili  x   /  AST  11  /  ALT  8<L>  /  AlkPhos  80  09-17    CARDIAC MARKERS ( 18 Sep 2022 00:57 )  x     / 0.02 ng/mL / x     / x     / x      CARDIAC MARKERS ( 17 Sep 2022 18:57 )  x     / 0.03 ng/mL / 14 U/L / x     / 1.8 ng/mL      PT/INR - ( 17 Sep 2022 18:57 )   PT: 12.2 sec;   INR: 1.02          PTT - ( 17 Sep 2022 18:57 )  PTT:30.7 sec  CAPILLARY BLOOD GLUCOSE    < from: 12 Lead ECG (08.24.22 @ 15:11) >    Ventricular Rate 81 BPM    Atrial Rate 81 BPM    P-R Interval 220 ms    QRS Duration 120 ms    Q-T Interval 386 ms    QTC Calculation(Bazett) 448 ms    P Axis 74 degrees    R Axis -74 degrees    T Axis -5 degrees    Diagnosis Line Sinus rhythm [id number] degree AV block with premature ventricular complexes  Left axis deviation  Inferior infarct , age undetermined  Possible Anteroseptal infarct , age undetermined    Confirmed by John Govea ([id number]) on 8/29/2022 1:43:00 PM    < end of copied text >        I&O's Summary    18 Sep 2022 07:01  -  18 Sep 2022 17:57  --------------------------------------------------------  IN: 180 mL / OUT: 1000 mL / NET: -820 mL            ASSESSMENT/PLAN/RECOMMENDATIONS

## 2022-09-18 NOTE — H&P ADULT - PROBLEM SELECTOR PLAN 1
-Improved SOB since admission, satting 98% on home O2 3L NC. Euvolemic on exam   -Hx of CAD (prior PCIs, most recent 2013 @ [facility]) obtain collateral. Although patient DNI/DNR pt okay with cardiac workup and intervention if needed   -CXR unremarkable, f/u official, CT PE protocol prelim negative for PE   -Trop x1 0.03, f/u trops, BNP 1886  -EKG: SR @ 59bpm, TWI inferolateral leads (new)  -Prior Echo 11/2020: EF 45-50%, no WMA, aortic sclerosis w/out significant stenosis  -Echo ordered  -continue ASA 81mg QD, Plavix 75mg QD (unclear why patient still taking, last PCI 2013), Imdur 30mg QD -Improved SOB since admission, satting 98% on home O2 3L NC. Euvolemic on exam   -Hx of CAD (prior PCIs, most recent 2021 @ [facility]) obtain collateral. Although patient DNI/DNR pt okay with cardiac workup and intervention if needed   -CXR unremarkable, f/u official, CT PE protocol prelim negative for PE   -Trop x1 0.03, f/u trops, BNP 1886  -EKG: SR @ 59bpm, TWI inferolateral leads (new)  -Prior Echo 11/2020: EF 45-50%, no WMA, aortic sclerosis w/out significant stenosis  -Echo ordered  -continue ASA 81mg QD, Plavix 75mg QD, Imdur 30mg QD, and Toprol 25mg QD

## 2022-09-18 NOTE — CHART NOTE - NSCHARTNOTEFT_GEN_A_CORE
Patient with elevated HR to 160s, symptomatic with increased WOB. EKG ordered. Given IV Lopressor 5mg x2 with improvement of symptoms and HR. Will continue to monitor Patient with elevated HR to 160s, symptomatic with increased WOB. EKG ordered. Given IV Lopressor 5mg x2 and IV diltiazem 5mg x1 with improvement of symptoms and HR. Will continue to monitor Patient with elevated HR to 160s, symptomatic with increased WOB. EKG ordered, new onset afib. Given IV Lopressor 5mg x2 and IV diltiazem 5mg x1 with improvement of symptoms and HR. Will continue to monitor

## 2022-09-18 NOTE — GOALS OF CARE CONVERSATION - ADVANCED CARE PLANNING - CONVERSATION DETAILS
Extensive discussion had with patient's HCP regarding patient's Goals of Care. Pt is DNR/DNI, but will accept IV Antibiotics, IV Fluids, emergency medication administration, blood transfusion. Patient and HCP also open to discussing invasive testing and treatments for current health problems.

## 2022-09-18 NOTE — PHYSICAL THERAPY INITIAL EVALUATION ADULT - GENERAL OBSERVATIONS, REHAB EVAL
Received supine, SOB , Sp02 99% on 3L +EKG off, IV hep, [place]. left as found +RN Yaw aware, +productive cough

## 2022-09-18 NOTE — CHART NOTE - NSCHARTNOTEFT_GEN_A_CORE
Care plan update:    ASSESSMENT/PLAN:   89yo M, DNR/DNI (MOLST in chart), [facility] resident w/ PMHx of HTN, CAD (multiple PCI, most recent DAYANA x3 @ [facility] 11/2021), CKD III (baseline Cr 1.4-1.8, COPD (on home O2 3L NC, s/p COVID infxn), BPH, HLD, known lung nodules, recent C. diff, Anemia (baseline Hgb 8) who presented from [facility] w/ SOB x3-4 days. Pt admitted to cardiology for work up, and was found to have conversion to AFib w/ RVR, now rate controlled.     ## ATRIAL FIBRILLATION ##  --Found to be in new onset AFib w/ RVR (HR in 140s, now rate controlled).   --CONT: Eliquis 2.5mg PO BID and Lopressor 50mg PO q6hrs.   --Initiate Lasix 20mg PO QD to maintain euvolemic while in arrhythmia.   --PLAN: EP consult in AM for further rhythm management.     ## SHORTNESS OF BREATH ##  --on Home O2 3L NC, satting 98%. Appears clinically euvolemic.   --TTE (11/2020): LVEF 45-50%, no WMA.   --Pulmonology consulted - f/u recs.   --TTE for structural eval.     ## COPD ##  --Continue home inhalers and home O2 supplementation.     ## HTN ##  ---140s.   --CONT: Imdur 30mg PO QD, Valsartan 40mg PO QD.     ## CKD III ##  --Baseline Cr 1.4-1.8.   --Cr ~1.3; continue to monitor.     ## ANEMIA ##  --Hgb 8.5 on admit (at baseline).   --Eliquis 2.5mg PO BID initiated - monitor response.   --Maintain active T&S.       DVT ppx: Eliquis  Dispo: pending EP consult/recs  PT ordered - f/u eval results.   Nutrition consult ordered

## 2022-09-18 NOTE — H&P ADULT - NS ATTEND AMEND GEN_ALL_CORE FT
See PA note written above, for details. I reviewed the PA documentation.  I have personally seen and examined this patient today. I reviewed vitals, labs, medications, cardiac studies and additional imaging.  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 CTA negative for PE. Admitted to cardiology for dyspnea evaluation. Troponins flat 0.03, 0.02.  BNP elevated at 1886 but no clinical s/sx of volume overload, no e/o decompensated CHF.    Telemetry  notable for new onset afib.  ROS: Patient reports he feels well this morning, would like to get out of bed to walk to restroom  Physical Exam notable for: elderly patient laying in bed in NAD, flat neck veins, irreg irreg rhythm, tachycardic rate, no MGR detected, clear lungs, soft abdomen, no fluid wave detected, no pretibial pitting edema, no ankle edema, skin WWP, independently ambulatory with 1 person assistance  Plan for:  Lopressor 25mg po q6hr for rate control  Initiate Eliquis 2.5mg po BID for afib cva risk reduction  DC Plavix given last PCI >6 months prior, continue ASA  Lasix 20mg po daily to maintain euvolemia  Valsartan home dose ordered  Imdur 30mg po daily for chronic antianginal tx  Obtain TTE for structural assessment  EP consult for consideration of rhythm control  Pulmonary consulted for evaluation of non cardiac cause of dyspnea, e.g COPD  PT consulted  Dietician/Nutrition consult for assessment given BMI 18  R-M.Jeferson Jean M.D.  Cardiology Attending

## 2022-09-18 NOTE — H&P ADULT - PROBLEM SELECTOR PLAN 2
-Recent initiation of home O2 (3L, nasal cannula) on prior admission 2/2 COVID infection  -continue home inhalers

## 2022-09-18 NOTE — PHYSICAL THERAPY INITIAL EVALUATION ADULT - LEVEL OF INDEPENDENCE: SUPINE/SIT, REHAB EVAL
dangle x 3 minutes, increasing SOB/moderate assist (50% patients effort)/maximum assist (25% patients effort)

## 2022-09-18 NOTE — H&P ADULT - PROBLEM SELECTOR PLAN 5
-Hgb 8.5 on admission at baseline  -f/u iron studies  -maintain active T/S's -Hgb 8.5 on admission at baseline  -f/u iron studies  -maintain active T/S's      DVT PPx: SCD's 2/2 anemia  Dispo: pending clinical progression  PT ordered

## 2022-09-18 NOTE — PATIENT PROFILE ADULT - STATED REASON FOR ADMISSION
Patient's 89, A+OX3 w. periods of forgetfulness stated:" very tired wants to sleep". Admit for SOB,/ CHF r/o ACS

## 2022-09-18 NOTE — H&P ADULT - ASSESSMENT
Patient is a 89 y/o male DNR/DNI nursing home resident FHx of heart diseae with PMHx of HTN, known CAD (s/p PCI w/ DAYANA x3 @ [facility] 2013), CKD stage III (baseline Cr 1.4- 1.8), COPD (on home O2 3L NC, s/p COVID infection), BPH, HLD, known lung nodules, and chronic diarrhea, anemia (baseline 8) with recent admission earlier this month for severe sepsis secondary to c diff colitis and COVID19 pneumonia (during admission received R>L s/p drainage complicated by small PTX) who returned to [facility] ED 09/18 from nursing home for SOB x 3-4 days. Now admitted to cardiac tele for r/o ACS.  Patient is a 89 y/o male DNR/DNI nursing home resident FHx of heart diseae with PMHx of HTN, known CAD (s/p PCI w/ DAYANA x3 @ [facility] 11/2021), CKD stage III (baseline Cr 1.4- 1.8), COPD (on home O2 3L NC, s/p COVID infection), BPH, HLD, known lung nodules, and chronic diarrhea, anemia (baseline 8) with recent admission earlier this month for severe sepsis secondary to c diff colitis and COVID19 pneumonia (during admission received R>L s/p drainage complicated by small PTX) who returned to [facility] ED 09/18 from nursing home for SOB x 3-4 days. Now admitted to cardiac tele for r/o ACS.

## 2022-09-18 NOTE — H&P ADULT - NSHPLABSRESULTS_GEN_ALL_CORE
EKG: SR @ 59bpm, TWI inferolateral leads                          8.5    8.73  )-----------( 197      ( 17 Sep 2022 18:57 )             26.9       09-17    136  |  101  |  21  ----------------------------<  165<H>  4.8   |  29  |  1.38<H>    Ca    8.9      17 Sep 2022 18:57    TPro  5.9<L>  /  Alb  3.3  /  TBili  0.3  /  DBili  x   /  AST  11  /  ALT  8<L>  /  AlkPhos  80  09-17      CARDIAC MARKERS ( 17 Sep 2022 18:57 )  x     / 0.03 ng/mL / 14 U/L / x     / 1.8 ng/mL

## 2022-09-19 ENCOUNTER — TRANSCRIPTION ENCOUNTER (OUTPATIENT)
Age: 87
End: 2022-09-19

## 2022-09-19 VITALS — TEMPERATURE: 98 F

## 2022-09-19 PROCEDURE — 82550 ASSAY OF CK (CPK): CPT

## 2022-09-19 PROCEDURE — 80048 BASIC METABOLIC PNL TOTAL CA: CPT

## 2022-09-19 PROCEDURE — 93010 ELECTROCARDIOGRAM REPORT: CPT

## 2022-09-19 PROCEDURE — 84443 ASSAY THYROID STIM HORMONE: CPT

## 2022-09-19 PROCEDURE — 86850 RBC ANTIBODY SCREEN: CPT

## 2022-09-19 PROCEDURE — 84132 ASSAY OF SERUM POTASSIUM: CPT

## 2022-09-19 PROCEDURE — 71045 X-RAY EXAM CHEST 1 VIEW: CPT

## 2022-09-19 PROCEDURE — 84295 ASSAY OF SERUM SODIUM: CPT

## 2022-09-19 PROCEDURE — 83036 HEMOGLOBIN GLYCOSYLATED A1C: CPT

## 2022-09-19 PROCEDURE — 87635 SARS-COV-2 COVID-19 AMP PRB: CPT

## 2022-09-19 PROCEDURE — 82728 ASSAY OF FERRITIN: CPT

## 2022-09-19 PROCEDURE — 71275 CT ANGIOGRAPHY CHEST: CPT | Mod: MA

## 2022-09-19 PROCEDURE — 80061 LIPID PANEL: CPT

## 2022-09-19 PROCEDURE — 83540 ASSAY OF IRON: CPT

## 2022-09-19 PROCEDURE — 93306 TTE W/DOPPLER COMPLETE: CPT | Mod: 26

## 2022-09-19 PROCEDURE — 97161 PT EVAL LOW COMPLEX 20 MIN: CPT

## 2022-09-19 PROCEDURE — 83880 ASSAY OF NATRIURETIC PEPTIDE: CPT

## 2022-09-19 PROCEDURE — 86900 BLOOD TYPING SEROLOGIC ABO: CPT

## 2022-09-19 PROCEDURE — 93005 ELECTROCARDIOGRAM TRACING: CPT

## 2022-09-19 PROCEDURE — 80053 COMPREHEN METABOLIC PANEL: CPT

## 2022-09-19 PROCEDURE — 94640 AIRWAY INHALATION TREATMENT: CPT

## 2022-09-19 PROCEDURE — 93306 TTE W/DOPPLER COMPLETE: CPT

## 2022-09-19 PROCEDURE — 86901 BLOOD TYPING SEROLOGIC RH(D): CPT

## 2022-09-19 PROCEDURE — 82607 VITAMIN B-12: CPT

## 2022-09-19 PROCEDURE — 82803 BLOOD GASES ANY COMBINATION: CPT

## 2022-09-19 PROCEDURE — 85610 PROTHROMBIN TIME: CPT

## 2022-09-19 PROCEDURE — 82330 ASSAY OF CALCIUM: CPT

## 2022-09-19 PROCEDURE — 82553 CREATINE MB FRACTION: CPT

## 2022-09-19 PROCEDURE — 84484 ASSAY OF TROPONIN QUANT: CPT

## 2022-09-19 PROCEDURE — 83550 IRON BINDING TEST: CPT

## 2022-09-19 PROCEDURE — 83735 ASSAY OF MAGNESIUM: CPT

## 2022-09-19 PROCEDURE — 36415 COLL VENOUS BLD VENIPUNCTURE: CPT

## 2022-09-19 PROCEDURE — 85025 COMPLETE CBC W/AUTO DIFF WBC: CPT

## 2022-09-19 PROCEDURE — 85730 THROMBOPLASTIN TIME PARTIAL: CPT

## 2022-09-19 PROCEDURE — 99221 1ST HOSP IP/OBS SF/LOW 40: CPT

## 2022-09-19 PROCEDURE — 82746 ASSAY OF FOLIC ACID SERUM: CPT

## 2022-09-19 PROCEDURE — 99239 HOSP IP/OBS DSCHRG MGMT >30: CPT

## 2022-09-19 PROCEDURE — 99285 EMERGENCY DEPT VISIT HI MDM: CPT

## 2022-09-19 RX ORDER — ASPIRIN/CALCIUM CARB/MAGNESIUM 324 MG
1 TABLET ORAL
Qty: 0 | Refills: 0 | DISCHARGE
Start: 2022-09-19

## 2022-09-19 RX ORDER — ASPIRIN/CALCIUM CARB/MAGNESIUM 324 MG
1 TABLET ORAL
Qty: 0 | Refills: 0 | DISCHARGE

## 2022-09-19 RX ORDER — METOPROLOL TARTRATE 50 MG
0.5 TABLET ORAL
Qty: 45 | Refills: 2
Start: 2022-09-19 | End: 2022-12-17

## 2022-09-19 RX ORDER — SIMVASTATIN 20 MG/1
1 TABLET, FILM COATED ORAL
Qty: 0 | Refills: 0 | DISCHARGE

## 2022-09-19 RX ORDER — METOPROLOL TARTRATE 50 MG
1 TABLET ORAL
Qty: 0 | Refills: 0 | DISCHARGE

## 2022-09-19 RX ORDER — METOPROLOL TARTRATE 50 MG
1 TABLET ORAL
Qty: 60 | Refills: 2
Start: 2022-09-19 | End: 2022-12-17

## 2022-09-19 RX ORDER — CLOPIDOGREL BISULFATE 75 MG/1
1 TABLET, FILM COATED ORAL
Qty: 0 | Refills: 0 | DISCHARGE

## 2022-09-19 RX ORDER — ATORVASTATIN CALCIUM 80 MG/1
1 TABLET, FILM COATED ORAL
Qty: 30 | Refills: 2
Start: 2022-09-19 | End: 2022-12-17

## 2022-09-19 RX ORDER — APIXABAN 2.5 MG/1
1 TABLET, FILM COATED ORAL
Qty: 60 | Refills: 2
Start: 2022-09-19 | End: 2022-12-17

## 2022-09-19 RX ADMIN — VALSARTAN 40 MILLIGRAM(S): 80 TABLET ORAL at 05:24

## 2022-09-19 RX ADMIN — Medication 81 MILLIGRAM(S): at 11:55

## 2022-09-19 RX ADMIN — Medication 25 MILLIGRAM(S): at 05:23

## 2022-09-19 RX ADMIN — BUDESONIDE AND FORMOTEROL FUMARATE DIHYDRATE 2 PUFF(S): 160; 4.5 AEROSOL RESPIRATORY (INHALATION) at 11:55

## 2022-09-19 RX ADMIN — Medication 20 MILLIGRAM(S): at 05:24

## 2022-09-19 RX ADMIN — APIXABAN 2.5 MILLIGRAM(S): 2.5 TABLET, FILM COATED ORAL at 05:24

## 2022-09-19 RX ADMIN — Medication 25 MILLIGRAM(S): at 01:28

## 2022-09-19 NOTE — CONSULT NOTE ADULT - SUBJECTIVE AND OBJECTIVE BOX
HPI:  Patient is a 89 y/o male DNR/DNI nursing home resident  with PMHx of HTN, known CAD (s/p PCI w/ DAYANA x3 @ [facility] 11/2021), CKD stage III (baseline Cr 1.4- 1.8), COPD (on home O2 3L NC, s/p recent COVID Pneumonia), BPH, HLD, known lung nodules (stable as per most recent CT on this admit, refused bx),  chronic diarrhea, anemia (baseline 8) with recent admission earlier this month for severe sepsis secondary to c diff colitis and COVID19 pneumonia (during admission received R>L s/p drainage complicated by small PTX) who returned to [facility] ED 09/18 from nursing home for progressive SOB x 3-4 days.     In ED VS /51, HR 63, RR 24, Temp 98.4F, O2 98% on 3L NC, EKG revealed SR @ 59bpm, TWI in inferolateral leads (new), CXR unremarkable, Labs Trop x1 0.03, BNP 1886, Hgb 8.5 (baseline), Cr 1.3, COVID negative, CT PE protocol negative for PE.    Patient admitted for cardiac workup to r/o ACS.  (18 Sep 2022 00:35)      PAST MEDICAL & SURGICAL HISTORY:  HLD (hyperlipidemia)    Lung nodules    CAD (coronary artery disease)      BPH (benign prostatic hyperplasia)      COPD, moderate      Chronic diarrhea      Stage 3 chronic kidney disease      S/P cataract extraction      H/O right hemicolectomy    No pertinent family history in first degree relatives    No family history of cardiovascular disease (Father, Mother)        FAMILY HISTORY:  No pertinent family history in first degree relatives    No family history of cardiovascular disease (Father, Mother)        Social History: no hx Smoking, Drugs, ETOH    Home Medications:   * Patient Currently Takes Medications as of 18-Sep-2022 06:13 documented in Structured Notes  · 	Diovan 40 mg oral tablet: Last Dose Taken:  , 1 tab(s) orally once a day  · 	Albuterol (Eqv-ProAir HFA) 90 mcg/inh inhalation aerosol: Last Dose Taken:  , 2 puff(s) inhaled every 6 hours, As Needed -for shortness of breath and/or wheezing   · 	Flomax 0.4 mg oral capsule: Last Dose Taken:  , 1 cap(s) orally once a day  · 	mirtazapine 15 mg oral tablet: Last Dose Taken:  , 2 tab(s) orally once a day (at bedtime)  · 	simvastatin 20 mg oral tablet: Last Dose Taken:  , 1 tab(s) orally once a day (at bedtime)  · 	Aspirin Enteric Coated 81 mg oral delayed release tablet: Last Dose Taken:  , 1 tab(s) orally once a day  · 	clopidogrel 75 mg oral tablet: Last Dose Taken:  , 1 tab(s) orally once a day  · 	isosorbide mononitrate 30 mg oral tablet, extended release: Last Dose Taken:  , 1 tab(s) orally once a day (in the morning)  · 	Lomotil 2.5 mg-0.025 mg oral tablet: Last Dose Taken:  , 2 tab(s) orally 4 times a day  · 	Advair HFA 45 mcg-21 mcg/inh inhalation aerosol: Last Dose Taken:  , 2 puff(s) inhaled 2 times a day        Inpatient Medications:   ALBUTerol    90 MICROgram(s) HFA Inhaler 2 Puff(s) Inhalation every 6 hours PRN  apixaban 2.5 milliGRAM(s) Oral every 12 hours  aspirin enteric coated 81 milliGRAM(s) Oral daily  atorvastatin 40 milliGRAM(s) Oral at bedtime  budesonide  80 MICROgram(s)/formoterol 4.5 MICROgram(s) Inhaler 2 Puff(s) Inhalation two times a day  diphenoxylate/atropine 1 Tablet(s) Oral four times a day PRN  furosemide    Tablet 20 milliGRAM(s) Oral daily  isosorbide   mononitrate ER Tablet (IMDUR) 30 milliGRAM(s) Oral daily  mirtazapine 30 milliGRAM(s) Oral at bedtime  tamsulosin 0.4 milliGRAM(s) Oral at bedtime  valsartan 40 milliGRAM(s) Oral daily      No Known Allergies    ROS: denies palpitations, dizziness, syncope, CP    PHYSICAL:  T(C): 36.1 (09-19-22 @ 08:52), Max: 36.7 (09-18-22 @ 14:35)  HR: 50 (09-19-22 @ 08:38) (50 - 104)  BP: 129/61 (09-19-22 @ 08:38) (128/58 - 156/88)  RR: 18 (09-19-22 @ 08:38) (16 - 18)  SpO2: 99% (09-19-22 @ 08:38) (95% - 99%)    Exam: fragile and cachectic appearing  Constitutional: NAD  HEENT: Normocephalic atraumatic, PERRLA, EOMI  Cardiovascular: Normal S1 S2, No JVD, No murmurs  Respiratory: Lungs  decreased BS with occasional  rhonchi, no wheezing  Gastrointestinal:  Soft, NT/ND, + BS	  Neurologic: A&O x3, No deficit noted  Psychiatric: appropraite mood and affect   Ext: well perfused, no edema, pulses intact 2+      LABS:  CBC:                       9.5    6.89  )-----------( 190      ( 18 Sep 2022 05:47 )             30.3       Chemistry: 09-18    135  |  99  |  21  ----------------------------<  126<H>  4.9   |  24  |  1.34<H>    Ca    9.5      18 Sep 2022 05:47  Mg     2.0     09-18    TPro  5.9<L>  /  Alb  3.3  /  TBili  0.3  /  DBili  x   /  AST  11  /  ALT  8<L>  /  AlkPhos  80  09-17      Coags: PT/INR - ( 17 Sep 2022 18:57 )   PT: 12.2 sec;   INR: 1.02          PTT - ( 17 Sep 2022 18:57 )  PTT:30.7 sec    TSH: TSH     Troponin: CARDIAC MARKERS ( 18 Sep 2022 00:57 )  x     / 0.02 ng/mL / x     / x     / x      CARDIAC MARKERS ( 17 Sep 2022 18:57 )  x     / 0.03 ng/mL / 14 U/L / x     / 1.8 ng/mL        LFTs: LIVER FUNCTIONS - ( 17 Sep 2022 18:57 )  Alb: 3.3 g/dL / Pro: 5.9 g/dL / ALK PHOS: 80 U/L / ALT: 8 U/L / AST: 11 U/L / GGT: x               EKG:     Telemetry: A fib self converted to SB without significant pause on 9/19 at 5:59 AM    Prior EP procedures: none             HPI:  Patient is a 89 y/o male DNR/DNI nursing home resident  with PMHx of HTN, known CAD (s/p PCI w/ DAYANA x3 @ [facility] 11/2021), CKD stage III (baseline Cr 1.4- 1.8), COPD (on home O2 3L NC, s/p recent COVID Pneumonia), BPH, HLD, known lung nodules (stable as per most recent CT on this admit, refused bx),  chronic diarrhea, anemia (baseline 8) with recent admission earlier this month for severe sepsis secondary to c diff colitis and COVID19 pneumonia (during admission received R>L s/p drainage complicated by small PTX) who returned to [facility] ED 09/18 from nursing home for progressive SOB x 3-4 days.     In ED VS /51, HR 63, RR 24, Temp 98.4F, O2 98% on 3L NC, EKG revealed SR @ 59bpm, TWI in inferolateral leads (new), CXR unremarkable, Labs Trop x1 0.03, BNP 1886, Hgb 8.5 (baseline), Cr 1.3, COVID negative, CT PE protocol negative for PE.    Patient admitted for cardiac workup to r/o ACS.  Hospital course significant  for  new onset A fib on 9/18 with VR up to 140's, rate controlled with metoprolol 25mg po q6h. Patient started on eliquis 2.5mg BID.  Patient spontaneously converted to SR on 9/19 at 6am without significant pause. EP consulted for further management and possible amiodarone initiation.       PAST MEDICAL & SURGICAL HISTORY:  HLD (hyperlipidemia)    Lung nodules    CAD (coronary artery disease)      BPH (benign prostatic hyperplasia)      COPD, moderate      Chronic diarrhea      Stage 3 chronic kidney disease      S/P cataract extraction      H/O right hemicolectomy    No pertinent family history in first degree relatives    No family history of cardiovascular disease (Father, Mother)        FAMILY HISTORY:  No pertinent family history in first degree relatives    No family history of cardiovascular disease (Father, Mother)        Social History: no hx Smoking, Drugs, ETOH    Home Medications:   * Patient Currently Takes Medications as of 18-Sep-2022 06:13 documented in Structured Notes  · 	Diovan 40 mg oral tablet: Last Dose Taken:  , 1 tab(s) orally once a day  · 	Albuterol (Eqv-ProAir HFA) 90 mcg/inh inhalation aerosol: Last Dose Taken:  , 2 puff(s) inhaled every 6 hours, As Needed -for shortness of breath and/or wheezing   · 	Flomax 0.4 mg oral capsule: Last Dose Taken:  , 1 cap(s) orally once a day  · 	mirtazapine 15 mg oral tablet: Last Dose Taken:  , 2 tab(s) orally once a day (at bedtime)  · 	simvastatin 20 mg oral tablet: Last Dose Taken:  , 1 tab(s) orally once a day (at bedtime)  · 	Aspirin Enteric Coated 81 mg oral delayed release tablet: Last Dose Taken:  , 1 tab(s) orally once a day  · 	clopidogrel 75 mg oral tablet: Last Dose Taken:  , 1 tab(s) orally once a day  · 	isosorbide mononitrate 30 mg oral tablet, extended release: Last Dose Taken:  , 1 tab(s) orally once a day (in the morning)  · 	Lomotil 2.5 mg-0.025 mg oral tablet: Last Dose Taken:  , 2 tab(s) orally 4 times a day  · 	Advair HFA 45 mcg-21 mcg/inh inhalation aerosol: Last Dose Taken:  , 2 puff(s) inhaled 2 times a day        Inpatient Medications:   ALBUTerol    90 MICROgram(s) HFA Inhaler 2 Puff(s) Inhalation every 6 hours PRN  apixaban 2.5 milliGRAM(s) Oral every 12 hours  aspirin enteric coated 81 milliGRAM(s) Oral daily  atorvastatin 40 milliGRAM(s) Oral at bedtime  budesonide  80 MICROgram(s)/formoterol 4.5 MICROgram(s) Inhaler 2 Puff(s) Inhalation two times a day  diphenoxylate/atropine 1 Tablet(s) Oral four times a day PRN  furosemide    Tablet 20 milliGRAM(s) Oral daily  isosorbide   mononitrate ER Tablet (IMDUR) 30 milliGRAM(s) Oral daily  mirtazapine 30 milliGRAM(s) Oral at bedtime  tamsulosin 0.4 milliGRAM(s) Oral at bedtime  valsartan 40 milliGRAM(s) Oral daily      No Known Allergies    ROS: denies palpitations, dizziness, syncope, CP    PHYSICAL:  T(C): 36.1 (09-19-22 @ 08:52), Max: 36.7 (09-18-22 @ 14:35)  HR: 50 (09-19-22 @ 08:38) (50 - 104)  BP: 129/61 (09-19-22 @ 08:38) (128/58 - 156/88)  RR: 18 (09-19-22 @ 08:38) (16 - 18)  SpO2: 99% (09-19-22 @ 08:38) (95% - 99%)    Exam: fragile and cachectic appearing  Constitutional: NAD  HEENT: Normocephalic atraumatic, PERRLA, EOMI  Cardiovascular: Normal S1 S2, No JVD, No murmurs  Respiratory: Lungs  decreased BS with occasional  rhonchi, no wheezing  Gastrointestinal:  Soft, NT/ND, + BS	  Neurologic: A&O x3, No deficit noted  Psychiatric: appropraite mood and affect   Ext: well perfused, no edema, pulses intact 2+      LABS:  CBC:                       9.5    6.89  )-----------( 190      ( 18 Sep 2022 05:47 )             30.3       Chemistry: 09-18    135  |  99  |  21  ----------------------------<  126<H>  4.9   |  24  |  1.34<H>    Ca    9.5      18 Sep 2022 05:47  Mg     2.0     09-18    TPro  5.9<L>  /  Alb  3.3  /  TBili  0.3  /  DBili  x   /  AST  11  /  ALT  8<L>  /  AlkPhos  80  09-17      Coags: PT/INR - ( 17 Sep 2022 18:57 )   PT: 12.2 sec;   INR: 1.02          PTT - ( 17 Sep 2022 18:57 )  PTT:30.7 sec    TSH: TSH     Troponin: CARDIAC MARKERS ( 18 Sep 2022 00:57 )  x     / 0.02 ng/mL / x     / x     / x      CARDIAC MARKERS ( 17 Sep 2022 18:57 )  x     / 0.03 ng/mL / 14 U/L / x     / 1.8 ng/mL        LFTs: LIVER FUNCTIONS - ( 17 Sep 2022 18:57 )  Alb: 3.3 g/dL / Pro: 5.9 g/dL / ALK PHOS: 80 U/L / ALT: 8 U/L / AST: 11 U/L / GGT: x               EKG:     Telemetry: A fib self converted to SB without significant pause on 9/19 at 5:59 AM    Prior EP procedures: none             HPI:  Patient is a 89 y/o male DNR/DNI nursing home resident  with PMHx of HTN, known CAD (s/p PCI w/ DAYANA x3 @ [facility] 11/2021), CKD stage III (baseline Cr 1.4- 1.8), COPD (on home O2 3L NC, s/p recent COVID Pneumonia), BPH, HLD, known lung nodules (stable as per most recent CT on this admit, refused bx),  chronic diarrhea, anemia (baseline 8) with recent admission earlier this month for severe sepsis secondary to c diff colitis and COVID19 pneumonia (during admission received R>L s/p drainage complicated by small PTX) who returned to [facility] ED 09/18 from nursing home for progressive SOB x 3-4 days.     In ED VS /51, HR 63, RR 24, Temp 98.4F, O2 98% on 3L NC, EKG revealed SR @ 59bpm, TWI in inferolateral leads (new), CXR unremarkable, Labs Trop x1 0.03, BNP 1886, Hgb 8.5 (baseline), Cr 1.3, COVID negative, CT PE protocol negative for PE.    Patient admitted for cardiac workup to r/o ACS.  Hospital course significant  for  new onset A fib on 9/18 with VR up to 140's, rate controlled with metoprolol 25mg po q6h. Patient started on eliquis 2.5mg BID. Plavix stopped and asa 81mg continued.   Patient spontaneously converted to SR on 9/19 at 6am without significant pause. EP consulted for further management and possible amiodarone initiation.       PAST MEDICAL & SURGICAL HISTORY:  HLD (hyperlipidemia)    Lung nodules    CAD (coronary artery disease)      BPH (benign prostatic hyperplasia)      COPD, moderate      Chronic diarrhea      Stage 3 chronic kidney disease      S/P cataract extraction      H/O right hemicolectomy    No pertinent family history in first degree relatives    No family history of cardiovascular disease (Father, Mother)        FAMILY HISTORY:  No pertinent family history in first degree relatives    No family history of cardiovascular disease (Father, Mother)        Social History: no hx Smoking, Drugs, ETOH    Home Medications:   * Patient Currently Takes Medications as of 18-Sep-2022 06:13 documented in Structured Notes  · 	Diovan 40 mg oral tablet: Last Dose Taken:  , 1 tab(s) orally once a day  · 	Albuterol (Eqv-ProAir HFA) 90 mcg/inh inhalation aerosol: Last Dose Taken:  , 2 puff(s) inhaled every 6 hours, As Needed -for shortness of breath and/or wheezing   · 	Flomax 0.4 mg oral capsule: Last Dose Taken:  , 1 cap(s) orally once a day  · 	mirtazapine 15 mg oral tablet: Last Dose Taken:  , 2 tab(s) orally once a day (at bedtime)  · 	simvastatin 20 mg oral tablet: Last Dose Taken:  , 1 tab(s) orally once a day (at bedtime)  · 	Aspirin Enteric Coated 81 mg oral delayed release tablet: Last Dose Taken:  , 1 tab(s) orally once a day  · 	clopidogrel 75 mg oral tablet: Last Dose Taken:  , 1 tab(s) orally once a day  · 	isosorbide mononitrate 30 mg oral tablet, extended release: Last Dose Taken:  , 1 tab(s) orally once a day (in the morning)  · 	Lomotil 2.5 mg-0.025 mg oral tablet: Last Dose Taken:  , 2 tab(s) orally 4 times a day  · 	Advair HFA 45 mcg-21 mcg/inh inhalation aerosol: Last Dose Taken:  , 2 puff(s) inhaled 2 times a day        Inpatient Medications:   ALBUTerol    90 MICROgram(s) HFA Inhaler 2 Puff(s) Inhalation every 6 hours PRN  apixaban 2.5 milliGRAM(s) Oral every 12 hours  aspirin enteric coated 81 milliGRAM(s) Oral daily  atorvastatin 40 milliGRAM(s) Oral at bedtime  budesonide  80 MICROgram(s)/formoterol 4.5 MICROgram(s) Inhaler 2 Puff(s) Inhalation two times a day  diphenoxylate/atropine 1 Tablet(s) Oral four times a day PRN  furosemide    Tablet 20 milliGRAM(s) Oral daily  isosorbide   mononitrate ER Tablet (IMDUR) 30 milliGRAM(s) Oral daily  mirtazapine 30 milliGRAM(s) Oral at bedtime  tamsulosin 0.4 milliGRAM(s) Oral at bedtime  valsartan 40 milliGRAM(s) Oral daily  memtoprolol 25mg po q6h      No Known Allergies    ROS: denies palpitations, dizziness, syncope, CP    PHYSICAL:  T(C): 36.1 (09-19-22 @ 08:52), Max: 36.7 (09-18-22 @ 14:35)  HR: 50 (09-19-22 @ 08:38) (50 - 104)  BP: 129/61 (09-19-22 @ 08:38) (128/58 - 156/88)  RR: 18 (09-19-22 @ 08:38) (16 - 18)  SpO2: 99% (09-19-22 @ 08:38) (95% - 99%)    Exam: fragile and cachectic appearing  Constitutional: NAD  HEENT: Normocephalic atraumatic, PERRLA, EOMI  Cardiovascular: Normal S1 S2, No JVD, No murmurs  Respiratory: Lungs  decreased BS with occasional  rhonchi, no wheezing  Gastrointestinal:  Soft, NT/ND, + BS	  Neurologic: A&O x3, No deficit noted  Psychiatric: appropraite mood and affect   Ext: well perfused, no edema, pulses intact 2+      LABS:  CBC:                       9.5    6.89  )-----------( 190      ( 18 Sep 2022 05:47 )             30.3       Chemistry: 09-18    135  |  99  |  21  ----------------------------<  126<H>  4.9   |  24  |  1.34<H>    Ca    9.5      18 Sep 2022 05:47  Mg     2.0     09-18    TPro  5.9<L>  /  Alb  3.3  /  TBili  0.3  /  DBili  x   /  AST  11  /  ALT  8<L>  /  AlkPhos  80  09-17      Coags: PT/INR - ( 17 Sep 2022 18:57 )   PT: 12.2 sec;   INR: 1.02          PTT - ( 17 Sep 2022 18:57 )  PTT:30.7 sec    TSH: TSH     Troponin: CARDIAC MARKERS ( 18 Sep 2022 00:57 )  x     / 0.02 ng/mL / x     / x     / x      CARDIAC MARKERS ( 17 Sep 2022 18:57 )  x     / 0.03 ng/mL / 14 U/L / x     / 1.8 ng/mL        LFTs: LIVER FUNCTIONS - ( 17 Sep 2022 18:57 )  Alb: 3.3 g/dL / Pro: 5.9 g/dL / ALK PHOS: 80 U/L / ALT: 8 U/L / AST: 11 U/L / GGT: x               EKG:     Telemetry: A fib self converted to SB without significant pause on 9/19 at 5:59 AM    Prior EP procedures: none             HPI:  Patient is a 89 y/o male DNR/DNI nursing home resident  with PMHx of HTN, known CAD (s/p PCI w/ DAYANA x3 @ [facility] 11/2021), CKD stage III (baseline Cr 1.4- 1.8), COPD (on home O2 3L NC, s/p recent COVID Pneumonia), BPH, HLD, known lung nodules (stable as per most recent CT on this admit, refused bx),  chronic diarrhea, anemia (baseline 8) with recent admission earlier this month for severe sepsis secondary to c diff colitis and COVID19 pneumonia (during admission received R>L s/p drainage complicated by small PTX) who returned to [facility] ED 09/18 from nursing home for progressive SOB x 3-4 days.     In ED VS /51, HR 63, RR 24, Temp 98.4F, O2 98% on 3L NC, EKG revealed SR @ 59bpm, TWI in inferolateral leads (new), CXR unremarkable, Labs Trop x1 0.03, BNP 1886, Hgb 8.5 (baseline), Cr 1.3, COVID negative, CT PE protocol negative for PE.    Patient admitted for cardiac workup to r/o ACS.  Hospital course significant  for  new onset A fib on 9/18 with VR up to 140's, rate controlled with metoprolol 25mg po q6h. Patient started on eliquis 2.5mg BID. Plavix stopped and asa 81mg continued.   Patient spontaneously converted to SR on 9/19 at 6am without significant pause. EP consulted for further management and possible amiodarone initiation.       PAST MEDICAL & SURGICAL HISTORY:  HLD (hyperlipidemia)    Lung nodules    CAD (coronary artery disease)      BPH (benign prostatic hyperplasia)      COPD, moderate      Chronic diarrhea      Stage 3 chronic kidney disease      S/P cataract extraction      H/O right hemicolectomy    No pertinent family history in first degree relatives    No family history of cardiovascular disease (Father, Mother)        FAMILY HISTORY:  No pertinent family history in first degree relatives    No family history of cardiovascular disease (Father, Mother)        Social History: no hx Smoking, Drugs, ETOH    Home Medications:   * Patient Currently Takes Medications as of 18-Sep-2022 06:13 documented in Structured Notes  · 	Diovan 40 mg oral tablet: Last Dose Taken:  , 1 tab(s) orally once a day  · 	Albuterol (Eqv-ProAir HFA) 90 mcg/inh inhalation aerosol: Last Dose Taken:  , 2 puff(s) inhaled every 6 hours, As Needed -for shortness of breath and/or wheezing   · 	Flomax 0.4 mg oral capsule: Last Dose Taken:  , 1 cap(s) orally once a day  · 	mirtazapine 15 mg oral tablet: Last Dose Taken:  , 2 tab(s) orally once a day (at bedtime)  · 	simvastatin 20 mg oral tablet: Last Dose Taken:  , 1 tab(s) orally once a day (at bedtime)  · 	Aspirin Enteric Coated 81 mg oral delayed release tablet: Last Dose Taken:  , 1 tab(s) orally once a day  · 	clopidogrel 75 mg oral tablet: Last Dose Taken:  , 1 tab(s) orally once a day  · 	isosorbide mononitrate 30 mg oral tablet, extended release: Last Dose Taken:  , 1 tab(s) orally once a day (in the morning)  · 	Lomotil 2.5 mg-0.025 mg oral tablet: Last Dose Taken:  , 2 tab(s) orally 4 times a day  · 	Advair HFA 45 mcg-21 mcg/inh inhalation aerosol: Last Dose Taken:  , 2 puff(s) inhaled 2 times a day        Inpatient Medications:   ALBUTerol    90 MICROgram(s) HFA Inhaler 2 Puff(s) Inhalation every 6 hours PRN  apixaban 2.5 milliGRAM(s) Oral every 12 hours  aspirin enteric coated 81 milliGRAM(s) Oral daily  atorvastatin 40 milliGRAM(s) Oral at bedtime  budesonide  80 MICROgram(s)/formoterol 4.5 MICROgram(s) Inhaler 2 Puff(s) Inhalation two times a day  diphenoxylate/atropine 1 Tablet(s) Oral four times a day PRN  furosemide    Tablet 20 milliGRAM(s) Oral daily  isosorbide   mononitrate ER Tablet (IMDUR) 30 milliGRAM(s) Oral daily  mirtazapine 30 milliGRAM(s) Oral at bedtime  tamsulosin 0.4 milliGRAM(s) Oral at bedtime  valsartan 40 milliGRAM(s) Oral daily  metoprolol tartrate  25mg po q6h      No Known Allergies    ROS: denies palpitations, dizziness, syncope, CP    PHYSICAL:  T(C): 36.1 (09-19-22 @ 08:52), Max: 36.7 (09-18-22 @ 14:35)  HR: 50 (09-19-22 @ 08:38) (50 - 104)  BP: 129/61 (09-19-22 @ 08:38) (128/58 - 156/88)  RR: 18 (09-19-22 @ 08:38) (16 - 18)  SpO2: 99% (09-19-22 @ 08:38) (95% - 99%)    Exam: fragile and cachectic appearing  Constitutional: NAD  HEENT: Normocephalic atraumatic, PERRLA, EOMI  Cardiovascular: Normal S1 S2, No JVD, No murmurs  Respiratory: Lungs  decreased BS with occasional  rhonchi, no wheezing  Gastrointestinal:  Soft, NT/ND, + BS	  Neurologic: A&O x3, No deficit noted  Psychiatric: appropraite mood and affect   Ext: well perfused, no edema, pulses intact 2+      LABS:  CBC:                       9.5    6.89  )-----------( 190      ( 18 Sep 2022 05:47 )             30.3       Chemistry: 09-18    135  |  99  |  21  ----------------------------<  126<H>  4.9   |  24  |  1.34<H>    Ca    9.5      18 Sep 2022 05:47  Mg     2.0     09-18    TPro  5.9<L>  /  Alb  3.3  /  TBili  0.3  /  DBili  x   /  AST  11  /  ALT  8<L>  /  AlkPhos  80  09-17      Coags: PT/INR - ( 17 Sep 2022 18:57 )   PT: 12.2 sec;   INR: 1.02          PTT - ( 17 Sep 2022 18:57 )  PTT:30.7 sec    TSH: TSH     Troponin: CARDIAC MARKERS ( 18 Sep 2022 00:57 )  x     / 0.02 ng/mL / x     / x     / x      CARDIAC MARKERS ( 17 Sep 2022 18:57 )  x     / 0.03 ng/mL / 14 U/L / x     / 1.8 ng/mL        LFTs: LIVER FUNCTIONS - ( 17 Sep 2022 18:57 )  Alb: 3.3 g/dL / Pro: 5.9 g/dL / ALK PHOS: 80 U/L / ALT: 8 U/L / AST: 11 U/L / GGT: x               EKG:     Telemetry: A fib self converted to SB without significant pause on 9/19 at 5:59 AM    Prior EP procedures: none

## 2022-09-19 NOTE — DISCHARGE NOTE PROVIDER - [id number]_CORE_FT
Please follow up with your cardiologist, Dr. Mena, within 1-2 weeks of discharge home from the hospital.

## 2022-09-19 NOTE — PROGRESS NOTE ADULT - SUBJECTIVE AND OBJECTIVE BOX
***INCOMPLETE / IN PROGRESS***    Interventional Cardiology PA Adult Progress Note    Subjective Assessment:  	  MEDICATIONS:  isosorbide   mononitrate ER Tablet (IMDUR) 30 milliGRAM(s) Oral daily  metoprolol succinate ER 25 milliGRAM(s) Oral daily  tamsulosin 0.4 milliGRAM(s) Oral at bedtime  valsartan 40 milliGRAM(s) Oral daily      ALBUTerol    90 MICROgram(s) HFA Inhaler 2 Puff(s) Inhalation every 6 hours PRN  budesonide  80 MICROgram(s)/formoterol 4.5 MICROgram(s) Inhaler 2 Puff(s) Inhalation two times a day    mirtazapine 30 milliGRAM(s) Oral at bedtime    diphenoxylate/atropine 1 Tablet(s) Oral four times a day PRN    simvastatin 20 milliGRAM(s) Oral at bedtime    aspirin enteric coated 81 milliGRAM(s) Oral daily  clopidogrel Tablet 75 milliGRAM(s) Oral daily      	    [PHYSICAL EXAM:  TELEMETRY:  T(C): 36.4 (09-18-22 @ 05:28), Max: 36.9 (09-17-22 @ 18:04)  HR: 117 (09-18-22 @ 05:20) (60 - 137)  BP: 147/88 (09-18-22 @ 05:20) (124/51 - 156/75)  RR: 23 (09-18-22 @ 05:20) (19 - 24)  SpO2: 99% (09-18-22 @ 05:20) (95% - 100%)  Wt(kg): --  I&O's Summary    Height (cm): 172.7 (09-18 @ 00:35)  Weight (kg): 54.4 (09-18 @ 00:35)  BMI (kg/m2): 18.2 (09-18 @ 00:35)  BSA (m2): 1.64 (09-18 @ 00:35)  Espinoza:  Central/PICC/Mid Line:                                         Appearance: Normal	  HEENT:   Normal oral mucosa, PERRL, EOMI	  Neck: Supple, + JVD/ - JVD; Carotid Bruit   Cardiovascular: Normal S1 S2, No JVD, No murmurs,   Respiratory: Lungs clear to auscultation/Decreased Breath Sounds/No Rales, Rhonchi, Wheezing	  Gastrointestinal:  Soft, Non-tender, + BS	  Skin: No rashes, No ecchymoses, No cyanosis  Extremities: Normal range of motion, No clubbing, cyanosis or edema  Vascular: Peripheral pulses palpable 2+ bilaterally  Neurologic: Non-focal  Psychiatry: A & O x 3, Mood & affect appropriate      	    ECG:  	  RADIOLOGY:   DIAGNOSTIC TESTING:  [ ] Echocardiogram:  [ ]  Catheterization:  [ ] Stress Test:    [ ] WINSTON  OTHER: 	    LABS:	 	  CARDIAC MARKERS:                                  9.5    6.89  )-----------( 190      ( 18 Sep 2022 05:47 )             30.3     09-18    135  |  99  |  21  ----------------------------<  126<H>  4.9   |  24  |  1.34<H>    Ca    9.5      18 Sep 2022 05:47  Mg     2.0     09-18    TPro  5.9<L>  /  Alb  3.3  /  TBili  0.3  /  DBili  x   /  AST  11  /  ALT  8<L>  /  AlkPhos  80  09-17    proBNP: Serum Pro-Brain Natriuretic Peptide: 1886 pg/mL (09-17 @ 18:57)    Lipid Profile:   HgA1c:   TSH: Thyroid Stimulating Hormone, Serum: 0.424 uIU/mL (09-18 @ 05:47)    PT/INR - ( 17 Sep 2022 18:57 )   PT: 12.2 sec;   INR: 1.02          PTT - ( 17 Sep 2022 18:57 )  PTT:30.7 sec    ASSESSMENT/PLAN: 	        DVT ppx:  Dispo:    
Interventional, Pulmonary, Critical, Chest Special Procedures.    Pt was seen and fully examined by myself.     Time spent with patient in minutes: 127    Patient is a 89y old  Male who presents with a chief complaint of fatigue and SOB (18 Sep 2022 00:35) Events leading to this admission reviewed. The patient ill appearing, tolerating NC, verbally responsive     HPI:  Patient is a 89 y/o male DNR/DNI nursing home resident FHx of heart diseae with PMHx of HTN, known CAD (s/p PCI w/ DAYANA x3 @ [facility] 11/2021), CKD stage III (baseline Cr 1.4- 1.8), COPD (on home O2 3L NC, s/p COVID infection), BPH, HLD, known lung nodules, and chronic diarrhea, anemia (baseline 8) with recent admission earlier this month for severe sepsis secondary to c diff colitis and COVID19 pneumonia (during admission received R>L s/p drainage complicated by small PTX) who returned to [facility] ED 09/18 from nursing home for SOB x 3-4 days. Patient reports progressively worsening SOB, worst today which he endorsed to nursing home staff who treated him with Lasix and Albuterol with no improvement in symptoms which prompted ED evaluation. He states for the last few days he feels more fatigue and notices that his breathing is heavier than usual which he attributes to his nasal cannula. He denies any CP, palpitations, dizziness, syncope, diaphoresis, fatigue, LE edema, orthopnea, PND, N/V/D, abd pain, cough, congestion, fever, chills or recent sick contact. Of note patient is no longer on isolation for c-diff or COVID (remdesivir), and has completed treatment. In ED VS /51, HR 63, RR 24, Temp 98.4F, O2 98% on 3L NC, EKG revealed SR @ 59bpm, TWI in inferolateral leads (new), CXR unremarkable, Labs Trop x1 0.03, BNP 1886, Hgb 8.5 (baseline), Cr 1.3, COVID negative, CT PE protocol negative for PE.    Patient admitted for cardiac workup to r/o ACS.  (18 Sep 2022 00:35)      REVIEW OF SYSTEMS:  Constitutional: No fever, weight loss, chills + fatigue  Eyes: No eye pain, visual disturbances, or discharge  ENMT:  + difficulty hearing, tinnitus, vertigo; No sinus or throat pain. No epistaxis, +dysphagia, dysphonia, hoarseness no odynophagia  Neck: No pain, stiffness or neck swelling.  No masses or deformities  Respiratory: + cough, wheezing, hemoptysis  + COPD  - ILD   - PE   - ASTHMA     - PNEUMONIA  Cardiovascular: No chest pain, dysrhythmia, palpitations, dizziness or edema + CAD   - CHF   - HTN  Gastrointestinal: No abdominal or epigastric pain. No nausea, vomiting or hematemesis; No diarrhea or constipation. No melena or hematochezia, Icterus.          Genitourinary: No dysuria, frequency, hematuria or incontinence   - CKD/DEBBIE      - ESRD  Neurological: No headaches, +memory loss, loss of strength, numbness or tremors      +DEMENTIA     - STROKE    - SEIZURE  Skin: No itching, burning, rashes or lesions   Lymph Nodes: No enlarged glands  Endocrine: No heat or cold intolerance; No hair loss       + DM     - THYROID DISORDER  Musculoskeletal: No joint pain or swelling; No muscle, back or extremity pain, No edema  Psychiatric: No depression, anxiety, mood swings or difficulty sleeping  Heme/Lymph: No easy bruising or bleeding gums         - ANEMIA      - CANCER   -COAGULOPATHY  Allergy and Immunologic: No hives or eczema  PAST MEDICAL & SURGICAL HISTORY:  HLD (hyperlipidemia)      CAD (coronary artery disease)      BPH (benign prostatic hyperplasia)      COPD, moderate      Chronic diarrhea      Stage 3 chronic kidney disease      S/P cataract extraction      H/O right hemicolectomy        FAMILY HISTORY:  No family history of cardiovascular disease (Father, Mother)      SOCIAL HISTORY:      - Tobacco     - ETOH    Allergies    No Known Allergies    Intolerances      Vital Signs Last 24 Hrs  T(C): 37 (18 Sep 2022 09:23), Max: 37 (18 Sep 2022 09:23)  T(F): 98.6 (18 Sep 2022 09:23), Max: 98.6 (18 Sep 2022 09:23)  HR: 100 (18 Sep 2022 09:20) (60 - 137)  BP: 127/61 (18 Sep 2022 09:20) (124/51 - 156/75)  BP(mean): 82 (18 Sep 2022 09:20) (75 - 82)  RR: 18 (18 Sep 2022 09:20) (18 - 24)  SpO2: 98% (18 Sep 2022 09:20) (95% - 100%)    Parameters below as of 18 Sep 2022 09:20  Patient On (Oxygen Delivery Method): nasal cannula  O2 Flow (L/min): 3          MEDICATIONS:  MEDICATIONS  (STANDING):  aspirin enteric coated 81 milliGRAM(s) Oral daily  budesonide  80 MICROgram(s)/formoterol 4.5 MICROgram(s) Inhaler 2 Puff(s) Inhalation two times a day  clopidogrel Tablet 75 milliGRAM(s) Oral daily  isosorbide   mononitrate ER Tablet (IMDUR) 30 milliGRAM(s) Oral daily  metoprolol succinate ER 25 milliGRAM(s) Oral daily  mirtazapine 30 milliGRAM(s) Oral at bedtime  simvastatin 20 milliGRAM(s) Oral at bedtime  tamsulosin 0.4 milliGRAM(s) Oral at bedtime  valsartan 40 milliGRAM(s) Oral daily    MEDICATIONS  (PRN):  ALBUTerol    90 MICROgram(s) HFA Inhaler 2 Puff(s) Inhalation every 6 hours PRN Shortness of Breath and/or Wheezing  diphenoxylate/atropine 1 Tablet(s) Oral four times a day PRN Diarrhea      PHYSICAL EXAM:  Un Comfortable, no immediate distress  Eyes: PERRL, EOM intact; conjunctiva and sclera clear  Head: Normocephalic;  No Trauma  ENMT: No nasal discharge, hoarseness, +cough no  hemoptysis  Neck: Supple; non tender; no masses or deformities.    No JVD  Respiratory:- WHEEZING   - RHONCHI  + RLL RALES  + CRACKLES.  Diminished breath sounds R base   Cardiovascular: Regular rate and rhythm. S1 and S2 Normal; No murmurs, gallops or rubs     - PPM/AICD  Gastrointestinal: Soft mildly tender, non-distended; Normal bowel sounds; No hepatosplenomegaly.     -PEG    -  GT   + RUBIO  Genitourinary: No costovertebral angle tenderness. No dysuria  Extremities: AROM, No clubbing, cyanosis or edema    Vascular: Peripheral pulses palpable 2+ bilaterally  Neurological: Awake, moving extremities   Skin: Warm and dry. No obvious rash  Lymph Nodes: No acute cervical or supraclavicular adenopathy  Psychiatric: not really  engaging    DEVICES:  - DENTURES   +IV R / L     - ETUBE   -TRACH   -CTUBE  R / L    LABS:                          9.5    6.89  )-----------( 190      ( 18 Sep 2022 05:47 )             30.3     09-18    135  |  99  |  21  ----------------------------<  126<H>  4.9   |  24  |  1.34<H>    Ca    9.5      18 Sep 2022 05:47  Mg     2.0     09-18    TPro  5.9<L>  /  Alb  3.3  /  TBili  0.3  /  DBili  x   /  AST  11  /  ALT  8<L>  /  AlkPhos  80  09-17    PT/INR - ( 17 Sep 2022 18:57 )   PT: 12.2 sec;   INR: 1.02          PTT - ( 17 Sep 2022 18:57 )  PTT:30.7 sec  RADIOLOGY & ADDITIONAL STUDIES (The following images were personally reviewed):< from: CT Angio Chest PE Protocol w/ IV Cont (09.17.22 @ 22:54) >  ******PRELIMINARY REPORT******      ******PRELIMINARY REPORT******       ACC: [id number] EXAM:  CT ANGIO CHEST PULM ART [facility]                          PROCEDURE DATE:  09/17/2022    ******PRELIMINARY REPORT******      ******PRELIMINARY REPORT******           INTERPRETATION:  [facility] RADIOLOGIST PRELIMINARY REPORT      Initial report created on 9/17/2022 11:48:40 PM EDT  PROCEDURE INFORMATION:  Exam: CTA Chest With Contrast  Exam date and time: 9/17/2022 10:37 PM  Age: 89 years old  Clinical indication: Other: Recent covid, pleural effusions, worsening   SOB, cxr  clear; Patient HX: Drainage recently complicated by ptx that resolved.   Also HX.  Of copd but no wheez    TECHNIQUE:  Imaging protocol: Computed tomographic angiography of the chest with   contrast.  3D rendering (Not supervised by radiologist): MIP and/or 3D reconstructed  images were created by the technologist.  Total images: 963    COMPARISON:  CT ANGIO CHEST PULMONARY ARTERY WITHOUT AND WITH IC 3/19/2022 4:48 AM    FINDINGS:  Pulmonary arteries: Pulmonary artery evaluation of fair technical quality   but  no pulmonary artery embolism identified.  Aorta: Ectatic ascending aorta measuring up to 3.8 cm. Partially   visualized  abdominal aortic aneurysm measuring up to 4.0 cm.    Other arteries: Atherosclerosis is evident.    Lungs: Interval coalescence of 2 previously seen adjacent nodules in the   left  lower lobe now measuring 3.3 x 1.9 cm, also previously 3.3 cm in total   maximum  diameter. Mild dependent atelectasis.Unchanged subpleural nodules in the  posterior right upper lobe measuring up to 5 mm. This is best appreciated   image  57 series 5.  Pleural spaces: Unremarkable. No pneumothorax. No pleural effusion.  Heart: Moderate cardiomegaly.  Coronary arteries: Calcific coronary artery disease is evident.  Lymph nodes: Unremarkable. No enlarged lymph nodes.    Bones/joints: Spinal degenerative changes are evident.  Soft tissues: Unremarkable.    IMPRESSION:  1. No pulmonary artery embolism identified. Limited evaluation of the   lower  lobe subsegmental pulmonary arteries.  2. Moderate cardiomegaly.  3. Interval coalescence of 2 previously seen adjacent nodules in the left   lower  lobe now measuring 3.3 x 1.9 cm, also previously 3.3 cm in total maximum  diameter.  4. Unchanged subpleural nodules in the posterior right upper lobe.  5. Dependent atelectasis.    < end of copied text >  
Interventional, Pulmonary, Critical, Chest Special Procedures.    Pt was seen and fully examined by myself.     Time spent with patient in minutes: 67    Patient is a 89y old  Male who presents with a chief complaint of fatigue and SOB (19 Sep 2022 11:57)The patient ill appearing, some verbal response, not really engaging today, reporting generalized discomfort     HPI:  Patient is a 89 y/o male DNR/DNI nursing home resident FHx of heart diseae with PMHx of HTN, known CAD (s/p PCI w/ DAYANA x3 @ [facility] 11/2021), CKD stage III (baseline Cr 1.4- 1.8), COPD (on home O2 3L NC, s/p COVID infection), BPH, HLD, known lung nodules, and chronic diarrhea, anemia (baseline 8) with recent admission earlier this month for severe sepsis secondary to c diff colitis and COVID19 pneumonia (during admission received R>L s/p drainage complicated by small PTX) who returned to [facility] ED 09/18 from nursing home for SOB x 3-4 days. Patient reports progressively worsening SOB, worst today which he endorsed to nursing home staff who treated him with Lasix and Albuterol with no improvement in symptoms which prompted ED evaluation. He states for the last few days he feels more fatigue and notices that his breathing is heavier than usual which he attributes to his nasal cannula. He denies any CP, palpitations, dizziness, syncope, diaphoresis, fatigue, LE edema, orthopnea, PND, N/V/D, abd pain, cough, congestion, fever, chills or recent sick contact. Of note patient is no longer on isolation for c-diff or COVID (remdesivir), and has completed treatment. In ED VS /51, HR 63, RR 24, Temp 98.4F, O2 98% on 3L NC, EKG revealed SR @ 59bpm, TWI in inferolateral leads (new), CXR unremarkable, Labs Trop x1 0.03, BNP 1886, Hgb 8.5 (baseline), Cr 1.3, COVID negative, CT PE protocol negative for PE.    Patient admitted for cardiac workup to r/o ACS.  (18 Sep 2022 00:35)      REVIEW OF SYSTEMS:  Constitutional: No fever, weight loss, chills + fatigue  Eyes: No eye pain, visual disturbances, or discharge  ENMT:  + difficulty hearing, tinnitus, vertigo; No sinus or throat pain. No epistaxis, +dysphagia, dysphonia, hoarseness no odynophagia  Neck: No pain, stiffness or neck swelling.  No masses or deformities  Respiratory: + cough, wheezing, hemoptysis  + COPD  - ILD   - PE   - ASTHMA     - PNEUMONIA  Cardiovascular: No chest pain, dysrhythmia, palpitations, dizziness or edema + CAD   - CHF   - HTN  Gastrointestinal: No abdominal or epigastric pain. No nausea, vomiting or hematemesis; No diarrhea or constipation. No melena or hematochezia, Icterus.          Genitourinary: No dysuria, frequency, hematuria or incontinence   - CKD/DEBBIE      - ESRD  Neurological: No headaches, +memory loss, loss of strength, numbness or tremors      +DEMENTIA     - STROKE    - SEIZURE  Skin: No itching, burning, rashes or lesions   Lymph Nodes: No enlarged glands  Endocrine: No heat or cold intolerance; No hair loss       + DM     - THYROID DISORDER  Musculoskeletal: No joint pain or swelling; No muscle, back or extremity pain, No edema  Psychiatric: No depression, anxiety, mood swings or difficulty sleeping  Heme/Lymph: No easy bruising or bleeding gums         - ANEMIA      - CANCER   -COAGULOPATHY  Allergy and Immunologic: No hives or eczema    PAST MEDICAL & SURGICAL HISTORY:  HLD (hyperlipidemia)      CAD (coronary artery disease)      BPH (benign prostatic hyperplasia)      COPD, moderate      Chronic diarrhea      Stage 3 chronic kidney disease      S/P cataract extraction      H/O right hemicolectomy        FAMILY HISTORY:  No family history of cardiovascular disease (Father, Mother)      SOCIAL HISTORY:      - Tobacco     - ETOH    Allergies    No Known Allergies    Intolerances      Vital Signs Last 24 Hrs  T(C): 36.6 (19 Sep 2022 14:15), Max: 36.6 (19 Sep 2022 14:15)  T(F): 97.9 (19 Sep 2022 14:15), Max: 97.9 (19 Sep 2022 14:15)  HR: 53 (19 Sep 2022 11:54) (50 - 104)  BP: 96/48 (19 Sep 2022 11:54) (96/48 - 156/88)  BP(mean): 83 (19 Sep 2022 05:17) (83 - 111)  RR: 18 (19 Sep 2022 11:54) (16 - 18)  SpO2: 98% (19 Sep 2022 11:54) (95% - 99%)    Parameters below as of 19 Sep 2022 11:54  Patient On (Oxygen Delivery Method): nasal cannula  O2 Flow (L/min): 3      09-18 @ 07:01 - 09-19 @ 07:00  --------------------------------------------------------  IN: 180 mL / OUT: 1650 mL / NET: -1470 mL    09-19 @ 07:01 - 09-19 @ 15:54  --------------------------------------------------------  IN: 250 mL / OUT: 0 mL / NET: 250 mL          MEDICATIONS:  MEDICATIONS  (STANDING):  apixaban 2.5 milliGRAM(s) Oral every 12 hours  aspirin enteric coated 81 milliGRAM(s) Oral daily  atorvastatin 40 milliGRAM(s) Oral at bedtime  budesonide  80 MICROgram(s)/formoterol 4.5 MICROgram(s) Inhaler 2 Puff(s) Inhalation two times a day  furosemide    Tablet 20 milliGRAM(s) Oral daily  isosorbide   mononitrate ER Tablet (IMDUR) 30 milliGRAM(s) Oral daily  mirtazapine 30 milliGRAM(s) Oral at bedtime  tamsulosin 0.4 milliGRAM(s) Oral at bedtime  valsartan 40 milliGRAM(s) Oral daily    MEDICATIONS  (PRN):  ALBUTerol    90 MICROgram(s) HFA Inhaler 2 Puff(s) Inhalation every 6 hours PRN Shortness of Breath and/or Wheezing  diphenoxylate/atropine 1 Tablet(s) Oral four times a day PRN Diarrhea      PHYSICAL EXAM:  Un Comfortable, no immediate distress  Eyes: PERRL, EOM intact; conjunctiva and sclera clear  Head: Normocephalic;  No Trauma  ENMT: No nasal discharge, hoarseness, +cough no  hemoptysis  Neck: Supple; non tender; no masses or deformities.    No JVD  Respiratory:- WHEEZING   - RHONCHI  + RLL RALES  + CRACKLES.  Diminished breath sounds R base   Cardiovascular: Regular rate and rhythm. S1 and S2 Normal; No murmurs, gallops or rubs     - PPM/AICD  Gastrointestinal: Soft mildly tender, non-distended; Normal bowel sounds; No hepatosplenomegaly.     -PEG    -  GT   + RUBIO  Genitourinary: No costovertebral angle tenderness. No dysuria  Extremities: AROM, No clubbing, cyanosis or edema    Vascular: Peripheral pulses palpable 2+ bilaterally  Neurological: Awake, moving extremities   Skin: Warm and dry. No obvious rash  Lymph Nodes: No acute cervical or supraclavicular adenopathy  Psychiatric: not really  engaging    DEVICES:  - DENTURES   +IV R / L     - ETUBE   -TRACH   -CTUBE  R / L    LABS:                          9.5    6.89  )-----------( 190      ( 18 Sep 2022 05:47 )             30.3     09-18    135  |  99  |  21  ----------------------------<  126<H>  4.9   |  24  |  1.34<H>    Ca    9.5      18 Sep 2022 05:47  Mg     2.0     09-18    TPro  5.9<L>  /  Alb  3.3  /  TBili  0.3  /  DBili  x   /  AST  11  /  ALT  8<L>  /  AlkPhos  80  09-17    PT/INR - ( 17 Sep 2022 18:57 )   PT: 12.2 sec;   INR: 1.02          PTT - ( 17 Sep 2022 18:57 )  PTT:30.7 sec  RADIOLOGY & ADDITIONAL STUDIES (The following images were personally reviewed):
          Patient is a 89y old  Male who presents with a chief complaint of fatigue and SOB (19 Sep 2022 11:57)The patient ill appearing, some verbal response, not really engaging today, reporting generalized discomfort     HPI:  Patient is a 89 y/o male DNR/DNI nursing home resident FHx of heart diseae with PMHx of HTN, known CAD (s/p PCI w/ DAYANA x3 @ [facility] 11/2021), CKD stage III (baseline Cr 1.4- 1.8), COPD (on home O2 3L NC, s/p COVID infection), BPH, HLD, known lung nodules, and chronic diarrhea, anemia (baseline 8) with recent admission earlier this month for severe sepsis secondary to c diff colitis and COVID19 pneumonia (during admission received R>L s/p drainage complicated by small PTX) who returned to [facility] ED 09/18 from nursing home for SOB x 3-4 days. Patient reports progressively worsening SOB, worst today which he endorsed to nursing home staff who treated him with Lasix and Albuterol with no improvement in symptoms which prompted ED evaluation. He states for the last few days he feels more fatigue and notices that his breathing is heavier than usual which he attributes to his nasal cannula. He denies any CP, palpitations, dizziness, syncope, diaphoresis, fatigue, LE edema, orthopnea, PND, N/V/D, abd pain, cough, congestion, fever, chills or recent sick contact. Of note patient is no longer on isolation for c-diff or COVID (remdesivir), and has completed treatment. In ED VS /51, HR 63, RR 24, Temp 98.4F, O2 98% on 3L NC, EKG revealed SR @ 59bpm, TWI in inferolateral leads (new), CXR unremarkable, Labs Trop x1 0.03, BNP 1886, Hgb 8.5 (baseline), Cr 1.3, COVID negative, CT PE protocol negative for PE.    Patient admitted for cardiac workup to r/o ACS.  (18 Sep 2022 00:35)  Back in NSR      REVIEW OF SYSTEMS:  Constitutional: No fever, weight loss, chills + fatigue  Eyes: No eye pain, visual disturbances, or discharge  ENMT:  + difficulty hearing, tinnitus, vertigo; No sinus or throat pain. No epistaxis, +dysphagia, dysphonia, hoarseness no odynophagia  Neck: No pain, stiffness or neck swelling.  No masses or deformities  Respiratory: + cough, wheezing, hemoptysis  + COPD  - ILD   - PE   - ASTHMA     - PNEUMONIA  Cardiovascular: No chest pain, dysrhythmia, palpitations, dizziness or edema + CAD   - CHF   - HTN  Gastrointestinal: No abdominal or epigastric pain. No nausea, vomiting or hematemesis; No diarrhea or constipation. No melena or hematochezia, Icterus.          Genitourinary: No dysuria, frequency, hematuria or incontinence   - CKD/DEBBIE      - ESRD  Neurological: No headaches, +memory loss, loss of strength, numbness or tremors      +DEMENTIA     - STROKE    - SEIZURE  Skin: No itching, burning, rashes or lesions   Lymph Nodes: No enlarged glands  Endocrine: No heat or cold intolerance; No hair loss       + DM     - THYROID DISORDER  Musculoskeletal: No joint pain or swelling; No muscle, back or extremity pain, No edema  Psychiatric: No depression, anxiety, mood swings or difficulty sleeping  Heme/Lymph: No easy bruising or bleeding gums         - ANEMIA      - CANCER   -COAGULOPATHY  Allergy and Immunologic: No hives or eczema    PAST MEDICAL & SURGICAL HISTORY:  HLD (hyperlipidemia)      CAD (coronary artery disease)      BPH (benign prostatic hyperplasia)      COPD, moderate      Chronic diarrhea      Stage 3 chronic kidney disease      S/P cataract extraction      H/O right hemicolectomy        FAMILY HISTORY:  No family history of cardiovascular disease (Father, Mother)      SOCIAL HISTORY:      - Tobacco     - ETOH    Allergies    No Known Allergies    Intolerances      Vital Signs Last 24 Hrs  T(C): 36.6 (19 Sep 2022 14:15), Max: 36.6 (19 Sep 2022 14:15)  T(F): 97.9 (19 Sep 2022 14:15), Max: 97.9 (19 Sep 2022 14:15)  HR: 53 (19 Sep 2022 11:54) (50 - 104)  BP: 96/48 (19 Sep 2022 11:54) (96/48 - 156/88)  BP(mean): 83 (19 Sep 2022 05:17) (83 - 111)  RR: 18 (19 Sep 2022 11:54) (16 - 18)  SpO2: 98% (19 Sep 2022 11:54) (95% - 99%)    Parameters below as of 19 Sep 2022 11:54  Patient On (Oxygen Delivery Method): nasal cannula  O2 Flow (L/min): 3      09-18 @ 07:01 - 09-19 @ 07:00  --------------------------------------------------------  IN: 180 mL / OUT: 1650 mL / NET: -1470 mL    09-19 @ 07:01 - 09-19 @ 15:54  --------------------------------------------------------  IN: 250 mL / OUT: 0 mL / NET: 250 mL          MEDICATIONS:  MEDICATIONS  (STANDING):  apixaban 2.5 milliGRAM(s) Oral every 12 hours  aspirin enteric coated 81 milliGRAM(s) Oral daily  atorvastatin 40 milliGRAM(s) Oral at bedtime  budesonide  80 MICROgram(s)/formoterol 4.5 MICROgram(s) Inhaler 2 Puff(s) Inhalation two times a day  furosemide    Tablet 20 milliGRAM(s) Oral daily  isosorbide   mononitrate ER Tablet (IMDUR) 30 milliGRAM(s) Oral daily  mirtazapine 30 milliGRAM(s) Oral at bedtime  tamsulosin 0.4 milliGRAM(s) Oral at bedtime  valsartan 40 milliGRAM(s) Oral daily    MEDICATIONS  (PRN):  ALBUTerol    90 MICROgram(s) HFA Inhaler 2 Puff(s) Inhalation every 6 hours PRN Shortness of Breath and/or Wheezing  diphenoxylate/atropine 1 Tablet(s) Oral four times a day PRN Diarrhea      PHYSICAL EXAM:  Un Comfortable, no immediate distress  frail, thin  Eyes: PERRL, EOM intact; conjunctiva and sclera clear  Head: Normocephalic;  No Trauma  ENMT: No nasal discharge, hoarseness, +cough no  hemoptysis  Neck: Supple; non tender; no masses or deformities.    No JVD  Respiratory:- WHEEZING   - RHONCHI  + RLL RALES  + CRACKLES.  Diminished breath sounds R base   Cardiovascular: Regular rate and rhythm. S1 and S2 Normal; No murmurs, gallops or rubs     - PPM/AICD  Gastrointestinal: Soft mildly tender, non-distended; Normal bowel sounds; No hepatosplenomegaly.     -PEG    -  GT   + RUBIO  Genitourinary: No costovertebral angle tenderness. No dysuria  Extremities: AROM, No clubbing, cyanosis or edema    Vascular: Peripheral pulses palpable 2+ bilaterally  Neurological: Awake, moving extremities   Skin: Warm and dry. No obvious rash  Lymph Nodes: No acute cervical or supraclavicular adenopathy  Psychiatric: not really  engaging    DEVICES:  - DENTURES   +IV R / L     - ETUBE   -TRACH   -CTUBE  R / L    LABS:                          9.5    6.89  )-----------( 190      ( 18 Sep 2022 05:47 )             30.3     09-18    135  |  99  |  21  ----------------------------<  126<H>  4.9   |  24  |  1.34<H>    Ca    9.5      18 Sep 2022 05:47  Mg     2.0     09-18    TPro  5.9<L>  /  Alb  3.3  /  TBili  0.3  /  DBili  x   /  AST  11  /  ALT  8<L>  /  AlkPhos  80  09-17    PT/INR - ( 17 Sep 2022 18:57 )   PT: 12.2 sec;   INR: 1.02          PTT - ( 17 Sep 2022 18:57 )  PTT:30.7 sec  RADIOLOGY & ADDITIONAL STUDIES (The following images were personally reviewed):

## 2022-09-19 NOTE — DISCHARGE NOTE PROVIDER - NSDCCPCAREPLAN_GEN_ALL_CORE_FT
PRINCIPAL DISCHARGE DIAGNOSIS  Diagnosis: Atrial flutter  Assessment and Plan of Treatment: You presented to the hospital with shortness of breath and fatigue. While here, you were found to be in an abnormal heart rhythm (Atrial Flutter), which we believe was causing your symptoms. People with atrial flutter are at an increased risk of forming a blood clot in the heart, which can travel to the brain, causing a stroke, or to other parts of the body. You have been placed on Eliquis, it is important you take this medication as directed. Eliquis lowers your chance of having a stroke by helping to prevent clots from forming. If you stop taking Eliquis, you may have increased risk of forming a blood clot in your heart which can cause a stroke. Do not stop Eliquis without talking to your cardiologist. Additionally, it is important tot make sure your heart rate stays controlled, which you have been placed on a medication___________. Please continue ________________. If you have questions or concerns please contact us at our 24-hr service line [phone]. Please follow up with your cardiologist, Dr. Mena, within 1-2 weeks of discharge home from the hospital. Please follow up with electrophysiologist,  __________, on _________.       PRINCIPAL DISCHARGE DIAGNOSIS  Diagnosis: Atrial flutter  Assessment and Plan of Treatment: You presented to the hospital with shortness of breath and fatigue. While here, you were found to be in an abnormal heart rhythm (Atrial Flutter), which we believe was causing your symptoms. People with atrial flutter are at an increased risk of forming a blood clot in the heart, which can travel to the brain, causing a stroke, or to other parts of the body. You have been placed on Eliquis, it is important you take this medication as directed. Eliquis lowers your chance of having a stroke by helping to prevent clots from forming. If you stop taking Eliquis, you may have increased risk of forming a blood clot in your heart which can cause a stroke. Do not stop Eliquis without talking to your cardiologist. Additionally, it is important tot make sure your heart rate stays controlled, which you have been placed on a medication Toprol. Please continue TOPROL-XL 25MG TWICE DAILY. If you have questions or concerns please contact us at our 24-hr service line [phone]. Please follow up with your cardiologist, Dr. Mena, on Tuesday 9/27/22 at 1:00pm.

## 2022-09-19 NOTE — CONSULT NOTE ADULT - NS ATTEND AMEND GEN_ALL_CORE FT
89 year old incidentally found to have paroxysmal atrial fibrillation. No symptoms, but with some rapid ventricular rates. Agree that preferred approach is rate control and oral anticoagulation given lack of symptomatology in the clinical arrhythmia. Low dose metoprolol is best first line approach, could consider low dose amiodarone if breakthroughs with rapid ventricular rates are demonstrated.

## 2022-09-19 NOTE — DISCHARGE NOTE PROVIDER - PROVIDER TOKENS
FREE:[LAST:[Rajesh],FIRST:[Kira],PHONE:[(   )    -],FAX:[(   )    -]] FREE:[LAST:[Rajesh],FIRST:[Kira],PHONE:[phone]],FAX:[(   )    -],ADDRESS:[[address]  [place] [address]],SCHEDULEDAPPT:[09/27/2022],SCHEDULEDAPPTTIME:[01:00 PM],ESTABLISHEDPATIENT:[T]]

## 2022-09-19 NOTE — DISCHARGE NOTE PROVIDER - HOSPITAL COURSE
89yo M, DNR/DNI (MOLST in chart), nursing home resident w/ PMHx of HTN, CAD (multiple PCI, most recent DAYANA x3 @ [facility] 11/2021), CKD III (baseline Cr 1.4-1.8, COPD (on home O2 3L NC, s/p COVID infxn), BPH, HLD, known lung nodules, recent C. diff, Anemia (baseline Hgb 8) who presented from nursing home w/ SOB x3-4 days. Pt admitted to cardiology for work up, and was found to have conversion to AFlutter w/ RVR, now rate controlled. Morning of 9/19/22 ~6AM patient converted to sinus rhythm (HR 50-70). Patient was started on  Eliquis 2.5mg PO BID and Toprol _______. EP consulted, and recommending ________. Patient had TTE revealing ________.    Patient seen and examined prior to discharge. VSS, labs unremarkable, remains sinus on telemetry, and physical exam benign. Patient has returned to baseline as discussed with his nephew at bedside. Hospital course reviewed with attending cardiologist and patient cleared for discharge home. Pt to follow up with Dr. Mena within 1-2 weeks. Med rec complete, and prescription to be coordinated by patient's rehab facility. Discharge instructions reviewed with patient and patient's nephew at bedside (Tanner Yan - pt's HCP).            89yo M, DNR/DNI (MOLST in chart), nursing home resident w/ PMHx of HTN, CAD (multiple PCI, most recent DAYANA x3 @ [facility] 11/2021), CKD III (baseline Cr 1.4-1.8, COPD (on home O2 3L NC, s/p COVID infxn), BPH, HLD, known lung nodules, recent C. diff, Anemia (baseline Hgb 8) who presented from nursing home w/ SOB x3-4 days. Pt admitted to cardiology for work up, and was found to have conversion to AFlutter w/ RVR, now rate controlled. Morning of 9/19/22 ~6AM patient converted to sinus rhythm (HR 50-70). Patient was started on  Eliquis 2.5mg PO BID and Lopressor 25mg PO q6hrs. EP consulted, and recommending NOT starting Amiodarone given patient's pulmonary history. Patient to be discharged on Toprol-XL 25mg PO BID. Patient had TTE revealing ________.    Patient seen and examined prior to discharge. VSS, labs unremarkable, remains sinus on telemetry, and physical exam benign. Patient has returned to baseline as discussed with his nephew at bedside. Hospital course reviewed with attending cardiologist and patient cleared for discharge home. Pt to follow up with Dr. Mena within 1-2 weeks. Med rec complete, and prescription to be coordinated by patient's rehab facility. Discharge instructions reviewed with patient and patient's nephew at bedside (Tanner Yan - pt's HCP).            89yo M, DNR/DNI (MOLST in chart), nursing home resident w/ PMHx of HTN, CAD (multiple PCI, most recent DAYANA x3 @ [facility] 11/2021), CKD III (baseline Cr 1.4-1.8, COPD (on home O2 3L NC, s/p COVID infection, BPH, HLD, known lung nodules, recent C. diff, Anemia (baseline Hgb 8) who presented from nursing home w/ SOB x3-4 days. Pt admitted to cardiology for work up, and was found to have AFlutter w/ RV. Morning of 9/19/22 ~6AM patient converted to sinus rhythm (HR 50-70). Patient was started on  Eliquis 2.5mg PO BID and Lopressor 25mg PO q6hrs. EP consulted, and recommending NOT starting Amiodarone given patient's pulmonary history. Patient to be discharged on Lopressor 25mg PO BID. Patient had TTE 9/19/22: 1. Normal left and right ventricular size and systolic function. 2. Moderate aortic stenosis. with mild aortic regurgitation. 3. The aortic root is mildly dilated. The aortic root measures 3.80 cm at level of the sinuses of Valsalva (normal 3.1-3.7 cm for men, 2.7-3.3 cm for women). 4. No evidence of pulmonary hypertension, pulmonary artery systolic pressure is 23 mmHg. 5. No pericardial effusion. 6. The aortic root is mildly dilated. The aortic root measures 3.80 cm at level of the sinuses of Valsalva (normal 3.1-3.7 cm for men, 2.7-3.3 cm for women).    Patient seen and examined prior to discharge. VSS, labs unremarkable, remains sinus on telemetry, and physical exam benign. Patient has returned to baseline as discussed with his nephew at bedside. Hospital course reviewed with attending cardiologist and patient cleared for discharge home. Pt to follow up with Dr. Mena on Tuesday 9/27/22 at 1:00pm. Discharge instructions reviewed with patient and patient's nephew at bedside (Tanner Yan - pt's HCP). Phone number provided to contact [facility] Cardiology service with any questions/concerns.

## 2022-09-19 NOTE — CONSULT NOTE ADULT - ASSESSMENT
1 y/o male DNR/DNI nursing home resident  with PMHx of HTN, known CAD (s/p PCI w/ DAYANA x3 @ [place] [place] 11/2021), CKD stage III (baseline Cr 1.4- 1.8), COPD (on home O2 3L NC, s/p recent COVID Pneumonia), BPH, HLD, known lung nodules (stable as per most recent CT on this admit, refused bx),  anemia (baseline 8) with recent admission earlier this month for severe sepsis secondary to c diff colitis and COVID19 pneumonia (during admission received R>L s/p drainage complicated by small PTX) who returned to [facility] ED 09/18 from nursing home for progressive SOB x 3-4 days. Admitted to 5U for ACS, on admit in SR and  new  TWI in inferolateral leads , CT PE protocol negative for PE with stable nodules on L and R.    Patient admitted for cardiac workup to r/o ACS.  Hospital course significant  for  new onset A fib on 9/18 with VR up to 140's, rate controlled with metoprolol 25mg po q6h. Patient started on eliquis 2.5mg BID. Plavix stopped and asa 81mg continued.   Patient spontaneously converted to SR on 9/19 at 6am without significant pause. EP consulted for further management and possible amiodarone initiation.     PAF: Recommend to continue lower dose metoprolol 25mg po q12h and  eliquis 2.5mg po BID. Do not recommend amiodarone at this time.   Clear from EP point to transfer to [facility]. 89 y/o male DNR/DNI nursing home resident  with PMHx of HTN, known CAD (s/p PCI w/ DAYANA x3 @ [facility] 11/2021), CKD stage III (baseline Cr 1.4- 1.8), COPD (on home O2 3L NC, s/p recent COVID Pneumonia), BPH, HLD, known lung nodules (stable as per most recent CT on this admit, refused bx),  anemia (baseline 8) with recent admission earlier this month for severe sepsis secondary to c diff colitis and COVID19 pneumonia (during admission received R>L s/p drainage complicated by small PTX) who returned to [facility] ED 09/18 from nursing home for progressive SOB x 3-4 days. Admitted to 5U, on admit in SR and new TWI in inferolateral leads , T PE protocol negative for PE with stable nodules on L and R, trops at 0.03--0.02.   Hospital course significant  for  new onset A fib on 9/18 with VR up to 140's, rate controlled with metoprolol 25mg po q6h. Patient started on eliquis 2.5mg BID. Plavix stopped and asa 81mg continued.   Patient spontaneously converted to SR on 9/19 at 6am without significant pause. EP consulted for further management and possible amiodarone initiation.     PAF: Recommend to continue lower dose metoprolol 25mg po q12h and  eliquis 2.5mg po BID. Do not recommend amiodarone at this time.   Clear from EP point to transfer to [facility].

## 2022-09-19 NOTE — DISCHARGE NOTE NURSING/CASE MANAGEMENT/SOCIAL WORK - PATIENT PORTAL LINK FT
You can access the FollowMyHealth Patient Portal offered by [facility] by registering at the following website: [email or web address]. By joining [facility]’s FollowMyHealth portal, you will also be able to view your health information using other applications (apps) compatible with our system.

## 2022-09-19 NOTE — DISCHARGE NOTE PROVIDER - CARE PROVIDER_API CALL
Kira Mena  Phone: (   )    -  Fax: (   )    -  Follow Up Time:    Kira Mena  [address]  [place] [id number]  Phone: [phone]  Fax: (   )    -  Established Patient  Scheduled Appointment: 09/27/2022 01:00 PM

## 2022-09-19 NOTE — DISCHARGE NOTE PROVIDER - NSDCMRMEDTOKEN_GEN_ALL_CORE_FT
Advair HFA 45 mcg-21 mcg/inh inhalation aerosol: 2 puff(s) inhaled 2 times a day  Albuterol (Eqv-ProAir HFA) 90 mcg/inh inhalation aerosol: 2 puff(s) inhaled every 6 hours, As Needed -for shortness of breath and/or wheezing   Aspirin Enteric Coated 81 mg oral delayed release tablet: 1 tab(s) orally once a day  clopidogrel 75 mg oral tablet: 1 tab(s) orally once a day  Diovan 40 mg oral tablet: 1 tab(s) orally once a day  Flomax 0.4 mg oral capsule: 1 cap(s) orally once a day  isosorbide mononitrate 30 mg oral tablet, extended release: 1 tab(s) orally once a day (in the morning)  Lomotil 2.5 mg-0.025 mg oral tablet: 2 tab(s) orally 4 times a day  mirtazapine 15 mg oral tablet: 2 tab(s) orally once a day (at bedtime)  simvastatin 20 mg oral tablet: 1 tab(s) orally once a day (at bedtime)  Toprol-XL 25 mg oral tablet, extended release: 1 tab(s) orally once a day   Advair HFA 45 mcg-21 mcg/inh inhalation aerosol: 2 puff(s) inhaled 2 times a day  clopidogrel 75 mg oral tablet: 1 tab(s) orally once a day  Diovan 40 mg oral tablet: 1 tab(s) orally once a day  Flomax 0.4 mg oral capsule: 1 cap(s) orally once a day  isosorbide mononitrate 30 mg oral tablet, extended release: 1 tab(s) orally once a day (in the morning)  Lomotil 2.5 mg-0.025 mg oral tablet: 2 tab(s) orally 4 times a day  mirtazapine 15 mg oral tablet: 2 tab(s) orally once a day (at bedtime)   Advair HFA 45 mcg-21 mcg/inh inhalation aerosol: 2 puff(s) inhaled 2 times a day  apixaban 2.5 mg oral tablet: 1 tab(s) orally every 12 hours  aspirin 81 mg oral delayed release tablet: 1 tab(s) orally once a day  atorvastatin 40 mg oral tablet: 1 tab(s) orally once a day (at bedtime)  Diovan 40 mg oral tablet: 1 tab(s) orally once a day  Flomax 0.4 mg oral capsule: 1 cap(s) orally once a day  isosorbide mononitrate 30 mg oral tablet, extended release: 1 tab(s) orally once a day (in the morning)  Lomotil 2.5 mg-0.025 mg oral tablet: 2 tab(s) orally 4 times a day  Lopressor 50 mg oral tablet: 0.5 tab(s) orally every 12 hours   mirtazapine 15 mg oral tablet: 2 tab(s) orally once a day (at bedtime)

## 2022-09-19 NOTE — PROGRESS NOTE ADULT - ASSESSMENT
ASSESSMENT/PLANPatient is a 89 y/o male DNR/DNI nursing home resident FHx of heart diseae with PMHx of HTN, known CAD (s/p PCI w/ DAYANA x3 @ [facility] 11/2021), CKD stage III (baseline Cr 1.4- 1.8), COPD (on home O2 3L NC, s/p COVID infection), BPH, HLD, known lung nodules, and chronic diarrhea, anemia (baseline 8) with recent admission earlier this month for severe sepsis secondary to c diff colitis and COVID19 pneumonia (during admission received R>L s/p drainage complicated by small PTX) who returned to [facility] ED 09/18 from nursing home for SOB x 3-4 days. Now admitted to cardiac tele for  ACS. CHF    1. O2 attempt 3LNC   2. Bronchodilators:  Atrovent/ albuterol q 4 – 6 hours as needed  3. Corticosteroids: off  4. ID/Antibiotics: off   5. Cardiac/HTN: TTE, optimize CHF mngmnt   6. GI: Rx/ prophylaxis c PPI/H2B  7. Heme: Rx/VT prophylaxis c SQH/SCD/AC Plavix,ASA  8. Aspiration precautions  9. The patient firmly refused any invasive intervention on lung nodules   Discussed with managing team      
ASSESSMENT/PLANPatient is a 89 y/o male DNR/DNI nursing home resident FHx of heart diseae with PMHx of HTN, known CAD (s/p PCI w/ DAYANA x3 @ [facility] 11/2021), CKD stage III (baseline Cr 1.4- 1.8), COPD (on home O2 3L NC, s/p COVID infection), BPH, HLD, known lung nodules, and chronic diarrhea, anemia (baseline 8) with recent admission earlier this month for severe sepsis secondary to c diff colitis and COVID19 pneumonia (during admission received R>L s/p drainage complicated by small PTX) who returned to [facility] ED 09/18 from nursing home for SOB x 3-4 days. Now admitted to cardiac tele for  ACS. CHF    1. O2 2LNC   2. Bronchodilators:  Atrovent/ albuterol q 4 – 6 hours as needed  3. Corticosteroids: off  4. ID/Antibiotics: off   5. Cardiac/HTN: TTE, optimize CHF mngmnt   6. GI: Rx/ prophylaxis c PPI/H2B  7. Heme: Rx/VT prophylaxis c SQH/SCD/AC Plavix,ASA  8. Aspiration precautions  9. The patient firmly refused any invasive intervention on lung nodules   Discussed with managing team
ASSESSMENT/PLANPatient is a 89 y/o male DNR/DNI nursing home resident FHx of heart diseae with PMHx of HTN, known CAD (s/p PCI w/ DAYANA x3 @ [facility] 11/2021), CKD stage III (baseline Cr 1.4- 1.8), COPD (on home O2 3L NC, s/p COVID infection), BPH, HLD, known lung nodules, and chronic diarrhea, anemia (baseline 8) with recent admission earlier this month for severe sepsis secondary to c diff colitis and COVID19 pneumonia (during admission received R>L s/p drainage complicated by small PTX) who returned to [facility] ED 09/18 from nursing home for SOB x 3-4 days. Now admitted to cardiac tele for  ACS. CHF    Improved  IN NSR  supp rx.   cont eliquis for short term  appears euvolemic  PT
Patient is a 89 y/o male DNR/DNI nursing home resident FHx of heart diseae with PMHx of HTN, known CAD (s/p PCI w/ DAYANA x3 @ [facility] 11/2021), CKD stage III (baseline Cr 1.4- 1.8), COPD (on home O2 3L NC, s/p COVID infection), BPH, HLD, known lung nodules, and chronic diarrhea, anemia (baseline 8) with recent admission earlier this month for severe sepsis secondary to c diff colitis and COVID19 pneumonia (during admission received R>L s/p drainage complicated by small PTX) who returned to [facility] ED 09/18 from nursing home for SOB x 3-4 days. Now admitted to cardiac tele for r/o ACS.

## 2022-09-21 DIAGNOSIS — Z79.82 LONG TERM (CURRENT) USE OF ASPIRIN: ICD-10-CM

## 2022-09-21 DIAGNOSIS — R94.31 ABNORMAL ELECTROCARDIOGRAM [ECG] [EKG]: ICD-10-CM

## 2022-09-21 DIAGNOSIS — R91.8 OTHER NONSPECIFIC ABNORMAL FINDING OF LUNG FIELD: ICD-10-CM

## 2022-09-21 DIAGNOSIS — D64.9 ANEMIA, UNSPECIFIED: ICD-10-CM

## 2022-09-21 DIAGNOSIS — I50.9 HEART FAILURE, UNSPECIFIED: ICD-10-CM

## 2022-09-21 DIAGNOSIS — J44.9 CHRONIC OBSTRUCTIVE PULMONARY DISEASE, UNSPECIFIED: ICD-10-CM

## 2022-09-21 DIAGNOSIS — E11.22 TYPE 2 DIABETES MELLITUS WITH DIABETIC CHRONIC KIDNEY DISEASE: ICD-10-CM

## 2022-09-21 DIAGNOSIS — N40.0 BENIGN PROSTATIC HYPERPLASIA WITHOUT LOWER URINARY TRACT SYMPTOMS: ICD-10-CM

## 2022-09-21 DIAGNOSIS — I13.0 HYPERTENSIVE HEART AND CHRONIC KIDNEY DISEASE WITH HEART FAILURE AND STAGE 1 THROUGH STAGE 4 CHRONIC KIDNEY DISEASE, OR UNSPECIFIED CHRONIC KIDNEY DISEASE: ICD-10-CM

## 2022-09-21 DIAGNOSIS — I48.92 UNSPECIFIED ATRIAL FLUTTER: ICD-10-CM

## 2022-09-21 DIAGNOSIS — N18.30 CHRONIC KIDNEY DISEASE, STAGE 3 UNSPECIFIED: ICD-10-CM

## 2022-09-21 DIAGNOSIS — Z66 DO NOT RESUSCITATE: ICD-10-CM

## 2022-09-21 DIAGNOSIS — Z79.02 LONG TERM (CURRENT) USE OF ANTITHROMBOTICS/ANTIPLATELETS: ICD-10-CM

## 2022-09-21 DIAGNOSIS — Z20.822 CONTACT WITH AND (SUSPECTED) EXPOSURE TO COVID-19: ICD-10-CM

## 2022-09-21 DIAGNOSIS — Z86.16 PERSONAL HISTORY OF COVID-19: ICD-10-CM

## 2022-09-21 DIAGNOSIS — I48.0 PAROXYSMAL ATRIAL FIBRILLATION: ICD-10-CM

## 2022-09-21 DIAGNOSIS — E78.5 HYPERLIPIDEMIA, UNSPECIFIED: ICD-10-CM

## 2022-09-21 DIAGNOSIS — Z95.5 PRESENCE OF CORONARY ANGIOPLASTY IMPLANT AND GRAFT: ICD-10-CM

## 2022-09-21 DIAGNOSIS — I25.10 ATHEROSCLEROTIC HEART DISEASE OF NATIVE CORONARY ARTERY WITHOUT ANGINA PECTORIS: ICD-10-CM

## 2022-09-21 DIAGNOSIS — Z79.899 OTHER LONG TERM (CURRENT) DRUG THERAPY: ICD-10-CM

## 2022-09-21 DIAGNOSIS — I35.2 NONRHEUMATIC AORTIC (VALVE) STENOSIS WITH INSUFFICIENCY: ICD-10-CM

## 2022-09-21 DIAGNOSIS — I77.89 OTHER SPECIFIED DISORDERS OF ARTERIES AND ARTERIOLES: ICD-10-CM

## 2022-09-21 DIAGNOSIS — Z90.49 ACQUIRED ABSENCE OF OTHER SPECIFIED PARTS OF DIGESTIVE TRACT: ICD-10-CM

## 2022-09-21 DIAGNOSIS — Z79.51 LONG TERM (CURRENT) USE OF INHALED STEROIDS: ICD-10-CM

## 2022-09-21 DIAGNOSIS — Z74.01 BED CONFINEMENT STATUS: ICD-10-CM

## 2022-09-21 DIAGNOSIS — R09.02 HYPOXEMIA: ICD-10-CM

## 2022-09-21 DIAGNOSIS — Z99.81 DEPENDENCE ON SUPPLEMENTAL OXYGEN: ICD-10-CM

## 2022-09-21 DIAGNOSIS — M19.90 UNSPECIFIED OSTEOARTHRITIS, UNSPECIFIED SITE: ICD-10-CM

## 2022-09-26 NOTE — DISCHARGE NOTE NURSING/CASE MANAGEMENT/SOCIAL WORK - DATE OF LAST VACCINATION
Spoke with patient and he confirms he is asymptomatic at this time  Informed patient Dr Giancarlo Gaston wants him to be covered with antibiotic around the time of surgery, hence why he asked him to start it 10/9/22  Patient verbalized understanding and agrees to plan  11-Nov-2021

## 2022-11-08 NOTE — DIETITIAN INITIAL EVALUATION ADULT - SIGNS/SYMPTOMS
Patient : Ghislaine Mcallister Age: 56 year old Sex: female   MRN: [id number] Encounter Date: 11/7/2022      History     Chief Complaint   Patient presents with   • Fall     HPI Ghislaine Mcallister is a 56 year old female with pmh significant for osteopenia, hypertension, hyperlipidemia, dysphonia that presents for concerns of wrist pain and forearm pain after fall.  The history is provided by the patient.  Patient reports she tripped on a bench when taking her dog out, fell backward and put her hands down to catch her fall.  Noted wrist pain 8/10 intensity, aching, and swelling more prominent on the left side than the right side. Pain radiates in to forearms bilaterally. Reports she did not strike her head.  No loss of consciousness.  Patient is not anticoagulated.     Past medical history, family history, surgical history, social history, medications, allergies, all personally reviewed by myself, Kacy Calhoun PA-C      Allergies   Allergen Reactions   • Bactrim RASH   • Fosamax ARTHRALGIA     Muscle aches in legs       Discharge Medication List as of 11/8/2022 12:42 AM      Prior to Admission Medications    Details   lisinopril (ZESTRIL) 5 MG tablet Take 1 tablet by mouth daily.Eprescribe, Disp-90 tablet, R-1      fluticasone-salmeterol (Advair Diskus) 250-50 MCG/ACT inhaler Inhale 1 puff into the lungs two times daily.Eprescribe, Disp-1 each, R-3      albuterol 108 (90 Base) MCG/ACT inhaler Inhale 2 puffs into the lungs every 4 hours as needed for Shortness of Breath or Wheezing.Eprescribe, Disp-1 each, R-3      benzonatate (TESSALON PERLES) 200 MG capsule Take 1 capsule by mouth 3 times daily as needed for Cough.Eprescribe, Disp-30 capsule, R-0      acetaminophen (TYLENOL) 325 MG tablet Take 650 mg by mouth every 4 hours as needed for Pain.Historical Med      calcium carbonate-vitamin D (CALTRATE+D) 600-400 MG-UNIT per tablet Take 1 tablet by mouth daily.Historical Med      VITAMIN D, CHOLECALCIFEROL, PO Take 2,000 Units by  mouth. Historical Med      Multiple Vitamins-Minerals (MULTIVITAMIN & MINERAL PO) Take  by mouth.Historical Med         Modified Medications    Details   HYDROcodone-acetaminophen (Norco) 5-325 MG per tablet Indication: Acute PainTake 1 tablet by mouth every 8 hours as needed for Pain. Not to exceed 3000mg of acetaminophen in 24 hours. Each pill contains 325mg of acetaminophen.Eprescribe, Disp-10 tablet, R-0             Past Medical History:   Diagnosis Date   • Closed nondisplaced fracture of scaphoid bone of right wrist 06/02/2018   • COVID-19 04/01/2022   • Dysphonia    • Fracture of tibial plateau 09/17/2014   • HTN (hypertension)    • Hyperlipidemia    • Osteopenia        Past Surgical History:   Procedure Laterality Date   • BUNIONECTOMY     • COLONOSCOPY  7/25/2016    Dr. Deleon, repeat every 2-3 years   • COLONOSCOPY  08/27/2018   • FOOT SURGERY      cyst removal   • LASIK SURGERY Bilateral 3/2008   • LASIK SURGERY Right 4/2015    right eye enhancement       Family History   Problem Relation Age of Onset   • Diabetes Mother    • Cancer Father         lung   • Hypertension Father    • Multiple Sclerosis Sister    • Cancer Maternal Grandmother         lymphoma   • Stroke Maternal Grandfather    • Cancer, Breast Paternal Cousin    • Cancer, Thyroid Paternal Cousin        Social History     Tobacco Use   • Smoking status: Never Smoker   • Smokeless tobacco: Never Used   Vaping Use   • Vaping Use: never used   Substance Use Topics   • Alcohol use: Yes     Comment: occasional   • Drug use: No       E-cigarette/Vaping   • E-Cigarette/Vaping Use Never Used      E-Cigarette/Vaping Substances & Devices       Review of Systems  Negative, non pertinent, noncontributory for all remaining 13 systems other than stated above.    Physical Exam     ED Triage Vitals [11/07/22 2225]   ED Triage Vitals Group      Temp 97.9 °F (36.6 °C)      Heart Rate 79      Resp 20      BP (!) 157/83      SpO2 100 %      EtCO2 mmHg        Height 5' 5\" (1.651 m)      Weight 180 lb 1.9 oz (81.7 kg)      Weight Scale Used Scale in bed      BMI (Calculated) 29.97      IBW/kg (Calculated) 57       Physical Exam  General: Vital signs and nursing notes reviewed.   HEENT: Head: Normocephalic and atraumatic. No retroauricular ecchymosis or periorbital ecchymosis. Eyes: Sclerae anicteric. Conjunctivae pink. Pupils equal, round, and reactive to light. Ears: Tympanic membranes are visualized and no hemotympanum. No otorrhea. Nose: Nares patent. No septal defect nor septal hematoma. No rhinorrhea. Mouth: Oropharynx moist. No malocclusion. Uvula rises symmetrically to the midline with phonation.  Neck: No reproducible pain, step-offs, crepitus with palpation of the posterior cervical spine. Full active range of motion. Supple. No lymphadenopathy. Trachea midline. Phonation normal.  Clavicles: Atraumatic.  Lungs: Clear to auscultation, bilaterally, in the posterior fields.  Chest muscle wall:  No reproducible tenderness in chest muscle wall. No chest muscle instability with AP and lateral palpation of the chest.  Heart: Regular rate and rhythm. Normal S1 and S2.  Abdomen: Soft and nontender. No rebound, rigidity, no guarding. No CVA tenderness bilaterally.  Pelvis: No pain with pelvic rock. No tenderness overlying greater trochanters bilaterally.  Musculoskeletal:  Right hand:  No deformity. No STS.  No TTP. Capillary refill distally brisk. Right wrist: TTP along distal radius. STS. No deformity. Decreased aROM d/t pain. Right forearm:  TTP along distal radius. STS. No deformity.  Right elbow: No deformity. No STS.  No TTP. Left hand: No deformity. No STS.  No TTP. Left wrist: TTP along distal radius. STS. No large deformity.  Left forearm: TTP along distal radius. STS. No large deformity. Left elbow: No deformity. No STS.  No TTP.   Extremities: No clubbing, cyanosis, nor edema.  Pulses: Equal bilateral radial and dorsalis pedis pulses.  Neurologic: CN 2-12  grossly intact. GCS score is 15. Speech fluent. Gait is not ataxic..  Psychiatric: Alert and oriented to person, place and time. Affect is appropriate. Hygiene appropriate.          ED Course     Splint Application    Date/Time: 11/8/2022 12:05 AM  Performed by: Kacy Calhoun PA-C  Authorized by: Kacy Calhoun PA-C     Consent:     Consent obtained:  Verbal    Consent given by:  Patient    Risks, benefits, and alternatives were discussed: yes      Risks discussed:  Discoloration, numbness, pain and swelling    Alternatives discussed:  No treatment, delayed treatment, alternative treatment, observation and referral  Universal protocol:     Procedure explained and questions answered to patient or proxy's satisfaction: yes      Relevant documents present and verified: yes      Immediately prior to procedure a time out was called: yes      Patient identity confirmed:  Verbally with patient  Pre-procedure details:     Distal perfusion: brisk capillary refill    Procedure details:     Location:  Wrist    Wrist location:  L wrist    Strapping: no      Splint type:  Volar short arm (orthoglass)    Supplies:  Cotton padding, elastic bandage and fiberglass    Attestation: Splint applied and adjusted personally by me    Post-procedure details:     Distal neurologic exam:  Unchanged    Distal perfusion: distal pulses strong      Procedure completion:  Tolerated well, no immediate complications    Post-procedure imaging: not applicable    Splint Application    Date/Time: 11/8/2022 12:15 AM  Performed by: Kacy Calhoun PA-C  Authorized by: Kacy Calhoun PA-C     Consent:     Consent obtained:  Verbal    Consent given by:  Patient    Risks, benefits, and alternatives were discussed: yes      Risks discussed:  Swelling    Alternatives discussed:  No treatment, delayed treatment, observation, alternative treatment and referral  Universal protocol:     Procedure explained and questions answered to patient or proxy's  satisfaction: yes      Relevant documents present and verified: yes      Immediately prior to procedure a time out was called: yes      Patient identity confirmed:  Verbally with patient  Procedure details:     Location:  Wrist    Wrist location:  R wrist    Strapping: no      Splint type:  Volar short arm    Supplies:  Elastic bandage, fiberglass and cotton padding    Attestation: Splint applied and adjusted personally by me    Post-procedure details:     Distal neurologic exam:  Unchanged    Distal perfusion: distal pulses strong      Procedure completion:  Tolerated well, no immediate complications    Post-procedure imaging: not applicable          Lab Results     No results found for this visit on 11/07/22.      Radiology Results     Imaging Results          XR FOREARM 2 VW BILATERAL (Final result)  Result time 11/07/22 23:51:37    Final result                 Impression:    FINDINGS/IMPRESSION:      Comminuted and impacted fractures of the bilateral distal radii with  intra-articular extension. Mildly displaced fracture of the left ulnar  styloid process. Slight cortical irregularity of the bilateral scaphoid  bones.               Narrative:    EXAM: XR WRIST 3+ VW BILATERAL, XR FOREARM 2 VW BILATERAL    INDICATION:  pain    COMPARISON:  None.                               XR WRIST 3+ VW BILATERAL (Final result)  Result time 11/07/22 23:51:37    Final result                 Impression:    FINDINGS/IMPRESSION:      Comminuted and impacted fractures of the bilateral distal radii with  intra-articular extension. Mildly displaced fracture of the left ulnar  styloid process. Slight cortical irregularity of the bilateral scaphoid  bones.               Narrative:    EXAM: XR WRIST 3+ VW BILATERAL, XR FOREARM 2 VW BILATERAL    INDICATION:  pain    COMPARISON:  None.                                ED Medication Orders (From admission, onward)    Ordered Start     Status Ordering Provider    11/08/22 0034 11/08/22 0035   HYDROcodone-acetaminophen (NORCO) 5-325 MG per tablet 1 tablet  ONCE         Last MAR action: Given HUMBERTOSHANNAN    11/07/22 2243 11/07/22 2244  morphine injection 4 mg  ONCE         Last MAR action: Given HUMBERTO, SHANNAN    11/07/22 2243 11/07/22 2244  ondansetron (ZOFRAN) injection 4 mg  ONCE         Last MAR action: Given SHANNAN PAUL               MDM 56 year old female with pmh significant for osteopenia,hypertension, hyperlipidemia, dysphonia that presents for concerns of wrist pain and forearm pain after fall.  The history is provided by the patient.  Patient reports she tripped on a bench when taking her dog out, fell backward and put her hands down to catch her fall.  Noted wrist pain 8/10 intensity, aching, and swelling more prominent on the left side than the right side. Pain radiates in to forearms bilaterally. Reports she did not strike her head.  No loss of consciousness.  Patient is not anticoagulated.    Patient is afebrile and not toxic appearing.     Peripheral IV is established abuse given 4 mg IV morphine, 4 mg IV Zofran.      Bilateral wrist and forearm imaging shows comminuted impacted fracture of bilateral distal radii with intra-articular extension, mildly displaced fracture of the left are normal styloid process, slight cortical irregular of bilateral scaphoid bones. The preliminary interpretation was performed by myself as I independently reviewed imaging. Formal radiology interpretation reviewed. No discrepancy in report.     Patient was counseled laboratory and radiographic results, she received 1 oral Stanley in the ED for pain control as 24 hour pharmacy now closed.  On recheck pain decreased from 09/10 to 6/10 intensity.    Bilateral short arm volar splint with 2 in ortho glass were applied by myself.  Patient neurovascularly unchanged postprocedure.  Offered admission as there is concern for ability to provide care at home as  works given bilateral distal radii fractures  requiring splinting, but patient's  has off W-F and would like work excuse for tomorrow as they are hopeful they will be able make arrangements for home assistance. Service to home care was placed.  Instructed patient to follow-up with orthopedist within the next 1 weeks time, return if new acute worsening of symptoms.    DdX considered but not limited to:  Fracture, dislocation, sprain, strain, contusion.  Multiple diagnoses were considered.  Clinical Impression     ED Diagnosis   1. Closed fracture of distal end of left radius, unspecified fracture morphology, initial encounter     2. Closed fracture of distal end of right radius with delayed healing, unspecified fracture morphology, subsequent encounter  SERVICE TO ORTHOPEDICS    HYDROcodone-acetaminophen (Norco) 5-325 MG per tablet    DISCONTINUED: HYDROcodone-acetaminophen (Norco) 5-325 MG per tablet   3. Traumatic closed fracture of ulnar styloid with minimal displacement, left, initial encounter  HYDROcodone-acetaminophen (Norco) 5-325 MG per tablet    SERVICE TO HOME CARE    DISCONTINUED: HYDROcodone-acetaminophen (Norco) 5-325 MG per tablet       Disposition        Discharge after Treatment 11/8/2022 12:42 AM  There is no comment    Closure  The patient was further counseled that this is a provisional diagnosis. There is always the potential for new or worsening of symptoms and the patient was educated to return to the ED, call 911, or seek medical attention in nearest ED if new or acute worsening of symptoms develop.                   Kacy Calhoun PA-C  11/08/22 1257     AEB wt loss >12% in 7 months, mild-moderate fat/muscle losses

## 2022-11-21 NOTE — DISCHARGE NOTE PROVIDER - REASON FOR ADMISSION
Dyspnea pt c/o left sided facial swelling beginning Saturday. pt was seen at  on Sunday and prescribed Augmentin. pt has taken 2 doses with no relief.

## 2023-03-10 NOTE — ED ADULT NURSE NOTE - DATE OF LAST VACCINATION
[FreeTextEntry1] : Primary diagnosis: type 2 DM, HLD, HTN\par Other medical problems: CAD s/p recent STEMI in 2022 with stent, HFrEF\par \par Patient presents as a hospital follow up. Saw Delmis on 2023. \par \par #Diabetes Mellitus Type 2   \par \par Diagnosis- 6 years ago \par Current regimen: Semglee 22 units qhs, MFM 500mg BID, Jardiance 10mg daily \par Other diabetic medications discontinued in the past:\par 1.  Janumet                          Reason for discontinuation: unclear \par 2. Was on insulin in  prior to kidney stone surgery and then was discontinued\par \par PCP/prior endocrinologist: managed by PCP \par Signs/symptoms: Denies \par Last HgbA1c: 10.1% (2022)\par Home blood sugars: at goal. fasting  pp <180 \par Hypoglycemia: Very occasionally \par FH of diabetes: mother \par History of CAD: yes as noted above\par History of CVA: Denies \par \par Diet: \par Breakfast: Oatmeal + fruits \par Lunch: chicken with broccoli \par Dinner: Rice + fish \par Snacks: Denies \par Drinks: Denies juices/sodas. Just water \par Met with diabetes educator? \par \par Exercise: \par walking \par \par eGFR: 97    Alb/creat: - \par Follow with nephrology? Denies\par \par Last eye exam: UTD \par Patient saw Dr. Octavio Esteban\par Evidence of retinopathy? Denies \par \par Last foot exam: Due. \par Any issues? Denies\par \par Social History: \par Alcohol: Denies\par Tobacco: Quit. former for 20+ years  \par Work: [employer] and [employer]\par \par #Hyperlipidemia \par (2022)\par LDL: 121\par T\par Currently on statin: Lipitor 80mg daily \par Hx of CAD/MI? Yes, STEMI s/p stent \par Follows with cardiology? Yes, Dr. Smyth\par \par #Hypertension\par BP today: 118/78\par Current regimen: Metoprolol 50mg daily, losartan 25mg \par 
11-Nov-2021

## 2023-03-16 NOTE — H&P ADULT - RESPIRATORY RATE (BREATHS/MIN)
18 Mohs Rapid Report Verbiage: The area of clinically evident tumor was marked with skin marking ink and appropriately hatched.  The initial incision was made following the Mohs approach through the skin.  The specimen was taken to the lab, divided into the necessary number of pieces, chromacoded and processed according to the Mohs protocol.  This was repeated in successive stages until a tumor free defect was achieved.

## 2023-08-27 NOTE — PHYSICAL THERAPY INITIAL EVALUATION ADULT - SITTING BALANCE: DYNAMIC
Nursing reporting hematura.  Is on a small dose of Eliquis as well.  No catheter.        Order for UA/UC    PAOLA Sauceda CNP    
fair minus

## 2023-10-10 NOTE — DISCHARGE NOTE NURSING/CASE MANAGEMENT/SOCIAL WORK - FLU SEASON?
Refill per VO of Dr. Jacob العلي  Last appt: 8/10/2023    Future Appointments   Date Time Provider [address]   8/12/2024  2:20 PM Carmina Curry MD [facility]       Requested Prescriptions     Signed Prescriptions Disp Refills    fluvastatin (LESCOL) 20 MG capsule 90 capsule 3     Sig: TAKE 1 CAPSULE BY MOUTH EVERY DAY     Authorizing Provider: Carmina Curry     Ordering User: Palmer Cadena
Yes...

## 2023-11-24 NOTE — PHYSICAL THERAPY INITIAL EVALUATION ADULT - IMPAIRMENTS FOUND, PT EVAL
aerobic capacity/endurance/gait, locomotion, and balance/muscle strength/ventilation and respiration/gas exchange MSSA bacteremia

## 2023-12-25 NOTE — PROGRESS NOTE ADULT - PROBLEM/PLAN-12
DISPLAY PLAN FREE TEXT
no

## 2024-02-12 NOTE — PROGRESS NOTE ADULT - PROBLEM/PLAN-3
used
DISPLAY PLAN FREE TEXT

## 2024-02-28 NOTE — DISCHARGE NOTE PROVIDER - NSDCHC_MEDRECSTATUS_GEN_ALL_CORE
Mother called and agreed with Dr. Ortiz's suggestions    Risperidone has been discontinued    Invega order prepped    Routed to Dr. Ortiz for verification of accuracy and approval   Admission Reconciliation is Completed  Discharge Reconciliation is Not Complete Admission Reconciliation is Completed  Discharge Reconciliation is Completed

## 2024-03-19 NOTE — ED ADULT TRIAGE NOTE - ESI TRIAGE ACUITY LEVEL, MLM
----- Message from Kaitlynn Londono DO sent at 3/19/2024  3:34 PM EDT -----  Patient requested phone call for lab results:    If any additional questions recommend discuss at follow up visit with Zaina:    LDL was very slightly elevated (2 points above normal), reducing saturated and trans fats can help improve this.    Thyroid level was normal    A1c level was normal at 5.5 no sign of prediabetes or diabetes.    Urine test has not resulted yet.    Liver kidney and electrolytes normal.    Hepatitis C negative.    Blood counts normal.    
Called numbers in patients chart said it was restricted calls. Results are in patients mychart.  
2

## 2024-03-27 NOTE — DISCHARGE NOTE NURSING/CASE MANAGEMENT/SOCIAL WORK - NSCORESITESY/N_GEN_A_CORE_RD
Render In Strict Bullet Format?: No Continue Regimen: Triamcinolone 0.1% cream bid Detail Level: Zone No Initiate Treatment: dicloxacillin 500 mg capsule \\nQuantity: 28.0 Capsule\\nSig: Take one po bid x 14 days Detail Level: Simple

## 2024-06-06 NOTE — PROGRESS NOTE ADULT - PROBLEM SELECTOR PROBLEM 8
Problem: Potential for Falls  Goal: Patient will remain free of falls  Description: INTERVENTIONS:  - Educate patient/family on patient safety including physical limitations  - Instruct patient to call for assistance with activity   - Consult OT/PT to assist with strengthening/mobility   - Keep Call bell within reach  - Keep bed low and locked with side rails adjusted as appropriate  - Keep care items and personal belongings within reach  - Initiate and maintain comfort rounds  - Make Fall Risk Sign visible to staff  - Offer Toileting every 2 Hours, in advance of need  - Initiate/Maintain bed alarm  - Obtain necessary fall risk management equipment: bed alarm  - Apply yellow socks and bracelet for high fall risk patients  - Consider moving patient to room near nurses station  Outcome: Progressing     Problem: Prexisting or High Potential for Compromised Skin Integrity  Goal: Skin integrity is maintained or improved  Description: INTERVENTIONS:  - Identify patients at risk for skin breakdown  - Assess and monitor skin integrity  - Assess and monitor nutrition and hydration status  - Monitor labs   - Assess for incontinence   - Turn and reposition patient  - Assist with mobility/ambulation  - Relieve pressure over bony prominences  - Avoid friction and shearing  - Provide appropriate hygiene as needed including keeping skin clean and dry  - Evaluate need for skin moisturizer/barrier cream  - Collaborate with interdisciplinary team   - Patient/family teaching  - Consider wound care consult   Outcome: Progressing     Problem: PAIN - ADULT  Goal: Verbalizes/displays adequate comfort level or baseline comfort level  Description: Interventions:  - Encourage patient to monitor pain and request assistance  - Assess pain using appropriate pain scale  - Administer analgesics based on type and severity of pain and evaluate response  - Implement non-pharmacological measures as appropriate and evaluate response  - Consider  cultural and social influences on pain and pain management  - Notify physician/advanced practitioner if interventions unsuccessful or patient reports new pain  Outcome: Progressing     Problem: INFECTION - ADULT  Goal: Absence or prevention of progression during hospitalization  Description: INTERVENTIONS:  - Assess and monitor for signs and symptoms of infection  - Monitor lab/diagnostic results  - Monitor all insertion sites, i.e. indwelling lines, tubes, and drains  - Monitor endotracheal if appropriate and nasal secretions for changes in amount and color  - [place] appropriate cooling/warming therapies per order  - Administer medications as ordered  - Instruct and encourage patient and family to use good hand hygiene technique  - Identify and instruct in appropriate isolation precautions for identified infection/condition  Outcome: Progressing  Goal: Absence of fever/infection during neutropenic period  Description: INTERVENTIONS:  - Monitor WBC    Outcome: Progressing     Problem: SAFETY ADULT  Goal: Patient will remain free of falls  Description: INTERVENTIONS:  - Educate patient/family on patient safety including physical limitations  - Instruct patient to call for assistance with activity   - Consult OT/PT to assist with strengthening/mobility   - Keep Call bell within reach  - Keep bed low and locked with side rails adjusted as appropriate  - Keep care items and personal belongings within reach  - Initiate and maintain comfort rounds  - Make Fall Risk Sign visible to staff  - Offer Toileting every 2 Hours, in advance of need  - Initiate/Maintain bed alarm  - Obtain necessary fall risk management equipment: bed alarm  - Apply yellow socks and bracelet for high fall risk patients  - Consider moving patient to room near nurses station  Outcome: Progressing  Goal: Maintain or return to baseline ADL function  Description: INTERVENTIONS:  -  Assess patient's ability to carry out ADLs; assess patient's baseline  for ADL function and identify physical deficits which impact ability to perform ADLs (bathing, care of mouth/teeth, toileting, grooming, dressing, etc.)  - Assess/evaluate cause of self-care deficits   - Assess range of motion  - Assess patient's mobility; develop plan if impaired  - Assess patient's need for assistive devices and provide as appropriate  - Encourage maximum independence but intervene and supervise when necessary  - Involve family in performance of ADLs  - Assess for home care needs following discharge   - Consider OT consult to assist with ADL evaluation and planning for discharge  - Provide patient education as appropriate  Outcome: Progressing  Goal: Maintains/Returns to pre admission functional level  Description: INTERVENTIONS:  - Perform AM-PAC 6 Click Basic Mobility/ Daily Activity assessment daily.  - Set and communicate daily mobility goal to care team and patient/family/caregiver.   - Collaborate with rehabilitation services on mobility goals if consulted  - Perform Range of Motion 2 times a day.  - Reposition patient every 2 hours.  - Dangle patient 2 times a day  - Stand patient 2 times a day  - Ambulate patient 2 times a day  - Out of bed to chair 2 times a day   - Out of bed for meals 2 times a day  - Out of bed for toileting  - Record patient progress and toleration of activity level   Outcome: Progressing     Problem: DISCHARGE PLANNING  Goal: Discharge to home or other facility with appropriate resources  Description: INTERVENTIONS:  - Identify barriers to discharge w/patient and caregiver  - Arrange for needed discharge resources and transportation as appropriate  - Identify discharge learning needs (meds, wound care, etc.)  - Arrange for interpretive services to assist at discharge as needed  - Refer to Case Management Department for coordinating discharge planning if the patient needs post-hospital services based on physician/advanced practitioner order or complex needs related to  functional status, cognitive ability, or social support system  Outcome: Progressing     Problem: Knowledge Deficit  Goal: Patient/family/caregiver demonstrates understanding of disease process, treatment plan, medications, and discharge instructions  Description: Complete learning assessment and assess knowledge base.  Interventions:  - Provide teaching at level of understanding  - Provide teaching via preferred learning methods  Outcome: Progressing      Prophylactic measure

## 2024-06-10 NOTE — ED ADULT TRIAGE NOTE - NS ED TRIAGE CLINICAL UPGRADE
Wife of the patient was calling in regards to the status of the medication refill.     Advised her that a Prior Auth from the insurance was needed. The office started the process and it can take 7 to 21 business days.     Advised her to contact her insurance with further questions regarding their timeline   Deteriorating patient status - Patient was clinically upgraded due to deteriorating patient status.

## 2024-08-14 NOTE — ED ADULT NURSE NOTE - FINAL NURSING ELECTRONIC SIGNATURE
Pre-Operative Diagnosis: Perioperative dehiscence of abdominal wound with evisceration [T81.31XA]     Post-Operative Diagnosis: Perioperative dehiscence of abdominal wound with evisceration [T81.31XA]      Procedure Performed:   EXPLORATORY LAPAROTOMY WITH EVACUATION OF OF INTRA-ABDOMINAL HEMATOMA, SMALL BOWEL RESECTION, REPAIR OF ENTEROTOMY    Surgeons and Role:     * Nikkie Chavez MD - Primary    Assistant(s):  PA: Mary Moya PA; Christi Haynes PA-C     Surgical Findings: multiple lacerations and injury to small bowel,      Specimen: small bowel ileal resection of approx 80cm with ulcerations and lacerations, primary anastomosis just proximal to the ileocecal valve, enterotomy repaired mid- jejunum     Estimated Blood Loss: Blood Output: 50 mL (8/14/2024  1:17 PM)      Dictation Number:      Nikkie Chavez MD  8/14/2024  5:02 PM           09-Nov-2020 19:15

## 2024-11-26 NOTE — ED ADULT NURSE NOTE - NSFALLRSKASSESSDT_ED_ALL_ED
[de-identified] : IAC w/wo on 10/7/2024 @ [facility]  multilobulated residual enhancing lesions within the right internal auditory canal/CPA measuring 1.3 x 0.7 x 0.6 cm slightly decreased in size compared to previous MRI in 2016 [de-identified] : head/neck wo on 10/11/24 @ [facility]  patent arterial vasculature of the head and neck. No high grade stenosis, occlusion. No evidence of dissection, malformation or aneurysm. [de-identified] : MRV on 10/29/24 @ [facility] no evidence of flow related signal contrast enhancement within the right sigmoid sinus and right internal jugular vein. Diminutive caliber of the right transverse sinus given associated postoperative changes, these findings may reflect postoperative sinus occlusion. This siminutive appearance appears to be stable dating back to 2017. 24-Aug-2022 16:10

## 2024-12-15 NOTE — DIETITIAN INITIAL EVALUATION ADULT. - FACTORS AFF FOOD INTAKE
Problem: PHYSICAL THERAPY ADULT  Goal: Performs mobility at highest level of function for planned discharge setting.  See evaluation for individualized goals.  Description: Treatment/Interventions: ADL retraining, Functional transfer training, LE strengthening/ROM, Elevations, Therapeutic exercise, Endurance training, Patient/family training, Equipment eval/education, Bed mobility, Gait training, Compensatory technique education, Spoke to nursing, Spoke to case management          See flowsheet documentation for full assessment, interventions and recommendations.  Note: Prognosis: Good  Problem List: Decreased strength, Decreased endurance, Impaired balance, Decreased mobility, Decreased cognition, Decreased safety awareness, Impaired judgement, Obesity, Decreased skin integrity, Pain  Assessment: Pt is a 79 y.o. male seen for PT evaluation s/p admission to [facility] on 12/14/2024 with Fall.  Order placed for PT services.  Upon evaluation: Pt is presenting with impaired functional mobility due to pain, decreased strength, decreased endurance, impaired balance, gait deviations, impaired cognition, decreased safety awareness, impaired judgment, fall risk, LE edema, and impaired skin integrity requiring  moderate assistance for bed mobility, minimal to maximal assistance for transfers, and moderate assistance for ambulation with out AD . Pt's clinical presentation is currently unpredictable given the functional mobility deficits above, especially weakness, edema of extremities, decreased skin integrity, decreased endurance, impaired balance, gait deviations, pain, decreased activity tolerance, decreased functional mobility tolerance, decreased safety awareness, impaired judgement, and decreased cognition, coupled with fall risks as indicated by AM-PAC 6-Clicks: 14/24 as well as hx of falls, impaired balance, polypharmacy, impaired judgement, decreased safety awareness, decreased cognition, and  obesity and combined with medical complications of abnormal blood sugars, abnormal sodium values, and need for input for mobility technique/safety, recent ED visit 12/10/24 due to LE edema.  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 Personal factors affecting pt at time of IE include: step(s) to enter environment, limited home support, inability to perform IADLs, inability to perform ADLs, inability to navigate level surfaces without external assistance, inability to ambulate household distances, limited insight into impairments, and recent fall(s)/fall history. Pt will benefit from continued skilled PT services to address deficits as defined above and to maximize level of functional mobility to facilitate return toward PLOF and improved QOL. From PT/mobility standpoint, recommendation at time of d/c would be Level II (Moderate Resource Intensity in order to reduce fall risk and maximize pt's functional independence and consistency with mobility. Recommend trial with walker next 1-2 sessions and ther ex next 1-2 sessions.  Barriers to Discharge: Decreased caregiver support, Inaccessible home environment  Barriers to Discharge Comments: requires assistance to complete mobility, recent falls, increased fall risk, safety concerns with impaired cognition  Rehab Resource Intensity Level, PT: II (Moderate Resource Intensity) (PAR)    See flowsheet documentation for full assessment.         persistent lack of appetite

## 2025-01-17 NOTE — ED ADULT NURSE NOTE - NSFALLRSKHARMRISK_ED_ALL_ED
